# Patient Record
Sex: MALE | Race: WHITE | URBAN - NONMETROPOLITAN AREA
[De-identification: names, ages, dates, MRNs, and addresses within clinical notes are randomized per-mention and may not be internally consistent; named-entity substitution may affect disease eponyms.]

---

## 2017-03-17 ENCOUNTER — DOCTOR'S OFFICE (OUTPATIENT)
Dept: URBAN - NONMETROPOLITAN AREA CLINIC 1 | Facility: CLINIC | Age: 52
Setting detail: OPHTHALMOLOGY
End: 2017-03-17
Payer: COMMERCIAL

## 2017-03-17 DIAGNOSIS — H44.23: ICD-10-CM

## 2017-03-17 DIAGNOSIS — H04.123: ICD-10-CM

## 2017-03-17 DIAGNOSIS — H33.22: ICD-10-CM

## 2017-03-17 DIAGNOSIS — H43.813: ICD-10-CM

## 2017-03-17 PROCEDURE — 92134 CPTRZ OPH DX IMG PST SGM RTA: CPT | Performed by: OPHTHALMOLOGY

## 2017-03-17 PROCEDURE — 76512 OPH US DX B-SCAN: CPT | Performed by: OPHTHALMOLOGY

## 2017-03-17 PROCEDURE — 92226 OPHTHALMOSCOPY EXT SUBSEQUENT: CPT | Performed by: OPHTHALMOLOGY

## 2017-03-17 PROCEDURE — 92014 COMPRE OPH EXAM EST PT 1/>: CPT | Performed by: OPHTHALMOLOGY

## 2017-03-17 ASSESSMENT — REFRACTION_CURRENTRX
OS_OVR_VA: 20/
OD_OVR_VA: 20/
OD_OVR_VA: 20/
OS_OVR_VA: 20/
OS_OVR_VA: 20/
OD_OVR_VA: 20/

## 2017-03-17 ASSESSMENT — REFRACTION_MANIFEST
OS_VA2: 20/
OS_VA1: 20/
OD_VA1: 20/
OS_VA3: 20/
OD_VA2: 20/
OS_VA3: 20/
OU_VA: 20/
OU_VA: 20/
OS_VA3: 20/
OS_VA1: 20/
OD_VA1: 20/
OU_VA: 20/
OD_VA3: 20/
OS_VA1: 20/
OS_VA2: 20/
OS_VA2: 20/
OD_VA2: 20/
OD_VA3: 20/
OD_VA3: 20/
OD_VA1: 20/
OD_VA2: 20/

## 2017-03-17 ASSESSMENT — VISUAL ACUITY
OD_BCVA: 20/CFX2'
OS_BCVA: 20/25-2

## 2017-03-17 ASSESSMENT — SPHEQUIV_DERIVED
OD_SPHEQUIV: 1
OS_SPHEQUIV: 0.25

## 2017-03-17 ASSESSMENT — TEAR BREAK UP TIME (TBUT)
OD_TBUT: T
OS_TBUT: T

## 2017-03-17 ASSESSMENT — SUPERFICIAL PUNCTATE KERATITIS (SPK)
OD_SPK: ABSENT
OS_SPK: 1+

## 2017-03-17 ASSESSMENT — CONFRONTATIONAL VISUAL FIELD TEST (CVF)
OD_FINDINGS: FULL
OS_FINDINGS: FULL

## 2017-03-17 ASSESSMENT — REFRACTION_AUTOREFRACTION
OS_AXIS: 38
OD_CYLINDER: -1.00
OD_SPHERE: +1.50
OS_SPHERE: +0.75
OS_CYLINDER: -1.00
OD_AXIS: 6

## 2017-06-19 ENCOUNTER — DOCTOR'S OFFICE (OUTPATIENT)
Dept: URBAN - NONMETROPOLITAN AREA CLINIC 1 | Facility: CLINIC | Age: 52
Setting detail: OPHTHALMOLOGY
End: 2017-06-19
Payer: COMMERCIAL

## 2017-06-19 DIAGNOSIS — H43.811: ICD-10-CM

## 2017-06-19 DIAGNOSIS — H43.812: ICD-10-CM

## 2017-06-19 DIAGNOSIS — H33.22: ICD-10-CM

## 2017-06-19 DIAGNOSIS — H43.813: ICD-10-CM

## 2017-06-19 DIAGNOSIS — H04.123: ICD-10-CM

## 2017-06-19 DIAGNOSIS — H44.23: ICD-10-CM

## 2017-06-19 PROCEDURE — 92014 COMPRE OPH EXAM EST PT 1/>: CPT | Performed by: OPHTHALMOLOGY

## 2017-06-19 PROCEDURE — 92226 OPHTHALMOSCOPY EXT SUBSEQUENT: CPT | Performed by: OPHTHALMOLOGY

## 2017-06-19 PROCEDURE — 92134 CPTRZ OPH DX IMG PST SGM RTA: CPT | Performed by: OPHTHALMOLOGY

## 2017-06-19 ASSESSMENT — REFRACTION_MANIFEST
OD_VA2: 20/
OS_VA1: 20/
OU_VA: 20/
OD_VA1: 20/
OS_VA2: 20/
OD_VA2: 20/
OS_VA3: 20/
OS_VA2: 20/
OD_VA1: 20/
OD_VA3: 20/
OU_VA: 20/
OU_VA: 20/
OS_VA3: 20/
OD_VA3: 20/
OS_VA3: 20/
OS_VA2: 20/
OD_VA2: 20/
OD_VA1: 20/
OS_VA1: 20/
OS_VA1: 20/
OD_VA3: 20/

## 2017-06-19 ASSESSMENT — REFRACTION_AUTOREFRACTION
OD_CYLINDER: -1.00
OS_SPHERE: +0.75
OD_SPHERE: +1.50
OD_AXIS: 6
OS_CYLINDER: -1.00
OS_AXIS: 38

## 2017-06-19 ASSESSMENT — VISUAL ACUITY
OS_BCVA: 20/30-2
OD_BCVA: CF@FACE

## 2017-06-19 ASSESSMENT — SPHEQUIV_DERIVED
OD_SPHEQUIV: 1
OS_SPHEQUIV: 0.25

## 2017-06-19 ASSESSMENT — REFRACTION_CURRENTRX
OS_OVR_VA: 20/
OS_OVR_VA: 20/
OD_OVR_VA: 20/
OS_OVR_VA: 20/
OD_OVR_VA: 20/
OD_OVR_VA: 20/

## 2017-06-19 ASSESSMENT — CONFRONTATIONAL VISUAL FIELD TEST (CVF)
OD_FINDINGS: FULL
OS_FINDINGS: FULL

## 2017-06-19 ASSESSMENT — TEAR BREAK UP TIME (TBUT)
OD_TBUT: T
OS_TBUT: T

## 2017-06-19 ASSESSMENT — SUPERFICIAL PUNCTATE KERATITIS (SPK)
OD_SPK: ABSENT
OS_SPK: 1+

## 2017-10-05 ENCOUNTER — DOCTOR'S OFFICE (OUTPATIENT)
Dept: URBAN - NONMETROPOLITAN AREA CLINIC 1 | Facility: CLINIC | Age: 52
Setting detail: OPHTHALMOLOGY
End: 2017-10-05
Payer: COMMERCIAL

## 2017-10-05 DIAGNOSIS — H43.813: ICD-10-CM

## 2017-10-05 DIAGNOSIS — H33.22: ICD-10-CM

## 2017-10-05 DIAGNOSIS — H44.23: ICD-10-CM

## 2017-10-05 DIAGNOSIS — H04.123: ICD-10-CM

## 2017-10-05 DIAGNOSIS — H43.811: ICD-10-CM

## 2017-10-05 DIAGNOSIS — H43.812: ICD-10-CM

## 2017-10-05 PROCEDURE — 92226 OPHTHALMOSCOPY EXT SUBSEQUENT: CPT | Performed by: OPHTHALMOLOGY

## 2017-10-05 PROCEDURE — 92134 CPTRZ OPH DX IMG PST SGM RTA: CPT | Performed by: OPHTHALMOLOGY

## 2017-10-05 PROCEDURE — 92014 COMPRE OPH EXAM EST PT 1/>: CPT | Performed by: OPHTHALMOLOGY

## 2017-10-05 ASSESSMENT — SPHEQUIV_DERIVED
OD_SPHEQUIV: 1
OS_SPHEQUIV: 0.25

## 2017-10-05 ASSESSMENT — REFRACTION_MANIFEST
OD_VA1: 20/
OS_VA2: 20/
OS_VA3: 20/
OS_VA2: 20/
OD_VA2: 20/
OS_VA1: 20/
OU_VA: 20/
OS_VA3: 20/
OD_VA3: 20/
OS_VA1: 20/
OD_VA1: 20/
OD_VA2: 20/
OS_VA1: 20/
OU_VA: 20/
OD_VA3: 20/
OS_VA2: 20/
OU_VA: 20/
OS_VA3: 20/
OD_VA3: 20/
OD_VA1: 20/
OD_VA2: 20/

## 2017-10-05 ASSESSMENT — REFRACTION_AUTOREFRACTION
OD_SPHERE: +1.50
OD_CYLINDER: -1.00
OS_AXIS: 38
OS_SPHERE: +0.75
OD_AXIS: 6
OS_CYLINDER: -1.00

## 2017-10-05 ASSESSMENT — REFRACTION_CURRENTRX
OD_OVR_VA: 20/
OS_OVR_VA: 20/
OD_OVR_VA: 20/
OS_OVR_VA: 20/
OS_OVR_VA: 20/
OD_OVR_VA: 20/

## 2017-10-05 ASSESSMENT — CONFRONTATIONAL VISUAL FIELD TEST (CVF)
OD_FINDINGS: FULL
OS_FINDINGS: FULL

## 2017-10-05 ASSESSMENT — TEAR BREAK UP TIME (TBUT)
OS_TBUT: T
OD_TBUT: T

## 2017-10-05 ASSESSMENT — SUPERFICIAL PUNCTATE KERATITIS (SPK)
OD_SPK: ABSENT
OS_SPK: 1+

## 2017-10-05 ASSESSMENT — VISUAL ACUITY
OD_BCVA: CF@FACE
OS_BCVA: 20/25-1

## 2017-11-21 ENCOUNTER — DOCTOR'S OFFICE (OUTPATIENT)
Dept: URBAN - NONMETROPOLITAN AREA CLINIC 2 | Facility: CLINIC | Age: 52
Setting detail: OPHTHALMOLOGY
End: 2017-11-21
Payer: COMMERCIAL

## 2017-11-21 DIAGNOSIS — H44.23: ICD-10-CM

## 2017-11-21 DIAGNOSIS — H04.123: ICD-10-CM

## 2017-11-21 DIAGNOSIS — H33.22: ICD-10-CM

## 2017-11-21 DIAGNOSIS — H10.503: ICD-10-CM

## 2017-11-21 DIAGNOSIS — H43.813: ICD-10-CM

## 2017-11-21 PROCEDURE — 92014 COMPRE OPH EXAM EST PT 1/>: CPT | Performed by: OPHTHALMOLOGY

## 2017-11-21 ASSESSMENT — REFRACTION_MANIFEST
OS_VA1: 20/
OD_VA1: 20/
OU_VA: 20/
OS_VA3: 20/
OS_VA3: 20/
OD_VA2: 20/
OS_VA2: 20/
OS_VA1: 20/
OD_VA3: 20/
OS_VA2: 20/
OS_VA2: 20/
OD_VA1: 20/
OU_VA: 20/
OD_VA3: 20/
OU_VA: 20/
OS_VA1: 20/
OD_VA2: 20/
OD_VA1: 20/
OS_VA3: 20/
OD_VA3: 20/
OD_VA2: 20/

## 2017-11-21 ASSESSMENT — REFRACTION_CURRENTRX
OD_OVR_VA: 20/
OS_OVR_VA: 20/
OD_OVR_VA: 20/
OD_OVR_VA: 20/
OS_OVR_VA: 20/
OS_OVR_VA: 20/

## 2017-11-21 ASSESSMENT — VISUAL ACUITY
OD_BCVA: CF@FACE
OS_BCVA: 20/30-1

## 2017-11-21 ASSESSMENT — SPHEQUIV_DERIVED
OD_SPHEQUIV: 1
OS_SPHEQUIV: 0.25

## 2017-11-21 ASSESSMENT — TEAR BREAK UP TIME (TBUT)
OS_TBUT: T
OD_TBUT: T

## 2017-11-21 ASSESSMENT — REFRACTION_AUTOREFRACTION
OS_AXIS: 38
OD_CYLINDER: -1.00
OD_AXIS: 6
OS_CYLINDER: -1.00
OD_SPHERE: +1.50
OS_SPHERE: +0.75

## 2017-11-21 ASSESSMENT — CONFRONTATIONAL VISUAL FIELD TEST (CVF)
OS_FINDINGS: FULL
OD_FINDINGS: FULL

## 2017-11-21 ASSESSMENT — SUPERFICIAL PUNCTATE KERATITIS (SPK)
OD_SPK: ABSENT
OS_SPK: 1+

## 2017-11-27 ENCOUNTER — RX ONLY (RX ONLY)
Age: 52
End: 2017-11-27

## 2017-11-27 ENCOUNTER — DOCTOR'S OFFICE (OUTPATIENT)
Dept: URBAN - NONMETROPOLITAN AREA CLINIC 1 | Facility: CLINIC | Age: 52
Setting detail: OPHTHALMOLOGY
End: 2017-11-27
Payer: COMMERCIAL

## 2017-11-27 DIAGNOSIS — H04.123: ICD-10-CM

## 2017-11-27 DIAGNOSIS — H10.501: ICD-10-CM

## 2017-11-27 DIAGNOSIS — H43.813: ICD-10-CM

## 2017-11-27 DIAGNOSIS — H10.502: ICD-10-CM

## 2017-11-27 DIAGNOSIS — H33.22: ICD-10-CM

## 2017-11-27 DIAGNOSIS — H44.23: ICD-10-CM

## 2017-11-27 PROCEDURE — 99213 OFFICE O/P EST LOW 20 MIN: CPT | Performed by: OPHTHALMOLOGY

## 2017-11-27 ASSESSMENT — REFRACTION_AUTOREFRACTION
OS_AXIS: 38
OS_SPHERE: +0.75
OD_CYLINDER: -1.00
OS_CYLINDER: -1.00
OD_AXIS: 6
OD_SPHERE: +1.50

## 2017-11-27 ASSESSMENT — REFRACTION_MANIFEST
OD_VA3: 20/
OS_VA2: 20/
OS_VA1: 20/
OU_VA: 20/
OD_VA2: 20/
OU_VA: 20/
OS_VA3: 20/
OS_VA2: 20/
OD_VA1: 20/
OS_VA1: 20/
OD_VA1: 20/
OD_VA3: 20/
OU_VA: 20/
OS_VA2: 20/
OD_VA2: 20/
OD_VA3: 20/
OS_VA1: 20/
OS_VA3: 20/
OD_VA2: 20/
OD_VA1: 20/
OS_VA3: 20/

## 2017-11-27 ASSESSMENT — SPHEQUIV_DERIVED
OD_SPHEQUIV: 1
OS_SPHEQUIV: 0.25

## 2017-11-27 ASSESSMENT — VISUAL ACUITY
OD_BCVA: HM
OS_BCVA: 20/25-1

## 2017-11-27 ASSESSMENT — REFRACTION_CURRENTRX
OS_OVR_VA: 20/
OD_OVR_VA: 20/
OS_OVR_VA: 20/
OD_OVR_VA: 20/
OS_OVR_VA: 20/
OD_OVR_VA: 20/

## 2017-11-27 ASSESSMENT — SUPERFICIAL PUNCTATE KERATITIS (SPK)
OD_SPK: ABSENT
OS_SPK: 1+

## 2017-11-27 ASSESSMENT — TEAR BREAK UP TIME (TBUT)
OD_TBUT: T
OS_TBUT: T

## 2017-11-27 ASSESSMENT — CONFRONTATIONAL VISUAL FIELD TEST (CVF)
OD_FINDINGS: FULL
OS_FINDINGS: FULL

## 2017-12-11 ENCOUNTER — DOCTOR'S OFFICE (OUTPATIENT)
Dept: URBAN - NONMETROPOLITAN AREA CLINIC 1 | Facility: CLINIC | Age: 52
Setting detail: OPHTHALMOLOGY
End: 2017-12-11
Payer: COMMERCIAL

## 2017-12-11 DIAGNOSIS — H44.23: ICD-10-CM

## 2017-12-11 DIAGNOSIS — H43.813: ICD-10-CM

## 2017-12-11 DIAGNOSIS — H33.22: ICD-10-CM

## 2017-12-11 DIAGNOSIS — H04.123: ICD-10-CM

## 2017-12-11 PROCEDURE — 92012 INTRM OPH EXAM EST PATIENT: CPT | Performed by: OPHTHALMOLOGY

## 2017-12-11 ASSESSMENT — REFRACTION_CURRENTRX
OD_OVR_VA: 20/
OS_OVR_VA: 20/
OD_OVR_VA: 20/
OD_OVR_VA: 20/

## 2017-12-11 ASSESSMENT — REFRACTION_MANIFEST
OU_VA: 20/
OD_VA3: 20/
OS_VA2: 20/
OD_VA2: 20/
OS_VA3: 20/
OS_VA1: 20/
OS_VA2: 20/
OS_VA1: 20/
OD_VA2: 20/
OD_VA3: 20/
OD_VA1: 20/
OS_VA1: 20/
OD_VA2: 20/
OU_VA: 20/
OD_VA1: 20/
OS_VA3: 20/
OU_VA: 20/
OS_VA3: 20/
OD_VA1: 20/
OD_VA3: 20/
OS_VA2: 20/

## 2017-12-11 ASSESSMENT — REFRACTION_AUTOREFRACTION
OS_AXIS: 38
OS_SPHERE: +0.75
OD_AXIS: 6
OD_CYLINDER: -1.00
OS_CYLINDER: -1.00
OD_SPHERE: +1.50

## 2017-12-11 ASSESSMENT — SPHEQUIV_DERIVED
OD_SPHEQUIV: 1
OS_SPHEQUIV: 0.25

## 2017-12-11 ASSESSMENT — VISUAL ACUITY
OS_BCVA: 20/30
OD_BCVA: HM

## 2017-12-11 ASSESSMENT — SUPERFICIAL PUNCTATE KERATITIS (SPK)
OD_SPK: ABSENT
OS_SPK: 1+

## 2017-12-11 ASSESSMENT — TEAR BREAK UP TIME (TBUT)
OD_TBUT: T
OS_TBUT: T

## 2017-12-11 ASSESSMENT — CONFRONTATIONAL VISUAL FIELD TEST (CVF)
OS_FINDINGS: FULL
OD_FINDINGS: FULL

## 2017-12-21 ENCOUNTER — DOCTOR'S OFFICE (OUTPATIENT)
Dept: URBAN - NONMETROPOLITAN AREA CLINIC 1 | Facility: CLINIC | Age: 52
Setting detail: OPHTHALMOLOGY
End: 2017-12-21
Payer: COMMERCIAL

## 2017-12-21 DIAGNOSIS — H43.813: ICD-10-CM

## 2017-12-21 DIAGNOSIS — H10.503: ICD-10-CM

## 2017-12-21 PROCEDURE — 92012 INTRM OPH EXAM EST PATIENT: CPT | Performed by: OPHTHALMOLOGY

## 2017-12-21 ASSESSMENT — VISUAL ACUITY
OS_BCVA: 20/50+2
OD_BCVA: CF

## 2017-12-21 ASSESSMENT — SUPERFICIAL PUNCTATE KERATITIS (SPK)
OS_SPK: 1+
OD_SPK: ABSENT

## 2017-12-21 ASSESSMENT — REFRACTION_MANIFEST
OS_VA3: 20/
OS_VA3: 20/
OS_VA1: 20/
OS_VA2: 20/
OS_VA2: 20/
OD_VA2: 20/
OD_VA1: 20/
OD_VA1: 20/
OD_VA2: 20/
OD_VA2: 20/
OU_VA: 20/
OD_VA3: 20/
OU_VA: 20/
OD_VA1: 20/
OS_VA1: 20/
OD_VA3: 20/
OS_VA1: 20/
OS_VA2: 20/
OS_VA3: 20/
OU_VA: 20/
OD_VA3: 20/

## 2017-12-21 ASSESSMENT — TEAR BREAK UP TIME (TBUT)
OD_TBUT: T
OS_TBUT: T

## 2017-12-21 ASSESSMENT — REFRACTION_CURRENTRX
OD_OVR_VA: 20/
OS_OVR_VA: 20/
OD_OVR_VA: 20/
OS_OVR_VA: 20/
OD_OVR_VA: 20/
OS_OVR_VA: 20/

## 2017-12-21 ASSESSMENT — CONFRONTATIONAL VISUAL FIELD TEST (CVF)
OD_FINDINGS: FULL
OS_FINDINGS: FULL

## 2017-12-21 ASSESSMENT — REFRACTION_AUTOREFRACTION
OD_SPHERE: +1.50
OD_AXIS: 6
OS_AXIS: 38
OD_CYLINDER: -1.00
OS_CYLINDER: -1.00
OS_SPHERE: +0.75

## 2017-12-21 ASSESSMENT — SPHEQUIV_DERIVED
OS_SPHEQUIV: 0.25
OD_SPHEQUIV: 1

## 2018-01-11 ENCOUNTER — DOCTOR'S OFFICE (OUTPATIENT)
Dept: URBAN - NONMETROPOLITAN AREA CLINIC 1 | Facility: CLINIC | Age: 53
Setting detail: OPHTHALMOLOGY
End: 2018-01-11
Payer: COMMERCIAL

## 2018-01-11 DIAGNOSIS — H43.813: ICD-10-CM

## 2018-01-11 DIAGNOSIS — H43.812: ICD-10-CM

## 2018-01-11 DIAGNOSIS — S05.12XA: ICD-10-CM

## 2018-01-11 DIAGNOSIS — H33.22: ICD-10-CM

## 2018-01-11 DIAGNOSIS — H43.811: ICD-10-CM

## 2018-01-11 DIAGNOSIS — H10.503: ICD-10-CM

## 2018-01-11 PROCEDURE — 92014 COMPRE OPH EXAM EST PT 1/>: CPT | Performed by: OPHTHALMOLOGY

## 2018-01-11 PROCEDURE — 92134 CPTRZ OPH DX IMG PST SGM RTA: CPT | Performed by: OPHTHALMOLOGY

## 2018-01-11 PROCEDURE — 92226 OPHTHALMOSCOPY EXT SUBSEQUENT: CPT | Performed by: OPHTHALMOLOGY

## 2018-01-11 ASSESSMENT — REFRACTION_MANIFEST
OD_VA1: 20/
OD_VA3: 20/
OU_VA: 20/
OS_VA3: 20/
OD_VA1: 20/
OU_VA: 20/
OD_VA2: 20/
OS_VA1: 20/
OS_VA2: 20/
OD_VA2: 20/
OS_VA2: 20/
OS_VA1: 20/
OS_VA3: 20/
OS_VA2: 20/
OS_VA1: 20/
OU_VA: 20/
OD_VA3: 20/
OS_VA3: 20/
OD_VA3: 20/
OD_VA1: 20/
OD_VA2: 20/

## 2018-01-11 ASSESSMENT — CONFRONTATIONAL VISUAL FIELD TEST (CVF)
OD_FINDINGS: FULL
OS_FINDINGS: FULL

## 2018-01-11 ASSESSMENT — TEAR BREAK UP TIME (TBUT)
OS_TBUT: T
OD_TBUT: T

## 2018-01-11 ASSESSMENT — REFRACTION_AUTOREFRACTION
OS_AXIS: 38
OD_CYLINDER: -1.00
OS_SPHERE: +0.75
OD_SPHERE: +1.50
OD_AXIS: 6
OS_CYLINDER: -1.00

## 2018-01-11 ASSESSMENT — VISUAL ACUITY
OS_BCVA: 20/30-2
OD_BCVA: 20/CF XS 3'

## 2018-01-11 ASSESSMENT — REFRACTION_CURRENTRX
OS_OVR_VA: 20/
OS_OVR_VA: 20/
OD_OVR_VA: 20/
OS_OVR_VA: 20/
OD_OVR_VA: 20/
OD_OVR_VA: 20/

## 2018-01-11 ASSESSMENT — SUPERFICIAL PUNCTATE KERATITIS (SPK)
OD_SPK: ABSENT
OS_SPK: 1+

## 2018-01-11 ASSESSMENT — SPHEQUIV_DERIVED
OD_SPHEQUIV: 1
OS_SPHEQUIV: 0.25

## 2018-01-18 ENCOUNTER — RX ONLY (RX ONLY)
Age: 53
End: 2018-01-18

## 2018-01-25 ENCOUNTER — DOCTOR'S OFFICE (OUTPATIENT)
Dept: URBAN - NONMETROPOLITAN AREA CLINIC 1 | Facility: CLINIC | Age: 53
Setting detail: OPHTHALMOLOGY
End: 2018-01-25
Payer: COMMERCIAL

## 2018-01-25 DIAGNOSIS — H10.503: ICD-10-CM

## 2018-01-25 DIAGNOSIS — H43.813: ICD-10-CM

## 2018-01-25 DIAGNOSIS — S05.12XA: ICD-10-CM

## 2018-01-25 DIAGNOSIS — H33.22: ICD-10-CM

## 2018-01-25 PROCEDURE — 92014 COMPRE OPH EXAM EST PT 1/>: CPT | Performed by: OPHTHALMOLOGY

## 2018-01-25 ASSESSMENT — REFRACTION_AUTOREFRACTION
OS_SPHERE: +0.75
OD_SPHERE: +1.50
OD_CYLINDER: -1.00
OS_AXIS: 38
OS_CYLINDER: -1.00
OD_AXIS: 6

## 2018-01-25 ASSESSMENT — TEAR BREAK UP TIME (TBUT)
OS_TBUT: T
OD_TBUT: T

## 2018-01-25 ASSESSMENT — REFRACTION_MANIFEST
OS_VA1: 20/
OD_VA2: 20/
OD_VA1: 20/
OD_VA3: 20/
OU_VA: 20/
OS_VA3: 20/
OS_VA3: 20/
OD_VA2: 20/
OS_VA2: 20/
OD_VA3: 20/
OS_VA1: 20/
OS_VA3: 20/
OD_VA2: 20/
OS_VA2: 20/
OD_VA3: 20/
OD_VA1: 20/
OU_VA: 20/
OS_VA2: 20/
OU_VA: 20/
OD_VA1: 20/
OS_VA1: 20/

## 2018-01-25 ASSESSMENT — REFRACTION_CURRENTRX
OS_OVR_VA: 20/
OD_OVR_VA: 20/
OD_OVR_VA: 20/
OS_OVR_VA: 20/
OD_OVR_VA: 20/
OS_OVR_VA: 20/

## 2018-01-25 ASSESSMENT — CONFRONTATIONAL VISUAL FIELD TEST (CVF)
OD_FINDINGS: FULL
OS_FINDINGS: FULL

## 2018-01-25 ASSESSMENT — SPHEQUIV_DERIVED
OD_SPHEQUIV: 1
OS_SPHEQUIV: 0.25

## 2018-01-25 ASSESSMENT — SUPERFICIAL PUNCTATE KERATITIS (SPK)
OS_SPK: 1+
OD_SPK: ABSENT

## 2018-01-25 ASSESSMENT — VISUAL ACUITY
OD_BCVA: 20/CF XS 3'
OS_BCVA: 20/25-2

## 2018-02-19 ENCOUNTER — DOCTOR'S OFFICE (OUTPATIENT)
Dept: URBAN - NONMETROPOLITAN AREA CLINIC 1 | Facility: CLINIC | Age: 53
Setting detail: OPHTHALMOLOGY
End: 2018-02-19
Payer: COMMERCIAL

## 2018-02-19 DIAGNOSIS — H43.812: ICD-10-CM

## 2018-02-19 DIAGNOSIS — H33.22: ICD-10-CM

## 2018-02-19 DIAGNOSIS — S05.12XA: ICD-10-CM

## 2018-02-19 DIAGNOSIS — H43.813: ICD-10-CM

## 2018-02-19 DIAGNOSIS — H10.502: ICD-10-CM

## 2018-02-19 DIAGNOSIS — H10.501: ICD-10-CM

## 2018-02-19 DIAGNOSIS — H43.811: ICD-10-CM

## 2018-02-19 PROCEDURE — 92226 OPHTHALMOSCOPY EXT SUBSEQUENT: CPT | Performed by: OPHTHALMOLOGY

## 2018-02-19 PROCEDURE — 92014 COMPRE OPH EXAM EST PT 1/>: CPT | Performed by: OPHTHALMOLOGY

## 2018-02-19 ASSESSMENT — REFRACTION_AUTOREFRACTION
OS_AXIS: 38
OD_CYLINDER: -1.00
OS_SPHERE: +0.75
OD_SPHERE: +1.50
OS_CYLINDER: -1.00
OD_AXIS: 6

## 2018-02-19 ASSESSMENT — REFRACTION_MANIFEST
OS_VA3: 20/
OU_VA: 20/
OD_VA1: 20/
OS_VA3: 20/
OD_VA2: 20/
OD_VA2: 20/
OS_VA1: 20/
OS_VA1: 20/
OD_VA2: 20/
OD_VA1: 20/
OS_VA3: 20/
OU_VA: 20/
OS_VA2: 20/
OD_VA3: 20/
OU_VA: 20/
OS_VA2: 20/
OD_VA3: 20/
OS_VA2: 20/
OS_VA1: 20/
OD_VA3: 20/
OD_VA1: 20/

## 2018-02-19 ASSESSMENT — REFRACTION_CURRENTRX
OD_OVR_VA: 20/
OS_OVR_VA: 20/
OS_OVR_VA: 20/
OD_OVR_VA: 20/
OS_OVR_VA: 20/
OD_OVR_VA: 20/

## 2018-02-19 ASSESSMENT — TEAR BREAK UP TIME (TBUT)
OS_TBUT: T
OD_TBUT: T

## 2018-02-19 ASSESSMENT — CONFRONTATIONAL VISUAL FIELD TEST (CVF)
OD_FINDINGS: FULL
OS_FINDINGS: FULL

## 2018-02-19 ASSESSMENT — VISUAL ACUITY: OS_BCVA: 20/30-2

## 2018-02-19 ASSESSMENT — SUPERFICIAL PUNCTATE KERATITIS (SPK)
OS_SPK: 1+
OD_SPK: ABSENT

## 2018-02-19 ASSESSMENT — SPHEQUIV_DERIVED
OD_SPHEQUIV: 1
OS_SPHEQUIV: 0.25

## 2018-05-17 ENCOUNTER — DOCTOR'S OFFICE (OUTPATIENT)
Dept: URBAN - NONMETROPOLITAN AREA CLINIC 1 | Facility: CLINIC | Age: 53
Setting detail: OPHTHALMOLOGY
End: 2018-05-17
Payer: COMMERCIAL

## 2018-05-17 DIAGNOSIS — H10.502: ICD-10-CM

## 2018-05-17 DIAGNOSIS — H10.501: ICD-10-CM

## 2018-05-17 DIAGNOSIS — H43.811: ICD-10-CM

## 2018-05-17 DIAGNOSIS — H43.813: ICD-10-CM

## 2018-05-17 DIAGNOSIS — H43.812: ICD-10-CM

## 2018-05-17 DIAGNOSIS — H04.123: ICD-10-CM

## 2018-05-17 DIAGNOSIS — S05.12XA: ICD-10-CM

## 2018-05-17 DIAGNOSIS — H33.22: ICD-10-CM

## 2018-05-17 PROCEDURE — 92226 OPHTHALMOSCOPY EXT SUBSEQUENT: CPT | Performed by: OPHTHALMOLOGY

## 2018-05-17 PROCEDURE — 92014 COMPRE OPH EXAM EST PT 1/>: CPT | Performed by: OPHTHALMOLOGY

## 2018-05-17 PROCEDURE — 92134 CPTRZ OPH DX IMG PST SGM RTA: CPT | Performed by: OPHTHALMOLOGY

## 2018-05-17 ASSESSMENT — SUPERFICIAL PUNCTATE KERATITIS (SPK)
OD_SPK: ABSENT
OS_SPK: 1+

## 2018-05-17 ASSESSMENT — CONFRONTATIONAL VISUAL FIELD TEST (CVF)
OD_FINDINGS: FULL
OS_FINDINGS: FULL

## 2018-05-17 ASSESSMENT — REFRACTION_CURRENTRX
OS_OVR_VA: 20/
OD_OVR_VA: 20/
OS_OVR_VA: 20/
OD_OVR_VA: 20/
OD_OVR_VA: 20/
OS_OVR_VA: 20/

## 2018-05-17 ASSESSMENT — REFRACTION_MANIFEST
OD_VA1: 20/
OS_VA3: 20/
OS_VA3: 20/
OD_VA2: 20/
OS_VA2: 20/
OD_VA2: 20/
OD_VA1: 20/
OU_VA: 20/
OS_VA2: 20/
OD_VA1: 20/
OD_VA3: 20/
OS_VA1: 20/
OU_VA: 20/
OU_VA: 20/
OS_VA1: 20/
OD_VA3: 20/
OS_VA2: 20/
OD_VA2: 20/
OS_VA1: 20/
OD_VA3: 20/
OS_VA3: 20/

## 2018-05-17 ASSESSMENT — SPHEQUIV_DERIVED
OS_SPHEQUIV: 0.25
OD_SPHEQUIV: 1

## 2018-05-17 ASSESSMENT — REFRACTION_AUTOREFRACTION
OD_AXIS: 6
OS_AXIS: 38
OD_SPHERE: +1.50
OS_CYLINDER: -1.00
OD_CYLINDER: -1.00
OS_SPHERE: +0.75

## 2018-05-17 ASSESSMENT — VISUAL ACUITY
OS_BCVA: 20/30-2
OD_BCVA: 20/CFX2'

## 2018-05-17 ASSESSMENT — TEAR BREAK UP TIME (TBUT)
OS_TBUT: T
OD_TBUT: T

## 2018-08-23 ENCOUNTER — DOCTOR'S OFFICE (OUTPATIENT)
Dept: URBAN - NONMETROPOLITAN AREA CLINIC 1 | Facility: CLINIC | Age: 53
Setting detail: OPHTHALMOLOGY
End: 2018-08-23
Payer: COMMERCIAL

## 2018-08-23 DIAGNOSIS — H43.811: ICD-10-CM

## 2018-08-23 DIAGNOSIS — H43.812: ICD-10-CM

## 2018-08-23 DIAGNOSIS — H04.122: ICD-10-CM

## 2018-08-23 DIAGNOSIS — S05.12XA: ICD-10-CM

## 2018-08-23 DIAGNOSIS — H10.503: ICD-10-CM

## 2018-08-23 DIAGNOSIS — H43.813: ICD-10-CM

## 2018-08-23 DIAGNOSIS — H04.121: ICD-10-CM

## 2018-08-23 DIAGNOSIS — H33.22: ICD-10-CM

## 2018-08-23 PROCEDURE — 83861 MICROFLUID ANALY TEARS: CPT | Performed by: OPHTHALMOLOGY

## 2018-08-23 PROCEDURE — 92134 CPTRZ OPH DX IMG PST SGM RTA: CPT | Performed by: OPHTHALMOLOGY

## 2018-08-23 PROCEDURE — 92226 OPHTHALMOSCOPY EXT SUBSEQUENT: CPT | Performed by: OPHTHALMOLOGY

## 2018-08-23 PROCEDURE — 92014 COMPRE OPH EXAM EST PT 1/>: CPT | Performed by: OPHTHALMOLOGY

## 2018-08-23 ASSESSMENT — REFRACTION_MANIFEST
OS_VA2: 20/
OU_VA: 20/
OD_VA3: 20/
OD_VA3: 20/
OS_VA1: 20/
OS_VA2: 20/
OS_VA3: 20/
OD_VA1: 20/
OS_VA2: 20/
OS_VA1: 20/
OD_VA2: 20/
OD_VA2: 20/
OU_VA: 20/
OU_VA: 20/
OS_VA3: 20/
OD_VA2: 20/
OS_VA1: 20/
OD_VA1: 20/
OS_VA3: 20/
OD_VA1: 20/
OD_VA3: 20/

## 2018-08-23 ASSESSMENT — SPHEQUIV_DERIVED
OS_SPHEQUIV: 0.25
OD_SPHEQUIV: 1

## 2018-08-23 ASSESSMENT — REFRACTION_CURRENTRX
OS_OVR_VA: 20/
OS_OVR_VA: 20/
OD_OVR_VA: 20/
OD_OVR_VA: 20/
OS_OVR_VA: 20/
OD_OVR_VA: 20/

## 2018-08-23 ASSESSMENT — REFRACTION_AUTOREFRACTION
OD_AXIS: 6
OS_AXIS: 38
OS_CYLINDER: -1.00
OD_CYLINDER: -1.00
OS_SPHERE: +0.75
OD_SPHERE: +1.50

## 2018-08-23 ASSESSMENT — SUPERFICIAL PUNCTATE KERATITIS (SPK)
OD_SPK: ABSENT
OS_SPK: 1+

## 2018-08-23 ASSESSMENT — TEAR BREAK UP TIME (TBUT)
OD_TBUT: T
OS_TBUT: T

## 2018-08-23 ASSESSMENT — CONFRONTATIONAL VISUAL FIELD TEST (CVF)
OD_FINDINGS: FULL
OS_FINDINGS: FULL

## 2018-08-23 ASSESSMENT — VISUAL ACUITY
OD_BCVA: 20/CFX3'
OS_BCVA: 20/40-1

## 2018-09-24 ENCOUNTER — DOCTOR'S OFFICE (OUTPATIENT)
Dept: URBAN - NONMETROPOLITAN AREA CLINIC 1 | Facility: CLINIC | Age: 53
Setting detail: OPHTHALMOLOGY
End: 2018-09-24
Payer: COMMERCIAL

## 2018-09-24 ENCOUNTER — OPTICAL OFFICE (OUTPATIENT)
Dept: URBAN - NONMETROPOLITAN AREA CLINIC 4 | Facility: CLINIC | Age: 53
Setting detail: OPHTHALMOLOGY
End: 2018-09-24

## 2018-09-24 DIAGNOSIS — Z96.1: ICD-10-CM

## 2018-09-24 DIAGNOSIS — H52.221: ICD-10-CM

## 2018-09-24 PROCEDURE — 92015 DETERMINE REFRACTIVE STATE: CPT | Performed by: OPTOMETRIST

## 2018-09-24 PROCEDURE — V2103 SPHEROCYLINDR 4.00D/12-2.00D: HCPCS | Performed by: OPTOMETRIST

## 2018-09-24 PROCEDURE — V2744 TINT PHOTOCHROMATIC LENS/ES: HCPCS | Performed by: OPTOMETRIST

## 2018-09-24 PROCEDURE — V2784 LENS POLYCARB OR EQUAL: HCPCS | Performed by: OPTOMETRIST

## 2018-09-24 PROCEDURE — V2020 VISION SVCS FRAMES PURCHASES: HCPCS | Performed by: OPTOMETRIST

## 2018-09-24 ASSESSMENT — REFRACTION_AUTOREFRACTION
OD_AXIS: 6
OD_SPHERE: +1.50
OS_AXIS: 38
OS_SPHERE: +0.75
OD_CYLINDER: -1.00
OS_CYLINDER: -1.00

## 2018-09-24 ASSESSMENT — REFRACTION_CURRENTRX
OD_OVR_VA: 20/
OD_SPHERE: +0.75
OS_OVR_VA: 20/
OD_OVR_VA: 20/
OS_OVR_VA: 20/
OS_OVR_VA: 20/
OD_CYLINDER: -0.75
OD_OVR_VA: 20/
OD_AXIS: 009
OS_SPHERE: BALANCE
OD_VPRISM_DIRECTION: PROGS
OD_ADD: +2.25

## 2018-09-24 ASSESSMENT — REFRACTION_MANIFEST
OD_AXIS: 005
OD_VA1: 20/
OD_VA1: 20/30-2
OS_VA2: 20/
OU_VA: 20/
OD_VA3: 20/
OD_ADD: +2.50
OD_SPHERE: +1.25
OD_CYLINDER: -1.00
OS_VA1: 20/CF
OS_VA3: 20/
OD_VA2: 20/
OS_VA1: 20/
OD_VA2: 20/30-2
OS_SPHERE: BALANCE
OS_VA3: 20/
OD_VA3: 20/
OU_VA: 20/
OS_VA2: 20/

## 2018-09-24 ASSESSMENT — VISUAL ACUITY
OS_BCVA: 20/40-1
OD_BCVA: 20/CFX3'

## 2018-09-24 ASSESSMENT — SPHEQUIV_DERIVED
OS_SPHEQUIV: 0.25
OD_SPHEQUIV: 0.75
OD_SPHEQUIV: 1

## 2018-11-26 ENCOUNTER — DOCTOR'S OFFICE (OUTPATIENT)
Dept: URBAN - NONMETROPOLITAN AREA CLINIC 1 | Facility: CLINIC | Age: 53
Setting detail: OPHTHALMOLOGY
End: 2018-11-26
Payer: COMMERCIAL

## 2018-11-26 DIAGNOSIS — S05.12XA: ICD-10-CM

## 2018-11-26 DIAGNOSIS — H43.813: ICD-10-CM

## 2018-11-26 DIAGNOSIS — H10.503: ICD-10-CM

## 2018-11-26 DIAGNOSIS — H04.121: ICD-10-CM

## 2018-11-26 DIAGNOSIS — H43.812: ICD-10-CM

## 2018-11-26 DIAGNOSIS — H04.122: ICD-10-CM

## 2018-11-26 DIAGNOSIS — H43.811: ICD-10-CM

## 2018-11-26 DIAGNOSIS — H04.123: ICD-10-CM

## 2018-11-26 PROCEDURE — 92226 OPHTHALMOSCOPY EXT SUBSEQUENT: CPT | Performed by: OPHTHALMOLOGY

## 2018-11-26 PROCEDURE — 83861 MICROFLUID ANALY TEARS: CPT | Performed by: OPHTHALMOLOGY

## 2018-11-26 PROCEDURE — 92134 CPTRZ OPH DX IMG PST SGM RTA: CPT | Performed by: OPHTHALMOLOGY

## 2018-11-26 PROCEDURE — 92014 COMPRE OPH EXAM EST PT 1/>: CPT | Performed by: OPHTHALMOLOGY

## 2018-11-26 ASSESSMENT — REFRACTION_MANIFEST
OD_ADD: +2.50
OS_VA1: 20/
OD_VA1: 20/
OU_VA: 20/
OD_SPHERE: +1.25
OD_VA2: 20/30-2
OD_VA2: 20/
OD_AXIS: 005
OS_VA3: 20/
OD_VA1: 20/30-2
OU_VA: 20/
OD_VA3: 20/
OS_SPHERE: BALANCE
OS_VA1: 20/CF
OD_VA3: 20/
OS_VA2: 20/
OS_VA2: 20/
OS_VA3: 20/
OD_CYLINDER: -1.00

## 2018-11-26 ASSESSMENT — CONFRONTATIONAL VISUAL FIELD TEST (CVF)
OS_FINDINGS: FULL
OD_FINDINGS: FULL

## 2018-11-26 ASSESSMENT — REFRACTION_CURRENTRX
OS_SPHERE: BALANCE
OS_OVR_VA: 20/
OD_OVR_VA: 20/
OD_AXIS: 009
OD_ADD: +2.25
OS_OVR_VA: 20/
OD_SPHERE: +0.75
OD_VPRISM_DIRECTION: PROGS
OD_OVR_VA: 20/
OS_OVR_VA: 20/
OD_OVR_VA: 20/
OD_CYLINDER: -0.75

## 2018-11-26 ASSESSMENT — REFRACTION_AUTOREFRACTION
OD_AXIS: 6
OS_AXIS: 38
OD_CYLINDER: -1.00
OS_SPHERE: +0.75
OS_CYLINDER: -1.00
OD_SPHERE: +1.50

## 2018-11-26 ASSESSMENT — TEAR BREAK UP TIME (TBUT)
OS_TBUT: T
OD_TBUT: T

## 2018-11-26 ASSESSMENT — SPHEQUIV_DERIVED
OD_SPHEQUIV: 0.75
OD_SPHEQUIV: 1
OS_SPHEQUIV: 0.25

## 2018-11-26 ASSESSMENT — SUPERFICIAL PUNCTATE KERATITIS (SPK)
OD_SPK: ABSENT
OS_SPK: 1+

## 2018-11-26 ASSESSMENT — VISUAL ACUITY: OS_BCVA: 20/30-2

## 2019-03-11 ENCOUNTER — DOCTOR'S OFFICE (OUTPATIENT)
Dept: URBAN - NONMETROPOLITAN AREA CLINIC 1 | Facility: CLINIC | Age: 54
Setting detail: OPHTHALMOLOGY
End: 2019-03-11
Payer: COMMERCIAL

## 2019-03-11 DIAGNOSIS — H10.503: ICD-10-CM

## 2019-03-11 DIAGNOSIS — H33.22: ICD-10-CM

## 2019-03-11 DIAGNOSIS — H04.123: ICD-10-CM

## 2019-03-11 DIAGNOSIS — H04.122: ICD-10-CM

## 2019-03-11 DIAGNOSIS — H43.811: ICD-10-CM

## 2019-03-11 DIAGNOSIS — H43.813: ICD-10-CM

## 2019-03-11 DIAGNOSIS — H43.812: ICD-10-CM

## 2019-03-11 DIAGNOSIS — H04.121: ICD-10-CM

## 2019-03-11 PROCEDURE — 83861 MICROFLUID ANALY TEARS: CPT | Performed by: OPHTHALMOLOGY

## 2019-03-11 PROCEDURE — 92134 CPTRZ OPH DX IMG PST SGM RTA: CPT | Performed by: OPHTHALMOLOGY

## 2019-03-11 PROCEDURE — 92014 COMPRE OPH EXAM EST PT 1/>: CPT | Performed by: OPHTHALMOLOGY

## 2019-03-11 PROCEDURE — 92226 OPHTHALMOSCOPY EXT SUBSEQUENT: CPT | Performed by: OPHTHALMOLOGY

## 2019-03-11 ASSESSMENT — CONFRONTATIONAL VISUAL FIELD TEST (CVF)
OS_FINDINGS: FULL
OD_FINDINGS: FULL

## 2019-03-11 ASSESSMENT — REFRACTION_CURRENTRX
OS_OVR_VA: 20/
OD_VPRISM_DIRECTION: PROGS
OD_OVR_VA: 20/
OS_OVR_VA: 20/
OS_OVR_VA: 20/
OD_SPHERE: +0.75
OD_OVR_VA: 20/
OS_SPHERE: BALANCE
OD_ADD: +2.25
OD_CYLINDER: -0.75
OD_AXIS: 009
OD_OVR_VA: 20/

## 2019-03-11 ASSESSMENT — REFRACTION_MANIFEST
OD_AXIS: 005
OS_VA3: 20/
OU_VA: 20/
OS_VA1: 20/
OD_VA2: 20/30-2
OD_VA3: 20/
OS_VA1: 20/CF
OD_CYLINDER: -1.00
OD_VA1: 20/
OD_SPHERE: +1.25
OD_VA1: 20/30-2
OD_VA2: 20/
OS_SPHERE: BALANCE
OS_VA2: 20/
OU_VA: 20/
OD_ADD: +2.50
OD_VA3: 20/
OS_VA2: 20/
OS_VA3: 20/

## 2019-03-11 ASSESSMENT — REFRACTION_AUTOREFRACTION
OS_SPHERE: +0.75
OD_AXIS: 6
OD_SPHERE: +1.50
OD_CYLINDER: -1.00
OS_CYLINDER: -1.00
OS_AXIS: 38

## 2019-03-11 ASSESSMENT — SPHEQUIV_DERIVED
OS_SPHEQUIV: 0.25
OD_SPHEQUIV: 1
OD_SPHEQUIV: 0.75

## 2019-03-11 ASSESSMENT — VISUAL ACUITY: OS_BCVA: 20/30-2

## 2019-03-11 ASSESSMENT — SUPERFICIAL PUNCTATE KERATITIS (SPK)
OS_SPK: 1+
OD_SPK: ABSENT

## 2019-03-11 ASSESSMENT — TEAR BREAK UP TIME (TBUT)
OD_TBUT: T
OS_TBUT: T

## 2019-03-15 PROBLEM — K63.5 POLYP OF COLON: Status: ACTIVE | Noted: 2019-03-15

## 2019-04-01 ENCOUNTER — ANESTHESIA (OUTPATIENT)
Dept: GASTROENTEROLOGY | Facility: HOSPITAL | Age: 54
End: 2019-04-01
Payer: MEDICARE

## 2019-04-01 ENCOUNTER — ANESTHESIA EVENT (OUTPATIENT)
Dept: GASTROENTEROLOGY | Facility: HOSPITAL | Age: 54
End: 2019-04-01
Payer: MEDICARE

## 2019-04-01 ENCOUNTER — HOSPITAL ENCOUNTER (OUTPATIENT)
Facility: HOSPITAL | Age: 54
Setting detail: OUTPATIENT SURGERY
Discharge: HOME | End: 2019-04-01
Attending: COLON & RECTAL SURGERY | Admitting: COLON & RECTAL SURGERY
Payer: MEDICARE

## 2019-04-01 VITALS
DIASTOLIC BLOOD PRESSURE: 93 MMHG | OXYGEN SATURATION: 96 % | RESPIRATION RATE: 18 BRPM | BODY MASS INDEX: 32.58 KG/M2 | WEIGHT: 215 LBS | HEIGHT: 68 IN | TEMPERATURE: 97.9 F | HEART RATE: 73 BPM | SYSTOLIC BLOOD PRESSURE: 157 MMHG

## 2019-04-01 DIAGNOSIS — K63.5 POLYP OF COLON, UNSPECIFIED PART OF COLON, UNSPECIFIED TYPE: ICD-10-CM

## 2019-04-01 PROCEDURE — 88305 TISSUE EXAM BY PATHOLOGIST: CPT | Performed by: PATHOLOGY

## 2019-04-01 PROCEDURE — 45385 COLONOSCOPY W/LESION REMOVAL: CPT | Performed by: COLON & RECTAL SURGERY

## 2019-04-01 RX ORDER — SODIUM CHLORIDE 9 MG/ML
50 INJECTION, SOLUTION INTRAVENOUS CONTINUOUS
Status: DISCONTINUED | OUTPATIENT
Start: 2019-04-01 | End: 2019-04-01 | Stop reason: HOSPADM

## 2019-04-01 RX ORDER — LIDOCAINE HYDROCHLORIDE 10 MG/ML
INJECTION, SOLUTION INFILTRATION; PERINEURAL AS NEEDED
Status: DISCONTINUED | OUTPATIENT
Start: 2019-04-01 | End: 2019-04-01 | Stop reason: SURG

## 2019-04-01 RX ORDER — PROPOFOL 10 MG/ML
INJECTION, EMULSION INTRAVENOUS CONTINUOUS PRN
Status: DISCONTINUED | OUTPATIENT
Start: 2019-04-01 | End: 2019-04-01 | Stop reason: SURG

## 2019-04-01 RX ORDER — PROPOFOL 10 MG/ML
INJECTION, EMULSION INTRAVENOUS AS NEEDED
Status: DISCONTINUED | OUTPATIENT
Start: 2019-04-01 | End: 2019-04-01 | Stop reason: SURG

## 2019-04-01 RX ADMIN — PROPOFOL 120 MG: 10 INJECTION, EMULSION INTRAVENOUS at 08:38

## 2019-04-01 RX ADMIN — PROPOFOL 100 MCG/KG/MIN: 10 INJECTION, EMULSION INTRAVENOUS at 08:38

## 2019-04-01 RX ADMIN — SODIUM CHLORIDE: 0.9 INJECTION, SOLUTION INTRAVENOUS at 08:28

## 2019-04-01 RX ADMIN — LIDOCAINE HYDROCHLORIDE 50 MG: 10 INJECTION, SOLUTION INFILTRATION; PERINEURAL at 08:38

## 2019-06-17 ENCOUNTER — DOCTOR'S OFFICE (OUTPATIENT)
Dept: URBAN - NONMETROPOLITAN AREA CLINIC 1 | Facility: CLINIC | Age: 54
Setting detail: OPHTHALMOLOGY
End: 2019-06-17
Payer: COMMERCIAL

## 2019-06-17 ENCOUNTER — RX ONLY (RX ONLY)
Age: 54
End: 2019-06-17

## 2019-06-17 DIAGNOSIS — H04.122: ICD-10-CM

## 2019-06-17 DIAGNOSIS — H04.123: ICD-10-CM

## 2019-06-17 DIAGNOSIS — H04.121: ICD-10-CM

## 2019-06-17 DIAGNOSIS — H43.813: ICD-10-CM

## 2019-06-17 DIAGNOSIS — H10.503: ICD-10-CM

## 2019-06-17 PROCEDURE — 83861 MICROFLUID ANALY TEARS: CPT | Performed by: OPHTHALMOLOGY

## 2019-06-17 PROCEDURE — 92014 COMPRE OPH EXAM EST PT 1/>: CPT | Performed by: OPHTHALMOLOGY

## 2019-06-17 PROCEDURE — 92134 CPTRZ OPH DX IMG PST SGM RTA: CPT | Performed by: OPHTHALMOLOGY

## 2019-06-17 ASSESSMENT — REFRACTION_MANIFEST
OS_VA2: 20/
OD_SPHERE: +1.25
OD_VA2: 20/
OU_VA: 20/
OD_VA3: 20/
OD_VA2: 20/30-2
OS_VA2: 20/
OS_SPHERE: BALANCE
OU_VA: 20/
OS_VA1: 20/
OD_ADD: +2.50
OS_VA3: 20/
OD_VA1: 20/
OD_VA3: 20/
OD_CYLINDER: -1.00
OS_VA1: 20/CF
OD_VA1: 20/30-2
OD_AXIS: 005
OS_VA3: 20/

## 2019-06-17 ASSESSMENT — REFRACTION_CURRENTRX
OS_OVR_VA: 20/
OD_VPRISM_DIRECTION: PROGS
OD_AXIS: 009
OD_OVR_VA: 20/
OS_OVR_VA: 20/
OS_SPHERE: BALANCE
OD_OVR_VA: 20/
OD_CYLINDER: -0.75
OD_ADD: +2.25
OD_SPHERE: +0.75
OS_OVR_VA: 20/
OD_OVR_VA: 20/

## 2019-06-17 ASSESSMENT — REFRACTION_AUTOREFRACTION
OD_AXIS: 6
OS_AXIS: 38
OS_CYLINDER: -1.00
OD_SPHERE: +1.50
OD_CYLINDER: -1.00
OS_SPHERE: +0.75

## 2019-06-17 ASSESSMENT — CONFRONTATIONAL VISUAL FIELD TEST (CVF)
OD_FINDINGS: FULL
OS_FINDINGS: FULL

## 2019-06-17 ASSESSMENT — SPHEQUIV_DERIVED
OD_SPHEQUIV: 1
OD_SPHEQUIV: 0.75
OS_SPHEQUIV: 0.25

## 2019-06-17 ASSESSMENT — SUPERFICIAL PUNCTATE KERATITIS (SPK)
OD_SPK: ABSENT
OS_SPK: 1+

## 2019-06-17 ASSESSMENT — VISUAL ACUITY: OS_BCVA: 20/40-2

## 2019-06-17 ASSESSMENT — TEAR BREAK UP TIME (TBUT)
OD_TBUT: T
OS_TBUT: T

## 2019-07-11 ENCOUNTER — DOCTOR'S OFFICE (OUTPATIENT)
Dept: URBAN - NONMETROPOLITAN AREA CLINIC 1 | Facility: CLINIC | Age: 54
Setting detail: OPHTHALMOLOGY
End: 2019-07-11
Payer: COMMERCIAL

## 2019-07-11 DIAGNOSIS — H04.122: ICD-10-CM

## 2019-07-11 DIAGNOSIS — H04.123: ICD-10-CM

## 2019-07-11 DIAGNOSIS — H33.22: ICD-10-CM

## 2019-07-11 DIAGNOSIS — H43.813: ICD-10-CM

## 2019-07-11 DIAGNOSIS — H04.121: ICD-10-CM

## 2019-07-11 DIAGNOSIS — H10.503: ICD-10-CM

## 2019-07-11 PROCEDURE — 83861 MICROFLUID ANALY TEARS: CPT | Performed by: OPHTHALMOLOGY

## 2019-07-11 PROCEDURE — 92250 FUNDUS PHOTOGRAPHY W/I&R: CPT | Performed by: OPHTHALMOLOGY

## 2019-07-11 PROCEDURE — 92014 COMPRE OPH EXAM EST PT 1/>: CPT | Performed by: OPHTHALMOLOGY

## 2019-07-11 PROCEDURE — 92235 FLUORESCEIN ANGRPH MLTIFRAME: CPT | Performed by: OPHTHALMOLOGY

## 2019-07-11 ASSESSMENT — SPHEQUIV_DERIVED
OS_SPHEQUIV: 0.25
OD_SPHEQUIV: 0.75
OD_SPHEQUIV: 1

## 2019-07-11 ASSESSMENT — REFRACTION_MANIFEST
OS_VA2: 20/
OD_VA3: 20/
OD_VA1: 20/30-2
OD_VA2: 20/
OD_ADD: +2.50
OD_VA3: 20/
OU_VA: 20/
OD_AXIS: 005
OD_VA1: 20/
OS_VA1: 20/
OS_VA3: 20/
OS_VA3: 20/
OS_VA1: 20/CF
OS_SPHERE: BALANCE
OD_SPHERE: +1.25
OU_VA: 20/
OS_VA2: 20/
OD_VA2: 20/30-2
OD_CYLINDER: -1.00

## 2019-07-11 ASSESSMENT — VISUAL ACUITY: OS_BCVA: 20/40-2

## 2019-07-11 ASSESSMENT — REFRACTION_CURRENTRX
OD_AXIS: 009
OD_OVR_VA: 20/
OD_CYLINDER: -0.75
OD_OVR_VA: 20/
OS_OVR_VA: 20/
OD_VPRISM_DIRECTION: PROGS
OS_OVR_VA: 20/
OD_OVR_VA: 20/
OD_ADD: +2.25
OD_SPHERE: +0.75
OS_OVR_VA: 20/
OS_SPHERE: BALANCE

## 2019-07-11 ASSESSMENT — SUPERFICIAL PUNCTATE KERATITIS (SPK)
OD_SPK: ABSENT
OS_SPK: 1+

## 2019-07-11 ASSESSMENT — REFRACTION_AUTOREFRACTION
OS_SPHERE: +0.75
OD_AXIS: 6
OS_CYLINDER: -1.00
OD_SPHERE: +1.50
OS_AXIS: 38
OD_CYLINDER: -1.00

## 2019-07-11 ASSESSMENT — TEAR BREAK UP TIME (TBUT)
OS_TBUT: T
OD_TBUT: T

## 2019-07-11 ASSESSMENT — CONFRONTATIONAL VISUAL FIELD TEST (CVF)
OD_FINDINGS: FULL
OS_FINDINGS: FULL

## 2019-10-10 ENCOUNTER — DOCTOR'S OFFICE (OUTPATIENT)
Dept: URBAN - NONMETROPOLITAN AREA CLINIC 1 | Facility: CLINIC | Age: 54
Setting detail: OPHTHALMOLOGY
End: 2019-10-10
Payer: COMMERCIAL

## 2019-10-10 DIAGNOSIS — H04.121: ICD-10-CM

## 2019-10-10 DIAGNOSIS — H43.812: ICD-10-CM

## 2019-10-10 DIAGNOSIS — H43.811: ICD-10-CM

## 2019-10-10 DIAGNOSIS — H43.813: ICD-10-CM

## 2019-10-10 DIAGNOSIS — H04.123: ICD-10-CM

## 2019-10-10 DIAGNOSIS — H10.503: ICD-10-CM

## 2019-10-10 DIAGNOSIS — H04.122: ICD-10-CM

## 2019-10-10 PROCEDURE — 83861 MICROFLUID ANALY TEARS: CPT | Performed by: OPHTHALMOLOGY

## 2019-10-10 PROCEDURE — 92014 COMPRE OPH EXAM EST PT 1/>: CPT | Performed by: OPHTHALMOLOGY

## 2019-10-10 PROCEDURE — 92134 CPTRZ OPH DX IMG PST SGM RTA: CPT | Performed by: OPHTHALMOLOGY

## 2019-10-10 PROCEDURE — 92226 OPHTHALMOSCOPY EXT SUBSEQUENT: CPT | Performed by: OPHTHALMOLOGY

## 2019-10-10 ASSESSMENT — REFRACTION_CURRENTRX
OD_VPRISM_DIRECTION: PROGS
OD_OVR_VA: 20/
OS_OVR_VA: 20/
OD_ADD: +2.25
OD_SPHERE: +0.75
OD_OVR_VA: 20/
OD_CYLINDER: -0.75
OS_OVR_VA: 20/
OS_OVR_VA: 20/
OS_SPHERE: BALANCE
OD_AXIS: 009
OD_OVR_VA: 20/

## 2019-10-10 ASSESSMENT — REFRACTION_AUTOREFRACTION
OS_AXIS: 38
OD_AXIS: 6
OD_SPHERE: +1.50
OS_SPHERE: +0.75
OS_CYLINDER: -1.00
OD_CYLINDER: -1.00

## 2019-10-10 ASSESSMENT — REFRACTION_MANIFEST
OS_SPHERE: BALANCE
OS_VA2: 20/
OD_SPHERE: +1.25
OD_AXIS: 005
OU_VA: 20/
OS_VA1: 20/CF
OS_VA3: 20/
OS_VA3: 20/
OD_VA2: 20/
OD_VA1: 20/30-2
OD_VA3: 20/
OD_CYLINDER: -1.00
OU_VA: 20/
OD_VA1: 20/
OD_VA3: 20/
OD_ADD: +2.50
OS_VA2: 20/
OD_VA2: 20/30-2
OS_VA1: 20/

## 2019-10-10 ASSESSMENT — CONFRONTATIONAL VISUAL FIELD TEST (CVF)
OS_FINDINGS: FULL
OD_FINDINGS: FULL

## 2019-10-10 ASSESSMENT — TEAR BREAK UP TIME (TBUT)
OS_TBUT: T
OD_TBUT: T

## 2019-10-10 ASSESSMENT — SUPERFICIAL PUNCTATE KERATITIS (SPK)
OD_SPK: ABSENT
OS_SPK: 1+

## 2019-10-10 ASSESSMENT — VISUAL ACUITY
OD_BCVA: HM
OS_BCVA: 20/30-2

## 2019-10-10 ASSESSMENT — SPHEQUIV_DERIVED
OS_SPHEQUIV: 0.25
OD_SPHEQUIV: 0.75
OD_SPHEQUIV: 1

## 2019-10-18 ENCOUNTER — DOCTOR'S OFFICE (OUTPATIENT)
Dept: URBAN - NONMETROPOLITAN AREA CLINIC 1 | Facility: CLINIC | Age: 54
Setting detail: OPHTHALMOLOGY
End: 2019-10-18
Payer: COMMERCIAL

## 2019-10-18 DIAGNOSIS — H00.14: ICD-10-CM

## 2019-10-18 PROCEDURE — 92012 INTRM OPH EXAM EST PATIENT: CPT | Performed by: OPTOMETRIST

## 2019-10-18 ASSESSMENT — SPHEQUIV_DERIVED
OD_SPHEQUIV: 1
OD_SPHEQUIV: 0.75
OS_SPHEQUIV: 0.25

## 2019-10-18 ASSESSMENT — REFRACTION_AUTOREFRACTION
OS_AXIS: 38
OS_SPHERE: +0.75
OS_CYLINDER: -1.00
OD_SPHERE: +1.50
OD_CYLINDER: -1.00
OD_AXIS: 6

## 2019-10-18 ASSESSMENT — REFRACTION_MANIFEST
OS_VA1: 20/CF
OS_VA2: 20/
OD_VA2: 20/30-2
OS_VA2: 20/
OD_VA1: 20/
OD_VA3: 20/
OS_VA3: 20/
OS_SPHERE: BALANCE
OD_VA2: 20/
OD_ADD: +2.50
OD_AXIS: 005
OU_VA: 20/
OD_SPHERE: +1.25
OD_CYLINDER: -1.00
OS_VA3: 20/
OD_VA3: 20/
OS_VA1: 20/
OU_VA: 20/
OD_VA1: 20/30-2

## 2019-10-18 ASSESSMENT — REFRACTION_CURRENTRX
OD_OVR_VA: 20/
OD_SPHERE: +0.75
OD_OVR_VA: 20/
OD_VPRISM_DIRECTION: PROGS
OD_AXIS: 009
OS_OVR_VA: 20/
OS_OVR_VA: 20/
OS_SPHERE: BALANCE
OS_OVR_VA: 20/
OD_ADD: +2.25
OD_OVR_VA: 20/
OD_CYLINDER: -0.75

## 2019-10-18 ASSESSMENT — TEAR BREAK UP TIME (TBUT)
OD_TBUT: T
OS_TBUT: T

## 2019-10-18 ASSESSMENT — SUPERFICIAL PUNCTATE KERATITIS (SPK)
OS_SPK: 1+
OD_SPK: 1+

## 2019-10-18 ASSESSMENT — CONFRONTATIONAL VISUAL FIELD TEST (CVF)
OS_FINDINGS: FULL
OD_FINDINGS: FULL

## 2019-10-18 ASSESSMENT — VISUAL ACUITY
OD_BCVA: HM
OS_BCVA: 20/30-2

## 2019-11-01 ENCOUNTER — DOCTOR'S OFFICE (OUTPATIENT)
Dept: URBAN - NONMETROPOLITAN AREA CLINIC 1 | Facility: CLINIC | Age: 54
Setting detail: OPHTHALMOLOGY
End: 2019-11-01
Payer: COMMERCIAL

## 2019-11-01 DIAGNOSIS — H00.14: ICD-10-CM

## 2019-11-01 PROCEDURE — 92012 INTRM OPH EXAM EST PATIENT: CPT | Performed by: OPTOMETRIST

## 2019-11-01 ASSESSMENT — VISUAL ACUITY
OD_BCVA: HM
OS_BCVA: 20/30-2

## 2019-11-01 ASSESSMENT — REFRACTION_CURRENTRX
OD_OVR_VA: 20/
OS_SPHERE: BALANCE
OS_OVR_VA: 20/
OD_VPRISM_DIRECTION: PROGS
OD_AXIS: 009
OD_OVR_VA: 20/
OD_SPHERE: +0.75
OS_OVR_VA: 20/
OD_CYLINDER: -0.75
OD_ADD: +2.25
OD_OVR_VA: 20/
OS_OVR_VA: 20/

## 2019-11-01 ASSESSMENT — REFRACTION_MANIFEST
OD_VA3: 20/
OD_VA1: 20/30-2
OS_VA2: 20/
OD_VA2: 20/
OS_SPHERE: BALANCE
OS_VA3: 20/
OS_VA1: 20/CF
OS_VA3: 20/
OU_VA: 20/
OD_AXIS: 005
OU_VA: 20/
OS_VA1: 20/
OD_SPHERE: +1.25
OD_VA1: 20/
OD_ADD: +2.50
OS_VA2: 20/
OD_VA2: 20/30-2
OD_VA3: 20/
OD_CYLINDER: -1.00

## 2019-11-01 ASSESSMENT — SUPERFICIAL PUNCTATE KERATITIS (SPK)
OD_SPK: 1+
OS_SPK: 1+

## 2019-11-01 ASSESSMENT — REFRACTION_AUTOREFRACTION
OS_CYLINDER: -1.00
OD_SPHERE: +1.50
OS_SPHERE: +0.75
OD_CYLINDER: -1.00
OS_AXIS: 38
OD_AXIS: 6

## 2019-11-01 ASSESSMENT — SPHEQUIV_DERIVED
OD_SPHEQUIV: 1
OD_SPHEQUIV: 0.75
OS_SPHEQUIV: 0.25

## 2019-11-01 ASSESSMENT — TEAR BREAK UP TIME (TBUT)
OS_TBUT: T
OD_TBUT: T

## 2019-12-22 ENCOUNTER — HOSPITAL ENCOUNTER (EMERGENCY)
Facility: HOSPITAL | Age: 54
Discharge: HOME | End: 2019-12-22
Attending: EMERGENCY MEDICINE | Admitting: EMERGENCY MEDICINE
Payer: MEDICARE

## 2019-12-22 VITALS
RESPIRATION RATE: 17 BRPM | TEMPERATURE: 98.9 F | WEIGHT: 222.66 LBS | OXYGEN SATURATION: 94 % | DIASTOLIC BLOOD PRESSURE: 100 MMHG | SYSTOLIC BLOOD PRESSURE: 164 MMHG | HEART RATE: 94 BPM | BODY MASS INDEX: 33.86 KG/M2

## 2019-12-22 DIAGNOSIS — N50.9 SCROTAL SKIN LESION: Primary | ICD-10-CM

## 2019-12-22 DIAGNOSIS — D69.6 THROMBOCYTOPENIA (HCC): ICD-10-CM

## 2019-12-22 LAB
APTT PPP: 32 SECONDS (ref 23–37)
BASOPHILS # BLD AUTO: 0.02 THOUSANDS/ΜL (ref 0–0.1)
BASOPHILS NFR BLD AUTO: 0 % (ref 0–1)
EOSINOPHIL # BLD AUTO: 0.01 THOUSAND/ΜL (ref 0–0.61)
EOSINOPHIL NFR BLD AUTO: 0 % (ref 0–6)
ERYTHROCYTE [DISTWIDTH] IN BLOOD BY AUTOMATED COUNT: 13.7 % (ref 11.6–15.1)
HCT VFR BLD AUTO: 44.8 % (ref 36.5–49.3)
HGB BLD-MCNC: 14.4 G/DL (ref 12–17)
IMM GRANULOCYTES # BLD AUTO: 0.02 THOUSAND/UL (ref 0–0.2)
IMM GRANULOCYTES NFR BLD AUTO: 0 % (ref 0–2)
INR PPP: 1.13 (ref 0.84–1.19)
LYMPHOCYTES # BLD AUTO: 1.11 THOUSANDS/ΜL (ref 0.6–4.47)
LYMPHOCYTES NFR BLD AUTO: 17 % (ref 14–44)
MCH RBC QN AUTO: 31.7 PG (ref 26.8–34.3)
MCHC RBC AUTO-ENTMCNC: 32.1 G/DL (ref 31.4–37.4)
MCV RBC AUTO: 99 FL (ref 82–98)
MONOCYTES # BLD AUTO: 0.66 THOUSAND/ΜL (ref 0.17–1.22)
MONOCYTES NFR BLD AUTO: 10 % (ref 4–12)
NEUTROPHILS # BLD AUTO: 4.77 THOUSANDS/ΜL (ref 1.85–7.62)
NEUTS SEG NFR BLD AUTO: 73 % (ref 43–75)
NRBC BLD AUTO-RTO: 0 /100 WBCS
PLATELET # BLD AUTO: 143 THOUSANDS/UL (ref 149–390)
PMV BLD AUTO: 11.3 FL (ref 8.9–12.7)
PROTHROMBIN TIME: 14.5 SECONDS (ref 11.6–14.5)
RBC # BLD AUTO: 4.54 MILLION/UL (ref 3.88–5.62)
WBC # BLD AUTO: 6.59 THOUSAND/UL (ref 4.31–10.16)

## 2019-12-22 PROCEDURE — 85610 PROTHROMBIN TIME: CPT | Performed by: EMERGENCY MEDICINE

## 2019-12-22 PROCEDURE — 12001 RPR S/N/AX/GEN/TRNK 2.5CM/<: CPT | Performed by: EMERGENCY MEDICINE

## 2019-12-22 PROCEDURE — 36415 COLL VENOUS BLD VENIPUNCTURE: CPT | Performed by: EMERGENCY MEDICINE

## 2019-12-22 PROCEDURE — 85025 COMPLETE CBC W/AUTO DIFF WBC: CPT | Performed by: EMERGENCY MEDICINE

## 2019-12-22 PROCEDURE — 85730 THROMBOPLASTIN TIME PARTIAL: CPT | Performed by: EMERGENCY MEDICINE

## 2019-12-22 PROCEDURE — 88304 TISSUE EXAM BY PATHOLOGIST: CPT | Performed by: PATHOLOGY

## 2019-12-22 PROCEDURE — 99284 EMERGENCY DEPT VISIT MOD MDM: CPT | Performed by: EMERGENCY MEDICINE

## 2019-12-22 PROCEDURE — 99283 EMERGENCY DEPT VISIT LOW MDM: CPT

## 2019-12-22 PROCEDURE — 11200 RMVL SKIN TAGS UP TO&INC 15: CPT | Performed by: EMERGENCY MEDICINE

## 2019-12-22 RX ORDER — LIDOCAINE HYDROCHLORIDE AND EPINEPHRINE 10; 10 MG/ML; UG/ML
10 INJECTION, SOLUTION INFILTRATION; PERINEURAL ONCE
Status: COMPLETED | OUTPATIENT
Start: 2019-12-22 | End: 2019-12-22

## 2019-12-22 RX ADMIN — LIDOCAINE HYDROCHLORIDE AND EPINEPHRINE 10 ML: 10; 10 INJECTION, SOLUTION INFILTRATION; PERINEURAL at 19:37

## 2019-12-23 NOTE — ED PROVIDER NOTES
History  Chief Complaint   Patient presents with    Groin Swelling     "he's had bleeding from his groin for 2 days" Pt seen in ED yesterday for same complaint  Mother presents with adult breifs with saturated areas  Patient is a 53-year-old male who presents the emergency department complaining of a bleeding lesion on his left scrotum  He was seen in the emergency department at [facility] possible 2 days ago for the same diagnosed with a bleeding skin lesion and advised to follow up with Urology or Dermatology however the lesion continues to bleed on to his brief or pads that her tucked in the area near the lesion  History provided by:  Patient and parent      Prior to Admission Medications   Prescriptions Last Dose Informant Patient Reported?  Taking?   ascorbic acid (VITAMIN C) 500 mg tablet   Yes No   Sig: Take 500 mg by mouth daily   aspirin 81 MG tablet 12/22/2019 at Unknown time  Yes Yes   Sig: Take 81 mg by mouth daily   b complex vitamins tablet   Yes No   Sig: Take 1 tablet by mouth daily   calcium-vitamin D 250-100 MG-UNIT per tablet   Yes No   Sig: Take 1 tablet by mouth 2 (two) times a day   clonazePAM (KlonoPIN) 0 5 mg tablet   Yes No   Sig: Take 0 5 mg by mouth 2 (two) times a day   furosemide (LASIX) 40 mg tablet   Yes No   Sig: Take 40 mg by mouth daily   lamoTRIgine (LaMICtal) 200 MG tablet   Yes No   Sig: Take 200 mg by mouth 2 (two) times a day   levOCARNitine (CARNITOR) 330 MG tablet   Yes No   Sig: Take 330 mg by mouth 2 (two) times a day   primidone (MYSOLINE) 50 mg tablet   Yes No   Sig: Take 400 mg by mouth daily after breakfast   vitamin E, tocopherol, 400 units capsule   Yes No   Sig: Take 400 Units by mouth daily   zinc gluconate 50 mg tablet   Yes No   Sig: Take 50 mg by mouth daily   zonisamide (ZONEGRAN) 100 mg capsule   Yes No   Sig: Take 100 mg by mouth daily      Facility-Administered Medications: None       Past Medical History:   Diagnosis Date    Pericardial effusion     Pneumonia     Seizure [facility])        Past Surgical History:   Procedure Laterality Date    FRACTURE SURGERY      clavicle, left humerus, radial, and ulna  Right radius    HIP ARTHROSCOPY Right     [place] COLONOSCOPY FLX DX W/COLLJ SPEC WHEN PFRMD N/A 4/1/2019    Procedure: COLONOSCOPY;  Surgeon: Natanael Banks MD;  [facility]; Service: Colorectal       Family History   Adopted: Yes     I have reviewed and agree with the history as documented  Social History     Tobacco Use    Smoking status: Never Smoker    Smokeless tobacco: Never Used   Substance Use Topics    Alcohol use: Never     Frequency: Never    Drug use: Never        Review of Systems   Constitutional: Negative for activity change, appetite change, chills, fatigue and fever  HENT: Negative for congestion, ear pain, rhinorrhea and sore throat  Eyes: Negative for discharge, redness and visual disturbance  Respiratory: Negative for cough, chest tightness, shortness of breath and wheezing  Cardiovascular: Negative for chest pain and palpitations  Gastrointestinal: Negative for abdominal pain, constipation, diarrhea, nausea and vomiting  Endocrine: Negative for polydipsia and polyuria  Genitourinary: Negative for difficulty urinating, dysuria, frequency, hematuria and urgency  Musculoskeletal: Negative for arthralgias and myalgias  Skin: Negative for color change, pallor and rash  Bleeding skin lesion on left scrotum   Neurological: Negative for dizziness, weakness, light-headedness, numbness and headaches  Hematological: Negative for adenopathy  Does not bruise/bleed easily  All other systems reviewed and are negative  Physical Exam  Physical Exam   Constitutional: He is oriented to person, place, and time  He appears well-developed and well-nourished  HENT:   Head: Normocephalic and atraumatic     Right Ear: External ear normal    Left Ear: External ear normal    Nose: Nose normal  Mouth/Throat: Oropharynx is clear and moist    Eyes: Pupils are equal, round, and reactive to light  Conjunctivae and EOM are normal    Neck: Normal range of motion  Neck supple  Cardiovascular: Normal rate, regular rhythm, normal heart sounds and intact distal pulses  Pulmonary/Chest: Effort normal and breath sounds normal  No respiratory distress  He has no wheezes  He has no rales  He exhibits no tenderness  Abdominal: Soft  Bowel sounds are normal  He exhibits no distension  There is no tenderness  There is no guarding  Genitourinary: Penis normal          Musculoskeletal: Normal range of motion  Neurological: He is alert and oriented to person, place, and time  No cranial nerve deficit or sensory deficit  Skin: Skin is warm and dry  Psychiatric: He has a normal mood and affect  Nursing note and vitals reviewed        Vital Signs  ED Triage Vitals   Temperature Pulse Respirations Blood Pressure SpO2   12/22/19 1854 12/22/19 1850 12/22/19 1850 12/22/19 1850 12/22/19 1850   98 9 °F (37 2 °C) 94 17 164/100 94 %      Temp Source Heart Rate Source Patient Position - Orthostatic VS BP Location FiO2 (%)   12/22/19 1854 12/22/19 1850 -- 12/22/19 1850 --   Oral Monitor  Right arm       Pain Score       12/22/19 1850       No Pain           Vitals:    12/22/19 1850   BP: 164/100   Pulse: 94         Visual Acuity      ED Medications  Medications   lidocaine-epinephrine (XYLOCAINE/EPINEPHRINE) 1 %-1:100,000 injection 10 mL (10 mL Infiltration Given by Other 12/22/19 1937)       Diagnostic Studies  Results Reviewed     Procedure Component Value Units Date/Time    Protime-INR [[id number]]  (Normal) Collected:  12/22/19 1930    Lab Status:  Final result Specimen:  Blood from Arm, Left Updated:  12/22/19 1954     Protime 14 5 seconds      INR 1 13    APTT [[id number]]  (Normal) Collected:  12/22/19 1930    Lab Status:  Final result Specimen:  Blood from Arm, Left Updated:  12/22/19 1954     PTT 32 seconds     CBC and differential [[id number]]  (Abnormal) Collected:  12/22/19 1930    Lab Status:  Final result Specimen:  Blood from Arm, Left Updated:  12/22/19 1950     WBC 6 59 Thousand/uL      RBC 4 54 Million/uL      Hemoglobin 14 4 g/dL      Hematocrit 44 8 %      MCV 99 fL      MCH 31 7 pg      MCHC 32 1 g/dL      RDW 13 7 %      MPV 11 3 fL      Platelets 930 Thousands/uL      nRBC 0 /100 WBCs      Neutrophils Relative 73 %      Immat GRANS % 0 %      Lymphocytes Relative 17 %      Monocytes Relative 10 %      Eosinophils Relative 0 %      Basophils Relative 0 %      Neutrophils Absolute 4 77 Thousands/µL      Immature Grans Absolute 0 02 Thousand/uL      Lymphocytes Absolute 1 11 Thousands/µL      Monocytes Absolute 0 66 Thousand/µL      Eosinophils Absolute 0 01 Thousand/µL      Basophils Absolute 0 02 Thousands/µL                  No orders to display              Procedures  Laceration repair  Date/Time: 12/22/2019 7:51 PM  Performed by: Josr Crowley DO  Authorized by: Josr Crowley DO   Consent: Verbal consent obtained  Risks and benefits: risks, benefits and alternatives were discussed  Consent given by: patient and guardian  Patient understanding: patient states understanding of the procedure being performed  Patient identity confirmed: verbally with patient  Body area: anogenital (Left scrotal skin)  Laceration length: 0 5 cm  Anesthesia: local infiltration    Anesthesia:  Local Anesthetic: lidocaine 1% with epinephrine  Anesthetic total: 3 mL    Sedation:  Patient sedated: no        Procedure Details:  Wound skin closure material used: Five 0 Vicryl  Number of sutures: 1  Suture technique: Figure of 8    Approximation: close  Approximation difficulty: simple  Dressing: 4x4 sterile gauze  Patient tolerance: Patient tolerated the procedure well with no immediate complications  Comments: Skin tag lesion was anesthetized with lidocaine with epinephrine and a figure-of-eight suture was tied around the base of the lesion and the lesion was removed with a scalpel the lesion was sent for pathology analysis with results to be sent to PCP for follow-up  Bleeding was controlled and resolved dressing placed  ED Course                               MDM  Number of Diagnoses or Management Options  Scrotal skin lesion: new and requires workup  Thrombocytopenia [facility]): new and requires workup  Diagnosis management comments: Patient remains clinically hemodynamically stable in the emergency department afebrile nontoxic well-appearing bleeding skin lesion was sutured with figure-of-eight suture at the base and removed with scalpel in the ED and sent for pathology patient advised follow-up with PCP and either Dermatology or Urology for further evaluation and treatment advised local wound care for now return precautions and anticipatory guidance discussed           Amount and/or Complexity of Data Reviewed  Clinical lab tests: ordered and reviewed  Tests in the medicine section of CPT®: ordered    Risk of Complications, Morbidity, and/or Mortality  Presenting problems: low  Diagnostic procedures: low  Management options: low    Patient Progress  Patient progress: stable        Disposition  Final diagnoses:   Scrotal skin lesion - Bleeding sutured and removed in the emergency department   Thrombocytopenia ([facility] [address] ) - Mild     Time reflects when diagnosis was documented in both MDM as applicable and the Disposition within this note     Time User Action Codes Description Comment    12/22/2019  7:54 PM Sindy Delcid Add [N50 9] Scrotal skin lesion     12/22/2019  7:54 PM Tien Babin Modify [N50 9] Scrotal skin lesion Bleeding sutured and removed in the emergency department    12/22/2019  7:55 PM Tien Babin Add [D69 6] Thrombocytopenia ([address] )     12/22/2019  7:55 PM Sindy Delcid Modify [D69 6] Thrombocytopenia [facility]) Mild      ED Disposition     ED Disposition Condition Date/Time Comment    Discharge Stable Sun Dec 22, 2019  7:53 ZHANE Mariano discharge to home/self care  Follow-up Information     Follow up With Specialties Details Why Contact Info    Meme Brooks DO General Practice Schedule an appointment as soon as possible for a visit in 3 days  [address] [facility] [address] [facility]      Traci Parks MD Urology Schedule an appointment as soon as possible for a visit in 3 days For wound re-check, For suture removal recheck of and follow-up for left scrotal skin lesion that was removed in the ED  [address] [facility] Dermatology Schedule an appointment as soon as possible for a visit in 3 days For wound re-check, For suture removal,  follow-up for left scrotal skin lesion that was removed in the ED  [address] [address]  [phone]            Patient's Medications   Discharge Prescriptions    No medications on file     No discharge procedures on file      ED Provider  Electronically Signed by           Lamine Harris DO  12/22/19 2013

## 2019-12-24 ENCOUNTER — TELEPHONE (OUTPATIENT)
Dept: EMERGENCY DEPT | Facility: HOSPITAL | Age: 54
End: 2019-12-24

## 2020-01-07 ENCOUNTER — APPOINTMENT (EMERGENCY)
Dept: CT IMAGING | Facility: HOSPITAL | Age: 55
DRG: 871 | End: 2020-01-07
Payer: MEDICARE

## 2020-01-07 ENCOUNTER — HOSPITAL ENCOUNTER (EMERGENCY)
Facility: HOSPITAL | Age: 55
Discharge: HOME | DRG: 871 | End: 2020-01-07
Attending: EMERGENCY MEDICINE | Admitting: EMERGENCY MEDICINE
Payer: MEDICARE

## 2020-01-07 VITALS
SYSTOLIC BLOOD PRESSURE: 142 MMHG | OXYGEN SATURATION: 95 % | RESPIRATION RATE: 22 BRPM | BODY MASS INDEX: 34.83 KG/M2 | TEMPERATURE: 101.6 F | HEART RATE: 104 BPM | DIASTOLIC BLOOD PRESSURE: 75 MMHG | WEIGHT: 229.06 LBS

## 2020-01-07 DIAGNOSIS — S30.0XXA CONTUSION OF LOWER BACK, INITIAL ENCOUNTER: Primary | ICD-10-CM

## 2020-01-07 DIAGNOSIS — J18.9 PNEUMONIA OF LEFT UPPER LOBE DUE TO INFECTIOUS ORGANISM: ICD-10-CM

## 2020-01-07 LAB
FLUAV RNA NPH QL NAA+PROBE: NORMAL
FLUBV RNA NPH QL NAA+PROBE: NORMAL
RSV RNA NPH QL NAA+PROBE: NORMAL

## 2020-01-07 PROCEDURE — 87631 RESP VIRUS 3-5 TARGETS: CPT | Performed by: EMERGENCY MEDICINE

## 2020-01-07 PROCEDURE — 99284 EMERGENCY DEPT VISIT MOD MDM: CPT | Performed by: EMERGENCY MEDICINE

## 2020-01-07 PROCEDURE — 99284 EMERGENCY DEPT VISIT MOD MDM: CPT

## 2020-01-07 PROCEDURE — 74176 CT ABD & PELVIS W/O CONTRAST: CPT

## 2020-01-07 PROCEDURE — 71250 CT THORAX DX C-: CPT

## 2020-01-07 RX ORDER — LAMOTRIGINE 25 MG/1
250 TABLET ORAL DAILY
Status: ON HOLD | COMMUNITY
End: 2020-01-21 | Stop reason: SDUPTHER

## 2020-01-07 RX ORDER — PRIMIDONE 50 MG/1
100 TABLET ORAL
COMMUNITY

## 2020-01-07 RX ORDER — PRIMIDONE 50 MG/1
150 TABLET ORAL
Status: ON HOLD | COMMUNITY
End: 2020-01-21 | Stop reason: SDUPTHER

## 2020-01-07 RX ORDER — AZITHROMYCIN 250 MG/1
TABLET, FILM COATED ORAL
Qty: 6 TABLET | Refills: 0 | Status: SHIPPED | OUTPATIENT
Start: 2020-01-07 | End: 2020-01-11

## 2020-01-07 RX ORDER — LAMOTRIGINE 25 MG/1
300 TABLET ORAL
Status: ON HOLD | COMMUNITY
End: 2021-08-07 | Stop reason: SDUPTHER

## 2020-01-07 RX ORDER — ACETAMINOPHEN 325 MG/1
650 TABLET ORAL ONCE
Status: COMPLETED | OUTPATIENT
Start: 2020-01-07 | End: 2020-01-07

## 2020-01-07 RX ADMIN — ACETAMINOPHEN 650 MG: 325 TABLET ORAL at 10:14

## 2020-01-07 NOTE — ED PROVIDER NOTES
History  Chief Complaint   Patient presents with   Sariah Angela on sunday mom states fell into tub while amb in to br, large ecchymotic area noted to right flank area, area is hard to touch, mom states pt had rapid breathing and keeps asking for water, pt has no complaints     Patient with history of developmental delay, unreliable for history  History is obtained from the patient's mother  According to her, 2 days ago, patient tripped and fell injuring right ribs/flank  Originally the patient seemed asymptomatic but over the past 2 days has developed an area of ecchymosis which has become hard and apparently uncomfortable  The patient himself denies any pain  According to the mother, the patient recently had upper respiratory type symptoms  No further history is available  History provided by:  Parent  History limited by: Developmental delay   used: No    Fall   Mechanism of injury: fall    Injury location:  Torso  Torso injury location:  R flank  Incident location:  Home  Fall:     Fall occurred: from standing, into bathtub  Impact surface: bathtub  Point of impact: right flank  Entrapped after fall: no    Suspicion of alcohol use: no    Suspicion of drug use: no    Prior to arrival data:     Patient ambulatory at scene: yes      Blood loss:  None    Responsiveness at scene:  Alert    Orientation at scene:  Person, place, situation and time    Loss of consciousness: no      Amnesic to event: no      Airway interventions:  None  Associated symptoms: no abdominal pain, no chest pain, no difficulty breathing, no headaches, no hearing loss, no loss of consciousness, no nausea, no neck pain, no seizures and no vomiting        Prior to Admission Medications   Prescriptions Last Dose Informant Patient Reported?  Taking?   ascorbic acid (VITAMIN C) 500 mg tablet   Yes Yes   Sig: Take 500 mg by mouth daily   aspirin 81 MG tablet   Yes Yes   Sig: Take 81 mg by mouth daily   b complex vitamins tablet   Yes Yes   Sig: Take 1 tablet by mouth daily   calcium-vitamin D 250-100 MG-UNIT per tablet   Yes Yes   Sig: Take 1 tablet by mouth daily    clonazePAM (KlonoPIN) 0 5 mg tablet   Yes Yes   Sig: Take 0 5 mg by mouth daily at bedtime    furosemide (LASIX) 40 mg tablet   Yes Yes   Sig: Take 40 mg by mouth daily   lamoTRIgine (LaMICtal) 200 MG tablet   Yes Yes   Sig: Take 200 mg by mouth daily in the early morning    lamoTRIgine (LaMICtal) 25 mg tablet   Yes Yes   Sig: Take 250 mg by mouth daily   lamoTRIgine (LaMICtal) 25 mg tablet   Yes Yes   Sig: Take 300 mg by mouth daily at bedtime   levOCARNitine (CARNITOR) 330 MG tablet   Yes Yes   Sig: Take 330 mg by mouth 4 (four) times daily (after meals and at bedtime)    primidone (MYSOLINE) 50 mg tablet   Yes Yes   Sig: Take 150 mg by mouth daily after breakfast    primidone (MYSOLINE) 50 mg tablet   Yes Yes   Sig: Take 100 mg by mouth daily with lunch   primidone (MYSOLINE) 50 mg tablet   Yes Yes   Sig: Take 150 mg by mouth daily before dinner   vitamin E, tocopherol, 400 units capsule   Yes Yes   Sig: Take 400 Units by mouth daily   zinc gluconate 50 mg tablet   Yes Yes   Sig: Take 50 mg by mouth daily   zonisamide (ZONEGRAN) 100 mg capsule   Yes Yes   Sig: Take 100 mg by mouth daily      Facility-Administered Medications: None       Past Medical History:   Diagnosis Date    Hypertension     Pericardial effusion     Pneumonia     Seizure [facility])        Past Surgical History:   Procedure Laterality Date    FRACTURE SURGERY      clavicle, left humerus, radial, and ulna  Right radius    HIP ARTHROSCOPY Right     [place] COLONOSCOPY FLX DX W/COLLJ SPEC WHEN PFRMD N/A 4/1/2019    Procedure: COLONOSCOPY;  Surgeon: Anna Laurent MD;  [facility]; Service: Colorectal       Family History   Adopted: Yes     I have reviewed and agree with the history as documented      Social History     Tobacco Use    Smoking status: Never Smoker    Smokeless tobacco: Never Used   Substance Use Topics    Alcohol use: Never     Frequency: Never    Drug use: Never        Review of Systems   Constitutional: Negative for chills and fever  HENT: Negative for ear pain, hearing loss, sore throat, trouble swallowing and voice change  Eyes: Negative for pain and discharge  Respiratory: Negative for cough, shortness of breath and wheezing  Cardiovascular: Negative for chest pain and palpitations  Gastrointestinal: Negative for abdominal pain, blood in stool, constipation, diarrhea, nausea and vomiting  Genitourinary: Negative for dysuria, flank pain, frequency and hematuria  Musculoskeletal: Negative for joint swelling, neck pain and neck stiffness  Skin: Negative for rash and wound  Neurological: Negative for dizziness, seizures, loss of consciousness, syncope, facial asymmetry and headaches  Psychiatric/Behavioral: Negative for hallucinations, self-injury and suicidal ideas  All other systems reviewed and are negative  Physical Exam  Physical Exam   Constitutional: He is oriented to person, place, and time  He appears well-developed and well-nourished  No distress  HENT:   Head: Normocephalic and atraumatic  Right Ear: External ear normal    Left Ear: External ear normal    Eyes: Pupils are equal, round, and reactive to light  Conjunctivae and EOM are normal    Neck: Normal range of motion  Neck supple  Cardiovascular: Normal rate, regular rhythm and normal heart sounds  No murmur heard  Pulmonary/Chest: Effort normal and breath sounds normal  He has no wheezes  He has no rales  Abdominal: Soft  Bowel sounds are normal  He exhibits no distension  There is no tenderness  There is no rebound and no guarding  Musculoskeletal: Normal range of motion  He exhibits no deformity  Area of ecchymosis noted in the right flank  This area is only minimally tender  There is no drainage or bleeding  There is no crepitus  Neurological: He is alert and oriented to person, place, and time  No cranial nerve deficit  Skin: Skin is warm and dry  No rash noted  Psychiatric: He has a normal mood and affect  His behavior is normal    Nursing note and vitals reviewed  Vital Signs  ED Triage Vitals [01/07/20 0929]   Temperature Pulse Respirations Blood Pressure SpO2   (!) 101 6 °F (38 7 °C) 104 22 142/75 95 %      Temp Source Heart Rate Source Patient Position - Orthostatic VS BP Location FiO2 (%)   Oral Monitor Sitting Right arm --      Pain Score       No Pain           Vitals:    01/07/20 0929   BP: 142/75   Pulse: 104   Patient Position - Orthostatic VS: Sitting         Visual Acuity      ED Medications  Medications   acetaminophen (TYLENOL) tablet 650 mg (650 mg Oral Given 1/7/20 1014)       Diagnostic Studies  Results Reviewed     Procedure Component Value Units Date/Time    Influenza A/B and RSV PCR [[id number]]  (Normal) Collected:  01/07/20 0944    Lab Status:  Final result Specimen:  Nose Updated:  01/07/20 1026     INFLUENZA A PCR None Detected     INFLUENZA B PCR None Detected     RSV PCR None Detected                 CT chest abdomen pelvis wo contrast   Final Result by Thomas Mckeon MD (01/07 1031)      No traumatic thoracic or intra-abdominal abnormality identified  Stranding within the subcutaneous tissues of the right flank consistent with a contusion  Left upper lobe alveolar opacities likely representing pneumonia  Pericardial calcification likely related to prior pericarditis  Workstation performed: [id number]                    Procedures  Procedures         ED Course  ED Course as of Jan 07 1105   Tue Jan 07, 2020   1105 Patient had fever 101 6 but normal saturations  Examination is benign  Blood pressure is without hypotension  CT reveals left upper lobe pneumonia but no traumatic injury to the right flank or intra-abdominal area    Patient is stable for discharge with prescription for antibiotic                                  MDM  Number of Diagnoses or Management Options  Contusion of lower back, initial encounter:   Pneumonia of left upper lobe due to infectious organism [facility]):      Amount and/or Complexity of Data Reviewed  Clinical lab tests: ordered and reviewed  Tests in the radiology section of CPT®: ordered and reviewed  Review and summarize past medical records: yes  Independent visualization of images, tracings, or specimens: yes          Disposition  Final diagnoses:   Contusion of lower back, initial encounter   Pneumonia of left upper lobe due to infectious organism [facility])     Time reflects when diagnosis was documented in both MDM as applicable and the Disposition within this note     Time User Action Codes Description Comment    1/7/2020 10:58 AM Raleigh Price [S30  0XXA] Contusion of lower back, initial encounter     1/7/2020 10:58 AM Raleigh Price [J18 1] Pneumonia of left upper lobe due to infectious organism [facility])       ED Disposition     ED Disposition Condition Date/Time Comment    Discharge Stable Tue Jan 7, 2020 10:58 AM [place] discharge to home/self care  Follow-up Information     Follow up With Specialties Details Why Contact Gene Hyde,  General Practice In 2 days  [address] [place] [address]  [phone]            Patient's Medications   Discharge Prescriptions    AZITHROMYCIN (ZITHROMAX) 250 MG TABLET    Take 2 tablets today then 1 tablet daily x 4 days       Start Date: 1/7/2020  End Date: 1/11/2020       Order Dose: --       Quantity: 6 tablet    Refills: 0     No discharge procedures on file      ED Provider  Electronically Signed by           Suzi Tellez MD  01/07/20 9509

## 2020-01-10 ENCOUNTER — APPOINTMENT (INPATIENT)
Dept: NON INVASIVE DIAGNOSTICS | Facility: HOSPITAL | Age: 55
DRG: 871 | End: 2020-01-10
Payer: MEDICARE

## 2020-01-10 ENCOUNTER — HOSPITAL ENCOUNTER (INPATIENT)
Facility: HOSPITAL | Age: 55
LOS: 7 days | DRG: 871 | End: 2020-01-17
Attending: EMERGENCY MEDICINE | Admitting: ANESTHESIOLOGY
Payer: MEDICARE

## 2020-01-10 ENCOUNTER — APPOINTMENT (EMERGENCY)
Dept: RADIOLOGY | Facility: HOSPITAL | Age: 55
DRG: 871 | End: 2020-01-10
Payer: MEDICARE

## 2020-01-10 DIAGNOSIS — R60.0 BILATERAL LEG EDEMA: ICD-10-CM

## 2020-01-10 DIAGNOSIS — J96.01 ACUTE RESPIRATORY FAILURE WITH HYPOXIA (HCC): ICD-10-CM

## 2020-01-10 DIAGNOSIS — J96.00 ACUTE RESPIRATORY FAILURE (HCC): ICD-10-CM

## 2020-01-10 DIAGNOSIS — J18.9 PNEUMONIA: Primary | ICD-10-CM

## 2020-01-10 PROBLEM — G40.309 NONINTRACTABLE GENERALIZED IDIOPATHIC EPILEPSY WITHOUT STATUS EPILEPTICUS (HCC): Status: ACTIVE | Noted: 2020-01-10

## 2020-01-10 PROBLEM — D69.6 THROMBOCYTOPENIA (HCC): Status: ACTIVE | Noted: 2020-01-10

## 2020-01-10 PROBLEM — R79.89 ELEVATED SERUM CREATININE: Status: ACTIVE | Noted: 2020-01-10

## 2020-01-10 PROBLEM — N17.9 AKI (ACUTE KIDNEY INJURY) (HCC): Status: ACTIVE | Noted: 2020-01-10

## 2020-01-10 PROBLEM — R94.31 T WAVE INVERSION IN EKG: Status: ACTIVE | Noted: 2020-01-10

## 2020-01-10 PROBLEM — S30.1XXD: Status: ACTIVE | Noted: 2020-01-10

## 2020-01-10 PROBLEM — J21.0 RSV (ACUTE BRONCHIOLITIS DUE TO RESPIRATORY SYNCYTIAL VIRUS): Status: ACTIVE | Noted: 2020-01-10

## 2020-01-10 PROBLEM — I10 ESSENTIAL HYPERTENSION: Status: ACTIVE | Noted: 2020-01-10

## 2020-01-10 LAB
ALBUMIN SERPL BCP-MCNC: 3.3 G/DL (ref 3.5–5)
ALP SERPL-CCNC: 178 U/L (ref 46–116)
ALT SERPL W P-5'-P-CCNC: 31 U/L (ref 12–78)
ANION GAP SERPL CALCULATED.3IONS-SCNC: 8 MMOL/L (ref 4–13)
APTT PPP: 34 SECONDS (ref 23–37)
ARTERIAL PATENCY WRIST A: YES
AST SERPL W P-5'-P-CCNC: 33 U/L (ref 5–45)
ATRIAL RATE: 107 BPM
ATRIAL RATE: 117 BPM
BASE EXCESS BLDA CALC-SCNC: -1.4 MMOL/L
BASOPHILS # BLD AUTO: 0.02 THOUSANDS/ΜL (ref 0–0.1)
BASOPHILS NFR BLD AUTO: 0 % (ref 0–1)
BILIRUB SERPL-MCNC: 1.07 MG/DL (ref 0.2–1)
BUN SERPL-MCNC: 21 MG/DL (ref 5–25)
CALCIUM SERPL-MCNC: 8 MG/DL (ref 8.3–10.1)
CHLORIDE SERPL-SCNC: 96 MMOL/L (ref 100–108)
CO2 SERPL-SCNC: 28 MMOL/L (ref 21–32)
CREAT SERPL-MCNC: 1.45 MG/DL (ref 0.6–1.3)
EOSINOPHIL # BLD AUTO: 0.01 THOUSAND/ΜL (ref 0–0.61)
EOSINOPHIL NFR BLD AUTO: 0 % (ref 0–6)
ERYTHROCYTE [DISTWIDTH] IN BLOOD BY AUTOMATED COUNT: 13.9 % (ref 11.6–15.1)
FLUAV RNA NPH QL NAA+PROBE: ABNORMAL
FLUBV RNA NPH QL NAA+PROBE: ABNORMAL
GFR SERPL CREATININE-BSD FRML MDRD: 55 ML/MIN/1.73SQ M
GLUCOSE SERPL-MCNC: 97 MG/DL (ref 65–140)
HCO3 BLDA-SCNC: 23.2 MMOL/L (ref 22–28)
HCT VFR BLD AUTO: 45.4 % (ref 36.5–49.3)
HGB BLD-MCNC: 15 G/DL (ref 12–17)
IMM GRANULOCYTES # BLD AUTO: 0.02 THOUSAND/UL (ref 0–0.2)
IMM GRANULOCYTES NFR BLD AUTO: 0 % (ref 0–2)
INR PPP: 1.24 (ref 0.84–1.19)
L PNEUMO1 AG UR QL IA.RAPID: NEGATIVE
LACTATE SERPL-SCNC: 2.2 MMOL/L (ref 0.5–2)
LACTATE SERPL-SCNC: 2.8 MMOL/L (ref 0.5–2)
LYMPHOCYTES # BLD AUTO: 0.48 THOUSANDS/ΜL (ref 0.6–4.47)
LYMPHOCYTES NFR BLD AUTO: 6 % (ref 14–44)
MCH RBC QN AUTO: 31.9 PG (ref 26.8–34.3)
MCHC RBC AUTO-ENTMCNC: 33 G/DL (ref 31.4–37.4)
MCV RBC AUTO: 97 FL (ref 82–98)
MONOCYTES # BLD AUTO: 0.88 THOUSAND/ΜL (ref 0.17–1.22)
MONOCYTES NFR BLD AUTO: 11 % (ref 4–12)
NASAL CANNULA: 2
NEUTROPHILS # BLD AUTO: 6.59 THOUSANDS/ΜL (ref 1.85–7.62)
NEUTS SEG NFR BLD AUTO: 83 % (ref 43–75)
NRBC BLD AUTO-RTO: 0 /100 WBCS
NT-PROBNP SERPL-MCNC: 197 PG/ML
O2 CT BLDA-SCNC: 13.8 ML/DL (ref 16–23)
OXYHGB MFR BLDA: 68.4 % (ref 94–97)
P AXIS: 62 DEGREES
P AXIS: 69 DEGREES
PCO2 BLDA: 38.8 MM HG (ref 36–44)
PH BLDA: 7.39 [PH] (ref 7.35–7.45)
PLATELET # BLD AUTO: 122 THOUSANDS/UL (ref 149–390)
PMV BLD AUTO: 11.9 FL (ref 8.9–12.7)
PO2 BLDA: 35.9 MM HG (ref 75–129)
POTASSIUM SERPL-SCNC: 3.6 MMOL/L (ref 3.5–5.3)
PR INTERVAL: 150 MS
PR INTERVAL: 152 MS
PROCALCITONIN SERPL-MCNC: 2.84 NG/ML
PROT SERPL-MCNC: 9.6 G/DL (ref 6.4–8.2)
PROTHROMBIN TIME: 15.7 SECONDS (ref 11.6–14.5)
QRS AXIS: 83 DEGREES
QRS AXIS: 83 DEGREES
QRSD INTERVAL: 96 MS
QRSD INTERVAL: 98 MS
QT INTERVAL: 328 MS
QT INTERVAL: 336 MS
QTC INTERVAL: 448 MS
QTC INTERVAL: 457 MS
RBC # BLD AUTO: 4.7 MILLION/UL (ref 3.88–5.62)
RSV RNA NPH QL NAA+PROBE: DETECTED
S PNEUM AG UR QL: NEGATIVE
SODIUM SERPL-SCNC: 132 MMOL/L (ref 136–145)
SPECIMEN SOURCE: ABNORMAL
T WAVE AXIS: -43 DEGREES
T WAVE AXIS: -63 DEGREES
TROPONIN I SERPL-MCNC: 0.02 NG/ML
TROPONIN I SERPL-MCNC: <0.02 NG/ML
TROPONIN I SERPL-MCNC: <0.02 NG/ML
VENTRICULAR RATE: 107 BPM
VENTRICULAR RATE: 117 BPM
WBC # BLD AUTO: 8 THOUSAND/UL (ref 4.31–10.16)

## 2020-01-10 PROCEDURE — 93306 TTE W/DOPPLER COMPLETE: CPT | Performed by: INTERNAL MEDICINE

## 2020-01-10 PROCEDURE — 84145 PROCALCITONIN (PCT): CPT | Performed by: EMERGENCY MEDICINE

## 2020-01-10 PROCEDURE — 87081 CULTURE SCREEN ONLY: CPT | Performed by: PHYSICIAN ASSISTANT

## 2020-01-10 PROCEDURE — 83605 ASSAY OF LACTIC ACID: CPT | Performed by: NURSE PRACTITIONER

## 2020-01-10 PROCEDURE — 94760 N-INVAS EAR/PLS OXIMETRY 1: CPT

## 2020-01-10 PROCEDURE — C8929 TTE W OR WO FOL WCON,DOPPLER: HCPCS

## 2020-01-10 PROCEDURE — 94640 AIRWAY INHALATION TREATMENT: CPT

## 2020-01-10 PROCEDURE — 87449 NOS EACH ORGANISM AG IA: CPT | Performed by: NURSE PRACTITIONER

## 2020-01-10 PROCEDURE — 99223 1ST HOSP IP/OBS HIGH 75: CPT | Performed by: INTERNAL MEDICINE

## 2020-01-10 PROCEDURE — 85730 THROMBOPLASTIN TIME PARTIAL: CPT | Performed by: EMERGENCY MEDICINE

## 2020-01-10 PROCEDURE — 94664 DEMO&/EVAL PT USE INHALER: CPT

## 2020-01-10 PROCEDURE — 99285 EMERGENCY DEPT VISIT HI MDM: CPT

## 2020-01-10 PROCEDURE — 93005 ELECTROCARDIOGRAM TRACING: CPT

## 2020-01-10 PROCEDURE — 99291 CRITICAL CARE FIRST HOUR: CPT | Performed by: ANESTHESIOLOGY

## 2020-01-10 PROCEDURE — 87147 CULTURE TYPE IMMUNOLOGIC: CPT | Performed by: PHYSICIAN ASSISTANT

## 2020-01-10 PROCEDURE — 99285 EMERGENCY DEPT VISIT HI MDM: CPT | Performed by: EMERGENCY MEDICINE

## 2020-01-10 PROCEDURE — 36600 WITHDRAWAL OF ARTERIAL BLOOD: CPT

## 2020-01-10 PROCEDURE — 80175 DRUG SCREEN QUAN LAMOTRIGINE: CPT | Performed by: NURSE PRACTITIONER

## 2020-01-10 PROCEDURE — 83880 ASSAY OF NATRIURETIC PEPTIDE: CPT | Performed by: PHYSICIAN ASSISTANT

## 2020-01-10 PROCEDURE — 87070 CULTURE OTHR SPECIMN AEROBIC: CPT | Performed by: NURSE PRACTITIONER

## 2020-01-10 PROCEDURE — 93010 ELECTROCARDIOGRAM REPORT: CPT | Performed by: INTERNAL MEDICINE

## 2020-01-10 PROCEDURE — 36415 COLL VENOUS BLD VENIPUNCTURE: CPT | Performed by: EMERGENCY MEDICINE

## 2020-01-10 PROCEDURE — 84484 ASSAY OF TROPONIN QUANT: CPT | Performed by: NURSE PRACTITIONER

## 2020-01-10 PROCEDURE — 82805 BLOOD GASES W/O2 SATURATION: CPT | Performed by: INTERNAL MEDICINE

## 2020-01-10 PROCEDURE — 87631 RESP VIRUS 3-5 TARGETS: CPT | Performed by: PHYSICIAN ASSISTANT

## 2020-01-10 PROCEDURE — 94660 CPAP INITIATION&MGMT: CPT

## 2020-01-10 PROCEDURE — 87040 BLOOD CULTURE FOR BACTERIA: CPT | Performed by: EMERGENCY MEDICINE

## 2020-01-10 PROCEDURE — 80203 DRUG SCREEN QUANT ZONISAMIDE: CPT | Performed by: NURSE PRACTITIONER

## 2020-01-10 PROCEDURE — 85025 COMPLETE CBC W/AUTO DIFF WBC: CPT | Performed by: EMERGENCY MEDICINE

## 2020-01-10 PROCEDURE — 80053 COMPREHEN METABOLIC PANEL: CPT | Performed by: EMERGENCY MEDICINE

## 2020-01-10 PROCEDURE — 94762 N-INVAS EAR/PLS OXIMTRY CONT: CPT

## 2020-01-10 PROCEDURE — 87205 SMEAR GRAM STAIN: CPT | Performed by: NURSE PRACTITIONER

## 2020-01-10 PROCEDURE — 85610 PROTHROMBIN TIME: CPT | Performed by: EMERGENCY MEDICINE

## 2020-01-10 PROCEDURE — 93970 EXTREMITY STUDY: CPT

## 2020-01-10 PROCEDURE — 96365 THER/PROPH/DIAG IV INF INIT: CPT

## 2020-01-10 PROCEDURE — 71045 X-RAY EXAM CHEST 1 VIEW: CPT

## 2020-01-10 PROCEDURE — 83605 ASSAY OF LACTIC ACID: CPT | Performed by: EMERGENCY MEDICINE

## 2020-01-10 PROCEDURE — 96367 TX/PROPH/DG ADDL SEQ IV INF: CPT

## 2020-01-10 PROCEDURE — 80188 ASSAY OF PRIMIDONE: CPT | Performed by: NURSE PRACTITIONER

## 2020-01-10 PROCEDURE — 84484 ASSAY OF TROPONIN QUANT: CPT | Performed by: EMERGENCY MEDICINE

## 2020-01-10 RX ORDER — ALBUTEROL SULFATE 2.5 MG/3ML
2.5 SOLUTION RESPIRATORY (INHALATION) EVERY 6 HOURS PRN
Status: DISCONTINUED | OUTPATIENT
Start: 2020-01-10 | End: 2020-01-10

## 2020-01-10 RX ORDER — BUMETANIDE 0.25 MG/ML
2 INJECTION, SOLUTION INTRAMUSCULAR; INTRAVENOUS ONCE
Status: DISCONTINUED | OUTPATIENT
Start: 2020-01-10 | End: 2020-01-10

## 2020-01-10 RX ORDER — FUROSEMIDE 40 MG/1
40 TABLET ORAL DAILY
Status: DISCONTINUED | OUTPATIENT
Start: 2020-01-10 | End: 2020-01-10

## 2020-01-10 RX ORDER — ASPIRIN 81 MG/1
81 TABLET, CHEWABLE ORAL DAILY
Status: DISCONTINUED | OUTPATIENT
Start: 2020-01-10 | End: 2020-01-17 | Stop reason: HOSPADM

## 2020-01-10 RX ORDER — LEVALBUTEROL 1.25 MG/.5ML
1.25 SOLUTION, CONCENTRATE RESPIRATORY (INHALATION)
Status: DISCONTINUED | OUTPATIENT
Start: 2020-01-10 | End: 2020-01-17 | Stop reason: HOSPADM

## 2020-01-10 RX ORDER — ALBUTEROL SULFATE 2.5 MG/3ML
2.5 SOLUTION RESPIRATORY (INHALATION) EVERY 6 HOURS PRN
Status: DISCONTINUED | OUTPATIENT
Start: 2020-01-10 | End: 2020-01-17 | Stop reason: HOSPADM

## 2020-01-10 RX ORDER — ZONISAMIDE 100 MG/1
100 CAPSULE ORAL
Status: DISCONTINUED | OUTPATIENT
Start: 2020-01-10 | End: 2020-01-17 | Stop reason: HOSPADM

## 2020-01-10 RX ORDER — PRIMIDONE 50 MG/1
150 TABLET ORAL
Status: DISCONTINUED | OUTPATIENT
Start: 2020-01-10 | End: 2020-01-17 | Stop reason: HOSPADM

## 2020-01-10 RX ORDER — B-COMPLEX WITH VITAMIN C
1 TABLET ORAL
Status: DISCONTINUED | OUTPATIENT
Start: 2020-01-10 | End: 2020-01-17 | Stop reason: HOSPADM

## 2020-01-10 RX ORDER — LAMOTRIGINE 100 MG/1
200 TABLET ORAL
Status: DISCONTINUED | OUTPATIENT
Start: 2020-01-10 | End: 2020-01-16

## 2020-01-10 RX ORDER — FUROSEMIDE 10 MG/ML
20 INJECTION INTRAMUSCULAR; INTRAVENOUS ONCE
Status: COMPLETED | OUTPATIENT
Start: 2020-01-10 | End: 2020-01-10

## 2020-01-10 RX ORDER — ASCORBIC ACID 500 MG
500 TABLET ORAL DAILY
Status: DISCONTINUED | OUTPATIENT
Start: 2020-01-10 | End: 2020-01-17 | Stop reason: HOSPADM

## 2020-01-10 RX ORDER — PRIMIDONE 50 MG/1
150 TABLET ORAL
Status: DISCONTINUED | OUTPATIENT
Start: 2020-01-10 | End: 2020-01-16

## 2020-01-10 RX ORDER — PRIMIDONE 50 MG/1
100 TABLET ORAL
Status: DISCONTINUED | OUTPATIENT
Start: 2020-01-10 | End: 2020-01-17 | Stop reason: HOSPADM

## 2020-01-10 RX ORDER — HEPARIN SODIUM 5000 [USP'U]/ML
5000 INJECTION, SOLUTION INTRAVENOUS; SUBCUTANEOUS EVERY 8 HOURS SCHEDULED
Status: DISCONTINUED | OUTPATIENT
Start: 2020-01-10 | End: 2020-01-17 | Stop reason: HOSPADM

## 2020-01-10 RX ORDER — GUAIFENESIN 600 MG
600 TABLET, EXTENDED RELEASE 12 HR ORAL 2 TIMES DAILY
Status: DISCONTINUED | OUTPATIENT
Start: 2020-01-10 | End: 2020-01-17 | Stop reason: HOSPADM

## 2020-01-10 RX ORDER — LAMOTRIGINE 100 MG/1
250 TABLET ORAL DAILY
Status: DISCONTINUED | OUTPATIENT
Start: 2020-01-10 | End: 2020-01-10

## 2020-01-10 RX ORDER — CEFEPIME HYDROCHLORIDE 2 G/50ML
2000 INJECTION, SOLUTION INTRAVENOUS ONCE
Status: COMPLETED | OUTPATIENT
Start: 2020-01-10 | End: 2020-01-10

## 2020-01-10 RX ORDER — CLONAZEPAM 0.5 MG/1
0.5 TABLET ORAL
Status: DISCONTINUED | OUTPATIENT
Start: 2020-01-10 | End: 2020-01-17 | Stop reason: HOSPADM

## 2020-01-10 RX ORDER — ACETAMINOPHEN 325 MG/1
650 TABLET ORAL EVERY 6 HOURS PRN
Status: DISCONTINUED | OUTPATIENT
Start: 2020-01-10 | End: 2020-01-17 | Stop reason: HOSPADM

## 2020-01-10 RX ORDER — LEVALBUTEROL 1.25 MG/.5ML
1.25 SOLUTION, CONCENTRATE RESPIRATORY (INHALATION) EVERY 6 HOURS PRN
Status: DISCONTINUED | OUTPATIENT
Start: 2020-01-10 | End: 2020-01-10

## 2020-01-10 RX ORDER — VITAMIN E 268 MG
400 CAPSULE ORAL DAILY
Status: DISCONTINUED | OUTPATIENT
Start: 2020-01-10 | End: 2020-01-17 | Stop reason: HOSPADM

## 2020-01-10 RX ORDER — ZINC GLUCONATE 50 MG
50 TABLET ORAL DAILY
Status: DISCONTINUED | OUTPATIENT
Start: 2020-01-10 | End: 2020-01-17 | Stop reason: HOSPADM

## 2020-01-10 RX ORDER — ONDANSETRON 2 MG/ML
4 INJECTION INTRAMUSCULAR; INTRAVENOUS EVERY 6 HOURS PRN
Status: DISCONTINUED | OUTPATIENT
Start: 2020-01-10 | End: 2020-01-17 | Stop reason: HOSPADM

## 2020-01-10 RX ORDER — CEFEPIME HYDROCHLORIDE 2 G/50ML
2000 INJECTION, SOLUTION INTRAVENOUS EVERY 12 HOURS
Status: DISCONTINUED | OUTPATIENT
Start: 2020-01-10 | End: 2020-01-13

## 2020-01-10 RX ORDER — LEVOCARNITINE 330 MG/1
330 TABLET ORAL
Status: DISCONTINUED | OUTPATIENT
Start: 2020-01-10 | End: 2020-01-17 | Stop reason: HOSPADM

## 2020-01-10 RX ORDER — ALBUMIN, HUMAN INJ 5% 5 %
12.5 SOLUTION INTRAVENOUS ONCE
Status: COMPLETED | OUTPATIENT
Start: 2020-01-10 | End: 2020-01-10

## 2020-01-10 RX ORDER — LAMOTRIGINE 100 MG/1
300 TABLET ORAL
Status: DISCONTINUED | OUTPATIENT
Start: 2020-01-10 | End: 2020-01-17 | Stop reason: HOSPADM

## 2020-01-10 RX ORDER — HYDRALAZINE HYDROCHLORIDE 20 MG/ML
5 INJECTION INTRAMUSCULAR; INTRAVENOUS EVERY 6 HOURS PRN
Status: DISCONTINUED | OUTPATIENT
Start: 2020-01-10 | End: 2020-01-14

## 2020-01-10 RX ORDER — LAMOTRIGINE 100 MG/1
250 TABLET ORAL
Status: DISCONTINUED | OUTPATIENT
Start: 2020-01-11 | End: 2020-01-16

## 2020-01-10 RX ORDER — LABETALOL 20 MG/4 ML (5 MG/ML) INTRAVENOUS SYRINGE
10 EVERY 4 HOURS PRN
Status: DISCONTINUED | OUTPATIENT
Start: 2020-01-10 | End: 2020-01-14

## 2020-01-10 RX ADMIN — LAMOTRIGINE 200 MG: 100 TABLET ORAL at 08:50

## 2020-01-10 RX ADMIN — SODIUM CHLORIDE 1000 ML: 0.9 INJECTION, SOLUTION INTRAVENOUS at 05:36

## 2020-01-10 RX ADMIN — PRIMIDONE 150 MG: 50 TABLET ORAL at 08:50

## 2020-01-10 RX ADMIN — HEPARIN SODIUM 5000 UNITS: 5000 INJECTION INTRAVENOUS; SUBCUTANEOUS at 15:11

## 2020-01-10 RX ADMIN — CLONAZEPAM 0.5 MG: 0.5 TABLET ORAL at 22:18

## 2020-01-10 RX ADMIN — GUAIFENESIN 600 MG: 600 TABLET ORAL at 17:45

## 2020-01-10 RX ADMIN — SODIUM CHLORIDE 1000 ML: 0.9 INJECTION, SOLUTION INTRAVENOUS at 08:57

## 2020-01-10 RX ADMIN — METRONIDAZOLE 500 MG: 500 INJECTION, SOLUTION INTRAVENOUS at 23:17

## 2020-01-10 RX ADMIN — VANCOMYCIN HYDROCHLORIDE 1250 MG: 5 INJECTION, POWDER, LYOPHILIZED, FOR SOLUTION INTRAVENOUS at 20:44

## 2020-01-10 RX ADMIN — ZONISAMIDE 100 MG: 100 CAPSULE ORAL at 23:25

## 2020-01-10 RX ADMIN — LAMOTRIGINE 250 MG: 25 TABLET ORAL at 15:41

## 2020-01-10 RX ADMIN — LEVALBUTEROL HYDROCHLORIDE 1.25 MG: 1.25 SOLUTION, CONCENTRATE RESPIRATORY (INHALATION) at 13:00

## 2020-01-10 RX ADMIN — LAMOTRIGINE 300 MG: 100 TABLET ORAL at 22:18

## 2020-01-10 RX ADMIN — PERFLUTREN 1 ML/MIN: 6.52 INJECTION, SUSPENSION INTRAVENOUS at 16:16

## 2020-01-10 RX ADMIN — GUAIFENESIN 600 MG: 600 TABLET ORAL at 08:51

## 2020-01-10 RX ADMIN — CEFEPIME HYDROCHLORIDE 2000 MG: 2 INJECTION, SOLUTION INTRAVENOUS at 05:41

## 2020-01-10 RX ADMIN — PRIMIDONE 100 MG: 50 TABLET ORAL at 15:42

## 2020-01-10 RX ADMIN — IPRATROPIUM BROMIDE 0.5 MG: 0.5 SOLUTION RESPIRATORY (INHALATION) at 20:30

## 2020-01-10 RX ADMIN — METRONIDAZOLE 500 MG: 500 INJECTION, SOLUTION INTRAVENOUS at 13:38

## 2020-01-10 RX ADMIN — CEFEPIME HYDROCHLORIDE 2000 MG: 2 INJECTION, SOLUTION INTRAVENOUS at 18:39

## 2020-01-10 RX ADMIN — IPRATROPIUM BROMIDE 0.5 MG: 0.5 SOLUTION RESPIRATORY (INHALATION) at 09:56

## 2020-01-10 RX ADMIN — FUROSEMIDE 40 MG: 40 TABLET ORAL at 08:51

## 2020-01-10 RX ADMIN — Medication 50 MG: at 08:49

## 2020-01-10 RX ADMIN — OXYCODONE HYDROCHLORIDE AND ACETAMINOPHEN 500 MG: 500 TABLET ORAL at 09:02

## 2020-01-10 RX ADMIN — IPRATROPIUM BROMIDE 0.5 MG: 0.5 SOLUTION RESPIRATORY (INHALATION) at 13:00

## 2020-01-10 RX ADMIN — Medication 400 UNITS: at 08:50

## 2020-01-10 RX ADMIN — FUROSEMIDE 20 MG: 10 INJECTION, SOLUTION INTRAMUSCULAR; INTRAVENOUS at 15:06

## 2020-01-10 RX ADMIN — VANCOMYCIN HYDROCHLORIDE 1500 MG: 1 INJECTION, POWDER, LYOPHILIZED, FOR SOLUTION INTRAVENOUS at 06:31

## 2020-01-10 RX ADMIN — ALBUMIN (HUMAN) 12.5 G: 12.5 INJECTION, SOLUTION INTRAVENOUS at 22:18

## 2020-01-10 RX ADMIN — SODIUM CHLORIDE 1000 ML: 0.9 INJECTION, SOLUTION INTRAVENOUS at 06:31

## 2020-01-10 RX ADMIN — LEVALBUTEROL HYDROCHLORIDE 1.25 MG: 1.25 SOLUTION, CONCENTRATE RESPIRATORY (INHALATION) at 09:56

## 2020-01-10 RX ADMIN — HEPARIN SODIUM 5000 UNITS: 5000 INJECTION INTRAVENOUS; SUBCUTANEOUS at 22:18

## 2020-01-10 RX ADMIN — Medication 1 TABLET: at 08:52

## 2020-01-10 RX ADMIN — PRIMIDONE 150 MG: 50 TABLET ORAL at 23:25

## 2020-01-10 RX ADMIN — ASPIRIN 81 MG 81 MG: 81 TABLET ORAL at 09:02

## 2020-01-10 RX ADMIN — HEPARIN SODIUM 5000 UNITS: 5000 INJECTION INTRAVENOUS; SUBCUTANEOUS at 08:58

## 2020-01-10 RX ADMIN — LEVALBUTEROL HYDROCHLORIDE 1.25 MG: 1.25 SOLUTION, CONCENTRATE RESPIRATORY (INHALATION) at 20:30

## 2020-01-10 RX ADMIN — ACETAMINOPHEN 650 MG: 325 TABLET ORAL at 20:16

## 2020-01-10 NOTE — ASSESSMENT & PLAN NOTE
· Pneumonia found on CT scan 1/7, after a fall  · CT AP 1/7: Left upper lobe alveolar opacities likely representing pneumonia,Was treated outpatient with oral Zithromax   · Failure to improve, cefepime and vanc given in the ER and also on Flagyl  · Blood cultures remained negative, urine strep and Legionella negative  · Antibiotics discontinued after 5 daysprocalcitonin remained negative  · Symptom control  · Respiratory and oxygen protocol

## 2020-01-10 NOTE — ASSESSMENT & PLAN NOTE
· /86  · Does not appear to be on antihypertensive, however is on Lasix for chronic lower extremity edema  · Monitor BP

## 2020-01-10 NOTE — ASSESSMENT & PLAN NOTE
· S/p fall 1/7  · CT abdomen pelvis:  No traumatic thoracic or intra-abdominal abnormality identified    Stranding within the subcutaneous tissues of the right flank consistent with a contusion  ·

## 2020-01-10 NOTE — PLAN OF CARE
Problem: Potential for Falls  Goal: Patient will remain free of falls  Description  INTERVENTIONS:  - Assess patient frequently for physical needs  -  Identify cognitive and physical deficits and behaviors that affect risk of falls  -  [place] fall precautions as indicated by assessment   - Educate patient/family on patient safety including physical limitations  - Instruct patient to call for assistance with activity based on assessment  - Modify environment to reduce risk of injury  - Consider OT/PT consult to assist with strengthening/mobility  Outcome: Progressing     Problem: Prexisting or High Potential for Compromised Skin Integrity  Goal: Skin integrity is maintained or improved  Description  INTERVENTIONS:  - Identify patients at risk for skin breakdown  - Assess and monitor skin integrity  - Assess and monitor nutrition and hydration status  - Monitor labs   - Assess for incontinence   - Turn and reposition patient  - Assist with mobility/ambulation  - Relieve pressure over bony prominences  - Avoid friction and shearing  - Provide appropriate hygiene as needed including keeping skin clean and dry  - Evaluate need for skin moisturizer/barrier cream  - Collaborate with interdisciplinary team   - Patient/family teaching  - Consider wound care consult   Outcome: Progressing     Problem: Nutrition/Hydration-ADULT  Goal: Nutrient/Hydration intake appropriate for improving, restoring or maintaining nutritional needs  Description  Monitor and assess patient's nutrition/hydration status for malnutrition  Collaborate with interdisciplinary team and initiate plan and interventions as ordered  Monitor patient's weight and dietary intake as ordered or per policy  Utilize nutrition screening tool and intervene as necessary  Determine patient's food preferences and provide high-protein, high-caloric foods as appropriate       INTERVENTIONS:  - Monitor oral intake, urinary output, labs, and treatment plans  - Assess nutrition and hydration status and recommend course of action  - Evaluate amount of meals eaten  - Assist patient with eating if necessary   - Allow adequate time for meals  - Recommend/ encourage appropriate diets, oral nutritional supplements, and vitamin/mineral supplements  - Order, calculate, and assess calorie counts as needed  - Recommend, monitor, and adjust tube feedings and TPN/PPN based on assessed needs  - Assess need for intravenous fluids  - Provide specific nutrition/hydration education as appropriate  - Include patient/family/caregiver in decisions related to nutrition  Outcome: Progressing     Problem: SAFETY ADULT  Goal: Patient will remain free of falls  Description  INTERVENTIONS:  - Assess patient frequently for physical needs  -  Identify cognitive and physical deficits and behaviors that affect risk of falls    -  [place] fall precautions as indicated by assessment   - Educate patient/family on patient safety including physical limitations  - Instruct patient to call for assistance with activity based on assessment  - Modify environment to reduce risk of injury  - Consider OT/PT consult to assist with strengthening/mobility  Outcome: Progressing

## 2020-01-10 NOTE — H&P
[facility] Internal Medicine    H&P- Jovanny Latif 1965, 48 y o  male MRN: [id number]    Unit/Bed#: ED 10 Encounter: [id number]    Primary Care Provider: Rafael Hernandez,    Date and time admitted to hospital: 1/10/2020  5:20 AM        * Pneumonia due to infectious organism  Assessment & Plan  · Pneumonia found on CT scan 1/7, after a fall  · CT AP 1/7: Left upper lobe alveolar opacities likely representing pneumonia  · PCXR 1/10:  Left upper lobe opacity  Radiology read is pending  · Was treated outpatient with oral Zithromax   · Failure to improve, cefepime and vanc given in the ER  · Temperature 99 4°, no leukocytosis  · Check procalcitonin  · Check Legionella and strep pneumoniae  · Blood cultures pending  · Continue antibiotics and deescalate as indicated  · Symptom control  · Respiratory and oxygen protocol    Nonintractable generalized idiopathic epilepsy without status epilepticus ([facility] [address] )  Assessment & Plan  · History of seizures since a child, has 1-2 year, with recent increase in past months to one/month  · Is on Lamictal Mysoline clonazepam Zonisemide  · Per Neurology, medications contribute to unsteady gait  · Check Lamictal, Mysoline, zonisamide levels  · Seizure precautions  · Fall precautions    Essential hypertension  Assessment & Plan  · /86  · Does not appear to be on antihypertensive, however is on Lasix for chronic lower extremity edema  · Monitor BP    T wave inversion in EKG  Assessment & Plan  · EKG:  with T-wave inversions V3 V4 V5 V6  No old for comparison  · No chest pain  · Troponin <0 03  · Trend troponin x 3  · Telemetry monitoring    Contusion, flank, subsequent encounter  Assessment & Plan  · S/p fall 1/7  · CT abdomen pelvis:  No traumatic thoracic or intra-abdominal abnormality identified    Stranding within the subcutaneous tissues of the right flank consistent with a contusion  · Monitor    Thrombocytopenia (HCC)  Assessment & Plan  · Platelets 122  · Monitor for signs of bleeding  · Daily CBC and trend platelets    Elevated serum creatinine  Assessment & Plan  · Creatinine 1 45  Baseline is 1 0 to 1 2   · Monitor Cr        VTE Prophylaxis: Heparin  / reason for no mechanical VTE prophylaxis VTE score 3   Code Status:  Full  POLST: POLST form is not discussed and not completed at this time  Discussion with family:  Patient's mother    Anticipated Length of Stay:  Patient will be admitted on an Inpatient basis with an anticipated length of stay of  greater than 2 midnights  Justification for Hospital Stay:  As described in plan above    Total Time for Visit, including Counseling / Coordination of Care: 45 minutes  Greater than 50% of this total time spent on direct patient counseling and coordination of care  Chief Complaint:   Cough, fever, shortness of breath    History of Present Illness:    Korey Thapa is a 48 y o  male with a history of epilepsy generally controlled on anticonvulsants, developmental delay, essential hypertension, recent fall, and pneumonia who presents with a 3 day history of worsening shortness of breath, fever, and cough  The pneumonia was found last week on a CT scan  He was seen because he had fallen last week while in the shower  He was started on Zithromax p o  He has gotten worse  He has a productive cough, wheezing, and shortness of breath  No chest pain  No nausea or vomiting  He has chronic lower extremity swelling  His gait is sometimes unsteady  Per Neurology note, this is somewhat attributed to his medication  When he fell last week, his mother said he fell through the shower door and hit the side of the tub  He did not lose consciousness, but she is unsure if he may have had a seizure  She says he really only has them 1 to 2 times a year except for the past several months where he has been having one a month  He has a large contusion of the right flank area and mid thoracic spine      Review of Systems:    Review of Systems   Constitutional: Positive for fever  Negative for chills  Eyes: Negative for visual disturbance  Respiratory: Positive for cough, shortness of breath and wheezing  Cardiovascular: Positive for leg swelling  Negative for chest pain and palpitations  Gastrointestinal: Negative for abdominal pain, diarrhea, nausea and vomiting  Genitourinary: Negative for dysuria and flank pain  Musculoskeletal: Negative for arthralgias and myalgias  Skin: Negative for pallor and rash  Neurological: Positive for seizures  Negative for dizziness, syncope, weakness, numbness and headaches  All other systems reviewed and are negative  Past Medical and Surgical History:     Past Medical History:   Diagnosis Date    Hypertension     Pericardial effusion     Pneumonia     Seizure [facility])        Past Surgical History:   Procedure Laterality Date    FRACTURE SURGERY      clavicle, left humerus, radial, and ulna  Right radius    HIP ARTHROSCOPY Right     AK COLONOSCOPY FLX DX W/COLLJ SPEC WHEN PFRMD N/A 4/1/2019    Procedure: COLONOSCOPY;  Surgeon: Marques Gonzalez MD;  Location: [facility]; Service: Colorectal       Meds/Allergies:    Prior to Admission medications    Medication Sig Start Date End Date Taking?  Authorizing Provider   ascorbic acid (VITAMIN C) 500 mg tablet Take 500 mg by mouth daily   Yes Historical Provider, MD   aspirin 81 MG tablet Take 81 mg by mouth daily   Yes Historical Provider, MD   azithromycin (ZITHROMAX) 250 mg tablet Take 2 tablets today then 1 tablet daily x 4 days 1/7/20 1/11/20 Yes Loc Melissa MD   b complex vitamins tablet Take 1 tablet by mouth daily   Yes Historical Provider, MD   calcium-vitamin D 250-100 MG-UNIT per tablet Take 1 tablet by mouth daily    Yes Historical Provider, MD   clonazePAM (KlonoPIN) 0 5 mg tablet Take 0 5 mg by mouth daily at bedtime    Yes Historical Provider, MD   furosemide (LASIX) 40 mg tablet Take 40 mg by mouth daily   Yes Historical Provider, MD   lamoTRIgine (LaMICtal) 200 MG tablet Take 200 mg by mouth daily in the early morning    Yes Historical Provider, MD   lamoTRIgine (LaMICtal) 25 mg tablet Take 250 mg by mouth daily   Yes Historical Provider, MD   lamoTRIgine (LaMICtal) 25 mg tablet Take 300 mg by mouth daily at bedtime   Yes Historical Provider, MD   levOCARNitine (CARNITOR) 330 MG tablet Take 330 mg by mouth 4 (four) times daily (after meals and at bedtime)    Yes Historical Provider, MD   primidone (MYSOLINE) 50 mg tablet Take 150 mg by mouth daily after breakfast    Yes Historical Provider, MD   primidone (MYSOLINE) 50 mg tablet Take 100 mg by mouth daily with lunch   Yes Historical Provider, MD   primidone (MYSOLINE) 50 mg tablet Take 150 mg by mouth daily before dinner   Yes Historical Provider, MD   vitamin E, tocopherol, 400 units capsule Take 400 Units by mouth daily   Yes Historical Provider, MD   zinc gluconate 50 mg tablet Take 50 mg by mouth daily   Yes Historical Provider, MD   zonisamide (ZONEGRAN) 100 mg capsule Take 100 mg by mouth daily   Yes Historical Provider, MD     I have reviewed home medications with patient family member  Patient's mother was concerned that he gets his anticonvulsants at the right times, breakfast, 1500, and 2200  Allergies:    Allergies   Allergen Reactions    Dilantin [Phenytoin]      Pericardial effusion    Phenobarbital Hyperactivity       Social History:     Marital Status: Unknown   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  Patient Pre-hospital Living Situation:  Home with mother  Patient Pre-hospital Level of Mobility:  Ambulatory  Patient Pre-hospital Diet Restrictions:  None  Substance Use History:   Social History     Substance and Sexual Activity   Alcohol Use Never    Frequency: Never     Social History     Tobacco Use   Smoking Status Never Smoker   Smokeless Tobacco Never Used     Social History     Substance and Sexual Activity   Drug Use Never Family History:    Patient is adopted, no family history available  Physical Exam:     Vitals:   Blood Pressure: (!) 185/103 (01/10/20 0745)  Pulse: (!) 116 (01/10/20 0745)  Temperature: 99 4 °F (37 4 °C) (01/10/20 0516)  Temp Source: Temporal (01/10/20 0516)  Respirations: (!) 32 (01/10/20 0745)  Weight - Scale: 101 kg (222 lb 10 6 oz) (01/10/20 0516)  SpO2: 94 % (01/10/20 0745)    Physical Exam   Constitutional: He is oriented to person, place, and time  He appears well-developed and well-nourished  HENT:   Head: Normocephalic and atraumatic  Eyes: Pupils are equal, round, and reactive to light  Conjunctivae and EOM are normal    Neck: Normal range of motion  Neck supple  Cardiovascular: Normal rate, regular rhythm, normal heart sounds and intact distal pulses  Pulmonary/Chest: Effort normal  He has wheezes  He exhibits no tenderness  Abdominal: Soft  Bowel sounds are normal    Musculoskeletal: Normal range of motion  He exhibits edema  3+ nonpitting bilateral lower extremity edema   Neurological: He is alert and oriented to person, place, and time  Skin: Skin is warm and dry  Capillary refill takes less than 2 seconds  Discoloration of PVD bilateral lower extremities   Nursing note and vitals reviewed  Additional Data:     Lab Results: I have personally reviewed pertinent reports        Results from last 7 days   Lab Units 01/10/20  0526   WBC Thousand/uL 8 00   HEMOGLOBIN g/dL 15 0   HEMATOCRIT % 45 4   PLATELETS Thousands/uL 122*   NEUTROS PCT % 83*   LYMPHS PCT % 6*   MONOS PCT % 11   EOS PCT % 0     Results from last 7 days   Lab Units 01/10/20  0526   SODIUM mmol/L 132*   POTASSIUM mmol/L 3 6   CHLORIDE mmol/L 96*   CO2 mmol/L 28   BUN mg/dL 21   CREATININE mg/dL 1 45*   ANION GAP mmol/L 8   CALCIUM mg/dL 8 0*   ALBUMIN g/dL 3 3*   TOTAL BILIRUBIN mg/dL 1 07*   ALK PHOS U/L 178*   ALT U/L 31   AST U/L 33   GLUCOSE RANDOM mg/dL 97     Results from last 7 days   Lab Units 01/10/20  0526   INR  1 24*             Results from last 7 days   Lab Units 01/10/20  0526   LACTIC ACID mmol/L 2 2*       Imaging: I have personally reviewed pertinent reports  XR chest 1 view portable    (Results Pending)       EKG, Pathology, and Other Studies Reviewed on Admission:   · EKG: ST rate of 107  No ST elevations  Inverted T-waves in V3 V4 V5 V6  Allscripts / Epic Records Reviewed: Yes     ** Please Note: This note has been constructed using a voice recognition system   **

## 2020-01-10 NOTE — ASSESSMENT & PLAN NOTE
· Chronic thrombocytopenia by reviewing the records from outside  · Monitor for signs of bleeding  · Daily CBC and trend platelets

## 2020-01-10 NOTE — ASSESSMENT & PLAN NOTE
· History of seizures since a child, has 1-2 year, with recent increase in past months to one/month  · Is on Lamictal Mysoline clonazepam Zonisemide  · Per Neurology, medications contribute to unsteady gait  · Seizure precautions  · Fall precautions  · Patient had 1 episode of tonic-clonic seizure lasting for about 5 seconds spontaneously resolved while eating the breakfast today  Discussed with Neurology on-call  No need to adjust the medication

## 2020-01-10 NOTE — RESPIRATORY THERAPY NOTE
RT Ventilator Management Note  Mateusz Mckinney 48 y o  male MRN: [id number]  Unit/Bed#: ZHAO Encounter: [id number]      Daily Screen     No data found in the last 10 encounters  Physical Exam:   Assessment Type: During-treatment  General Appearance: Alert, Awake  Respiratory Pattern: Symmetrical, Labored  Chest Assessment: Chest expansion symmetrical  Bilateral Breath Sounds: Diminished, Expiratory wheezes  Cough: Congested, Strong, Non-productive  O2 Device: HFNC      Resp Comments: Pt from ED to  via HFNC  Pt placed on resp

## 2020-01-10 NOTE — ASSESSMENT & PLAN NOTE
· Pneumonia found on CT scan 1/7, after a fall  · Was treated outpatient with oral Zithromax   · Failure to improve, cefepime and vanc given in the ER  · Temperature 99 4°, no leukocytosis  · Check procalcitonin  · Blood cultures pending  · Continue antibiotics and deescalate as indicated  · Symptom control  · Respiratory and oxygen protocol

## 2020-01-10 NOTE — RESPIRATORY THERAPY NOTE
RT Protocol Note  Jeanne Bills 48 y o  male MRN: [id number]  Unit/Bed#: ED 10 Encounter: [id number]    Assessment    Principal Problem:    Pneumonia due to infectious organism  Active Problems:    Elevated serum creatinine    Thrombocytopenia (HCC)    Nonintractable generalized idiopathic epilepsy without status epilepticus ([facility] [address] )    Contusion, flank, subsequent encounter    T wave inversion in EKG    Essential hypertension      Home Pulmonary Medications:         Past Medical History:   Diagnosis Date    Hypertension     Pericardial effusion     Pneumonia     Seizure ([facility] [address] )      Social History     Socioeconomic History    Marital status: Unknown     Spouse name: None    Number of children: None    Years of education: None    Highest education level: None   Occupational History    None   Social Needs    Financial resource strain: None    Food insecurity:     Worry: None     Inability: None    Transportation needs:     Medical: None     Non-medical: None   Tobacco Use    Smoking status: Never Smoker    Smokeless tobacco: Never Used   Substance and Sexual Activity    Alcohol use: Never     Frequency: Never    Drug use: Never    Sexual activity: None   Lifestyle    Physical activity:     Days per week: None     Minutes per session: None    Stress: None   Relationships    Social connections:     Talks on phone: None     Gets together: None     Attends [employer] service: None     Active member of club or organization: None     Attends meetings of clubs or organizations: None     Relationship status: None    Intimate partner violence:     Fear of current or ex partner: None     Emotionally abused: None     Physically abused: None     Forced sexual activity: None   Other Topics Concern    None   Social History Narrative    None       Subjective         Objective    Physical Exam:   Assessment Type: During-treatment  General Appearance: Alert, Awake  Respiratory Pattern: Symmetrical, Labored  Chest Assessment: Chest expansion symmetrical  Bilateral Breath Sounds: Diminished, Expiratory wheezes  Cough: Congested, Strong, Non-productive  O2 Device: 2LPM NC    Vitals:  Blood pressure (!) 183/90, pulse (!) 120, temperature 99 1 °F (37 3 °C), temperature source Oral, resp  rate (!) 29, weight 101 kg (222 lb 10 6 oz), SpO2 91 %  Results from last 7 days   Lab Units 01/10/20  1045   PH ART  7 395   PCO2 ART mm Hg 38 8   PO2 ART mm Hg 35 9*   HCO3 ART mmol/L 23 2   BASE EXC ART mmol/L -1 4   O2 CONTENT ART mL/dL 13 8*   O2 HGB, ARTERIAL % 68 4*   ABG SOURCE  Radial, Right   SHAN TEST  Yes       Imaging and other studies: I have personally reviewed pertinent reports  O2 Device: 2LPM NC     Plan    Respiratory Plan: Moderate/Severe Distress pathway        Resp Comments: Pt now inpatient and respiratory protocol ordered  This is pt 6th time having PNA per mother  Pt takes udn tx when sick in the past  Pt is now audibly wheezing and exp wheezes through out lung fields  Q6 udn tx started now

## 2020-01-10 NOTE — ED NOTES
Assumed care of patient at 0700  Rounded on patient  Patient resting comfortably in bed, with call bell in reach and family at the bedside  No needs at this time        Rosalba Willoughby RN  01/10/20 0875

## 2020-01-10 NOTE — ASSESSMENT & PLAN NOTE
· History of seizures since a child, has 1-2 year, with recent increase in past months to one/month  · Is on Lamictal Mysoline clonazepam Zonisemide  · Per Neurology, medications contribute to unsteady gait  · Check Lamictal, Mysoline, zonisamide levels  · Seizure precautions  · Fall precautions

## 2020-01-10 NOTE — ASSESSMENT & PLAN NOTE
· Pneumonia found on CT scan 1/7, after a fall  · CT AP 1/7: Left upper lobe alveolar opacities likely representing pneumonia  · PCXR 1/10:  Left upper lobe opacity    Radiology read is pending  · Was treated outpatient with oral Zithromax   · Failure to improve, cefepime and vanc given in the ER  · Temperature 99 4°, no leukocytosis  · Check procalcitonin  · Check Legionella and strep pneumoniae  · Blood cultures pending  · Continue antibiotics and deescalate as indicated  · Symptom control  · Respiratory and oxygen protocol

## 2020-01-10 NOTE — ASSESSMENT & PLAN NOTE
· EKG:  with T-wave inversions V3 V4 V5 V6    No old for comparison  · No chest pain  · Troponin <0 03  · Trend troponin x 3  · Outpatient follow-up

## 2020-01-10 NOTE — ASSESSMENT & PLAN NOTE
· EKG:  with T-wave inversions V3 V4 V5 V6    No old for comparison  · No chest pain  · Troponin <0 03  · Trend troponin x 3  · Telemetry monitoring

## 2020-01-10 NOTE — PROGRESS NOTES
Given poor tolerance to regular foods, will order ensure clear TID  Will monitor I/O, labs, weights

## 2020-01-10 NOTE — ED NOTES
Pharmacy could not dispense Vitamin B supplements or levocarnitine as ordered  [place] Texted physician on duty  Awaiting reply        Gavin Cesar RN  01/10/20 8166

## 2020-01-10 NOTE — ASSESSMENT & PLAN NOTE
· S/p fall 1/7  · CT abdomen pelvis:  No traumatic thoracic or intra-abdominal abnormality identified    Stranding within the subcutaneous tissues of the right flank consistent with a contusion  · Monitor

## 2020-01-10 NOTE — ASSESSMENT & PLAN NOTE
· History of seizures since a child, has 1-2 year, with recent increase in past months to one/month  · Is on Lamictal Mysoline clonazepam Zonisemide  · Per Neurology, medications contribute to unsteady gait  · Check Lamictal, Mysoline, zonisamide levels  · Resume home meds and follow up levels  · Seizure precautions  · Fall precautions

## 2020-01-10 NOTE — PROGRESS NOTES
Vancomycin Assessment    [place] is a 48 y o  male who was currently prescribed vancomycin 1500 mg iv q 12 hrs for Pneumonia     Relevant clinical data and objective history reviewed:  Creatinine   Date Value Ref Range Status   01/10/2020 1 45 (H) 0 60 - 1 30 mg/dL Final     Comment:     Standardized to IDMS reference method     BP (!) 185/103 (BP Location: Right arm)   Pulse (!) 116   Temp 99 4 °F (37 4 °C) (Temporal)   Resp (!) 32   Wt 101 kg (222 lb 10 6 oz)   SpO2 94%   BMI 33 86 kg/m²   I/O last 3 completed shifts: In: 1050 [IV Piggyback:1050]  Out: -   Lab Results   Component Value Date/Time    BUN 21 01/10/2020 05:26 AM    WBC 8 00 01/10/2020 05:26 AM    HGB 15 0 01/10/2020 05:26 AM    HCT 45 4 01/10/2020 05:26 AM    MCV 97 01/10/2020 05:26 AM     (L) 01/10/2020 05:26 AM     Temp Readings from Last 3 Encounters:   01/10/20 99 4 °F (37 4 °C) (Temporal)   01/07/20 (!) 101 6 °F (38 7 °C) (Oral)   12/22/19 98 9 °F (37 2 °C) (Oral)     Vancomycin Days of Therapy: 1    Assessment/Plan  The patient is currently on vancomycin utilizing scheduled dosing based on adjusted body weight (due to obesity)  Baseline risks associated with therapy include: pre-existing renal impairment and concomitant nephrotoxic medications and dehydration  The patient was currently prescribed 1500 mg iv q 12 hrs and after clinical evaluation will be changed to 1250 mg iv q 12 hrs  Pharmacy will also follow closely for s/sx of nephrotoxicity, infusion reactions and appropriateness of therapy  BMP and CBC will be ordered per protocol  Plan for trough as patient approaches steady state, prior to the 4th  dose at approximately 1900 on 1/11/19  Due to infection severity, will target a trough of 15-20 (appropriate for most indications)   Pharmacy will continue to follow the patients culture results and clinical progress daily      Klaudia Munroe, Pharmacist

## 2020-01-10 NOTE — RESPIRATORY THERAPY NOTE
RT Ventilator Management Note  Geetha Portillo 48 y o  male MRN: [id number]  Unit/Bed#: ED 10 Encounter: [id number]      Daily Screen     No data found in the last 10 encounters  Physical Exam:   Assessment Type: During-treatment  General Appearance: Alert, Awake  Respiratory Pattern: Symmetrical, Labored  Chest Assessment: Chest expansion symmetrical  Bilateral Breath Sounds: Diminished, Expiratory wheezes  Cough: Congested, Strong, Non-productive  O2 Device: HFNC      Resp Comments: (P) Pt continues with increased WOB and SpO2 of 92% on 6LPM NC  Pt placed on HFNC at this time, WOB is decreasing with increase in SpO2 to 96%  Pt tolerating HFNC well

## 2020-01-10 NOTE — ED PROVIDER NOTES
History  Chief Complaint   Patient presents with    Shortness of Breath     Patient has some intermittent episodes of sob, increased cough and fever  Patient was diagnosed with pneumonia 2 days ago and was started on levofloxacin  47 yo M dx'd w/PNA on CT 2 days ago, febrile at that time w/negative rsv / flu and sent home in well appearing condition w/aza  Respiratory sx and fever con't and worsened since that time  History provided by:  Patient and parent   used: No    Shortness of Breath   Severity:  Moderate  Onset quality:  Gradual  Duration:  3 days  Timing:  Constant  Progression:  Worsening  Chronicity:  New  Context: URI    Context: not activity, not animal exposure, not emotional upset and not fumes    Relieved by:  Nothing  Worsened by:  Deep breathing, exertion, activity and coughing  Ineffective treatments: AZA x 2 days  Associated symptoms: cough and fever    Associated symptoms: no abdominal pain, no chest pain, no diaphoresis, no ear pain, no headaches, no hemoptysis, no neck pain, no rash, no sore throat, no sputum production, no syncope, no vomiting and no wheezing        Cannot display prior to admission medications because the patient has not been admitted in this contact  Past Medical History:   Diagnosis Date    Hypertension     Pericardial effusion     Pneumonia     Seizure [facility])        Past Surgical History:   Procedure Laterality Date    FRACTURE SURGERY      clavicle, left humerus, radial, and ulna  Right radius    HIP ARTHROSCOPY Right     ME COLONOSCOPY FLX DX W/COLLJ SPEC WHEN PFRMD N/A 4/1/2019    Procedure: COLONOSCOPY;  Surgeon: Bravo Renee MD;  Location: [facility]; Service: Colorectal       Family History   Adopted: Yes     I have reviewed and agree with the history as documented      Social History     Tobacco Use    Smoking status: Never Smoker    Smokeless tobacco: Never Used   Substance Use Topics    Alcohol use: Never Frequency: Never    Drug use: Never        Review of Systems   Constitutional: Positive for fatigue and fever  Negative for diaphoresis  HENT: Negative for ear pain, postnasal drip, rhinorrhea, sinus pressure, sinus pain and sore throat  Eyes: Negative for pain and redness  Respiratory: Positive for cough and shortness of breath  Negative for hemoptysis, sputum production and wheezing  Cardiovascular: Negative for chest pain and syncope  Gastrointestinal: Negative for abdominal distention, abdominal pain, diarrhea, nausea and vomiting  Endocrine: Negative for cold intolerance and heat intolerance  Genitourinary: Negative for dysuria and flank pain  Musculoskeletal: Negative for neck pain  Skin: Negative for rash  Neurological: Negative for headaches  Psychiatric/Behavioral: Negative for agitation and behavioral problems  Physical Exam  Physical Exam   Constitutional: He is oriented to person, place, and time  Non-toxic appearance  He appears ill  HENT:   Head: Normocephalic and atraumatic  Mouth/Throat: Oropharynx is clear and moist    Eyes: Pupils are equal, round, and reactive to light  Strabismus   Neck: Normal range of motion  Neck supple  Cardiovascular:   Tachycardic    Pulmonary/Chest: Tachypnea noted  No apnea  He is in respiratory distress  He has decreased breath sounds in the right middle field, the right lower field, the left middle field and the left lower field  He has wheezes in the left middle field  Abdominal: Soft  He exhibits no distension and no mass  There is no tenderness  There is no rebound  Musculoskeletal:        Right lower leg: Normal         Left lower leg: Normal    Neurological: He is alert and oriented to person, place, and time  Skin: Skin is warm  Capillary refill takes less than 2 seconds  Psychiatric: He has a normal mood and affect   His behavior is normal        Vital Signs  ED Triage Vitals [01/10/20 0516]   Temperature Pulse Respirations Blood Pressure SpO2   99 4 °F (37 4 °C) (!) 113 (!) 26 170/86 92 %      Temp Source Heart Rate Source Patient Position - Orthostatic VS BP Location FiO2 (%)   Temporal Monitor Lying Right arm --      Pain Score       --           Vitals:    01/10/20 0516   BP: 170/86   Pulse: (!) 113   Patient Position - Orthostatic VS: Lying         Visual Acuity      ED Medications  Medications   sodium chloride 0 9 % bolus 1,000 mL (0 mL Intravenous Stopped 1/10/20 0606)     Followed by   sodium chloride 0 9 % bolus 1,000 mL (1,000 mL Intravenous New Bag 1/10/20 0631)     Followed by   sodium chloride 0 9 % bolus 1,000 mL (has no administration in time range)   vancomycin (VANCOCIN) 1,500 mg in sodium chloride 0 9 % 250 mL IVPB (1,500 mg Intravenous New Bag 1/10/20 0631)   cefepime (MAXIPIME) IVPB (premix) 2,000 mg (0 mg Intravenous Stopped 1/10/20 0611)       Diagnostic Studies  Results Reviewed     Procedure Component Value Units Date/Time    Lactic acid x2 [[id number]]  (Abnormal) Collected:  01/10/20 0526    Lab Status:  Final result Specimen:  Blood from Arm, Right Updated:  01/10/20 0606     LACTIC ACID 2 2 mmol/L     Narrative:       Result may be elevated if tourniquet was used during collection      Troponin I [[id number]]  (Normal) Collected:  01/10/20 0529    Lab Status:  Final result Specimen:  Blood from Arm, Right Updated:  01/10/20 0558     Troponin I <0 02 ng/mL     Comprehensive metabolic panel [[id number]]  (Abnormal) Collected:  01/10/20 0526    Lab Status:  Final result Specimen:  Blood from Arm, Right Updated:  01/10/20 0553     Sodium 132 mmol/L      Potassium 3 6 mmol/L      Chloride 96 mmol/L      CO2 28 mmol/L      ANION GAP 8 mmol/L      BUN 21 mg/dL      Creatinine 1 45 mg/dL      Glucose 97 mg/dL      Calcium 8 0 mg/dL      AST 33 U/L      ALT 31 U/L      Alkaline Phosphatase 178 U/L      Total Protein 9 6 g/dL      Albumin 3 3 g/dL      Total Bilirubin 1 07 mg/dL      eGFR 55 ml/min/1 73sq m Narrative:       National Kid[employer] guidelines for Chronic Kidney Disease (CKD):     Stage 1 with normal or high GFR (GFR > 90 mL/min/1 73 square meters)    Stage 2 Mild CKD (GFR = 60-89 mL/min/1 73 square meters)    Stage 3A Moderate CKD (GFR = 45-59 mL/min/1 73 square meters)    Stage 3B Moderate CKD (GFR = 30-44 mL/min/1 73 square meters)    Stage 4 Severe CKD (GFR = 15-29 mL/min/1 73 square meters)    Stage 5 End Stage CKD (GFR <15 mL/min/1 73 square meters)  Note: GFR calculation is accurate only with a steady state creatinine    Protime-INR [[id number]]  (Abnormal) Collected:  01/10/20 0526    Lab Status:  Final result Specimen:  Blood from Arm, Right Updated:  01/10/20 0551     Protime 15 7 seconds      INR 1 24    APTT [[id number]]  (Normal) Collected:  01/10/20 0526    Lab Status:  Final result Specimen:  Blood from Arm, Right Updated:  01/10/20 0551     PTT 34 seconds     CBC and differential [[id number]]  (Abnormal) Collected:  01/10/20 0526    Lab Status:  Final result Specimen:  Blood from Arm, Right Updated:  01/10/20 0546     WBC 8 00 Thousand/uL      RBC 4 70 Million/uL      Hemoglobin 15 0 g/dL      Hematocrit 45 4 %      MCV 97 fL      MCH 31 9 pg      MCHC 33 0 g/dL      RDW 13 9 %      MPV 11 9 fL      Platelets 804 Thousands/uL      nRBC 0 /100 WBCs      Neutrophils Relative 83 %      Immat GRANS % 0 %      Lymphocytes Relative 6 %      Monocytes Relative 11 %      Eosinophils Relative 0 %      Basophils Relative 0 %      Neutrophils Absolute 6 59 Thousands/µL      Immature Grans Absolute 0 02 Thousand/uL      Lymphocytes Absolute 0 48 Thousands/µL      Monocytes Absolute 0 88 Thousand/µL      Eosinophils Absolute 0 01 Thousand/µL      Basophils Absolute 0 02 Thousands/µL     Blood culture #2 [[id number]] Collected:  01/10/20 0536    Lab Status:   In process Specimen:  Blood from Arm, Right Updated:  01/10/20 0540    Procalcitonin [[id number]] Collected:  01/10/20 0526    Lab Status: In process Specimen:  Blood from Arm, Right Updated:  01/10/20 0533    Blood culture #1 [[id number]] Collected:  01/10/20 0526    Lab Status: In process Specimen:  Blood from Arm, Right Updated:  01/10/20 0533    Lactic acid x2 [[id number]]     Lab Status:  No result Specimen:  Blood                  XR chest 1 view portable    (Results Pending)              Procedures  ECG 12 Lead Documentation Only  Date/Time: 1/10/2020 7:07 AM  Performed by: Dona Khan MD  Authorized by: Dona Khan MD     ECG reviewed by me, the ED Provider: yes    Patient location:  ED  Previous ECG:     Previous ECG:  Unavailable    Comparison to cardiac monitor: Yes    Interpretation:     Interpretation: abnormal    Rate:     ECG rate:  107    ECG rate assessment: normal    Rhythm:     Rhythm: sinus tachycardia    QRS:     QRS axis:  Normal    QRS intervals:  Normal  Conduction:     Conduction: normal    ST segments:     ST segments:  Abnormal    Depression:  I, II, III, V3, V4, V5 and V6  T waves:     T waves: inverted      Inverted:  V3, V4, V5, V6, II, III and aVF  Comments:      Incomplete RBBB             ED Course                               MDM  Number of Diagnoses or Management Options  Pneumonia:   Diagnosis management comments: 47 yo M presenting from home w/worsening SOB w/known PNA  Failing out pt  Tachycardic and tachypneic on arrival  Sepsis w/u initiated  The patient was given IVF, vanc and cefepime  Lactate 2 2 on arrival  Troponin is normal  EKG cannot see prior; does have diffuse ST depression possibly rate related, no ST elevation noted and pt denying any CP  Trend troponin  Discussed with WHITNEY for admission to tele bed           Disposition  Final diagnoses:   Pneumonia     Time reflects when diagnosis was documented in both MDM as applicable and the Disposition within this note     Time User Action Codes Description Comment    1/10/2020  6:57 AM Gabby Alexis Add [[id number]] Pneumonia       ED Disposition     ED Disposition Condition Date/Time Comment    No Disposition Selected  Fri Anand 10, 2020  6:56 AM Case was discussed with WHITNEY Fay and the patient's admission status was agreed to be Admission Status: inpatient status to the service of WHITNEY Fay         Follow-up Information    None         Patient's Medications   Discharge Prescriptions    No medications on file     No discharge procedures on file      ED Provider  Electronically Signed by           Lucy Gu MD  01/10/20 0289       Lucy Gu MD  01/10/20 9695

## 2020-01-10 NOTE — CONSULTS
Consult- Catalina Copeland 1965, 48 y o  male MRN: [id number]    Unit/Bed#: ED 10 Encounter: [id number]    Primary Care Provider: Paulino Lugo DO   Date and time admitted to hospital: 1/10/2020  5:20 AM      Consults    * RSV (acute bronchiolitis due to respiratory syncytial virus)  Assessment & Plan  Supportive Care  Droplet precautions  High flow nasal cannula O2 for comfort      Nonintractable generalized idiopathic epilepsy without status epilepticus ([facility] [address] )  Assessment & Plan  · History of seizures since a child, has 1-2 year, with recent increase in past months to one/month  · Is on Lamictal Mysoline clonazepam Zonisemide  · Per Neurology, medications contribute to unsteady gait  · Check Lamictal, Mysoline, zonisamide levels  · Resume home meds and follow up levels  · Seizure precautions  · Fall precautions      Pneumonia due to infectious organism  Assessment & Plan  · Pneumonia found on CT scan 1/7, after a fall  · CT AP 1/7: Left upper lobe alveolar opacities likely representing pneumonia  · PCXR 1/10:  Left upper lobe opacity  Radiology read is pending  · Was treated outpatient with oral Zithromax   · Failure to improve, cefepime and vanc given in the ER  · Temperature 99 4°, no leukocytosis  · Check procalcitonin  · Check Legionella and strep pneumoniae  · Blood cultures pending  · Continue antibiotics and deescalate as indicated  · Symptom control  · Respiratory and oxygen protocol      Essential hypertension  Assessment & Plan  · /86  · Does not appear to be on antihypertensive, however is on Lasix for chronic lower extremity edema  · Monitor BP  · PRN labetalol and hydralazine for now  · Consider adding po once patient is more stable      T wave inversion in EKG  Assessment & Plan  · EKG:  with T-wave inversions V3 V4 V5 V6    No old for comparison  · No chest pain  · Troponin <0 03  · Trend troponin x 3  · Telemetry monitoring      Contusion, flank, subsequent encounter  Assessment & Plan  · S/p fall 1/7  · CT abdomen pelvis:  No traumatic thoracic or intra-abdominal abnormality identified  Stranding within the subcutaneous tissues of the right flank consistent with a contusion  · Monitor      Thrombocytopenia (HCC)  Assessment & Plan  · Platelets 497  · Monitor for signs of bleeding  · Daily CBC and trend platelets      Elevated serum creatinine  Assessment & Plan  · Creatinine 1 45  Baseline is 1 0 to 1 2   · Monitor Cr        -------------------------------------------------------------------------------------------------------------  Chief Complaint: I feel short of breath    History of Present Illness   HX and PE limited by: developmental delay  Ketan Alfred is a 48 y o  male with a pmhx of epilepsy since childhood, fairly well controlled on anticonvulsants, developmental delay, essential HTN, recent fall in shower and an outpatient diagnosis of pneumonia who presents to [address] [place] [address] ED on 1/10/20 with worsening shortness of breath, fever and cough X 3 days  The patient was found to have a pneumonia incidentally on a CT that was ordered after a fall last week  At that time, he was started on zithromax po and per his mother has been compliant with taking the medication  Despite that, his sxs have worsened  Mr Isabella De La Paz was admitted to the hospitalist service but critical care was called to assess him today 1/10/20 when his respiratory status declined  He appeared to be in respiratory distress, utilizing accessory muscles, with HTN and tachycardia and an oxygen sat of 90% on 6 L NC  The patient was accepted to the ICU as a step down level 2        History obtained from the patient/ family  -------------------------------------------------------------------------------------------------------------  Dispo: Continue Stepdown Level 2 level of care     Code Status: Level 1 - Full Code  --------------------------------------------------------------------------------------------------------------  Review of Systems   Constitutional: Positive for activity change, fatigue and fever  Negative for appetite change  HENT: Negative for congestion, sinus pressure, sinus pain, sneezing and sore throat  Respiratory: Positive for cough, chest tightness, shortness of breath and wheezing  Negative for apnea and stridor  Cardiovascular: Positive for leg swelling (chronic per patient)  Negative for chest pain and palpitations  Gastrointestinal: Negative for abdominal distention, abdominal pain, constipation, diarrhea, nausea and vomiting  Genitourinary: Negative for dysuria, flank pain, frequency and urgency  Musculoskeletal: Negative for myalgias, neck pain and neck stiffness  Skin: Negative for color change and pallor  Neurological: Negative for dizziness, facial asymmetry, speech difficulty, weakness, light-headedness and headaches  Psychiatric/Behavioral: Negative for agitation and confusion  The patient is not nervous/anxious  Physical Exam   Constitutional: He is oriented to person, place, and time  He appears well-developed and well-nourished  He appears distressed  HENT:   Head: Normocephalic  Nose: Nose normal    Mouth/Throat: Oropharynx is clear and moist    Eyes: Pupils are equal, round, and reactive to light  Conjunctivae and EOM are normal    Right eye with exotropia   Neck: Normal range of motion  Neck supple  No tracheal deviation present  No thyromegaly present  Cardiovascular: Regular rhythm  Tachycardia present  Exam reveals distant heart sounds  No murmur heard  Pulses:       Carotid pulses are 2+ on the right side, and 2+ on the left side  Radial pulses are 2+ on the right side, and 2+ on the left side  Dorsalis pedis pulses are 2+ on the right side, and 2+ on the left side  Pulmonary/Chest: Accessory muscle usage present   Tachypnea noted  He is in respiratory distress  He has wheezes in the right upper field, the right middle field, the right lower field, the left upper field, the left middle field and the left lower field  He has no rales  He exhibits no tenderness  Abdominal: Soft  He exhibits no distension  Bowel sounds are decreased  There is no tenderness  There is no guarding  Musculoskeletal: Normal range of motion  He exhibits no edema  Pitting edema and chronic skin changes to bilateral LE- pt  States these are both chronic conditions   Lymphadenopathy:     He has no cervical adenopathy  Neurological: He is alert and oriented to person, place, and time  He has normal strength  He displays no atrophy and no tremor  No cranial nerve deficit or sensory deficit  GCS eye subscore is 4  GCS verbal subscore is 5  GCS motor subscore is 6  Skin: Skin is warm and dry  Capillary refill takes 2 to 3 seconds  He is not diaphoretic  Psychiatric: His speech is normal and behavior is normal    Nursing note and vitals reviewed  --------------------------------------------------------------------------------------------------------------  Historical Information   Past Medical History:   Diagnosis Date    Hypertension     Pericardial effusion     Pneumonia     [facility]izure [facility])      Past Surgical History:   Procedure Laterality Date    FRACTURE SURGERY      clavicle, left humerus, radial, and ulna  Right radius    HIP ARTHROSCOPY Right     DE COLONOSCOPY FLX DX W/COLLJ SPEC WHEN PFRMD N/A 4/1/2019    Procedure: COLONOSCOPY;  Surgeon: Gita Roasdo MD;  Location: [facility]; Service: Colorectal     Social History   Social History     Substance and Sexual Activity   Alcohol Use Never    Frequency: Never     Social History     Substance and Sexual Activity   Drug Use Never     Social History     Tobacco Use   Smoking Status Never Smoker   Smokeless Tobacco Never Used       Family History:   Family History   Adopted:  Yes Patient is adopted - family hx unknown    Vitals:   Vitals:    01/10/20 1400 01/10/20 1505 01/10/20 1539 01/10/20 1610   BP: (!) 175/78 159/93 (!) 193/93    BP Location: Right arm Left arm Left arm    Pulse: (!) 118 (!) 118 (!) 122    Resp: (!) 33  (!) 32    Temp:       TempSrc:       SpO2: 92%  92% 96%   Weight:       Height:         Temp  Min: 99 1 °F (37 3 °C)  Max: 99 4 °F (37 4 °C)  IBW: 68 4 kg  Height: 5' 8" (172 7 cm)  Body mass index is 33 86 kg/m²        Medications:  Current Facility-Administered Medications   Medication Dose Route Frequency    acetaminophen (TYLENOL) tablet 650 mg  650 mg Oral Q6H PRN    ascorbic acid (VITAMIN C) tablet 500 mg  500 mg Oral Daily    aspirin chewable tablet 81 mg  81 mg Oral Daily    b complex vitamins tablet 1 tablet  1 tablet Oral Daily    calcium carbonate-vitamin D (OSCAL-D) 500 mg-200 units per tablet 1 tablet  1 tablet Oral Daily With Breakfast    cefepime (MAXIPIME) IVPB (premix) 2,000 mg  2,000 mg Intravenous Q12H    clonazePAM (KlonoPIN) tablet 0 5 mg  0 5 mg Oral HS    guaiFENesin (MUCINEX) 12 hr tablet 600 mg  600 mg Oral BID    heparin (porcine) subcutaneous injection 5,000 Units  5,000 Units Subcutaneous Q8H [facility]    hydrALAZINE (APRESOLINE) injection 5 mg  5 mg Intravenous Q6H PRN    ipratropium (ATROVENT) 0 02 % inhalation solution 0 5 mg  0 5 mg Nebulization Q6H    Labetalol HCl (NORMODYNE) injection 10 mg  10 mg Intravenous Q4H PRN    lamoTRIgine (LaMICtal) tablet 200 mg  200 mg Oral Early Morning    lamoTRIgine (LaMICtal) tablet 250 mg  250 mg Oral Daily    lamoTRIgine (LaMICtal) tablet 300 mg  300 mg Oral HS    levalbuterol (XOPENEX) inhalation solution 1 25 mg  1 25 mg Nebulization Q6H    levOCARNitine (CARNITOR) tablet 330 mg  330 mg Oral 4x Daily (PC & HS)    metroNIDAZOLE (FLAGYL) IVPB (premix) 500 mg  500 mg Intravenous Q8H    ondansetron (ZOFRAN) injection 4 mg  4 mg Intravenous Q6H PRN    primidone (MYSOLINE) tablet 100 mg 100 mg Oral Daily With Lunch    primidone (MYSOLINE) tablet 150 mg  150 mg Oral After Breakfast    primidone (MYSOLINE) tablet 150 mg  150 mg Oral HS    vancomycin (VANCOCIN) 1,250 mg in sodium chloride 0 9 % 250 mL IVPB  15 mg/kg (Adjusted) Intravenous Q12H    vitamin E (tocopherol) capsule 400 Units  400 Units Oral Daily    zinc gluconate tablet 50 mg  50 mg Oral Daily    zonisamide (ZONEGRAN) capsule 100 mg  100 mg Oral HS     Home medications:  Prior to Admission Medications   Prescriptions Last Dose Informant Patient Reported?  Taking?   ascorbic acid (VITAMIN C) 500 mg tablet 1/9/2020 at Unknown time  Yes Yes   Sig: Take 500 mg by mouth daily   aspirin 81 MG tablet 1/9/2020 at Unknown time  Yes Yes   Sig: Take 81 mg by mouth daily   azithromycin (ZITHROMAX) 250 mg tablet 1/9/2020 at Unknown time  No Yes   Sig: Take 2 tablets today then 1 tablet daily x 4 days   b complex vitamins tablet 1/9/2020 at Unknown time  Yes Yes   Sig: Take 1 tablet by mouth daily   calcium-vitamin D 250-100 MG-UNIT per tablet 1/9/2020 at Unknown time  Yes Yes   Sig: Take 1 tablet by mouth daily    clonazePAM (KlonoPIN) 0 5 mg tablet 1/9/2020 at Unknown time  Yes Yes   Sig: Take 0 5 mg by mouth daily at bedtime    furosemide (LASIX) 40 mg tablet 1/9/2020 at Unknown time  Yes Yes   Sig: Take 40 mg by mouth daily   lamoTRIgine (LaMICtal) 200 MG tablet 1/9/2020 at Unknown time  Yes Yes   Sig: Take 200 mg by mouth daily in the early morning    lamoTRIgine (LaMICtal) 25 mg tablet 1/9/2020 at Unknown time  Yes Yes   Sig: Take 250 mg by mouth daily   lamoTRIgine (LaMICtal) 25 mg tablet 1/9/2020 at Unknown time  Yes Yes   Sig: Take 300 mg by mouth daily at bedtime   levOCARNitine (CARNITOR) 330 MG tablet 1/9/2020 at Unknown time  Yes Yes   Sig: Take 330 mg by mouth 4 (four) times daily (after meals and at bedtime)    primidone (MYSOLINE) 50 mg tablet 1/9/2020 at Unknown time  Yes Yes   Sig: Take 150 mg by mouth daily after breakfast primidone (MYSOLINE) 50 mg tablet 1/9/2020 at Unknown time  Yes Yes   Sig: Take 100 mg by mouth daily with lunch   primidone (MYSOLINE) 50 mg tablet 1/9/2020 at Unknown time  Yes Yes   Sig: Take 150 mg by mouth daily before dinner   vitamin E, tocopherol, 400 units capsule 1/9/2020 at Unknown time  Yes Yes   Sig: Take 400 Units by mouth daily   zinc gluconate 50 mg tablet 1/9/2020 at Unknown time  Yes Yes   Sig: Take 50 mg by mouth daily   zonisamide (ZONEGRAN) 100 mg capsule 1/9/2020 at Unknown time  Yes Yes   Sig: Take 100 mg by mouth daily      Facility-Administered Medications: None     Allergies:   Allergies   Allergen Reactions    Dilantin [Phenytoin]      Pericardial effusion    Phenobarbital Hyperactivity         Laboratory and Diagnostics:  Results from last 7 days   Lab Units 01/10/20  0526   WBC Thousand/uL 8 00   HEMOGLOBIN g/dL 15 0   HEMATOCRIT % 45 4   PLATELETS Thousands/uL 122*   NEUTROS PCT % 83*   MONOS PCT % 11     Results from last 7 days   Lab Units 01/10/20  0526   SODIUM mmol/L 132*   POTASSIUM mmol/L 3 6   CHLORIDE mmol/L 96*   CO2 mmol/L 28   ANION GAP mmol/L 8   BUN mg/dL 21   CREATININE mg/dL 1 45*   CALCIUM mg/dL 8 0*   GLUCOSE RANDOM mg/dL 97   ALT U/L 31   AST U/L 33   ALK PHOS U/L 178*   ALBUMIN g/dL 3 3*   TOTAL BILIRUBIN mg/dL 1 07*          Results from last 7 days   Lab Units 01/10/20  0526   INR  1 24*   PTT seconds 34      Results from last 7 days   Lab Units 01/10/20  1340 01/10/20  1039 01/10/20  0529   TROPONIN I ng/mL 0 02 <0 02 <0 02     Results from last 7 days   Lab Units 01/10/20  1125 01/10/20  0526   LACTIC ACID mmol/L 2 8* 2 2*     ABG:  Results from last 7 days   Lab Units 01/10/20  1045   PH ART  7 395   PCO2 ART mm Hg 38 8   PO2 ART mm Hg 35 9*   HCO3 ART mmol/L 23 2   BASE EXC ART mmol/L -1 4   ABG SOURCE  Radial, Right     VBG:  Results from last 7 days   Lab Units 01/10/20  1045   ABG SOURCE  Radial, Right     Results from last 7 days   Lab Units 01/10/20  0526   PROCALCITONIN ng/ml 2 84*         Micro:  Results from last 7 days   Lab Units 01/10/20  0536 01/10/20  0526   BLOOD CULTURE  Received in Microbiology Lab  Culture in Progress  Received in Microbiology Lab  Culture in Progress  EKG: Sinus tachycardia  Imaging:   XR chest 1 view portable   Final Result      Left upper lobe opacity, not substantially changed when allowing for difference in modality, consistent with the reported history of pneumonia  Workstation performed: [id number]         VAS lower limb venous duplex study, complete bilateral    (Results Pending)       Full code  ------------------------------------------------------------------------------------------------------------  Counseling / Coordination of Care  Total time spent today 30 minutes  Greater than 50% of total time was spent with the patient and / or family counseling and / or coordination of care  A description of the counseling / coordination of care: Spoke with patient, reviewed chart and spoke with ED and hospitalist attending  Discussed plan of care for patient      Sary Telles PA-C        Portions of the record may have been created with voice recognition software  Occasional wrong word or "sound a like" substitutions may have occurred due to the inherent limitations of voice recognition software    Read the chart carefully and recognize, using context, where substitutions have occurred

## 2020-01-10 NOTE — ASSESSMENT & PLAN NOTE
· /86  · Does not appear to be on antihypertensive, however is on Lasix for chronic lower extremity edema  · Monitor BP  · PRN labetalol and hydralazine for now  · Consider adding po once patient is more stable

## 2020-01-11 ENCOUNTER — APPOINTMENT (INPATIENT)
Dept: RADIOLOGY | Facility: HOSPITAL | Age: 55
DRG: 871 | End: 2020-01-11
Payer: MEDICARE

## 2020-01-11 LAB
ANION GAP SERPL CALCULATED.3IONS-SCNC: 6 MMOL/L (ref 4–13)
BASOPHILS # BLD AUTO: 0.02 THOUSANDS/ΜL (ref 0–0.1)
BASOPHILS NFR BLD AUTO: 0 % (ref 0–1)
BUN SERPL-MCNC: 19 MG/DL (ref 5–25)
CALCIUM SERPL-MCNC: 7.5 MG/DL (ref 8.3–10.1)
CHLORIDE SERPL-SCNC: 100 MMOL/L (ref 100–108)
CO2 SERPL-SCNC: 30 MMOL/L (ref 21–32)
CREAT SERPL-MCNC: 1.35 MG/DL (ref 0.6–1.3)
EOSINOPHIL # BLD AUTO: 0.02 THOUSAND/ΜL (ref 0–0.61)
EOSINOPHIL NFR BLD AUTO: 0 % (ref 0–6)
ERYTHROCYTE [DISTWIDTH] IN BLOOD BY AUTOMATED COUNT: 14.2 % (ref 11.6–15.1)
GFR SERPL CREATININE-BSD FRML MDRD: 60 ML/MIN/1.73SQ M
GLUCOSE SERPL-MCNC: 101 MG/DL (ref 65–140)
HCT VFR BLD AUTO: 41 % (ref 36.5–49.3)
HGB BLD-MCNC: 13.4 G/DL (ref 12–17)
IMM GRANULOCYTES # BLD AUTO: 0.02 THOUSAND/UL (ref 0–0.2)
IMM GRANULOCYTES NFR BLD AUTO: 0 % (ref 0–2)
LACTATE SERPL-SCNC: 0.9 MMOL/L (ref 0.5–2)
LYMPHOCYTES # BLD AUTO: 0.69 THOUSANDS/ΜL (ref 0.6–4.47)
LYMPHOCYTES NFR BLD AUTO: 12 % (ref 14–44)
MAGNESIUM SERPL-MCNC: 1.6 MG/DL (ref 1.6–2.6)
MCH RBC QN AUTO: 32 PG (ref 26.8–34.3)
MCHC RBC AUTO-ENTMCNC: 32.7 G/DL (ref 31.4–37.4)
MCV RBC AUTO: 98 FL (ref 82–98)
MONOCYTES # BLD AUTO: 0.8 THOUSAND/ΜL (ref 0.17–1.22)
MONOCYTES NFR BLD AUTO: 14 % (ref 4–12)
NEUTROPHILS # BLD AUTO: 4.04 THOUSANDS/ΜL (ref 1.85–7.62)
NEUTS SEG NFR BLD AUTO: 74 % (ref 43–75)
NRBC BLD AUTO-RTO: 0 /100 WBCS
PHOSPHATE SERPL-MCNC: 3.3 MG/DL (ref 2.7–4.5)
PLATELET # BLD AUTO: 110 THOUSANDS/UL (ref 149–390)
PMV BLD AUTO: 11.7 FL (ref 8.9–12.7)
POTASSIUM SERPL-SCNC: 3.7 MMOL/L (ref 3.5–5.3)
PROCALCITONIN SERPL-MCNC: 4 NG/ML
RBC # BLD AUTO: 4.19 MILLION/UL (ref 3.88–5.62)
SODIUM SERPL-SCNC: 136 MMOL/L (ref 136–145)
VANCOMYCIN TROUGH SERPL-MCNC: 10.6 UG/ML (ref 10–20)
WBC # BLD AUTO: 5.59 THOUSAND/UL (ref 4.31–10.16)

## 2020-01-11 PROCEDURE — 94640 AIRWAY INHALATION TREATMENT: CPT

## 2020-01-11 PROCEDURE — 85025 COMPLETE CBC W/AUTO DIFF WBC: CPT | Performed by: NURSE PRACTITIONER

## 2020-01-11 PROCEDURE — 94760 N-INVAS EAR/PLS OXIMETRY 1: CPT

## 2020-01-11 PROCEDURE — 92610 EVALUATE SWALLOWING FUNCTION: CPT

## 2020-01-11 PROCEDURE — 80202 ASSAY OF VANCOMYCIN: CPT | Performed by: NURSE PRACTITIONER

## 2020-01-11 PROCEDURE — 99233 SBSQ HOSP IP/OBS HIGH 50: CPT | Performed by: ANESTHESIOLOGY

## 2020-01-11 PROCEDURE — 80048 BASIC METABOLIC PNL TOTAL CA: CPT | Performed by: NURSE PRACTITIONER

## 2020-01-11 PROCEDURE — 84100 ASSAY OF PHOSPHORUS: CPT | Performed by: PHYSICIAN ASSISTANT

## 2020-01-11 PROCEDURE — 71045 X-RAY EXAM CHEST 1 VIEW: CPT

## 2020-01-11 PROCEDURE — 94660 CPAP INITIATION&MGMT: CPT

## 2020-01-11 PROCEDURE — 83735 ASSAY OF MAGNESIUM: CPT | Performed by: PHYSICIAN ASSISTANT

## 2020-01-11 PROCEDURE — 94762 N-INVAS EAR/PLS OXIMTRY CONT: CPT

## 2020-01-11 PROCEDURE — 84145 PROCALCITONIN (PCT): CPT | Performed by: NURSE PRACTITIONER

## 2020-01-11 RX ORDER — POLYVINYL ALCOHOL 14 MG/ML
1 SOLUTION/ DROPS OPHTHALMIC 4 TIMES DAILY
Status: DISCONTINUED | OUTPATIENT
Start: 2020-01-11 | End: 2020-01-11

## 2020-01-11 RX ORDER — CALCIUM GLUCONATE 20 MG/ML
1 INJECTION, SOLUTION INTRAVENOUS ONCE
Status: COMPLETED | OUTPATIENT
Start: 2020-01-11 | End: 2020-01-11

## 2020-01-11 RX ORDER — MAGNESIUM SULFATE HEPTAHYDRATE 40 MG/ML
2 INJECTION, SOLUTION INTRAVENOUS ONCE
Status: COMPLETED | OUTPATIENT
Start: 2020-01-11 | End: 2020-01-11

## 2020-01-11 RX ORDER — FUROSEMIDE 10 MG/ML
20 INJECTION INTRAMUSCULAR; INTRAVENOUS ONCE
Status: COMPLETED | OUTPATIENT
Start: 2020-01-11 | End: 2020-01-11

## 2020-01-11 RX ORDER — ASPIRIN 81 MG
1 TABLET, DELAYED RELEASE (ENTERIC COATED) ORAL DAILY
Status: DISCONTINUED | OUTPATIENT
Start: 2020-01-11 | End: 2020-01-11 | Stop reason: SDUPTHER

## 2020-01-11 RX ADMIN — LEVOCARNITINE 330 MG: 330 TABLET ORAL at 17:34

## 2020-01-11 RX ADMIN — VANCOMYCIN HYDROCHLORIDE 1250 MG: 5 INJECTION, POWDER, LYOPHILIZED, FOR SOLUTION INTRAVENOUS at 09:41

## 2020-01-11 RX ADMIN — PRIMIDONE 100 MG: 50 TABLET ORAL at 12:50

## 2020-01-11 RX ADMIN — DEXTRAN 70, AND HYPROMELLOSE 2910 1 DROP: 1; 3 SOLUTION/ DROPS OPHTHALMIC at 19:11

## 2020-01-11 RX ADMIN — METRONIDAZOLE 500 MG: 500 INJECTION, SOLUTION INTRAVENOUS at 12:50

## 2020-01-11 RX ADMIN — GUAIFENESIN 600 MG: 600 TABLET ORAL at 19:11

## 2020-01-11 RX ADMIN — CLONAZEPAM 0.5 MG: 0.5 TABLET ORAL at 21:51

## 2020-01-11 RX ADMIN — DEXTRAN 70, AND HYPROMELLOSE 2910 1 DROP: 1; 3 SOLUTION/ DROPS OPHTHALMIC at 21:51

## 2020-01-11 RX ADMIN — METRONIDAZOLE 500 MG: 500 INJECTION, SOLUTION INTRAVENOUS at 06:15

## 2020-01-11 RX ADMIN — LEVALBUTEROL HYDROCHLORIDE 1.25 MG: 1.25 SOLUTION, CONCENTRATE RESPIRATORY (INHALATION) at 20:56

## 2020-01-11 RX ADMIN — OXYCODONE HYDROCHLORIDE AND ACETAMINOPHEN 500 MG: 500 TABLET ORAL at 08:44

## 2020-01-11 RX ADMIN — PRIMIDONE 150 MG: 50 TABLET ORAL at 08:45

## 2020-01-11 RX ADMIN — FUROSEMIDE 20 MG: 10 INJECTION, SOLUTION INTRAMUSCULAR; INTRAVENOUS at 16:37

## 2020-01-11 RX ADMIN — LAMOTRIGINE 300 MG: 100 TABLET ORAL at 21:50

## 2020-01-11 RX ADMIN — LAMOTRIGINE 250 MG: 100 TABLET ORAL at 12:49

## 2020-01-11 RX ADMIN — Medication 50 MG: at 08:44

## 2020-01-11 RX ADMIN — LEVOCARNITINE 330 MG: 330 TABLET ORAL at 13:34

## 2020-01-11 RX ADMIN — METRONIDAZOLE 500 MG: 500 INJECTION, SOLUTION INTRAVENOUS at 20:48

## 2020-01-11 RX ADMIN — HEPARIN SODIUM 5000 UNITS: 5000 INJECTION INTRAVENOUS; SUBCUTANEOUS at 21:50

## 2020-01-11 RX ADMIN — Medication 1 TABLET: at 08:44

## 2020-01-11 RX ADMIN — VANCOMYCIN HYDROCHLORIDE 2000 MG: 5 INJECTION, POWDER, LYOPHILIZED, FOR SOLUTION INTRAVENOUS at 21:51

## 2020-01-11 RX ADMIN — GUAIFENESIN 600 MG: 600 TABLET ORAL at 08:44

## 2020-01-11 RX ADMIN — LEVALBUTEROL HYDROCHLORIDE 1.25 MG: 1.25 SOLUTION, CONCENTRATE RESPIRATORY (INHALATION) at 07:00

## 2020-01-11 RX ADMIN — PRIMIDONE 50 MG: 50 TABLET ORAL at 21:50

## 2020-01-11 RX ADMIN — Medication 400 UNITS: at 08:45

## 2020-01-11 RX ADMIN — LEVALBUTEROL HYDROCHLORIDE 1.25 MG: 1.25 SOLUTION, CONCENTRATE RESPIRATORY (INHALATION) at 01:57

## 2020-01-11 RX ADMIN — IPRATROPIUM BROMIDE 0.5 MG: 0.5 SOLUTION RESPIRATORY (INHALATION) at 20:56

## 2020-01-11 RX ADMIN — IPRATROPIUM BROMIDE 0.5 MG: 0.5 SOLUTION RESPIRATORY (INHALATION) at 13:09

## 2020-01-11 RX ADMIN — DEXTRAN 70, AND HYPROMELLOSE 2910 1 DROP: 1; 3 SOLUTION/ DROPS OPHTHALMIC at 12:21

## 2020-01-11 RX ADMIN — IPRATROPIUM BROMIDE 0.5 MG: 0.5 SOLUTION RESPIRATORY (INHALATION) at 01:57

## 2020-01-11 RX ADMIN — CALCIUM GLUCONATE 1 G: 20 INJECTION, SOLUTION INTRAVENOUS at 10:42

## 2020-01-11 RX ADMIN — CEFEPIME HYDROCHLORIDE 2000 MG: 2 INJECTION, SOLUTION INTRAVENOUS at 05:33

## 2020-01-11 RX ADMIN — IPRATROPIUM BROMIDE 0.5 MG: 0.5 SOLUTION RESPIRATORY (INHALATION) at 07:00

## 2020-01-11 RX ADMIN — LAMOTRIGINE 200 MG: 100 TABLET ORAL at 05:33

## 2020-01-11 RX ADMIN — LEVOCARNITINE 330 MG: 330 TABLET ORAL at 21:51

## 2020-01-11 RX ADMIN — LEVALBUTEROL HYDROCHLORIDE 1.25 MG: 1.25 SOLUTION, CONCENTRATE RESPIRATORY (INHALATION) at 13:09

## 2020-01-11 RX ADMIN — ASPIRIN 81 MG 81 MG: 81 TABLET ORAL at 08:44

## 2020-01-11 RX ADMIN — HEPARIN SODIUM 5000 UNITS: 5000 INJECTION INTRAVENOUS; SUBCUTANEOUS at 05:33

## 2020-01-11 RX ADMIN — CEFEPIME HYDROCHLORIDE 2000 MG: 2 INJECTION, SOLUTION INTRAVENOUS at 19:11

## 2020-01-11 RX ADMIN — ZONISAMIDE 100 MG: 100 CAPSULE ORAL at 21:50

## 2020-01-11 RX ADMIN — ZINC 1 TABLET: TAB ORAL at 12:20

## 2020-01-11 RX ADMIN — HEPARIN SODIUM 5000 UNITS: 5000 INJECTION INTRAVENOUS; SUBCUTANEOUS at 13:58

## 2020-01-11 RX ADMIN — MAGNESIUM SULFATE HEPTAHYDRATE 2 G: 40 INJECTION, SOLUTION INTRAVENOUS at 06:48

## 2020-01-11 NOTE — PROGRESS NOTES
Progress Note - Ketan Alfred 1965, 48 y o  male MRN: [id number]    Unit/Bed#: -01 Encounter: [id number]    Primary Care Provider: Isaiah Ratliff DO   Date and time admitted to hospital: 1/10/2020  5:20 AM        * RSV (acute bronchiolitis due to respiratory syncytial virus)  Assessment & Plan  Supportive Care  Droplet precautions  High flow nasal cannula O2 for comfort      Acute respiratory failure with hypoxia ([facility] [address] )  Assessment & Plan  · Likely related to PNA and RSV  · Elevated D-dimer  BLE duplex pending to r/o DVT  · Continue ABX  · Follow up on cultures  · HFNC, wean to nasal cannula as able  · Xopenex/ Atrovent q6 hours, PRN albuterol  · Respiratory protocol  · Mucinex PO PRN      Pneumonia due to infectious organism  Assessment & Plan  · Pneumonia found on CT scan 1/7, after a fall  · CT AP 1/7: Left upper lobe alveolar opacities likely representing pneumonia  · PCXR 1/10:  Left upper lobe opacity  Radiology read is pending  · Was treated outpatient with oral Zithromax   · Failure to improve, cefepime and vanc given in the ER  · Temperature 99 4°, no leukocytosis  · Trend WBC  · Temp 100 6 overnight  · Check procalcitonin- 2 84, Will trend  · Check Legionella and strep pneumoniae- negative  · Blood cultures pending  · Continue antibiotics and deescalate as indicated  · Symptom control  · Respiratory and oxygen protocol      Nonintractable generalized idiopathic epilepsy without status epilepticus ([facility] [address] )  Assessment & Plan  · History of seizures since a child, has 1-2 year, with recent increase in past months to one/month  · Is on Lamictal Mysoline clonazepam Zonisemide  · Per Neurology, medications contribute to unsteady gait  · Check Lamictal, Mysoline, zonisamide levels  · Resume home meds and follow up levels  · Seizure precautions  · Fall precautions      T wave inversion in EKG  Assessment & Plan  · EKG:  with T-wave inversions V3 V4 V5 V6    No old for comparison  · No chest pain  · Troponin <0 03  · Trend troponin x 3- all negative  · Telemetry monitoring      Essential hypertension  Assessment & Plan  · /86  · Does not appear to be on antihypertensive, however is on Lasix for chronic lower extremity edema  · Given Lasix 20 mg IV x1  · Monitor BP  · PRN labetalol and hydralazine for now  · Consider adding po once patient is more stable      Elevated serum creatinine  Assessment & Plan  · Creatinine 1 45  Baseline is 1 0 to 1 2   · Monitor Cr  · Avoid hypotension, nephrotoxic agents      Thrombocytopenia (HCC)  Assessment & Plan  · Platelets 109  · Monitor for signs of bleeding  · Daily CBC and trend platelets      Contusion, flank, subsequent encounter  Assessment & Plan  · S/p fall 1/7  · CT abdomen pelvis:  No traumatic thoracic or intra-abdominal abnormality identified  Stranding within the subcutaneous tissues of the right flank consistent with a contusion  · Monitor        ----------------------------------------------------------------------------------------  HPI/24hr events:  Patient was admitted to the [facility] yesterday after worsening respiratory distress while in emergency department  At that time he was satting 90% on 6 L nasal cannula with increased tachycardia and hypertension  He was given breathing treatments started on high-flow nasal cannula  There is concern for aspiration pneumonia and he was continued on cefepime, vancomycin, Flagyl  He also received Lasix 20 mg IV x1  He continued to be tachycardic last evening and was given 250 mL of albumin 5% with improvement in his heart  He remained on the high-flow but was wean down to lower settings  He appears improved  Lactic cleared  He had no other acute events      Disposition: Continue Stepdown Level 2 level of care   Code Status: Level 1 - Full Code  ---------------------------------------------------------------------------------------  SUBJECTIVE    Review of Systems   Constitutional: Positive for fever  Negative for chills  HENT: Negative for sore throat and trouble swallowing  Eyes: Positive for discharge  Negative for photophobia and visual disturbance  States eyes watering  This is baseline  Respiratory: Positive for cough, chest tightness and shortness of breath  States these are improved since admission  Cardiovascular: Positive for leg swelling  Negative for chest pain and palpitations  Chronic LE edema   Gastrointestinal: Negative for abdominal pain, diarrhea, nausea and vomiting  Genitourinary: Negative for difficulty urinating  Musculoskeletal: Positive for gait problem  Unsteady at times   Neurological: Negative for dizziness, speech difficulty, weakness, light-headedness and headaches  Psychiatric/Behavioral: Negative for sleep disturbance  ---------------------------------------------------------------------------------------  OBJECTIVE    Physical Exam   Constitutional: He is oriented to person, place, and time  No distress  HENT:   Head: Normocephalic and atraumatic  Mouth/Throat: Oropharynx is clear and moist  No oropharyngeal exudate  Eyes: Pupils are equal, round, and reactive to light  Conjunctivae are normal  Right eye exhibits no discharge  Left eye exhibits no discharge  No scleral icterus  Right eye extropia   Neck: Normal range of motion  Neck supple  No tracheal deviation present  Cardiovascular: Normal rate, regular rhythm, normal heart sounds and intact distal pulses  Exam reveals no friction rub  No murmur heard  Pulmonary/Chest: No respiratory distress  Slightly tachypneic  Scattered wheezing throughout  Intermittent congested cough, productive at times  Abdominal: Soft  Bowel sounds are normal  There is no tenderness  There is no guarding  Large, round   Genitourinary:   Genitourinary Comments: Voiding spontaneously   Musculoskeletal: Normal range of motion  He exhibits edema     BLE edema Neurological: He is alert and oriented to person, place, and time  Developmental delay  Answers questions appropriately, slow responses at times  Follows commands  Speech clear and coherent  DUARTE equally   Skin: Skin is warm and dry  Capillary refill takes less than 2 seconds  He is not diaphoretic  Psychiatric: He has a normal mood and affect  His behavior is normal        Vitals   Vitals:    01/10/20 2030 01/10/20 2300 20 0007 20 0157   BP:  130/79     BP Location:  Left arm     Pulse:  98     Resp:  20     Temp:  98 8 °F (37 1 °C)     TempSrc:  Oral     SpO2: 96% 96% 96% 95%   Weight:       Height:         Temp (24hrs), Av 4 °F (37 4 °C), Min:98 8 °F (37 1 °C), Max:100 6 °F (38 1 °C)  Current: Temperature: 98 8 °F (37 1 °C)          SpO2: SpO2: 95 %, SpO2 Activity: SpO2 Activity: At Rest, SpO2 Device: O2 Device: High flow nasal cannula  O2 Flow Rate (L/min): 60 L/min    Invasive/non-invasive ventilation settings   Respiratory    Lab Data (Last 4 hours)    None         O2/Vent Data (Last 4 hours)    None                Height and Weights   Height: 5' 8" (172 7 cm)  IBW: 68 4 kg  Body mass index is 33 76 kg/m²  Weight (last 2 days)     Date/Time   Weight    01/10/20 1807   101 (222)    01/10/20 0516   101 (222 66)              Intake and Output  I/O        07 - 01/10 0700 01/10 07 -  0700    I V  (mL/kg)  30 (0 3)    IV Piggyback 1050 2900    Total Intake(mL/kg) 1050 (10 4) 2930 (29)    Urine (mL/kg/hr)  925 (0 4)    Total Output  925    Net +1050 +                Nutrition       Diet Orders   (From admission, onward)             Start     Ordered    01/10/20 1818  Diet NPO  Diet effective now     Question Answer Comment   Diet Type NPO    RD to adjust diet per protocol?  Yes        01/10/20 1817    01/10/20 1224  Dietary nutrition supplements  Once     Question Answer Comment   Select Supplement: Ensure Clear Apple    Frequency Breakfast, Dinner        01/10/20 1223 01/10/20 1224  Dietary nutrition supplements  Once     Question Answer Comment   Select Supplement: Ensure Clear Benoit    Frequency Lunch        01/10/20 1223                Laboratory and Diagnostics:  Results from last 7 days   Lab Units 01/10/20  0526   WBC Thousand/uL 8 00   HEMOGLOBIN g/dL 15 0   HEMATOCRIT % 45 4   PLATELETS Thousands/uL 122*   NEUTROS PCT % 83*   MONOS PCT % 11     Results from last 7 days   Lab Units 01/10/20  0526   SODIUM mmol/L 132*   POTASSIUM mmol/L 3 6   CHLORIDE mmol/L 96*   CO2 mmol/L 28   ANION GAP mmol/L 8   BUN mg/dL 21   CREATININE mg/dL 1 45*   CALCIUM mg/dL 8 0*   GLUCOSE RANDOM mg/dL 97   ALT U/L 31   AST U/L 33   ALK PHOS U/L 178*   ALBUMIN g/dL 3 3*   TOTAL BILIRUBIN mg/dL 1 07*          Results from last 7 days   Lab Units 01/10/20  0526   INR  1 24*   PTT seconds 34      Results from last 7 days   Lab Units 01/10/20  1340 01/10/20  1039 01/10/20  0529   TROPONIN I ng/mL 0 02 <0 02 <0 02     Results from last 7 days   Lab Units 01/10/20  2335 01/10/20  1125 01/10/20  0526   LACTIC ACID mmol/L 0 9 2 8* 2 2*     ABG:  Results from last 7 days   Lab Units 01/10/20  1045   PH ART  7 395   PCO2 ART mm Hg 38 8   PO2 ART mm Hg 35 9*   HCO3 ART mmol/L 23 2   BASE EXC ART mmol/L -1 4   ABG SOURCE  Radial, Right     VBG:  Results from last 7 days   Lab Units 01/10/20  1045   ABG SOURCE  Radial, Right     Results from last 7 days   Lab Units 01/10/20  0526   PROCALCITONIN ng/ml 2 84*       Micro  Results from last 7 days   Lab Units 01/10/20  1538 01/10/20  1126 01/10/20  0536 01/10/20  0526   BLOOD CULTURE   --   --  Received in Microbiology Lab  Culture in Progress  Received in Microbiology Lab  Culture in Progress     GRAM STAIN RESULT  Rare Epithelial cells per low power field*  Rare Polys*  1+ Gram positive cocci in pairs*  --   --   --    LEGIONELLA URINARY ANTIGEN   --  Negative  --   --    STREP PNEUMONIAE ANTIGEN, URINE   --  Negative  --   --        EKG: NSR, Rate improved  Imaging: No new imaging to review       Active Medications  Scheduled Meds:  Current Facility-Administered Medications:  acetaminophen 650 mg Oral Q6H PRN Birdie Flood, CRNP    albuterol 2 5 mg Nebulization Q6H PRN Mnaa Edge, RIGOBERTONP    ascorbic acid 500 mg Oral Daily Sherita Flood, CRNP    aspirin 81 mg Oral Daily Sherita Flood, CRNP    b complex vitamins 1 tablet Oral Daily Sherita Flood, CRNP    calcium carbonate-vitamin D 1 tablet Oral Daily With Breakfast Sherita Flood, CRNP    cefepime 2,000 mg Intravenous Q12H Sherita Flood, CRNP Last Rate: Stopped (01/10/20 1901)   clonazePAM 0 5 mg Oral HS Sherita Flood, CRNP    guaiFENesin 600 mg Oral BID Sherita Flood, CRNP    heparin (porcine) 5,000 Units Subcutaneous [facility] Sherita Flood, CRNP    hydrALAZINE 5 mg Intravenous Q6H PRN Triston Her PA-C    ipratropium 0 5 mg Nebulization Q6H Ceasar Nance MD    Labetalol HCl 10 mg Intravenous Q4H PRN Triston Her PA-C    lamoTRIgine 200 mg Oral Early Morning Sherita Flood, CRNP    lamoTRIgine 250 mg Oral Daily With Lunch Mana Edge, CRNP    lamoTRIgine 300 mg Oral HS Sherita Flood, NADEGE    levalbuterol 1 25 mg Nebulization Q6H Ceasar Nance MD    levOCARNitine 330 mg Oral 4x Daily (PC & HS) Sherita Flood, CRREINA    metroNIDAZOLE 500 mg Intravenous Q8H Enma Her PA-C Last Rate: Stopped (01/10/20 3397)   ondansetron 4 mg Intravenous Q6H PRN Sherita Flood, CRNP    primidone 100 mg Oral Daily With Lunch Sherita Flood, CRNP    primidone 150 mg Oral After Breakfast Sherita Flood, CRNP    primidone 150 mg Oral HS Sherita Flood, CRNP    vancomycin 15 mg/kg (Adjusted) Intravenous Q12H Birdie Flood, CRNP Last Rate: Stopped (01/10/20 5550)   vitamin E (tocopherol) 400 Units Oral Daily Sherita Flood, CRNP    zinc gluconate 50 mg Oral Daily Sherita Flood, CRNP    zonisamide 100 mg Oral HS Sherita Flood, CRNP      Continuous Infusions:     PRN Meds: acetaminophen 650 mg Q6H PRN   albuterol 2 5 mg Q6H PRN   hydrALAZINE 5 mg Q6H PRN   Labetalol HCl 10 mg Q4H PRN   ondansetron 4 mg Q6H PRN         Trinity NADEGE Anne      Portions of the record may have been created with voice recognition software  Occasional wrong word or "sound a like" substitutions may have occurred due to the inherent limitations of voice recognition software    Read the chart carefully and recognize, using context, where substitutions have occurred

## 2020-01-11 NOTE — ASSESSMENT & PLAN NOTE
· Pneumonia found on CT scan 1/7, after a fall  · CT AP 1/7: Left upper lobe alveolar opacities likely representing pneumonia  · PCXR 1/10:  Left upper lobe opacity      · Was treated outpatient with oral Zithromax   · Failure to improve, now on cefepime and vanc Day #2  · Check procalcitonin- 2 84 -> 4 0  · Legionella and strep pneumoniae- negative  · Blood cultures NTD  · Respiratory and oxygen protocol  · Speech eval today and no signs of aspiration consider D/C flagyl tomorrow

## 2020-01-11 NOTE — SPEECH THERAPY NOTE
Speech-Language Pathology Bedside Swallow Evaluation      Patient Name: Eddie Godoy    [id number] Date: 1/11/2020     Problem List  Principal Problem:    RSV (acute bronchiolitis due to respiratory syncytial virus)  Active Problems:    Pneumonia due to infectious organism    Elevated serum creatinine    Thrombocytopenia (HCC)    Nonintractable generalized idiopathic epilepsy without status epilepticus ([facility] [address] )    Contusion, flank, subsequent encounter    T wave inversion in EKG    Essential hypertension    Acute respiratory failure with hypoxia [facility])      Past Medical History  Past Medical History:   Diagnosis Date    Hypertension     Pericardial effusion     Pneumonia     Seizure [facility])        Past Surgical History  Past Surgical History:   Procedure Laterality Date    FRACTURE SURGERY      clavicle, left humerus, radial, and ulna  Right radius    HIP ARTHROSCOPY Right     MT COLONOSCOPY FLX DX W/COLLJ SPEC WHEN PFRMD N/A 4/1/2019    Procedure: COLONOSCOPY;  Surgeon: Day Barnes MD;  Location: [facility]; Service: Colorectal       Summary   Pt presented with functional appearing oral and suspected mild pharyngeal dysphagia  Symptoms or concerns included suspected delayed pharyngeal swallow and reduced airway protection  Recommended level 3 soft chopped diet to ease in breakdown and reduce required effort and thin liquids, aspiration precautions  Pt and mother pleased with diet advancement  Dysphagia tx to complete regular trials and f/u on tolerance of recommended upgraded diet post evaluation from prior NPO       Risk/s for Aspiration: SOB, dx of CP     Recommended Diet: soft/level 3 diet and thin liquids   Recommended Form of Meds: as tolerated   Aspiration precautions and swallowing strategies: upright posture, only feed when fully alert, slow rate of feeding, small bites/sips and alternating bites and sips:     Current Medical Status  Eddie Godoy is a 48 y o  male with a history of epilepsy generally controlled on anticonvulsants, developmental delay, essential hypertension, recent fall, and pneumonia who presents with a 3 day history of worsening shortness of breath, fever, and cough  The pneumonia was found last week on a CT scan  He was seen because he had fallen last week while in the shower  He was started on Zithromax p o  He has gotten worse  He has a productive cough, wheezing, and shortness of breath  No chest pain  No nausea or vomiting  He has chronic lower extremity swelling  His gait is sometimes unsteady  Per Neurology note, this is somewhat attributed to his medication  When he fell last week, his mother said he fell through the shower door and hit the side of the tub  He did not lose consciousness, but she is unsure if he may have had a seizure  She says he really only has them 1 to 2 times a year except for the past several months where he has been having one a month  He has a large contusion of the right flank area and mid thoracic spine  Current Precautions:  Fall  Aspiration  Contact Droplet  Seizure      Past medical history: Please see H&P for details    Special Studies:  CXR 1/10/2020: Left upper lobe opacity, not substantially changed when allowing for difference in modality, consistent with the reported history of pneumonia  Social/Education/Vocational Hx:  Pt lives mother  Mother provides needed care however pt does work and participate in activities in community (i e , [employer])    Swallow Information   Current Risks for Dysphagia & Aspiration: decreased alertness and current active disease  Current Symptoms/Concerns: productive cough  Current Diet: NPO except medications   Baseline Diet: regular diet and thin liquids      Baseline Assessment   Behavior/Cognition: awake following arousal   Speech/Language Status: able to participate in basic conversation  Intellectual impairment    Patient Positioning: upright in bed  Pain Status/Interventions/Response to Interventions:  No report of pain  Swallow Mechanism Exam  Facial: symmetrical  Labial: WFL  Lingual: WFL and slight lingual thrush  Velum: symmetrical  Mandible: adequate ROM  Dentition: adequate and natural  Vocal quality:clear but weak  Volitional Cough: strong/productive  Expectorated quarter size mucous during evaluation of brown quality  Respiratory Status: high flow @ 6L      Consistencies Assessed and Performance   Consistencies Administered: thin liquids, puree, mechanical soft solids, soft solids and hard solids  Materials administered included small sips via cup and straw, monitored bite sizes    Oral Stage: WFL  Mastication was adequate with the materials administered today  Bolus formation and transfer were functional with no significant oral residue noted  No overt s/s reduced oral control  Pharyngeal Stage: suspected and mild    Swallow Mechanics:  Swallowing initiation appeared slightly delayed  Laryngeal rise was palpated and judged to be diminished but overall functional   1 episode of cough response and slight desaturation of 02 with regular pretzel  Remaining 4 regular trials yielded no other episodes of difficulty  Denied globus sensation with all and vocal quality clear with checks  Esophageal Concerns: none reported    Strategies and Efficacy: small bites/sips, slow rate    Summary and Recommendations (see above)    Results Reviewed with: patient, RN, MD and family     Caregiver Education: initiated  I educated patient and mother with basics re: swallowing A/P and means of minimizing aspiration risk (eg benefit of upright position c use of pillow/s to ensure head in neutral or slightly flexed position, use of slow rate/small bites and sips, ensuring laryngeal rise/swallow initiation prior to administering the next tsp etc)  Also discussed minimizing of caffeine drinks due to a diatoric and poor relation to seizure medication  Mother agreeable to promote water intake as able  Treatment Recommended: dysphagia tx     Frequency of treatment: 1-3x week    Patient Stated Goal: "I want to get back home and go to sheets for a [employer] "    Dysphagia LTG per SLP  -Patient will demonstrate optimum safety and efficacy of oral intake and swallowing function without overt s/sx of penetration or aspiration for the highest appropriate diet level       Short Term Goals:    -Pt will tolerate Dysphagia 3/advanced (dental soft) diet and thin liquids with no significant s/s oral or pharyngeal dysphagia across 1-3 diagnostic session/s     -Patient will tolerate trials of upgraded food and/or liquid texture with no significant s/s of oral or pharyngeal dysphagia including aspiration across 1-3 diagnostic sessions       Speech Therapy Prognosis   Prognosis: good    Prognosis Considerations: prior medical history, cognitive status and respiratory status

## 2020-01-11 NOTE — ASSESSMENT & PLAN NOTE
· Likely related to PNA and RSV  · Elevated D-dimer  BLE duplex pending to r/o DVT  · Continue ABX  · Follow up on cultures     · HFNC 50% 60L, wean to nasal cannula as able  · Xopenex/ Atrovent q6 hours, PRN albuterol  · Respiratory protocol  · Mucinex PO PRN

## 2020-01-11 NOTE — PLAN OF CARE
Problem: Potential for Falls  Goal: Patient will remain free of falls  Description  INTERVENTIONS:  - Assess patient frequently for physical needs  -  Identify cognitive and physical deficits and behaviors that affect risk of falls  -  [place] fall precautions as indicated by assessment   - Educate patient/family on patient safety including physical limitations  - Instruct patient to call for assistance with activity based on assessment  - Modify environment to reduce risk of injury  - Consider OT/PT consult to assist with strengthening/mobility  Outcome: Progressing     Problem: Prexisting or High Potential for Compromised Skin Integrity  Goal: Skin integrity is maintained or improved  Description  INTERVENTIONS:  - Identify patients at risk for skin breakdown  - Assess and monitor skin integrity  - Assess and monitor nutrition and hydration status  - Monitor labs   - Assess for incontinence   - Turn and reposition patient  - Assist with mobility/ambulation  - Relieve pressure over bony prominences  - Avoid friction and shearing  - Provide appropriate hygiene as needed including keeping skin clean and dry  - Evaluate need for skin moisturizer/barrier cream  - Collaborate with interdisciplinary team   - Patient/family teaching  - Consider wound care consult   Outcome: Progressing     Problem: Nutrition/Hydration-ADULT  Goal: Nutrient/Hydration intake appropriate for improving, restoring or maintaining nutritional needs  Description  Monitor and assess patient's nutrition/hydration status for malnutrition  Collaborate with interdisciplinary team and initiate plan and interventions as ordered  Monitor patient's weight and dietary intake as ordered or per policy  Utilize nutrition screening tool and intervene as necessary  Determine patient's food preferences and provide high-protein, high-caloric foods as appropriate       INTERVENTIONS:  - Monitor oral intake, urinary output, labs, and treatment plans  - Assess nutrition and hydration status and recommend course of action  - Evaluate amount of meals eaten  - Assist patient with eating if necessary   - Allow adequate time for meals  - Recommend/ encourage appropriate diets, oral nutritional supplements, and vitamin/mineral supplements  - Order, calculate, and assess calorie counts as needed  - Recommend, monitor, and adjust tube feedings and TPN/PPN based on assessed needs  - Assess need for intravenous fluids  - Provide specific nutrition/hydration education as appropriate  - Include patient/family/caregiver in decisions related to nutrition  Outcome: Progressing     Problem: PAIN - ADULT  Goal: Verbalizes/displays adequate comfort level or baseline comfort level  Description  Interventions:  - Encourage patient to monitor pain and request assistance  - Assess pain using appropriate pain scale  - Administer analgesics based on type and severity of pain and evaluate response  - Implement non-pharmacological measures as appropriate and evaluate response  - Consider cultural and social influences on pain and pain management  - Notify physician/advanced practitioner if interventions unsuccessful or patient reports new pain  Outcome: Progressing     Problem: INFECTION - ADULT  Goal: Absence or prevention of progression during hospitalization  Description  INTERVENTIONS:  - Assess and monitor for signs and symptoms of infection  - Monitor lab/diagnostic results  - Monitor all insertion sites, i e  indwelling lines, tubes, and drains  - Monitor endotracheal if appropriate and nasal secretions for changes in amount and color  - [place] appropriate cooling/warming therapies per order  - Administer medications as ordered  - Instruct and encourage patient and family to use good hand hygiene technique  - Identify and instruct in appropriate isolation precautions for identified infection/condition  Outcome: Progressing  Goal: Absence of fever/infection during neutropenic period  Description  INTERVENTIONS:  - Monitor WBC    Outcome: Progressing     Problem: SAFETY ADULT  Goal: Patient will remain free of falls  Description  INTERVENTIONS:  - Assess patient frequently for physical needs  -  Identify cognitive and physical deficits and behaviors that affect risk of falls    -  [place] fall precautions as indicated by assessment   - Educate patient/family on patient safety including physical limitations  - Instruct patient to call for assistance with activity based on assessment  - Modify environment to reduce risk of injury  - Consider OT/PT consult to assist with strengthening/mobility  Outcome: Progressing  Goal: Maintain or return to baseline ADL function  Description  INTERVENTIONS:  -  Assess patient's ability to carry out ADLs; assess patient's baseline for ADL function and identify physical deficits which impact ability to perform ADLs (bathing, care of mouth/teeth, toileting, grooming, dressing, etc )  - Assess/evaluate cause of self-care deficits   - Assess range of motion  - Assess patient's mobility; develop plan if impaired  - Assess patient's need for assistive devices and provide as appropriate  - Encourage maximum independence but intervene and supervise when necessary  - Involve family in performance of ADLs  - Assess for home care needs following discharge   - Consider OT consult to assist with ADL evaluation and planning for discharge  - Provide patient education as appropriate  Outcome: Progressing  Goal: Maintain or return mobility status to optimal level  Description  INTERVENTIONS:  - Assess patient's baseline mobility status (ambulation, transfers, stairs, etc )    - Identify cognitive and physical deficits and behaviors that affect mobility  - Identify mobility aids required to assist with transfers and/or ambulation (gait belt, sit-to-stand, lift, walker, cane, etc )  - [place] fall precautions as indicated by assessment  - Record patient progress and toleration of activity level on Mobility SBAR; progress patient to next Phase/Stage  - Instruct patient to call for assistance with activity based on assessment  - Consider rehabilitation consult to assist with strengthening/weightbearing, etc   Outcome: Progressing     Problem: DISCHARGE PLANNING  Goal: Discharge to home or other facility with appropriate resources  Description  INTERVENTIONS:  - Identify barriers to discharge w/patient and caregiver  - Arrange for needed discharge resources and transportation as appropriate  - Identify discharge learning needs (meds, wound care, etc )  - Arrange for interpretive services to assist at discharge as needed  - Refer to Case Management Department for coordinating discharge planning if the patient needs post-hospital services based on physician/advanced practitioner order or complex needs related to functional status, cognitive ability, or social support system  Outcome: Progressing     Problem: Knowledge Deficit  Goal: Patient/family/caregiver demonstrates understanding of disease process, treatment plan, medications, and discharge instructions  Description  Complete learning assessment and assess knowledge base    Interventions:  - Provide teaching at level of understanding  - Provide teaching via preferred learning methods  Outcome: Progressing

## 2020-01-11 NOTE — ASSESSMENT & PLAN NOTE
· EKG:  with T-wave inversions V3 V4 V5 V6    No old for comparison  · No chest pain  · Troponin <0 03  · Trend troponin x 3- all negative  · Telemetry monitoring

## 2020-01-11 NOTE — RESPIRATORY THERAPY NOTE
RT Ventilator Management Note  Angelika Tom 48 y o  male MRN: [id number]  Unit/Bed#: -01 Encounter: [id number]      Daily Screen     No data found in the last 10 encounters  Physical Exam:   Assessment Type: During-treatment  General Appearance: Sleeping  Respiratory Pattern: Normal  Chest Assessment: Chest expansion symmetrical  Bilateral Breath Sounds: Diminished, Coarse(slt coarse)  Cough: None  O2 Device: HFNC      Resp Comments: Pt tolerating HFNC 50/50 very well  Will continue to monitor

## 2020-01-11 NOTE — ASSESSMENT & PLAN NOTE
· Pneumonia found on CT scan 1/7, after a fall  · CT AP 1/7: Left upper lobe alveolar opacities likely representing pneumonia  · PCXR 1/10:  Left upper lobe opacity  Radiology read is pending  · Was treated outpatient with oral Zithromax   · Failure to improve, cefepime and vanc given in the ER  · Temperature 99 4°, no leukocytosis  · Trend WBC     · Temp 100 6 overnight  · Check procalcitonin- 2 84, Will trend  · Check Legionella and strep pneumoniae- negative  · Blood cultures pending  · Continue antibiotics and deescalate as indicated  · Symptom control  · Respiratory and oxygen protocol

## 2020-01-11 NOTE — ASSESSMENT & PLAN NOTE
· /86  · Does not appear to be on antihypertensive, however is on Lasix for chronic lower extremity edema  · Given Lasix 20 mg IV x1  · Monitor BP  · PRN labetalol and hydralazine for now  · Consider adding po once patient is more stable

## 2020-01-11 NOTE — ASSESSMENT & PLAN NOTE
· Does not appear to be on antihypertensive, however is on Lasix 40mg QD for chronic lower extremity edema  · Given Lasix 20 mg IV x1 due to being +1 6 liters this afternoon    · Monitor BP  · PRN labetalol and hydralazine for now  · Consider adding po once patient is more stable

## 2020-01-11 NOTE — ASSESSMENT & PLAN NOTE
· Baseline is 1 0 to 1 2   · Monitor Cr  · Creatinine today pending  · Avoid hypotension, nephrotoxic agents

## 2020-01-11 NOTE — ASSESSMENT & PLAN NOTE
· Likely related to PNA and RSV  · Elevated D-dimer  BLE duplex pending to r/o DVT  · Continue ABX  · Follow up on cultures     · HFNC, wean to nasal cannula as able  · Xopenex/ Atrovent q6 hours, PRN albuterol  · Respiratory protocol  · Mucinex PO PRN

## 2020-01-12 ENCOUNTER — APPOINTMENT (INPATIENT)
Dept: RADIOLOGY | Facility: HOSPITAL | Age: 55
DRG: 871 | End: 2020-01-12
Payer: MEDICARE

## 2020-01-12 LAB
ALBUMIN SERPL BCP-MCNC: 2.8 G/DL (ref 3.5–5)
ALP SERPL-CCNC: 150 U/L (ref 46–116)
ALT SERPL W P-5'-P-CCNC: 29 U/L (ref 12–78)
ANION GAP SERPL CALCULATED.3IONS-SCNC: 6 MMOL/L (ref 4–13)
AST SERPL W P-5'-P-CCNC: 38 U/L (ref 5–45)
BACTERIA SPT RESP CULT: ABNORMAL
BACTERIA SPT RESP CULT: ABNORMAL
BILIRUB SERPL-MCNC: 0.63 MG/DL (ref 0.2–1)
BUN SERPL-MCNC: 21 MG/DL (ref 5–25)
CALCIUM SERPL-MCNC: 7.7 MG/DL (ref 8.3–10.1)
CHLORIDE SERPL-SCNC: 102 MMOL/L (ref 100–108)
CO2 SERPL-SCNC: 29 MMOL/L (ref 21–32)
CREAT SERPL-MCNC: 1.2 MG/DL (ref 0.6–1.3)
ERYTHROCYTE [DISTWIDTH] IN BLOOD BY AUTOMATED COUNT: 14.1 % (ref 11.6–15.1)
GFR SERPL CREATININE-BSD FRML MDRD: 69 ML/MIN/1.73SQ M
GLUCOSE SERPL-MCNC: 102 MG/DL (ref 65–140)
GRAM STN SPEC: ABNORMAL
HCT VFR BLD AUTO: 39.6 % (ref 36.5–49.3)
HGB BLD-MCNC: 12.9 G/DL (ref 12–17)
MAGNESIUM SERPL-MCNC: 2.1 MG/DL (ref 1.6–2.6)
MCH RBC QN AUTO: 31.9 PG (ref 26.8–34.3)
MCHC RBC AUTO-ENTMCNC: 32.6 G/DL (ref 31.4–37.4)
MCV RBC AUTO: 98 FL (ref 82–98)
MRSA NOSE QL CULT: ABNORMAL
MRSA NOSE QL CULT: ABNORMAL
PLATELET # BLD AUTO: 100 THOUSANDS/UL (ref 149–390)
PMV BLD AUTO: 11.7 FL (ref 8.9–12.7)
POTASSIUM SERPL-SCNC: 3.9 MMOL/L (ref 3.5–5.3)
PROCALCITONIN SERPL-MCNC: 2.74 NG/ML
PROT SERPL-MCNC: 8.1 G/DL (ref 6.4–8.2)
RBC # BLD AUTO: 4.05 MILLION/UL (ref 3.88–5.62)
SODIUM SERPL-SCNC: 137 MMOL/L (ref 136–145)
WBC # BLD AUTO: 4.96 THOUSAND/UL (ref 4.31–10.16)

## 2020-01-12 PROCEDURE — 92526 ORAL FUNCTION THERAPY: CPT

## 2020-01-12 PROCEDURE — 80053 COMPREHEN METABOLIC PANEL: CPT | Performed by: PHYSICIAN ASSISTANT

## 2020-01-12 PROCEDURE — 94762 N-INVAS EAR/PLS OXIMTRY CONT: CPT

## 2020-01-12 PROCEDURE — 94760 N-INVAS EAR/PLS OXIMETRY 1: CPT

## 2020-01-12 PROCEDURE — 94640 AIRWAY INHALATION TREATMENT: CPT

## 2020-01-12 PROCEDURE — 99233 SBSQ HOSP IP/OBS HIGH 50: CPT | Performed by: ANESTHESIOLOGY

## 2020-01-12 PROCEDURE — 94660 CPAP INITIATION&MGMT: CPT

## 2020-01-12 PROCEDURE — 85027 COMPLETE CBC AUTOMATED: CPT | Performed by: PHYSICIAN ASSISTANT

## 2020-01-12 PROCEDURE — 71045 X-RAY EXAM CHEST 1 VIEW: CPT

## 2020-01-12 PROCEDURE — 83735 ASSAY OF MAGNESIUM: CPT | Performed by: PHYSICIAN ASSISTANT

## 2020-01-12 PROCEDURE — 84145 PROCALCITONIN (PCT): CPT | Performed by: PHYSICIAN ASSISTANT

## 2020-01-12 RX ADMIN — IPRATROPIUM BROMIDE 0.5 MG: 0.5 SOLUTION RESPIRATORY (INHALATION) at 13:04

## 2020-01-12 RX ADMIN — PRIMIDONE 150 MG: 50 TABLET ORAL at 21:16

## 2020-01-12 RX ADMIN — PRIMIDONE 100 MG: 50 TABLET ORAL at 13:12

## 2020-01-12 RX ADMIN — VANCOMYCIN HYDROCHLORIDE 2000 MG: 5 INJECTION, POWDER, LYOPHILIZED, FOR SOLUTION INTRAVENOUS at 10:07

## 2020-01-12 RX ADMIN — LEVOCARNITINE 330 MG: 330 TABLET ORAL at 13:12

## 2020-01-12 RX ADMIN — Medication 400 UNITS: at 09:58

## 2020-01-12 RX ADMIN — LEVOCARNITINE 330 MG: 330 TABLET ORAL at 17:34

## 2020-01-12 RX ADMIN — IPRATROPIUM BROMIDE 0.5 MG: 0.5 SOLUTION RESPIRATORY (INHALATION) at 08:54

## 2020-01-12 RX ADMIN — GUAIFENESIN 600 MG: 600 TABLET ORAL at 10:02

## 2020-01-12 RX ADMIN — CLONAZEPAM 0.5 MG: 0.5 TABLET ORAL at 21:14

## 2020-01-12 RX ADMIN — LAMOTRIGINE 200 MG: 100 TABLET ORAL at 05:43

## 2020-01-12 RX ADMIN — IPRATROPIUM BROMIDE 0.5 MG: 0.5 SOLUTION RESPIRATORY (INHALATION) at 01:33

## 2020-01-12 RX ADMIN — LEVALBUTEROL HYDROCHLORIDE 1.25 MG: 1.25 SOLUTION, CONCENTRATE RESPIRATORY (INHALATION) at 13:04

## 2020-01-12 RX ADMIN — LAMOTRIGINE 250 MG: 100 TABLET ORAL at 13:13

## 2020-01-12 RX ADMIN — DEXTRAN 70, AND HYPROMELLOSE 2910 1 DROP: 1; 3 SOLUTION/ DROPS OPHTHALMIC at 17:40

## 2020-01-12 RX ADMIN — HEPARIN SODIUM 5000 UNITS: 5000 INJECTION INTRAVENOUS; SUBCUTANEOUS at 05:43

## 2020-01-12 RX ADMIN — METRONIDAZOLE 500 MG: 500 INJECTION, SOLUTION INTRAVENOUS at 05:03

## 2020-01-12 RX ADMIN — Medication 1 TABLET: at 07:30

## 2020-01-12 RX ADMIN — HEPARIN SODIUM 5000 UNITS: 5000 INJECTION INTRAVENOUS; SUBCUTANEOUS at 13:45

## 2020-01-12 RX ADMIN — ZONISAMIDE 100 MG: 100 CAPSULE ORAL at 21:16

## 2020-01-12 RX ADMIN — DEXTRAN 70, AND HYPROMELLOSE 2910 1 DROP: 1; 3 SOLUTION/ DROPS OPHTHALMIC at 21:18

## 2020-01-12 RX ADMIN — MUPIROCIN: 20 OINTMENT TOPICAL at 21:17

## 2020-01-12 RX ADMIN — LEVALBUTEROL HYDROCHLORIDE 1.25 MG: 1.25 SOLUTION, CONCENTRATE RESPIRATORY (INHALATION) at 08:54

## 2020-01-12 RX ADMIN — CEFEPIME HYDROCHLORIDE 2000 MG: 2 INJECTION, SOLUTION INTRAVENOUS at 05:42

## 2020-01-12 RX ADMIN — DEXTRAN 70, AND HYPROMELLOSE 2910 1 DROP: 1; 3 SOLUTION/ DROPS OPHTHALMIC at 09:59

## 2020-01-12 RX ADMIN — PRIMIDONE 150 MG: 50 TABLET ORAL at 09:58

## 2020-01-12 RX ADMIN — CEFEPIME HYDROCHLORIDE 2000 MG: 2 INJECTION, SOLUTION INTRAVENOUS at 17:35

## 2020-01-12 RX ADMIN — Medication 50 MG: at 09:58

## 2020-01-12 RX ADMIN — LEVALBUTEROL HYDROCHLORIDE 1.25 MG: 1.25 SOLUTION, CONCENTRATE RESPIRATORY (INHALATION) at 01:33

## 2020-01-12 RX ADMIN — ZINC 1 TABLET: TAB ORAL at 09:58

## 2020-01-12 RX ADMIN — LEVOCARNITINE 330 MG: 330 TABLET ORAL at 21:17

## 2020-01-12 RX ADMIN — OXYCODONE HYDROCHLORIDE AND ACETAMINOPHEN 500 MG: 500 TABLET ORAL at 10:02

## 2020-01-12 RX ADMIN — LAMOTRIGINE 300 MG: 100 TABLET ORAL at 21:14

## 2020-01-12 RX ADMIN — HEPARIN SODIUM 5000 UNITS: 5000 INJECTION INTRAVENOUS; SUBCUTANEOUS at 21:14

## 2020-01-12 RX ADMIN — IPRATROPIUM BROMIDE 0.5 MG: 0.5 SOLUTION RESPIRATORY (INHALATION) at 20:38

## 2020-01-12 RX ADMIN — ASPIRIN 81 MG 81 MG: 81 TABLET ORAL at 10:02

## 2020-01-12 RX ADMIN — VANCOMYCIN HYDROCHLORIDE 2000 MG: 5 INJECTION, POWDER, LYOPHILIZED, FOR SOLUTION INTRAVENOUS at 21:20

## 2020-01-12 RX ADMIN — GUAIFENESIN 600 MG: 600 TABLET ORAL at 17:34

## 2020-01-12 RX ADMIN — DEXTRAN 70, AND HYPROMELLOSE 2910 1 DROP: 1; 3 SOLUTION/ DROPS OPHTHALMIC at 13:12

## 2020-01-12 RX ADMIN — LEVALBUTEROL HYDROCHLORIDE 1.25 MG: 1.25 SOLUTION, CONCENTRATE RESPIRATORY (INHALATION) at 20:38

## 2020-01-12 RX ADMIN — LEVOCARNITINE 330 MG: 330 TABLET ORAL at 09:57

## 2020-01-12 NOTE — ASSESSMENT & PLAN NOTE
· Baseline is 1 0 to 1 2   · Monitor Cr  · Creatinine down to 1 2 today  · Avoid hypotension, nephrotoxic agents

## 2020-01-12 NOTE — ASSESSMENT & PLAN NOTE
· Pneumonia found on CT scan 1/7, after a fall  · CT AP 1/7: Left upper lobe alveolar opacities likely representing pneumonia  · PCXR 1/10:  Left upper lobe opacity      · Was treated outpatient with oral Zithromax   · Failure to improve, now on cefepime and vanc Day #3  · Check procalcitonin- 2 84 -> 4 0 -> 2 74  · Legionella and strep pneumoniae- negative  · Blood cultures NTD  · Respiratory and oxygen protocol  · Speech eval 1/11 and no signs of aspiration-->  D/C'd flagyl on 1/12  · MRSA swab +; bactroban started 1/12  · Continue HFNC, wean as able

## 2020-01-12 NOTE — PLAN OF CARE
Problem: Potential for Falls  Goal: Patient will remain free of falls  Description  INTERVENTIONS:  - Assess patient frequently for physical needs  -  Identify cognitive and physical deficits and behaviors that affect risk of falls  -  [place] fall precautions as indicated by assessment   - Educate patient/family on patient safety including physical limitations  - Instruct patient to call for assistance with activity based on assessment  - Modify environment to reduce risk of injury  - Consider OT/PT consult to assist with strengthening/mobility  Outcome: Progressing     Problem: Prexisting or High Potential for Compromised Skin Integrity  Goal: Skin integrity is maintained or improved  Description  INTERVENTIONS:  - Identify patients at risk for skin breakdown  - Assess and monitor skin integrity  - Assess and monitor nutrition and hydration status  - Monitor labs   - Assess for incontinence   - Turn and reposition patient  - Assist with mobility/ambulation  - Relieve pressure over bony prominences  - Avoid friction and shearing  - Provide appropriate hygiene as needed including keeping skin clean and dry  - Evaluate need for skin moisturizer/barrier cream  - Collaborate with interdisciplinary team   - Patient/family teaching  - Consider wound care consult   Outcome: Progressing     Problem: Nutrition/Hydration-ADULT  Goal: Nutrient/Hydration intake appropriate for improving, restoring or maintaining nutritional needs  Description  Monitor and assess patient's nutrition/hydration status for malnutrition  Collaborate with interdisciplinary team and initiate plan and interventions as ordered  Monitor patient's weight and dietary intake as ordered or per policy  Utilize nutrition screening tool and intervene as necessary  Determine patient's food preferences and provide high-protein, high-caloric foods as appropriate       INTERVENTIONS:  - Monitor oral intake, urinary output, labs, and treatment plans  - Assess nutrition and hydration status and recommend course of action  - Evaluate amount of meals eaten  - Assist patient with eating if necessary   - Allow adequate time for meals  - Recommend/ encourage appropriate diets, oral nutritional supplements, and vitamin/mineral supplements  - Order, calculate, and assess calorie counts as needed  - Recommend, monitor, and adjust tube feedings and TPN/PPN based on assessed needs  - Assess need for intravenous fluids  - Provide specific nutrition/hydration education as appropriate  - Include patient/family/caregiver in decisions related to nutrition  Outcome: Progressing     Problem: PAIN - ADULT  Goal: Verbalizes/displays adequate comfort level or baseline comfort level  Description  Interventions:  - Encourage patient to monitor pain and request assistance  - Assess pain using appropriate pain scale  - Administer analgesics based on type and severity of pain and evaluate response  - Implement non-pharmacological measures as appropriate and evaluate response  - Consider cultural and social influences on pain and pain management  - Notify physician/advanced practitioner if interventions unsuccessful or patient reports new pain  Outcome: Progressing     Problem: INFECTION - ADULT  Goal: Absence or prevention of progression during hospitalization  Description  INTERVENTIONS:  - Assess and monitor for signs and symptoms of infection  - Monitor lab/diagnostic results  - Monitor all insertion sites, i e  indwelling lines, tubes, and drains  - Monitor endotracheal if appropriate and nasal secretions for changes in amount and color  - [place] appropriate cooling/warming therapies per order  - Administer medications as ordered  - Instruct and encourage patient and family to use good hand hygiene technique  - Identify and instruct in appropriate isolation precautions for identified infection/condition  Outcome: Progressing  Goal: Absence of fever/infection during neutropenic period  Description  INTERVENTIONS:  - Monitor WBC    Outcome: Progressing     Problem: SAFETY ADULT  Goal: Patient will remain free of falls  Description  INTERVENTIONS:  - Assess patient frequently for physical needs  -  Identify cognitive and physical deficits and behaviors that affect risk of falls    -  [place] fall precautions as indicated by assessment   - Educate patient/family on patient safety including physical limitations  - Instruct patient to call for assistance with activity based on assessment  - Modify environment to reduce risk of injury  - Consider OT/PT consult to assist with strengthening/mobility  Outcome: Progressing  Goal: Maintain or return to baseline ADL function  Description  INTERVENTIONS:  -  Assess patient's ability to carry out ADLs; assess patient's baseline for ADL function and identify physical deficits which impact ability to perform ADLs (bathing, care of mouth/teeth, toileting, grooming, dressing, etc )  - Assess/evaluate cause of self-care deficits   - Assess range of motion  - Assess patient's mobility; develop plan if impaired  - Assess patient's need for assistive devices and provide as appropriate  - Encourage maximum independence but intervene and supervise when necessary  - Involve family in performance of ADLs  - Assess for home care needs following discharge   - Consider OT consult to assist with ADL evaluation and planning for discharge  - Provide patient education as appropriate  Outcome: Progressing  Goal: Maintain or return mobility status to optimal level  Description  INTERVENTIONS:  - Assess patient's baseline mobility status (ambulation, transfers, stairs, etc )    - Identify cognitive and physical deficits and behaviors that affect mobility  - Identify mobility aids required to assist with transfers and/or ambulation (gait belt, sit-to-stand, lift, walker, cane, etc )  - [place] fall precautions as indicated by assessment  - Record patient progress and toleration of activity level on Mobility SBAR; progress patient to next Phase/Stage  - Instruct patient to call for assistance with activity based on assessment  - Consider rehabilitation consult to assist with strengthening/weightbearing, etc   Outcome: Progressing     Problem: DISCHARGE PLANNING  Goal: Discharge to home or other facility with appropriate resources  Description  INTERVENTIONS:  - Identify barriers to discharge w/patient and caregiver  - Arrange for needed discharge resources and transportation as appropriate  - Identify discharge learning needs (meds, wound care, etc )  - Arrange for interpretive services to assist at discharge as needed  - Refer to Case Management Department for coordinating discharge planning if the patient needs post-hospital services based on physician/advanced practitioner order or complex needs related to functional status, cognitive ability, or social support system  Outcome: Progressing     Problem: Knowledge Deficit  Goal: Patient/family/caregiver demonstrates understanding of disease process, treatment plan, medications, and discharge instructions  Description  Complete learning assessment and assess knowledge base    Interventions:  - Provide teaching at level of understanding  - Provide teaching via preferred learning methods  Outcome: Progressing

## 2020-01-12 NOTE — ASSESSMENT & PLAN NOTE
· History of seizures since a child, has 1-2 year, with recent increase in past months to one/month  · Is on Lamictal Mysoline clonazepam Zonisemide  · Per Neurology, medications contribute to unsteady gait  · Check Lamictal, Mysoline, zonisamide levels  · Will take 2-5 days for results  · Resume home meds and follow up levels  · Seizure precautions  · Fall precautions

## 2020-01-12 NOTE — PROGRESS NOTES
Vancomycin Assessment    Mateusz Mckinney is a 48 y o  male who is currently receiving vancomycin 1250 mg iv q 12 hrs for Pneumonia     Relevant clinical data and objective history reviewed:  Creatinine   Date Value Ref Range Status   01/11/2020 1 35 (H) 0 60 - 1 30 mg/dL Final     Comment:     Standardized to IDMS reference method   01/10/2020 1 45 (H) 0 60 - 1 30 mg/dL Final     Comment:     Standardized to IDMS reference method     /88 (BP Location: Left arm)   Pulse 100   Temp 98 3 °F (36 8 °C) (Oral)   Resp 15   Ht 5' 8" (1 727 m)   Wt 101 kg (222 lb 0 1 oz)   SpO2 93%   BMI 33 76 kg/m²   I/O last 3 completed shifts: In: 7719 [I V :40; IV Piggyback:3450]  Out: 3050 [Urine:3050]  Lab Results   Component Value Date/Time    BUN 19 01/11/2020 05:05 AM    WBC 5 59 01/11/2020 05:05 AM    HGB 13 4 01/11/2020 05:05 AM    HCT 41 0 01/11/2020 05:05 AM    MCV 98 01/11/2020 05:05 AM     (L) 01/11/2020 05:05 AM     Temp Readings from Last 3 Encounters:   01/11/20 98 3 °F (36 8 °C) (Oral)   01/07/20 (!) 101 6 °F (38 7 °C) (Oral)   12/22/19 98 9 °F (37 2 °C) (Oral)     Vancomycin Days of Therapy: 2    Assessment/Plan  The patient is currently on vancomycin utilizing scheduled dosing  Baseline risks associated with therapy include: pre-existing renal impairment, concomitant nephrotoxic medications and advanced age  The patient is receiving 1250 mg iv q 12 hrs with the most recent vancomycin level being at steady-state and sub-therapeutic based on a goal of 15-20 (appropriate for most indications) ; therefore, after clinical evaluation will be changed to 2000 mg q 12 hrs   Pharmacy will continue to follow closely for s/sx of nephrotoxicity, infusion reactions and appropriateness of therapy  BMP and CBC will be ordered per protocol  Plan for trough as patient approaches steady state, prior to the 4th  dose at approximately 0800 on 1/13/20   Pharmacy will continue to follow the patients culture results and clinical progress daily      Nitesh Canseco, Pharmacist

## 2020-01-12 NOTE — ASSESSMENT & PLAN NOTE
· Platelets pending for this am   · appears to have had low platelets since December (143,000)  · Monitor for signs of bleeding  · Daily CBC and trend platelets

## 2020-01-12 NOTE — PROGRESS NOTES
Vancomycin IV Pharmacy-to-Dose Consultation    Cecil Mckeon is a 48 y o  male who is currently receiving Vancomycin IV with management by the Pharmacy Consult service  Assessment/Plan:  The patient was reviewed  Renal function is stable and no signs or symptoms of nephrotoxicity and/or infusion reactions were documented in the chart  Based on todays assessment, continue current vancomycin (day # 3) dosing of 2000 mg IV every 12 hours, with a plan for trough to be drawn at 0800 on 1/13/20  We will continue to follow the patients culture results and clinical progress daily      Amanda Yang, Pharmacist

## 2020-01-12 NOTE — ASSESSMENT & PLAN NOTE
· Likely related to PNA and RSV  · Elevated D-dimer  BLE duplex negative for DVT  · Continue ABX  · MRSA + in nares     · HFNC 50% 60L, wean to nasal cannula as able  · Xopenex/ Atrovent q6 hours, PRN albuterol  · Respiratory protocol  · Mucinex PO PRN  · OOB to chair today given negative duplex

## 2020-01-12 NOTE — ASSESSMENT & PLAN NOTE
· Does not appear to be on antihypertensive, however is on Lasix 40mg QD for chronic lower extremity edema  · Received one dose of Lasix 20 mg IV x1 yesterday    · Monitor BP  · PRN labetalol and hydralazine for now  · Consider adding po once patient is more stable

## 2020-01-12 NOTE — PROGRESS NOTES
Progress Note - Saravanan Leiva 1965, 48 y o  male MRN: [id number]    Unit/Bed#: -01 Encounter: [id number]    Primary Care Provider: Zaynab Martins DO   Date and time admitted to hospital: 1/10/2020  5:20 AM        * RSV (acute bronchiolitis due to respiratory syncytial virus)  Assessment & Plan  Supportive Care  Droplet precautions  High flow nasal cannula O2 for comfort- wean as tolerated      Acute respiratory failure with hypoxia ([facility] )  Assessment & Plan  · Likely related to PNA and RSV  · Elevated D-dimer  BLE duplex pending to r/o DVT  · Continue ABX  · Follow up on cultures  · HFNC 50% 60L, wean to nasal cannula as able  · Xopenex/ Atrovent q6 hours, PRN albuterol  · Respiratory protocol  · Mucinex PO PRN      Pneumonia due to infectious organism  Assessment & Plan  · Pneumonia found on CT scan 1/7, after a fall  · CT AP 1/7: Left upper lobe alveolar opacities likely representing pneumonia  · PCXR 1/10:  Left upper lobe opacity  · Was treated outpatient with oral Zithromax   · Failure to improve, now on cefepime and vanc Day #2  · Check procalcitonin- 2 84 -> 4 0  · Legionella and strep pneumoniae- negative  · Blood cultures NTD  · Respiratory and oxygen protocol  · Speech eval today and no signs of aspiration consider D/C flagyl tomorrow      Nonintractable generalized idiopathic epilepsy without status epilepticus ([facility] )  Assessment & Plan  · History of seizures since a child, has 1-2 year, with recent increase in past months to one/month  · Is on Lamictal Mysoline clonazepam Zonisemide  · Per Neurology, medications contribute to unsteady gait  · Check Lamictal, Mysoline, zonisamide levels  · Resume home meds and follow up levels  · Seizure precautions  · Fall precautions      T wave inversion in EKG  Assessment & Plan  · EKG:  with T-wave inversions V3 V4 V5 V6    No old for comparison  · No chest pain  · Troponin <0 03  · Trend troponin x 3- all negative  · Telemetry monitoring      Essential hypertension  Assessment & Plan  · Does not appear to be on antihypertensive, however is on Lasix 40mg QD for chronic lower extremity edema  · Given Lasix 20 mg IV x1 due to being +1 6 liters this afternoon  · Monitor BP  · PRN labetalol and hydralazine for now  · Consider adding po once patient is more stable      Elevated serum creatinine  Assessment & Plan  · Baseline is 1 0 to 1 2   · Monitor Cr  · Creatinine today pending  · Avoid hypotension, nephrotoxic agents      Thrombocytopenia (HCC)  Assessment & Plan  · Platelets 133  · Monitor for signs of bleeding  · Daily CBC and trend platelets      Contusion, flank, subsequent encounter  Assessment & Plan  · S/p fall 1/7  · CT abdomen pelvis:  No traumatic thoracic or intra-abdominal abnormality identified  Stranding within the subcutaneous tissues of the right flank consistent with a contusion  · Monitor        ----------------------------------------------------------------------------------------  HPI/24hr events:  No acute events overnight  Patient remained on high-flow nasal cannula throughout the day and overnight without any issues  Was evaluated by speech yesterday and cleared for a regular diet  There were no concerns for aspiration  He was given Lasix 20 mg x 1 dose yesterday for positive via  Disposition: Continue Stepdown Level 2 level of care   Code Status: Level 1 - Full Code  ---------------------------------------------------------------------------------------  SUBJECTIVE    Review of Systems   Constitutional: Negative for chills and fever  HENT: Negative for sore throat and trouble swallowing  Respiratory: Positive for cough  Negative for chest tightness and shortness of breath  Cardiovascular: Negative for chest pain and palpitations  Gastrointestinal: Negative for abdominal pain, diarrhea, nausea and vomiting  Genitourinary: Negative for difficulty urinating     Musculoskeletal: Negative for back pain and myalgias  Neurological: Negative for dizziness, light-headedness and headaches  Psychiatric/Behavioral: Negative for hallucinations and sleep disturbance  ---------------------------------------------------------------------------------------  OBJECTIVE    Physical Exam   Constitutional: He is oriented to person, place, and time  He appears well-developed and well-nourished  No distress  HENT:   Head: Normocephalic and atraumatic  Mouth/Throat: Oropharynx is clear and moist  No oropharyngeal exudate  Right eye extropia   Eyes: Pupils are equal, round, and reactive to light  Conjunctivae are normal  Right eye exhibits no discharge  Left eye exhibits no discharge  No scleral icterus  Neck: Neck supple  No tracheal deviation present  Cardiovascular: Normal rate, regular rhythm, normal heart sounds and intact distal pulses  Exam reveals no friction rub  No murmur heard  Pulmonary/Chest: Effort normal  No respiratory distress  He has wheezes  He has no rales  Faint scattered wheezing throughout   Abdominal: Soft  Bowel sounds are normal  He exhibits no distension  There is no tenderness  Abdomen large, round   Genitourinary:   Genitourinary Comments: Voiding spontaneously without difficulty   Musculoskeletal: He exhibits edema  Bilateral lower extremity edema  Moves all extremities spontaneously and with equal strength throughout   Neurological: He is alert and oriented to person, place, and time  Developmental delay  Answers questions appropriately  Follows command  Speech clear and coherent  Moves all extremities equally   Skin: Skin is warm and dry  Capillary refill takes less than 2 seconds  He is not diaphoretic  No pallor  Psychiatric: He has a normal mood and affect  His behavior is normal    Vitals reviewed      Vitals   Vitals:    01/11/20 1900 01/11/20 2100 01/11/20 2325 01/12/20 0133   BP: 139/88  148/92    BP Location:   Left arm    Pulse: 100  99    Resp: 15  (! 24 Temp: 98 3  98 6 °F (37 °C)    TempSrc:   Oral    SpO2: 95% 95% 91% 92%   Weight:       Height:         Temp (24hrs), Av 2 °F (36 8 °C), Min:97 1 °F (36 2 °C), Max:98 8 °F (37 1 °C)  Current: Temperature: 98 6 °F (37 °C)          SpO2: SpO2: 92 %, SpO2 Activity: SpO2 Activity: At Rest, SpO2 Device: O2 Device: High flow nasal cannula  O2 Flow Rate (L/min): 50 L/min    Invasive/non-invasive ventilation settings   Respiratory    Lab Data (Last 4 hours)    None         O2/Vent Data (Last 4 hours)       0133          Non-Invasive Ventilation Mode HFNC                   Height and Weights   Height: 5' 8" (172 7 cm)  IBW: 68 4 kg  Body mass index is 33 76 kg/m²  Weight (last 2 days)     Date/Time   Weight    01/10/20 1807   101 (222)    01/10/20 0516   101 (222 66)              Intake and Output  I/O       01/10 07 -  0700  07 -  0700    P  O   480    I V  (mL/kg) 30 (0 3) 10 (0 1)    IV Piggyback 3050 550    Total Intake(mL/kg) 3080 (30 5) 1040 (10 3)    Urine (mL/kg/hr) 1400 (0 6) 1650 (0 7)    Total Output 1400 1650    Net +1680 -610                Nutrition       Diet Orders   (From admission, onward)             Start     Ordered    20 1543  Diet Dysphagia/Modified Consistency; Dysphagia 3-Dental Soft; Thin Liquid  Diet effective now     Question Answer Comment   Diet Type Dysphagia/Modified Consistency    Dysphagia/Modified Consistency Dysphagia 3-Dental Soft    Liquid Modifier Thin Liquid    Special Instructions Chopped meats    RD to adjust diet per protocol?  Yes        20 1543    01/10/20 1224  Dietary nutrition supplements  Once     Question Answer Comment   Select Supplement: Ensure Clear Apple    Frequency Breakfast, Dinner        01/10/20 1223    01/10/20 1224  Dietary nutrition supplements  Once     Question Answer Comment   Select Supplement: Ensure Clear Jearl Raker    Frequency Lunch        01/10/20 1223                Laboratory and Diagnostics:  Results from last 7 days   Lab Units 01/11/20  0505 01/10/20  0526   WBC Thousand/uL 5 59 8 00   HEMOGLOBIN g/dL 13 4 15 0   HEMATOCRIT % 41 0 45 4   PLATELETS Thousands/uL 110* 122*   NEUTROS PCT % 74 83*   MONOS PCT % 14* 11     Results from last 7 days   Lab Units 01/11/20  0505 01/10/20  0526   SODIUM mmol/L 136 132*   POTASSIUM mmol/L 3 7 3 6   CHLORIDE mmol/L 100 96*   CO2 mmol/L 30 28   ANION GAP mmol/L 6 8   BUN mg/dL 19 21   CREATININE mg/dL 1 35* 1 45*   CALCIUM mg/dL 7 5* 8 0*   GLUCOSE RANDOM mg/dL 101 97   ALT U/L  --  31   AST U/L  --  33   ALK PHOS U/L  --  178*   ALBUMIN g/dL  --  3 3*   TOTAL BILIRUBIN mg/dL  --  1 07*     Results from last 7 days   Lab Units 01/11/20  0505   MAGNESIUM mg/dL 1 6   PHOSPHORUS mg/dL 3 3      Results from last 7 days   Lab Units 01/10/20  0526   INR  1 24*   PTT seconds 34      Results from last 7 days   Lab Units 01/10/20  1340 01/10/20  1039 01/10/20  0529   TROPONIN I ng/mL 0 02 <0 02 <0 02     Results from last 7 days   Lab Units 01/10/20  2335 01/10/20  1125 01/10/20  0526   LACTIC ACID mmol/L 0 9 2 8* 2 2*     ABG:  Results from last 7 days   Lab Units 01/10/20  1045   PH ART  7 395   PCO2 ART mm Hg 38 8   PO2 ART mm Hg 35 9*   HCO3 ART mmol/L 23 2   BASE EXC ART mmol/L -1 4   ABG SOURCE  Radial, Right     VBG:  Results from last 7 days   Lab Units 01/10/20  1045   ABG SOURCE  Radial, Right     Results from last 7 days   Lab Units 01/11/20  0505 01/10/20  0526   PROCALCITONIN ng/ml 4 00* 2 84*       Micro  Results from last 7 days   Lab Units 01/10/20  1538 01/10/20  1340 01/10/20  1126 01/10/20  0536 01/10/20  0526   BLOOD CULTURE   --   --   --  No Growth at 24 hrs  No Growth at 24 hrs  SPUTUM CULTURE  Culture too young- will reincubate  --   --   --   --    GRAM STAIN RESULT  Rare Epithelial cells per low power field*  Rare Polys*  1+ Gram positive cocci in pairs*  --   --   --   --    MRSA CULTURE ONLY   --  Culture results to follow    --   --   --    LEGIONELLA URINARY ANTIGEN   --   --  Negative  --   --    STREP PNEUMONIAE ANTIGEN, URINE   --   --  Negative  --   --        EKG: NSR  Imaging: No new imaging to review       Active Medications  Scheduled Meds:    Current Facility-Administered Medications:  acetaminophen 650 mg Oral Q6H PRN Frannie Neema, CRNP    albuterol 2 5 mg Nebulization Q6H PRN Seretha Hulett, CRNP    ascorbic acid 500 mg Oral Daily Frannie Neema, CRNP    aspirin 81 mg Oral Daily Frannie Neema, CRNP    calcium carbonate-vitamin D 1 tablet Oral Daily With Breakfast Frannie Neema, CRNP    cefepime 2,000 mg Intravenous Q12H Frannie Neema, CRNP Last Rate: Stopped (01/11/20 2048)   clonazePAM 0 5 mg Oral HS Frannie Neema, CRNP    dextran 70-hypromellose 1 drop Both Eyes 4x Daily Liya Haskins PA-C    guaiFENesin 600 mg Oral BID Frannie Neema, CRNP    heparin (porcine) 5,000 Units Subcutaneous [facility] Frannie Neema, CRNP    hydrALAZINE 5 mg Intravenous Q6H PRN Andres Her PA-C    ipratropium 0 5 mg Nebulization Q6H Roberto Benjamin MD    Labetalol HCl 10 mg Intravenous Q4H PRN Andres Her PA-C    lamoTRIgine 200 mg Oral Early Morning Frannie Neema, CRNP    lamoTRIgine 250 mg Oral Daily With Lunch Seretha Hulett, CRNP    lamoTRIgine 300 mg Oral HS Frannie Neema, CRREINA    levalbuterol 1 25 mg Nebulization Q6H Roberto Benjamin MD    levOCARNitine 330 mg Oral 4x Daily (PC & HS) Frannie Neema, CRNP    metroNIDAZOLE 500 mg Intravenous Q8H Enma Her PA-C Last Rate: Stopped (01/11/20 2151)   multivitamin stress formula 1 tablet Oral Daily Mumtaz Wells MD    ondansetron 4 mg Intravenous Q6H PRN Frannie Neema, CRNP    primidone 100 mg Oral Daily With Lunch Frannie Neema, CRNP    primidone 150 mg Oral After Breakfast Frannie Neema, CRNP    primidone 150 mg Oral HS Frannie Neema, CRNP    vancomycin 20 mg/kg Intravenous Q12H Roberto Benjamin MD Last Rate: 2,000 mg (01/11/20 8884)   vitamin E (tocopherol) 400 Units Oral Daily Aidan Dessert, CRNP    zinc gluconate 50 mg Oral Daily Aidan Dessert, NADEGE    zonisamide 100 mg Oral HS Aidan MikaelsertNADEGE      Continuous Infusions:     PRN Meds:     acetaminophen 650 mg Q6H PRN   albuterol 2 5 mg Q6H PRN   hydrALAZINE 5 mg Q6H PRN   Labetalol HCl 10 mg Q4H PRN   ondansetron 4 mg Q6H PRN       --------------------------------------------------------------------------------------  Counseling / Coordination of Care  Total time spent today 21 minutes  Greater than 50% of total time was spent with the patient and / or family counseling and / or coordination of care  A description of the counseling / coordination of care: NADEGE Varner      Portions of the record may have been created with voice recognition software  Occasional wrong word or "sound a like" substitutions may have occurred due to the inherent limitations of voice recognition software    Read the chart carefully and recognize, using context, where substitutions have occurred

## 2020-01-12 NOTE — PROGRESS NOTES
Progress Note - Jessica Skinner 1965, 48 y o  male MRN: [id number]    Unit/Bed#: -01 Encounter: [id number]    Primary Care Provider: Jasbir Pelayo DO   Date and time admitted to hospital: 1/10/2020  5:20 AM        * RSV (acute bronchiolitis due to respiratory syncytial virus)  Assessment & Plan  Supportive Care  Droplet precautions  High flow nasal cannula O2 for comfort- wean as tolerated      Acute respiratory failure with hypoxia ([facility] [address] )  Assessment & Plan  · Likely related to PNA and RSV  · Elevated D-dimer  BLE duplex negative for DVT  · Continue ABX  · MRSA + in nares  · HFNC 50% 60L, wean to nasal cannula as able  · Xopenex/ Atrovent q6 hours, PRN albuterol  · Respiratory protocol  · Mucinex PO PRN  · OOB to chair today given negative duplex      Pneumonia due to infectious organism  Assessment & Plan  · Pneumonia found on CT scan 1/7, after a fall  · CT AP 1/7: Left upper lobe alveolar opacities likely representing pneumonia  · PCXR 1/10:  Left upper lobe opacity      · Was treated outpatient with oral Zithromax   · Failure to improve, now on cefepime and vanc Day #3  · Check procalcitonin- 2 84 -> 4 0 -> 2 74  · Legionella and strep pneumoniae- negative  · Blood cultures NTD  · Respiratory and oxygen protocol  · Speech eval 1/11 and no signs of aspiration-->  D/C'd flagyl on 1/12  · MRSA swab +; bactroban started 1/12  · Continue HFNC, wean as able      Nonintractable generalized idiopathic epilepsy without status epilepticus ([facility] [address] )  Assessment & Plan  · History of seizures since a child, has 1-2 year, with recent increase in past months to one/month  · Is on Lamictal Mysoline clonazepam Zonisemide  · Per Neurology, medications contribute to unsteady gait  · Check Lamictal, Mysoline, zonisamide levels  · Will take 2-5 days for results  · Resume home meds and follow up levels  · Seizure precautions  · Fall precautions      T wave inversion in EKG  Assessment & Plan  · EKG:  with T-wave inversions V3 V4 V5 V6  No old for comparison  · No chest pain  · Troponin <0 03  · Trend troponin x 3- all negative  · Telemetry monitoring      Essential hypertension  Assessment & Plan  · Does not appear to be on antihypertensive, however is on Lasix 40mg QD for chronic lower extremity edema  · Received one dose of Lasix 20 mg IV x1 yesterday  · Monitor BP  · PRN labetalol and hydralazine for now  · Consider adding po once patient is more stable      Elevated serum creatinine  Assessment & Plan  · Baseline is 1 0 to 1 2   · Monitor Cr  · Creatinine down to 1 2 today  · Avoid hypotension, nephrotoxic agents      Thrombocytopenia (HCC)  Assessment & Plan  · Platelets pending for this 137  · appears to have had low platelets since December (143,000)  · Monitor for signs of bleeding  · Daily CBC and trend platelets      Contusion, flank, subsequent encounter  Assessment & Plan  · S/p fall 1/7  · CT abdomen pelvis:  No traumatic thoracic or intra-abdominal abnormality identified  Stranding within the subcutaneous tissues of the right flank consistent with a contusion  · Monitor        ----------------------------------------------------------------------------------------  HPI/24hr events: No acute events overnight  Duplex negative for DVT and he was mobilized out of bed  He desatted to upper 80's with activity, but quickly recovered without adjusting O2  Flagyl was d/c'd after determined not aspirating  He was started on bactroban for + MRSA screen  Disposition: Continue Stepdown Level 2 level of care   Code Status: Level 1 - Full Code  ---------------------------------------------------------------------------------------  SUBJECTIVE    Review of Systems   Constitutional: Negative for chills and fever  HENT: Negative for sore throat and trouble swallowing  Eyes: Negative for photophobia  Respiratory: Positive for cough  Negative for chest tightness and shortness of breath           SOB feels improved compared to previous day   Cardiovascular: Negative for chest pain and palpitations  Gastrointestinal: Negative for abdominal pain, diarrhea, nausea and vomiting  Genitourinary: Negative for difficulty urinating  Musculoskeletal: Negative for arthralgias and myalgias  Neurological: Negative for dizziness, light-headedness and headaches  Psychiatric/Behavioral: Negative for sleep disturbance  ---------------------------------------------------------------------------------------  OBJECTIVE    Physical Exam   Constitutional: He is oriented to person, place, and time  He appears well-developed and well-nourished  No distress  HENT:   Head: Normocephalic and atraumatic  Right Ear: External ear normal    Left Ear: External ear normal    Mouth/Throat: Oropharynx is clear and moist  No oropharyngeal exudate  Eyes: Pupils are equal, round, and reactive to light  Conjunctivae are normal  Right eye exhibits no discharge  Left eye exhibits no discharge  No scleral icterus  Neck: Normal range of motion  No tracheal deviation present  Cardiovascular: Normal rate, regular rhythm, normal heart sounds and intact distal pulses  Exam reveals no friction rub  No murmur heard  Pulmonary/Chest: Effort normal  No respiratory distress  He has wheezes  He has no rales  Inspiratory and expiratory wheezes, faint and scattered   Abdominal: Soft  Bowel sounds are normal  He exhibits no distension  There is no tenderness  Genitourinary:   Genitourinary Comments: Voiding spontaneously, clear yellow   Musculoskeletal: He exhibits edema  Stable chronic BLE edema  DUARTE spontaneously and w equal strength throughout   Neurological: He is alert and oriented to person, place, and time  Right eye extropia  Grossly non-focal   Skin: Skin is warm and dry  Capillary refill takes less than 2 seconds  He is not diaphoretic  No pallor  Right flank contusion   Psychiatric: He has a normal mood and affect   His behavior is normal    Developmental delay   Vitals reviewed  Vitals   Vitals:    20 0000 20 0049 20 0312 20 0400   BP: 133/55   114/81   BP Location:       Pulse: 88   84   Resp: 22   21   Temp: 98 5 °F (36 9 °C)   98 5 °F (36 9 °C)   TempSrc: Oral   Oral   SpO2: 94% 91% 91% 93%   Weight:       Height:         Temp (24hrs), Av 4 °F (36 9 °C), Min:97 9 °F (36 6 °C), Max:98 7 °F (37 1 °C)  Current: Temperature: 98 5 °F (36 9 °C)          SpO2: SpO2: 91 %, SpO2 Device: O2 Device: High flow nasal cannula  O2 Flow Rate (L/min): 50 L/min, 60%    Invasive/non-invasive ventilation settings   Respiratory    Lab Data (Last 4 hours)    None         O2/Vent Data (Last 4 hours)      400          Non-Invasive Ventilation Mode HFNC                   Height and Weights   Height: 5' 8" (172 7 cm)  IBW: 68 4 kg  Body mass index is 33 76 kg/m²  Weight (last 2 days)     None          Intake and Output  I/O        07 -  0700  07 -  0700    P  O  480 1200    I V  (mL/kg) 10 (0 1) 50 (0 5)    IV Piggyback 550 1050    Total Intake(mL/kg) 1040 (10 3) 2300 (22 8)    Urine (mL/kg/hr) 1950 (0 8) 2200 (0 9)    Stool  0    Total Output 1950 2200    Net -910 +100          Unmeasured Urine Occurrence 1 x     Unmeasured Stool Occurrence  2 x          Nutrition       Diet Orders   (From admission, onward)             Start     Ordered    20 1543  Diet Dysphagia/Modified Consistency; Dysphagia 3-Dental Soft; Thin Liquid  Diet effective now     Question Answer Comment   Diet Type Dysphagia/Modified Consistency    Dysphagia/Modified Consistency Dysphagia 3-Dental Soft    Liquid Modifier Thin Liquid    Special Instructions Chopped meats    RD to adjust diet per protocol?  Yes        20 1543    01/10/20 1224  Dietary nutrition supplements  Once     Question Answer Comment   Select Supplement: Ensure Clear Apple    Frequency Breakfast, Dinner        01/10/20 1223    01/10/20 1224  Dietary nutrition supplements  Once     Question Answer Comment   Select Supplement: Ensure Clear Benoit    Frequency Lunch        01/10/20 1223                Laboratory and Diagnostics:  Results from last 7 days   Lab Units 01/13/20  0435 01/12/20  0440 01/11/20  0505 01/10/20  0526   WBC Thousand/uL 4 69 4 96 5 59 8 00   HEMOGLOBIN g/dL 12 4 12 9 13 4 15 0   HEMATOCRIT % 38 1 39 6 41 0 45 4   PLATELETS Thousands/uL 137* 100* 110* 122*   NEUTROS PCT %  --   --  74 83*   MONOS PCT %  --   --  14* 11     Results from last 7 days   Lab Units 01/13/20  0435 01/12/20  0440 01/11/20  0505 01/10/20  0526   SODIUM mmol/L 139 137 136 132*   POTASSIUM mmol/L 4 0 3 9 3 7 3 6   CHLORIDE mmol/L 104 102 100 96*   CO2 mmol/L 29 29 30 28   ANION GAP mmol/L 6 6 6 8   BUN mg/dL 21 21 19 21   CREATININE mg/dL 1 02 1 20 1 35* 1 45*   CALCIUM mg/dL 7 5* 7 7* 7 5* 8 0*   GLUCOSE RANDOM mg/dL 110 102 101 97   ALT U/L  --  29  --  31   AST U/L  --  38  --  33   ALK PHOS U/L  --  150*  --  178*   ALBUMIN g/dL  --  2 8*  --  3 3*   TOTAL BILIRUBIN mg/dL  --  0 63  --  1 07*     Results from last 7 days   Lab Units 01/13/20  0435 01/12/20  0440 01/11/20  0505   MAGNESIUM mg/dL 2 1 2 1 1 6   PHOSPHORUS mg/dL  --   --  3 3      Results from last 7 days   Lab Units 01/10/20  0526   INR  1 24*   PTT seconds 34      Results from last 7 days   Lab Units 01/10/20  1340 01/10/20  1039 01/10/20  0529   TROPONIN I ng/mL 0 02 <0 02 <0 02     Results from last 7 days   Lab Units 01/10/20  2335 01/10/20  1125 01/10/20  0526   LACTIC ACID mmol/L 0 9 2 8* 2 2*     ABG:  Results from last 7 days   Lab Units 01/10/20  1045   PH ART  7 395   PCO2 ART mm Hg 38 8   PO2 ART mm Hg 35 9*   HCO3 ART mmol/L 23 2   BASE EXC ART mmol/L -1 4   ABG SOURCE  Radial, Right     VBG:  Results from last 7 days   Lab Units 01/10/20  1045   ABG SOURCE  Radial, Right     Results from last 7 days   Lab Units 01/12/20  1601 01/11/20  0505 01/10/20  0526   PROCALCITONIN ng/ml 2 74* 4 00* 2 84*       Micro  Results from last 7 days   Lab Units 01/10/20  1538 01/10/20  1340 01/10/20  1126 01/10/20  0536 01/10/20  0526   BLOOD CULTURE   --   --   --  No Growth at 48 hrs  No Growth at 48 hrs  SPUTUM CULTURE  2+ Growth of   Commensal respiratory leonides only; No significant growth of Staph aureus/MRSA or Pseudomonas aeruginosa  --   --   --   --    GRAM STAIN RESULT  Epithelial cells per low power field*  Rare Polys*  1+ Gram positive cocci in pairs*  --   --   --   --    MRSA CULTURE ONLY   --  Methicillin Resistant Staphylococcus aureus isolated*  Please note: This patient requires contact precautions    --   --   --    LEGIONELLA URINARY ANTIGEN   --   --  Negative  --   --    STREP PNEUMONIAE ANTIGEN, URINE   --   --  Negative  --   --        EKG: NSR, no ectopy  Imaging: No new imaging to review    Active Medications  Scheduled Meds:  Current Facility-Administered Medications:  acetaminophen 650 mg Oral Q6H PRN Verito Pill, CRNP    albuterol 2 5 mg Nebulization Q6H PRN Beryl Palm, CRNP    ascorbic acid 500 mg Oral Daily Verito Pill, CRNP    aspirin 81 mg Oral Daily Verito Pill, CRNP    calcium carbonate-vitamin D 1 tablet Oral Daily With Breakfast Verito Pill, CRNP    cefepime 2,000 mg Intravenous Q12H Verito Pill, CRNP Last Rate: 2,000 mg (01/12/20 1735)   clonazePAM 0 5 mg Oral HS Verito Pill, CRNP    dextran 70-hypromellose 1 drop Both Eyes 4x Daily Liya Haskins PA-C    guaiFENesin 600 mg Oral BID Verito Pill, CRNP    heparin (porcine) 5,000 Units Subcutaneous [facility] Verito Pill, CRNP    hydrALAZINE 5 mg Intravenous Q6H PRN Moises Her PA-C    ipratropium 0 5 mg Nebulization Q6H April Clemens MD    Labetalol HCl 10 mg Intravenous Q4H PRN [place] Dart Pistoria, WHITNEY    lamoTRIgine 200 mg Oral Early Morning Verito Pill, CRNP    lamoTRIgine 250 mg Oral Daily With Lunch Beryl Palm, CRNP    lamoTRIgine 300 mg Oral HS Verito Pill, CRNP levalbuterol 1 25 mg Nebulization Q6H Ragini Colby MD    levOCARNitine 330 mg Oral 4x Daily (PC & HS) NADEGE Nayak    multivitamin stress formula 1 tablet Oral Daily Glenys Mascorro MD    mupirocin  Nasal Q12H [facility] Liya Garza WHITNEY Zayas    ondansetron 4 mg Intravenous Q6H PRN NADEGE Nayak    primidone 100 mg Oral Daily With Lunch NADEGE Nayak    primidone 150 mg Oral After Breakfast NADEGE Nayak    primidone 150 mg Oral HS NADEGE Nayak    vancomycin 20 mg/kg Intravenous Q12H Ragini Colby MD Last Rate: 2,000 mg (01/12/20 2120)   vitamin E (tocopherol) 400 Units Oral Daily NADEGE Nayak    zinc gluconate 50 mg Oral Daily NADEGE Nayak    zonisamide 100 mg Oral HS NADEGE Nayak      Continuous Infusions:     PRN Meds:     acetaminophen 650 mg Q6H PRN   albuterol 2 5 mg Q6H PRN   hydrALAZINE 5 mg Q6H PRN   Labetalol HCl 10 mg Q4H PRN   ondansetron 4 mg Q6H PRN     ---------------------------------------------------------------------------------------  Counseling / Coordination of Care  Total time spent today 22 minutes  Greater than 50% of total time was spent with the patient and / or family counseling and / or coordination of care  A description of the counseling / coordination of care: NADEGE Lopez      Portions of the record may have been created with voice recognition software  Occasional wrong word or "sound a like" substitutions may have occurred due to the inherent limitations of voice recognition software    Read the chart carefully and recognize, using context, where substitutions have occurred

## 2020-01-13 LAB
ANION GAP SERPL CALCULATED.3IONS-SCNC: 6 MMOL/L (ref 4–13)
BUN SERPL-MCNC: 21 MG/DL (ref 5–25)
CALCIUM SERPL-MCNC: 7.5 MG/DL (ref 8.3–10.1)
CHLORIDE SERPL-SCNC: 104 MMOL/L (ref 100–108)
CO2 SERPL-SCNC: 29 MMOL/L (ref 21–32)
CREAT SERPL-MCNC: 1.02 MG/DL (ref 0.6–1.3)
ERYTHROCYTE [DISTWIDTH] IN BLOOD BY AUTOMATED COUNT: 14.4 % (ref 11.6–15.1)
GFR SERPL CREATININE-BSD FRML MDRD: 84 ML/MIN/1.73SQ M
GLUCOSE SERPL-MCNC: 110 MG/DL (ref 65–140)
HCT VFR BLD AUTO: 38.1 % (ref 36.5–49.3)
HGB BLD-MCNC: 12.4 G/DL (ref 12–17)
MAGNESIUM SERPL-MCNC: 2.1 MG/DL (ref 1.6–2.6)
MCH RBC QN AUTO: 32 PG (ref 26.8–34.3)
MCHC RBC AUTO-ENTMCNC: 32.5 G/DL (ref 31.4–37.4)
MCV RBC AUTO: 98 FL (ref 82–98)
PHENOBARB SERPL-MCNC: 12 UG/ML (ref 15–40)
PLATELET # BLD AUTO: 137 THOUSANDS/UL (ref 149–390)
PMV BLD AUTO: 11.7 FL (ref 8.9–12.7)
POTASSIUM SERPL-SCNC: 4 MMOL/L (ref 3.5–5.3)
PRIMIDONE SERPL-MCNC: 7.7 UG/ML (ref 5–12)
PROCALCITONIN SERPL-MCNC: 1.9 NG/ML
RBC # BLD AUTO: 3.87 MILLION/UL (ref 3.88–5.62)
SODIUM SERPL-SCNC: 139 MMOL/L (ref 136–145)
VANCOMYCIN TROUGH SERPL-MCNC: 20.4 UG/ML (ref 10–20)
WBC # BLD AUTO: 4.69 THOUSAND/UL (ref 4.31–10.16)
ZONISAMIDE SERPL-MCNC: 4.1 UG/ML (ref 10–40)

## 2020-01-13 PROCEDURE — 93970 EXTREMITY STUDY: CPT | Performed by: SURGERY

## 2020-01-13 PROCEDURE — 94760 N-INVAS EAR/PLS OXIMETRY 1: CPT

## 2020-01-13 PROCEDURE — 99233 SBSQ HOSP IP/OBS HIGH 50: CPT | Performed by: ANESTHESIOLOGY

## 2020-01-13 PROCEDURE — 80048 BASIC METABOLIC PNL TOTAL CA: CPT | Performed by: PHYSICIAN ASSISTANT

## 2020-01-13 PROCEDURE — 94640 AIRWAY INHALATION TREATMENT: CPT

## 2020-01-13 PROCEDURE — 84145 PROCALCITONIN (PCT): CPT | Performed by: NURSE PRACTITIONER

## 2020-01-13 PROCEDURE — 83735 ASSAY OF MAGNESIUM: CPT | Performed by: PHYSICIAN ASSISTANT

## 2020-01-13 PROCEDURE — 85027 COMPLETE CBC AUTOMATED: CPT | Performed by: PHYSICIAN ASSISTANT

## 2020-01-13 PROCEDURE — 80202 ASSAY OF VANCOMYCIN: CPT | Performed by: INTERNAL MEDICINE

## 2020-01-13 RX ADMIN — CEFEPIME HYDROCHLORIDE 2000 MG: 2 INJECTION, SOLUTION INTRAVENOUS at 06:14

## 2020-01-13 RX ADMIN — LEVOCARNITINE 330 MG: 330 TABLET ORAL at 22:00

## 2020-01-13 RX ADMIN — HEPARIN SODIUM 5000 UNITS: 5000 INJECTION INTRAVENOUS; SUBCUTANEOUS at 13:13

## 2020-01-13 RX ADMIN — VANCOMYCIN HYDROCHLORIDE 2000 MG: 5 INJECTION, POWDER, LYOPHILIZED, FOR SOLUTION INTRAVENOUS at 09:02

## 2020-01-13 RX ADMIN — Medication 1 TABLET: at 09:12

## 2020-01-13 RX ADMIN — LEVALBUTEROL HYDROCHLORIDE 1.25 MG: 1.25 SOLUTION, CONCENTRATE RESPIRATORY (INHALATION) at 13:42

## 2020-01-13 RX ADMIN — DEXTRAN 70, AND HYPROMELLOSE 2910 1 DROP: 1; 3 SOLUTION/ DROPS OPHTHALMIC at 22:00

## 2020-01-13 RX ADMIN — LEVALBUTEROL HYDROCHLORIDE 1.25 MG: 1.25 SOLUTION, CONCENTRATE RESPIRATORY (INHALATION) at 20:41

## 2020-01-13 RX ADMIN — PRIMIDONE 150 MG: 50 TABLET ORAL at 09:00

## 2020-01-13 RX ADMIN — ASPIRIN 81 MG 81 MG: 81 TABLET ORAL at 08:55

## 2020-01-13 RX ADMIN — IPRATROPIUM BROMIDE 0.5 MG: 0.5 SOLUTION RESPIRATORY (INHALATION) at 20:41

## 2020-01-13 RX ADMIN — DEXTRAN 70, AND HYPROMELLOSE 2910 1 DROP: 1; 3 SOLUTION/ DROPS OPHTHALMIC at 09:01

## 2020-01-13 RX ADMIN — PRIMIDONE 100 MG: 50 TABLET ORAL at 13:10

## 2020-01-13 RX ADMIN — GUAIFENESIN 600 MG: 600 TABLET ORAL at 17:31

## 2020-01-13 RX ADMIN — LEVOCARNITINE 330 MG: 330 TABLET ORAL at 13:11

## 2020-01-13 RX ADMIN — ZONISAMIDE 100 MG: 100 CAPSULE ORAL at 22:00

## 2020-01-13 RX ADMIN — DEXTRAN 70, AND HYPROMELLOSE 2910 1 DROP: 1; 3 SOLUTION/ DROPS OPHTHALMIC at 13:10

## 2020-01-13 RX ADMIN — IPRATROPIUM BROMIDE 0.5 MG: 0.5 SOLUTION RESPIRATORY (INHALATION) at 08:11

## 2020-01-13 RX ADMIN — MUPIROCIN: 20 OINTMENT TOPICAL at 20:16

## 2020-01-13 RX ADMIN — IPRATROPIUM BROMIDE 0.5 MG: 0.5 SOLUTION RESPIRATORY (INHALATION) at 13:41

## 2020-01-13 RX ADMIN — LEVOCARNITINE 330 MG: 330 TABLET ORAL at 09:19

## 2020-01-13 RX ADMIN — LAMOTRIGINE 200 MG: 100 TABLET ORAL at 06:14

## 2020-01-13 RX ADMIN — LAMOTRIGINE 250 MG: 100 TABLET ORAL at 14:37

## 2020-01-13 RX ADMIN — GUAIFENESIN 600 MG: 600 TABLET ORAL at 08:55

## 2020-01-13 RX ADMIN — CLONAZEPAM 0.5 MG: 0.5 TABLET ORAL at 22:00

## 2020-01-13 RX ADMIN — Medication 400 UNITS: at 08:59

## 2020-01-13 RX ADMIN — ZINC 1 TABLET: TAB ORAL at 09:00

## 2020-01-13 RX ADMIN — IPRATROPIUM BROMIDE 0.5 MG: 0.5 SOLUTION RESPIRATORY (INHALATION) at 03:11

## 2020-01-13 RX ADMIN — LEVOCARNITINE 330 MG: 330 TABLET ORAL at 16:46

## 2020-01-13 RX ADMIN — PRIMIDONE 150 MG: 50 TABLET ORAL at 22:00

## 2020-01-13 RX ADMIN — LEVALBUTEROL HYDROCHLORIDE 1.25 MG: 1.25 SOLUTION, CONCENTRATE RESPIRATORY (INHALATION) at 03:11

## 2020-01-13 RX ADMIN — HEPARIN SODIUM 5000 UNITS: 5000 INJECTION INTRAVENOUS; SUBCUTANEOUS at 06:14

## 2020-01-13 RX ADMIN — OXYCODONE HYDROCHLORIDE AND ACETAMINOPHEN 500 MG: 500 TABLET ORAL at 08:55

## 2020-01-13 RX ADMIN — Medication 50 MG: at 08:59

## 2020-01-13 RX ADMIN — DEXTRAN 70, AND HYPROMELLOSE 2910 1 DROP: 1; 3 SOLUTION/ DROPS OPHTHALMIC at 17:31

## 2020-01-13 RX ADMIN — LEVALBUTEROL HYDROCHLORIDE 1.25 MG: 1.25 SOLUTION, CONCENTRATE RESPIRATORY (INHALATION) at 08:11

## 2020-01-13 RX ADMIN — MUPIROCIN: 20 OINTMENT TOPICAL at 08:58

## 2020-01-13 NOTE — ASSESSMENT & PLAN NOTE
· Likely related to PNA and RSV  · Elevated D-dimer  BLE duplex negative for DVT  · ABX: vancomycin IV day 4  · Flagyl discontinued 1/12  · Cefepime discontinued 1/13  · Bactroban BID for 5 days for MRSA + in nares     · HFNC, wean to nasal cannula as able  · Xopenex/ Atrovent q6 hours, PRN albuterol  · Respiratory protocol  · Mucinex PO PRN  · OOB to chair

## 2020-01-13 NOTE — PROGRESS NOTES
Chart reviewed aware of medical management  Admit with RSV bronchiolitis  On high flow- Vanco IV, Apresoline IV, nebs,      CM called patient's mother to obtain baseline information as patient was asleep and on high flow 02  Baseline information was obtained  CM discussed the role of CM in helping the patient develop a discharge plan and assist the patient in carry out their plan  01/13/20 [facility]   Patient Information   Mental Status Sedated   Primary Caregiver Family   Support System Immediate family   Activities of Daily Living Prior to Admission   Functional Status Independent   Assistive Device No device needed   Living Arrangement House;Lives with someone   Income Information   Income Source SSI/SSD   Quill of Transport to Appts: Family     No advance directive  No POA  Per patients Mother she is the decision maker and is able to assist upon dc  Primary  is patient's Mother  PCP: Dr Peggy Miner                 Pt has a prescription plan and uses [employer]  Medications are affordable  Patient has CP and does attend work at " [employer]" Monday to Friday  Patient is transported by a [employer]  Patient resides with his Mother and brother  House set up: 2-3 steps to enter the house with rails, patient's bedroom is on the 2nd floor with 12 steps up and rails  Functional level PTA: Able to manage self care, is involve in [employer] group, very artistic, involved in Art group  DME use:  Has a nebulizer  Prior use of Home Care or STR: No  Transportation:  No identified issues upon dc, as Mother can transport  Community Resources: [employer]    The Goal for Jessa Pathak is to go home with his family  CM discussed home care services, patient will not be eligible as his Mother knows he will want to go back to work and will not be home bound      Mrs José Luis Gutiérrez express concerns with [employer] nebulizer-- as she had an issue with the tubing when they went to use it-- she is unsure how old it is  She is going to try to find out where it came from/ the age to see if it needs to be service  She thinks it is less than 5 years  CM reviewed d/c planning process including the following: identifying help at home, patient preference for d/c planning needs, availability of treatment team to discuss questions or concerns patient and/or family may have regarding understanding medications and recognizing signs and symptoms once discharged  CM also encouraged patient to follow up with all recommended appointments after discharge  Patient advised of importance for patient and family to participate in managing patients medical well being      CM will continue to follow    Home with Mother

## 2020-01-13 NOTE — PROGRESS NOTES
Vancomycin Assessment    Catalina Copeland is a 48 y o  male who is currently receiving vancomycin 2000 mg IV every 12 hours for Pneumonia     Relevant clinical data and objective history reviewed:  Creatinine   Date Value Ref Range Status   01/13/2020 1 02 0 60 - 1 30 mg/dL Final     Comment:     Standardized to IDMS reference method   01/12/2020 1 20 0 60 - 1 30 mg/dL Final     Comment:     Standardized to IDMS reference method   01/11/2020 1 35 (H) 0 60 - 1 30 mg/dL Final     Comment:     Standardized to IDMS reference method     /97 (BP Location: Left arm)   Pulse 90   Temp 98 3 °F (36 8 °C) (Oral)   Resp 20   Ht 5' 8" (1 727 m)   Wt 101 kg (222 lb 0 1 oz)   SpO2 96%   BMI 33 76 kg/m²   I/O last 3 completed shifts: In: 6045 [P O :1200; I V :50; IV Piggyback:1200]  Out: 2800 [Urine:2800]  Lab Results   Component Value Date/Time    BUN 21 01/13/2020 04:35 AM    WBC 4 69 01/13/2020 04:35 AM    HGB 12 4 01/13/2020 04:35 AM    HCT 38 1 01/13/2020 04:35 AM    MCV 98 01/13/2020 04:35 AM     (L) 01/13/2020 04:35 AM     Temp Readings from Last 3 Encounters:   01/13/20 98 3 °F (36 8 °C) (Oral)   01/07/20 (!) 101 6 °F (38 7 °C) (Oral)   12/22/19 98 9 °F (37 2 °C) (Oral)     Vancomycin Days of Therapy: 4    Trough: 20 4    Assessment/Plan  The patient is currently on vancomycin utilizing scheduled dosing  The patient is receiving 2000 mg IV every 12 hours with the most recent vancomycin level being at steady-state and supratherapeutic based on a goal of 15-20 (appropriate for most indications) ; therefore, after clinical evaluation will be changed to 1750 mg IV every 12 hours   Pharmacy will continue to follow closely for s/sx of nephrotoxicity, infusion reactions and appropriateness of therapy  BMP and CBC will be ordered per protocol  Plan for trough as patient approaches steady state, prior to the 4th  dose at approximately 1030 at 1/15/20   Pharmacy will continue to follow the patients culture results and clinical progress daily      Rosa Lacey, Pharmacist

## 2020-01-13 NOTE — CASE MANAGEMENT
ETHAN identified Nebulizer is 8 yrs old  CM was called to bedside to talk to Mother and she found out the same information  Aware patient will need a new nebulizer prescription for dc  Informed patient's Mother the relationship with [employer] for the needed nebulizer, it is her choice for me to use the DME company-    Requested prescription for Nebulizer for dc

## 2020-01-13 NOTE — SPEECH THERAPY NOTE
Speech Language/Pathology Bedside Dysphagia Treatment    Recommended diet: level 3 soft with thin liquids  Recommended strategies: small bites sips, slow rate, 90 degree positioning     Conversed with pt's mother regarding intake and tolerance  Reported appetite + with absent episodes of significant difficulty or s/s of aspiration  Reviewed compensatory strategies that included: slow rate, small bites/sips, and upright positioning  Agreement, usage, and understanding verbalized  Pt was upright in chair for long duration of day and consumed dinner in chair (encouraged if able to tolerate)  Absent po trials due to fatigue  Nsg agreeable to tolerance of current diet  Continue current and dysphagia tx additional 1 session to trial regular for advancement if appropriate       [address] Clark MS CCC-SLP

## 2020-01-13 NOTE — ASSESSMENT & PLAN NOTE
· Pneumonia found on CT scan 1/7, after a fall  · CT AP 1/7: Left upper lobe alveolar opacities likely representing pneumonia  · PCXR 1/10:  Left upper lobe opacity  · Was treated outpatient with oral Zithromax   · Failure to improve, cefepime and vanc Day #3  · Check procalcitonin- 2 84 -> 4 0 -> 2 74 -> 1 9  · Legionella and strep pneumoniae- negative  · Blood cultures NTD  · Respiratory and oxygen protocol  · Speech eval 1/11 and no signs of aspiration-->  D/C'd flagyl on 1/12  · MRSA swab +; bactroban started 1/12  · Continue HFNC, wean as able  · On 1/13-Discontinue cefepime IV   Continue Vancomycin IV (Day4)  · Follow up on pending culture susceptibility reports

## 2020-01-13 NOTE — ASSESSMENT & PLAN NOTE
· Platelets appear to have had low platelets since December (143,000)  · Monitor for signs of bleeding  · Daily CBC and trend platelets

## 2020-01-13 NOTE — ASSESSMENT & PLAN NOTE
· Supportive Care  · Droplet precautions  · High flow nasal cannula O2 for comfort- wean as tolerated  · Was weaned yesterday to nasal cannula, overnight placed back on high-flow due to desats to 85%

## 2020-01-13 NOTE — PLAN OF CARE
Problem: Potential for Falls  Goal: Patient will remain free of falls  Description  INTERVENTIONS:  - Assess patient frequently for physical needs  -  Identify cognitive and physical deficits and behaviors that affect risk of falls  -  [place] fall precautions as indicated by assessment   - Educate patient/family on patient safety including physical limitations  - Instruct patient to call for assistance with activity based on assessment  - Modify environment to reduce risk of injury  - Consider OT/PT consult to assist with strengthening/mobility  Outcome: Progressing     Problem: Prexisting or High Potential for Compromised Skin Integrity  Goal: Skin integrity is maintained or improved  Description  INTERVENTIONS:  - Identify patients at risk for skin breakdown  - Assess and monitor skin integrity  - Assess and monitor nutrition and hydration status  - Monitor labs   - Assess for incontinence   - Turn and reposition patient  - Assist with mobility/ambulation  - Relieve pressure over bony prominences  - Avoid friction and shearing  - Provide appropriate hygiene as needed including keeping skin clean and dry  - Evaluate need for skin moisturizer/barrier cream  - Collaborate with interdisciplinary team   - Patient/family teaching  - Consider wound care consult   Outcome: Progressing     Problem: Nutrition/Hydration-ADULT  Goal: Nutrient/Hydration intake appropriate for improving, restoring or maintaining nutritional needs  Description  Monitor and assess patient's nutrition/hydration status for malnutrition  Collaborate with interdisciplinary team and initiate plan and interventions as ordered  Monitor patient's weight and dietary intake as ordered or per policy  Utilize nutrition screening tool and intervene as necessary  Determine patient's food preferences and provide high-protein, high-caloric foods as appropriate       INTERVENTIONS:  - Monitor oral intake, urinary output, labs, and treatment plans  - Assess nutrition and hydration status and recommend course of action  - Evaluate amount of meals eaten  - Assist patient with eating if necessary   - Allow adequate time for meals  - Recommend/ encourage appropriate diets, oral nutritional supplements, and vitamin/mineral supplements  - Order, calculate, and assess calorie counts as needed  - Recommend, monitor, and adjust tube feedings and TPN/PPN based on assessed needs  - Assess need for intravenous fluids  - Provide specific nutrition/hydration education as appropriate  - Include patient/family/caregiver in decisions related to nutrition  Outcome: Progressing     Problem: PAIN - ADULT  Goal: Verbalizes/displays adequate comfort level or baseline comfort level  Description  Interventions:  - Encourage patient to monitor pain and request assistance  - Assess pain using appropriate pain scale  - Administer analgesics based on type and severity of pain and evaluate response  - Implement non-pharmacological measures as appropriate and evaluate response  - Consider cultural and social influences on pain and pain management  - Notify physician/advanced practitioner if interventions unsuccessful or patient reports new pain  Outcome: Progressing     Problem: INFECTION - ADULT  Goal: Absence or prevention of progression during hospitalization  Description  INTERVENTIONS:  - Assess and monitor for signs and symptoms of infection  - Monitor lab/diagnostic results  - Monitor all insertion sites, i e  indwelling lines, tubes, and drains  - Monitor endotracheal if appropriate and nasal secretions for changes in amount and color  - [place] appropriate cooling/warming therapies per order  - Administer medications as ordered  - Instruct and encourage patient and family to use good hand hygiene technique  - Identify and instruct in appropriate isolation precautions for identified infection/condition  Outcome: Progressing  Goal: Absence of fever/infection during neutropenic period  Description  INTERVENTIONS:  - Monitor WBC    Outcome: Progressing     Problem: SAFETY ADULT  Goal: Patient will remain free of falls  Description  INTERVENTIONS:  - Assess patient frequently for physical needs  -  Identify cognitive and physical deficits and behaviors that affect risk of falls    -  [place] fall precautions as indicated by assessment   - Educate patient/family on patient safety including physical limitations  - Instruct patient to call for assistance with activity based on assessment  - Modify environment to reduce risk of injury  - Consider OT/PT consult to assist with strengthening/mobility  Outcome: Progressing  Goal: Maintain or return to baseline ADL function  Description  INTERVENTIONS:  -  Assess patient's ability to carry out ADLs; assess patient's baseline for ADL function and identify physical deficits which impact ability to perform ADLs (bathing, care of mouth/teeth, toileting, grooming, dressing, etc )  - Assess/evaluate cause of self-care deficits   - Assess range of motion  - Assess patient's mobility; develop plan if impaired  - Assess patient's need for assistive devices and provide as appropriate  - Encourage maximum independence but intervene and supervise when necessary  - Involve family in performance of ADLs  - Assess for home care needs following discharge   - Consider OT consult to assist with ADL evaluation and planning for discharge  - Provide patient education as appropriate  Outcome: Progressing  Goal: Maintain or return mobility status to optimal level  Description  INTERVENTIONS:  - Assess patient's baseline mobility status (ambulation, transfers, stairs, etc )    - Identify cognitive and physical deficits and behaviors that affect mobility  - Identify mobility aids required to assist with transfers and/or ambulation (gait belt, sit-to-stand, lift, walker, cane, etc )  - [place] fall precautions as indicated by assessment  - Record patient progress and toleration of activity level on Mobility SBAR; progress patient to next Phase/Stage  - Instruct patient to call for assistance with activity based on assessment  - Consider rehabilitation consult to assist with strengthening/weightbearing, etc   Outcome: Progressing     Problem: DISCHARGE PLANNING  Goal: Discharge to home or other facility with appropriate resources  Description  INTERVENTIONS:  - Identify barriers to discharge w/patient and caregiver  - Arrange for needed discharge resources and transportation as appropriate  - Identify discharge learning needs (meds, wound care, etc )  - Arrange for interpretive services to assist at discharge as needed  - Refer to Case Management Department for coordinating discharge planning if the patient needs post-hospital services based on physician/advanced practitioner order or complex needs related to functional status, cognitive ability, or social support system  Outcome: Progressing     Problem: Knowledge Deficit  Goal: Patient/family/caregiver demonstrates understanding of disease process, treatment plan, medications, and discharge instructions  Description  Complete learning assessment and assess knowledge base    Interventions:  - Provide teaching at level of understanding  - Provide teaching via preferred learning methods  Outcome: Progressing

## 2020-01-14 LAB
ANION GAP SERPL CALCULATED.3IONS-SCNC: 2 MMOL/L (ref 4–13)
BUN SERPL-MCNC: 19 MG/DL (ref 5–25)
CALCIUM SERPL-MCNC: 7.8 MG/DL (ref 8.3–10.1)
CHLORIDE SERPL-SCNC: 105 MMOL/L (ref 100–108)
CO2 SERPL-SCNC: 32 MMOL/L (ref 21–32)
CREAT SERPL-MCNC: 0.97 MG/DL (ref 0.6–1.3)
ERYTHROCYTE [DISTWIDTH] IN BLOOD BY AUTOMATED COUNT: 14.3 % (ref 11.6–15.1)
GFR SERPL CREATININE-BSD FRML MDRD: 89 ML/MIN/1.73SQ M
GLUCOSE SERPL-MCNC: 117 MG/DL (ref 65–140)
HCT VFR BLD AUTO: 38.8 % (ref 36.5–49.3)
HGB BLD-MCNC: 12.2 G/DL (ref 12–17)
LAMOTRIGINE SERPL-MCNC: 11.5 UG/ML (ref 2–20)
MCH RBC QN AUTO: 31.4 PG (ref 26.8–34.3)
MCHC RBC AUTO-ENTMCNC: 31.4 G/DL (ref 31.4–37.4)
MCV RBC AUTO: 100 FL (ref 82–98)
PLATELET # BLD AUTO: 148 THOUSANDS/UL (ref 149–390)
PMV BLD AUTO: 11.5 FL (ref 8.9–12.7)
POTASSIUM SERPL-SCNC: 4.4 MMOL/L (ref 3.5–5.3)
PROCALCITONIN SERPL-MCNC: 1.15 NG/ML
RBC # BLD AUTO: 3.89 MILLION/UL (ref 3.88–5.62)
SODIUM SERPL-SCNC: 139 MMOL/L (ref 136–145)
WBC # BLD AUTO: 5.38 THOUSAND/UL (ref 4.31–10.16)

## 2020-01-14 PROCEDURE — 85027 COMPLETE CBC AUTOMATED: CPT | Performed by: NURSE PRACTITIONER

## 2020-01-14 PROCEDURE — 94660 CPAP INITIATION&MGMT: CPT

## 2020-01-14 PROCEDURE — 94760 N-INVAS EAR/PLS OXIMETRY 1: CPT

## 2020-01-14 PROCEDURE — 84145 PROCALCITONIN (PCT): CPT | Performed by: NURSE PRACTITIONER

## 2020-01-14 PROCEDURE — 94640 AIRWAY INHALATION TREATMENT: CPT

## 2020-01-14 PROCEDURE — 99233 SBSQ HOSP IP/OBS HIGH 50: CPT | Performed by: ANESTHESIOLOGY

## 2020-01-14 PROCEDURE — 80048 BASIC METABOLIC PNL TOTAL CA: CPT | Performed by: NURSE PRACTITIONER

## 2020-01-14 RX ADMIN — DEXTRAN 70, AND HYPROMELLOSE 2910 1 DROP: 1; 3 SOLUTION/ DROPS OPHTHALMIC at 21:10

## 2020-01-14 RX ADMIN — ASPIRIN 81 MG 81 MG: 81 TABLET ORAL at 08:56

## 2020-01-14 RX ADMIN — GUAIFENESIN 600 MG: 600 TABLET ORAL at 08:56

## 2020-01-14 RX ADMIN — VANCOMYCIN HYDROCHLORIDE 1750 MG: 5 INJECTION, POWDER, LYOPHILIZED, FOR SOLUTION INTRAVENOUS at 03:05

## 2020-01-14 RX ADMIN — DEXTRAN 70, AND HYPROMELLOSE 2910 1 DROP: 1; 3 SOLUTION/ DROPS OPHTHALMIC at 18:07

## 2020-01-14 RX ADMIN — HEPARIN SODIUM 5000 UNITS: 5000 INJECTION INTRAVENOUS; SUBCUTANEOUS at 13:37

## 2020-01-14 RX ADMIN — ZONISAMIDE 100 MG: 100 CAPSULE ORAL at 21:10

## 2020-01-14 RX ADMIN — LAMOTRIGINE 200 MG: 100 TABLET ORAL at 06:25

## 2020-01-14 RX ADMIN — PRIMIDONE 100 MG: 50 TABLET ORAL at 12:16

## 2020-01-14 RX ADMIN — LEVOCARNITINE 330 MG: 330 TABLET ORAL at 08:57

## 2020-01-14 RX ADMIN — IPRATROPIUM BROMIDE 0.5 MG: 0.5 SOLUTION RESPIRATORY (INHALATION) at 09:02

## 2020-01-14 RX ADMIN — HEPARIN SODIUM 5000 UNITS: 5000 INJECTION INTRAVENOUS; SUBCUTANEOUS at 21:10

## 2020-01-14 RX ADMIN — CLONAZEPAM 0.5 MG: 0.5 TABLET ORAL at 21:10

## 2020-01-14 RX ADMIN — LAMOTRIGINE 300 MG: 100 TABLET ORAL at 21:10

## 2020-01-14 RX ADMIN — LEVOCARNITINE 330 MG: 330 TABLET ORAL at 18:07

## 2020-01-14 RX ADMIN — GUAIFENESIN 600 MG: 600 TABLET ORAL at 18:07

## 2020-01-14 RX ADMIN — Medication 50 MG: at 08:59

## 2020-01-14 RX ADMIN — PRIMIDONE 150 MG: 50 TABLET ORAL at 08:58

## 2020-01-14 RX ADMIN — MUPIROCIN: 20 OINTMENT TOPICAL at 08:58

## 2020-01-14 RX ADMIN — IPRATROPIUM BROMIDE 0.5 MG: 0.5 SOLUTION RESPIRATORY (INHALATION) at 13:59

## 2020-01-14 RX ADMIN — IPRATROPIUM BROMIDE 0.5 MG: 0.5 SOLUTION RESPIRATORY (INHALATION) at 01:59

## 2020-01-14 RX ADMIN — LEVOCARNITINE 330 MG: 330 TABLET ORAL at 12:15

## 2020-01-14 RX ADMIN — LAMOTRIGINE 300 MG: 100 TABLET ORAL at 03:11

## 2020-01-14 RX ADMIN — LEVALBUTEROL HYDROCHLORIDE 1.25 MG: 1.25 SOLUTION, CONCENTRATE RESPIRATORY (INHALATION) at 09:01

## 2020-01-14 RX ADMIN — LEVOCARNITINE 330 MG: 330 TABLET ORAL at 21:12

## 2020-01-14 RX ADMIN — IPRATROPIUM BROMIDE 0.5 MG: 0.5 SOLUTION RESPIRATORY (INHALATION) at 19:37

## 2020-01-14 RX ADMIN — LEVALBUTEROL HYDROCHLORIDE 1.25 MG: 1.25 SOLUTION, CONCENTRATE RESPIRATORY (INHALATION) at 13:59

## 2020-01-14 RX ADMIN — LEVALBUTEROL HYDROCHLORIDE 1.25 MG: 1.25 SOLUTION, CONCENTRATE RESPIRATORY (INHALATION) at 19:37

## 2020-01-14 RX ADMIN — HEPARIN SODIUM 5000 UNITS: 5000 INJECTION INTRAVENOUS; SUBCUTANEOUS at 06:28

## 2020-01-14 RX ADMIN — ZINC 1 TABLET: TAB ORAL at 08:57

## 2020-01-14 RX ADMIN — LAMOTRIGINE 250 MG: 100 TABLET ORAL at 12:15

## 2020-01-14 RX ADMIN — HEPARIN SODIUM 5000 UNITS: 5000 INJECTION INTRAVENOUS; SUBCUTANEOUS at 03:07

## 2020-01-14 RX ADMIN — Medication 1 TABLET: at 08:56

## 2020-01-14 RX ADMIN — DEXTRAN 70, AND HYPROMELLOSE 2910 1 DROP: 1; 3 SOLUTION/ DROPS OPHTHALMIC at 08:57

## 2020-01-14 RX ADMIN — Medication 400 UNITS: at 08:59

## 2020-01-14 RX ADMIN — PRIMIDONE 150 MG: 50 TABLET ORAL at 21:10

## 2020-01-14 RX ADMIN — OXYCODONE HYDROCHLORIDE AND ACETAMINOPHEN 500 MG: 500 TABLET ORAL at 08:56

## 2020-01-14 RX ADMIN — DEXTRAN 70, AND HYPROMELLOSE 2910 1 DROP: 1; 3 SOLUTION/ DROPS OPHTHALMIC at 12:15

## 2020-01-14 RX ADMIN — LEVALBUTEROL HYDROCHLORIDE 1.25 MG: 1.25 SOLUTION, CONCENTRATE RESPIRATORY (INHALATION) at 01:59

## 2020-01-14 NOTE — CONSULTS
The patient's Vancomycin therapy has been discontinued/completed  Thank you for this consult; Pharmacy will sign-off now

## 2020-01-14 NOTE — PROGRESS NOTES
Vancomycin IV Pharmacy-to-Dose Consultation    Jovanny Latif is a 48 y o  male who is currently receiving Vancomycin IV with management by the Pharmacy Consult service  Assessment/Plan:  The patient was reviewed  Renal function is stable and no signs or symptoms of nephrotoxicity and/or infusion reactions were documented in the chart  Based on todays assessment, continue current vancomycin (day # 5) dosing of 1750 mg iv q 12 hrs, with a plan for trough to be drawn at 1430 on 01/15/20  We will continue to follow the patients culture results and clinical progress daily      Ashlee Branch, Pharmacist

## 2020-01-14 NOTE — RESPIRATORY THERAPY NOTE
Paged to patient's room by nurse per CRNP  Patient's SpO2 decreasing into low 80's  Head of bed raised  FiO2 and liter flow increased    CRNP aware

## 2020-01-14 NOTE — ASSESSMENT & PLAN NOTE
· Likely related to PNA and RSV  · Elevated D-dimer  BLE duplex negative for DVT  · ABX  · Completed 5 days of vancomycin IV on 1/13  · Flagyl discontinued 1/12  · Cefepime discontinued 1/13  · HFNC, wean to nasal cannula as able  · Weaned from HFNC on 1/13, had episodes of apnea and desaturations during the night and was placed back on HFNC on 1/14  Again weaned on 1/14 to nasal cannula  Will order CPAP HS for suspected sleep apnea  Patient will likely need outpatient follow up for sleep study  · Xopenex/ Atrovent q6 hours, PRN albuterol  · Respiratory protocol  · Mucinex PO PRN  · OOB to chair  · Trial of CPAP overnight 1/14/2020  Tolerated well and maintained O2 saturations >90%

## 2020-01-14 NOTE — PLAN OF CARE
Problem: Potential for Falls  Goal: Patient will remain free of falls  Description  INTERVENTIONS:  - Assess patient frequently for physical needs  -  Identify cognitive and physical deficits and behaviors that affect risk of falls  -  [place] fall precautions as indicated by assessment   - Educate patient/family on patient safety including physical limitations  - Instruct patient to call for assistance with activity based on assessment  - Modify environment to reduce risk of injury  - Consider OT/PT consult to assist with strengthening/mobility  Outcome: Progressing     Problem: Prexisting or High Potential for Compromised Skin Integrity  Goal: Skin integrity is maintained or improved  Description  INTERVENTIONS:  - Identify patients at risk for skin breakdown  - Assess and monitor skin integrity  - Assess and monitor nutrition and hydration status  - Monitor labs   - Assess for incontinence   - Turn and reposition patient  - Assist with mobility/ambulation  - Relieve pressure over bony prominences  - Avoid friction and shearing  - Provide appropriate hygiene as needed including keeping skin clean and dry  - Evaluate need for skin moisturizer/barrier cream  - Collaborate with interdisciplinary team   - Patient/family teaching  - Consider wound care consult   Outcome: Progressing     Problem: Nutrition/Hydration-ADULT  Goal: Nutrient/Hydration intake appropriate for improving, restoring or maintaining nutritional needs  Description  Monitor and assess patient's nutrition/hydration status for malnutrition  Collaborate with interdisciplinary team and initiate plan and interventions as ordered  Monitor patient's weight and dietary intake as ordered or per policy  Utilize nutrition screening tool and intervene as necessary  Determine patient's food preferences and provide high-protein, high-caloric foods as appropriate       INTERVENTIONS:  - Monitor oral intake, urinary output, labs, and treatment plans  - Assess nutrition and hydration status and recommend course of action  - Evaluate amount of meals eaten  - Assist patient with eating if necessary   - Allow adequate time for meals  - Recommend/ encourage appropriate diets, oral nutritional supplements, and vitamin/mineral supplements  - Order, calculate, and assess calorie counts as needed  - Recommend, monitor, and adjust tube feedings and TPN/PPN based on assessed needs  - Assess need for intravenous fluids  - Provide specific nutrition/hydration education as appropriate  - Include patient/family/caregiver in decisions related to nutrition  Outcome: Progressing     Problem: PAIN - ADULT  Goal: Verbalizes/displays adequate comfort level or baseline comfort level  Description  Interventions:  - Encourage patient to monitor pain and request assistance  - Assess pain using appropriate pain scale  - Administer analgesics based on type and severity of pain and evaluate response  - Implement non-pharmacological measures as appropriate and evaluate response  - Consider cultural and social influences on pain and pain management  - Notify physician/advanced practitioner if interventions unsuccessful or patient reports new pain  Outcome: Progressing     Problem: INFECTION - ADULT  Goal: Absence or prevention of progression during hospitalization  Description  INTERVENTIONS:  - Assess and monitor for signs and symptoms of infection  - Monitor lab/diagnostic results  - Monitor all insertion sites, i e  indwelling lines, tubes, and drains  - Monitor endotracheal if appropriate and nasal secretions for changes in amount and color  - [place] appropriate cooling/warming therapies per order  - Administer medications as ordered  - Instruct and encourage patient and family to use good hand hygiene technique  - Identify and instruct in appropriate isolation precautions for identified infection/condition  Outcome: Progressing  Goal: Absence of fever/infection during neutropenic period  Description  INTERVENTIONS:  - Monitor WBC    Outcome: Progressing     Problem: SAFETY ADULT  Goal: Patient will remain free of falls  Description  INTERVENTIONS:  - Assess patient frequently for physical needs  -  Identify cognitive and physical deficits and behaviors that affect risk of falls  -  [place] fall precautions as indicated by assessment   - Educate patient/family on patient safety including physical limitations  - Instruct patient to call for assistance with activity based on assessment  - Modify environment to reduce risk of injury  - Consider OT/PT consult to assist with strengthening/mobility  Outcome: Progressing  Goal: Maintain or return to baseline ADL function  Description  INTERVENTIONS:  - Assess patient frequently for physical needs  -  Identify cognitive and physical deficits and behaviors that affect risk of falls    -  [place] fall precautions as indicated by assessment   - Educate patient/family on patient safety including physical limitations  - Instruct patient to call for assistance with activity based on assessment  - Modify environment to reduce risk of injury  - Consider OT/PT consult to assist with strengthening/mobility  Outcome: Progressing  Goal: Maintain or return mobility status to optimal level  Description  INTERVENTIONS:  -  Assess patient's ability to carry out ADLs; assess patient's baseline for ADL function and identify physical deficits which impact ability to perform ADLs (bathing, care of mouth/teeth, toileting, grooming, dressing, etc )  - Assess/evaluate cause of self-care deficits   - Assess range of motion  - Assess patient's mobility; develop plan if impaired  - Assess patient's need for assistive devices and provide as appropriate  - Encourage maximum independence but intervene and supervise when necessary  - Involve family in performance of ADLs  - Assess for home care needs following discharge   - Consider OT consult to assist with ADL evaluation and planning for discharge  - Provide patient education as appropriate  Outcome: Progressing     Problem: DISCHARGE PLANNING  Goal: Discharge to home or other facility with appropriate resources  Description  INTERVENTIONS:  - Identify barriers to discharge w/patient and caregiver  - Arrange for needed discharge resources and transportation as appropriate  - Identify discharge learning needs (meds, wound care, etc )  - Arrange for interpretive services to assist at discharge as needed  - Refer to Case Management Department for coordinating discharge planning if the patient needs post-hospital services based on physician/advanced practitioner order or complex needs related to functional status, cognitive ability, or social support system  Outcome: Progressing     Problem: Knowledge Deficit  Goal: Patient/family/caregiver demonstrates understanding of disease process, treatment plan, medications, and discharge instructions  Description  Complete learning assessment and assess knowledge base    Interventions:  - Provide teaching at level of understanding  - Provide teaching via preferred learning methods  Outcome: Progressing

## 2020-01-14 NOTE — RESPIRATORY THERAPY NOTE
Patient placed on HFNC during hours of sleep per verbal order from [address]  Patient presents with deep pitched wheeze and loud snore    Goal is to keep SpO2 > 90%

## 2020-01-14 NOTE — ASSESSMENT & PLAN NOTE
· Pneumonia found on CT scan 1/7, after a fall  · CT AP 1/7: Left upper lobe alveolar opacities likely representing pneumonia  · PCXR 1/10:  Left upper lobe opacity  · Was treated outpatient with oral Zithromax   · Failure to improve, cefepime and vanc Day #3  · Check procalcitonin- 2 84 -> 4 0 -> 2 74 -> 1 9 -> 1 15  · Legionella and strep pneumoniae- negative  · Blood cultures NTD  · Respiratory and oxygen protocol  · Speech eval 1/11 and no signs of aspiration-->  D/C'd flagyl on 1/12  · MRSA swab +  · Tilmon [place] started 1/12, discontinued per policy 4/09  · Contact isolation  · Continue HFNC, wean as able  · Completed 5 days of vancomycin IV on 1/13  Cefepime discontinue 1/13

## 2020-01-14 NOTE — PROGRESS NOTES
Progress Note - Sanya Quesada 1965, 48 y o  male MRN: [id number]    Unit/Bed#: -01 Encounter: [id number]    Primary Care Provider: Debora Frankel, DO   Date and time admitted to hospital: 1/10/2020  5:20 AM        * RSV (acute bronchiolitis due to respiratory syncytial virus)  Assessment & Plan  · Supportive Care  · Droplet precautions  · High flow nasal cannula O2 for comfort- wean as tolerated  · Was weaned yesterday to nasal cannula, overnight placed back on high-flow due to desats to 85%      Acute respiratory failure with hypoxia (HCC)  Assessment & Plan  · Likely related to PNA and RSV  · Elevated D-dimer  BLE duplex negative for DVT  · ABX: vancomycin IV day 4  · Flagyl discontinued 1/12  · Cefepime discontinued 1/13  · Bactroban BID for 5 days for MRSA + in nares  · HFNC, wean to nasal cannula as able  · Xopenex/ Atrovent q6 hours, PRN albuterol  · Respiratory protocol  · Mucinex PO PRN  · OOB to chair      Pneumonia due to infectious organism  Assessment & Plan  · Pneumonia found on CT scan 1/7, after a fall  · CT AP 1/7: Left upper lobe alveolar opacities likely representing pneumonia  · PCXR 1/10:  Left upper lobe opacity  · Was treated outpatient with oral Zithromax   · Failure to improve, cefepime and vanc Day #3  · Check procalcitonin- 2 84 -> 4 0 -> 2 74 -> 1 9  · Legionella and strep pneumoniae- negative  · Blood cultures NTD  · Respiratory and oxygen protocol  · Speech eval 1/11 and no signs of aspiration-->  D/C'd flagyl on 1/12  · MRSA swab +; bactroban started 1/12  · Continue HFNC, wean as able  · On 1/13-Discontinue cefepime IV   Continue Vancomycin IV (Day4)  · Follow up on pending culture susceptibility reports    Nonintractable generalized idiopathic epilepsy without status epilepticus ([facility] [address] )  Assessment & Plan  · History of seizures since a child, has 1-2 year, with recent increase in past months to one/month  · Is on Lamictal Mysoline clonazepam Zonisemide  · Per Neurology, medications contribute to unsteady gait  · Check Lamictal, Mysoline, zonisamide levels  · Will take 2-5 days for results  · Resume home meds and follow up levels  · Seizure precautions  · Fall precautions      Elevated serum creatinine  Assessment & Plan  · Baseline is 1 0 to 1 2   · Monitor Cr  · Creatinine down to 1 2 today  · Avoid hypotension, nephrotoxic agents      Thrombocytopenia (HCC)  Assessment & Plan  · Platelets appear to have had low platelets since December (143,000)  · Monitor for signs of bleeding  · Daily CBC and trend platelets      Contusion, flank, subsequent encounter  Assessment & Plan  · S/p fall 1/7  · CT abdomen pelvis:  No traumatic thoracic or intra-abdominal abnormality identified  Stranding within the subcutaneous tissues of the right flank consistent with a contusion  · Monitor      ----------------------------------------------------------------------------------------  HPI/24hr events: Weaned off HFNC to 6L NC yesterday and tolerated most of day  Cefepime was d/c'd and he was continued on Vanco Tolerated increasing activity  Overnight, he had multiple episodes of desats, consistently around 85%  He was repositioned multiple times but needed to go back on HFNC, with improvement in his sat's  No other acute events overnight  Disposition: Continue Stepdown Level 2 level of care   Code Status: Level 1 - Full Code  ---------------------------------------------------------------------------------------  SUBJECTIVE    Review of Systems   Reason unable to perform ROS: Pt denies pain, sleeping comfortably  Denies SOB  Falls back to sleep easily      ---------------------------------------------------------------------------------------  OBJECTIVE    Physical Exam   Constitutional: He is oriented to person, place, and time  He appears well-nourished  No distress  Sleeping comfortable  No distress     HENT:   Head: Normocephalic and atraumatic     Mouth/Throat: Oropharynx is clear and moist  No oropharyngeal exudate  right eye extropia   Eyes: Pupils are equal, round, and reactive to light  Conjunctivae are normal  Right eye exhibits no discharge  Left eye exhibits no discharge  No scleral icterus  Neck: Neck supple  No tracheal deviation present  Cardiovascular: Normal rate, regular rhythm, normal heart sounds and intact distal pulses  Exam reveals no friction rub  No murmur heard  Pulmonary/Chest: Effort normal  No respiratory distress  He has no rales  Faint scattered wheezing, increased air movement  Abdominal: Soft  Bowel sounds are normal  He exhibits no distension  There is no tenderness  There is no guarding  Genitourinary:   Genitourinary Comments: Voiding spontaneously   Musculoskeletal: He exhibits edema  DUARTE spontaneously  Equal strength throughout   Neurological: He is alert and oriented to person, place, and time  Non-focal exam   Skin: Skin is warm and dry  Capillary refill takes less than 2 seconds  He is not diaphoretic  No pallor  Right flank ecchymosis- stable   Psychiatric: His behavior is normal    Nursing note and vitals reviewed  Vitals   Vitals:    20 0000 20 0159 20 0302 20 0400   BP:   140/83    BP Location:   Left arm    Pulse:   85    Resp:   18    Temp:   98 8 °F (37 1 °C)    TempSrc:   Oral    SpO2: 97% 97% 95% 95%   Weight:       Height:         Temp (24hrs), Av 4 °F (36 9 °C), Min:98 1 °F (36 7 °C), Max:98 8 °F (37 1 °C)  Current: Temperature: 98 8 °F (37 1 °C)          SpO2 Activity: SpO2 Activity: At Rest, SpO2 Device: O2 Device: High flow nasal cannula  O2 Flow Rate (L/min): 50 L/min    Invasive/non-invasive ventilation settings   Respiratory    Lab Data (Last 4 hours)    None         O2/Vent Data (Last 4 hours)      400          Non-Invasive Ventilation Mode HFNC                   Height and Weights   Height: 5' 8" (172 7 cm)  IBW: 68 4 kg  Body mass index is 33 76 kg/m²    Weight (last 2 days)     None          Intake and Output  I/O       01/12 0701 - 01/13 0700 01/13 0701 - 01/14 0700    P  O  1200 1080    I V  (mL/kg) 50 (0 5)     IV Piggyback 1050 500    Total Intake(mL/kg) 2300 (22 8) 1580 (15 6)    Urine (mL/kg/hr) 2500 (1) 600 (0 2)    Stool 0 0    Total Output 2500 600    Net -200 +980          Unmeasured Urine Occurrence  1 x    Unmeasured Stool Occurrence 2 x 1 x          Nutrition       Diet Orders   (From admission, onward)             Start     Ordered    01/11/20 1543  Diet Dysphagia/Modified Consistency; Dysphagia 3-Dental Soft; Thin Liquid  Diet effective now     Question Answer Comment   Diet Type Dysphagia/Modified Consistency    Dysphagia/Modified Consistency Dysphagia 3-Dental Soft    Liquid Modifier Thin Liquid    Special Instructions Chopped meats    RD to adjust diet per protocol?  Yes        01/11/20 1543    01/10/20 1224  Dietary nutrition supplements  Once     Question Answer Comment   Select Supplement: Ensure Clear Apple    Frequency Breakfast, Dinner        01/10/20 1223    01/10/20 1224  Dietary nutrition supplements  Once     Question Answer Comment   Select Supplement: Ensure Clear Benoit    Frequency Lunch        01/10/20 1223                Laboratory and Diagnostics:  Results from last 7 days   Lab Units 01/13/20  0435 01/12/20  0440 01/11/20  0505 01/10/20  0526   WBC Thousand/uL 4 69 4 96 5 59 8 00   HEMOGLOBIN g/dL 12 4 12 9 13 4 15 0   HEMATOCRIT % 38 1 39 6 41 0 45 4   PLATELETS Thousands/uL 137* 100* 110* 122*   NEUTROS PCT %  --   --  74 83*   MONOS PCT %  --   --  14* 11     Results from last 7 days   Lab Units 01/13/20  0435 01/12/20  0440 01/11/20  0505 01/10/20  0526   SODIUM mmol/L 139 137 136 132*   POTASSIUM mmol/L 4 0 3 9 3 7 3 6   CHLORIDE mmol/L 104 102 100 96*   CO2 mmol/L 29 29 30 28   ANION GAP mmol/L 6 6 6 8   BUN mg/dL 21 21 19 21   CREATININE mg/dL 1 02 1 20 1 35* 1 45*   CALCIUM mg/dL 7 5* 7 7* 7 5* 8 0*   GLUCOSE RANDOM mg/dL 110 102 101 97 ALT U/L  --  29  --  31   AST U/L  --  38  --  33   ALK PHOS U/L  --  150*  --  178*   ALBUMIN g/dL  --  2 8*  --  3 3*   TOTAL BILIRUBIN mg/dL  --  0 63  --  1 07*     Results from last 7 days   Lab Units 01/13/20  0435 01/12/20  0440 01/11/20  0505   MAGNESIUM mg/dL 2 1 2 1 1 6   PHOSPHORUS mg/dL  --   --  3 3      Results from last 7 days   Lab Units 01/10/20  0526   INR  1 24*   PTT seconds 34      Results from last 7 days   Lab Units 01/10/20  1340 01/10/20  1039 01/10/20  0529   TROPONIN I ng/mL 0 02 <0 02 <0 02     Results from last 7 days   Lab Units 01/10/20  2335 01/10/20  1125 01/10/20  0526   LACTIC ACID mmol/L 0 9 2 8* 2 2*     ABG:  Results from last 7 days   Lab Units 01/10/20  1045   PH ART  7 395   PCO2 ART mm Hg 38 8   PO2 ART mm Hg 35 9*   HCO3 ART mmol/L 23 2   BASE EXC ART mmol/L -1 4   ABG SOURCE  Radial, Right     VBG:  Results from last 7 days   Lab Units 01/10/20  1045   ABG SOURCE  Radial, Right     Results from last 7 days   Lab Units 01/13/20  0843 01/12/20  1601 01/11/20  0505 01/10/20  0526   PROCALCITONIN ng/ml 1 90* 2 74* 4 00* 2 84*       Micro  Results from last 7 days   Lab Units 01/10/20  1538 01/10/20  1340 01/10/20  1126 01/10/20  0536 01/10/20  0526   BLOOD CULTURE   --   --   --  No Growth at 72 hrs  No Growth at 72 hrs  SPUTUM CULTURE  2+ Growth of   Commensal respiratory leonides only; No significant growth of Staph aureus/MRSA or Pseudomonas aeruginosa  --   --   --   --    GRAM STAIN RESULT  Epithelial cells per low power field*  Rare Polys*  1+ Gram positive cocci in pairs*  --   --   --   --    MRSA CULTURE ONLY   --  Methicillin Resistant Staphylococcus aureus isolated*  Please note: This patient requires contact precautions    --   --   --    LEGIONELLA URINARY ANTIGEN   --   --  Negative  --   --    STREP PNEUMONIAE ANTIGEN, URINE   --   --  Negative  --   --        EKG: nsr  Imaging: no new imaging to review    Active Medications  Scheduled Meds:    Current Facility-Administered Medications:  acetaminophen 650 mg Oral Q6H PRN Marandrea Avila, CRNP    albuterol 2 5 mg Nebulization Q6H PRN Shoaib Horton, CRNP    ascorbic acid 500 mg Oral Daily Marlavonla Margarita, CRNP    aspirin 81 mg Oral Daily Marlavonla Margarita, CRNP    calcium carbonate-vitamin D 1 tablet Oral Daily With Breakfast Marandrea Avila, NADEGE    clonazePAM 0 5 mg Oral HS Marlavonla Margarita, NADEGE    dextran 70-hypromellose 1 drop Both Eyes 4x Daily Liya Haskins PA-C    guaiFENesin 600 mg Oral BID MarNADEGE Rehman    heparin (porcine) 5,000 Units Subcutaneous [facility] Veronique Avila, NADEGE    hydrALAZINE 5 mg Intravenous Q6H PRN Basil Lasso PisWHITNEY he    ipratropium 0 5 mg Nebulization Q6H Peter Rasmussen MD    Labetalol HCl 10 mg Intravenous Q4H PRN Basil Lasso PisWHITNEY he    lamoTRIgine 200 mg Oral Early Morning Marandrea Avila, NADEGE    lamoTRIgine 250 mg Oral Daily With Lunch Shoaib Horton, NADEGE    lamoTRIgine 300 mg Oral HS Marandrea Avila, NADEGE    levalbuterol 1 25 mg Nebulization Q6H Peter Rasmussen MD    levOCARNitine 330 mg Oral 4x Daily (PC & HS) NADEGE Armenta    multivitamin stress formula 1 tablet Oral Daily Mindy Trevizo MD    mupirocin  Nasal Q12H [facility] Liya Montenegro PA-C    ondansetron 4 mg Intravenous Q6H PRN Marandrea Avila, NADEGE    primidone 100 mg Oral Daily With Lunch Marlavonla Margarita, CRREINA    primidone 150 mg Oral After Breakfast Marandrea Avila, CRREINA    primidone 150 mg Oral HS NADEGE Armenta    vancomycin 1,750 mg Intravenous Q12H Peter Rasmussen MD Last Rate: 1,750 mg (01/14/20 0305)   vitamin E (tocopherol) 400 Units Oral Daily Marandrea Avila, NADEGE    zinc gluconate 50 mg Oral Daily Marzella Margarita, NADEGE    zonisamide 100 mg Oral HS NADEGE Armenta      Continuous Infusions:     PRN Meds:     acetaminophen 650 mg Q6H PRN   albuterol 2 5 mg Q6H PRN   hydrALAZINE 5 mg Q6H PRN   Labetalol HCl 10 mg Q4H PRN   ondansetron 4 mg Q6H PRN ---------------------------------------------------------------------------------------  Counseling / Coordination of Care  Total time spent today 43 minutes  Greater than 50% of total time was spent with the patient and / or family counseling and / or coordination of care  A description of the counseling / coordination of care: NADEGE Renee      Portions of the record may have been created with voice recognition software  Occasional wrong word or "sound a like" substitutions may have occurred due to the inherent limitations of voice recognition software    Read the chart carefully and recognize, using context, where substitutions have occurred

## 2020-01-14 NOTE — ASSESSMENT & PLAN NOTE
· Supportive Care  · Droplet precautions-discontinue 1/14 as patient is not immunocompromised  · High flow nasal cannula O2 for comfort- wean as tolerated  · Was weaned yesterday to nasal cannula, overnight placed back on high-flow due to desats to 85%  · Trial on CPAP overnight 1/14/2020  Tolerated well and maintained O2 saturations > 90%

## 2020-01-15 PROBLEM — R79.89 ELEVATED SERUM CREATININE: Status: RESOLVED | Noted: 2020-01-10 | Resolved: 2020-01-15

## 2020-01-15 LAB
ANION GAP SERPL CALCULATED.3IONS-SCNC: 5 MMOL/L (ref 4–13)
BACTERIA BLD CULT: NORMAL
BACTERIA BLD CULT: NORMAL
BUN SERPL-MCNC: 19 MG/DL (ref 5–25)
CALCIUM SERPL-MCNC: 8.2 MG/DL (ref 8.3–10.1)
CHLORIDE SERPL-SCNC: 104 MMOL/L (ref 100–108)
CO2 SERPL-SCNC: 32 MMOL/L (ref 21–32)
CREAT SERPL-MCNC: 1 MG/DL (ref 0.6–1.3)
ERYTHROCYTE [DISTWIDTH] IN BLOOD BY AUTOMATED COUNT: 14.2 % (ref 11.6–15.1)
GFR SERPL CREATININE-BSD FRML MDRD: 86 ML/MIN/1.73SQ M
GLUCOSE SERPL-MCNC: 106 MG/DL (ref 65–140)
HCT VFR BLD AUTO: 39.4 % (ref 36.5–49.3)
HGB BLD-MCNC: 12.6 G/DL (ref 12–17)
MCH RBC QN AUTO: 32.1 PG (ref 26.8–34.3)
MCHC RBC AUTO-ENTMCNC: 32 G/DL (ref 31.4–37.4)
MCV RBC AUTO: 100 FL (ref 82–98)
PLATELET # BLD AUTO: 169 THOUSANDS/UL (ref 149–390)
PMV BLD AUTO: 10.8 FL (ref 8.9–12.7)
POTASSIUM SERPL-SCNC: 4.1 MMOL/L (ref 3.5–5.3)
RBC # BLD AUTO: 3.93 MILLION/UL (ref 3.88–5.62)
SODIUM SERPL-SCNC: 141 MMOL/L (ref 136–145)
WBC # BLD AUTO: 6.33 THOUSAND/UL (ref 4.31–10.16)

## 2020-01-15 PROCEDURE — 94640 AIRWAY INHALATION TREATMENT: CPT

## 2020-01-15 PROCEDURE — 97163 PT EVAL HIGH COMPLEX 45 MIN: CPT

## 2020-01-15 PROCEDURE — 85027 COMPLETE CBC AUTOMATED: CPT | Performed by: NURSE PRACTITIONER

## 2020-01-15 PROCEDURE — 97116 GAIT TRAINING THERAPY: CPT

## 2020-01-15 PROCEDURE — 94760 N-INVAS EAR/PLS OXIMETRY 1: CPT

## 2020-01-15 PROCEDURE — 94660 CPAP INITIATION&MGMT: CPT

## 2020-01-15 PROCEDURE — 97167 OT EVAL HIGH COMPLEX 60 MIN: CPT

## 2020-01-15 PROCEDURE — 94762 N-INVAS EAR/PLS OXIMTRY CONT: CPT

## 2020-01-15 PROCEDURE — 80048 BASIC METABOLIC PNL TOTAL CA: CPT | Performed by: NURSE PRACTITIONER

## 2020-01-15 PROCEDURE — NC001 PR NO CHARGE: Performed by: NURSE PRACTITIONER

## 2020-01-15 PROCEDURE — 99233 SBSQ HOSP IP/OBS HIGH 50: CPT | Performed by: ANESTHESIOLOGY

## 2020-01-15 RX ADMIN — GUAIFENESIN 600 MG: 600 TABLET ORAL at 17:27

## 2020-01-15 RX ADMIN — LEVOCARNITINE 330 MG: 330 TABLET ORAL at 12:20

## 2020-01-15 RX ADMIN — PRIMIDONE 150 MG: 50 TABLET ORAL at 08:24

## 2020-01-15 RX ADMIN — ZONISAMIDE 100 MG: 100 CAPSULE ORAL at 21:30

## 2020-01-15 RX ADMIN — Medication 400 UNITS: at 08:25

## 2020-01-15 RX ADMIN — LAMOTRIGINE 300 MG: 100 TABLET ORAL at 21:28

## 2020-01-15 RX ADMIN — HEPARIN SODIUM 5000 UNITS: 5000 INJECTION INTRAVENOUS; SUBCUTANEOUS at 21:29

## 2020-01-15 RX ADMIN — DEXTRAN 70, AND HYPROMELLOSE 2910 1 DROP: 1; 3 SOLUTION/ DROPS OPHTHALMIC at 21:29

## 2020-01-15 RX ADMIN — IPRATROPIUM BROMIDE 0.5 MG: 0.5 SOLUTION RESPIRATORY (INHALATION) at 13:49

## 2020-01-15 RX ADMIN — Medication 1 TABLET: at 08:22

## 2020-01-15 RX ADMIN — CLONAZEPAM 0.5 MG: 0.5 TABLET ORAL at 21:28

## 2020-01-15 RX ADMIN — ZINC 1 TABLET: TAB ORAL at 08:25

## 2020-01-15 RX ADMIN — IPRATROPIUM BROMIDE 0.5 MG: 0.5 SOLUTION RESPIRATORY (INHALATION) at 01:09

## 2020-01-15 RX ADMIN — HEPARIN SODIUM 5000 UNITS: 5000 INJECTION INTRAVENOUS; SUBCUTANEOUS at 05:06

## 2020-01-15 RX ADMIN — LAMOTRIGINE 200 MG: 100 TABLET ORAL at 05:06

## 2020-01-15 RX ADMIN — PRIMIDONE 100 MG: 50 TABLET ORAL at 12:20

## 2020-01-15 RX ADMIN — IPRATROPIUM BROMIDE 0.5 MG: 0.5 SOLUTION RESPIRATORY (INHALATION) at 21:01

## 2020-01-15 RX ADMIN — GUAIFENESIN 600 MG: 600 TABLET ORAL at 08:22

## 2020-01-15 RX ADMIN — LEVALBUTEROL HYDROCHLORIDE 1.25 MG: 1.25 SOLUTION, CONCENTRATE RESPIRATORY (INHALATION) at 08:04

## 2020-01-15 RX ADMIN — DEXTRAN 70, AND HYPROMELLOSE 2910 1 DROP: 1; 3 SOLUTION/ DROPS OPHTHALMIC at 17:27

## 2020-01-15 RX ADMIN — LEVALBUTEROL HYDROCHLORIDE 1.25 MG: 1.25 SOLUTION, CONCENTRATE RESPIRATORY (INHALATION) at 01:09

## 2020-01-15 RX ADMIN — IPRATROPIUM BROMIDE 0.5 MG: 0.5 SOLUTION RESPIRATORY (INHALATION) at 08:04

## 2020-01-15 RX ADMIN — DEXTRAN 70, AND HYPROMELLOSE 2910 1 DROP: 1; 3 SOLUTION/ DROPS OPHTHALMIC at 12:19

## 2020-01-15 RX ADMIN — LEVALBUTEROL HYDROCHLORIDE 1.25 MG: 1.25 SOLUTION, CONCENTRATE RESPIRATORY (INHALATION) at 13:50

## 2020-01-15 RX ADMIN — LEVALBUTEROL HYDROCHLORIDE 1.25 MG: 1.25 SOLUTION, CONCENTRATE RESPIRATORY (INHALATION) at 21:01

## 2020-01-15 RX ADMIN — PRIMIDONE 150 MG: 50 TABLET ORAL at 21:30

## 2020-01-15 RX ADMIN — ASPIRIN 81 MG 81 MG: 81 TABLET ORAL at 08:22

## 2020-01-15 RX ADMIN — Medication 50 MG: at 08:24

## 2020-01-15 RX ADMIN — DEXTRAN 70, AND HYPROMELLOSE 2910 1 DROP: 1; 3 SOLUTION/ DROPS OPHTHALMIC at 08:22

## 2020-01-15 RX ADMIN — LEVOCARNITINE 330 MG: 330 TABLET ORAL at 17:27

## 2020-01-15 RX ADMIN — HEPARIN SODIUM 5000 UNITS: 5000 INJECTION INTRAVENOUS; SUBCUTANEOUS at 14:02

## 2020-01-15 RX ADMIN — LAMOTRIGINE 250 MG: 100 TABLET ORAL at 12:20

## 2020-01-15 RX ADMIN — OXYCODONE HYDROCHLORIDE AND ACETAMINOPHEN 500 MG: 500 TABLET ORAL at 08:22

## 2020-01-15 RX ADMIN — LEVOCARNITINE 330 MG: 330 TABLET ORAL at 08:24

## 2020-01-15 RX ADMIN — LEVOCARNITINE 330 MG: 330 TABLET ORAL at 21:30

## 2020-01-15 NOTE — ASSESSMENT & PLAN NOTE
· Does not appear to be on antihypertensive, however is on Lasix 40mg QD for chronic lower extremity edema  · Received one dose of Lasix 20 mg IV x1   · Monitor BP

## 2020-01-15 NOTE — ASSESSMENT & PLAN NOTE
· S/p fall 1/7 (prior to admission)  · CT abdomen pelvis:  No traumatic thoracic or intra-abdominal abnormality identified    Stranding within the subcutaneous tissues of the right flank consistent with a contusion  · Monitor

## 2020-01-15 NOTE — PROGRESS NOTES
Transfer Note - Saravanan Leiva 1965, 48 y o  male MRN: [id number]    Unit/Bed#: -01 Encounter: [id number]    Primary Care Provider: Zaynab Martins DO   Date and time admitted to hospital: 1/10/2020  5:20 AM        * RSV (acute bronchiolitis due to respiratory syncytial virus)  Assessment & Plan  · Supportive Care  · Droplet precautions-discontinue 1/14 as patient is not immunocompromised  · High flow nasal cannula O2 for comfort- wean as tolerated, now on nasal cannula  · Was weaned 01/13 to nasal cannula, overnight placed back on high-flow due to desats to 85%  · Trial on CPAP overnight 1/14/2020  Tolerated well and maintained O2 saturations > 90%  · Currently on room air  Nasal cannula oxygen prn  Acute respiratory failure with hypoxia (HCC)  Assessment & Plan  · Likely related to PNA and RSV  · Elevated D-dimer  BLE duplex negative for DVT  · ABX  · Completed 5 days of vancomycin IV on 1/13  · Flagyl discontinued 1/12  · Cefepime discontinued 1/13  · HFNC, wean to nasal cannula as able  · Weaned from HFNC on 1/13, had episodes of apnea and desaturations during the night and was placed back on HFNC on 1/14  Again weaned on 1/14 to nasal cannula  Will order CPAP HS for suspected sleep apnea  Patient will likely need outpatient follow up for sleep study  · Xopenex/ Atrovent q6 hours, PRN albuterol  · Respiratory protocol  · Pulmonary Toliet  · Mucinex PO PRN  · OOB to chair  · Trial of CPAP overnight 1/14/2020  Tolerated well and maintained O2 saturations >90%  · Currently on room air  Nasal cannula oxygen prn  Pneumonia due to infectious organism  Assessment & Plan  · Pneumonia found on CT scan 1/7, after a fall  · CT AP 1/7: Left upper lobe alveolar opacities likely representing pneumonia  · PCXR 1/10:  Left upper lobe opacity  · Was treated outpatient with oral Zithromax   · Started on vancomycin, cefepime, flagyl on admit    · Speech eval 1/11 and no signs of aspiration-->  D/C'd flagyl on 1/12  · Completed 5 days of vancomycin IV on 1/13  Cefepime discontinue 1/13  · Check procalcitonin- 2 84 -> 4 0 -> 2 74 -> 1 9 -> 1 15  · Legionella and strep pneumoniae- negative  · Blood cultures-negative  · Sputum-mixed leonides  · Respiratory and oxygen protocol  · MRSA swab +  · bactroban started 1/12, discontinued per policy 0/00  · Contact isolation  · Weaned from HFNC on 1/14, continue nasal cannula prn to maintain saturations > 90%  · Aggressive pulmonary toliet    Nonintractable generalized idiopathic epilepsy without status epilepticus ([facility] [address] )  Assessment & Plan  · History of seizures since a child, has 1-2 year, with recent increase in past months to one/month  · Patient is on Lamictal, Mysoline, clonazepam, Zonisemide  · Per Neurology, medications contribute to unsteady gait  · Check Lamictal, Mysoline, zonisamide levels  · Will take 2-5 days for results  · Resume home meds and follow up levels  · Seizure precautions  · Fall precautions      T wave inversion in EKG  Assessment & Plan  · EKG:  with T-wave inversions V3 V4 V5 V6  No old for comparison  · No chest pain  · Troponin <0 03  · Trend troponin x 3- all negative  · Telemetry monitoring      Essential hypertension  Assessment & Plan  · Does not appear to be on antihypertensive, however is on Lasix 40mg QD for chronic lower extremity edema  · Received one dose of Lasix 20 mg IV x1   · Monitor BP      Thrombocytopenia (HCC)  Assessment & Plan  · Platelets appear to have had low platelets since December (143,000)  · Monitor for signs of bleeding  · Daily CBC and trend platelets      Contusion, flank, subsequent encounter  Assessment & Plan  · S/p fall 1/7 (prior to admission)  · CT abdomen pelvis:  No traumatic thoracic or intra-abdominal abnormality identified    Stranding within the subcutaneous tissues of the right flank consistent with a contusion  · Monitor        Code Status: Level 1 - Full Code  POA:    POLST:      Reason for ICU admission:   Acute hypoxic respiratory failure requiring high flow nasal cannula    Active problems:   Principal Problem:    RSV (acute bronchiolitis due to respiratory syncytial virus)  Active Problems:    Pneumonia due to infectious organism    Acute respiratory failure with hypoxia (HCC)    Nonintractable generalized idiopathic epilepsy without status epilepticus ([address] )    T wave inversion in EKG    Essential hypertension    Thrombocytopenia (HCC)    Contusion, flank, subsequent encounter  Resolved Problems:    Elevated serum creatinine      Consultants:       History of Present Illness:   Shaggy Hernández is a 48 y o  Male with past medical history of epilepsy since childhood, fairly well controlled on anticonvulsants, developmental delay, essential HTN, recent fall in shower at home, outpatient diagnosis of pneumonia who presented to [address] [place] [address] ED on 01/10 with worsening shortness of breath, fever, and cough x 3 days  The patient was found to have a pneumonia incidentally on a CT that was ordered after a fall    At that time, he was started on zithromax po and per his mother has been compliant with taking the medication  Summary of clinical course:   Patient was admitted to the hospitalist service on 01/10 for RSV pneumonia  Critical care was called to assess him on 1/10/20 when his respiratory status declined  He appeared to be in respiratory distress, utilizing accessory muscles, with HTN and tachycardia and an oxygen sat of 90% on 6 L NC  Patient was placed on high flow nasal cannula and upgrade to step down 2 level of care under the critical care team     Patient was started on broad spectrum antibiotics with vancomycin, cefepime and flagyl  Continued on nasal cannula, weaned to nasal cannula on 01/13  Did require high flow oxygen again overnight on 01/13-01/14 for periods on nocturnal hypoxia  Again weaned to nasal cannula on 01/14   Patient trialed CPAP on the evening of 01/14 which he tolerated well and had no desaturations events while on CPAP  Also during this time, antibiotics were deescalated and on 01/14 the patient completed a 5 day course of vancomycin IV  Patient is hemodynamically stable and now on nasal cannula  PT and OT evaluations ongoing  CM following patient  Recent or scheduled procedures:   1/07 CT chest abdomen pelvis:  No traumatic thoracic or intra-abdominal abnormality identified  Stranding within the subcutaneous tissues of the right flank consistent with contusion  Left upper lobe alveolar opacities likely representing pneumonia  Pericardial calcification likely related to prior pericarditis  1/10 CXR: Left upper lobe opacity, not substantially changed when allowing for difference in modality, consistent with the reported history of pneumonia  1/11 CXR: Worsening left upper lobe airspace consolidation, as well as diffuse bilateral airspace opacities  1/12 CXR: Increased consolidation of the left upper lobe suggest pneumonia  More diffuse opacities are slightly improved  1/13 BLE duplex negative    Outstanding/pending diagnostics:   AM labs for 01/15    Cultures:   1/10 RSV POSITIVE, flu negative  1/10 Sputum cx-mixed resp leonides/GPC in pairs  1/10 Urine strep/ legionella negative  1/10 Blood cultures negative  1/12 MRSA nares +       Mobilization Plan:   OOB to chair, PT/OT consults pending    Nutrition Plan:   SLP on 01/12-recommended level 2 soft with thin liquid diet, small bites/sips, slow rate, 90 degree positioning  "Continue current and dysphagia tx additional 1 session to trial regular for advancement if appropriate" per notes  VTE Pharmacologic Prophylaxis: Heparin  VTE Mechanical Prophylaxis: sequential compression device    Discharge Plan:   Patient should be ready for discharge pending evaluation by PT/OT    Initial Physical Therapy Recommendations: pending  Initial Occupational Therapy Recommendations: pending  Initial /Plan: see note on 01/13  Anticipate discharge to home however evaluation by PT/OT pending  Home medications that are not reordered and reason why:   n/a      Spoke with Dr Neel Torres regarding transfer  Please call [phone] with any questions or concerns  Portions of the record may have been created with voice recognition software  Occasional wrong word or "sound a like" substitutions may have occurred due to the inherent limitations of voice recognition software  Read the chart carefully and recognize, using context, where substitutions have occurred

## 2020-01-15 NOTE — PHYSICAL THERAPY NOTE
PHYSICAL THERAPY EVALUATION  NAME:  Steven Ninilchik: 01/15/20    AGE:   48 y o  Mrn:   [id number]  ADMIT DX:  Acute respiratory failure ([facility] [address] ) [J96 00]  Pneumonia [J18 9]  Respiratory distress [R06 03]  Bilateral leg edema [R60 0]    Past Medical History:   Diagnosis Date    Hypertension     Pericardial effusion     Pneumonia     Seizure ([facility] [address] )      Length Of Stay: 5  Performed at least 2 patient identifiers during session: Name and Birthday  PHYSICAL THERAPY EVALUATION :      01/15/20 0737   Note Type   Note type Eval/Treat   Pain Assessment   Pain Assessment No/denies pain   Pain Score No Pain   Home Living   Type of [address] [place] [address] Two level;Bed/bath upstairs;Stairs to enter with rails  (2-3 ALESIA w rails and FF w B HRs 2nd floor)   Additional Comments Pt reports having a 2nd floor bedroom and bathroom with FF of steps with B HRs to access  Prior Function   Level of [place] Independent with ADLs and functional mobility   Lives With   (mother and brother)   Receives Help From Family   ADL Assistance Independent   IADLs Needs assistance   Falls in the last 6 months 1 to 4   Vocational   (retail greenhouse)   Comments Pt reports being (I) without AD PTA  Restrictions/Precautions   Other Precautions Chair Alarm; Bed Alarm;Contact/isolation;Droplet precautions; Fall Risk;Multiple lines   General   Additional Pertinent History Pt recently diagnosed with PNA, (+) for RSV, hx of seizures, developmental delay   Family/Caregiver Present No   Cognition   Orientation Level Oriented to person;Oriented to place  (month)   Following Commands Follows one step commands with increased time or repetition   RLE Assessment   RLE Assessment WFL  (B LE edema' grossly 3+/5)   LLE Assessment   LLE Assessment WFL  (grossly 3+/5)   Light Touch   RLE Light Touch Grossly intact   LLE Light Touch Grossly intact   Bed Mobility   Supine to Sit 4  Minimal assistance   Additional items Assist x 1; Increased time required;Verbal cues  (VCs for technique  trunk management)   Additional Comments HOB flat without bedrail   Transfers   Sit to Stand 4  Minimal assistance   Additional items Assist x 1; Increased time required;Verbal cues   Stand to Sit 4  Minimal assistance   Additional items Assist x 1; Increased time required;Verbal cues   Stand pivot 4  Minimal assistance   Additional items Assist x 1; Increased time required;Verbal cues   Additional Comments Completed transfers without AD with minAx1 with min cues for turning completely and direction   Ambulation/Elevation   Gait pattern Wide ADRIEL; Decreased foot clearance; Short stride  (slow cadnece)   Gait Assistance 4  Minimal assist   Additional items Assist x 1;Verbal cues  (cues for inc step length)   Assistive Device None  (pt reaching for external support)   Distance 4'x1 and 23' x1 without AD with dec step length, increased ADRIEL and min cues of rincreased step length   Balance   Static Sitting Fair   Dynamic Sitting Fair -   Static Standing Fair -   Dynamic Standing Poor +   Ambulatory Poor +   Endurance Deficit   Endurance Deficit Yes   Endurance Deficit Description Spo2 on room air at rest in mid 90s  Nursing trialing on room air  SpO2 desaturated to 86% with mobility and increased to 90-91% with standing/seated rest break with verbal cues for pursed lip breathing  Activity Tolerance   Activity Tolerance Patient limited by fatigue   Medical Staff Made Aware OTFeliberto   Nurse Made Aware RNTong   Assessment   Prognosis Good   Problem List Decreased strength;Decreased endurance; Impaired balance;Decreased mobility; Decreased cognition; Impaired judgement;Decreased safety awareness   Goals   STG Expiration Date 01/25/20   PT Treatment Day 1   Plan   Treatment/Interventions Functional transfer training;LE strengthening/ROM; Elevations; Therapeutic exercise; Endurance training;Patient/family training;Cognitive reorientation;Equipment eval/education; Bed mobility;Gait training; Compensatory technique education;Spoke to nursing;OT   PT Frequency Other (Comment)  (3-5x/wk)   Recommendation   Recommendation Short-term skilled PT; Home PT  (STR vs Home PT pending progress)   Equipment Recommended Walker   Additional Comments [facility] 6 clicks 32/13     (Please find full objective findings from PT assessment regarding body systems outlined above)  Assessment: Pt is a 48 y o  male seen for PT evaluation s/p admission to [address] [place] [address] on 1/10/2020 with RSV (acute bronchiolitis due to respiratory syncytial virus)  Pt recently diagnoosed with PNA  Order placed for PT services  Upon evaluation: Pt is presenting with impaired functional mobility due to decreased strength, decreased endurance, impaired balance, gait deviations, impaired cognition, decreased safety awareness, impaired judgment, fall risk and LE edema requiring minimal assistance for bed mobility, minimal assistance for transfers and minimal assistance for ambulation with RW  Pt's clinical presentation is currently unstable/unpredictable given the functional mobility deficits above, especially weakness, edema of extremities, decreased endurance, gait deviations, decreased activity tolerance, decreased functional mobility tolerance, decreased safety awareness, impaired judgement, decreased cognition and SOB upon exertion, coupled with fall risks as indicated by AM-PAC 6-Clicks: 37/21 as well as hx of falls, impaired balance, impaired judgement, decreased safety awareness and decreased cognition and combined with medical complications of tachycardia, low SpO2 values and need for input for mobility technique/safety  Pt's PMHx and comorbidities that may affect physical performance and progress include: HTN, obesity, limited cognition with developmental delay and epilepsy, thrombocytopenia   Personal factors affecting pt at time of IE include: step(s) to enter environment, multi-level environment, inability to perform ADLs, inability to navigate level surfaces without external assistance, inability to ambulate household distances, inability to navigate community distances and recent fall(s)/fall history  Pt will benefit from continued skilled PT services to address deficits as defined above and to maximize level of functional mobility to facilitate return toward PLOF and improved QOL  From PT/mobility standpoint, recommendation at time of d/c would be STR vs  Home PT pending progress in order to reduce fall risk and maximize pt's functional independence and consistency with mobility in order to facilitate return to PLOF  Recommend trial with walker next 1-2 sessions and ther ex next 1-2 sessions  Goals: Pt will: Perform bed mobility tasks with modified I to reposition in bed and prepare for transfers  Pt will perform transfers with modified I to increase Indep in home environment, return to home with ALESIA and decrease risk for falls and prepare for ambulation  Pt will ambulate with least restrictive AD for >/= 150' with  modified I  to increase Indep in home environment, decrease burden of care, decrease risk for falls, improve activity tolerance and improve gait quality and to access home environment  Pt will complete >/= 12 steps with with bilateral handrails with Supervision to increase Indep in home environment, decrease burden of care, return to home with ALESIA, return to multilevel home, decrease risk for falls and improve activity tolerance  Maribel Menard, PT,DPT         PHYSICAL THERAPY TREATMENT NOTE  Time In:0752  Time Out:0802  Total Time: 8'      S:  "This feels good" (regarding walking)  O:  Sit<>stand from toilet with minAx1 with mod VCs for hand placement  initiated RW for ambulation for balance and safety   Ambulated 48' with RW and steadying assistance with minimal cues for navigation of environment with RW  1 standing rest break after 20' ambulation for 10 seconds to recover desaturation of Spo2 from 86% to 91%  SpO2 at 89% after ambulation  Increased to 91% with sitting rest  Min cues for increased step length and to stay within ADRIEL of RW      A:  Pt performed ambulation with decreased assistance and improved gait pattern with noted increased step length bilaterally  He required one 10 second standing rest break to recover desaturation of SpO2 to 86%, increasing to 91%  He requires minimal cues for hand placement with transfers and proper use of RW  He will continue to benefit from PT services to maximize LOF  STR vs home PT pending progress as patient has stairs to access home environment and was working  He would benefit form STR to facilitate return to home and work  P:  Recommend addition of therapeutic exercises to current functional mobility program  Ambulation with LRAD   Stair trial     Angel Bridges, PT, DPT

## 2020-01-15 NOTE — PROGRESS NOTES
Progress Note - Kendy Patterson 1965, 48 y o  male MRN: [id number]    Unit/Bed#: -01 Encounter: [id number]    Primary Care Provider: Francisco Bowden DO   Date and time admitted to hospital: 1/10/2020  5:20 AM        * RSV (acute bronchiolitis due to respiratory syncytial virus)  Assessment & Plan  · Supportive Care  · Droplet precautions-discontinue 1/14 as patient is not immunocompromised  · High flow nasal cannula O2 for comfort- wean as tolerated  · Was weaned yesterday to nasal cannula, overnight placed back on high-flow due to desats to 85%  · Trial on CPAP overnight 1/14/2020  Tolerated well and maintained O2 saturations > 90%  Acute respiratory failure with hypoxia (HCC)  Assessment & Plan  · Likely related to PNA and RSV  · Elevated D-dimer  BLE duplex negative for DVT  · ABX  · Completed 5 days of vancomycin IV on 1/13  · Flagyl discontinued 1/12  · Cefepime discontinued 1/13  · HFNC, wean to nasal cannula as able  · Weaned from HFNC on 1/13, had episodes of apnea and desaturations during the night and was placed back on HFNC on 1/14  Again weaned on 1/14 to nasal cannula  Will order CPAP HS for suspected sleep apnea  Patient will likely need outpatient follow up for sleep study  · Xopenex/ Atrovent q6 hours, PRN albuterol  · Respiratory protocol  · Mucinex PO PRN  · OOB to chair  · Trial of CPAP overnight 1/14/2020  Tolerated well and maintained O2 saturations >90%  Pneumonia due to infectious organism  Assessment & Plan  · Pneumonia found on CT scan 1/7, after a fall  · CT AP 1/7: Left upper lobe alveolar opacities likely representing pneumonia  · PCXR 1/10:  Left upper lobe opacity      · Was treated outpatient with oral Zithromax   · Failure to improve, cefepime and vanc Day #3  · Check procalcitonin- 2 84 -> 4 0 -> 2 74 -> 1 9 -> 1 15  · Legionella and strep pneumoniae- negative  · Blood cultures NTD  · Respiratory and oxygen protocol  · Speech eval 1/11 and no signs of aspiration-->  D/C'd flagyl on 1/12  · MRSA swab +  · Isidor Lass started 1/12, discontinued per policy 1/88  · Contact isolation  · Continue HFNC, wean as able  · Completed 5 days of vancomycin IV on 1/13  Cefepime discontinue 1/13  Nonintractable generalized idiopathic epilepsy without status epilepticus ([facility] [address] )  Assessment & Plan  · History of seizures since a child, has 1-2 year, with recent increase in past months to one/month  · Is on Lamictal Mysoline clonazepam Zonisemide  · Per Neurology, medications contribute to unsteady gait  · Check Lamictal, Mysoline, zonisamide levels  · Will take 2-5 days for results  · Resume home meds and follow up levels  · Seizure precautions  · Fall precautions      T wave inversion in EKG  Assessment & Plan  · EKG:  with T-wave inversions V3 V4 V5 V6  No old for comparison  · No chest pain  · Troponin <0 03  · Trend troponin x 3- all negative  · Telemetry monitoring      Essential hypertension  Assessment & Plan  · Does not appear to be on antihypertensive, however is on Lasix 40mg QD for chronic lower extremity edema  · Received one dose of Lasix 20 mg IV x1   · Monitor BP  · PRN labetalol and hydralazine for now  · Consider adding po once patient is more stable      Elevated serum creatinineresolved as of 1/15/2020  Assessment & Plan  · Baseline is 1 0 to 1 2   · Monitor Cr  · Creatinine down to 1 2 today  · Avoid hypotension, nephrotoxic agents      Thrombocytopenia (HCC)  Assessment & Plan  · Platelets appear to have had low platelets since December (143,000)  · Monitor for signs of bleeding  · Daily CBC and trend platelets      Contusion, flank, subsequent encounter  Assessment & Plan  · S/p fall 1/7  · CT abdomen pelvis:  No traumatic thoracic or intra-abdominal abnormality identified    Stranding within the subcutaneous tissues of the right flank consistent with a contusion  · Monitor      ----------------------------------------------------------------------------------------  HPI/24hr events: No significant events overnight  Tolerated trial of CPAP maintaining O2 saturations > 90%  Disposition: Continue Stepdown Level 2 level of care   Code Status: Level 1 - Full Code  ---------------------------------------------------------------------------------------  SUBJECTIVE  "I'm OK " Denied SOB, headache, nausea, chest pain and abdominal pain  Review of Systems  Review of systems was reviewed and negative unless stated above in HPI/24-hour events   ---------------------------------------------------------------------------------------  OBJECTIVE    Physical Exam   Constitutional: He is oriented to person, place, and time  He appears well-developed and well-nourished  No distress  HENT:   Head: Normocephalic and atraumatic  Right Ear: External ear normal    Left Ear: External ear normal    Nose: Nose normal    Eyes: Pupils are equal, round, and reactive to light  Conjunctivae and EOM are normal  Right eye exhibits no discharge  Left eye exhibits no discharge  No scleral icterus  Neck: Normal range of motion  Neck supple  No JVD present  No tracheal deviation present  Cardiovascular: Normal rate, regular rhythm and intact distal pulses  Exam reveals no gallop and no friction rub  Pulmonary/Chest: Effort normal and breath sounds normal  No stridor  No respiratory distress  He has no wheezes  He has no rales  He exhibits no tenderness  Abdominal: Soft  Bowel sounds are normal  He exhibits no distension and no mass  There is no tenderness  There is no rebound and no guarding  Right flank contusion present  Musculoskeletal: Normal range of motion  He exhibits edema  He exhibits no tenderness or deformity  Lymphadenopathy:     He has no cervical adenopathy  Neurological: He is alert and oriented to person, place, and time   No cranial nerve deficit or sensory deficit  Skin: Skin is warm and dry  No rash noted  He is not diaphoretic  No erythema  No pallor  Vitals   Vitals:    20 2300 01/15/20 0029 01/15/20 0109 01/15/20 0324   BP: 139/83   117/86   BP Location: Left arm   Right arm   Pulse: 80   73   Resp: 22   18   Temp: 98 4 °F (36 9 °C)   98 4 °F (36 9 °C)   TempSrc: Axillary   Axillary   SpO2: 96% 95% 90% 96%   Weight:       Height:         Temp (24hrs), Av 6 °F (37 °C), Min:98 4 °F (36 9 °C), Max:98 8 °F (37 1 °C)  Current: Temperature: 98 4 °F (36 9 °C)          SpO2: SpO2: 96 %  O2 Flow Rate (L/min): 2 L/min    Invasive/non-invasive ventilation settings   Respiratory    Lab Data (Last 4 hours)    None         O2/Vent Data (Last 4 hours)    None                Height and Weights   Height: 5' 8" (172 7 cm)  IBW: 68 4 kg  Body mass index is 33 76 kg/m²  Weight (last 2 days)     None          Intake and Output  I/O        0701 -  0700  07 - 01/15 0700    P  O  1080 240    IV Piggyback 500     Total Intake(mL/kg) 1580 (15 6) 240 (2 4)    Urine (mL/kg/hr) 600 (0 2) 450 (0 2)    Stool 0 0    Total Output 600 450    Net +980 -210          Unmeasured Urine Occurrence 1 x 1 x    Unmeasured Stool Occurrence 1 x 2 x          Nutrition       Diet Orders   (From admission, onward)             Start     Ordered    20 1543  Diet Dysphagia/Modified Consistency; Dysphagia 3-Dental Soft; Thin Liquid  Diet effective now     Question Answer Comment   Diet Type Dysphagia/Modified Consistency    Dysphagia/Modified Consistency Dysphagia 3-Dental Soft    Liquid Modifier Thin Liquid    Special Instructions Chopped meats    RD to adjust diet per protocol?  Yes        20 1543    01/10/20 1224  Dietary nutrition supplements  Once     Question Answer Comment   Select Supplement: Ensure Clear Apple    Frequency Breakfast, Dinner        01/10/20 1223    01/10/20 1224  Dietary nutrition supplements  Once     Question Answer Comment   Select Supplement: Ensure Clear Benoit    Frequency Lunch        01/10/20 1223                Laboratory and Diagnostics:  Results from last 7 days   Lab Units 01/15/20  0503 01/14/20  0519 01/13/20  0435 01/12/20  0440 01/11/20  0505 01/10/20  0526   WBC Thousand/uL 6 33 5 38 4 69 4 96 5 59 8 00   HEMOGLOBIN g/dL 12 6 12 2 12 4 12 9 13 4 15 0   HEMATOCRIT % 39 4 38 8 38 1 39 6 41 0 45 4   PLATELETS Thousands/uL 169 148* 137* 100* 110* 122*   NEUTROS PCT %  --   --   --   --  74 83*   MONOS PCT %  --   --   --   --  14* 11     Results from last 7 days   Lab Units 01/14/20  0519 01/13/20  0435 01/12/20  0440 01/11/20  0505 01/10/20  0526   SODIUM mmol/L 139 139 137 136 132*   POTASSIUM mmol/L 4 4 4 0 3 9 3 7 3 6   CHLORIDE mmol/L 105 104 102 100 96*   CO2 mmol/L 32 29 29 30 28   ANION GAP mmol/L 2* 6 6 6 8   BUN mg/dL 19 21 21 19 21   CREATININE mg/dL 0 97 1 02 1 20 1 35* 1 45*   CALCIUM mg/dL 7 8* 7 5* 7 7* 7 5* 8 0*   GLUCOSE RANDOM mg/dL 117 110 102 101 97   ALT U/L  --   --  29  --  31   AST U/L  --   --  38  --  33   ALK PHOS U/L  --   --  150*  --  178*   ALBUMIN g/dL  --   --  2 8*  --  3 3*   TOTAL BILIRUBIN mg/dL  --   --  0 63  --  1 07*     Results from last 7 days   Lab Units 01/13/20  0435 01/12/20  0440 01/11/20  0505   MAGNESIUM mg/dL 2 1 2 1 1 6   PHOSPHORUS mg/dL  --   --  3 3      Results from last 7 days   Lab Units 01/10/20  0526   INR  1 24*   PTT seconds 34      Results from last 7 days   Lab Units 01/10/20  1340 01/10/20  1039 01/10/20  0529   TROPONIN I ng/mL 0 02 <0 02 <0 02     Results from last 7 days   Lab Units 01/10/20  2335 01/10/20  1125 01/10/20  0526   LACTIC ACID mmol/L 0 9 2 8* 2 2*     ABG:  Results from last 7 days   Lab Units 01/10/20  1045   PH ART  7 395   PCO2 ART mm Hg 38 8   PO2 ART mm Hg 35 9*   HCO3 ART mmol/L 23 2   BASE EXC ART mmol/L -1 4   ABG SOURCE  Radial, Right     VBG:  Results from last 7 days   Lab Units 01/10/20  1045   ABG SOURCE  Radial, Right     Results from last 7 days   Lab Units 01/14/20  0600 01/13/20  0843 01/12/20  1601 01/11/20  0505 01/10/20  0526   PROCALCITONIN ng/ml 1 15* 1 90* 2 74* 4 00* 2 84*       Micro  Results from last 7 days   Lab Units 01/10/20  1538 01/10/20  1340 01/10/20  1126 01/10/20  0536 01/10/20  0526   BLOOD CULTURE   --   --   --  No Growth After 4 Days  No Growth After 4 Days  SPUTUM CULTURE  2+ Growth of   Commensal respiratory leonides only; No significant growth of Staph aureus/MRSA or Pseudomonas aeruginosa  --   --   --   --    GRAM STAIN RESULT  Epithelial cells per low power field*  Rare Polys*  1+ Gram positive cocci in pairs*  --   --   --   --    MRSA CULTURE ONLY   --  Methicillin Resistant Staphylococcus aureus isolated*  Please note: This patient requires contact precautions  --   --   --    LEGIONELLA URINARY ANTIGEN   --   --  Negative  --   --    STREP PNEUMONIAE ANTIGEN, URINE   --   --  Negative  --   --        EKG: NSR on telemetry  Imaging: I have personally reviewed pertinent reports        Active Medications  Scheduled Meds:    Current Facility-Administered Medications:  acetaminophen 650 mg Oral Q6H PRN NADEGE Manzo   albuterol 2 5 mg Nebulization Q6H PRN NADEGE Gonzalez   ascorbic acid 500 mg Oral Daily NADEGE Manzo   aspirin 81 mg Oral Daily NADEGE Manzo   calcium carbonate-vitamin D 1 tablet Oral Daily With Breakfast NADEGE Manzo   clonazePAM 0 5 mg Oral HS NADEGE Manzo   dextran 70-hypromellose 1 drop Both Eyes 4x Daily Liya Haskins PA-C   guaiFENesin 600 mg Oral BID NADEGE Manzo   heparin (porcine) 5,000 Units Subcutaneous [facility] NADEGE Manzo   ipratropium 0 5 mg Nebulization Q6H Mina Ortega MD   lamoTRIgine 200 mg Oral Early Morning NADEGE Manzo   lamoTRIgine 250 mg Oral Daily With Lunch NADEGE Gonzalez   lamoTRIgine 300 mg Oral HS NADEGE Manzo   levalbuterol 1 25 mg Nebulization Q6H Mina Ortega MD levOCARNitine 330 mg Oral 4x Daily (PC & HS) Tresea Pill, CRNP   multivitamin stress formula 1 tablet Oral Daily Concepcion Arrieta MD   ondansetron 4 mg Intravenous Q6H PRN Tresea Pill, CRNP   primidone 100 mg Oral Daily With Lunch Tresea Pill, CRNP   primidone 150 mg Oral After Breakfast Tresea Pill, CRNP   primidone 150 mg Oral HS Tresea Pill, CRNP   vitamin E (tocopherol) 400 Units Oral Daily Tresea Pill, CRNP   zinc gluconate 50 mg Oral Daily Tresea Pill, CRNP   zonisamide 100 mg Oral HS Tresea Pill, CRNP     Continuous Infusions:     PRN Meds:     acetaminophen 650 mg Q6H PRN   albuterol 2 5 mg Q6H PRN   ondansetron 4 mg Q6H PRN     ---------------------------------------------------------------------------------------  Counseling / Coordination of Care    Total time spent today 30 minutes  Greater than 50% of total time was spent with the patient and / or family counseling and / or coordination of care  [address] [place] [address]      Portions of the record may have been created with voice recognition software  Occasional wrong word or "sound a like" substitutions may have occurred due to the inherent limitations of voice recognition software    Read the chart carefully and recognize, using context, where substitutions have occurred

## 2020-01-15 NOTE — ASSESSMENT & PLAN NOTE
· Likely related to PNA and RSV  · Elevated D-dimer  BLE duplex negative for DVT  · ABX  · Completed 5 days of vancomycin IV on 1/13  · Flagyl discontinued 1/12  · Cefepime discontinued 1/13  · HFNC, wean to nasal cannula as able  · Weaned from HFNC on 1/13, had episodes of apnea and desaturations during the night and was placed back on HFNC on 1/14  Again weaned on 1/14 to nasal cannula  Will order CPAP HS for suspected sleep apnea  Patient will likely need outpatient follow up for sleep study  · Xopenex/ Atrovent q6 hours, PRN albuterol  · Respiratory protocol  · Pulmonary [place]  · Mucinex PO PRN  · OOB to chair  · Trial of CPAP overnight 1/14/2020  Tolerated well and maintained O2 saturations >90%  · Currently on room air  Nasal cannula oxygen prn

## 2020-01-15 NOTE — OCCUPATIONAL THERAPY NOTE
OccupationalTherapy Evaluation     Patient Name: Tati Sen  HZMMI'[id number] Date: 1/15/2020  Problem List  Principal Problem:    RSV (acute bronchiolitis due to respiratory syncytial virus)  Active Problems:    Pneumonia due to infectious organism    Thrombocytopenia ([facility] )    Nonintractable generalized idiopathic epilepsy without status epilepticus ([facility] )    Contusion, flank, subsequent encounter    T wave inversion in EKG    Essential hypertension    Acute respiratory failure with hypoxia [facility])    Past Medical History  Past Medical History:   Diagnosis Date    Hypertension     Pericardial effusion     Pneumonia     Seizure [facility])      Past Surgical History  Past Surgical History:   Procedure Laterality Date    FRACTURE SURGERY      clavicle, left humerus, radial, and ulna  Right radius    HIP ARTHROSCOPY Right     TX COLONOSCOPY FLX DX W/COLLJ SPEC WHEN PFRMD N/A 4/1/2019    Procedure: COLONOSCOPY;  Surgeon: Bravo Renee MD;  Location:  GI LAB; Service: Colorectal           01/15/20 0805   Note Type   Note type Eval only   Restrictions/Precautions   Other Precautions Cognitive; Chair Alarm; Bed Alarm; Fall Risk;Droplet precautions;Contact/isolation;Multiple lines   Pain Assessment   Pain Assessment No/denies pain   Pain Score No Pain   Home Living   Type of [address] [place] [address] Two level;Bed/bath upstairs;Stairs to enter with rails  (2-3 ALESIA with HR  FF to 2nd floor to bed/bath)   Additional Comments Pt reports having a 2nd floor bedroom and bathroom with FF of steps with B HRs to access  Prior Function   Level of [place] Independent with ADLs and functional mobility   Lives With   (mother and brother)   Receives Help From Family   ADL Assistance Independent   IADLs Needs assistance   Falls in the last 6 months 1 to 4   Vocational   (retail greenhouse)   Comments Pt reports being I with ADL tasks and functional mobility      Lifestyle   Autonomy Pt reports completing ADL tasks @ I Reciprocal Relationships Pt lives at home with his brother and mother   Service to Others Pt works at a [employer]   [address] [employer] [address] enjoys being artistic and completing crafts   ADL   Where Assessed Edge of bed   [address]  Supervision/Setup   LB Dressing Assistance 3  Moderate Assistance   LB Dressing Deficit Steadying; Requires assistive device for steadying;Verbal cueing;Supervision/safety; Increased time to complete; Don/doff R sock; Don/doff L sock; Don/doff R shoe;Don/doff L shoe;Pull up over hips   Toileting Assistance  4  Minimal Assistance   Toileting Deficit Perineal hygiene;Clothing management up;Supervison/safety;Steadying;Verbal cueing; Increased time to complete   Bed Mobility   Supine to Sit 4  Minimal assistance   Additional items Assist x 1; Increased time required;Verbal cues   Additional Comments HOB flat without bedrail   Transfers   Sit to Stand 4  Minimal assistance   Additional items Assist x 1; Increased time required   Stand to Sit 4  Minimal assistance   Additional items Assist x 1; Increased time required   Stand pivot 4  Minimal assistance   Additional items Assist x 1; Increased time required   Additional Comments Completed transfers without AD with minAx1 with min cues for turning completely and direction   Balance   Static Sitting Fair +   Dynamic Sitting Fair   Static Standing Fair -   Dynamic Standing Poor +   Ambulatory Poor +   Activity Tolerance   Activity Tolerance Patient limited by fatigue   Medical Staff Made Aware Spoke with PTKamille   Nurse Made Aware Spoke with RN, Osa Cockayne Assessment   RUE Assessment WFL   LUE Assessment   LUE Assessment WFL   Cognition   Overall Cognitive Status Impaired   Arousal/Participation Alert; Responsive; Cooperative   Attention Difficulty attending to directions   Orientation Level Oriented to person;Oriented to place  (month)   Following Commands Follows one step commands with increased time or repetition   Assessment Limitation Decreased ADL status; Decreased UE strength;Decreased Safe judgement during ADL;Decreased cognition;Decreased endurance;Decreased high-level ADLs   Prognosis Good   Assessment Pt is a 49 y/o M admitted to [address] [place] [address] 1/10/2020 d/t experiencing worsening SOB, fever, and cough  Dx: Pneumonia  Pt with significant PMHx impacting performance during functional tasks including: epilepsy, HTN, seizure, pericardial effusion, developmental delay, recurrent falls  Pt reports he lives at home with his mother and brother in a 2 story home with 2-3 ALESIA with HR  Pt's bed/bath on 2nd floor  Pt reports completing ADL tasks @ I and functional mobility in the home  Pt has assistance with IADLs and community mobility  Some information obtained from CM note d/t Pt unable to accurately report details regarding home set-up or PLOF  On evaluation, pt completing supine>sit @ Min A with extended time, HOB flat and no handrails  Pt sitting EOB while completing LB dressing @ Mod A for donning socks/shoes and Cm around hips as well as steadying  Pt completing functional transfers @ Min A with extended time and vc'ing for safety and technique  Pt with short distance (15ft) ambulation to restroom with no AD @ Min A for steadying  Pt completing toileting @ Min A for CM and posterior pericare  Pt then returning to recliner chair  Upon end of session, Pt seated OOB in recliner with call bell in reach, tray table in front of Pt, chair alarm intact and all needs met at this time  Pt scoring 35/100 on Barthel Index  Pt presents with decrease activity tolerance, decrease standing balance, decrease sitting balance, decrease performance during ADL tasks, decrease cognition, decrease safety awareness , increase impulsiveness, decrease UB MS, decrease generalized strength, decrease activity engagement and decrease performance during functional transfers   Pt would benefit from continued acute OT services to address deficits as well as post acute rehab vs  [facility] OT with assistance from family PRN pending progress  Plan   Treatment Interventions ADL retraining;Functional transfer training;UE strengthening/ROM; Endurance training;Cognitive reorientation;Patient/family training;Equipment evaluation/education; Compensatory technique education;Continued evaluation; Energy conservation; Activityengagement   Goal Expiration Date 01/25/20   OT Frequency 3-5x/wk   Recommendation   Recommendation   (post acute rehab vs  OT)   OT Discharge Recommendation   (post acute rehab vs  OT)   Barthel Index   Feeding 10   Bathing 0   Grooming Score 5   Dressing Score 5   Bladder Score 0   Bowels Score 0   Toilet Use Score 5   Transfers (Bed/Chair) Score 10   Mobility (Level Surface) Score 0   Stairs Score 0   Barthel Index Score 35     Pt goals to be met by 1/25/2020    1  Pt will demonstrate ability to complete supine<>sit @ I in order to increase safety and independence during ADL tasks  2  Pt will demonstrate ability to complete UB ADLs including washing/dressing @ I in order to increase performance and participation during meaningful tasks  3  Pt will demonstrate ability to complete LB dressing @ Mod I in order to increase safety and independence during meaningful tasks  4  Pt will demonstrate ability to suzy/doff socks/shoes while sitting EOB @ I in order to increase safety and independence during meaningful tasks  5  Pt will demonstrate ability to complete toileting tasks including CM and pericare @ S with use of least restrictive device in order to increase safety and independence during meaningful tasks  6  Pt will demonstrate ability to complete EOB, chair, toilet/commode transfers @ Mod I with use of least restrictive device in order to increase performance and participation during functional tasks  7  Pt will demonstrate ability to stand for 15 minutes while maintaining G balance with use of least restrictive device for UB support PRN    8  Pt will demonstrate ability to tolerate 30-35 minute OT session with no vc'ing for deep breathing or use of energy conservation techniques in order to increase activity tolerance during functional tasks  9  Pt will demonstrate Good carryover of use of energy conservation/compensatory strategies during ADLs and functional tasks in order to increase safety and reduce risk for falls  10  Pt will demonstrate Good attention and participation in continued evaluation of functional ambulation house hold distances in order to assist with safe d/c planning  11  Pt will attend to continued cognitive assessments 100% of the time in order to provide most appropriate d/c recommendations  12  Pt/family will demonstrate 100% carryover of BUE HEP in order to increase BUE MS and increase performance during functional tasks upon d/c home      Birdie Whitaker OTR/L

## 2020-01-15 NOTE — PLAN OF CARE
Problem: OCCUPATIONAL THERAPY ADULT  Goal: Performs self-care activities at highest level of function for planned discharge setting  See evaluation for individualized goals  Description  Treatment Interventions: ADL retraining, Functional transfer training, UE strengthening/ROM, Endurance training, Cognitive reorientation, Patient/family training, Equipment evaluation/education, Compensatory technique education, Continued evaluation, Energy conservation, Activityengagement          See flowsheet documentation for full assessment, interventions and recommendations  Note:   Limitation: Decreased ADL status, Decreased UE strength, Decreased Safe judgement during ADL, Decreased cognition, Decreased endurance, Decreased high-level ADLs  Prognosis: Good  Assessment: Pt is a 49 y/o M admitted to [facility] 1/10/2020 d/t experiencing worsening SOB, fever, and cough  Dx: Pneumonia  Pt with significant PMHx impacting performance during functional tasks including: epilepsy, HTN, seizure, pericardial effusion, developmental delay, recurrent falls  Pt reports he lives at home with his mother and brother in a 2 story home with 2-3 ALESIA with HR  Pt's bed/bath on 2nd floor  Pt reports completing ADL tasks @ I and functional mobility in the home  Pt has assistance with IADLs and community mobility  Some information obtained from CM note d/t Pt unable to accurately report details regarding home set-up or PLOF  On evaluation, pt completing supine>sit @ Min A with extended time, HOB flat and no handrails  Pt sitting EOB while completing LB dressing @ Mod A for donning socks/shoes and Cm around hips as well as steadying  Pt completing functional transfers @ Min A with extended time and vc'ing for safety and technique  Pt with short distance (15ft) ambulation to restroom with no AD @ Min A for steadying  Pt completing toileting @ Min A for CM and posterior pericare  Pt then returning to recliner chair   Upon end of session, Pt seated OOB in recliner with call bell in reach, tray table in front of Pt, chair alarm intact and all needs met at this time  Pt scoring 35/100 on Barthel Index  Pt presents with decrease activity tolerance, decrease standing balance, decrease sitting balance, decrease performance during ADL tasks, decrease cognition, decrease safety awareness , increase impulsiveness, decrease UB MS, decrease generalized strength, decrease activity engagement and decrease performance during functional transfers  Pt would benefit from continued acute OT services to address deficits as well as post acute rehab vs  [facility] [place] OT with assistance from family PRN pending progress     Recommendation: (post acute rehab vs [place] OT)  OT Discharge Recommendation: (post acute rehab vs [place] OT)

## 2020-01-15 NOTE — PLAN OF CARE
Problem: PHYSICAL THERAPY ADULT  Goal: Performs mobility at highest level of function for planned discharge setting  See evaluation for individualized goals  Description  Treatment/Interventions: Functional transfer training, LE strengthening/ROM, Elevations, Therapeutic exercise, Endurance training, Patient/family training, Cognitive reorientation, Equipment eval/education, Bed mobility, Gait training, Compensatory technique education, Spoke to nursing, OT  Equipment Recommended: Anay [place]ón       See flowsheet documentation for full assessment, interventions and recommendations  Note:   Prognosis: Good  Problem List: Decreased strength, Decreased endurance, Impaired balance, Decreased mobility, Decreased cognition, Impaired judgement, Decreased safety awareness  Assessment: Pt is a 48 y o  male seen for PT evaluation s/p admission to [address] [place] [address] on 1/10/2020 with RSV (acute bronchiolitis due to respiratory syncytial virus)  Pt recently diagnoosed with PNA  Order placed for PT services    Upon evaluation: Pt is presenting with impaired functional mobility due to decreased strength, decreased endurance, impaired balance, gait deviations, impaired cognition, decreased safety awareness, impaired judgment, fall risk and LE edema requiring minimal assistance for bed mobility, minimal assistance for transfers and minimal assistance for ambulation with RW  Pt's clinical presentation is currently unstable/unpredictable given the functional mobility deficits above, especially weakness, edema of extremities, decreased endurance, gait deviations, decreased activity tolerance, decreased functional mobility tolerance, decreased safety awareness, impaired judgement, decreased cognition and SOB upon exertion, coupled with fall risks as indicated by AM-PAC 6-Clicks: 62/23 as well as hx of falls, impaired balance, impaired judgement, decreased safety awareness and decreased cognition and combined with medical complications of tachycardia, low SpO2 values and need for input for mobility technique/safety  Pt's PMHx and comorbidities that may affect physical performance and progress include: HTN, obesity, limited cognition with developmental delay and epilepsy, thrombocytopenia  Personal factors affecting pt at time of IE include: step(s) to enter environment, multi-level environment, inability to perform ADLs, inability to navigate level surfaces without external assistance, inability to ambulate household distances, inability to navigate community distances and recent fall(s)/fall history  Pt will benefit from continued skilled PT services to address deficits as defined above and to maximize level of functional mobility to facilitate return toward PLOF and improved QOL  From PT/mobility standpoint, recommendation at time of d/c would be STR vs  Home PT pending progress in order to reduce fall risk and maximize pt's functional independence and consistency with mobility in order to facilitate return to PLOF  Recommend trial with walker next 1-2 sessions and ther ex next 1-2 sessions  Recommendation: Short-term skilled PT, Home PT(STR vs Home PT pending progress)          See flowsheet documentation for full assessment

## 2020-01-15 NOTE — CASE MANAGEMENT
Chart reviewed aware of medical management:  RSV pneumonia, possible bacterial superinfection pneumonia   Acute respiratory failure with hypoxemia  Improving  Reviewed therapy notes and discuss dc plans with patient's Mother  We discussed STR vs Home with home care  At this time her choice is HHC  Educated on [address], per Mrs Rachell Cabot they have used [address] in the past although she could not recall the agency  A post acute care recommendation was made by your care team for [address]  Discussed Freedom of Choice with caregiver  List of agencies given to caregiver via in person  caregiver aware the list is custom filtered for them by zip code location   Mrs Rachell Cabot is going to review the list and CM will need to follow up 1/16:  Also noting patient may need home 02  Will ask MD to order home 02 evaluation and determine if nocturnal overnight oxymetry is needed

## 2020-01-15 NOTE — ASSESSMENT & PLAN NOTE
· History of seizures since a child, has 1-2 year, with recent increase in past months to one/month  · Patient is on Lamictal, Mysoline, clonazepam, Zonisemide  · Per Neurology, medications contribute to unsteady gait  · Check Lamictal, Mysoline, zonisamide levels  · Will take 2-5 days for results  · Resume home meds and follow up levels  · Seizure precautions  · Fall precautions

## 2020-01-15 NOTE — PLAN OF CARE
Problem: PHYSICAL THERAPY ADULT  Goal: Performs mobility at highest level of function for planned discharge setting  See evaluation for individualized goals  Description  Treatment/Interventions: Functional transfer training, LE strengthening/ROM, Elevations, Therapeutic exercise, Endurance training, Patient/family training, Cognitive reorientation, Equipment eval/education, Bed mobility, Gait training, Compensatory technique education, Spoke to nursing, OT  Equipment Recommended: Miranda Awad       See flowsheet documentation for full assessment, interventions and recommendations  4/07/4553 [id number] by Chris Hagen PT  Outcome: Progressing  Note:   Prognosis: Good  Problem List: Decreased strength, Decreased endurance, Impaired balance, Decreased mobility, Decreased cognition, Impaired judgement, Decreased safety awareness   Pt performed ambulation with decreased assistance and improved gait pattern with noted increased step length bilaterally  He required one 10 second standing rest break to recover desaturation of SpO2 to 86%, increasing to 91%  He requires minimal cues for hand placement with transfers and proper use of RW  He will continue to benefit from PT services to maximize LOF  STR vs home PT pending progress as patient has stairs to access home environment and was working  He would benefit form STR to facilitate return to home and work  Recommendation: Short-term skilled PT, Home PT(STR vs Home PT pending progress)          See flowsheet documentation for full assessment

## 2020-01-15 NOTE — ASSESSMENT & PLAN NOTE
· Supportive Care  · Droplet precautions-discontinue 1/14 as patient is not immunocompromised  · High flow nasal cannula O2 for comfort- wean as tolerated, now on nasal cannula  · Was weaned 01/13 to nasal cannula, overnight placed back on high-flow due to desats to 85%  · Trial on CPAP overnight 1/14/2020  Tolerated well and maintained O2 saturations > 90%  · Currently on room air  Nasal cannula oxygen prn

## 2020-01-15 NOTE — ASSESSMENT & PLAN NOTE
· Pneumonia found on CT scan 1/7, after a fall  · CT AP 1/7: Left upper lobe alveolar opacities likely representing pneumonia  · PCXR 1/10:  Left upper lobe opacity  · Was treated outpatient with oral Zithromax   · Started on vancomycin, cefepime, flagyl on admit  · Speech eval 1/11 and no signs of aspiration-->  D/C'd flagyl on 1/12  · Completed 5 days of vancomycin IV on 1/13  Cefepime discontinue 1/13    · Check procalcitonin- 2 84 -> 4 0 -> 2 74 -> 1 9 -> 1 15  · Legionella and strep pneumoniae- negative  · Blood cultures-negative  · Sputum-mixed leonides  · Respiratory and oxygen protocol  · MRSA swab +  · bactroban started 1/12, discontinued per policy 0/77  · Contact isolation  · Weaned from HFNC on 1/14, continue nasal cannula prn to maintain saturations > 90%  · Aggressive pulmonary toliet

## 2020-01-15 NOTE — ASSESSMENT & PLAN NOTE
· Does not appear to be on antihypertensive, however is on Lasix 40mg QD for chronic lower extremity edema  · Received one dose of Lasix 20 mg IV x1   · Monitor BP  · PRN labetalol and hydralazine for now  · Consider adding po once patient is more stable

## 2020-01-16 LAB
ANION GAP SERPL CALCULATED.3IONS-SCNC: 6 MMOL/L (ref 4–13)
BUN SERPL-MCNC: 19 MG/DL (ref 5–25)
CALCIUM SERPL-MCNC: 8.2 MG/DL (ref 8.3–10.1)
CHLORIDE SERPL-SCNC: 103 MMOL/L (ref 100–108)
CO2 SERPL-SCNC: 31 MMOL/L (ref 21–32)
CREAT SERPL-MCNC: 0.92 MG/DL (ref 0.6–1.3)
ERYTHROCYTE [DISTWIDTH] IN BLOOD BY AUTOMATED COUNT: 14.2 % (ref 11.6–15.1)
GFR SERPL CREATININE-BSD FRML MDRD: 95 ML/MIN/1.73SQ M
GLUCOSE SERPL-MCNC: 101 MG/DL (ref 65–140)
HCT VFR BLD AUTO: 37.2 % (ref 36.5–49.3)
HGB BLD-MCNC: 12.1 G/DL (ref 12–17)
MCH RBC QN AUTO: 31.8 PG (ref 26.8–34.3)
MCHC RBC AUTO-ENTMCNC: 32.5 G/DL (ref 31.4–37.4)
MCV RBC AUTO: 98 FL (ref 82–98)
PLATELET # BLD AUTO: 194 THOUSANDS/UL (ref 149–390)
PMV BLD AUTO: 11.1 FL (ref 8.9–12.7)
POTASSIUM SERPL-SCNC: 4.2 MMOL/L (ref 3.5–5.3)
RBC # BLD AUTO: 3.8 MILLION/UL (ref 3.88–5.62)
SODIUM SERPL-SCNC: 140 MMOL/L (ref 136–145)
WBC # BLD AUTO: 6.02 THOUSAND/UL (ref 4.31–10.16)

## 2020-01-16 PROCEDURE — 94762 N-INVAS EAR/PLS OXIMTRY CONT: CPT

## 2020-01-16 PROCEDURE — 94760 N-INVAS EAR/PLS OXIMETRY 1: CPT

## 2020-01-16 PROCEDURE — 99232 SBSQ HOSP IP/OBS MODERATE 35: CPT | Performed by: FAMILY MEDICINE

## 2020-01-16 PROCEDURE — 94640 AIRWAY INHALATION TREATMENT: CPT

## 2020-01-16 PROCEDURE — 85027 COMPLETE CBC AUTOMATED: CPT | Performed by: NURSE PRACTITIONER

## 2020-01-16 PROCEDURE — 94660 CPAP INITIATION&MGMT: CPT

## 2020-01-16 PROCEDURE — 80048 BASIC METABOLIC PNL TOTAL CA: CPT | Performed by: NURSE PRACTITIONER

## 2020-01-16 RX ORDER — LAMOTRIGINE 100 MG/1
250 TABLET ORAL
Status: DISCONTINUED | OUTPATIENT
Start: 2020-01-17 | End: 2020-01-17 | Stop reason: HOSPADM

## 2020-01-16 RX ORDER — LAMOTRIGINE 100 MG/1
200 TABLET ORAL
Status: DISCONTINUED | OUTPATIENT
Start: 2020-01-17 | End: 2020-01-17 | Stop reason: HOSPADM

## 2020-01-16 RX ORDER — PRIMIDONE 50 MG/1
150 TABLET ORAL
Status: DISCONTINUED | OUTPATIENT
Start: 2020-01-17 | End: 2020-01-17 | Stop reason: HOSPADM

## 2020-01-16 RX ADMIN — DEXTRAN 70, AND HYPROMELLOSE 2910 1 DROP: 1; 3 SOLUTION/ DROPS OPHTHALMIC at 09:38

## 2020-01-16 RX ADMIN — LEVALBUTEROL HYDROCHLORIDE 1.25 MG: 1.25 SOLUTION, CONCENTRATE RESPIRATORY (INHALATION) at 21:39

## 2020-01-16 RX ADMIN — PRIMIDONE 100 MG: 50 TABLET ORAL at 12:37

## 2020-01-16 RX ADMIN — LAMOTRIGINE 200 MG: 100 TABLET ORAL at 06:17

## 2020-01-16 RX ADMIN — IPRATROPIUM BROMIDE 0.5 MG: 0.5 SOLUTION RESPIRATORY (INHALATION) at 14:00

## 2020-01-16 RX ADMIN — PRIMIDONE 150 MG: 50 TABLET ORAL at 09:38

## 2020-01-16 RX ADMIN — Medication 1 TABLET: at 09:50

## 2020-01-16 RX ADMIN — MICONAZOLE NITRATE: 20 CREAM TOPICAL at 17:49

## 2020-01-16 RX ADMIN — MICONAZOLE NITRATE: 20 CREAM TOPICAL at 12:37

## 2020-01-16 RX ADMIN — ASPIRIN 81 MG 81 MG: 81 TABLET ORAL at 09:37

## 2020-01-16 RX ADMIN — HEPARIN SODIUM 5000 UNITS: 5000 INJECTION INTRAVENOUS; SUBCUTANEOUS at 22:58

## 2020-01-16 RX ADMIN — ZINC 1 TABLET: TAB ORAL at 09:38

## 2020-01-16 RX ADMIN — ZONISAMIDE 100 MG: 100 CAPSULE ORAL at 22:57

## 2020-01-16 RX ADMIN — LEVALBUTEROL HYDROCHLORIDE 1.25 MG: 1.25 SOLUTION, CONCENTRATE RESPIRATORY (INHALATION) at 01:27

## 2020-01-16 RX ADMIN — HEPARIN SODIUM 5000 UNITS: 5000 INJECTION INTRAVENOUS; SUBCUTANEOUS at 14:35

## 2020-01-16 RX ADMIN — IPRATROPIUM BROMIDE 0.5 MG: 0.5 SOLUTION RESPIRATORY (INHALATION) at 21:39

## 2020-01-16 RX ADMIN — Medication 50 MG: at 09:38

## 2020-01-16 RX ADMIN — LEVOCARNITINE 330 MG: 330 TABLET ORAL at 12:38

## 2020-01-16 RX ADMIN — LEVALBUTEROL HYDROCHLORIDE 1.25 MG: 1.25 SOLUTION, CONCENTRATE RESPIRATORY (INHALATION) at 08:50

## 2020-01-16 RX ADMIN — LAMOTRIGINE 300 MG: 100 TABLET ORAL at 22:58

## 2020-01-16 RX ADMIN — LEVOCARNITINE 330 MG: 330 TABLET ORAL at 17:49

## 2020-01-16 RX ADMIN — LAMOTRIGINE 250 MG: 100 TABLET ORAL at 12:38

## 2020-01-16 RX ADMIN — LEVOCARNITINE 330 MG: 330 TABLET ORAL at 09:39

## 2020-01-16 RX ADMIN — CLONAZEPAM 0.5 MG: 0.5 TABLET ORAL at 22:58

## 2020-01-16 RX ADMIN — DEXTRAN 70, AND HYPROMELLOSE 2910 1 DROP: 1; 3 SOLUTION/ DROPS OPHTHALMIC at 17:49

## 2020-01-16 RX ADMIN — IPRATROPIUM BROMIDE 0.5 MG: 0.5 SOLUTION RESPIRATORY (INHALATION) at 01:27

## 2020-01-16 RX ADMIN — PRIMIDONE 150 MG: 50 TABLET ORAL at 22:57

## 2020-01-16 RX ADMIN — GUAIFENESIN 600 MG: 600 TABLET ORAL at 09:50

## 2020-01-16 RX ADMIN — HEPARIN SODIUM 5000 UNITS: 5000 INJECTION INTRAVENOUS; SUBCUTANEOUS at 06:17

## 2020-01-16 RX ADMIN — OXYCODONE HYDROCHLORIDE AND ACETAMINOPHEN 500 MG: 500 TABLET ORAL at 09:37

## 2020-01-16 RX ADMIN — GUAIFENESIN 600 MG: 600 TABLET ORAL at 17:49

## 2020-01-16 RX ADMIN — Medication 400 UNITS: at 09:37

## 2020-01-16 RX ADMIN — LEVALBUTEROL HYDROCHLORIDE 1.25 MG: 1.25 SOLUTION, CONCENTRATE RESPIRATORY (INHALATION) at 14:00

## 2020-01-16 RX ADMIN — IPRATROPIUM BROMIDE 0.5 MG: 0.5 SOLUTION RESPIRATORY (INHALATION) at 08:50

## 2020-01-16 RX ADMIN — DEXTRAN 70, AND HYPROMELLOSE 2910 1 DROP: 1; 3 SOLUTION/ DROPS OPHTHALMIC at 12:35

## 2020-01-16 RX ADMIN — LEVOCARNITINE 330 MG: 330 TABLET ORAL at 22:57

## 2020-01-16 RX ADMIN — DEXTRAN 70, AND HYPROMELLOSE 2910 1 DROP: 1; 3 SOLUTION/ DROPS OPHTHALMIC at 22:57

## 2020-01-16 NOTE — CASE MANAGEMENT
In follow up with Mrs Natalie Daniel she is concerned that she will not be able to provide enough assistance for her son  She spoke to her Brother who resides in [place] and she is requesting a referral to [employer]  ( she is planning on staying with her brother in [facility])    CM will refer to [employer] to review

## 2020-01-16 NOTE — ASSESSMENT & PLAN NOTE
· Likely related to pneumonia and RSV  Patient finish the course of antibiotics for the pneumonia  Supportive care for the RSV  · Patient was on high-flow nasal cannula requiring intensive care admission and was able to be weaned off  · Patient requiring CPAP q h s  For suspected sleep apnea    Eventually need sleep study  · Continue with the respiratory protocol  · Airway clearance protocol

## 2020-01-16 NOTE — QUICK NOTE
Mother updated in the room,  Mother reported that patient usually takes is seizure medication every 8 hours, 07:00 15:00 and 22:00-dosing adjusted  Discussed with the nurses about keeping the schedule

## 2020-01-16 NOTE — PROGRESS NOTES
Progress Note - Lui Olivares 1965, 48 y o  male MRN: [id number]    Unit/Bed#: -01 Encounter: [id number]    Primary Care Provider: Maria G Wright DO   Date and time admitted to hospital: 1/10/2020  5:20 AM        Acute respiratory failure with hypoxia [facility])  Assessment & Plan  · Likely related to pneumonia and RSV  Patient finish the course of antibiotics for the pneumonia  Supportive care for the RSV  · Patient was on high-flow nasal cannula requiring intensive care admission and was able to be weaned off  · Patient requiring CPAP q h s  For suspected sleep apnea  Eventually need sleep study  · Continue with the respiratory protocol  · Airway clearance protocol    Essential hypertension  Assessment & Plan  · Blood pressure acceptable    T wave inversion in EKG  Assessment & Plan  · EKG:  with T-wave inversions V3 V4 V5 V6  No old for comparison  · No chest pain  · Troponin <0 03  · Trend troponin x 3  · Outpatient follow-up    Contusion, flank, subsequent encounter  Assessment & Plan  · S/p fall 1/7  · CT abdomen pelvis:  No traumatic thoracic or intra-abdominal abnormality identified  Stranding within the subcutaneous tissues of the right flank consistent with a contusion  ·     Nonintractable generalized idiopathic epilepsy without status epilepticus ([address] )  Assessment & Plan  · History of seizures since a child, has 1-2 year, with recent increase in past months to one/month  · Is on Lamictal Mysoline clonazepam Zonisemide  · Per Neurology, medications contribute to unsteady gait  · Seizure precautions  · Fall precautions  · Patient had 1 episode of tonic-clonic seizure lasting for about 5 seconds spontaneously resolved while eating the breakfast today  Discussed with Neurology on-call  No need to adjust the medication      Thrombocytopenia (HCC)  Assessment & Plan  · Chronic thrombocytopenia by reviewing the records from outside  · Monitor for signs of bleeding  · Daily CBC and trend platelets    Pneumonia due to infectious organism  Assessment & Plan  · Pneumonia found on CT scan 1/7, after a fall  · CT AP 1/7: Left upper lobe alveolar opacities likely representing pneumonia,Was treated outpatient with oral Zithromax   · Failure to improve, cefepime and vanc given in the ER and also on Flagyl  · Blood cultures remained negative, urine strep and Legionella negative  · Antibiotics discontinued after 5 daysprocalcitonin remained negative  · Symptom control  · Respiratory and oxygen protocol    * RSV (acute bronchiolitis due to respiratory syncytial virus)  Assessment & Plan  · Supportive care  · Droplet precautions were discontinued as per protocol  · Patient was able to be weaned off of the high-flow oxygen  · Patient was on in nasal cannula, wean off as tolerated    Elevated serum creatinineresolved as of 1/15/2020  Assessment & Plan  · Creatinine 1 45  Baseline is 1 0 to 1 2   · Monitor Cr      VTE Pharmacologic Prophylaxis:   Pharmacologic: heparin  Mechanical VTE Prophylaxis in Place: Yes    Patient Centered Rounds: I have performed bedside rounds with nursing staff today  Discussions with Specialists or Other Care Team Provider:  Neurology    Education and Discussions with Family / Patient:  Called mother twice today  No answer  No option to leave a voicemail     Time Spent for Care: 30 minutes  More than 50% of total time spent on counseling and coordination of care as described above  Current Length of Stay: 6 day(s)    Current Patient Status: Inpatient   Certification Statement: The patient will continue to require additional inpatient hospital stay due to Pending rehab    Discharge Plan:     Code Status: Level 1 - Full Code      Subjective:   Patient seen and examined  Patient had a brief episode of seizure activity while he was eating breakfast   Lasted for about 5 seconds or so  Discussed with Neurology    Likely secondary to his acute illness lowering the seizure threshold no need to adjust the medications at this point  Attempted to contact his mother multiple times no answer and no option to leave a voicemail either    Objective:     Vitals:   Temp (24hrs), Av 7 °F (36 5 °C), Min:97 3 °F (36 3 °C), Max:98 4 °F (36 9 °C)    Temp:  [97 3 °F (36 3 °C)-98 4 °F (36 9 °C)] 97 3 °F (36 3 °C)  HR:  [] 103  Resp:  [19-22] 19  BP: (115-142)/(78-97) 124/78  SpO2:  [90 %-96 %] 94 %  Body mass index is 33 76 kg/m²  Input and Output Summary (last 24 hours): Intake/Output Summary (Last 24 hours) at 2020 1625  Last data filed at 2020 1501  Gross per 24 hour   Intake 360 ml   Output 1180 ml   Net -820 ml       Physical Exam:     Physical Exam   Constitutional: No distress  HENT:   Left mild subconjunctivae hemorrhage noted   Eyes: Right eye exhibits no discharge  Left eye exhibits no discharge  Neck: No JVD present  Cardiovascular: Normal rate  Pulmonary/Chest: Effort normal    Decreased breath sounds bilateral  Bilateral rhonchi   Abdominal: Soft  Musculoskeletal: Normal range of motion  He exhibits no edema  Neurological: He is alert  No cranial nerve deficit  Skin: Skin is warm  He is not diaphoretic  Additional Data:     Labs:    Results from last 7 days   Lab Units 20  0435  20  0505   WBC Thousand/uL 6 02   < > 5 59   HEMOGLOBIN g/dL 12 1   < > 13 4   HEMATOCRIT % 37 2   < > 41 0   PLATELETS Thousands/uL 194   < > 110*   NEUTROS PCT %  --   --  74   LYMPHS PCT %  --   --  12*   MONOS PCT %  --   --  14*   EOS PCT %  --   --  0    < > = values in this interval not displayed       Results from last 7 days   Lab Units 20  0435  20  0440   POTASSIUM mmol/L 4 2   < > 3 9   CHLORIDE mmol/L 103   < > 102   CO2 mmol/L 31   < > 29   BUN mg/dL 19   < > 21   CREATININE mg/dL 0 92   < > 1 20   CALCIUM mg/dL 8 2*   < > 7 7*   ALK PHOS U/L  --   --  150*   ALT U/L  --   --  29   AST U/L  --   --  38    < > = values in this interval not displayed  Results from last 7 days   Lab Units 01/10/20  0526   INR  1 24*       * I Have Reviewed All Lab Data Listed Above  * Additional Pertinent Lab Tests Reviewed: All Labs Within Last 24 Hours Reviewed    Imaging:    Imaging Reports Reviewed Today Include:   Imaging Personally Reviewed by Myself Includes:      Recent Cultures (last 7 days):     Results from last 7 days   Lab Units 01/10/20  1538 01/10/20  1126 01/10/20  0536 01/10/20  0526   BLOOD CULTURE   --   --  No Growth After 5 Days  No Growth After 5 Days  SPUTUM CULTURE  2+ Growth of   Commensal respiratory leonides only; No significant growth of Staph aureus/MRSA or Pseudomonas aeruginosa    --   --   --    GRAM STAIN RESULT  Epithelial cells per low power field*  Rare Polys*  1+ Gram positive cocci in pairs*  --   --   --    LEGIONELLA URINARY ANTIGEN   --  Negative  --   --        Last 24 Hours Medication List:     Current Facility-Administered Medications:  acetaminophen 650 mg Oral Q6H PRN NADEGE Sarah   albuterol 2 5 mg Nebulization Q6H PRN NADEGE Sarah   ascorbic acid 500 mg Oral Daily NADEGE Sarah   aspirin 81 mg Oral Daily NADEGE Sarah   calcium carbonate-vitamin D 1 tablet Oral Daily With Breakfast NADEGE Sarah   clonazePAM 0 5 mg Oral HS NADEGE Sarah   dextran 70-hypromellose 1 drop Both Eyes 4x Daily NADEGE Sarah   guaiFENesin 600 mg Oral BID NADEGE Sarah   heparin (porcine) 5,000 Units Subcutaneous Q8H [address] NADEGE Sarah   ipratropium 0 5 mg Nebulization Q6H NADEGE Sarah   lamoTRIgine 200 mg Oral Early Morning NADEGE Sarah   lamoTRIgine 250 mg Oral Daily With Lunch NADEGE Sarah   lamoTRIgine 300 mg Oral HS NADEGE Sarah   levalbuterol 1 25 mg Nebulization Q6H NADEGE Sarah   levOCARNitine 330 mg Oral 4x Daily (PC & HS) NADEGE Gonzales   miconazole  Topical BID Aaron Nagy MD   multivitamin stress formula 1 tablet Oral Daily NADEGE Sarah   ondansetron 4 mg Intravenous Q6H PRN NADEGE Sarah   primidone 100 mg Oral Daily With Lunch NADEGE Sarah   primidone 150 mg Oral After Breakfast NADEGE Sarah   primidone 150 mg Oral HS NADEGE Sarah   vitamin E (tocopherol) 400 Units Oral Daily NADEGE Sarah   zinc gluconate 50 mg Oral Daily NADEGE Sarah   zonisamide 100 mg Oral HS NADEGE Khan        Today, Patient Was Seen By: Arie Lerma MD    ** Please Note: Dictation voice to text software may have been used in the creation of this document   **

## 2020-01-16 NOTE — RESPIRATORY THERAPY NOTE
RT Protocol Note  Dimitri Goode 48 y o  male MRN: [id number]  Unit/Bed#: -01 Encounter: [id number]    Assessment    Principal Problem:    RSV (acute bronchiolitis due to respiratory syncytial virus)  Active Problems:    Pneumonia due to infectious organism    Thrombocytopenia (HCC)    Nonintractable generalized idiopathic epilepsy without status epilepticus ([facility] [address] )    Contusion, flank, subsequent encounter    T wave inversion in EKG    Essential hypertension    Acute respiratory failure with hypoxia ([facility] [address] )      Home Pulmonary Medications:         Past Medical History:   Diagnosis Date    Hypertension     Pericardial effusion     Pneumonia     Seizure ([facility] [address] )      Social History     Socioeconomic History    Marital status: Unknown     Spouse name: None    Number of children: None    Years of education: None    Highest education level: None   Occupational History    None   Social Needs    Financial resource strain: None    Food insecurity:     Worry: None     Inability: None    Transportation needs:     Medical: None     Non-medical: None   Tobacco Use    Smoking status: Never Smoker    Smokeless tobacco: Never Used   Substance and Sexual Activity    Alcohol use: Never     Frequency: Never    Drug use: Never    Sexual activity: None   Lifestyle    Physical activity:     Days per week: None     Minutes per session: None    Stress: None   Relationships    Social connections:     Talks on phone: None     Gets together: None     Attends Mandaeism service: None     Active member of club or organization: None     Attends meetings of clubs or organizations: None     Relationship status: None    Intimate partner violence:     Fear of current or ex partner: None     Emotionally abused: None     Physically abused: None     Forced sexual activity: None   Other Topics Concern    None   Social History Narrative    None       Subjective         Objective    Physical Exam:   Assessment Type: During-treatment  General Appearance: Awake, Other (Comment)  Chest Assessment: Chest expansion symmetrical  Bilateral Breath Sounds: Diminished, Clear  O2 Device: CPAP with 2L O2    Vitals:  Blood pressure 128/97, pulse 102, temperature 98 4 °F (36 9 °C), temperature source Oral, resp  rate 21, height 5' 8" (1 727 m), weight 101 kg (222 lb 0 1 oz), SpO2 96 %  Results from last 7 days   Lab Units 01/10/20  1045   PH ART  7 395   PCO2 ART mm Hg 38 8   PO2 ART mm Hg 35 9*   HCO3 ART mmol/L 23 2   BASE EXC ART mmol/L -1 4   O2 CONTENT ART mL/dL 13 8*   O2 HGB, ARTERIAL % 68 4*   ABG SOURCE  Radial, Right   SHAN TEST  Yes       Imaging and other studies: I have personally reviewed pertinent reports  O2 Device: CPAP with 2L O2     Plan    Respiratory Plan: Mild Distress pathway        Resp Comments: Pt with cognative impairment at baseline  No distress

## 2020-01-16 NOTE — ASSESSMENT & PLAN NOTE
· Supportive care  · Droplet precautions were discontinued as per protocol  · Patient was able to be weaned off of the high-flow oxygen     · Patient was on in nasal cannula, wean off as tolerated

## 2020-01-17 ENCOUNTER — APPOINTMENT (INPATIENT)
Dept: NEUROLOGY | Facility: HOSPITAL | Age: 55
DRG: 871 | End: 2020-01-17
Payer: MEDICARE

## 2020-01-17 ENCOUNTER — APPOINTMENT (INPATIENT)
Dept: NEUROLOGY | Facility: CLINIC | Age: 55
DRG: 101 | End: 2020-01-17
Payer: MEDICARE

## 2020-01-17 ENCOUNTER — HOSPITAL ENCOUNTER (INPATIENT)
Facility: HOSPITAL | Age: 55
LOS: 4 days | DRG: 101 | End: 2020-01-21
Attending: INTERNAL MEDICINE | Admitting: GENERAL PRACTICE
Payer: MEDICARE

## 2020-01-17 VITALS
WEIGHT: 222 LBS | HEART RATE: 92 BPM | OXYGEN SATURATION: 94 % | TEMPERATURE: 97.8 F | DIASTOLIC BLOOD PRESSURE: 82 MMHG | HEIGHT: 68 IN | RESPIRATION RATE: 16 BRPM | BODY MASS INDEX: 33.65 KG/M2 | SYSTOLIC BLOOD PRESSURE: 130 MMHG

## 2020-01-17 DIAGNOSIS — J21.0 RSV (ACUTE BRONCHIOLITIS DUE TO RESPIRATORY SYNCYTIAL VIRUS): Primary | ICD-10-CM

## 2020-01-17 DIAGNOSIS — G40.309 NONINTRACTABLE GENERALIZED IDIOPATHIC EPILEPSY WITHOUT STATUS EPILEPTICUS (HCC): ICD-10-CM

## 2020-01-17 DIAGNOSIS — J96.01 ACUTE RESPIRATORY FAILURE WITH HYPOXIA (HCC): ICD-10-CM

## 2020-01-17 PROBLEM — G93.40 ACUTE ENCEPHALOPATHY: Status: ACTIVE | Noted: 2020-01-17

## 2020-01-17 PROBLEM — R13.10 DYSPHAGIA: Status: ACTIVE | Noted: 2020-01-17

## 2020-01-17 PROBLEM — R62.50 DEVELOPMENTAL DELAY: Status: ACTIVE | Noted: 2020-01-17

## 2020-01-17 PROCEDURE — 95715 VEEG EA 12-26HR INTMT MNTR: CPT

## 2020-01-17 PROCEDURE — 95700 EEG CONT REC W/VID EEG TECH: CPT

## 2020-01-17 PROCEDURE — 99223 1ST HOSP IP/OBS HIGH 75: CPT | Performed by: GENERAL PRACTICE

## 2020-01-17 PROCEDURE — 94762 N-INVAS EAR/PLS OXIMTRY CONT: CPT

## 2020-01-17 PROCEDURE — 4A10X4Z MONITORING OF CENTRAL NERVOUS ELECTRICAL ACTIVITY, EXTERNAL APPROACH: ICD-10-PCS | Performed by: PSYCHIATRY & NEUROLOGY

## 2020-01-17 PROCEDURE — 94660 CPAP INITIATION&MGMT: CPT

## 2020-01-17 PROCEDURE — 99239 HOSP IP/OBS DSCHRG MGMT >30: CPT | Performed by: FAMILY MEDICINE

## 2020-01-17 PROCEDURE — 97530 THERAPEUTIC ACTIVITIES: CPT

## 2020-01-17 PROCEDURE — 94640 AIRWAY INHALATION TREATMENT: CPT

## 2020-01-17 PROCEDURE — 94760 N-INVAS EAR/PLS OXIMETRY 1: CPT

## 2020-01-17 RX ORDER — ASPIRIN 81 MG/1
81 TABLET, CHEWABLE ORAL DAILY
Status: CANCELLED | OUTPATIENT
Start: 2020-01-18

## 2020-01-17 RX ORDER — ZINC GLUCONATE 50 MG
50 TABLET ORAL DAILY
Status: CANCELLED | OUTPATIENT
Start: 2020-01-18

## 2020-01-17 RX ORDER — LAMOTRIGINE 100 MG/1
200 TABLET ORAL
Status: CANCELLED | OUTPATIENT
Start: 2020-01-18

## 2020-01-17 RX ORDER — ZONISAMIDE 100 MG/1
100 CAPSULE ORAL
Status: CANCELLED | OUTPATIENT
Start: 2020-01-17

## 2020-01-17 RX ORDER — ASCORBIC ACID 500 MG
500 TABLET ORAL DAILY
Status: CANCELLED | OUTPATIENT
Start: 2020-01-18

## 2020-01-17 RX ORDER — CLONAZEPAM 0.5 MG/1
0.5 TABLET ORAL
Status: DISCONTINUED | OUTPATIENT
Start: 2020-01-17 | End: 2020-01-21 | Stop reason: HOSPADM

## 2020-01-17 RX ORDER — ASCORBIC ACID 500 MG
500 TABLET ORAL DAILY
Status: DISCONTINUED | OUTPATIENT
Start: 2020-01-18 | End: 2020-01-21 | Stop reason: HOSPADM

## 2020-01-17 RX ORDER — ALBUTEROL SULFATE 2.5 MG/3ML
2.5 SOLUTION RESPIRATORY (INHALATION) EVERY 4 HOURS PRN
Status: DISCONTINUED | OUTPATIENT
Start: 2020-01-17 | End: 2020-01-21 | Stop reason: HOSPADM

## 2020-01-17 RX ORDER — ZONISAMIDE 100 MG/1
100 CAPSULE ORAL
Status: DISCONTINUED | OUTPATIENT
Start: 2020-01-17 | End: 2020-01-21 | Stop reason: HOSPADM

## 2020-01-17 RX ORDER — PRIMIDONE 50 MG/1
100 TABLET ORAL
Status: DISCONTINUED | OUTPATIENT
Start: 2020-01-18 | End: 2020-01-18

## 2020-01-17 RX ORDER — VITAMIN E 268 MG
400 CAPSULE ORAL DAILY
Status: CANCELLED | OUTPATIENT
Start: 2020-01-18

## 2020-01-17 RX ORDER — LEVALBUTEROL 1.25 MG/.5ML
1.25 SOLUTION, CONCENTRATE RESPIRATORY (INHALATION)
Status: CANCELLED | OUTPATIENT
Start: 2020-01-17

## 2020-01-17 RX ORDER — PRIMIDONE 50 MG/1
150 TABLET ORAL
Status: DISCONTINUED | OUTPATIENT
Start: 2020-01-18 | End: 2020-01-21 | Stop reason: HOSPADM

## 2020-01-17 RX ORDER — LAMOTRIGINE 100 MG/1
250 TABLET ORAL
Status: CANCELLED | OUTPATIENT
Start: 2020-01-17

## 2020-01-17 RX ORDER — PRIMIDONE 50 MG/1
150 TABLET ORAL
Status: CANCELLED | OUTPATIENT
Start: 2020-01-18

## 2020-01-17 RX ORDER — LEVOCARNITINE 330 MG/1
330 TABLET ORAL
Status: CANCELLED | OUTPATIENT
Start: 2020-01-17

## 2020-01-17 RX ORDER — ACETAMINOPHEN 325 MG/1
650 TABLET ORAL EVERY 6 HOURS PRN
Status: CANCELLED | OUTPATIENT
Start: 2020-01-17

## 2020-01-17 RX ORDER — VITAMIN E 268 MG
400 CAPSULE ORAL DAILY
Status: DISCONTINUED | OUTPATIENT
Start: 2020-01-18 | End: 2020-01-21 | Stop reason: HOSPADM

## 2020-01-17 RX ORDER — PRIMIDONE 50 MG/1
100 TABLET ORAL
Status: CANCELLED | OUTPATIENT
Start: 2020-01-18

## 2020-01-17 RX ORDER — CLONAZEPAM 0.5 MG/1
0.5 TABLET ORAL
Status: CANCELLED | OUTPATIENT
Start: 2020-01-17

## 2020-01-17 RX ORDER — LEVALBUTEROL 1.25 MG/.5ML
1.25 SOLUTION, CONCENTRATE RESPIRATORY (INHALATION)
Status: DISCONTINUED | OUTPATIENT
Start: 2020-01-17 | End: 2020-01-17

## 2020-01-17 RX ORDER — ONDANSETRON 2 MG/ML
4 INJECTION INTRAMUSCULAR; INTRAVENOUS EVERY 6 HOURS PRN
Status: CANCELLED | OUTPATIENT
Start: 2020-01-17

## 2020-01-17 RX ORDER — GUAIFENESIN 600 MG
600 TABLET, EXTENDED RELEASE 12 HR ORAL 2 TIMES DAILY
Status: DISCONTINUED | OUTPATIENT
Start: 2020-01-17 | End: 2020-01-21 | Stop reason: HOSPADM

## 2020-01-17 RX ORDER — B-COMPLEX WITH VITAMIN C
1 TABLET ORAL
Status: DISCONTINUED | OUTPATIENT
Start: 2020-01-18 | End: 2020-01-21 | Stop reason: HOSPADM

## 2020-01-17 RX ORDER — HEPARIN SODIUM 5000 [USP'U]/ML
5000 INJECTION, SOLUTION INTRAVENOUS; SUBCUTANEOUS EVERY 8 HOURS SCHEDULED
Status: CANCELLED | OUTPATIENT
Start: 2020-01-17

## 2020-01-17 RX ORDER — SODIUM CHLORIDE FOR INHALATION 0.9 %
3 VIAL, NEBULIZER (ML) INHALATION
Status: DISCONTINUED | OUTPATIENT
Start: 2020-01-18 | End: 2020-01-18

## 2020-01-17 RX ORDER — ACETAMINOPHEN 325 MG/1
650 TABLET ORAL EVERY 6 HOURS PRN
Status: DISCONTINUED | OUTPATIENT
Start: 2020-01-17 | End: 2020-01-21 | Stop reason: HOSPADM

## 2020-01-17 RX ORDER — LAMOTRIGINE 100 MG/1
300 TABLET ORAL
Status: DISCONTINUED | OUTPATIENT
Start: 2020-01-17 | End: 2020-01-21 | Stop reason: HOSPADM

## 2020-01-17 RX ORDER — ASPIRIN 81 MG/1
81 TABLET, CHEWABLE ORAL DAILY
Status: DISCONTINUED | OUTPATIENT
Start: 2020-01-18 | End: 2020-01-21 | Stop reason: HOSPADM

## 2020-01-17 RX ORDER — ALBUTEROL SULFATE 2.5 MG/3ML
2.5 SOLUTION RESPIRATORY (INHALATION) EVERY 6 HOURS PRN
Status: CANCELLED | OUTPATIENT
Start: 2020-01-17

## 2020-01-17 RX ORDER — PRIMIDONE 50 MG/1
150 TABLET ORAL
Status: DISCONTINUED | OUTPATIENT
Start: 2020-01-17 | End: 2020-01-21 | Stop reason: HOSPADM

## 2020-01-17 RX ORDER — LAMOTRIGINE 100 MG/1
300 TABLET ORAL
Status: CANCELLED | OUTPATIENT
Start: 2020-01-17

## 2020-01-17 RX ORDER — ALBUTEROL SULFATE 2.5 MG/3ML
2.5 SOLUTION RESPIRATORY (INHALATION) EVERY 6 HOURS PRN
Status: DISCONTINUED | OUTPATIENT
Start: 2020-01-17 | End: 2020-01-17

## 2020-01-17 RX ORDER — LEVOCARNITINE 330 MG/1
330 TABLET ORAL
Status: DISCONTINUED | OUTPATIENT
Start: 2020-01-17 | End: 2020-01-21 | Stop reason: HOSPADM

## 2020-01-17 RX ORDER — LEVALBUTEROL 1.25 MG/.5ML
1.25 SOLUTION, CONCENTRATE RESPIRATORY (INHALATION)
Status: DISCONTINUED | OUTPATIENT
Start: 2020-01-18 | End: 2020-01-21 | Stop reason: HOSPADM

## 2020-01-17 RX ORDER — ONDANSETRON 2 MG/ML
4 INJECTION INTRAMUSCULAR; INTRAVENOUS EVERY 6 HOURS PRN
Status: DISCONTINUED | OUTPATIENT
Start: 2020-01-17 | End: 2020-01-21 | Stop reason: HOSPADM

## 2020-01-17 RX ORDER — GUAIFENESIN 600 MG
600 TABLET, EXTENDED RELEASE 12 HR ORAL 2 TIMES DAILY
Status: CANCELLED | OUTPATIENT
Start: 2020-01-17

## 2020-01-17 RX ORDER — LAMOTRIGINE 100 MG/1
200 TABLET ORAL
Status: DISCONTINUED | OUTPATIENT
Start: 2020-01-18 | End: 2020-01-21 | Stop reason: HOSPADM

## 2020-01-17 RX ORDER — MIDAZOLAM HYDROCHLORIDE 2 MG/2ML
4 INJECTION, SOLUTION INTRAMUSCULAR; INTRAVENOUS ONCE
Status: COMPLETED | OUTPATIENT
Start: 2020-01-17 | End: 2020-01-17

## 2020-01-17 RX ORDER — HEPARIN SODIUM 5000 [USP'U]/ML
5000 INJECTION, SOLUTION INTRAVENOUS; SUBCUTANEOUS EVERY 8 HOURS SCHEDULED
Status: DISCONTINUED | OUTPATIENT
Start: 2020-01-17 | End: 2020-01-21 | Stop reason: HOSPADM

## 2020-01-17 RX ORDER — ZINC GLUCONATE 50 MG
50 TABLET ORAL DAILY
Status: DISCONTINUED | OUTPATIENT
Start: 2020-01-18 | End: 2020-01-21 | Stop reason: HOSPADM

## 2020-01-17 RX ORDER — PRIMIDONE 50 MG/1
150 TABLET ORAL
Status: CANCELLED | OUTPATIENT
Start: 2020-01-17

## 2020-01-17 RX ORDER — B-COMPLEX WITH VITAMIN C
1 TABLET ORAL
Status: CANCELLED | OUTPATIENT
Start: 2020-01-18

## 2020-01-17 RX ADMIN — MIDAZOLAM HYDROCHLORIDE 4 MG: 1 INJECTION, SOLUTION INTRAMUSCULAR; INTRAVENOUS at 10:35

## 2020-01-17 RX ADMIN — IPRATROPIUM BROMIDE 0.5 MG: 0.5 SOLUTION RESPIRATORY (INHALATION) at 14:50

## 2020-01-17 RX ADMIN — ASPIRIN 81 MG 81 MG: 81 TABLET ORAL at 08:52

## 2020-01-17 RX ADMIN — Medication 50 MG: at 08:54

## 2020-01-17 RX ADMIN — IPRATROPIUM BROMIDE 0.5 MG: 0.5 SOLUTION RESPIRATORY (INHALATION) at 19:28

## 2020-01-17 RX ADMIN — Medication 400 UNITS: at 08:54

## 2020-01-17 RX ADMIN — LEVALBUTEROL HYDROCHLORIDE 1.25 MG: 1.25 SOLUTION, CONCENTRATE RESPIRATORY (INHALATION) at 19:28

## 2020-01-17 RX ADMIN — LAMOTRIGINE 250 MG: 100 TABLET ORAL at 15:11

## 2020-01-17 RX ADMIN — CLONAZEPAM 0.5 MG: 0.5 TABLET ORAL at 22:08

## 2020-01-17 RX ADMIN — IPRATROPIUM BROMIDE 0.5 MG: 0.5 SOLUTION RESPIRATORY (INHALATION) at 01:27

## 2020-01-17 RX ADMIN — Medication 1 TABLET: at 08:52

## 2020-01-17 RX ADMIN — HEPARIN SODIUM 5000 UNITS: 5000 INJECTION INTRAVENOUS; SUBCUTANEOUS at 22:08

## 2020-01-17 RX ADMIN — ZONISAMIDE 100 MG: 100 CAPSULE ORAL at 22:08

## 2020-01-17 RX ADMIN — PRIMIDONE 150 MG: 50 TABLET ORAL at 07:04

## 2020-01-17 RX ADMIN — LAMOTRIGINE 300 MG: 100 TABLET ORAL at 22:08

## 2020-01-17 RX ADMIN — MICONAZOLE NITRATE 1 APPLICATION: 20 CREAM TOPICAL at 08:53

## 2020-01-17 RX ADMIN — LEVOCARNITINE 330 MG: 330 TABLET ORAL at 08:53

## 2020-01-17 RX ADMIN — LEVALBUTEROL HYDROCHLORIDE 1.25 MG: 1.25 SOLUTION, CONCENTRATE RESPIRATORY (INHALATION) at 14:50

## 2020-01-17 RX ADMIN — PRIMIDONE 100 MG: 50 TABLET ORAL at 12:34

## 2020-01-17 RX ADMIN — LEVALBUTEROL HYDROCHLORIDE 1.25 MG: 1.25 SOLUTION, CONCENTRATE RESPIRATORY (INHALATION) at 08:21

## 2020-01-17 RX ADMIN — DEXTRAN 70 AND HYPROMELLOSE 2910 1 DROP: 1; 3 SOLUTION/ DROPS OPHTHALMIC at 22:09

## 2020-01-17 RX ADMIN — HEPARIN SODIUM 5000 UNITS: 5000 INJECTION INTRAVENOUS; SUBCUTANEOUS at 07:03

## 2020-01-17 RX ADMIN — DEXTRAN 70, AND HYPROMELLOSE 2910 1 DROP: 1; 3 SOLUTION/ DROPS OPHTHALMIC at 12:41

## 2020-01-17 RX ADMIN — ZINC 1 TABLET: TAB ORAL at 08:54

## 2020-01-17 RX ADMIN — LAMOTRIGINE 200 MG: 100 TABLET ORAL at 07:03

## 2020-01-17 RX ADMIN — HEPARIN SODIUM 5000 UNITS: 5000 INJECTION INTRAVENOUS; SUBCUTANEOUS at 15:11

## 2020-01-17 RX ADMIN — LEVALBUTEROL HYDROCHLORIDE 1.25 MG: 1.25 SOLUTION, CONCENTRATE RESPIRATORY (INHALATION) at 01:27

## 2020-01-17 RX ADMIN — GUAIFENESIN 600 MG: 600 TABLET ORAL at 08:52

## 2020-01-17 RX ADMIN — DEXTRAN 70, AND HYPROMELLOSE 2910 1 DROP: 1; 3 SOLUTION/ DROPS OPHTHALMIC at 08:53

## 2020-01-17 RX ADMIN — IPRATROPIUM BROMIDE 0.5 MG: 0.5 SOLUTION RESPIRATORY (INHALATION) at 08:22

## 2020-01-17 RX ADMIN — OXYCODONE HYDROCHLORIDE AND ACETAMINOPHEN 500 MG: 500 TABLET ORAL at 08:52

## 2020-01-17 NOTE — RESPIRATORY THERAPY NOTE
RT Protocol Note  Omid Sue 48 y o  male MRN: [id number]  Unit/Bed#: -01 Encounter: [id number]    Assessment    Principal Problem:    RSV (acute bronchiolitis due to respiratory syncytial virus)  Active Problems:    Pneumonia due to infectious organism    Thrombocytopenia (HCC)    Nonintractable generalized idiopathic epilepsy without status epilepticus ([facility] [address] )    Contusion, flank, subsequent encounter    T wave inversion in EKG    Essential hypertension    Acute respiratory failure with hypoxia ([facility] [address] )      Home Pulmonary Medications:    Home Devices/Therapy: BiPAP/CPAP    Past Medical History:   Diagnosis Date    Hypertension     Pericardial effusion     Pneumonia     Seizure ([facility] [address] )      Social History     Socioeconomic History    Marital status: Unknown     Spouse name: None    Number of children: None    Years of education: None    Highest education level: None   Occupational History    None   Social Needs    Financial resource strain: None    Food insecurity:     Worry: None     Inability: None    Transportation needs:     Medical: None     Non-medical: None   Tobacco Use    Smoking status: Never Smoker    Smokeless tobacco: Never Used   Substance and Sexual Activity    Alcohol use: Never     Frequency: Never    Drug use: Never    Sexual activity: None   Lifestyle    Physical activity:     Days per week: None     Minutes per session: None    Stress: None   Relationships    Social connections:     Talks on phone: None     Gets together: None     Attends Mu-ism service: None     Active member of club or organization: None     Attends meetings of clubs or organizations: None     Relationship status: None    Intimate partner violence:     Fear of current or ex partner: None     Emotionally abused: None     Physically abused: None     Forced sexual activity: None   Other Topics Concern    None   Social History Narrative    None       Subjective         Objective    Physical Exam: Assessment Type: Pre-treatment  General Appearance: Awake  Respiratory Pattern: Normal  Chest Assessment: Chest expansion symmetrical, Trachea midline  Bilateral Breath Sounds: Clear, Rhonchi  Location Specific: No  Cough: None  O2 Device: 3L NC    Vitals:  Blood pressure 149/85, pulse 91, temperature 97 9 °F (36 6 °C), temperature source Oral, resp  rate 16, height 5' 8" (1 727 m), weight 101 kg (222 lb 0 1 oz), SpO2 91 %  Results from last 7 days   Lab Units 01/10/20  1045   PH ART  7 395   PCO2 ART mm Hg 38 8   PO2 ART mm Hg 35 9*   HCO3 ART mmol/L 23 2   BASE EXC ART mmol/L -1 4   O2 CONTENT ART mL/dL 13 8*   O2 HGB, ARTERIAL % 68 4*   ABG SOURCE  Radial, Right   SHAN TEST  Yes       Imaging and other studies: I have personally reviewed pertinent reports  O2 Device: 3L NC     Plan    Respiratory Plan: No distress/Pulmonary history, Mild Distress pathway        Resp Comments: Pt with no distress  Seizures on prior shift  CPAP HS

## 2020-01-17 NOTE — ASSESSMENT & PLAN NOTE
POA  Was in ICU at [facility]  Now on [place] O2  Suspected 2/2 RSV and superimposed PNA completed abx  Started on CPAP qhs at [facility]      Will ask for pulm consult as pt still hypoxic

## 2020-01-17 NOTE — CASE MANAGEMENT
Discussed in rounds today: patient had seizures while therapy was working with patient  MD feels medication adjustments are indicated and she will reach out with Neurology  ETHAN reached out and spoke to [facility], [facility], [phone]  Patient is a great candidate for acute rehab , however we need to assure seizure free for 24 hours  Earliest possible dc would be Monday  CM tiger text therapy to see patient on Monday for [facility] [place] to review  1055:MD evaluated and informed me he needs to be transfer to higher level of care related to his seizures/ neurology and EEG monitoring  Medical necessity form completed provided for nursing and 1 copy and placed in the bin  Per MD she has updated the family

## 2020-01-17 NOTE — H&P
H&P- Ramone Samaoya 1965, 48 y o  male MRN: [id number]    Unit/Bed#: [facility] 360-86 Encounter: [id number]    Primary Care Provider: Raquel Patel DO   Date and time admitted to hospital: 1/17/2020  5:25 PM        * Nonintractable generalized idiopathic epilepsy without status epilepticus ([facility] [address] )  Assessment & Plan  Pt had witnessed seizure last night  Transferred to [facility] for EMU  Epileptology aware  C/w home meds  Neuro consult      Acute encephalopathy  Assessment & Plan  Likely post-ictal  Per brother pt AAOx3 at baseline    Dysphagia  Assessment & Plan  Modified diet  Speech eval    Developmental delay  Assessment & Plan  Supportive care  PT/OT/ST  Pt AAOx1 and did not always follow commands  At baseline AAOx3    Acute respiratory failure with hypoxia [facility])  Assessment & Plan  POA  Was in ICU at [facility]  Now on [place] O2  Suspected 2/2 RSV and superimposed PNA completed abx  Started on CPAP qhs at [facility]  Will ask for pulm consult as pt still hypoxic    RSV (acute bronchiolitis due to respiratory syncytial virus)  Assessment & Plan  Resp protocol    VTE Prophylaxis: Heparin  / sequential compression device   Code Status: Full  POLST: POLST form is not discussed and not completed at this time  Discussion with family: w/ brother    Anticipated Length of Stay:  Patient will be admitted on an Inpatient basis with an anticipated length of stay of  At least 2 midnights  Justification for Hospital Stay: need for EMU    Total Time for Visit, including Counseling / Coordination of Care: 45 minutes  Greater than 50% of this total time spent on direct patient counseling and coordination of care  Chief Complaint:   seizure    History of Present Illness:    Ramone Samayoa is a 48 y o  male who presents with seizures witnessed at [facility]  Pt has a known seizure d/o and DD and had multiple seizures at [address] this morning  Given Versed and no seizures since then  Pt originally at [facility] for RSV w/ superimposed PNA    Was on HF in ICU but now on NC  Pt otherwise cannot give me any history  Review of Systems:    Review of Systems   Unable to perform ROS: Mental status change       Past Medical and Surgical History:     Past Medical History:   Diagnosis Date    Hypertension     Pericardial effusion     Pneumonia     Seizure [facility])        Past Surgical History:   Procedure Laterality Date    FRACTURE SURGERY      clavicle, left humerus, radial, and ulna  Right radius    HIP ARTHROSCOPY Right     IN COLONOSCOPY FLX DX W/COLLJ SPEC WHEN PFRMD N/A 4/1/2019    Procedure: COLONOSCOPY;  Surgeon: Jyotsna Butler MD;  Location: [facility]; Service: Colorectal       Meds/Allergies:    Prior to Admission medications    Medication Sig Start Date End Date Taking?  Authorizing Provider   ascorbic acid (VITAMIN C) 500 mg tablet Take 500 mg by mouth daily    Historical Provider, MD   aspirin 81 MG tablet Take 81 mg by mouth daily    Historical Provider, MD   b complex vitamins tablet Take 1 tablet by mouth daily    Historical Provider, MD   calcium-vitamin D 250-100 MG-UNIT per tablet Take 1 tablet by mouth daily     Historical Provider, MD   clonazePAM (KlonoPIN) 0 5 mg tablet Take 0 5 mg by mouth daily at bedtime     Historical Provider, MD   furosemide (LASIX) 40 mg tablet Take 40 mg by mouth daily    Historical Provider, MD   lamoTRIgine (LaMICtal) 200 MG tablet Take 200 mg by mouth daily in the early morning     Historical Provider, MD   lamoTRIgine (LaMICtal) 25 mg tablet Take 250 mg by mouth daily    Historical Provider, MD   lamoTRIgine (LaMICtal) 25 mg tablet Take 300 mg by mouth daily at bedtime    Historical Provider, MD   levOCARNitine (CARNITOR) 330 MG tablet Take 330 mg by mouth 4 (four) times daily (after meals and at bedtime)     Historical Provider, MD   primidone (MYSOLINE) 50 mg tablet Take 150 mg by mouth daily after breakfast     Historical Provider, MD   primidone (MYSOLINE) 50 mg tablet Take 100 mg by mouth daily with lunch    Historical Provider, MD   primidone (MYSOLINE) 50 mg tablet Take 150 mg by mouth daily before dinner    Historical Provider, MD   vitamin E, tocopherol, 400 units capsule Take 400 Units by mouth daily    Historical Provider, MD   zinc gluconate 50 mg tablet Take 50 mg by mouth daily    Historical Provider, MD   zonisamide (ZONEGRAN) 100 mg capsule Take 100 mg by mouth daily    Historical Provider, MD     I have reviewed home medications using allscripts  Allergies: Allergies   Allergen Reactions    Dilantin [Phenytoin]      Pericardial effusion    Phenobarbital Hyperactivity       Social History:     Marital Status: Unknown     Substance Use History:   Social History     Substance and Sexual Activity   Alcohol Use Never    Frequency: Never     Social History     Tobacco Use   Smoking Status Never Smoker   Smokeless Tobacco Never Used     Social History     Substance and Sexual Activity   Drug Use Never       Family History:    Family History   Adopted: Yes       Physical Exam:     Vitals:   Blood Pressure: 111/71 (01/17/20 1742)  Pulse: 95 (01/17/20 1742)  Temperature: 98 8 °F (37 1 °C) (01/17/20 1742)  Respirations: 16 (01/17/20 1742)  Height: 5' 6" (167 6 cm) (01/17/20 1804)  Weight - Scale: 110 kg (242 lb 8 1 oz) (01/17/20 1804)  SpO2: 94 % (01/17/20 1742)    Physical Exam   Constitutional: No distress  HENT:   Head: Normocephalic and atraumatic  Eyes: Conjunctivae and EOM are normal    Neck: Normal range of motion  Neck supple  Cardiovascular: Normal rate and regular rhythm  Pulmonary/Chest:   rhochi   Abdominal: Soft  Bowel sounds are normal  He exhibits no distension  There is no tenderness  Musculoskeletal: He exhibits no edema  Neurological: He is alert  Ox1  Pt squeezes my hands and closes eyes on command   Skin: Skin is warm and dry  He is not diaphoretic  Additional Data:     Lab Results: I have personally reviewed pertinent reports        Results from last 7 days   Lab Units 01/16/20  0435  01/11/20  0505   WBC Thousand/uL 6 02   < > 5 59   HEMOGLOBIN g/dL 12 1   < > 13 4   HEMATOCRIT % 37 2   < > 41 0   PLATELETS Thousands/uL 194   < > 110*   NEUTROS PCT %  --   --  74   LYMPHS PCT %  --   --  12*   MONOS PCT %  --   --  14*   EOS PCT %  --   --  0    < > = values in this interval not displayed  Results from last 7 days   Lab Units 01/16/20  0435  01/12/20  0440   SODIUM mmol/L 140   < > 137   POTASSIUM mmol/L 4 2   < > 3 9   CHLORIDE mmol/L 103   < > 102   CO2 mmol/L 31   < > 29   BUN mg/dL 19   < > 21   CREATININE mg/dL 0 92   < > 1 20   ANION GAP mmol/L 6   < > 6   CALCIUM mg/dL 8 2*   < > 7 7*   ALBUMIN g/dL  --   --  2 8*   TOTAL BILIRUBIN mg/dL  --   --  0 63   ALK PHOS U/L  --   --  150*   ALT U/L  --   --  29   AST U/L  --   --  38   GLUCOSE RANDOM mg/dL 101   < > 102    < > = values in this interval not displayed  Results from last 7 days   Lab Units 01/14/20  0600 01/13/20  0843 01/12/20  1601 01/11/20  0505 01/10/20  2335   LACTIC ACID mmol/L  --   --   --   --  0 9   PROCALCITONIN ng/ml 1 15* 1 90* 2 74* 4 00*  --        Imaging: I have personally reviewed pertinent reports  No orders to display       EKG, Pathology, and Other Studies Reviewed on Admission:   · EKG: n/a    Allscripts / Epic Records Reviewed: Yes     ** Please Note: This note has been constructed using a voice recognition system   ** EPISTAXIS/CLOTTED NASAL BLOOD

## 2020-01-17 NOTE — QUICK NOTE
Patient had a single seizure yesterday  Since he had active infecton, we did not believe a change in AED was warranted at that time  However, last night he had few more GTC, with incomplete return to baseline  Thus, he will be transferred to [facility] for EMU and to optimize his AED regimen

## 2020-01-17 NOTE — ASSESSMENT & PLAN NOTE
Pt had witnessed seizure last night  Transferred to B for EMU  Epileptology aware      C/w home meds  Neuro consult

## 2020-01-17 NOTE — PLAN OF CARE
Problem: PHYSICAL THERAPY ADULT  Goal: Performs mobility at highest level of function for planned discharge setting  See evaluation for individualized goals  Description  Treatment/Interventions: Functional transfer training, LE strengthening/ROM, Elevations, Therapeutic exercise, Endurance training, Patient/family training, Cognitive reorientation, Equipment eval/education, Bed mobility, Gait training, Compensatory technique education, Spoke to nursing, OT  Equipment Recommended: Niru Edmond       See flowsheet documentation for full assessment, interventions and recommendations  Outcome: Not Progressing  Note:   Prognosis: Fair  Problem List: Decreased strength, Decreased endurance, Impaired balance, Decreased mobility, Decreased cognition, Impaired judgement, Decreased safety awareness, Pain  Assessment: Pt with seizure like activity this session limiting further mobility  He was able to follow 1 step commands inconsistently with increased time to process and carry out  He required increased assistance with rolling with bed mobility and was unable to progress to standing and ambulation trial due to full body seizure like activity lasting no greater than 2-3 seconds warranting return to bed for safety  RN made aware  Sitting balance varied form steadying assistance to moderate assistance of one due to lateral LOB to left with seizure like activity mostly at UB  He will continue to benefit from PT services as he is medically managed to progress to maximize LOF and facilitate return to home  Continue to recommend STR upon D/C to maximize LOF  Recommendation: Short-term skilled PT          See flowsheet documentation for full assessment

## 2020-01-17 NOTE — PLAN OF CARE
Problem: Potential for Falls  Goal: Patient will remain free of falls  Description  INTERVENTIONS:  - Assess patient frequently for physical needs  -  Identify cognitive and physical deficits and behaviors that affect risk of falls  -  [place] fall precautions as indicated by assessment   - Educate patient/family on patient safety including physical limitations  - Instruct patient to call for assistance with activity based on assessment  - Modify environment to reduce risk of injury  - Consider OT/PT consult to assist with strengthening/mobility  Outcome: Progressing     Problem: Prexisting or High Potential for Compromised Skin Integrity  Goal: Skin integrity is maintained or improved  Description  INTERVENTIONS:  - Identify patients at risk for skin breakdown  - Assess and monitor skin integrity  - Assess and monitor nutrition and hydration status  - Monitor labs   - Assess for incontinence   - Turn and reposition patient  - Assist with mobility/ambulation  - Relieve pressure over bony prominences  - Avoid friction and shearing  - Provide appropriate hygiene as needed including keeping skin clean and dry  - Evaluate need for skin moisturizer/barrier cream  - Collaborate with interdisciplinary team   - Patient/family teaching  - Consider wound care consult   Outcome: Progressing     Problem: Nutrition/Hydration-ADULT  Goal: Nutrient/Hydration intake appropriate for improving, restoring or maintaining nutritional needs  Description  Monitor and assess patient's nutrition/hydration status for malnutrition  Collaborate with interdisciplinary team and initiate plan and interventions as ordered  Monitor patient's weight and dietary intake as ordered or per policy  Utilize nutrition screening tool and intervene as necessary  Determine patient's food preferences and provide high-protein, high-caloric foods as appropriate       INTERVENTIONS:  - Monitor oral intake, urinary output, labs, and treatment plans  - Assess nutrition and hydration status and recommend course of action  - Evaluate amount of meals eaten  - Assist patient with eating if necessary   - Allow adequate time for meals  - Recommend/ encourage appropriate diets, oral nutritional supplements, and vitamin/mineral supplements  - Order, calculate, and assess calorie counts as needed  - Recommend, monitor, and adjust tube feedings and TPN/PPN based on assessed needs  - Assess need for intravenous fluids  - Provide specific nutrition/hydration education as appropriate  - Include patient/family/caregiver in decisions related to nutrition  Outcome: Progressing     Problem: PAIN - ADULT  Goal: Verbalizes/displays adequate comfort level or baseline comfort level  Description  Interventions:  - Encourage patient to monitor pain and request assistance  - Assess pain using appropriate pain scale  - Administer analgesics based on type and severity of pain and evaluate response  - Implement non-pharmacological measures as appropriate and evaluate response  - Consider cultural and social influences on pain and pain management  - Notify physician/advanced practitioner if interventions unsuccessful or patient reports new pain  Outcome: Progressing     Problem: INFECTION - ADULT  Goal: Absence or prevention of progression during hospitalization  Description  INTERVENTIONS:  - Assess and monitor for signs and symptoms of infection  - Monitor lab/diagnostic results  - Monitor all insertion sites, i e  indwelling lines, tubes, and drains  - Monitor endotracheal if appropriate and nasal secretions for changes in amount and color  - [place] appropriate cooling/warming therapies per order  - Administer medications as ordered  - Instruct and encourage patient and family to use good hand hygiene technique  - Identify and instruct in appropriate isolation precautions for identified infection/condition  Outcome: Progressing  Goal: Absence of fever/infection during neutropenic period  Description  INTERVENTIONS:  - Monitor WBC    Outcome: Progressing     Problem: SAFETY ADULT  Goal: Patient will remain free of falls  Description  INTERVENTIONS:  - Assess patient frequently for physical needs  -  Identify cognitive and physical deficits and behaviors that affect risk of falls    -  [place] fall precautions as indicated by assessment   - Educate patient/family on patient safety including physical limitations  - Instruct patient to call for assistance with activity based on assessment  - Modify environment to reduce risk of injury  - Consider OT/PT consult to assist with strengthening/mobility  Outcome: Progressing  Goal: Maintain or return to baseline ADL function  Description  INTERVENTIONS:  -  Assess patient's ability to carry out ADLs; assess patient's baseline for ADL function and identify physical deficits which impact ability to perform ADLs (bathing, care of mouth/teeth, toileting, grooming, dressing, etc )  - Assess/evaluate cause of self-care deficits   - Assess range of motion  - Assess patient's mobility; develop plan if impaired  - Assess patient's need for assistive devices and provide as appropriate  - Encourage maximum independence but intervene and supervise when necessary  - Involve family in performance of ADLs  - Assess for home care needs following discharge   - Consider OT consult to assist with ADL evaluation and planning for discharge  - Provide patient education as appropriate  Outcome: Progressing  Goal: Maintain or return mobility status to optimal level  Description  INTERVENTIONS:  - Assess patient's baseline mobility status (ambulation, transfers, stairs, etc )    - Identify cognitive and physical deficits and behaviors that affect mobility  - Identify mobility aids required to assist with transfers and/or ambulation (gait belt, sit-to-stand, lift, walker, cane, etc )  - [place] fall precautions as indicated by assessment  - Record patient progress and toleration of activity level on Mobility SBAR; progress patient to next Phase/Stage  - Instruct patient to call for assistance with activity based on assessment  - Consider rehabilitation consult to assist with strengthening/weightbearing, etc   Outcome: Progressing     Problem: DISCHARGE PLANNING  Goal: Discharge to home or other facility with appropriate resources  Description  INTERVENTIONS:  - Identify barriers to discharge w/patient and caregiver  - Arrange for needed discharge resources and transportation as appropriate  - Identify discharge learning needs (meds, wound care, etc )  - Arrange for interpretive services to assist at discharge as needed  - Refer to Case Management Department for coordinating discharge planning if the patient needs post-hospital services based on physician/advanced practitioner order or complex needs related to functional status, cognitive ability, or social support system  Outcome: Progressing     Problem: Knowledge Deficit  Goal: Patient/family/caregiver demonstrates understanding of disease process, treatment plan, medications, and discharge instructions  Description  Complete learning assessment and assess knowledge base    Interventions:  - Provide teaching at level of understanding  - Provide teaching via preferred learning methods  Outcome: Progressing

## 2020-01-17 NOTE — PLAN OF CARE
Problem: Prexisting or High Potential for Compromised Skin Integrity  Goal: Skin integrity is maintained or improved  Description  INTERVENTIONS:  - Identify patients at risk for skin breakdown  - Assess and monitor skin integrity  - Assess and monitor nutrition and hydration status  - Monitor labs   - Assess for incontinence   - Turn and reposition patient  - Assist with mobility/ambulation  - Relieve pressure over bony prominences  - Avoid friction and shearing  - Provide appropriate hygiene as needed including keeping skin clean and dry  - Evaluate need for skin moisturizer/barrier cream  - Collaborate with interdisciplinary team   - Patient/family teaching  - Consider wound care consult   Outcome: Progressing     Problem: Potential for Falls  Goal: Patient will remain free of falls  Description  INTERVENTIONS:  - Assess patient frequently for physical needs  -  Identify cognitive and physical deficits and behaviors that affect risk of falls    -  [place] fall precautions as indicated by assessment   - Educate patient/family on patient safety including physical limitations  - Instruct patient to call for assistance with activity based on assessment  - Modify environment to reduce risk of injury  - Consider OT/PT consult to assist with strengthening/mobility  Outcome: Progressing     Problem: NEUROSENSORY - ADULT  Goal: Achieves stable or improved neurological status  Description  INTERVENTIONS  - Monitor and report changes in neurological status  - Monitor vital signs such as temperature, blood pressure, glucose, and any other labs ordered   - Initiate measures to prevent increased intracranial pressure  - Monitor for seizure activity and implement precautions if appropriate      Outcome: Progressing  Goal: Remains free of injury related to seizures activity  Description  INTERVENTIONS  - Maintain airway, patient safety  and administer oxygen as ordered  - Monitor patient for seizure activity, document and report duration and description of seizure to physician/advanced practitioner  - If seizure occurs,  ensure patient safety during seizure  - Reorient patient post seizure  - Seizure pads on all 4 side rails  - Instruct patient/family to notify RN of any seizure activity including if an aura is experienced  - Instruct patient/family to call for assistance with activity based on nursing assessment  - Administer anti-seizure medications if ordered    Outcome: Progressing  Goal: Achieves maximal functionality and self care  Description  INTERVENTIONS  - Monitor swallowing and airway patency with patient fatigue and changes in neurological status  - Encourage and assist patient to increase activity and self care     - Encourage visually impaired, hearing impaired and aphasic patients to use assistive/communication devices  Outcome: Progressing     Problem: RESPIRATORY - ADULT  Goal: Achieves optimal ventilation and oxygenation  Description  INTERVENTIONS:  - Assess for changes in respiratory status  - Assess for changes in mentation and behavior  - Position to facilitate oxygenation and minimize respiratory effort  - Oxygen administered by appropriate delivery if ordered  - Initiate smoking cessation education as indicated  - Encourage broncho-pulmonary hygiene including cough, deep breathe, Incentive Spirometry  - Assess the need for suctioning and aspirate as needed  - Assess and instruct to report SOB or any respiratory difficulty  - Respiratory Therapy support as indicated  Outcome: Progressing     Problem: PAIN - ADULT  Goal: Verbalizes/displays adequate comfort level or baseline comfort level  Description  Interventions:  - Encourage patient to monitor pain and request assistance  - Assess pain using appropriate pain scale  - Administer analgesics based on type and severity of pain and evaluate response  - Implement non-pharmacological measures as appropriate and evaluate response  - Consider cultural and social influences on pain and pain management  - Notify physician/advanced practitioner if interventions unsuccessful or patient reports new pain  Outcome: Progressing     Problem: INFECTION - ADULT  Goal: Absence or prevention of progression during hospitalization  Description  INTERVENTIONS:  - Assess and monitor for signs and symptoms of infection  - Monitor lab/diagnostic results  - Monitor all insertion sites, i e  indwelling lines, tubes, and drains  - Monitor endotracheal if appropriate and nasal secretions for changes in amount and color  - [place] appropriate cooling/warming therapies per order  - Administer medications as ordered  - Instruct and encourage patient and family to use good hand hygiene technique  - Identify and instruct in appropriate isolation precautions for identified infection/condition  Outcome: Progressing     Problem: SAFETY ADULT  Goal: Patient will remain free of falls  Description  INTERVENTIONS:  - Assess patient frequently for physical needs  -  Identify cognitive and physical deficits and behaviors that affect risk of falls    -  [place] fall precautions as indicated by assessment   - Educate patient/family on patient safety including physical limitations  - Instruct patient to call for assistance with activity based on assessment  - Modify environment to reduce risk of injury  - Consider OT/PT consult to assist with strengthening/mobility  Outcome: Progressing  Goal: Maintain or return to baseline ADL function  Description  INTERVENTIONS:  -  Assess patient's ability to carry out ADLs; assess patient's baseline for ADL function and identify physical deficits which impact ability to perform ADLs (bathing, care of mouth/teeth, toileting, grooming, dressing, etc )  - Assess/evaluate cause of self-care deficits   - Assess range of motion  - Assess patient's mobility; develop plan if impaired  - Assess patient's need for assistive devices and provide as appropriate  - Encourage maximum independence but intervene and supervise when necessary  - Involve family in performance of ADLs  - Assess for home care needs following discharge   - Consider OT consult to assist with ADL evaluation and planning for discharge  - Provide patient education as appropriate  Outcome: Progressing  Goal: Maintain or return mobility status to optimal level  Description  INTERVENTIONS:  - Assess patient's baseline mobility status (ambulation, transfers, stairs, etc )    - Identify cognitive and physical deficits and behaviors that affect mobility  - Identify mobility aids required to assist with transfers and/or ambulation (gait belt, sit-to-stand, lift, walker, cane, etc )  - [place] fall precautions as indicated by assessment  - Record patient progress and toleration of activity level on Mobility SBAR; progress patient to next Phase/Stage  - Instruct patient to call for assistance with activity based on assessment  - Consider rehabilitation consult to assist with strengthening/weightbearing, etc   Outcome: Progressing

## 2020-01-17 NOTE — PROGRESS NOTES
Filipe Alonso MD, saw and evaluated the patient  I have discussed the patient with the resident/non-physician practitioner and agree with the resident's/non-physician practitioner's findings, Plan of Care, and MDM as documented in the resident's/non-physician practitioner's note, except where noted  All available labs and Radiology studies were reviewed  At this point I agree with the current assessment done in the Emergency Department  I have conducted an independent evaluation of this patient a history and physical is as follows:    Patient complains of feeling intermittently jittery, sometimes short of breath, sometimes anxious  Patient has history of prior alcohol use, prior benzodiazepine dependence, but has been weaned off benzodiazepines  Here because of the anxiety  Patient also has history of thyroid disease  No fevers or chills  No abdominal pain  Review of systems negative in 12 systems reviewed  On exam Vital signs were reviewed  Patient is awake, alert, interactive  The patient's pupils are equally round reactive to light  Oropharynx is clear with moist mucous membranes  Neck is supple and nontender with no adenopathy or JVD  Heart is regular with no murmurs, rubs, or gallops  Lungs are clear and equal with no wheezes, rales, or rhonchi  Abdomen is soft and nontender with no masses, rebound, or guarding  There is no CVA tenderness  The patient was completely exposed  There is no skin breakdown  There are no rashes or skin changes  Extremities are warm and well perfused with good pulses  The patient has normal strength, sensation, and cranial nerves    Impression:  Anxiety, agree with resident note  Critical Care Time  CritCare Time    Procedures Pt being transferred to [facility] for [facility]  Report called and given to NELL Maradiaga  Awaiting 1545 pick-up from [place] [facility]   Will continue to monitor

## 2020-01-17 NOTE — PROGRESS NOTES
Pt seen having active tonic clonic seizure  Lasting approx 10 seconds  IM midazolam 4mg administered  Pt remained unharmed

## 2020-01-17 NOTE — ASSESSMENT & PLAN NOTE
· Chronic thrombocytopenia by reviewing the records from outside  · Monitor for signs of bleeding  · Daily CBC and trend platelets-194 yesterday

## 2020-01-17 NOTE — OCCUPATIONAL THERAPY NOTE
OccupationalTherapy Progress Note     Patient Name: Mateusz CARMICHAEL Date: 1/17/2020  Problem List  Principal Problem:    RSV (acute bronchiolitis due to respiratory syncytial virus)  Active Problems:    Pneumonia due to infectious organism    Thrombocytopenia ([facility] [address] )    Nonintractable generalized idiopathic epilepsy without status epilepticus [facility] )    Contusion, flank, subsequent encounter    T wave inversion in EKG    Essential hypertension    Acute respiratory failure with hypoxia ([facility] )              01/17/20 0817   Restrictions/Precautions   Weight Bearing Precautions Per Order No   Other Precautions Cognitive; Bed Alarm;Contact/isolation;O2;Fall Risk;Telemetry   Pain Assessment   Pain Assessment FLACC   Pain Rating: FLACC (Rest) - Face 1   Pain Rating: FLACC (Rest) - Legs 0   Pain Rating: FLACC (Rest) - Activity 0   Pain Rating: FLACC (Rest) - Cry 1   Pain Rating: FLACC (Rest) - Consolability 0   Score: FLACC (Rest) 2   Pain Rating: FLACC (Activity) - Face 1   Pain Rating: FLACC (Activity) - Legs 0   Pain Rating: FLACC (Activity) - Activity 1   Pain Rating: FLACC (Activity) - Cry 1   Pain Rating: FLACC (Activity) - Consolability 0   Score: FLACC (Activity) 3   ADL   Where Assessed Supine, bed   Grooming Assistance 1  Total Assistance   Grooming Deficit Setup;Verbal cueing;Supervision/safety; Increased time to complete;Wash/dry face   Grooming Comments Pt given wash cloth in attempt to wash face  Pt unable to follow command to grasp washcloth and lift to face  Requiring total A   Toileting Assistance  1  Total Assistance   Toileting Deficit Setup;Steadying;Verbal cueing;Supervison/safety; Increased time to complete;Perineal hygiene   Toileting Comments Pt incontinent, requiring total A for perihygiene while rolling in bed   Bed Mobility   Rolling R 2  Maximal assistance   Additional items Assist x 1; Increased time required;Verbal cues   Rolling L 3  Moderate assistance  (min A on 1st trial, mod A 2nd) Additional items Assist x 1; Increased time required;Verbal cues   Supine to Sit 4  Minimal assistance   Additional items Assist x 1; Increased time required;Verbal cues   Sit to Supine 2  Maximal assistance   Additional items Assist x 1; Increased time required;Verbal cues;LE management   Cognition   Overall Cognitive Status Impaired   Arousal/Participation Persistent stimuli required;Poorly responsive   Attention Difficulty attending to directions   Orientation Level Oriented to person;Disoriented to place; Disoriented to time;Disoriented to situation   Memory Unable to assess   Following Commands Follows one step commands inconsistently   Comments Pt minimally responsive to commands and questions   Activity Tolerance   Activity Tolerance Treatment limited secondary to medical complications (Comment)  (seizure like activity)   Medical Staff Made Aware NELL Parmar, Brenda Campos   Assessment   Assessment Pt seen for OT treatment session in order to focus on functional activity tolerance, bed mobility, ADLs, functional mobility/transfers  Pt minimally responsive to questions and commands throughout session, minimal eye contact  Pt supine in bed upon arrival  Pt given wash cloth in attempt to wash face  Pt unable to follow command to grasp washcloth and lift to face  Requiring total A  Seizure-like activity with supine in bed noted in UB  Pt required min A x1 to sit EOB  To maintain sitting EOB for 2 min, pt required CGA-mod A for stability and safety while in sitting 2* full body seizure like activity  Unsafe to trial sit><stand transfers at this time  Pt required max A x1 to return to supine  Pt noted to be incontinent, requiring total A for hygiene  Rolling R with max A x1, rolling L with min-mod A x1   Pt would benefit from continued OT services in order to address decrease activity tolerance, decrease standing balance, decrease sitting balance, decrease performance during ADL tasks, decrease cognition, decrease safety awareness , increase impulsiveness, decrease UB MS, decrease generalized strength, decrease activity engagement and decrease performance during functional transfers  At the end of the session pt returned to supine with call bell within reach, RN notified of progression of treatment  All needs met  Continue to recommend STR when medically cleared  OT to follow pt on caseload  Plan   Goal Expiration Date 01/25/20   OT Treatment Day 1   OT Frequency 3-5x/wk   Recommendation   OT Discharge Recommendation Short Term Rehab   OT - OK to Discharge   (when medically cleared to rehab)        Pt will benefit from continued OT services in order to maximize independence with ADL performance, functional mobility, and improve overall endurance/strength required to complete functional tasks in preparation for discharge      At the end of the session, all needs met and pt supine in bed, bed alarm activated and Call bell within reach    [facility], OTR/L

## 2020-01-17 NOTE — ASSESSMENT & PLAN NOTE
· Pneumonia found on CT scan 1/7, after a fall  · CT AP 1/7: Left upper lobe alveolar opacities likely representing pneumonia,Was treated outpatient with oral Zithromax   · Failure to improve, cefepime and vanc given in the ER and also on Flagyl  · Blood cultures remained negative,urine strep and Legionella negative  · Antibiotics discontinued after 5 daysmprocalcitonin remained negative  · Supportive care with the respiratory protocol and oxygen

## 2020-01-17 NOTE — ASSESSMENT & PLAN NOTE
· Patient needed high-flow oxygen at the time of presentation  · Currently on nasal cannula    · Continue with supportive care

## 2020-01-17 NOTE — TRANSPORTATION MEDICAL NECESSITY
Section I - General Information    Name of Patient: Saravanan Leiva                 : 1965    Medicare #: [id number]  Transport Date: 20 (PCS is valid for round trips on this date and for all repetitive trips in the 60-day range as noted below )  Origin: [address] [place] [address] [place]: [employer]    Is the pt's stay covered under Medicare Part A (PPS/DRG)   [x]      Closest appropriate facility? If no, why is transport to more distant facility required? Yes  If hospice pt, is this transport related to pt's terminal illness? No       Section II - Medical Necessity Questionnaire  Ambulance transportation is medically necessary only if other means of transport are contraindicated or would be potentially harmful to the patient  To meet this requirement, the patient must either be "bed confined" or suffer from a condition such that transport by means other than ambulance is contraindicated by the patient's condition  The following questions must be answered by the medical professional signing below for this form to be valid:    1)  Describe the MEDICAL CONDITION (physical and/or mental) of this patient AT [address] [place] [address] that requires the patient to be transported in an ambulance and why transport by other means is contraindicated by the patient's condition:  Requires higher level of care, reoccurring seizures, requiring continuous eeg monitoring with Neurology     2) Is the patient "bed confined" as defined below? No  To be "be confined" the patient must satisfy all three of the following conditions: (1) unable to get up from bed without Assistance; AND (2) unable to ambulate; AND (3) unable to sit in a chair or wheelchair  3) Can this patient safely be transported by car or wheelchair van (i e , seated during transport without a medical attendant or monitoring)?    No    4) In addition to completing questions 1-3 above, please check any of the following conditions that apply     *Note: supporting documentation for any boxes checked must be maintained in the patient's medical records  Medical attendant required   Requires oxygen-unable to self administer  Special handling/isolation/infection control precautions required   Hemodynamic monitoring required en route      If hosp-hosp transfer, describe services needed at 2nd facility not available at 1st facility?   - services not present: requiring continuous eeg monitor, seizure management with Neurology      Section III - Signature of Physician or Healthcare Professional  I certify that the above information is true and correct based on my evaluation of this patient, and represent that the patient requires transport by ambulance and that other forms of transport are contraindicated  I understand that this information will be used by the Centers for Medicare and Medicaid Services (CMS) to support the determination of medical necessity for ambulance services, and I represent that I have personal knowledge of the patient's condition at time of transport  []  If this box is checked, I also certify that the patient is physically or mentally incapable of signing the ambulance service's claim and that the institution with which I am affiliated has furnished care, services, or assistance to the patient  My signature below is made on behalf of the patient pursuant to 42 CFR §424 36(b)(4)   In accordance with 42 CFR §424 37, the specific reason(s) that the patient is physically or mentally incapable of signing the claim form is as follows: NA    Signature of Physician* or [address] [place] [address] RN _____________________________________________________________  Signature Date 01/17/20 (For scheduled repetitive transports, this form is not valid for transports performed more than 60 days after this date)    Printed Name & Credentials of Physician or Healthcare Professional (MD, DO, RN, etc )________________________________  *Form must be signed by patient's attending physician for scheduled, repetitive transports   For non-repetitive, unscheduled ambulance transports, if unable to obtain the signature of the attending physician, any of the following may sign (choose appropriate option below)  [] Physician Assistant []  Clinical Nurse Specialist []  Registered Nurse  []  Nurse Practitioner  [x] Discharge Planner

## 2020-01-17 NOTE — ASSESSMENT & PLAN NOTE
· History of seizures since a child, has 1-2 year, with recent increase in past months to one/month  · Is on Lamictal Mysoline clonazepam Zonisemide  · Per Neurology, medications contribute to unsteady gait  · Seizure precautions  · Fall precautions  · Patient had 1 episode of seizure yesterday and in the morning patient had multiple episodes of not acting seizures  When I was in the room patient had a tonic-clonic activity with tongue biting lasted for about 15 seconds or so and then the patient with sonorous respiration with gargling and fixed gaze    Improved with the IM midazolam-discussed with Neurology and also with the epileptologist   Plan is to transfer to [place] for emu monitoring and medication adjustment

## 2020-01-17 NOTE — QUICK NOTE
Was in the room evaluating the patient and then he developed tonic clonic seizure  Tongue bite noted  A tonoclonic activity lasted for about 15 seconds or so  Then the patient was with sonorous respiration /gurgling and abnormal gaze  Patient was given IM midazolam with improvement in his condition    Patient had multiple seizures a earlier today  Discussed with Neurology on-call and also with the epileptologist from [facility] Andres [facility] to transfer to [facility] to be monitor under  EMU for medication adjustment  Discussed with patient's mother and she was agreeable for transfer  Patient is accepted in [facility] by the hospitalist team Dr Rigo Mary

## 2020-01-17 NOTE — PHYSICAL THERAPY NOTE
PHYSICAL THERAPY TREATMENT NOTE  NAME:  Myrna Rodrigez  DATE: 01/17/20    Length Of Stay: 7  Performed at least 2 patient identifiers during session: Name and ID regino    TREATMENT:      01/17/20 0816   Pain Assessment   Pain Assessment FLACC   Pain Rating: FLACC (Rest) - Face 1   Pain Rating: FLACC (Rest) - Legs 0   Pain Rating: FLACC (Rest) - Activity 0   Pain Rating: FLACC (Rest) - Cry 1   Pain Rating: FLACC (Rest) - Consolability 0   Score: FLACC (Rest) 2   Pain Rating: FLACC (Activity) - Face 1   Pain Rating: FLACC (Activity) - Legs 0   Pain Rating: FLACC (Activity) - Activity 1   Pain Rating: FLACC (Activity) - Cry 1   Pain Rating: FLACC (Activity) - Consolability 0   Score: FLACC (Activity) 3   General   Chart Reviewed Yes   Additional Pertinent History yes  Per nursing, patient with seizures 2-3 seconds long this AM  Nursing reports okay to see patient  Family/Caregiver Present No   Cognition   Orientation Level Oriented to person   Following Commands Follows one step commands inconsistently   Comments Pt requires increased time to process and respond to verbal commands  Subjective   Subjective "Lolly Console"   Bed Mobility   Rolling R 2  Maximal assistance   Additional items Assist x 1; Increased time required;Verbal cues;LE management  (VCs for technique and completion)   Rolling L 3  Moderate assistance  (minAx1 1st trial, modAx1 2nd trial)   Additional items Assist x 1; Increased time required;Verbal cues;LE management  (VCs for technique, trunk management)   Supine to Sit 4  Minimal assistance   Additional items Assist x 1; Increased time required;Verbal cues  (VCs for technique, min cues for hip management)   Sit to Supine 2  Maximal assistance   Additional items Assist x 1;LE management  (trunk management  pt with seizure like activity)   Additional Comments Pt required minAx1 to complete supine to sit EOB with increased time and HOB elevated 30 degrees   sat EOB 2' with steadying assistance to modAx1 to maintain due to seizure like activity of upper body to left  Then patient with full body seizure like activity warranting return to supine with maxAx1  Transfers   Sit to Stand   (unable to assess due to safety concerns)   Ambulation/Elevation   Gait Assistance   (unable to assess due to safety concerns)   Balance   Static Sitting Poor  (steadying assistance to modAx1 w LOB to left)   Endurance Deficit   Endurance Deficit Description Supplemental O2 on 2 lpm with SpO2 90% or greater   Activity Tolerance   Activity Tolerance Treatment limited secondary to medical complications (Comment)   Medical Staff Made Aware OT, [id number] [facility]   Nurse Made Aware RNDavid   Assessment   Prognosis Fair   Problem List Decreased strength;Decreased endurance; Impaired balance;Decreased mobility; Decreased cognition; Impaired judgement;Decreased safety awareness;Pain   Goals   PT Treatment Day 2   Plan   Treatment/Interventions LE strengthening/ROM; Functional transfer training;Elevations; Therapeutic exercise; Endurance training;Cognitive reorientation;Patient/family training;Equipment eval/education; Bed mobility;Gait training; Compensatory technique education;Spoke to nursing;OT   Progress No functional improvements  (limited by medical complication w seizure like activity)   PT Frequency Other (Comment)  (3-5x/week)   Recommendation   Recommendation Short-term skilled PT     Pt with seizure like activity this session limiting further mobility  He was able to follow 1 step commands inconsistently with increased time to process and carry out  He required increased assistance with rolling with bed mobility and was unable to progress to standing and ambulation trial due to full body seizure like activity lasting no greater than 2-3 seconds warranting return to bed for safety  RN made aware  Sitting balance varied form steadying assistance to moderate assistance of one due to lateral LOB to left with seizure like activity mostly at UB  He will continue to benefit from PT services as he is medically managed to progress to maximize LOF and facilitate return to home  Continue to recommend STR upon D/C to maximize LOF       Nan Gustafson, PT,DPT

## 2020-01-17 NOTE — DISCHARGE SUMMARY
Discharge- Ernesto Fat 1965, 48 y o  male MRN: [id number]    Unit/Bed#: -01 Encounter: [id number]    Primary Care Provider: Kayla Aase, DO   Date and time admitted to hospital: 1/10/2020  5:20 AM        Acute respiratory failure with hypoxia [facility])  Assessment & Plan  · Likely related to pneumonia and RSV  Patient finish the course of antibiotics for the pneumonia  Supportive care for the RSV  · Patient was on high-flow nasal cannula requiring intensive care admission and was able to be weaned off  · Patient requiring CPAP q h s  For suspected sleep apnea  Eventually need sleep study  · Continue with the respiratory protocol  · Airway clearance protocol  · Patient will be transferred to [facility] hypertension  Assessment & Plan  · Blood pressure acceptable    T wave inversion in EKG  Assessment & Plan  · EKG:  with T-wave inversions V3 V4 V5 V6  No old for comparison  · No chest pain  · Troponin <0 03  · Trend troponin x 3  · Outpatient follow-up    Contusion, flank, subsequent encounter  Assessment & Plan  · S/p fall 1/7  · CT abdomen pelvis:  No traumatic thoracic or intra-abdominal abnormality identified  Stranding within the subcutaneous tissues of the right flank consistent with a contusion  ·     Nonintractable generalized idiopathic epilepsy without status epilepticus ([facility] [address] )  Assessment & Plan  · History of seizures since a child, has 1-2 year, with recent increase in past months to one/month  · Is on Lamictal Mysoline clonazepam Zonisemide  · Per Neurology, medications contribute to unsteady gait  · Seizure precautions  · Fall precautions  · Patient had 1 episode of seizure yesterday and in the morning patient had multiple episodes of not acting seizures  When I was in the room patient had a tonic-clonic activity with tongue biting lasted for about 15 seconds or so and then the patient with sonorous respiration with gargling and fixed gaze    Improved with the IM midazolam-discussed with Neurology and also with the epileptologist   Plan is to transfer to [place] for emu monitoring and medication adjustment    Thrombocytopenia ([facility] [address] )  Assessment & Plan  · Chronic thrombocytopenia by reviewing the records from outside  · Monitor for signs of bleeding  · Daily CBC and trend platelets-194 yesterday    Pneumonia due to infectious organism  Assessment & Plan  · Pneumonia found on CT scan 1/7, after a fall  · CT AP 1/7: Left upper lobe alveolar opacities likely representing pneumonia,Was treated outpatient with oral Zithromax   · Failure to improve, cefepime and vanc given in the ER and also on Flagyl  · Blood cultures remained negative,urine strep and Legionella negative  · Antibiotics discontinued after 5 daysmprocalcitonin remained negative  · Supportive care with the respiratory protocol and oxygen    * RSV (acute bronchiolitis due to respiratory syncytial virus)  Assessment & Plan  · Patient needed high-flow oxygen at the time of presentation  · Currently on nasal cannula  · Continue with supportive care      Discharging Physician / Practitioner: Imelda Sdidiqi MD  PCP: Maria G Wright DO  Admission Date:   Admission Orders (From admission, onward)     Ordered        01/10/20 1251  Inpatient Admission  Once         01/10/20 0657  Inpatient Admission (expected length of stay for this patient Order details is greater than two midnights)  Once                   Discharge Date: 01/17/20    Resolved Problems  Date Reviewed: 1/17/2020          Resolved    Elevated serum creatinine 1/15/2020     Resolved by  Corin Garcia, [facility] Stay:  · Critical care    Procedures Performed:       Significant Findings / Test Results:   · CT chest abdomen and Pelvis-no traumatic thoracic or intra abnormal abnormality identified  Stranding within the subcutaneous tissue of the right flank consistent with contusion    Left upper lobe alveolar opacities likely representing pneumonia  · Chest x-ray-left upper lobe opacity  · Lower extremity Doppler-no acute DVT  · Chest x-ray-worsening left upper lobe airspace consolidation  · Chest x-ray 11/12/2020-increased consolidation of the left upper lobe suggest pneumonia  Incidental Findings:   ·     Test Results Pending at Discharge (will require follow up): Outpatient Tests Requested:  ·     Complications:  none    Reason for Admission:  RSV tracheobronchitis and pneumonia    Hospital Course:     Geetha Portillo is a 48 y o  male patient who originally presented to the hospital on 1/10/2020 due to cough fever and shortness of breath  Patient with history of seizure disorder with developmental delay and had a recent fall and diagnosed with pneumonia  Patient was treated as an outpatient but was having fever and worsening of shortness of breath and was brought to the hospital   Patient was on Zithromax as an outpatient  Patient was admitted to the hospital and started on antibiotics  Patient had worsening of respiratory status with increased work of breathing and hypoxia and needed to be on high-flow oxygen  At that point patient was transferred to the intensive care unit as a step-down patient  Patient was continued on the antibiotics with cefepime vancomycin and Flagyl  For calcium 13 remained negative and the antibiotics was discontinued after a total of 5 days in the hospital   In the meantime patient was continued on the breathing treatment and also was able to be weaned off of the high-flow to nasal cannula  Patient was continued on his oral antiepileptics  Patient had multiple episodes of seizure while in the hospital and discussed with epileptologist who recommended that the patient should be transferred to [place] for  EMU monitoring  For details refer to the chart  Discussed with patient's mother who is agreeable for the transfer    Discussed with the Dr Momo Taylor with epileptology  Accepting physician Dr Chavez Leung    Please see above list of diagnoses and related plan for additional information  Condition at Discharge: good     Discharge Day Visit / Exam:     Subjective:  Patient seen and examined  Vitals: Blood Pressure: (!) 189/87 (01/17/20 0745)  Pulse: 103 (01/17/20 0745)  Temperature: 97 7 °F (36 5 °C) (01/17/20 0745)  Temp Source: Oral (01/17/20 0745)  Respirations: 18 (01/17/20 0745)  Height: 5' 8" (172 7 cm) (01/10/20 1807)  Weight - Scale: 101 kg (222 lb 0 1 oz) (01/10/20 1807)  SpO2: 93 % (01/17/20 5574)  Exam:   Physical Exam   Constitutional: He appears well-developed  No distress  HENT:   Head: Normocephalic  Left subconjunctival hemorrhage  Tongue bite on the right   Eyes: Right eye exhibits no discharge  Neck: No JVD present  Cardiovascular: Normal rate and regular rhythm  Pulmonary/Chest: No respiratory distress  Decreased breath sounds bilateral with bilateral rhonchi   Abdominal: Soft  He exhibits no distension  Musculoskeletal: He exhibits no edema  Neurological: No cranial nerve deficit  Patient opens eyes to voice  Answers simple questions     Skin: Skin is warm  Discussion with Family: patient and mother in detail    Discharge instructions/Information to patient and family:   See after visit summary for information provided to patient and family  Provisions for Follow-Up Care:  See after visit summary for information related to follow-up care and any pertinent home health orders  Disposition:     Home    For Discharges to [facility] SNF:   · Not Applicable to this Patient - Not Applicable to this Patient    Planned Readmission:  none     Discharge Statement:  I spent 45 minutes discharging the patient  This time was spent on the day of discharge  I had direct contact with the patient on the day of discharge   Greater than 50% of the total time was spent examining patient, answering all patient questions, arranging and discussing plan of care with patient as well as directly providing post-discharge instructions  Additional time then spent on discharge activities  Discharge Medications:  See after visit summary for reconciled discharge medications provided to patient and family        ** Please Note: This note has been constructed using a voice recognition system **

## 2020-01-17 NOTE — PROGRESS NOTES
While completing morning assessment pt began to have very short length seizures appox 2-3 seconds long  This happened about three times in a ten minute period  Pt placed on O2  Provider notified and en route to bedside  Will continue to monitor

## 2020-01-17 NOTE — EMTALA/ACUTE CARE TRANSFER
[address] [facility] UNIT  [address] [facility] [address]  Dept: [phone]      ACUTE CARE TRANSFER CONSENT    NAME Kendy EDOUARD 1965                              MRN [id number]    I have been informed of my rights regarding examination, treatment, and transfer   by Dr Rainer Ramirez MD    Benefits:      Risks:        Consent for Transfer:  I acknowledge that my medical condition has been evaluated and explained to me by the treating physician or other qualified medical person and/or my attending physician, who has recommended that I be transferred to the service of    at    The above potential benefits of such transfer, the potential risks associated with such transfer, and the probable risks of not being transferred have been explained to me, and I fully understand them  The doctor has explained that, in my case, the benefits of transfer outweigh the risks  I agree to be transferred  I authorize the performance of emergency medical procedures and treatments upon me in both transit and upon arrival at the receiving facility  Additionally, I authorize the release of any and all medical records to the receiving facility and request they be transported with me, if possible  I understand that the safest mode of transportation during a medical emergency is an ambulance and that the Hospital advocates the use of this mode of transport  Risks of traveling to the receiving facility by car, including absence of medical control, life sustaining equipment, such as oxygen, and medical personnel has been explained to me and I fully understand them  (JUVE CORRECT BOX BELOW)  [  ]  I consent to the stated transfer and to be transported by ambulance/helicopter  [  ]  I consent to the stated transfer, but refuse transportation by ambulance and accept full responsibility for my transportation by car    I understand the risks of non-ambulance transfers and I exonerate the Hospital and its staff from any deterioration in my condition that results from this refusal     X___________________________________________    DATE  20  TIME________  Signature of patient or legally responsible individual signing on patient behalf           RELATIONSHIP TO PATIENT_________________________          Provider Certification    NAME Kendy Patterson                                         1965                              MRN [id number]    A medical screening exam was performed on the above named patient  Based on the examination:    Condition Necessitating Transfer [facility]     Patient Condition:      Reason for Transfer:   emu monitoring    Transfer Requirements: Facility     · Space available and qualified personnel available for treatment as acknowledged by    · Agreed to accept transfer and to provide appropriate medical treatment as acknowledged by          · Appropriate medical records of the examination and treatment of the patient are provided at the time of transfer   [address] [facility], [address] _______  · Transfer will be performed by qualified personnel from    and appropriate transfer equipment as required, including the use of necessary and appropriate life support measures      Provider Certification: I have examined the patient and explained the following risks and benefits of being transferred/refusing transfer to the patient/family:         Based on these reasonable risks and benefits to the patient and/or the unborn child(hillary), and based upon the information available at the time of the patients examination, I certify that the medical benefits reasonably to be expected from the provision of appropriate medical treatments at another medical facility outweigh the increasing risks, if any, to the individuals medical condition, and in the case of labor to the unborn child, from effecting the transfer      X____________________________________________ DATE 01/17/20        TIME_______      ORIGINAL - SEND TO MEDICAL RECORDS   COPY - SEND WITH PATIENT DURING TRANSFER

## 2020-01-17 NOTE — ASSESSMENT & PLAN NOTE
· Likely related to pneumonia and RSV  Patient finish the course of antibiotics for the pneumonia  Supportive care for the RSV  · Patient was on high-flow nasal cannula requiring intensive care admission and was able to be weaned off  · Patient requiring CPAP q h s  For suspected sleep apnea    Eventually need sleep study  · Continue with the respiratory protocol  · Airway clearance protocol  · Patient will be transferred to [facility]

## 2020-01-17 NOTE — PLAN OF CARE
Problem: OCCUPATIONAL THERAPY ADULT  Goal: Performs self-care activities at highest level of function for planned discharge setting  See evaluation for individualized goals  Description  Treatment Interventions: ADL retraining, Functional transfer training, UE strengthening/ROM, Endurance training, Cognitive reorientation, Patient/family training, Equipment evaluation/education, Compensatory technique education, Continued evaluation, Energy conservation, Activityengagement          See flowsheet documentation for full assessment, interventions and recommendations  Outcome: Progressing  Note:   Limitation: Decreased ADL status, Decreased UE strength, Decreased Safe judgement during ADL, Decreased cognition, Decreased endurance, Decreased high-level ADLs  Prognosis: Good  Assessment: Pt seen for OT treatment session in order to focus on functional activity tolerance, bed mobility, ADLs, functional mobility/transfers  Pt minimally responsive to questions and commands throughout session, minimal eye contact  Pt supine in bed upon arrival  Pt given wash cloth in attempt to wash face  Pt unable to follow command to grasp washcloth and lift to face  Requiring total A  Seizure-like activity with supine in bed noted in UB  Pt required min A x1 to sit EOB  To maintain sitting EOB for 2 min, pt required CGA-mod A for stability and safety while in sitting 2* full body seizure like activity  Unsafe to trial sit><stand transfers at this time  Pt required max A x1 to return to supine  Pt noted to be incontinent, requiring total A for hygiene  Rolling R with max A x1, rolling L with min-mod A x1   Pt would benefit from continued OT services in order to address decrease activity tolerance, decrease standing balance, decrease sitting balance, decrease performance during ADL tasks, decrease cognition, decrease safety awareness , increase impulsiveness, decrease UB MS, decrease generalized strength, decrease activity engagement and decrease performance during functional transfers  At the end of the session pt returned to supine with call bell within reach, RN notified of progression of treatment  All needs met  Continue to recommend STR when medically cleared  OT to follow pt on caseload    Recommendation: (post acute rehab vs HH OT)  OT Discharge Recommendation: Short Term Rehab  OT - OK to Discharge: (when medically cleared to rehab)

## 2020-01-18 ENCOUNTER — APPOINTMENT (INPATIENT)
Dept: RADIOLOGY | Facility: HOSPITAL | Age: 55
DRG: 101 | End: 2020-01-18
Payer: MEDICARE

## 2020-01-18 ENCOUNTER — APPOINTMENT (INPATIENT)
Dept: NEUROLOGY | Facility: CLINIC | Age: 55
DRG: 101 | End: 2020-01-18
Payer: MEDICARE

## 2020-01-18 PROBLEM — Z22.322 MRSA NASAL COLONIZATION: Status: ACTIVE | Noted: 2020-01-18

## 2020-01-18 LAB
ANION GAP SERPL CALCULATED.3IONS-SCNC: 3 MMOL/L (ref 4–13)
BUN SERPL-MCNC: 24 MG/DL (ref 5–25)
CALCIUM SERPL-MCNC: 8.7 MG/DL (ref 8.3–10.1)
CHLORIDE SERPL-SCNC: 109 MMOL/L (ref 100–108)
CO2 SERPL-SCNC: 31 MMOL/L (ref 21–32)
CREAT SERPL-MCNC: 0.99 MG/DL (ref 0.6–1.3)
ERYTHROCYTE [DISTWIDTH] IN BLOOD BY AUTOMATED COUNT: 14.4 % (ref 11.6–15.1)
GFR SERPL CREATININE-BSD FRML MDRD: 87 ML/MIN/1.73SQ M
GLUCOSE SERPL-MCNC: 90 MG/DL (ref 65–140)
HCT VFR BLD AUTO: 35.4 % (ref 36.5–49.3)
HGB BLD-MCNC: 11.3 G/DL (ref 12–17)
MAGNESIUM SERPL-MCNC: 2.2 MG/DL (ref 1.6–2.6)
MCH RBC QN AUTO: 32.5 PG (ref 26.8–34.3)
MCHC RBC AUTO-ENTMCNC: 31.9 G/DL (ref 31.4–37.4)
MCV RBC AUTO: 102 FL (ref 82–98)
PHOSPHATE SERPL-MCNC: 3 MG/DL (ref 2.7–4.5)
PLATELET # BLD AUTO: 200 THOUSANDS/UL (ref 149–390)
PMV BLD AUTO: 10.8 FL (ref 8.9–12.7)
POTASSIUM SERPL-SCNC: 4.2 MMOL/L (ref 3.5–5.3)
RBC # BLD AUTO: 3.48 MILLION/UL (ref 3.88–5.62)
SODIUM SERPL-SCNC: 143 MMOL/L (ref 136–145)
WBC # BLD AUTO: 6.9 THOUSAND/UL (ref 4.31–10.16)

## 2020-01-18 PROCEDURE — 84100 ASSAY OF PHOSPHORUS: CPT | Performed by: GENERAL PRACTICE

## 2020-01-18 PROCEDURE — 99232 SBSQ HOSP IP/OBS MODERATE 35: CPT | Performed by: NURSE PRACTITIONER

## 2020-01-18 PROCEDURE — NC001 PR NO CHARGE: Performed by: PSYCHIATRY & NEUROLOGY

## 2020-01-18 PROCEDURE — 92610 EVALUATE SWALLOWING FUNCTION: CPT

## 2020-01-18 PROCEDURE — 95718 EEG PHYS/QHP 2-12 HR W/VEEG: CPT | Performed by: PSYCHIATRY & NEUROLOGY

## 2020-01-18 PROCEDURE — 85027 COMPLETE CBC AUTOMATED: CPT | Performed by: GENERAL PRACTICE

## 2020-01-18 PROCEDURE — 80048 BASIC METABOLIC PNL TOTAL CA: CPT | Performed by: GENERAL PRACTICE

## 2020-01-18 PROCEDURE — 95715 VEEG EA 12-26HR INTMT MNTR: CPT

## 2020-01-18 PROCEDURE — 99223 1ST HOSP IP/OBS HIGH 75: CPT | Performed by: PSYCHIATRY & NEUROLOGY

## 2020-01-18 PROCEDURE — 94640 AIRWAY INHALATION TREATMENT: CPT

## 2020-01-18 PROCEDURE — 97163 PT EVAL HIGH COMPLEX 45 MIN: CPT

## 2020-01-18 PROCEDURE — 94640 AIRWAY INHALATION TREATMENT: CPT | Performed by: SOCIAL WORKER

## 2020-01-18 PROCEDURE — 94760 N-INVAS EAR/PLS OXIMETRY 1: CPT

## 2020-01-18 PROCEDURE — 71045 X-RAY EXAM CHEST 1 VIEW: CPT

## 2020-01-18 PROCEDURE — 99223 1ST HOSP IP/OBS HIGH 75: CPT | Performed by: INTERNAL MEDICINE

## 2020-01-18 PROCEDURE — 83735 ASSAY OF MAGNESIUM: CPT | Performed by: GENERAL PRACTICE

## 2020-01-18 RX ORDER — PREDNISONE 20 MG/1
40 TABLET ORAL DAILY
Status: DISCONTINUED | OUTPATIENT
Start: 2020-01-18 | End: 2020-01-21 | Stop reason: HOSPADM

## 2020-01-18 RX ORDER — BUDESONIDE 0.5 MG/2ML
0.5 INHALANT ORAL
Status: DISCONTINUED | OUTPATIENT
Start: 2020-01-18 | End: 2020-01-21 | Stop reason: HOSPADM

## 2020-01-18 RX ORDER — PRIMIDONE 50 MG/1
100 TABLET ORAL
Status: DISCONTINUED | OUTPATIENT
Start: 2020-01-18 | End: 2020-01-21 | Stop reason: HOSPADM

## 2020-01-18 RX ADMIN — LEVALBUTEROL HYDROCHLORIDE 1.25 MG: 1.25 SOLUTION, CONCENTRATE RESPIRATORY (INHALATION) at 07:29

## 2020-01-18 RX ADMIN — IPRATROPIUM BROMIDE 0.5 MG: 0.5 SOLUTION RESPIRATORY (INHALATION) at 20:05

## 2020-01-18 RX ADMIN — PREDNISONE 40 MG: 20 TABLET ORAL at 08:10

## 2020-01-18 RX ADMIN — PRIMIDONE 100 MG: 50 TABLET ORAL at 15:39

## 2020-01-18 RX ADMIN — IPRATROPIUM BROMIDE 0.5 MG: 0.5 SOLUTION RESPIRATORY (INHALATION) at 14:12

## 2020-01-18 RX ADMIN — Medication 1 TABLET: at 08:10

## 2020-01-18 RX ADMIN — LEVOCARNITINE 330 MG: 330 TABLET ORAL at 08:10

## 2020-01-18 RX ADMIN — Medication 50 MG: at 08:10

## 2020-01-18 RX ADMIN — ZINC 1 TABLET: TAB ORAL at 08:10

## 2020-01-18 RX ADMIN — DEXTRAN 70 AND HYPROMELLOSE 2910 1 DROP: 1; 3 SOLUTION/ DROPS OPHTHALMIC at 08:10

## 2020-01-18 RX ADMIN — PRIMIDONE 150 MG: 50 TABLET ORAL at 00:47

## 2020-01-18 RX ADMIN — DEXTRAN 70 AND HYPROMELLOSE 2910 1 DROP: 1; 3 SOLUTION/ DROPS OPHTHALMIC at 17:23

## 2020-01-18 RX ADMIN — HEPARIN SODIUM 5000 UNITS: 5000 INJECTION INTRAVENOUS; SUBCUTANEOUS at 13:02

## 2020-01-18 RX ADMIN — DEXTRAN 70 AND HYPROMELLOSE 2910 1 DROP: 1; 3 SOLUTION/ DROPS OPHTHALMIC at 21:46

## 2020-01-18 RX ADMIN — Medication 400 UNITS: at 08:10

## 2020-01-18 RX ADMIN — GUAIFENESIN 600 MG: 600 TABLET, EXTENDED RELEASE ORAL at 08:11

## 2020-01-18 RX ADMIN — LAMOTRIGINE 300 MG: 100 TABLET ORAL at 21:44

## 2020-01-18 RX ADMIN — DEXTRAN 70 AND HYPROMELLOSE 2910 1 DROP: 1; 3 SOLUTION/ DROPS OPHTHALMIC at 13:02

## 2020-01-18 RX ADMIN — MICONAZOLE NITRATE 1 APPLICATION: 2 CREAM TOPICAL at 17:23

## 2020-01-18 RX ADMIN — LEVALBUTEROL HYDROCHLORIDE 1.25 MG: 1.25 SOLUTION, CONCENTRATE RESPIRATORY (INHALATION) at 14:12

## 2020-01-18 RX ADMIN — GUAIFENESIN 600 MG: 600 TABLET, EXTENDED RELEASE ORAL at 17:23

## 2020-01-18 RX ADMIN — IPRATROPIUM BROMIDE 0.5 MG: 0.5 SOLUTION RESPIRATORY (INHALATION) at 07:38

## 2020-01-18 RX ADMIN — OXYCODONE HYDROCHLORIDE AND ACETAMINOPHEN 500 MG: 500 TABLET ORAL at 08:10

## 2020-01-18 RX ADMIN — ISODIUM CHLORIDE 3 ML: 0.03 SOLUTION RESPIRATORY (INHALATION) at 07:29

## 2020-01-18 RX ADMIN — LEVOCARNITINE 330 MG: 330 TABLET ORAL at 13:02

## 2020-01-18 RX ADMIN — PRIMIDONE 150 MG: 50 TABLET ORAL at 21:44

## 2020-01-18 RX ADMIN — LEVOCARNITINE 330 MG: 330 TABLET ORAL at 00:19

## 2020-01-18 RX ADMIN — LEVOCARNITINE 330 MG: 330 TABLET ORAL at 17:23

## 2020-01-18 RX ADMIN — MICONAZOLE NITRATE: 2 CREAM TOPICAL at 08:11

## 2020-01-18 RX ADMIN — HEPARIN SODIUM 5000 UNITS: 5000 INJECTION INTRAVENOUS; SUBCUTANEOUS at 06:26

## 2020-01-18 RX ADMIN — LEVOCARNITINE 330 MG: 330 TABLET ORAL at 21:45

## 2020-01-18 RX ADMIN — CLONAZEPAM 0.5 MG: 0.5 TABLET ORAL at 21:44

## 2020-01-18 RX ADMIN — HEPARIN SODIUM 5000 UNITS: 5000 INJECTION INTRAVENOUS; SUBCUTANEOUS at 21:44

## 2020-01-18 RX ADMIN — LEVALBUTEROL HYDROCHLORIDE 1.25 MG: 1.25 SOLUTION, CONCENTRATE RESPIRATORY (INHALATION) at 20:05

## 2020-01-18 RX ADMIN — BUDESONIDE 0.5 MG: 0.5 INHALANT RESPIRATORY (INHALATION) at 20:05

## 2020-01-18 RX ADMIN — PRIMIDONE 150 MG: 50 TABLET ORAL at 08:10

## 2020-01-18 RX ADMIN — ZONISAMIDE 100 MG: 100 CAPSULE ORAL at 21:45

## 2020-01-18 RX ADMIN — ASPIRIN 81 MG 81 MG: 81 TABLET ORAL at 08:10

## 2020-01-18 RX ADMIN — LAMOTRIGINE 200 MG: 100 TABLET ORAL at 06:26

## 2020-01-18 RX ADMIN — LAMOTRIGINE 250 MG: 25 TABLET ORAL at 15:38

## 2020-01-18 NOTE — ASSESSMENT & PLAN NOTE
POA  Was in ICU at [address] [place] [address]  Now on [place] O2  Sp high flow nasal canula requiring icu at [facility]  Suspected 2/2 RSV and superimposed PNA completed abx    Started on CPAP qhs at [address] [place] [address], for suspected sleep apnea eventually will require sleep study   Airway clearance protocol    Appreciate pulmonary

## 2020-01-18 NOTE — CONSULTS
Consult Note - Pulmonary   Ac Mac 48 y o  male MRN: [id number]  Unit/Bed#: [facility] 918-43 Encounter: [id number]      Reason for consultation: Hypoxic respiratory failure    Requesting physician: Dr Lopez Degree    1  Acute hypoxic respiratory failure 2/2 RSV pneumonia  - Pt presented with worsening cough, SOB, fever  - Imaging consistent with JAMAL opacities  - RSV +, MRSA nares +  - completed 5 day course of vancomycin/cefepime for possible superimposed bacterial pneumonia  - subsequent improvement in respiratory status, currently saturating 89% on room air, wore CPAP overnight  - positive RSV and positive MRSA nares, but his sputum culture grew mixed leonides, urine strep/Legionella were negative, and blood cultures were negative  - Pt is without recent fever or leukocytosis, continue to monitor off further Abx  - given wheezing, rhonchi on exam, will check repeat CXR  - start prednisone 40mg daily x 5 days for post viral bronchospasm  - c/w Atrovent/xopenex  - out of bed to chair, incentive spirometry, pulmonary toilet, goal SpO2 > 90%    2  Suspected CHASE  - Pt with episodes of hypoxia overnight while at [facility]  - c/w qhs CPAP  - will need outpatient sleep study     3  Epilepsy with recent seizures  - transferred to [facility] for VEEG given recurrent seizures  - management per neurology and primary team      Case to be discussed with Dr Keo Tucker  History of Present Illness      HPI:  Ac Mac is a 48 y o  male with past medical history of developmental delay, epilepsy since childhood, fairly well controlled on anticonvulsants, essential hypertension, outpatient diagnosis of pneumonia, who initially presented to [facility] on 01/10 with worsening shortness of breath, cough, fever  The patient was found to have left upper lobe pneumonia on CT that was ordered after his fall and patient was started on azithromycin  He was subsequently admitted and found to have RSV pneumonia    His respiratory status declined and Critical Care was consulted for hypoxia on 6 L nasal cannula  Patient was then placed on high-flow nasal cannula and upgraded to level to step-down under the critical care team   He was started on broad-spectrum antibiotics including vancomycin, cefepime, Flagyl, due to concern for superimposed bacterial infection  He completed 5 days of antibiotics, which were then discontinued  Patient's respiratory status slowly improved, but he continued to have nocturnal desaturation and was started on CPAP for suspected sleep apnea  Of note, patient was found to have positive RSV and positive MRSA nares, but his sputum culture grew mixed leonides, urine strep/Legionella were negative, and blood cultures were negative  After patient was transferred to the floor, he was evaluated by hospitalist on 01/17/2020 was noted to have tonic-clonic seizure with tongue biting, which lasted for about 15 seconds  Patient was given IM midazolam with improvement in condition  He also was noted to have multiple seizures earlier in the day  Neurology on-call was contacted and it was decided to transfer patient to [facility] for video EEG monitoring  Patient was successfully transferred yesterday evening  Patient was still noted to be somewhat hypoxic requiring nasal cannula upon transfer, so pulmonology evaluation was requested  Patient currently seen and examined at bedside  He is alert and oriented x2 to self and year, but believes that he is in [facility]  He does complain of dry cough and mild shortness of breath, but denies fever, chills, nausea, vomiting, chest pain  He understands that he has a history of epilepsy and is here for monitoring  Per RN, no seizure activity overnight  Patient currently saturating 89% on room air  He were CPAP overnight  Patient does not know of any history of underlying lung disease, including asthma or COPD      Review of Systems   Constitutional: Positive for activity change and fatigue  Negative for chills, fever and unexpected weight change  HENT: Positive for congestion  Negative for rhinorrhea, sneezing and sore throat  Respiratory: Positive for cough and wheezing  Negative for shortness of breath  Cardiovascular: Negative for chest pain, palpitations and leg swelling  Gastrointestinal: Negative for abdominal pain, constipation, diarrhea, nausea and vomiting  Endocrine: Negative for cold intolerance and heat intolerance  Genitourinary: Negative for dysuria  Musculoskeletal: Negative for arthralgias  Allergic/Immunologic: Negative for immunocompromised state  Neurological: Positive for seizures  Negative for dizziness and numbness  Psychiatric/Behavioral: Positive for confusion  Historical Information   Past Medical History:   Diagnosis Date    Hypertension     Pericardial effusion     Pneumonia     Seizure [facility])      Past Surgical History:   Procedure Laterality Date    FRACTURE SURGERY      clavicle, left humerus, radial, and ulna  Right radius    HIP ARTHROSCOPY Right     KS COLONOSCOPY FLX DX W/COLLJ SPEC WHEN PFRMD N/A 4/1/2019    Procedure: COLONOSCOPY;  Surgeon: Soheila Knowles MD;  Location: [facility];   Service: Colorectal     Family History   Adopted: Yes     Social History:  Never smoker, no alcohol or drug use    Meds/Allergies   Current Facility-Administered Medications   Medication Dose Route Frequency    acetaminophen (TYLENOL) tablet 650 mg  650 mg Oral Q6H PRN    albuterol inhalation solution 2 5 mg  2 5 mg Nebulization Q4H PRN    ascorbic acid (VITAMIN C) tablet 500 mg  500 mg Oral Daily    aspirin chewable tablet 81 mg  81 mg Oral Daily    calcium carbonate-vitamin D (OSCAL-D) 500 mg-200 units per tablet 1 tablet  1 tablet Oral Daily With Breakfast    clonazePAM (KlonoPIN) tablet 0 5 mg  0 5 mg Oral HS    dextran 70-hypromellose (GENTEAL TEARS) 0 1-0 3 % ophthalmic solution 1 drop  1 drop Both Eyes 4x Daily    guaiFENesin (MUCINEX) 12 hr tablet 600 mg  600 mg Oral BID    heparin (porcine) subcutaneous injection 5,000 Units  5,000 Units Subcutaneous Q8H [facility]    ipratropium (ATROVENT) 0 02 % inhalation solution 0 5 mg  0 5 mg Nebulization TID    lamoTRIgine (LaMICtal) tablet 200 mg  200 mg Oral Early Morning    lamoTRIgine (LaMICtal) tablet 250 mg  250 mg Oral Daily With Lunch    lamoTRIgine (LaMICtal) tablet 300 mg  300 mg Oral HS    levalbuterol (XOPENEX) inhalation solution 1 25 mg  1 25 mg Nebulization TID    levOCARNitine (CARNITOR) tablet 330 mg  330 mg Oral 4x Daily (PC & HS)    miconazole 2 % cream   Topical BID    multivitamin stress formula tablet 1 tablet  1 tablet Oral Daily    ondansetron (ZOFRAN) injection 4 mg  4 mg Intravenous Q6H PRN    predniSONE tablet 40 mg  40 mg Oral Daily    primidone (MYSOLINE) tablet 100 mg  100 mg Oral Daily With Lunch    primidone (MYSOLINE) tablet 150 mg  150 mg Oral HS    primidone (MYSOLINE) tablet 150 mg  150 mg Oral After Breakfast    sodium chloride 0 9 % inhalation solution 3 mL  3 mL Nebulization TID    vitamin E (tocopherol) capsule 400 Units  400 Units Oral Daily    zinc gluconate tablet 50 mg  50 mg Oral Daily    zonisamide (ZONEGRAN) capsule 100 mg  100 mg Oral HS     Medications Prior to Admission   Medication    ascorbic acid (VITAMIN C) 500 mg tablet    aspirin 81 MG tablet    b complex vitamins tablet    calcium-vitamin D 250-100 MG-UNIT per tablet    clonazePAM (KlonoPIN) 0 5 mg tablet    furosemide (LASIX) 40 mg tablet    lamoTRIgine (LaMICtal) 200 MG tablet    lamoTRIgine (LaMICtal) 25 mg tablet    lamoTRIgine (LaMICtal) 25 mg tablet    levOCARNitine (CARNITOR) 330 MG tablet    primidone (MYSOLINE) 50 mg tablet    primidone (MYSOLINE) 50 mg tablet    primidone (MYSOLINE) 50 mg tablet    vitamin E, tocopherol, 400 units capsule    zinc gluconate 50 mg tablet    zonisamide (ZONEGRAN) 100 mg capsule     Allergies   Allergen Reactions    Dilantin [Phenytoin]      Pericardial effusion    Phenobarbital Hyperactivity       Vitals:    01/17/20 2157 01/18/20 0734 01/18/20 0735 01/18/20 0742   BP: 109/71   130/78   Pulse: 93   91   Resp:       Temp: 99 1 °F (37 3 °C)   99 6 °F (37 6 °C)   TempSrc:    Axillary   SpO2:  95% 95% 98%   Weight:       Height:           Physical Exam   Constitutional: He appears well-developed and well-nourished  No distress  Dysconjugate gaze   HENT:   Head: Normocephalic and atraumatic  Mouth/Throat: Oropharynx is clear and moist  No oropharyngeal exudate  On VEEG    Eyes: EOM are normal  No scleral icterus  Cardiovascular: Normal rate, regular rhythm and normal heart sounds  No murmur heard  Pulmonary/Chest: Effort normal  No respiratory distress  He has wheezes  Scattered rhonchi with diffuse expiratory wheeze, most prominent at bases    Abdominal: Soft  Bowel sounds are normal  He exhibits no distension  There is no tenderness  Musculoskeletal: He exhibits no edema  Neurological: He is alert  Oriented x 2 to self and year  Knows he is in hospital but thought [place]   Follows commands    Skin: He is not diaphoretic  Labs: I have personally reviewed pertinent lab results  Results from last 7 days   Lab Units 01/18/20  0544 01/16/20  0435 01/15/20  0503   WBC Thousand/uL 6 90 6 02 6 33   HEMOGLOBIN g/dL 11 3* 12 1 12 6   HEMATOCRIT % 35 4* 37 2 39 4   PLATELETS Thousands/uL 200 194 169      Results from last 7 days   Lab Units 01/18/20  0544 01/16/20  0435 01/15/20  0503  01/12/20  0440   POTASSIUM mmol/L 4 2 4 2 4 1   < > 3 9   CHLORIDE mmol/L 109* 103 104   < > 102   CO2 mmol/L 31 31 32   < > 29   BUN mg/dL 24 19 19   < > 21   CREATININE mg/dL 0 99 0 92 1 00   < > 1 20   CALCIUM mg/dL 8 7 8 2* 8 2*   < > 7 7*   ALK PHOS U/L  --   --   --   --  150*   ALT U/L  --   --   --   --  29   AST U/L  --   --   --   --  38    < > = values in this interval not displayed       Results from last 7 days   Lab Units 01/18/20  0544 01/13/20  0435 01/12/20  0440   MAGNESIUM mg/dL 2 2 2 1 2 1     Results from last 7 days   Lab Units 01/18/20  0544   PHOSPHORUS mg/dL 3 0              0   Lab Value Date/Time    TROPONINI 0 02 01/10/2020 1340    TROPONINI <0 02 01/10/2020 1039    TROPONINI <0 02 01/10/2020 0529       Imaging and other studies: I have personally reviewed pertinent reports  and I have personally reviewed pertinent films in PACS  CXR 1/12/20  Increased consolidation in left upper lobe    CTA chest/abdomen/pelvis 01/07/2020  Left upper lobe alveolar opacities  Pericardial calcifications likely related to prior pericarditis    Lower extremity duplex 01/10/2020  No evidence of acute or chronic DVT in bilateral lower extremities    Pulmonary function testing:  None    EKG, Pathology, and Other Studies: I have personally reviewed pertinent reports      Echo 01/10/2020  EF 83%, grade 1 diastolic dysfunction    Micro:  positive RSV and positive MRSA nares, but his sputum culture grew mixed leonides, urine strep/Legionella were negative, and blood cultures were negative    Code Status: Level 1 - Full Code      Loy Nicole MD  Pulmonary & Critical Care Fellow, [address] [place] [address]

## 2020-01-18 NOTE — PLAN OF CARE
Problem: PHYSICAL THERAPY ADULT  Goal: Performs mobility at highest level of function for planned discharge setting  See evaluation for individualized goals  Description  Treatment/Interventions: Functional transfer training, LE strengthening/ROM, Elevations, Therapeutic exercise, Endurance training, Patient/family training, Bed mobility, Gait training  Equipment Recommended: (TBD)       See flowsheet documentation for full assessment, interventions and recommendations  1/18/2020 1454 by Ben Tolentino PT  Note:   Prognosis: Fair  Problem List: Decreased strength, Decreased endurance, Impaired balance, Decreased coordination, Decreased mobility, Decreased cognition, Impaired judgement, Decreased safety awareness  Assessment: Pt is 48 y o  male seen for high complexity PT evaluation s/p admission to [facility] on 1/17/20 as a transfer from [facility]; due to multiple seizures at [facility] this morning, transferred to [facility] for EMU  Pt initially admitted to [facility] on 1/10 with 3 day history of worsening SOB, fever, and cough  Comorbidities affecting pt's physical performance at time of assessment include: developmental delay, recurrent generalized seizure activity, h/o epilepsy, acute hypoxic respiratory failure 2* RSV, pneumonia, and CHASE  Patient's mom present and provided social history  PTA, Pt independent with ADLs and functional mobility without use of DME  Pt works [employer] time and lives with his family in a [facility] with 2-3 ALESIA and FF to bed/bathroom on 2nd floor  Upon evaluation, Pt required mod A x 1 for bed mobility, mod A x 2 for transfers and ambulation  Pt sat EOB at close supervision level for static sitting balance with poor/slouched posture 2* thoracic kyphosis  However, required min A x 1 when asked to maintain static sitting balance with upright posture  Pt required mod A x 2 for sit to stand transfer; VC required for upright posture   Pt ambulated 3 lateral steps with mod A x 2; demonstrated shuffled gait with significant balance deficits  Concluded session with patient supine in bed with bed alarm  Further ambulation deferred 2* SOB, fatigue, and significant ambulatory balance deficits  SpO2 >90% on RA post activity, required VC for deep breathing  Pt currently demonstrate deficits in posture, MCKEON, SOB, activity tolerance, trunk control, cognition, generalized weakness/deconditioing, trunk control, sitting/standing/ambulatory balance, gait, coordination, and pantera  Plan to progress ambulation distances with RW trial next session  Recommend STR as patient is currently below his functional baseline  Barriers to Discharge: Inaccessible home environment     Recommendation: Post acute IP rehab     PT - OK to Discharge: Yes(to rehab once medically stable)    See flowsheet documentation for full assessment

## 2020-01-18 NOTE — PROGRESS NOTES
Progress Note - Jeanne Sanju 1965, 48 y o  male MRN: [id number]    [facility] Encounter: [id number]    Primary Care Provider: Zelia Brunner, DO   Date and time admitted to hospital: 1/17/2020  5:25 PM        * Nonintractable generalized idiopathic epilepsy without status epilepticus ([facility] [address] )  Assessment & Plan  Pt has seizure history as a child and over the last past month has had increased occurrences  Pt had witnessed seizure  At [address] [place] [address] (he had 1 episode of tonic clonic seizure last about 5 seconds spontaneously resolved while eating breakfast  Neuro did not feel meds needed to be adjusted pt was transferred here)   Transferred to B for EMU  - emu for first 12 hrs unremarkable with exception of generalized spikes (expected for known generalized epilepsy)   Pt Meds per neuro:  "Lamictal 200 mg in AM, 250 mg at 3:00 PM and 300 mg at night  -primidone 150 mg in AM, 100 mg at 3:00 PM, 150 mg at night  -zonisamide 100 mg at night  -clonazepam 0 5 mg at night  -most recent AED levels include:              -primidone level of 7 7 on 01/10              -low zonisamide level of 4 1 on 01/10              -lamotrigine level of 11 5 on 01/10 "  -   (neuro feel these meds contribute to unsteady gait)   - If needed per neuro 1/18 then Zonegran may be increased as it is low, initial dose  - feel would be useful to obtain MRI w/wo once off EEG  Epileptology appreciated   C/w home meds        Acute encephalopathy  Assessment & Plan  Likely post-ictal  Per brother pt AAOx3 at baseline    Developmental delay  Assessment & Plan  Supportive care  PT/OT/ST  Pt AAOx1  Does not always follow command (pt is eating on my exam and very focused on his meal)   At baseline AAOx3    Acute respiratory failure with hypoxia [facility])  Assessment & Plan  POA  Was in ICU at [address] [place] [address]  Now on [place] O2  Sp high flow nasal canula requiring icu at [facility], [place]  Suspected 2/2 RSV and superimposed PNA completed abx    Started on CPAP qhs at [address] [place] [address], for suspected sleep apnea eventually will require sleep study   Airway clearance protocol    Appreciate pulmonary     RSV (acute bronchiolitis due to respiratory syncytial virus)  Assessment & Plan  Resp protocol  Supportive care appreciate pulmonary     Contusion, flank, subsequent encounter  Assessment & Plan  POA with some ecchymosis  Pt was sp a fall on 1/7   Prior imaging ct performed: No traumatic thoracic or intraabdominal abn  Stranding within the subcutaneous tissues of the right flank consistent with a contusion   Small ecchymotic area on upper back not tender to touch    T wave inversion in EKG  Assessment & Plan  Noted on EKG  at [facility]   When read by cards noted sinus tach with incomplete right bundle , st and t wave abnormality   troponins at gsl < 0 03  Will need continued outpatient follow up     MRSA nasal colonization  Assessment & Plan  On contact precautions   - noted at [facility], [place] via swap     Dysphagia  Assessment & Plan  Modified diet  Speech eval      VTE Pharmacologic Prophylaxis:   Pharmacologic: Heparin  Mechanical VTE Prophylaxis in Place: Yes    Patient Centered Rounds: I have performed bedside rounds with nursing staff today  Discussions with Specialists or Other Care Team Provider: nursing     Education and Discussions with Family / Patient: patient     Time Spent for Care: 30 minutes  More than 50% of total time spent on counseling and coordination of care as described above  Current Length of Stay: 1 day(s)    Current Patient Status: Inpatient   Certification Statement: The patient will continue to require additional inpatient hospital stay due to emu studies     Discharge Plan: when medically stable     Code Status: Level 1 - Full Code      Subjective:   Patient is doing well he is eating by himself  He does have a moist occasionally productive cough  He demonstrates coughing when I ask if he is coughing  Reports that his nose has been running  No other symptoms denies pain    Denies any visual disturbances  Patient has ecchymotic area on posterior upper back when palpating he denies any pain  Denies any numbness tingling  Objective:     Vitals:   Temp (24hrs), Av 2 °F (37 3 °C), Min:98 9 °F (37 2 °C), Max:99 6 °F (37 6 °C)    Temp:  [98 9 °F (37 2 °C)-99 6 °F (37 6 °C)] 99 °F (37 2 °C)  HR:  [91-99] 93  Resp:  [22] 22  BP: (109-138)/(71-78) 133/73  SpO2:  [90 %-98 %] 90 %  Body mass index is 39 14 kg/m²  Input and Output Summary (last 24 hours): Intake/Output Summary (Last 24 hours) at 2020  Last data filed at 2020 1700  Gross per 24 hour   Intake 1420 ml   Output 875 ml   Net 545 ml       Physical Exam:     Physical Exam   Constitutional: He is oriented to person, place, and time  No distress  HENT:   Head: Normocephalic and atraumatic  Mouth/Throat: No oropharyngeal exudate  Eyes: Right eye exhibits no discharge  Left eye exhibits no discharge  Left eye with conjunctival hemorrhage   Neck: No tracheal deviation present  No thyromegaly present  Cardiovascular: Normal rate  Exam reveals no gallop and no friction rub  No murmur heard  Pulmonary/Chest: No stridor  No respiratory distress  He has no wheezes  He has rales (Rhonchi)  He exhibits no tenderness  Moist occasionally productive cough   Abdominal: He exhibits no distension and no mass  There is no tenderness  There is no rebound and no guarding  No hernia  Musculoskeletal: He exhibits no edema, tenderness or deformity  Lymphadenopathy:     He has no cervical adenopathy  Neurological: He is oriented to person, place, and time  Skin: No rash noted  He is not diaphoretic  No erythema  No pallor  Psychiatric: He has a normal mood and affect           Additional Data:     Labs:    Results from last 7 days   Lab Units 20  0544   WBC Thousand/uL 6 90   HEMOGLOBIN g/dL 11 3*   HEMATOCRIT % 35 4*   PLATELETS Thousands/uL 200     Results from last 7 days   Lab Units 20  0544 01/12/20  0440   SODIUM mmol/L 143   < > 137   POTASSIUM mmol/L 4 2   < > 3 9   CHLORIDE mmol/L 109*   < > 102   CO2 mmol/L 31   < > 29   BUN mg/dL 24   < > 21   CREATININE mg/dL 0 99   < > 1 20   ANION GAP mmol/L 3*   < > 6   CALCIUM mg/dL 8 7   < > 7 7*   ALBUMIN g/dL  --   --  2 8*   TOTAL BILIRUBIN mg/dL  --   --  0 63   ALK PHOS U/L  --   --  150*   ALT U/L  --   --  29   AST U/L  --   --  38   GLUCOSE RANDOM mg/dL 90   < > 102    < > = values in this interval not displayed  Results from last 7 days   Lab Units 01/14/20  0600 01/13/20  0843 01/12/20  1601   PROCALCITONIN ng/ml 1 15* 1 90* 2 74*           * I Have Reviewed All Lab Data Listed Above  * Additional Pertinent Lab Tests Reviewed:  All Labs Within Last 24 Hours Reviewed    Imaging:    Imaging Reports Reviewed Today Include: reviewed     Recent Cultures (last 7 days):           Last 24 Hours Medication List:     Current Facility-Administered Medications:  acetaminophen 650 mg Oral Q6H PRN Marissa Elaine, DO   albuterol 2 5 mg Nebulization Q4H PRN Martha Baptiste MD   ascorbic acid 500 mg Oral Daily Marissa Elaine, DO   aspirin 81 mg Oral Daily Marissa Elaine, DO   budesonide 0 5 mg Nebulization Q12H Angus Hou MD   calcium carbonate-vitamin D 1 tablet Oral Daily With Breakfast Marissa Elaine, DO   clonazePAM 0 5 mg Oral HS Marissa Elaine, DO   dextran 70-hypromellose 1 drop Both Eyes 4x Daily Marissa Elaine, DO   guaiFENesin 600 mg Oral BID Marissa Elaine, DO   heparin (porcine) 5,000 Units Subcutaneous [facility] Marissa Elaine, DO   ipratropium 0 5 mg Nebulization TID Angus Hou MD   lamoTRIgine 200 mg Oral Early Morning Marissa Elaine, DO   lamoTRIgine 250 mg Oral Daily With Lunch Karishma Abilios WHITNEY Tello   lamoTRIgine 300 mg Oral HS Marissa Elaine, DO   levalbuterol 1 25 mg Nebulization TID Martha Baptiste MD   levOCARNitine 330 mg Oral 4x Daily (PC & HS) Marissa Elaine, DO   miconazole  Topical BID Marissa Elaine, DO   multivitamin stress formula 1 tablet Oral Daily Maggie Gonsalez DO   ondansetron 4 mg Intravenous Q6H PRN Maggie Gonsalez DO   predniSONE 40 mg Oral Daily Melly Caldwell MD   primidone 100 mg Oral Daily With Lunch Delaydillan Tello PA-C   primidone 150 mg Oral HS Maggie Gonsalez DO   primidone 150 mg Oral After Breakfast Maggie Gonsalez DO   vitamin E (tocopherol) 400 Units Oral Daily Maggie Gonsalez DO   zinc gluconate 50 mg Oral Daily Maggie Gonsalez DO   zonisamide 100 mg Oral HS Maggie Gonsalez DO        Today, Patient Was Seen By: NADEGE Gleason    ** Please Note: Dictation voice to text software may have been used in the creation of this document   **

## 2020-01-18 NOTE — ASSESSMENT & PLAN NOTE
Pt has seizure history as a child and over the last past month has had increased occurrences  Pt had witnessed seizure  At [facility] (he had 1 episode of tonic clonic seizure last about 5 seconds spontaneously resolved while eating breakfast  Neuro did not feel meds needed to be adjusted pt was transferred here)   [facility]  - emu for first 12 hrs unremarkable with exception of generalized spikes (expected for known generalized epilepsy)   Pt Meds per neuro:  "Lamictal 200 mg in AM, 250 mg at 3:00 PM and 300 mg at night  -primidone 150 mg in AM, 100 mg at 3:00 PM, 150 mg at night  -zonisamide 100 mg at night  -clonazepam 0 5 mg at night  -most recent AED levels include:              -primidone level of 7 7 on 01/10              -low zonisamide level of 4 1 on 01/10              -lamotrigine level of 11 5 on 01/10 "  -   (neuro feel these meds contribute to unsteady gait)   - If needed per neuro 1/18 then Zonegran may be increased as it is low, initial dose    - feel would be useful to obtain MRI w/wo once off EEG  Epileptology appreciated   C/w home meds

## 2020-01-18 NOTE — SPEECH THERAPY NOTE
Speech-Language Pathology Bedside Swallow Evaluation        Patient Name: Mamta Pereira    WLMBC'H Date: 1/18/2020     Problem List  Principal Problem:    Nonintractable generalized idiopathic epilepsy without status epilepticus ([facility] )  Active Problems:    RSV (acute bronchiolitis due to respiratory syncytial virus)    Acute respiratory failure with hypoxia ([facility] [address] )    Developmental delay    Dysphagia    Acute encephalopathy         Past Medical History  Past Medical History:   Diagnosis Date    Hypertension     Pericardial effusion     Pneumonia     Seizure [facility])        Past Surgical History  Past Surgical History:   Procedure Laterality Date    FRACTURE SURGERY      clavicle, left humerus, radial, and ulna  Right radius    HIP ARTHROSCOPY Right     [place] COLONOSCOPY FLX DX W/COLLJ SPEC WHEN PFRMD N/A 4/1/2019    Procedure: COLONOSCOPY;  Surgeon: Marques Gonzalez MD;  Location: [facility]; Service: Colorectal       Summary    Pt presents with functional oral and pharyngeal stages of swallowing, pt w/o overt s/s aspiration  Per mother, pt's chewing is better/stronger than previously, however pt stated he wanted to remain on dysphagia 3 diet instead of advancing to regular diet  Recommendations:   Diet: soft/level 3 diet and thin liquids   Meds: whole with liquid   Frequent Oral care: 2x/day  Other Recommendations/ considerations: no further follow up needed  Current Medical Status  Pt is a 48 y o  male who presented to [facility] with epilepsy  Pt  presents with seizures witnessed at [facility] [place], operated by [facility]  Pt has a known seizure d/o and DD and had multiple seizures at [address] this morning  Given Versed and no seizures since then  Pt originally at [address] [facility], [place], operated by [facility] for RSV w/ superimposed PNA  Was on HF in ICU but now on NC  Pt otherwise cannot give me any history       Past medical history:   Please see H&P for details    Special Studies:  CXR: 1/12/20 increased consolidation of JAMAL suggest pneumonia  Social/Education/Vocational Hx:  Pt lives with family    Swallow Information   Current Risks for Dysphagia & Aspiration: known history of dysphagia  Current Symptoms/Concerns: none currently  Current Diet: soft/level 3 diet and thin liquids   Baseline Diet: soft/level 3 diet and thin liquids- at [address] [facility]  Takes pills- w/ water    Baseline Assessment   Behavior/Cognition: alert  Speech/Language Status: able to participate in conversation and able to follow commands  Patient Positioning: upright in bed     Swallow Mechanism Exam   Facial: symmetrical  Labial: WFL  Lingual: WFL  Velum: unable to visualize  Mandible: adequate ROM  Dentition: adequate  Vocal quality:clear/adequate   Volitional Cough: strong/productive   Respiratory: RA    Consistencies Assessed and Performance   Consistencies Administered: thin liquids, nectar thick, puree and soft solids    Oral Stage: pt was able to feed self w/ min assistance  Adequate mastication/manipulation  Mother reported pt is chewing much better- "his jaw looks stronger"  Good oral control of liquids by cup and straw  Pharyngeal Stage: swallow initiation was timely w/ decreased laryngeal excursion  No overt s/s aspiration and pt denied and food dysphagia symptoms         Esophageal Concerns: none reported      Results Reviewed with: patient, RN and family   Dysphagia Goals: none at this time      Soraya Abrams CCC-SLP  Speech Pathogist  Available via  tiger text

## 2020-01-18 NOTE — PHYSICAL THERAPY NOTE
Physical Therapy Evaluation     Patient Name: Angelika ESPINOZA'S Date: 1/18/2020     Problem List  Principal Problem:    Nonintractable generalized idiopathic epilepsy without status epilepticus ([facility] [address] )  Active Problems:    RSV (acute bronchiolitis due to respiratory syncytial virus)    Acute respiratory failure with hypoxia ([facility] [address] )    Developmental delay    Dysphagia    Acute encephalopathy       Past Medical History  Past Medical History:   Diagnosis Date    Hypertension     Pericardial effusion     Pneumonia     Seizure [facility])         Past Surgical History  Past Surgical History:   Procedure Laterality Date    FRACTURE SURGERY      clavicle, left humerus, radial, and ulna  Right radius    HIP ARTHROSCOPY Right     [place] COLONOSCOPY FLX DX W/COLLJ SPEC WHEN PFRMD N/A 4/1/2019    Procedure: COLONOSCOPY;  Surgeon: Ishaan Irizarry MD;  Location:  GI LAB; Service: Colorectal           01/18/20 1110   Note Type   Note type Eval only   Pain Assessment   Pain Assessment No/denies pain   Pain Score No Pain   Home Living   Type of Home House   Home Layout Two level;Bed/bath upstairs;Stairs to enter with rails  (2-3 ALESIA, FF to bed/bath on 2nd floor)   Home Equipment   (No use of DME PTA)   Additional Comments  Pt resides with his mom and siblings in a [facility] with 2-3 ALESIA  Prior Function   Level of [place] Independent with ADLs and functional mobility   Lives With   (mom and brother)   Receives Help From Family   ADL Assistance Independent   IADLs Needs assistance   Falls in the last 6 months 1 to 4   Vocational Part time employment  (Retail)   Comments  (-)   Restrictions/Precautions   Wells Zander Bearing Precautions Per Order No   Other Precautions Droplet precautions;Contact/isolation;Cognitive; Chair Alarm; Bed Alarm;Telemetry;Multiple lines; Fall Risk;Seizure  (EMU)   General   Family/Caregiver Present Yes   Cognition   Overall Cognitive Status Impaired   Arousal/Participation Cooperative   Attention Attends with cues to redirect   Orientation Level Oriented to person;Oriented to place;Oriented to time;Oriented to situation   Memory Decreased recall of precautions   Following Commands Follows one step commands with increased time or repetition   Comments Pt is pleasant and cooperative  Increased time to process/respond to all commands and questions  RLE Assessment   RLE Assessment   (Grossly 4/5)   LLE Assessment   LLE Assessment   (Grossly 4/5)   Bed Mobility   Supine to Sit 3  Moderate assistance   Additional items Assist x 1; Increased time required;Verbal cues;LE management   Sit to Supine 3  Moderate assistance   Additional items Assist x 1; Increased time required;LE management;Verbal cues   Additional Comments Pt required mod A x 1 for UB support for supine to sit transfer  HOB elevated used for ease  Required mod A x 1 for BLE management to move sitting to supine  Pt sat EOB with poor/slouching posture at close superivsion level; Required min A x 1 to maintain static sitting balance with upright posture  Increased thoracic kyphosis noted in sitting  Transfers   Sit to Stand 3  Moderate assistance   Additional items Increased time required;Assist x 2;Verbal cues   Stand to Sit 3  Moderate assistance   Additional items Assist x 2; Increased time required;Verbal cues   Additional Comments Completed transfer with HHA x 2; Required moderate force production to achieve full standing position; Initially, difficulty with achieving full upright posture 2* thoracic kyphosis and balance defiicits  Ambulation/Elevation   Gait pattern Improper Weight shift;Decreased foot clearance;Shuffling; Foward flexed; Short stride; Step to;Excessively slow   Gait Assistance 3  Moderate assist   Additional items Assist x 2;Verbal cues   Assistive Device   (HHA x 2)   Distance 3 feet (3 lateral steps)   Balance   Static Sitting Fair -   Dynamic Sitting Poor + Static Standing Poor   Dynamic Standing Poor   Ambulatory Poor   Endurance Deficit   Endurance Deficit Yes   Endurance Deficit Description fatigue, SOB; SpO2: >90s on RA; VC for deep breathing   Activity Tolerance   Activity Tolerance Patient limited by fatigue;Patient tolerated treatment well   Nurse Made Aware Yes, RN Codie   Assessment   Prognosis Fair   Problem List Decreased strength;Decreased endurance; Impaired balance;Decreased coordination;Decreased mobility; Decreased cognition; Impaired judgement;Decreased safety awareness   Assessment Pt is 48 y o  male seen for high complexity PT evaluation s/p admission to [facility] on 1/17/20 as a transfer from [address] [place] [address]; due to multiple seizures at [address] [place] [address] this morning, transferred to [facility] for EMU  Pt initially admitted to [address] [place] [address] on 1/10 with 3 day history of worsening SOB, fever, and cough  Comorbidities affecting pt's physical performance at time of assessment include: developmental delay, recurrent generalized seizure activity, h/o epilepsy, acute hypoxic respiratory failure 2* RSV, pneumonia, and CHASE  Patient's mom present and provided social history  PTA, Pt independent with ADLs and functional mobility without use of DME  Pt works park time and lives with his family in a [facility] with 2-3 ALESIA and FF to bed/bathroom on 2nd floor  Upon evaluation, Pt required mod A x 1 for bed mobility, mod A x 2 for transfers and ambulation  Pt sat EOB at close supervision level for static sitting balance with poor/slouched posture 2* thoracic kyphosis  However, required min A x 1 when asked to maintain static sitting balance with upright posture  Pt required mod A x 2 for sit to stand transfer; VC required for upright posture  Pt ambulated 3 lateral steps with mod A x 2; demonstrated shuffled gait with significant balance deficits  Concluded session with patient supine in bed with bed alarm  Further ambulation deferred 2* SOB, fatigue, and significant ambulatory balance deficits   SpO2 >90% on RA post activity, required VC for deep breathing  Pt currently demonstrate deficits in posture, MCKEON, SOB, activity tolerance, trunk control, cognition, generalized weakness/deconditioing, trunk control, sitting/standing/ambulatory balance, gait, coordination, and pantera  Plan to progress ambulation distances with RW trial next session  Recommend STR as patient is currently below his functional baseline  Goals   Patient Goals To walk   STG Expiration Date 02/01/20   Short Term Goal #1 In 1-2 weeks, the patient will complete the following 1) Patient will perform supine <> sit independently 2) Patient will perform sit<>stand  transfer with LRAD at mod I level  3) Patient will ambulate >75 feet using LRAD at mod I level  4) Patient will ascend/descend 12 steps with 1 handrail and supervision  5) Patient will improve dynamic standing balance from poor  to fair + in order to perform everyday activities  6) Patient will continue with ongoing rehab services for family education, DME, and D/C planning  7) Increase standing tolerance to 5 minutes  8) Sit EOB with upright posture for 5 minutes at supervision level  Plan   Treatment/Interventions Functional transfer training;LE strengthening/ROM; Elevations; Therapeutic exercise; Endurance training;Patient/family training;Bed mobility;Gait training   PT Frequency   (3-5x/wk)   Recommendation   Recommendation Post acute IP rehab   Equipment Recommended   (TBD)   PT - OK to Discharge Yes  (to rehab once medically stable)   Modified [place] Scale   Modified Daryl Scale 4   Barthel Index   Feeding 10   Bathing 0   Grooming Score 5   Dressing Score 5   Bladder Score 0   Bowels Score 0   Toilet Use Score 5   Transfers (Bed/Chair) Score 5   Mobility (Level Surface) Score 0   Stairs Score 0   Barthel Index Score 30       Renny Obrien PT, DPT

## 2020-01-18 NOTE — CONSULTS
Consultation - Neurology   Ava Pizarro 48 y o  male MRN: [id number]  Unit/Bed#: [facility] 719-01 Encounter: [id number]      Assessment/Plan   Assessment/Plan:  65-year-old male with history of static encephalopathy/degree of cognitive impairment and epilepsy, presenting initially to [address] [place] [address] with shortness of breath and hypoxic respiratory failure (after recently being diagnosed with pneumonia several days prior)  Throughout admission over the past week he had breakthrough recurrent generalized tonic-clonic seizures, thus he was transferred for video EEG monitoring at [place] for more aggressive seizure/AED management  Concern for breakthrough seizure in the setting of infectious derangement with pneumonia, upon review of his AED levels, his zonisamide level was also low on admission  1  Recurrent generalized seizure activity, history of epilepsy:  -video EEG monitoring initiated, will discuss findings with epileptologist and attending neurologist  -his AED regimen as of this morning include:  -Lamictal 200 mg in AM, 250 mg at 3:00 PM and 300 mg at night  -primidone 150 mg in AM, 100 mg at 3:00 PM, 150 mg at night  -zonisamide 100 mg at night  -clonazepam 0 5 mg at night  -most recent AED levels include:   -primidone level of 7 7 on 01/10   -low zonisamide level of 4 1 on 01/10   -lamotrigine level of 11 5 on 01/10  -no neuro imaging obtained this admission; will discuss with attending if neuro imaging is warranted after video EEG monitoring completed  -supportive care/seizure precautions  -patient and family are interested in initiating care with  epilepsy team upon discharge, will arrange once medically/neurologically stable for discharge  2  Acute hypoxic respiratory failure secondary to RSV/pneumonia:  -pulmonary consult, checking repeat chest x-ray  -started on prednisone for oral or 5 days  -completed antibiotic course per H&P    3   CHASE:  -nightly CPAP  -outpatient sleep study recommended    Will further discuss plan of care with attending neurologist      History of Present Illness     Reason for Consult / Principal Problem: Recurrent Seizure Activity    HPI: Saravanan Leiva is a 48 y o  male history as mentioned above in assessment who neurology is asked to evaluate in regards to recurrent seizure activity/transfer to [place] for video EEG monitoring following initial admission at [address] [place] [address] for pneumonia/hypoxic respiratory failure  To review, the patient was initially admitted to [address] [place] [address] on 01/10 with respiratory symptoms including shortness of breath, fever/cough, he was found to have pneumonia the week prior on imaging (was found down by family during that presentation)  He does have a history of epilepsy, is on multiple AEDs at baseline which contribute to unsteady gait  He was evaluated by critical care for hypoxic respiratory failure likely secondary to pneumonia  His monitored in the critical care setting for several days and then transferred to the med surgical unit on 01/15  It was noted on the morning of  he had 1 brief episode of tonic-clonic seizure activity lasting several seconds while eating breakfast   However was noted yesterday that patient had several additional tonic-clonic seizures, 1 of which seen by primary team (documented in quick note):  Generalized tonic-clonic seizure for 15 seconds with sonorous respiration, abnormal gaze, he was given IM midazolam with  in activity  Neurology was contacted in regards to these recurrent events and advised/recommended video EEG monitoring at [place]  Per review of Care everywhere, he follows with [facility] Neu[facility], last formally seen in 2019  His AED regimen at that time was Lamictal 200/250/300, primidone 150/100/150, zonisamide 100 mg at night and clonazepam 0 5 mg at night  He also has a history of hyperammonemia and thus is on levocarnitine      Per discussion with his family including his mother this morning at bedside  The patient has had epilepsy since he was a child/infant, his seizures were previously generalized/tonic-clonic in semiology (he will also be postictal and lethargic for approximately 24 hours after), he would have 1-2 year on average  However mother states over the past year his semiology has changed as he will have sudden drop attacks with subsequent brief laughing episodes (also will become awake again fairly quickly without a prolonged postictal state)  These new events are slightly more frequent in frequency  He has followed with [facility] neurology as mentioned above, he states his Lamictal and primidone have been longstanding AEDs, zonisamide was a fairly new AED  Clonazepam is also quite new and dose was higher in the past, however patient will be quite lethargic and thus dose was decreased  Family states today he looks much more awake and interactive, quite near his baseline (yesterday was more lethargic)  Patient himself denies any subjective neurologic complaints  Family does state that the patient has had frequent falls and gait instability over the past year, per review of the neurology noting Care everywhere in August 2019; it was decided that the risk of falling/head injury with seizures/drop attacks outweighed the risk of fall secondary to gait instability from his meds  Thus AED regimen was kept the same  Inpatient consult to Neurology  Consult performed by: Frances Barlow PA-C  Consult ordered by: Regine Palomares DO          Review of Systems   Constitutional: Negative  HENT: Negative  Respiratory: Positive for cough and wheezing  Cardiovascular: Negative  Gastrointestinal: Negative  Musculoskeletal: Negative  Skin: Negative  Neurological: Positive for seizures   Negative for dizziness, tremors, syncope, facial asymmetry, speech difficulty, weakness, light-headedness, numbness and headaches  All other ROS reviewed and negative  Historical Information   Past Medical History:   Diagnosis Date    Hypertension     Pericardial effusion     Pneumonia     Seizure [facility])      Past Surgical History:   Procedure Laterality Date    FRACTURE SURGERY      clavicle, left humerus, radial, and ulna  Right radius    HIP ARTHROSCOPY Right     LA COLONOSCOPY FLX DX W/COLLJ SPEC WHEN PFRMD N/A 4/1/2019    Procedure: COLONOSCOPY;  Surgeon: Tim Gutierrez MD;  Location: [facility]; Service: Colorectal     Social History   Social History     Substance and Sexual Activity   Alcohol Use Never    Frequency: Never     Social History     Substance and Sexual Activity   Drug Use Never     Social History     Tobacco Use   Smoking Status Never Smoker   Smokeless Tobacco Never Used     Family History:   Family History   Adopted: Yes       Review of previous medical records was completed      Meds/Allergies   current meds:   Current Facility-Administered Medications   Medication Dose Route Frequency    acetaminophen (TYLENOL) tablet 650 mg  650 mg Oral Q6H PRN    albuterol inhalation solution 2 5 mg  2 5 mg Nebulization Q4H PRN    ascorbic acid (VITAMIN C) tablet 500 mg  500 mg Oral Daily    aspirin chewable tablet 81 mg  81 mg Oral Daily    calcium carbonate-vitamin D (OSCAL-D) 500 mg-200 units per tablet 1 tablet  1 tablet Oral Daily With Breakfast    clonazePAM (KlonoPIN) tablet 0 5 mg  0 5 mg Oral HS    dextran 70-hypromellose (GENTEAL TEARS) 0 1-0 3 % ophthalmic solution 1 drop  1 drop Both Eyes 4x Daily    guaiFENesin (MUCINEX) 12 hr tablet 600 mg  600 mg Oral BID    heparin (porcine) subcutaneous injection 5,000 Units  5,000 Units Subcutaneous Q8H [address]    lamoTRIgine (LaMICtal) tablet 200 mg  200 mg Oral Early Morning    lamoTRIgine (LaMICtal) tablet 250 mg  250 mg Oral Daily With Lunch    lamoTRIgine (LaMICtal) tablet 300 mg  300 mg Oral HS    levalbuterol (XOPENEX) inhalation solution 1 25 mg  1 25 mg Nebulization TID    levOCARNitine (CARNITOR) tablet 330 mg  330 mg Oral 4x Daily (PC & HS)    miconazole 2 % cream   Topical BID    multivitamin stress formula tablet 1 tablet  1 tablet Oral Daily    ondansetron (ZOFRAN) injection 4 mg  4 mg Intravenous Q6H PRN    primidone (MYSOLINE) tablet 100 mg  100 mg Oral Daily With Lunch    primidone (MYSOLINE) tablet 150 mg  150 mg Oral HS    primidone (MYSOLINE) tablet 150 mg  150 mg Oral After Breakfast    sodium chloride 0 9 % inhalation solution 3 mL  3 mL Nebulization TID    vitamin E (tocopherol) capsule 400 Units  400 Units Oral Daily    zinc gluconate tablet 50 mg  50 mg Oral Daily    zonisamide (ZONEGRAN) capsule 100 mg  100 mg Oral HS    and PTA meds:   Prior to Admission Medications   Prescriptions Last Dose Informant Patient Reported?  Taking?   ascorbic acid (VITAMIN C) 500 mg tablet   Yes No   Sig: Take 500 mg by mouth daily   aspirin 81 MG tablet   Yes No   Sig: Take 81 mg by mouth daily   b complex vitamins tablet   Yes No   Sig: Take 1 tablet by mouth daily   calcium-vitamin D 250-100 MG-UNIT per tablet   Yes No   Sig: Take 1 tablet by mouth daily    clonazePAM (KlonoPIN) 0 5 mg tablet   Yes No   Sig: Take 0 5 mg by mouth daily at bedtime    furosemide (LASIX) 40 mg tablet   Yes No   Sig: Take 40 mg by mouth daily   lamoTRIgine (LaMICtal) 200 MG tablet   Yes No   Sig: Take 200 mg by mouth daily in the early morning    lamoTRIgine (LaMICtal) 25 mg tablet   Yes No   Sig: Take 250 mg by mouth daily   lamoTRIgine (LaMICtal) 25 mg tablet   Yes No   Sig: Take 300 mg by mouth daily at bedtime   levOCARNitine (CARNITOR) 330 MG tablet   Yes No   Sig: Take 330 mg by mouth 4 (four) times daily (after meals and at bedtime)    primidone (MYSOLINE) 50 mg tablet   Yes No   Sig: Take 150 mg by mouth daily after breakfast    primidone (MYSOLINE) 50 mg tablet   Yes No   Sig: Take 100 mg by mouth daily with lunch   primidone (MYSOLINE) 50 mg tablet Yes No   Sig: Take 150 mg by mouth daily before dinner   vitamin E, tocopherol, 400 units capsule   Yes No   Sig: Take 400 Units by mouth daily   zinc gluconate 50 mg tablet   Yes No   Sig: Take 50 mg by mouth daily   zonisamide (ZONEGRAN) 100 mg capsule   Yes No   Sig: Take 100 mg by mouth daily      Facility-Administered Medications: None       Allergies   Allergen Reactions    Dilantin [Phenytoin]      Pericardial effusion    Phenobarbital Hyperactivity       Objective   Vitals:Blood pressure 109/71, pulse 93, temperature 99 1 °F (37 3 °C), resp  rate 16, height 5' 6" (1 676 m), weight 110 kg (242 lb 8 1 oz), SpO2 96 %  ,Body mass index is 39 14 kg/m²  Intake/Output Summary (Last 24 hours) at 1/18/2020 0713  Last data filed at 1/18/2020 0551  Gross per 24 hour   Intake 600 ml   Output 300 ml   Net 300 ml       Invasive Devices: Invasive Devices     Peripheral Intravenous Line            Peripheral IV 01/17/20 Left Hand less than 1 day                Physical Exam   Constitutional:   Mildly obese   HENT:   Head: Normocephalic and atraumatic  Eyes:   Chronic left retinal detachment, conjunctiva bleeding in left eye   Neck: Normal range of motion  Neck supple  Cardiovascular: Normal rate and regular rhythm  Pulmonary/Chest: Effort normal and breath sounds normal    Abdominal: Soft  Bowel sounds are normal    Musculoskeletal: Normal range of motion  Neurological: He is alert  He has a normal Finger-Nose-Finger Test    Skin: Skin is warm and dry  Neurologic Exam     Mental Status     Patient is awake and alert, oriented to self as well as family members at bedside  The month/year as well as the location  He can name colors and objects, no aphasia or dysarthria with brief conversation  He follows central and appendicular commands  Cranial Nerves     CN II   Visual fields full to confrontation       He has history of detached left retina with conjunctiva bleeding, he has chronic exotropia and EOM abnormality as result as well  He has no gross facial asymmetry with smile, symmetric light touch sensation, tongue protrusion is midline with no clear tongue bite  Motor Exam   Muscle bulk: normal  Overall muscle tone: normal  Right arm pronator drift: absent  Left arm pronator drift: absent  5/5 in upper extremities throughout  He has some proximal weakness bilaterally in the lower extremities, due to some cognitive impairment distal lower extremity testing was limited, he has giveaway weakness with bilateral dorsi and plantar flexion although with maximal effort this appears near full  Sensory Exam   Light touch normal      Gait, Coordination, and Reflexes     Coordination   Finger to nose coordination: normal    Tremor   Resting tremor: absent  Intention tremor: absent  He does at times display some extremity tremulousness in the uppers with intention/action  He has no resting tremor with conversation; nor does he have clinical seizure activity seen throughout exam        Lab Results:   CBC:   Results from last 7 days   Lab Units 01/18/20  0544 01/16/20  0435 01/15/20  0503   WBC Thousand/uL 6 90 6 02 6 33   RBC Million/uL 3 48* 3 80* 3 93   HEMOGLOBIN g/dL 11 3* 12 1 12 6   HEMATOCRIT % 35 4* 37 2 39 4   MCV fL 102* 98 100*   PLATELETS Thousands/uL 200 194 169   , BMP/CMP:   Results from last 7 days   Lab Units 01/18/20  0544 01/16/20  0435 01/15/20  0503  01/12/20  0440   SODIUM mmol/L 143 140 141   < > 137   POTASSIUM mmol/L 4 2 4 2 4 1   < > 3 9   CHLORIDE mmol/L 109* 103 104   < > 102   CO2 mmol/L 31 31 32   < > 29   BUN mg/dL 24 19 19   < > 21   CREATININE mg/dL 0 99 0 92 1 00   < > 1 20   CALCIUM mg/dL 8 7 8 2* 8 2*   < > 7 7*   AST U/L  --   --   --   --  38   ALT U/L  --   --   --   --  29   ALK PHOS U/L  --   --   --   --  150*   EGFR ml/min/1 73sq m 87 95 86   < > 69    < > = values in this interval not displayed     , Vitamin B12:   , HgBA1C:   , TSH:   , Coagulation:   , Lipid Profile:   , Ammonia:   , Urinalysis:       Invalid input(s): URIBILINOGEN, Drug Screen:   , Medication Drug Levels:       Invalid input(s): CARBAMAZEPINE,  PHENOBARB, LACOSAMIDE, OXCARBAZEPINE  Imaging Studies: I have personally reviewed pertinent films in PACS   No neuro imaging obtained this admission  EKG, Pathology, and Other Studies: I have personally reviewed pertinent reports  Video EEG monitoring 01/17/2020-01/18/2020: final read pending    VTE Prophylaxis: Sequential compression device (Venodyne)     Code Status: Level 1 - Full Code    Total time spent today 60 minutes  Discussed plan of care with patient/family and primary team:  Continuing video EEG monitoring, continuing AED regimen as ordered for now, we will make changes as needed, pulmonary consult for hypoxia, repeat chest x-ray, seizure precautions and possible neuro imaging once video EEG monitoring completed

## 2020-01-18 NOTE — PLAN OF CARE
Problem: Prexisting or High Potential for Compromised Skin Integrity  Goal: Skin integrity is maintained or improved  Description  INTERVENTIONS:  - Identify patients at risk for skin breakdown  - Assess and monitor skin integrity  - Assess and monitor nutrition and hydration status  - Monitor labs   - Assess for incontinence   - Turn and reposition patient  - Assist with mobility/ambulation  - Relieve pressure over bony prominences  - Avoid friction and shearing  - Provide appropriate hygiene as needed including keeping skin clean and dry  - Evaluate need for skin moisturizer/barrier cream  - Collaborate with interdisciplinary team   - Patient/family teaching  - Consider wound care consult   1/18/2020 1135 by Caity Varela RN  Outcome: Progressing  1/18/2020 1135 by Caity Varela RN  Outcome: Progressing     Problem: Potential for Falls  Goal: Patient will remain free of falls  Description  INTERVENTIONS:  - Assess patient frequently for physical needs  -  Identify cognitive and physical deficits and behaviors that affect risk of falls    -  [place] fall precautions as indicated by assessment   - Educate patient/family on patient safety including physical limitations  - Instruct patient to call for assistance with activity based on assessment  - Modify environment to reduce risk of injury  - Consider OT/PT consult to assist with strengthening/mobility  1/18/2020 1135 by Caity Varela RN  Outcome: Progressing  1/18/2020 1135 by Caity Varela RN  Outcome: Progressing     Problem: NEUROSENSORY - ADULT  Goal: Achieves stable or improved neurological status  Description  INTERVENTIONS  - Monitor and report changes in neurological status  - Monitor vital signs such as temperature, blood pressure, glucose, and any other labs ordered   - Initiate measures to prevent increased intracranial pressure  - Monitor for seizure activity and implement precautions if appropriate      1/18/2020 1135 by Siri Simmons RN  Outcome: Progressing  1/18/2020 1135 by Siri Simmons RN  Outcome: Progressing  Goal: Remains free of injury related to seizures activity  Description  INTERVENTIONS  - Maintain airway, patient safety  and administer oxygen as ordered  - Monitor patient for seizure activity, document and report duration and description of seizure to physician/advanced practitioner  - If seizure occurs,  ensure patient safety during seizure  - Reorient patient post seizure  - Seizure pads on all 4 side rails  - Instruct patient/family to notify RN of any seizure activity including if an aura is experienced  - Instruct patient/family to call for assistance with activity based on nursing assessment  - Administer anti-seizure medications if ordered    1/18/2020 1135 by Siri Simmons RN  Outcome: Progressing  1/18/2020 1135 by Siri Simmons RN  Outcome: Progressing  Goal: Achieves maximal functionality and self care  Description  INTERVENTIONS  - Monitor swallowing and airway patency with patient fatigue and changes in neurological status  - Encourage and assist patient to increase activity and self care     - Encourage visually impaired, hearing impaired and aphasic patients to use assistive/communication devices  1/18/2020 1135 by Siri Simmons RN  Outcome: Progressing  1/18/2020 1135 by Siri Simmons RN  Outcome: Progressing     Problem: RESPIRATORY - ADULT  Goal: Achieves optimal ventilation and oxygenation  Description  INTERVENTIONS:  - Assess for changes in respiratory status  - Assess for changes in mentation and behavior  - Position to facilitate oxygenation and minimize respiratory effort  - Oxygen administered by appropriate delivery if ordered  - Initiate smoking cessation education as indicated  - Encourage broncho-pulmonary hygiene including cough, deep breathe, Incentive Spirometry  - Assess the need for suctioning and aspirate as needed  - Assess and instruct to report SOB or any respiratory difficulty  - Respiratory Therapy support as indicated  1/18/2020 1135 by Zoraida Santos RN  Outcome: Progressing  1/18/2020 1135 by Zoraida Santos RN  Outcome: Progressing     Problem: PAIN - ADULT  Goal: Verbalizes/displays adequate comfort level or baseline comfort level  Description  Interventions:  - Encourage patient to monitor pain and request assistance  - Assess pain using appropriate pain scale  - Administer analgesics based on type and severity of pain and evaluate response  - Implement non-pharmacological measures as appropriate and evaluate response  - Notify physician/advanced practitioner if interventions unsuccessful or patient reports new pain   1/18/2020 1135 by Zoraida Santos RN  Outcome: Progressing  1/18/2020 1135 by Zoraida Santos RN  Outcome: Progressing     Problem: INFECTION - ADULT  Goal: Absence or prevention of progression during hospitalization  Description  INTERVENTIONS:  - Assess and monitor for signs and symptoms of infection  - Monitor lab/diagnostic results  - Monitor all insertion sites, i e  indwelling lines, tubes, and drains  - [place] appropriate cooling/warming therapies per order  - Administer medications as ordered  - Instruct and encourage patient and family to use good hand hygiene technique  - Identify and instruct in appropriate isolation precautions for identified infection/condition   1/18/2020 1135 by Zoraida Santos RN  Outcome: Progressing  1/18/2020 1135 by Zoraida Santos RN  Outcome: Progressing     Problem: SAFETY ADULT  Goal: Patient will remain free of falls  Description  INTERVENTIONS:  - Assess patient frequently for physical needs  -  Identify cognitive and physical deficits and behaviors that affect risk of falls    -  [place] fall precautions as indicated by assessment   - Educate patient/family on patient safety including physical limitations  - Instruct patient to call for assistance with activity based on assessment  - Modify environment to reduce risk of injury  - Consider OT/PT consult to assist with strengthening/mobility  1/18/2020 1135 by Yunier Kline RN  Outcome: Progressing  1/18/2020 1135 by Yunier Kline RN  Outcome: Progressing  Goal: Maintain or return to baseline ADL function  Description  INTERVENTIONS:  -  Assess patient's ability to carry out ADLs; assess patient's baseline for ADL function and identify physical deficits which impact ability to perform ADLs (bathing, care of mouth/teeth, toileting, grooming, dressing, etc )  - Assess/evaluate cause of self-care deficits   - Assess range of motion  - Assess patient's mobility; develop plan if impaired  - Assess patient's need for assistive devices and provide as appropriate  - Encourage maximum independence but intervene and supervise when necessary  - Involve family in performance of ADLs  - Assess for home care needs following discharge   - Consider OT consult to assist with ADL evaluation and planning for discharge  - Provide patient education as appropriate  1/18/2020 1135 by Yunier Kline RN  Outcome: Progressing  1/18/2020 1135 by Yunier Kline RN  Outcome: Progressing  Goal: Maintain or return mobility status to optimal level  Description  INTERVENTIONS:  - Assess patient's baseline mobility status (ambulation, transfers, stairs, etc )    - Identify cognitive and physical deficits and behaviors that affect mobility  - Identify mobility aids required to assist with transfers and/or ambulation (gait belt, sit-to-stand, lift, walker, cane, etc )  - [place] fall precautions as indicated by assessment  - Record patient progress and toleration of activity level on Mobility SBAR; progress patient to next Phase/Stage  - Instruct patient to call for assistance with activity based on assessment  - Consider rehabilitation consult to assist with strengthening/weightbearing, etc   1/18/2020 1135 by Yunier Kline RN  Outcome: Progressing  1/18/2020 1135 by Felipe Obrien RN  Outcome: Progressing     Problem: Nutrition/Hydration-ADULT  Goal: Nutrient/Hydration intake appropriate for improving, restoring or maintaining nutritional needs  Description  Monitor and assess patient's nutrition/hydration status for malnutrition  Collaborate with interdisciplinary team and initiate plan and interventions as ordered  Monitor patient's weight and dietary intake as ordered or per policy  Utilize nutrition screening tool and intervene as necessary  Determine patient's food preferences and provide high-protein, high-caloric foods as appropriate       INTERVENTIONS:  - Monitor oral intake, urinary output, labs, and treatment plans  - Assess nutrition and hydration status and recommend course of action  - Evaluate amount of meals eaten  - Assist patient with eating if necessary   - Allow adequate time for meals  - Recommend/ encourage appropriate diets, oral nutritional supplements, and vitamin/mineral supplements  - Order, calculate, and assess calorie counts as needed  - Assess need for intravenous fluids  - Provide specific nutrition/hydration education as appropriate  - Include patient/family/caregiver in decisions related to nutrition   1/18/2020 1135 by Felipe Obrien RN  Outcome: Progressing  1/18/2020 1135 by Felipe Obrien RN  Outcome: Progressing

## 2020-01-18 NOTE — PROGRESS NOTES
Per pt mother- Levocarnitine was taken to [facility], unknown number of pills  Pt was transferred to [facility] without these medications, only 4 pills sent  Pharmacy made aware and will reach out to / pharmacy to see if med can be sent over

## 2020-01-19 ENCOUNTER — APPOINTMENT (INPATIENT)
Dept: NEUROLOGY | Facility: CLINIC | Age: 55
DRG: 101 | End: 2020-01-19
Payer: MEDICARE

## 2020-01-19 PROCEDURE — 99232 SBSQ HOSP IP/OBS MODERATE 35: CPT | Performed by: PSYCHIATRY & NEUROLOGY

## 2020-01-19 PROCEDURE — 94640 AIRWAY INHALATION TREATMENT: CPT

## 2020-01-19 PROCEDURE — 94660 CPAP INITIATION&MGMT: CPT

## 2020-01-19 PROCEDURE — 94760 N-INVAS EAR/PLS OXIMETRY 1: CPT

## 2020-01-19 PROCEDURE — 99231 SBSQ HOSP IP/OBS SF/LOW 25: CPT | Performed by: INTERNAL MEDICINE

## 2020-01-19 PROCEDURE — 99232 SBSQ HOSP IP/OBS MODERATE 35: CPT | Performed by: NURSE PRACTITIONER

## 2020-01-19 PROCEDURE — 95715 VEEG EA 12-26HR INTMT MNTR: CPT

## 2020-01-19 RX ADMIN — DEXTRAN 70 AND HYPROMELLOSE 2910 1 DROP: 1; 3 SOLUTION/ DROPS OPHTHALMIC at 22:13

## 2020-01-19 RX ADMIN — BUDESONIDE 0.5 MG: 0.5 INHALANT RESPIRATORY (INHALATION) at 17:55

## 2020-01-19 RX ADMIN — IPRATROPIUM BROMIDE 0.5 MG: 0.5 SOLUTION RESPIRATORY (INHALATION) at 13:27

## 2020-01-19 RX ADMIN — LEVOCARNITINE 330 MG: 330 TABLET ORAL at 09:44

## 2020-01-19 RX ADMIN — Medication 1 TABLET: at 09:44

## 2020-01-19 RX ADMIN — ASPIRIN 81 MG 81 MG: 81 TABLET ORAL at 09:44

## 2020-01-19 RX ADMIN — PRIMIDONE 150 MG: 50 TABLET ORAL at 22:13

## 2020-01-19 RX ADMIN — GUAIFENESIN 600 MG: 600 TABLET, EXTENDED RELEASE ORAL at 09:45

## 2020-01-19 RX ADMIN — ZONISAMIDE 100 MG: 100 CAPSULE ORAL at 22:13

## 2020-01-19 RX ADMIN — HEPARIN SODIUM 5000 UNITS: 5000 INJECTION INTRAVENOUS; SUBCUTANEOUS at 22:10

## 2020-01-19 RX ADMIN — ZINC 1 TABLET: TAB ORAL at 09:44

## 2020-01-19 RX ADMIN — LAMOTRIGINE 300 MG: 100 TABLET ORAL at 22:10

## 2020-01-19 RX ADMIN — Medication 50 MG: at 09:44

## 2020-01-19 RX ADMIN — IPRATROPIUM BROMIDE 0.5 MG: 0.5 SOLUTION RESPIRATORY (INHALATION) at 08:14

## 2020-01-19 RX ADMIN — DEXTRAN 70 AND HYPROMELLOSE 2910 1 DROP: 1; 3 SOLUTION/ DROPS OPHTHALMIC at 17:55

## 2020-01-19 RX ADMIN — PRIMIDONE 150 MG: 50 TABLET ORAL at 09:44

## 2020-01-19 RX ADMIN — LEVOCARNITINE 330 MG: 330 TABLET ORAL at 17:55

## 2020-01-19 RX ADMIN — GUAIFENESIN 600 MG: 600 TABLET, EXTENDED RELEASE ORAL at 17:55

## 2020-01-19 RX ADMIN — LEVALBUTEROL HYDROCHLORIDE 1.25 MG: 1.25 SOLUTION, CONCENTRATE RESPIRATORY (INHALATION) at 08:14

## 2020-01-19 RX ADMIN — LAMOTRIGINE 250 MG: 25 TABLET ORAL at 15:29

## 2020-01-19 RX ADMIN — MICONAZOLE NITRATE 1 APPLICATION: 2 CREAM TOPICAL at 17:56

## 2020-01-19 RX ADMIN — PRIMIDONE 100 MG: 50 TABLET ORAL at 15:29

## 2020-01-19 RX ADMIN — LEVOCARNITINE 330 MG: 330 TABLET ORAL at 12:45

## 2020-01-19 RX ADMIN — PREDNISONE 40 MG: 20 TABLET ORAL at 09:44

## 2020-01-19 RX ADMIN — IPRATROPIUM BROMIDE 0.5 MG: 0.5 SOLUTION RESPIRATORY (INHALATION) at 17:55

## 2020-01-19 RX ADMIN — LAMOTRIGINE 200 MG: 100 TABLET ORAL at 06:23

## 2020-01-19 RX ADMIN — HEPARIN SODIUM 5000 UNITS: 5000 INJECTION INTRAVENOUS; SUBCUTANEOUS at 06:22

## 2020-01-19 RX ADMIN — BUDESONIDE 0.5 MG: 0.5 INHALANT RESPIRATORY (INHALATION) at 08:14

## 2020-01-19 RX ADMIN — MICONAZOLE NITRATE 1 APPLICATION: 2 CREAM TOPICAL at 09:52

## 2020-01-19 RX ADMIN — CLONAZEPAM 0.5 MG: 0.5 TABLET ORAL at 22:10

## 2020-01-19 RX ADMIN — LEVALBUTEROL HYDROCHLORIDE 1.25 MG: 1.25 SOLUTION, CONCENTRATE RESPIRATORY (INHALATION) at 17:55

## 2020-01-19 RX ADMIN — Medication 400 UNITS: at 09:44

## 2020-01-19 RX ADMIN — DEXTRAN 70 AND HYPROMELLOSE 2910 1 DROP: 1; 3 SOLUTION/ DROPS OPHTHALMIC at 09:44

## 2020-01-19 RX ADMIN — LEVOCARNITINE 330 MG: 330 TABLET ORAL at 22:13

## 2020-01-19 RX ADMIN — OXYCODONE HYDROCHLORIDE AND ACETAMINOPHEN 500 MG: 500 TABLET ORAL at 09:44

## 2020-01-19 RX ADMIN — DEXTRAN 70 AND HYPROMELLOSE 2910 1 DROP: 1; 3 SOLUTION/ DROPS OPHTHALMIC at 12:45

## 2020-01-19 RX ADMIN — HEPARIN SODIUM 5000 UNITS: 5000 INJECTION INTRAVENOUS; SUBCUTANEOUS at 12:45

## 2020-01-19 RX ADMIN — LEVALBUTEROL HYDROCHLORIDE 1.25 MG: 1.25 SOLUTION, CONCENTRATE RESPIRATORY (INHALATION) at 13:27

## 2020-01-19 NOTE — ASSESSMENT & PLAN NOTE
POA  Was in ICU at [address] [place] [address]  Now on [place] O2  Sp high flow nasal canula requiring icu at [facility]  Suspected 2/2 RSV and superimposed PNA completed abx  Started on CPAP qhs at [address] [place] [address], for suspected sleep apnea eventually will require sleep study   Airway clearance protocol    Appreciate pulmonary   Patient's repeat chest x-ray improving  Patient overall improving after start of prednisone per Pulmonary continue for 5 days total of that    Continue with Atrovent and Xopenex  Okay to discharge patient on albuterol

## 2020-01-19 NOTE — ASSESSMENT & PLAN NOTE
Noted on EKG  at [address] [facility]   When read by cards noted sinus tach with incomplete right bundle , st and t wave abnormality   troponins at gsl < 0 03  Will need continued outpatient follow up

## 2020-01-19 NOTE — SOCIAL WORK
Met with pt and explained CM role  Pt lives with his mom in a 2 story house with 3 ALESIA and BR and BD on 2nd floor  Pt was independent PTA and does not drive  Pt's mom provides him with transportation  Pt's PCP is Dr Daron Espinal  No DME avail  No hx of HHC, but has a hx of rehab at [facility]  No hx of mental health issues or drug or alcohol use  Pt has a prescription plan and uses [facility] in [place] for his prescriptions  Primary contact is pt's mom Monique Mark, contact # [phone]  Pt's mom will provide him with transportation home upon discharge  Pt's mom is his legal guardian  CM reviewed d/c planning process including the following: identifying help at home, patient preference for d/c planning needs, Discharge Lounge, [facility] Meds to Bed program, availability of treatment team to discuss questions or concerns patient and/or family may have regarding understanding medications and recognizing signs and symptoms once discharged  CM also encouraged patient to follow up with all recommended appointments after discharge  Patient advised of importance for patient and family to participate in managing patients medical well being  Patient/caregiver received discharge checklist  Content reviewed  Patient/caregiver encouraged to participate in discharge plan of care prior to discharge home

## 2020-01-19 NOTE — SOCIAL WORK
Met with pt, pt's mom Jose and pt's uncle Justin Madrid to discuss pt's discharge plan  Informed them of therapy's recommendation for inpt rehab  Jose stated that she has previously looked into [facility] while pt was at [facility] and she would prefer pt to go to [facility]  A post acute care recommendation was made by your care team for Acute Rehab  Discussed [facility] with patient, pt's mom and uncle  List of facilities given to patient, pt's mom, and uncle via in person  patient, pt's mom and uncle aware the list is custom filtered for them by preference  and that [facility] post acute providers are designated  ECIN referral sent to [facility]

## 2020-01-19 NOTE — PLAN OF CARE
Problem: Prexisting or High Potential for Compromised Skin Integrity  Goal: Skin integrity is maintained or improved  Description  INTERVENTIONS:  - Identify patients at risk for skin breakdown  - Assess and monitor skin integrity  - Assess and monitor nutrition and hydration status  - Monitor labs   - Assess for incontinence   - Turn and reposition patient  - Assist with mobility/ambulation  - Relieve pressure over bony prominences  - Avoid friction and shearing  - Provide appropriate hygiene as needed including keeping skin clean and dry  - Evaluate need for skin moisturizer/barrier cream  - Collaborate with interdisciplinary team   - Patient/family teaching  - Consider wound care consult   Outcome: Progressing     Problem: Potential for Falls  Goal: Patient will remain free of falls  Description  INTERVENTIONS:  - Assess patient frequently for physical needs  -  Identify cognitive and physical deficits and behaviors that affect risk of falls    -  [place] fall precautions as indicated by assessment   - Educate patient/family on patient safety including physical limitations  - Instruct patient to call for assistance with activity based on assessment  - Modify environment to reduce risk of injury  - Consider OT/PT consult to assist with strengthening/mobility  Outcome: Progressing     Problem: NEUROSENSORY - ADULT  Goal: Achieves stable or improved neurological status  Description  INTERVENTIONS  - Monitor and report changes in neurological status  - Monitor vital signs such as temperature, blood pressure, glucose, and any other labs ordered   - Initiate measures to prevent increased intracranial pressure  - Monitor for seizure activity and implement precautions if appropriate      Outcome: Progressing  Goal: Remains free of injury related to seizures activity  Description  INTERVENTIONS  - Maintain airway, patient safety  and administer oxygen as ordered  - Monitor patient for seizure activity, document and report duration and description of seizure to physician/advanced practitioner  - If seizure occurs,  ensure patient safety during seizure  - Reorient patient post seizure  - Seizure pads on all 4 side rails  - Instruct patient/family to notify RN of any seizure activity including if an aura is experienced  - Instruct patient/family to call for assistance with activity based on nursing assessment  - Administer anti-seizure medications if ordered    Outcome: Progressing  Goal: Achieves maximal functionality and self care  Description  INTERVENTIONS  - Monitor swallowing and airway patency with patient fatigue and changes in neurological status  - Encourage and assist patient to increase activity and self care     - Encourage visually impaired, hearing impaired and aphasic patients to use assistive/communication devices  Outcome: Progressing     Problem: RESPIRATORY - ADULT  Goal: Achieves optimal ventilation and oxygenation  Description  INTERVENTIONS:  - Assess for changes in respiratory status  - Assess for changes in mentation and behavior  - Position to facilitate oxygenation and minimize respiratory effort  - Oxygen administered by appropriate delivery if ordered  - Initiate smoking cessation education as indicated  - Encourage broncho-pulmonary hygiene including cough, deep breathe, Incentive Spirometry  - Assess the need for suctioning and aspirate as needed  - Assess and instruct to report SOB or any respiratory difficulty  - Respiratory Therapy support as indicated  Outcome: Progressing     Problem: PAIN - ADULT  Goal: Verbalizes/displays adequate comfort level or baseline comfort level  Description  Interventions:  - Encourage patient to monitor pain and request assistance  - Assess pain using appropriate pain scale  - Administer analgesics based on type and severity of pain and evaluate response  - Implement non-pharmacological measures as appropriate and evaluate response  - Notify physician/advanced practitioner if interventions unsuccessful or patient reports new pain   Outcome: Progressing     Problem: INFECTION - ADULT  Goal: Absence or prevention of progression during hospitalization  Description  INTERVENTIONS:  - Assess and monitor for signs and symptoms of infection  - Monitor lab/diagnostic results  - Monitor all insertion sites, i e  indwelling lines, tubes, and drains  - [place] appropriate cooling/warming therapies per order  - Administer medications as ordered  - Instruct and encourage patient and family to use good hand hygiene technique  - Identify and instruct in appropriate isolation precautions for identified infection/condition   Outcome: Progressing     Problem: SAFETY ADULT  Goal: Patient will remain free of falls  Description  INTERVENTIONS:  - Assess patient frequently for physical needs  -  Identify cognitive and physical deficits and behaviors that affect risk of falls    -  [place] fall precautions as indicated by assessment   - Educate patient/family on patient safety including physical limitations  - Instruct patient to call for assistance with activity based on assessment  - Modify environment to reduce risk of injury  - Consider OT/PT consult to assist with strengthening/mobility  Outcome: Progressing  Goal: Maintain or return to baseline ADL function  Description  INTERVENTIONS:  -  Assess patient's ability to carry out ADLs; assess patient's baseline for ADL function and identify physical deficits which impact ability to perform ADLs (bathing, care of mouth/teeth, toileting, grooming, dressing, etc )  - Assess/evaluate cause of self-care deficits   - Assess range of motion  - Assess patient's mobility; develop plan if impaired  - Assess patient's need for assistive devices and provide as appropriate  - Encourage maximum independence but intervene and supervise when necessary  - Involve family in performance of ADLs  - Assess for home care needs following discharge   - Consider OT consult to assist with ADL evaluation and planning for discharge  - Provide patient education as appropriate  Outcome: Progressing  Goal: Maintain or return mobility status to optimal level  Description  INTERVENTIONS:  - Assess patient's baseline mobility status (ambulation, transfers, stairs, etc )    - Identify cognitive and physical deficits and behaviors that affect mobility  - Identify mobility aids required to assist with transfers and/or ambulation (gait belt, sit-to-stand, lift, walker, cane, etc )  - [place] fall precautions as indicated by assessment  - Record patient progress and toleration of activity level on Mobility SBAR; progress patient to next Phase/Stage  - Instruct patient to call for assistance with activity based on assessment  - Consider rehabilitation consult to assist with strengthening/weightbearing, etc   Outcome: Progressing     Problem: Nutrition/Hydration-ADULT  Goal: Nutrient/Hydration intake appropriate for improving, restoring or maintaining nutritional needs  Description  Monitor and assess patient's nutrition/hydration status for malnutrition  Collaborate with interdisciplinary team and initiate plan and interventions as ordered  Monitor patient's weight and dietary intake as ordered or per policy  Utilize nutrition screening tool and intervene as necessary  Determine patient's food preferences and provide high-protein, high-caloric foods as appropriate       INTERVENTIONS:  - Monitor oral intake, urinary output, labs, and treatment plans  - Assess nutrition and hydration status and recommend course of action  - Evaluate amount of meals eaten  - Assist patient with eating if necessary   - Allow adequate time for meals  - Recommend/ encourage appropriate diets, oral nutritional supplements, and vitamin/mineral supplements  - Order, calculate, and assess calorie counts as needed  - Assess need for intravenous fluids  - Provide specific nutrition/hydration education as appropriate  - Include patient/family/caregiver in decisions related to nutrition   Outcome: Progressing

## 2020-01-19 NOTE — PROGRESS NOTES
Progress Note - Neurology   Kendy Patterson 48 y o  male MRN: [id number]  Unit/Bed#: [facility] 719-01 Encounter: [id number]    Assessment/Plan:  51-year-old male with history of static encephalopathy/degree of cognitive impairment and epilepsy, presenting initially to [address] [place] [address] with shortness of breath and hypoxic respiratory failure (after recently being diagnosed with pneumonia several days prior)  Throughout admission over the past week he had breakthrough recurrent generalized tonic-clonic seizures, thus he was transferred for video EEG monitoring at [facility] for more aggressive seizure/AED management  Concern for breakthrough seizure in the setting of infectious derangement with pneumonia, upon review of his AED levels, his zonisamide level was also low on admission  1  Seizure activity:  -video EEG monitoring ongoing:  Per discussion with epileptologist, and has occasional bursts of generalized spike wave, no clinical change with these  He does have some frequent staring spells/behavioral rests which were captured and NOT seizure activity  Overall EEG background is better than yesterday with decreased an interictal epileptiform discharges  Will continue monitoring through today and if remains improved/unremarkable tomorrow morning, likely discontinue then   -will pursue MRI brain with and without contrast once off video EEG  -AED regimen continues as:   -Lamictal 200 mg in AM, 250 mg at 3:00 PM and 300 mg at night   -primidone 150 mg in AM, 100 mg at 3:00 PM, 150 mg at night   -zonisamide 100 mg at night   -clonazepam 0 5 mg at night  -AED levels reviewed (see consult note yesterday)  -supportive care/seizure precautions  -family interested in establishing care with [facility] epilepsy team upon discharge  Will arrange for appointment/follow up       2  Acute hypoxic respiratory failure secondary to RSV/pneumonia:  -pulmonary following with management  -started on prednisone for oral or 5 days  -Atrovent/Xopenex  -respiratory therapy following     3  CHASE:  -nightly CPAP  -outpatient sleep study recommended    Discussed plan of care with attending neurologist      Subjective:   Patient resting in bed, family at bedside  Patient continues to do well, family agrees he continues to appear much improved compared to earlier in hospitalization at [address] [place] [address]  No significant changes per nursing staff, no gross clinical seizure activity  Vitals: Blood pressure 127/75, pulse 91, temperature 98 1 °F (36 7 °C), resp  rate 22, height 5' 6" (1 676 m), weight 110 kg (242 lb 8 1 oz), SpO2 93 %  ,Body mass index is 39 14 kg/m²  Physical Exam:   Physical Exam   Constitutional: He appears well-developed and well-nourished  HENT:   Head: Normocephalic and atraumatic  Eyes: Pupils are equal, round, and reactive to light  EOM are normal    L conjunctival bleeding noted   Neck: Normal range of motion  Neck supple  Cardiovascular: Normal rate and regular rhythm  Pulmonary/Chest: Effort normal and breath sounds normal    Abdominal: Soft  Bowel sounds are normal    Musculoskeletal: Normal range of motion  Neurological: He is alert  Skin: Skin is warm and dry  Neurologic Exam     Mental Status   Patient awake, pleasant, oriented  Following central and appendicular commands  No dysarthria or aphasia with conversation  Cranial Nerves     CN II   Visual fields full to confrontation  CN III, IV, VI   Pupils are equal, round, and reactive to light  Extraocular motions are normal    L eye exotropia with primary/midline gaze with EOM abnormality  Otherwise no gross CN abnormality  Motor Exam Moving extremities with full appearing strength, no focal deficit or laterality seen on exam today  Sensory Exam   No hemisensory deficit with tactile stimulation        Gait, Coordination, and Reflexes     Tremor   Resting tremor: absent  Intention tremor: absentNo clinical seizure activity seen throughout exam         Lab, Imaging and other studies:   Video EEG monitoring 01/18/2020-01/19/2020: final read pending, see details above in assessment/plan  CBC:   Results from last 7 days   Lab Units 01/18/20  0544 01/16/20  0435 01/15/20  0503   WBC Thousand/uL 6 90 6 02 6 33   RBC Million/uL 3 48* 3 80* 3 93   HEMOGLOBIN g/dL 11 3* 12 1 12 6   HEMATOCRIT % 35 4* 37 2 39 4   MCV fL 102* 98 100*   PLATELETS Thousands/uL 200 194 169   , BMP/CMP:   Results from last 7 days   Lab Units 01/18/20  0544 01/16/20  0435 01/15/20  0503   SODIUM mmol/L 143 140 141   POTASSIUM mmol/L 4 2 4 2 4 1   CHLORIDE mmol/L 109* 103 104   CO2 mmol/L 31 31 32   BUN mg/dL 24 19 19   CREATININE mg/dL 0 99 0 92 1 00   CALCIUM mg/dL 8 7 8 2* 8 2*   EGFR ml/min/1 73sq m 87 95 86   , Vitamin B12:   , HgBA1C:   , TSH:   , Coagulation:   , Lipid Profile:   , Ammonia:   , Urinalysis:       Invalid input(s): URIBILINOGEN, Drug Screen:   , Medication Drug Levels:       Invalid input(s): CARBAMAZEPINE,  PHENOBARB, LACOSAMIDE, OXCARBAZEPINE  VTE Prophylaxis: Sequential compression device (Venodyne)     Total time spent today 25 minutes  Discussed plan of care with patient and primary team: continuing vEEG and current AED regimen, discussed with family that staring episodes/behavioral arrest are not seizure  MRI brain to be obtained when off vEEG (hopefully tomorrow), will arrange for [facility] epilepsy follow up upon discharge to transfer neurology/epilepsy care

## 2020-01-19 NOTE — ASSESSMENT & PLAN NOTE
Pt has seizure history as a child and over the last past month has had increased occurrences  Pt had witnessed seizure  At [facility] (he had 1 episode of tonic clonic seizure last about 5 seconds spontaneously resolved while eating breakfast  Neuro did not feel meds needed to be adjusted pt was transferred here)   Transferred to [facility]"" for EMU  - emu for first 12 hrs unremarkable with exception of generalized spikes (expected for known generalized epilepsy)   Pt Meds per neuro:  "Lamictal 200 mg in AM, 250 mg at 3:00 PM and 300 mg at night  -primidone 150 mg in AM, 100 mg at 3:00 PM, 150 mg at night  -zonisamide 100 mg at night  -clonazepam 0 5 mg at night  -most recent AED levels include:              -primidone level of 7 7 on 01/10              -low zonisamide level of 4 1 on 01/10              -lamotrigine level of 11 5 on 01/10 "  -   (neuro feel these meds contribute to unsteady gait)   - If needed per neuro 1/18 then Zonegran may be increased as it is low, initial dose    - feel would be useful to obtain MRI w/wo once off EEG  Epileptology appreciated   C/w home meds

## 2020-01-19 NOTE — PLAN OF CARE
Problem: Nutrition/Hydration-ADULT  Goal: Nutrient/Hydration intake appropriate for improving, restoring or maintaining nutritional needs  Description  Monitor and assess patient's nutrition/hydration status for malnutrition  Collaborate with interdisciplinary team and initiate plan and interventions as ordered  Monitor patient's weight and dietary intake as ordered or per policy  Utilize nutrition screening tool and intervene as necessary  Determine patient's food preferences and provide high-protein, high-caloric foods as appropriate       INTERVENTIONS:  - Monitor oral intake, urinary output, labs, and treatment plans  - Assess nutrition and hydration status and recommend course of action  - Evaluate amount of meals eaten  - Assist patient with eating if necessary   - Allow adequate time for meals  - Recommend/ encourage appropriate diets, oral nutritional supplements, and vitamin/mineral supplements  - Order, calculate, and assess calorie counts as needed  - Assess need for intravenous fluids  - Provide specific nutrition/hydration education as appropriate  - Include patient/family/caregiver in decisions related to nutrition   Outcome: Progressing

## 2020-01-19 NOTE — ASSESSMENT & PLAN NOTE
Supportive care  PT/OT/ST  Pt AAOx1  Does not always follow command (pt is eating on my exam and very focused on his meal)   At baseline AAOx3

## 2020-01-19 NOTE — ASSESSMENT & PLAN NOTE
Noted on EKG  at [address] [facility]   When read by cards noted sinus tach with incomplete right bundle , st and t wave abnormality   troponins at gsl < 0 03  Will need continued outpatient follow up

## 2020-01-19 NOTE — ASSESSMENT & PLAN NOTE
POA with some ecchymosis  Pt was sp a fall on 1/7   Prior imaging ct performed: No traumatic thoracic or intraabdominal abn   Stranding within the subcutaneous tissues of the right flank consistent with a contusion   Small ecchymotic area on upper back not tender to touch

## 2020-01-19 NOTE — PROGRESS NOTES
Progress Note - Pulmonary   Regenia Kensiha 48 y o  male MRN: [id number]  Unit/Bed#: [facility] 719-01 Encounter: [id number]      1  Acute hypoxic respiratory failure 2/2 RSV pneumonia  - Pt presented with worsening cough, SOB, fever  - Imaging consistent with JAMAL opacities  - RSV +, MRSA nares +  - completed 5 day course of vancomycin/cefepime for possible superimposed bacterial pneumonia  - subsequent improvement in respiratory status, currently saturating low 90s on room air, CPAP at night  - positive RSV and positive MRSA nares, but his sputum culture grew mixed leonides, urine strep/Legionella were negative, and blood cultures were negative  - Pt is without recent fever or leukocytosis, continue to monitor off further Abx  - repeat chest x-ray shows continued improvement  - patient with significant improvement in wheezing after initiation of prednisone, would continue for 5 days total then stop  - c/w Atrovent/xopenex  - patient can be discharged with p r n  Albuterol  - out of bed to chair, incentive spirometry, pulmonary toilet, goal SpO2 > 90%     2  Suspected CHASE  - [place] of [place] [facility] [place]/w [place]hs CP[place], will need outpatient sleep study      3  Epilepsy with recent seizures  - [facility] [place] [facility] given recurrent seizures  - management per neurology and primary team      * patient is stable from pulmonary standpoint, will sign off  Please call with questions  Subjective:   Patient seen examined at bedside  States that he has no problem with his breathing  Does have mild nonproductive cough  Remains on video EEG  No fever, chills, nausea, vomiting, chest pain  Review of Systems   Constitutional: Positive for activity change and fatigue  Negative for chills, fever and unexpected weight change  HENT: Positive for congestion  Negative for rhinorrhea, sneezing and sore throat  Respiratory: Positive for cough and wheezing  Negative for shortness of breath  Cardiovascular: Negative for chest pain, palpitations and leg swelling  Gastrointestinal: Negative for abdominal pain, constipation, diarrhea, nausea and vomiting  Endocrine: Negative for cold intolerance and heat intolerance  Genitourinary: Negative for dysuria  Musculoskeletal: Negative for arthralgias  Allergic/Immunologic: Negative for immunocompromised state  Neurological: Positive for seizures  Negative for dizziness and numbness  Psychiatric/Behavioral: Positive for confusion  Objective:     Vitals:    01/18/20 2005 01/18/20 2154 01/19/20 0730 01/19/20 0814   BP:  128/73 127/75    Pulse:  92 91    Resp:       Temp:  98 5 °F (36 9 °C) 98 1 °F (36 7 °C)    TempSrc:       SpO2: 90% 93% 90% 93%   Weight:       Height:               Intake/Output Summary (Last 24 hours) at 1/19/2020 1102  Last data filed at 1/18/2020 1700  Gross per 24 hour   Intake 240 ml   Output 425 ml   Net -185 ml         Physical Exam   Constitutional: He is oriented to person, place, and time  He appears well-developed and well-nourished  No distress  On video EEG   HENT:   Head: Normocephalic and atraumatic  Mouth/Throat: Oropharynx is clear and moist  No oropharyngeal exudate  Dysconjugate gaze, with conjunctival erythema of left eye   Eyes: EOM are normal  No scleral icterus  Cardiovascular: Normal rate, regular rhythm and normal heart sounds  No murmur heard  Pulmonary/Chest: Effort normal and breath sounds normal  No respiratory distress  He has no wheezes  Abdominal: Soft  Bowel sounds are normal  He exhibits no distension  There is no tenderness  Musculoskeletal: He exhibits no edema  Neurological: He is alert and oriented to person, place, and time  Follows commands   Skin: He is not diaphoretic  Labs: I have personally reviewed pertinent lab results    Results from last 7 days   Lab Units 01/18/20  0544 01/16/20  0435 01/15/20  0503   WBC Thousand/uL 6 90 6 02 6 33   HEMOGLOBIN g/dL 11 3* 12 1 12 6   HEMATOCRIT % 35 4* 37 2 39 4   PLATELETS Thousands/uL 200 194 169      Results from last 7 days   Lab Units 01/18/20  0544 01/16/20  0435 01/15/20  0503   POTASSIUM mmol/L 4 2 4 2 4 1   CHLORIDE mmol/L 109* 103 104   CO2 mmol/L 31 31 32   BUN mg/dL 24 19 19   CREATININE mg/dL 0 99 0 92 1 00   CALCIUM mg/dL 8 7 8 2* 8 2*     Results from last 7 days   Lab Units 01/18/20  0544 01/13/20  0435   MAGNESIUM mg/dL 2 2 2 1     Results from last 7 days   Lab Units 01/18/20  0544   PHOSPHORUS mg/dL 3 0              0   Lab Value Date/Time    TROPONINI 0 02 01/10/2020 1340    TROPONINI <0 02 01/10/2020 1039    TROPONINI <0 02 01/10/2020 0529     Imaging and other studies: I have personally reviewed pertinent reports  and I have personally reviewed pertinent films in PACS  CXR 1/12/20  Increased consolidation in left upper lobe     CTA chest/abdomen/pelvis 01/07/2020  Left upper lobe alveolar opacities  Pericardial calcifications likely related to prior pericarditis     Lower extremity duplex 01/10/2020  No evidence of acute or chronic DVT in bilateral lower extremities     Pulmonary function testing:  None     EKG, Pathology, and Other Studies: I have personally reviewed pertinent reports      Echo 01/10/2020  EF 36%, grade 1 diastolic dysfunction     Micro:  positive RSV and positive MRSA nares, but his sputum culture grew mixed leonides, urine strep/Legionella were negative, and blood cultures were negative        Liang Valerio MD  Pulmonary & Critical Care Fellow, [facility]

## 2020-01-19 NOTE — PROGRESS NOTES
Progress Note - Geetha Portillo 1965, 48 y o  male MRN: [id number]    [facility] Encounter: [id number]    Primary Care Provider: Sunitha Landa DO   Date and time admitted to hospital: 1/17/2020  5:25 PM        * Nonintractable generalized idiopathic epilepsy without status epilepticus ([facility] [address] )  Assessment & Plan  Pt has seizure history as a child and over the last past month has had increased occurrences  Pt had witnessed seizure  At [address] [place] [address] (he had 1 episode of tonic clonic seizure last about 5 seconds spontaneously resolved while eating breakfast  Neuro did not feel meds needed to be adjusted pt was transferred here)   Transferred to B for EMU  - emu for first 12 hrs unremarkable with exception of generalized spikes (expected for known generalized epilepsy)   Pt Meds per neuro:  "Lamictal 200 mg in AM, 250 mg at 3:00 PM and 300 mg at night  -primidone 150 mg in AM, 100 mg at 3:00 PM, 150 mg at night  -zonisamide 100 mg at night  -clonazepam 0 5 mg at night  -most recent AED levels include:              -primidone level of 7 7 on 01/10              -low zonisamide level of 4 1 on 01/10              -lamotrigine level of 11 5 on 01/10 "  -   (neuro feel these meds contribute to unsteady gait)   - If needed per neuro 1/18 then Zonegran may be increased as it is low, initial dose  - feel would be useful to obtain MRI w/wo once off EEG  Epileptology appreciated   C/w home meds        Acute encephalopathy  Assessment & Plan  Likely post-ictal  Per brother pt AAOx3 at baseline    Developmental delay  Assessment & Plan  Supportive care  PT/OT/ST  Pt AAOx1  Does not always follow command (pt is eating on my exam and very focused on his meal)   At baseline AAOx3    Acute respiratory failure with hypoxia [facility])  Assessment & Plan  POA  Was in ICU at [address] [place] [address]  Now on Nc O2  Sp high flow nasal canula requiring icu at [facility]  Suspected 2/2 RSV and superimposed PNA completed abx    Started on CPAP qhs at [address] [place] [address], for suspected sleep apnea eventually will require sleep study   Airway clearance protocol    Appreciate pulmonary   Patient's repeat chest x-ray improving  Patient overall improving after start of prednisone per Pulmonary continue for 5 days total of that  Continue with Atrovent and Xopenex  Okay to discharge patient on albuterol      RSV (acute bronchiolitis due to respiratory syncytial virus)  Assessment & Plan  Resp protocol  Supportive care appreciate pulmonary     Contusion, flank, subsequent encounter  Assessment & Plan  POA with some ecchymosis  Pt was sp a fall on 1/7   Prior imaging ct performed: No traumatic thoracic or intraabdominal abn  Stranding within the subcutaneous tissues of the right flank consistent with a contusion   Small ecchymotic area on upper back not tender to touch    T wave inversion in EKG  Assessment & Plan  Noted on EKG  at [address]   When read by cards noted sinus tach with incomplete right bundle , st and t wave abnormality   troponins at gsl < 0 03  Will need continued outpatient follow up     MRSA nasal colonization  Assessment & Plan  On contact precautions   - noted at gsl via swap     Dysphagia  Assessment & Plan  Modified diet  Speech eval      VTE Pharmacologic Prophylaxis:   Pharmacologic: Heparin  Mechanical VTE Prophylaxis in Place: Yes    Patient Centered Rounds: I have performed bedside rounds with nursing staff today  Discussions with Specialists or Other Care Team Provider: nursing     Education and Discussions with Family / Patient: patient     Time Spent for Care: 45 minutes  More than 50% of total time spent on counseling and coordination of care as described above      Current Length of Stay: 2 day(s)    Current Patient Status: Inpatient   Certification Statement: The patient will continue to require additional inpatient hospital stay due to pending rehab referral to [facility] placed    Discharge Plan: to rehab at discharge     Code Status: Level 1 - Full Code      Subjective:   Pt is doing well alert talking more today   He has no pain slightly tender right flank side  Pt is eating and drinking does had cough that he is able to expectorate today  Not choking on food when he eats  Objective:     Vitals:   Temp (24hrs), Av 9 °F (37 2 °C), Min:98 1 °F (36 7 °C), Max:99 7 °F (37 6 °C)    Temp:  [98 1 °F (36 7 °C)-99 7 °F (37 6 °C)] 99 7 °F (37 6 °C)  HR:  [91-95] 95  Resp:  [20] 20  BP: (126-127)/(75-76) 126/76  SpO2:  [90 %-98 %] 93 %  Body mass index is 39 14 kg/m²  Input and Output Summary (last 24 hours): Intake/Output Summary (Last 24 hours) at 2020 2236  Last data filed at 2020 1730  Gross per 24 hour   Intake 540 ml   Output 300 ml   Net 240 ml       Physical Exam:     Physical Exam   Constitutional: He is oriented to person, place, and time  No distress  HENT:   Head: Normocephalic and atraumatic  Eyes: Right eye exhibits no discharge  No scleral icterus  Neck: No JVD present  No tracheal deviation present  No thyromegaly present  Cardiovascular: Normal rate  Exam reveals no gallop and no friction rub  No murmur heard  Pulmonary/Chest: Effort normal  No stridor  No respiratory distress  He has no wheezes  He has no rales  He exhibits no tenderness  Abdominal: Soft  He exhibits no distension and no mass  There is no tenderness  There is no rebound and no guarding  No hernia  Musculoskeletal: He exhibits tenderness (eecymosis healing on right flank side large softer not as firm / posterior midline back eccymosis )  He exhibits no edema or deformity  Lymphadenopathy:     He has no cervical adenopathy  Neurological: He is oriented to person, place, and time  Skin: No rash noted  He is not diaphoretic  No erythema  No pallor           Additional Data:     Labs:    Results from last 7 days   Lab Units 20  0544   WBC Thousand/uL 6 90   HEMOGLOBIN g/dL 11 3*   HEMATOCRIT % 35 4*   PLATELETS Thousands/uL 200 Results from last 7 days   Lab Units 01/18/20  0544   SODIUM mmol/L 143   POTASSIUM mmol/L 4 2   CHLORIDE mmol/L 109*   CO2 mmol/L 31   BUN mg/dL 24   CREATININE mg/dL 0 99   ANION GAP mmol/L 3*   CALCIUM mg/dL 8 7   GLUCOSE RANDOM mg/dL 90                 Results from last 7 days   Lab Units 01/14/20  0600 01/13/20  0843   PROCALCITONIN ng/ml 1 15* 1 90*           * I Have Reviewed All Lab Data Listed Above  * Additional Pertinent Lab Tests Reviewed:  All Labs Within Last 24 Hours Reviewed    Imaging:    Imaging Reports Reviewed Today Include: reviewed   Recent Cultures (last 7 days):           Last 24 Hours Medication List:     Current Facility-Administered Medications:  acetaminophen 650 mg Oral Q6H PRN Herberth Ashraf DO   albuterol 2 5 mg Nebulization Q4H PRN Ronaldo Dunn MD   ascorbic acid 500 mg Oral Daily Herberth Ashraf DO   aspirin 81 mg Oral Daily Herberth Ashraf DO   budesonide 0 5 mg Nebulization Q12H Shiela Floyd MD   calcium carbonate-vitamin D 1 tablet Oral Daily With Breakfast Herberth Ashraf DO   clonazePAM 0 5 mg Oral HS Herberth Ashraf DO   dextran 70-hypromellose 1 drop Both Eyes 4x Daily Herberth Ashraf DO   guaiFENesin 600 mg Oral BID Herberth Ashraf DO   heparin (porcine) 5,000 Units Subcutaneous [facility] Herberth Ashraf DO   ipratropium 0 5 mg Nebulization TID Shiela Floyd MD   lamoTRIgine 200 mg Oral Early Morning Herberth Ashraf DO   lamoTRIgine 250 mg Oral Daily With Lunch Lavone Quarry Mergel, PA-C   lamoTRIgine 300 mg Oral HS Herberth Ashraf DO   levalbuterol 1 25 mg Nebulization TID Ronaldo Dunn MD   levOCARNitine 330 mg Oral 4x Daily (PC & HS) Herberth Ashraf DO   miconazole  Topical BID Herberth Ashraf DO   multivitamin stress formula 1 tablet Oral Daily Herberth Ashraf DO   ondansetron 4 mg Intravenous Q6H PRN Herberth Ashraf DO   predniSONE 40 mg Oral Daily Shiela Floyd MD   primidone 100 mg Oral Daily With Lunch Lavone Quarry Mergel, PA-C   primidone 150 mg Oral HS Herberth Ashraf DO   primidone 150 mg Oral After Breakfast Crow Search, DO   vitamin E (tocopherol) 400 Units Oral Daily Crow Search, DO   zinc gluconate 50 mg Oral Daily Crow Search, DO   zonisamide 100 mg Oral HS Crow Search, DO        Today, Patient Was Seen By: NADEGE Santillan    ** Please Note: Dictation voice to text software may have been used in the creation of this document   **

## 2020-01-20 ENCOUNTER — APPOINTMENT (INPATIENT)
Dept: RADIOLOGY | Facility: HOSPITAL | Age: 55
DRG: 101 | End: 2020-01-20
Payer: MEDICARE

## 2020-01-20 PROCEDURE — 99233 SBSQ HOSP IP/OBS HIGH 50: CPT | Performed by: PSYCHIATRY & NEUROLOGY

## 2020-01-20 PROCEDURE — 70553 MRI BRAIN STEM W/O & W/DYE: CPT

## 2020-01-20 PROCEDURE — A9585 GADOBUTROL INJECTION: HCPCS | Performed by: INTERNAL MEDICINE

## 2020-01-20 PROCEDURE — 94640 AIRWAY INHALATION TREATMENT: CPT

## 2020-01-20 PROCEDURE — 94760 N-INVAS EAR/PLS OXIMETRY 1: CPT

## 2020-01-20 PROCEDURE — 99232 SBSQ HOSP IP/OBS MODERATE 35: CPT | Performed by: NURSE PRACTITIONER

## 2020-01-20 RX ADMIN — GADOBUTROL 11 ML: 604.72 INJECTION INTRAVENOUS at 19:41

## 2020-01-20 RX ADMIN — LEVALBUTEROL HYDROCHLORIDE 1.25 MG: 1.25 SOLUTION, CONCENTRATE RESPIRATORY (INHALATION) at 13:18

## 2020-01-20 RX ADMIN — GUAIFENESIN 600 MG: 600 TABLET, EXTENDED RELEASE ORAL at 17:45

## 2020-01-20 RX ADMIN — PRIMIDONE 150 MG: 50 TABLET ORAL at 22:04

## 2020-01-20 RX ADMIN — Medication 50 MG: at 09:26

## 2020-01-20 RX ADMIN — ZINC 1 TABLET: TAB ORAL at 09:26

## 2020-01-20 RX ADMIN — LEVOCARNITINE 330 MG: 330 TABLET ORAL at 22:03

## 2020-01-20 RX ADMIN — LAMOTRIGINE 200 MG: 100 TABLET ORAL at 05:56

## 2020-01-20 RX ADMIN — BUDESONIDE 0.5 MG: 0.5 INHALANT RESPIRATORY (INHALATION) at 06:56

## 2020-01-20 RX ADMIN — LEVOCARNITINE 330 MG: 330 TABLET ORAL at 17:45

## 2020-01-20 RX ADMIN — PRIMIDONE 100 MG: 50 TABLET ORAL at 15:44

## 2020-01-20 RX ADMIN — LAMOTRIGINE 300 MG: 100 TABLET ORAL at 22:09

## 2020-01-20 RX ADMIN — DEXTRAN 70 AND HYPROMELLOSE 2910 1 DROP: 1; 3 SOLUTION/ DROPS OPHTHALMIC at 17:45

## 2020-01-20 RX ADMIN — PRIMIDONE 150 MG: 50 TABLET ORAL at 07:50

## 2020-01-20 RX ADMIN — ASPIRIN 81 MG 81 MG: 81 TABLET ORAL at 09:27

## 2020-01-20 RX ADMIN — GUAIFENESIN 600 MG: 600 TABLET, EXTENDED RELEASE ORAL at 09:26

## 2020-01-20 RX ADMIN — LEVALBUTEROL HYDROCHLORIDE 1.25 MG: 1.25 SOLUTION, CONCENTRATE RESPIRATORY (INHALATION) at 06:55

## 2020-01-20 RX ADMIN — PREDNISONE 40 MG: 20 TABLET ORAL at 09:28

## 2020-01-20 RX ADMIN — Medication 400 UNITS: at 09:28

## 2020-01-20 RX ADMIN — MICONAZOLE NITRATE: 2 CREAM TOPICAL at 17:45

## 2020-01-20 RX ADMIN — LEVOCARNITINE 330 MG: 330 TABLET ORAL at 12:10

## 2020-01-20 RX ADMIN — HEPARIN SODIUM 5000 UNITS: 5000 INJECTION INTRAVENOUS; SUBCUTANEOUS at 15:46

## 2020-01-20 RX ADMIN — DEXTRAN 70 AND HYPROMELLOSE 2910 1 DROP: 1; 3 SOLUTION/ DROPS OPHTHALMIC at 22:05

## 2020-01-20 RX ADMIN — LEVOCARNITINE 330 MG: 330 TABLET ORAL at 07:50

## 2020-01-20 RX ADMIN — DEXTRAN 70 AND HYPROMELLOSE 2910 1 DROP: 1; 3 SOLUTION/ DROPS OPHTHALMIC at 09:26

## 2020-01-20 RX ADMIN — IPRATROPIUM BROMIDE 0.5 MG: 0.5 SOLUTION RESPIRATORY (INHALATION) at 06:56

## 2020-01-20 RX ADMIN — MICONAZOLE NITRATE: 2 CREAM TOPICAL at 09:26

## 2020-01-20 RX ADMIN — DEXTRAN 70 AND HYPROMELLOSE 2910 1 DROP: 1; 3 SOLUTION/ DROPS OPHTHALMIC at 12:10

## 2020-01-20 RX ADMIN — OXYCODONE HYDROCHLORIDE AND ACETAMINOPHEN 500 MG: 500 TABLET ORAL at 09:26

## 2020-01-20 RX ADMIN — CLONAZEPAM 0.5 MG: 0.5 TABLET ORAL at 22:02

## 2020-01-20 RX ADMIN — ZONISAMIDE 100 MG: 100 CAPSULE ORAL at 22:04

## 2020-01-20 RX ADMIN — LAMOTRIGINE 250 MG: 25 TABLET ORAL at 15:44

## 2020-01-20 RX ADMIN — IPRATROPIUM BROMIDE 0.5 MG: 0.5 SOLUTION RESPIRATORY (INHALATION) at 13:18

## 2020-01-20 RX ADMIN — HEPARIN SODIUM 5000 UNITS: 5000 INJECTION INTRAVENOUS; SUBCUTANEOUS at 05:56

## 2020-01-20 RX ADMIN — HEPARIN SODIUM 5000 UNITS: 5000 INJECTION INTRAVENOUS; SUBCUTANEOUS at 22:02

## 2020-01-20 RX ADMIN — Medication 1 TABLET: at 07:50

## 2020-01-20 NOTE — APP STUDENT NOTE
[facility] Progress Note  Patient: Saravanan Leiva 48 y o  male   MRN: [id number]  PCP: Zaynab Martins DO  Unit/Bed#: [facility] 719-01 Encounter: [id number]  Date Of Visit: 01/20/20    Assessment  Principal Problem:    Nonintractable generalized idiopathic epilepsy without status epilepticus ([facility] [address] )  Active Problems:    Contusion, flank, subsequent encounter    T wave inversion in EKG    RSV (acute bronchiolitis due to respiratory syncytial virus)    Acute respiratory failure with hypoxia ([facility] [address] )    Developmental delay    Dysphagia    Acute encephalopathy    MRSA nasal colonization  Resolved Problems:    * No resolved hospital problems  *        Plan  1  Nonintractable generalized idiopathic epilepsy without status epilepticus  Pt has hx of seizures as a child and had increase occurrence over the past month  Witnessed seizure at [facility] and transfer to [facility] [place] [facility] for EMU  Appricate neurology- "Lamictal 200 mg in am, 250 mg at 3pm and 300mg at night  - primidone 150 mg in am, 100 mg at 3pm, 150 mg at night  - zonisamide 100 mg at night  -clonazepam 0 5 mg at night  -most recent AED levels include:              -primidone level of 7 7 on 01/10              -low zonisamide level of 4 1 on 01/10              -lamotrigine level of 11 5 on 01/10 "  -   (neuro feel these meds contribute to unsteady gait) "  Awaiting MRI w/wo once EEG completed- vEEG in progress  2  Acute respiratory failure with hypoxia- d/t RSV with superimposed PNA  Was on high flow oxygen but transitioned to NC  Completed abx, x-ray from 1/18 showed improvement  Contiune oxygen supplement- NC for sats >92@  Currently on 2L NC with no distress noted  Continue budesonide, atrovent, and zopenex  CPAP hs, will need outpt sleep study  3  T-wave inversion  Troponin x3-< 0 02  No chest pain    4  Developmental delay  PT/OT/ST  AOx2 to self and situation- following commands and answering questions    Baseline AAOx3     5  Contusion, Flank, subsequent encounter  - s/p fall 1/7  - CT chest abdomen pelvic- No traumatic thoracic or intra-abdominal abnormality identified  Stranding within the subcutaneous tissues of the right flank consistent with a contusion    6  MRSA nasal colonization   - noted on nasal swab at [address] [place] [address]  - remains on contact precaution     7  Dysphagia   Continue modified diet level 3 per SPT        VTE Pharmacologic Prophylaxis: Heparin    Time Spent for Care: 20 mins  More than 50% of total time spent on counseling and coordination of care as described above  Current Length of Stay: 3 day(s)    Current Patient Status: Inpatient     Discharge Plan / Estimated Discharge Date: TBD    Code Status: Level 1 - Full Code  ______________________________________________________________________________    Subjective:   Patient seen and examined  Pt laying comfortably in bed, denies any pain or discomfort at this time- "I had a good breakfast"  EEG tech at bedside  Objective:   Vitals: Blood pressure 142/79, pulse 87, temperature 98 2 °F (36 8 °C), resp  rate 20, height 5' 6" (1 676 m), weight 110 kg (242 lb 8 1 oz), SpO2 94 %  Physical Exam:   General appearance: alert and oriented, in no acute distress, alert and slowed mentation  Head: Normocephalic, without obvious abnormality, atraumatic  Lungs: clear to auscultation bilaterally  Heart: S1, S2 normal   Eye: L eye slightly swollen, red but no discharge noted  Abdomen: normal findings: bowel sounds normal, no masses palpable, no organomegaly and soft, non-tender  Back: negative, R flank eecymosis-tenderness  Extremities: edema +2 lower extremities edema  Neurologic: Alert and oriented X 3, normal strength and tone  Normal symmetric reflexes   Normal coordination and gait    Additional Data:   Labs:  Results from last 7 days   Lab Units 01/18/20  0544 01/16/20  0435 01/15/20  0503 01/14/20  0519   WBC Thousand/uL 6 90 6 02 6 33 5 38   HEMOGLOBIN g/dL 11 3* 12 1 12 6 12 2   HEMATOCRIT % 35 4* 37 2 39 4 38 8   MCV fL 102* 98 100* 100*   PLATELETS Thousands/uL 200 194 169 148*     Results from last 7 days   Lab Units 01/18/20  0544 01/16/20  0435 01/15/20  0503 01/14/20  0519   SODIUM mmol/L 143 140 141 139   POTASSIUM mmol/L 4 2 4 2 4 1 4 4   CHLORIDE mmol/L 109* 103 104 105   CO2 mmol/L 31 31 32 32   BUN mg/dL 24 19 19 19   CREATININE mg/dL 0 99 0 92 1 00 0 97   CALCIUM mg/dL 8 7 8 2* 8 2* 7 8*   EGFR ml/min/1 73sq m 87 95 86 89   GLUCOSE RANDOM mg/dL 90 101 106 117     Results from last 7 days   Lab Units 01/18/20  0544   MAGNESIUM mg/dL 2 2   PHOSPHORUS mg/dL 3 0                              * I Have Reviewed All Lab Data Listed Above      Cultures:           Imaging:  Imaging Reports Reviewed Today Include:       Scheduled Meds:  Current Facility-Administered Medications:  acetaminophen 650 mg Oral Q6H PRN Keli Hidalgo DO   albuterol 2 5 mg Nebulization Q4H PRN Maurilio Cogan, MD   ascorbic acid 500 mg Oral Daily Keli Hidalgo DO   aspirin 81 mg Oral Daily Keli Hidalgo DO   budesonide 0 5 mg Nebulization Q12H Cristal Vallejo MD   calcium carbonate-vitamin D 1 tablet Oral Daily With Breakfast Keli Hidalgo DO   clonazePAM 0 5 mg Oral HS Keli Hidalgo DO   dextran 70-hypromellose 1 drop Both Eyes 4x Daily Keli Hidalgo DO   guaiFENesin 600 mg Oral BID Keli Hidalgo DO   heparin (porcine) 5,000 Units Subcutaneous [facility] Keli Hidalgo DO   ipratropium 0 5 mg Nebulization TID Cristal Vallejo MD   lamoTRIgine 200 mg Oral Early Morning Keli Hidalgo DO   lamoTRIgine 250 mg Oral Daily With Lunch Home Tello PA-C   lamoTRIgine 300 mg Oral HS Keli Hidalgo DO   levalbuterol 1 25 mg Nebulization TID Maurilio Cogan, MD   levOCARNitine 330 mg Oral 4x Daily (PC & HS) Keli Hidalgo DO   miconazole  Topical BID Keli Hidalgo DO   multivitamin stress formula 1 tablet Oral Daily Keli Hidalgo DO   ondansetron 4 mg Intravenous Q6H PRN Keli Hidalgo DO   predniSONE 40 mg Oral Daily Cristal Vallejo MD primidone 100 mg Oral Daily With Lunch Byron Tello PA-C   primidone 150 mg Oral HS Argie Luther, DO   primidone 150 mg Oral After Breakfast Argie Luther, DO   vitamin E (tocopherol) 400 Units Oral Daily Argie Luther, DO   zinc gluconate 50 mg Oral Daily Argie Luther, DO   zonisamide 100 mg Oral HS Argie Luther, DO     Continuous Infusions:   PRN Meds:    acetaminophen    albuterol    ondansetron

## 2020-01-20 NOTE — PLAN OF CARE
Problem: Prexisting or High Potential for Compromised Skin Integrity  Goal: Skin integrity is maintained or improved  Description  INTERVENTIONS:  - Identify patients at risk for skin breakdown  - Assess and monitor skin integrity  - Assess and monitor nutrition and hydration status  - Monitor labs   - Assess for incontinence   - Turn and reposition patient  - Assist with mobility/ambulation  - Relieve pressure over bony prominences  - Avoid friction and shearing  - Provide appropriate hygiene as needed including keeping skin clean and dry  - Evaluate need for skin moisturizer/barrier cream  - Collaborate with interdisciplinary team   - Patient/family teaching  - Consider wound care consult   Outcome: Progressing     Problem: Potential for Falls  Goal: Patient will remain free of falls  Description  INTERVENTIONS:  - Assess patient frequently for physical needs  -  Identify cognitive and physical deficits and behaviors that affect risk of falls    -  [place] fall precautions as indicated by assessment   - Educate patient/family on patient safety including physical limitations  - Instruct patient to call for assistance with activity based on assessment  - Modify environment to reduce risk of injury  - Consider OT/PT consult to assist with strengthening/mobility  Outcome: Progressing     Problem: NEUROSENSORY - ADULT  Goal: Achieves stable or improved neurological status  Description  INTERVENTIONS  - Monitor and report changes in neurological status  - Monitor vital signs such as temperature, blood pressure, glucose, and any other labs ordered   - Initiate measures to prevent increased intracranial pressure  - Monitor for seizure activity and implement precautions if appropriate      Outcome: Progressing  Goal: Remains free of injury related to seizures activity  Description  INTERVENTIONS  - Maintain airway, patient safety  and administer oxygen as ordered  - Monitor patient for seizure activity, document and report duration and description of seizure to physician/advanced practitioner  - If seizure occurs,  ensure patient safety during seizure  - Reorient patient post seizure  - Seizure pads on all 4 side rails  - Instruct patient/family to notify RN of any seizure activity including if an aura is experienced  - Instruct patient/family to call for assistance with activity based on nursing assessment  - Administer anti-seizure medications if ordered    Outcome: Progressing  Goal: Achieves maximal functionality and self care  Description  INTERVENTIONS  - Monitor swallowing and airway patency with patient fatigue and changes in neurological status  - Encourage and assist patient to increase activity and self care     - Encourage visually impaired, hearing impaired and aphasic patients to use assistive/communication devices  Outcome: Progressing     Problem: RESPIRATORY - ADULT  Goal: Achieves optimal ventilation and oxygenation  Description  INTERVENTIONS:  - Assess for changes in respiratory status  - Assess for changes in mentation and behavior  - Position to facilitate oxygenation and minimize respiratory effort  - Oxygen administered by appropriate delivery if ordered  - Initiate smoking cessation education as indicated  - Encourage broncho-pulmonary hygiene including cough, deep breathe, Incentive Spirometry  - Assess the need for suctioning and aspirate as needed  - Assess and instruct to report SOB or any respiratory difficulty  - Respiratory Therapy support as indicated  Outcome: Progressing     Problem: INFECTION - ADULT  Goal: Absence or prevention of progression during hospitalization  Description  INTERVENTIONS:  - Assess and monitor for signs and symptoms of infection  - Monitor lab/diagnostic results  - Monitor all insertion sites, i e  indwelling lines, tubes, and drains  - [place] appropriate cooling/warming therapies per order  - Administer medications as ordered  - Instruct and encourage patient and family to use good hand hygiene technique  - Identify and instruct in appropriate isolation precautions for identified infection/condition   Outcome: Progressing     Problem: PAIN - ADULT  Goal: Verbalizes/displays adequate comfort level or baseline comfort level  Description  Interventions:  - Encourage patient to monitor pain and request assistance  - Assess pain using appropriate pain scale  - Administer analgesics based on type and severity of pain and evaluate response  - Implement non-pharmacological measures as appropriate and evaluate response  - Notify physician/advanced practitioner if interventions unsuccessful or patient reports new pain   Outcome: Progressing     Problem: SAFETY ADULT  Goal: Patient will remain free of falls  Description  INTERVENTIONS:  - Assess patient frequently for physical needs  -  Identify cognitive and physical deficits and behaviors that affect risk of falls    -  [place] fall precautions as indicated by assessment   - Educate patient/family on patient safety including physical limitations  - Instruct patient to call for assistance with activity based on assessment  - Modify environment to reduce risk of injury  - Consider OT/PT consult to assist with strengthening/mobility  Outcome: Progressing  Goal: Maintain or return to baseline ADL function  Description  INTERVENTIONS:  -  Assess patient's ability to carry out ADLs; assess patient's baseline for ADL function and identify physical deficits which impact ability to perform ADLs (bathing, care of mouth/teeth, toileting, grooming, dressing, etc )  - Assess/evaluate cause of self-care deficits   - Assess range of motion  - Assess patient's mobility; develop plan if impaired  - Assess patient's need for assistive devices and provide as appropriate  - Encourage maximum independence but intervene and supervise when necessary  - Involve family in performance of ADLs  - Assess for home care needs following discharge   - Consider OT consult to assist with ADL evaluation and planning for discharge  - Provide patient education as appropriate  Outcome: Progressing  Goal: Maintain or return mobility status to optimal level  Description  INTERVENTIONS:  - Assess patient's baseline mobility status (ambulation, transfers, stairs, etc )    - Identify cognitive and physical deficits and behaviors that affect mobility  - Identify mobility aids required to assist with transfers and/or ambulation (gait belt, sit-to-stand, lift, walker, cane, etc )  - [place] fall precautions as indicated by assessment  - Record patient progress and toleration of activity level on Mobility SBAR; progress patient to next Phase/Stage  - Instruct patient to call for assistance with activity based on assessment  - Consider rehabilitation consult to assist with strengthening/weightbearing, etc   Outcome: Progressing     Problem: Nutrition/Hydration-ADULT  Goal: Nutrient/Hydration intake appropriate for improving, restoring or maintaining nutritional needs  Description  Monitor and assess patient's nutrition/hydration status for malnutrition  Collaborate with interdisciplinary team and initiate plan and interventions as ordered  Monitor patient's weight and dietary intake as ordered or per policy  Utilize nutrition screening tool and intervene as necessary  Determine patient's food preferences and provide high-protein, high-caloric foods as appropriate       INTERVENTIONS:  - Monitor oral intake, urinary output, labs, and treatment plans  - Assess nutrition and hydration status and recommend course of action  - Evaluate amount of meals eaten  - Assist patient with eating if necessary   - Allow adequate time for meals  - Recommend/ encourage appropriate diets, oral nutritional supplements, and vitamin/mineral supplements  - Order, calculate, and assess calorie counts as needed  - Assess need for intravenous fluids  - Provide specific nutrition/hydration education as appropriate  - Include patient/family/caregiver in decisions related to nutrition   Outcome: Progressing

## 2020-01-20 NOTE — PROGRESS NOTES
Progress Note - Neurology   Ramone Samayoa 48 y o  male MRN: [id number]  Unit/Bed#: [facility] 455-04 Encounter: [id number]    Assessment/ Plan:  1)  Breakthrough recurrent generalized tonic-clonic seizures in setting of patient with known intractable epilepsy, static encephalopathy and cognitive impairment with recent pneumonia and subtherapeutic levels of zonisamide   -MRI brain with without contrast is pending status post video EEG   -video EEG thus far without captured nonepileptic events  Patient does demonstrate continuous background irregularity and intermixed theta slowing with frequent brief bursts of generalized spike wave discharges  These findings are consistent with moderate diffuse cerebral dysfunction generalized epilepsy      -video EEG discontinued   -will continue AED regimen as follows:    -Lamictal 200 mg q a m , 250 mg at 3:00 p m , and 300 mg q h s     -primidone 150 mg q a m , 100 mg at 3:00 p m , 150 mg q h s     -zonisamide 100 mg q h s     -clonazepam 0 5 mg q h s    -upon discharge, patient is to follow up with [address] [place] [address] Neurology epilepsy Clinic, appointment has been requested    2)  Acute hypoxic failure secondary to RSV/pneumonia   -appreciate pulmonology   -patient is currently on prednisone for 5 days   -supportive care and medication management per primary team    Subjective:   Patient is a 57-year-old male with a history of static encephalopathy and and cognitive impairment with intractable epilepsy who initially presented to [address] [place] [address] with shortness of breath hypoxic respiratory failure secondary to pneumonia  The patient then demonstrated breakthrough recurrent generalized tonic-clonic seizures and was transferred to [facility] for video EEG  Of note, the patient's initial mild level was low on admission    Video EEG was abnormal as expected with patient's neurologic history, however there were no further electrographic or clinical generalized tonic-clonic seizures since initiation  On exam today, the patient is resting comfortably in bed in no acute distress  He is polite, alert and interactive  A 12 point review systems was completed and is negative  Patient demonstrates a grossly nonfocal neurological exam with exception of baseline left eye blindness and left eye exotropia with dysconjugate gaze  Remainder of neurological exam as detailed below      ROS:  See Subjective     Medications:  Scheduled Meds:  Current Facility-Administered Medications:  acetaminophen 650 mg Oral Q6H PRN Marissa Elaine, DO   albuterol 2 5 mg Nebulization Q4H PRN Martha Baptiste MD   ascorbic acid 500 mg Oral Daily Scheviktory Argentina, DO   aspirin 81 mg Oral Daily Scheli Elaine, DO   budesonide 0 5 mg Nebulization Q12H Angus Hou MD   calcium carbonate-vitamin D 1 tablet Oral Daily With Breakfast Scheli Elaine, DO   clonazePAM 0 5 mg Oral HS Scheli Elaine, DO   dextran 70-hypromellose 1 drop Both Eyes 4x Daily Marissa Elaine, DO   guaiFENesin 600 mg Oral BID Marissa Elaine, DO   heparin (porcine) 5,000 Units Subcutaneous [facility] Scheli Elaine, DO   ipratropium 0 5 mg Nebulization TID Angus Hou MD   lamoTRIgine 200 mg Oral Early Morning Marissa Elaine, DO   lamoTRIgine 250 mg Oral Daily With Lunch Karihsma Foots [facility], PA-C   lamoTRIgine 300 mg Oral HS Scheli Elaine, DO   levalbuterol 1 25 mg Nebulization TID Martha Baptiste MD   levOCARNitine 330 mg Oral 4x Daily (PC & HS) Scheli Elaine, DO   miconazole  Topical BID Scheli Elaine, DO   multivitamin stress formula 1 tablet Oral Daily Scheli Elaine, DO   ondansetron 4 mg Intravenous Q6H PRN Marissa Elaine, DO   predniSONE 40 mg Oral Daily Angus Hou MD   primidone 100 mg Oral Daily With Lunch Karishma Foots [place], PA-C   primidone 150 mg Oral HS Scheli Elaine, DO   primidone 150 mg Oral After Breakfast Scheli Elaine, DO   vitamin E (tocopherol) 400 Units Oral Daily Scheli Elaine, DO   zinc gluconate 50 mg Oral Daily Scheli Elaine, DO zonisamide 100 mg Oral HS Roxana Kenney DO     Continuous Infusions:   PRN Meds:   acetaminophen    albuterol    ondansetron      Vitals: Blood pressure 142/79, pulse 87, temperature 98 2 °F (36 8 °C), resp  rate 20, height 5' 6" (1 676 m), weight 110 kg (242 lb 8 1 oz), SpO2 94 %  ,Body mass index is 39 14 kg/m²  Physical Exam:   Physical Exam   Constitutional: He is oriented to person, place, and time  He appears well-developed and well-nourished  No distress  HENT:   Head: Normocephalic and atraumatic  Right Ear: External ear normal    Left Ear: External ear normal    Mouth/Throat: No oropharyngeal exudate  Eyes:   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   Neck: Normal range of motion  Neck supple  Pulmonary/Chest: Effort normal  No respiratory distress  Musculoskeletal: Normal range of motion  He exhibits no edema, tenderness or deformity  Neurological: He is oriented to person, place, and time  He has normal strength  Skin: Skin is warm and dry  No rash noted  He is not diaphoretic  No erythema  No pallor  Psychiatric: He has a normal mood and affect  His speech is normal and behavior is normal    Nursing note and vitals reviewed  Neurologic Exam     Mental Status   Oriented to person, place, and time  Follows 2 step commands  Attention: normal  Concentration: normal    Speech: speech is normal   Level of consciousness: alert  Knowledge: good  Normal comprehension  Cranial Nerves   Cranial nerves II through XII intact  With exception of left exotropia and baseline blindness     Motor Exam   Muscle bulk: normal  Overall muscle tone: normal    Strength   Strength 5/5 throughout  Sensory Exam   Light touch normal      Gait, Coordination, and Reflexes     Gait  Gait: (Deferred for safety)    Tremor   Resting tremor: absent      Lab, Imaging and other studies: I have personally reviewed pertinent reports       I have personally reviewed pertinent imaging in PACs  No results found for this or any previous visit (from the past 24 hour(s)) ]    VTE Prophylaxis: Sequential compression device (Venodyne)  and Heparin SQ    Counseling / Coordination of Care  Total time spent today 25 minutes  Greater than 50% of total time was spent with the patient and / or family counseling and / or coordination of care  A description of the counseling / coordination of care: Paulo Puckett The pt was seen and examined by myself and the attending physician  The chart was reviewed thoroughly, including laboratory values and imaging studies  The pt was counseled in the room

## 2020-01-21 VITALS
SYSTOLIC BLOOD PRESSURE: 138 MMHG | WEIGHT: 242.51 LBS | HEIGHT: 66 IN | OXYGEN SATURATION: 94 % | BODY MASS INDEX: 38.97 KG/M2 | RESPIRATION RATE: 16 BRPM | DIASTOLIC BLOOD PRESSURE: 85 MMHG | HEART RATE: 92 BPM | TEMPERATURE: 98.1 F

## 2020-01-21 PROCEDURE — 97167 OT EVAL HIGH COMPLEX 60 MIN: CPT

## 2020-01-21 PROCEDURE — 94640 AIRWAY INHALATION TREATMENT: CPT

## 2020-01-21 PROCEDURE — 94760 N-INVAS EAR/PLS OXIMETRY 1: CPT

## 2020-01-21 PROCEDURE — 97110 THERAPEUTIC EXERCISES: CPT

## 2020-01-21 PROCEDURE — 97116 GAIT TRAINING THERAPY: CPT

## 2020-01-21 PROCEDURE — 99232 SBSQ HOSP IP/OBS MODERATE 35: CPT | Performed by: PSYCHIATRY & NEUROLOGY

## 2020-01-21 PROCEDURE — 99239 HOSP IP/OBS DSCHRG MGMT >30: CPT | Performed by: HOSPITALIST

## 2020-01-21 PROCEDURE — 97530 THERAPEUTIC ACTIVITIES: CPT

## 2020-01-21 RX ORDER — ALBUTEROL SULFATE 90 UG/1
2 AEROSOL, METERED RESPIRATORY (INHALATION) EVERY 6 HOURS PRN
Qty: 8.5 G | Refills: 0 | Status: SHIPPED | OUTPATIENT
Start: 2020-01-21 | End: 2021-08-11 | Stop reason: HOSPADM

## 2020-01-21 RX ORDER — LAMOTRIGINE 25 MG/1
250 TABLET ORAL
Refills: 0
Start: 2020-01-21 | End: 2020-02-20

## 2020-01-21 RX ORDER — GUAIFENESIN 600 MG
600 TABLET, EXTENDED RELEASE 12 HR ORAL 2 TIMES DAILY
Refills: 0
Start: 2020-01-21 | End: 2020-01-28

## 2020-01-21 RX ORDER — PRIMIDONE 50 MG/1
150 TABLET ORAL
Refills: 0
Start: 2020-01-21

## 2020-01-21 RX ORDER — ZONISAMIDE 100 MG/1
100 CAPSULE ORAL
Refills: 0
Start: 2020-01-21

## 2020-01-21 RX ORDER — PREDNISONE 20 MG/1
40 TABLET ORAL DAILY
Qty: 2 TABLET | Refills: 0
Start: 2020-01-22 | End: 2020-01-23

## 2020-01-21 RX ADMIN — GUAIFENESIN 600 MG: 600 TABLET, EXTENDED RELEASE ORAL at 08:57

## 2020-01-21 RX ADMIN — ZINC 1 TABLET: TAB ORAL at 08:58

## 2020-01-21 RX ADMIN — LAMOTRIGINE 200 MG: 100 TABLET ORAL at 06:18

## 2020-01-21 RX ADMIN — LEVALBUTEROL HYDROCHLORIDE 1.25 MG: 1.25 SOLUTION, CONCENTRATE RESPIRATORY (INHALATION) at 14:05

## 2020-01-21 RX ADMIN — LEVALBUTEROL HYDROCHLORIDE 1.25 MG: 1.25 SOLUTION, CONCENTRATE RESPIRATORY (INHALATION) at 07:53

## 2020-01-21 RX ADMIN — HEPARIN SODIUM 5000 UNITS: 5000 INJECTION INTRAVENOUS; SUBCUTANEOUS at 06:18

## 2020-01-21 RX ADMIN — IPRATROPIUM BROMIDE 0.5 MG: 0.5 SOLUTION RESPIRATORY (INHALATION) at 07:53

## 2020-01-21 RX ADMIN — LEVOCARNITINE 330 MG: 330 TABLET ORAL at 12:21

## 2020-01-21 RX ADMIN — DEXTRAN 70 AND HYPROMELLOSE 2910 1 DROP: 1; 3 SOLUTION/ DROPS OPHTHALMIC at 08:58

## 2020-01-21 RX ADMIN — BUDESONIDE 0.5 MG: 0.5 INHALANT RESPIRATORY (INHALATION) at 07:53

## 2020-01-21 RX ADMIN — LAMOTRIGINE 250 MG: 25 TABLET ORAL at 14:30

## 2020-01-21 RX ADMIN — PREDNISONE 40 MG: 20 TABLET ORAL at 08:57

## 2020-01-21 RX ADMIN — MICONAZOLE NITRATE 1 APPLICATION: 2 CREAM TOPICAL at 08:58

## 2020-01-21 RX ADMIN — DEXTRAN 70 AND HYPROMELLOSE 2910 1 DROP: 1; 3 SOLUTION/ DROPS OPHTHALMIC at 12:21

## 2020-01-21 RX ADMIN — HEPARIN SODIUM 5000 UNITS: 5000 INJECTION INTRAVENOUS; SUBCUTANEOUS at 14:29

## 2020-01-21 RX ADMIN — Medication 1 TABLET: at 08:57

## 2020-01-21 RX ADMIN — ASPIRIN 81 MG 81 MG: 81 TABLET ORAL at 08:57

## 2020-01-21 RX ADMIN — PRIMIDONE 150 MG: 50 TABLET ORAL at 08:59

## 2020-01-21 RX ADMIN — Medication 400 UNITS: at 08:59

## 2020-01-21 RX ADMIN — LEVOCARNITINE 330 MG: 330 TABLET ORAL at 08:57

## 2020-01-21 RX ADMIN — OXYCODONE HYDROCHLORIDE AND ACETAMINOPHEN 500 MG: 500 TABLET ORAL at 08:57

## 2020-01-21 RX ADMIN — IPRATROPIUM BROMIDE 0.5 MG: 0.5 SOLUTION RESPIRATORY (INHALATION) at 14:05

## 2020-01-21 RX ADMIN — Medication 50 MG: at 08:59

## 2020-01-21 NOTE — ASSESSMENT & PLAN NOTE
Pt has seizure history as a child and over the last past month has had increased occurrences  Pt had witnessed seizure  At [facility] (he had 1 episode of tonic clonic seizure last about 5 seconds spontaneously resolved while eating breakfast  Neuro did not feel meds needed to be adjusted pt was transferred here)   [facility] [place] [facility]  - emu for first 12 hrs unremarkable with exception of generalized spikes (expected for known generalized epilepsy)   Pt Meds per neuro:  "Lamictal 200 mg in AM, 250 mg at 3:00 PM and 300 mg at night  -primidone 150 mg in AM, 100 mg at 3:00 PM, 150 mg at night  -zonisamide 100 mg at night  -clonazepam 0 5 mg at night  -most recent AED levels include:              -primidone level of 7 7 on 01/10              -low zonisamide level of 4 1 on 01/10              -lamotrigine level of 11 5 on 01/10 "  -   (neuro feel these meds contribute to unsteady gait)   - If needed per neuro 1/18 then Zonegran may be increased as it is low, initial dose    - feel would be useful to obtain MRI planned for today possibly  Epileptology appreciated   C/w home meds

## 2020-01-21 NOTE — ASSESSMENT & PLAN NOTE
Noted on EKG  at [facility]   When read by cards noted sinus tach with incomplete right bundle , st and t wave abnormality   troponins at gsl < 0 03  Will need continued outpatient follow up

## 2020-01-21 NOTE — ASSESSMENT & PLAN NOTE
POA with some ecchymosis  Pt was sp a fall on 1/7   Prior imaging ct performed: No traumatic thoracic or intraabdominal abn   Stranding within the subcutaneous tissues of the right flank consistent with a contusion   Small ecchymotic area on upper back not tender to touch  Large area on right flank side healing /yellowing

## 2020-01-21 NOTE — ASSESSMENT & PLAN NOTE
Pt has seizure history as a child and over the last past month has had increased occurrences  Pt had witnessed seizure  At [facility] (he had 1 episode of tonic clonic seizure last about 5 seconds spontaneously resolved while eating breakfast  Neuro did not feel meds needed to be adjusted pt was transferred here)   Transferred to [facility] for EMU  - emu for first 12 hrs unremarkable with exception of generalized spikes (expected for known generalized epilepsy)   Pt Meds per neuro:  "Lamictal 200 mg in AM, 250 mg at 3:00 PM and 300 mg at night  -primidone 150 mg in AM, 100 mg at 3:00 PM, 150 mg at night  -zonisamide 100 mg at night  -clonazepam 0 5 mg at night  -most recent AED levels include:              -primidone level of 7 7 on 01/10              -low zonisamide level of 4 1 on 01/10              -lamotrigine level of 11 5 on 01/10 "  -   MRI brain seizure protocol is negative  - hence his seizures a suspected to be precipitated in setting of infection and no medication changes has been recommended and he is cleared for discharge by Neurology with outpatient follow-up

## 2020-01-21 NOTE — PROGRESS NOTES
Progress Note - Jeanne Sanju 1965, 48 y o  male MRN: [id number]    [facility] Encounter: [id number]    Primary Care Provider: Zelia Brunner, DO   Date and time admitted to hospital: 1/17/2020  5:25 PM        * Nonintractable generalized idiopathic epilepsy without status epilepticus ([facility] [address] )  Assessment & Plan  Pt has seizure history as a child and over the last past month has had increased occurrences  Pt had witnessed seizure  At [address] [place] [address] (he had 1 episode of tonic clonic seizure last about 5 seconds spontaneously resolved while eating breakfast  Neuro did not feel meds needed to be adjusted pt was transferred here)   Transferred to [facility] for EMU  - emu for first 12 hrs unremarkable with exception of generalized spikes (expected for known generalized epilepsy)   Pt Meds per neuro:  "Lamictal 200 mg in AM, 250 mg at 3:00 PM and 300 mg at night  -primidone 150 mg in AM, 100 mg at 3:00 PM, 150 mg at night  -zonisamide 100 mg at night  -clonazepam 0 5 mg at night  -most recent AED levels include:              -primidone level of 7 7 on 01/10              -low zonisamide level of 4 1 on 01/10              -lamotrigine level of 11 5 on 01/10 "  -   (neuro feel these meds contribute to unsteady gait)   - If needed per neuro 1/18 then Zonegran may be increased as it is low, initial dose    - feel would be useful to obtain MRI planned for today possibly  Epileptology appreciated   C/w home meds        Acute encephalopathy  Assessment & Plan  Likely post-ictal  Per brother pt AAOx3 at baseline  improving     Developmental delay  Assessment & Plan  Supportive care  PT/OT/ST  Pt AAOx1   At baseline AAOx3  Pt is more talkative today more interactive   Plan for mri today     RSV (acute bronchiolitis due to respiratory syncytial virus)  Assessment & Plan  Resp protocol  Supportive care appreciate pulmonary     Contusion, flank, subsequent encounter  Assessment & Plan  POA with some ecchymosis  Pt was sp a fall on    Prior imaging ct performed: No traumatic thoracic or intraabdominal abn  Stranding within the subcutaneous tissues of the right flank consistent with a contusion   Small ecchymotic area on upper back not tender to touch  Large area on right flank side healing /yellowing     T wave inversion in EKG  Assessment & Plan  Noted on EKG  at [address]   When read by cards noted sinus tach with incomplete right bundle , st and t wave abnormality   troponins at [facility] < 0 03  Will need continued outpatient follow up     MRSA nasal colonization  Assessment & Plan  On contact precautions   - noted at [facility] via swab    Dysphagia  Assessment & Plan  Modified diet  Speech eval appreciated       VTE Pharmacologic Prophylaxis:   Pharmacologic: Heparin  Mechanical VTE Prophylaxis in Place: Yes    Patient Centered Rounds: I have performed bedside rounds with nursing staff today  Discussions with Specialists or Other Care Team Provider: nursing     Education and Discussions with Family / Patient: patient     Time Spent for Care: 45 minutes  More than 50% of total time spent on counseling and coordination of care as described above  Current Length of Stay: 3 day(s)    Current Patient Status: Inpatient   Certification Statement: The patient will continue to require additional inpatient hospital stay due to pending mri     Discharge Plan: to rehab when med stable pending mri    Code Status: Level 1 - Full Code      Subjective:   Pt is doing better now a little more alert interactive  No pain no n/v/d    Objective:     Vitals:   Temp (24hrs), Av 7 °F (37 1 °C), Min:98 2 °F (36 8 °C), Max:99 2 °F (37 3 °C)    Temp:  [98 2 °F (36 8 °C)-99 2 °F (37 3 °C)] 99 2 °F (37 3 °C)  HR:  [87-96] 96  Resp:  [20-22] 22  BP: (124-142)/(79-81) 124/81  SpO2:  [94 %-96 %] 94 %  Body mass index is 39 14 kg/m²  Input and Output Summary (last 24 hours):        Intake/Output Summary (Last 24 hours) at 2020 2055  Last data filed at 2020 2201  Gross per 24 hour   Intake 360 ml   Output 300 ml   Net 60 ml       Physical Exam:     Physical Exam   Constitutional: No distress  HENT:   Head: Normocephalic and atraumatic  Eyes: Right eye exhibits no discharge  Left eye exhibits no discharge  Left eye blind with conjunctival  hemorrhage    Neck: No tracheal deviation present  No thyromegaly present  Cardiovascular: Normal rate  Pulmonary/Chest: No respiratory distress  He has no wheezes  He has no rales  He exhibits no tenderness  Poor effort    Abdominal: He exhibits no distension and no mass  There is no tenderness  There is no rebound and no guarding  No hernia  Musculoskeletal: He exhibits no edema, tenderness or deformity  Lymphadenopathy:     He has no cervical adenopathy  Neurological: He is alert  Skin: No rash noted  He is not diaphoretic  No erythema  No pallor  Psychiatric: He has a normal mood and affect  Additional Data:     Labs:    Results from last 7 days   Lab Units 01/18/20  0544   WBC Thousand/uL 6 90   HEMOGLOBIN g/dL 11 3*   HEMATOCRIT % 35 4*   PLATELETS Thousands/uL 200     Results from last 7 days   Lab Units 01/18/20  0544   SODIUM mmol/L 143   POTASSIUM mmol/L 4 2   CHLORIDE mmol/L 109*   CO2 mmol/L 31   BUN mg/dL 24   CREATININE mg/dL 0 99   ANION GAP mmol/L 3*   CALCIUM mg/dL 8 7   GLUCOSE RANDOM mg/dL 90                 Results from last 7 days   Lab Units 01/14/20  0600   PROCALCITONIN ng/ml 1 15*           * I Have Reviewed All Lab Data Listed Above  * Additional Pertinent Lab Tests Reviewed:  All Labs Within Last 24 Hours Reviewed    Imaging:    Imaging Reports Reviewed Today Include: reviewed    Recent Cultures (last 7 days):           Last 24 Hours Medication List:     Current Facility-Administered Medications:  acetaminophen 650 mg Oral Q6H PRN Zee Partida, DO   albuterol 2 5 mg Nebulization Q4H PRN Amina Santos MD   ascorbic acid 500 mg Oral Daily [place] Helga, DO   aspirin 81 mg Oral Daily Cinthia Piggs, DO   budesonide 0 5 mg Nebulization Q12H Eamon Estrella MD   calcium carbonate-vitamin D 1 tablet Oral Daily With Breakfast Cinthia Pigmary, DO   clonazePAM 0 5 mg Oral HS Cinthia Pigmary, DO   dextran 70-hypromellose 1 drop Both Eyes 4x Daily Cinthia Piggs, DO   guaiFENesin 600 mg Oral BID Cinthia Piggs, DO   heparin (porcine) 5,000 Units Subcutaneous [facility] Cinthia Pigmary, DO   ipratropium 0 5 mg Nebulization TID Eamon Estrella MD   lamoTRIgine 200 mg Oral Early Morning Cinthia Pigmary, DO   lamoTRIgine 250 mg Oral Daily With Lunch Anant Tello PA-C   lamoTRIgine 300 mg Oral HS Cinthia Pigmary, DO   levalbuterol 1 25 mg Nebulization TID Angie Ngo MD   levOCARNitine 330 mg Oral 4x Daily (PC & HS) Cinthia Pigmary, DO   miconazole  Topical BID Cinthia Pigmary, DO   multivitamin stress formula 1 tablet Oral Daily Cinthia Pigmary, DO   ondansetron 4 mg Intravenous Q6H PRN Cinthia Pigmary, DO   predniSONE 40 mg Oral Daily Eamon Estrella MD   primidone 100 mg Oral Daily With Lunch Anant Tello PA-C   primidone 150 mg Oral HS Cinthia Pigmary, DO   primidone 150 mg Oral After Breakfast Cinthia Piggs, DO   vitamin E (tocopherol) 400 Units Oral Daily Cinthia Piggs, DO   zinc gluconate 50 mg Oral Daily Cinthia Piggs, DO   zonisamide 100 mg Oral HS Cinthiatorie Bhat, DO        Today, Patient Was Seen By: NADEGE Finch    ** Please Note: Dictation voice to text software may have been used in the creation of this document   **

## 2020-01-21 NOTE — RESTORATIVE TECHNICIAN NOTE
Restorative Specialist Mobility Note       Activity: Ambulate in cardona, Ambulate in room, Bathroom privileges, Chair, Dangle, Stand at bedside(Educated/encouraged pt to ambulate with assistance 3-4 x's/day  Chair alarm on  Pt callbell, phone/tray within reach )     Assistive Device: Front wheel walker, Other (Comment)(Assist x1 with chair follow  NC O2 on 1 5L   Assisted PTA Kyle Meth )          Nayan STILES, Restorative Technician,

## 2020-01-21 NOTE — PLAN OF CARE
Problem: OCCUPATIONAL THERAPY ADULT  Goal: Performs self-care activities at highest level of function for planned discharge setting  See evaluation for individualized goals  Description  Treatment Interventions: ADL retraining, Functional transfer training, Endurance training, Patient/family training, Equipment evaluation/education, Compensatory technique education, Continued evaluation, Activityengagement          See flowsheet documentation for full assessment, interventions and recommendations  Note:   Limitation: Decreased ADL status, Decreased endurance, Decreased high-level ADLs, Decreased self-care trans  Prognosis: Fair  Assessment: Pt is a 48year old male seen for initial OT evaluation s/p admission to [facility] 1/17/20 with SOB, fever, cough, recent fall with (?) seizure  Additional active problems include: nonintractable generalized idiopathic epilepsy without status epilepticus, acute encephalopathy, developmental delay, RSV, R flank contusion, abnosmal EKG, MRSA, and dysphagia  Pt with active OT orders and cleared by NELL Robertson for OT evaluation and OOB mobility  Pt is a questionable historian secondary to cognitive deficits  Information obtained from chart and confirmed with patient  Pt resides with his mom and brother in a [facility]  Pt reports that someone is always home with him to provide assist   Pt reports being I with ADLs, requires assistance with IADLs, and I with functional mobility without use of DME PTA  Pt doesn't drive and takes public bus for transportation  Pt is currently demonstrating the following occupational deficits: eating with set-up, grooming with set-up, UB bathing with Yordan, LB bathing with modA, UB dressing with Yordan, LB dressing with maxA, toielting with Yordan, bed mobility with modA, functional transfers with maxA    Factors currently limiting pt's independence in these areas include: cognitive deficits, b/l LE weakness, functional mobility, endurance, balance, and inaccessible home environment (FF to bed/bath)  From an OT standpoint, recommend STR upon d/c  Pt is to continue to benefit from skilled occupational therapy services while in the hospital to maximize functioning and independence with daily activities  See below for OT goals to be addressed 3-5x/wk       OT Discharge Recommendation: Short Term Rehab  OT - OK to Discharge: Yes(to STR when medically stable)

## 2020-01-21 NOTE — DISCHARGE SUMMARY
Discharge- [place]on Palatine 1965, 48 y o  male MRN: [id number]    Unit/Bed#: [facility] 719-01 Encounter: [id number]    Primary Care Provider: José Miguel Hyde DO   Date and time admitted to hospital: 1/17/2020  5:25 PM        MRSA nasal colonization  Assessment & Plan  On contact precautions   - noted at [facility] via swab    Acute encephalopathy  Assessment & Plan  Likely post-ictal  Per brother pt AAOx3 at baseline  improving     Dysphagia  Assessment & Plan  Modified diet  Speech eval appreciated     Developmental delay  Assessment & Plan  Supportive care  PT/OT/ST  Pt AAOx1   At baseline AAOx3    Acute respiratory failure with hypoxia [facility])  Assessment & Plan  POA  Was in ICU at [address] [place] [address]  Sp high flow nasal canula requiring icu at [facility]  Suspected 2/2 RSV and superimposed PNA completed abx  Started on CPAP qhs at [address] [place] [address], for suspected sleep apnea eventually will require sleep study   Airway clearance protocol    Appreciate pulmonary   Patient's repeat chest x-ray improving  Patient overall improving after start of prednisone per Pulmonary continue for 5 days total of that  Prn albuterol on DC      RSV (acute bronchiolitis due to respiratory syncytial virus)  Assessment & Plan  Resp protocol  Supportive care appreciate pulmonary  Status post 5 days of 40 mg oral prednisone last day on 01/22/2020 tomorrow    T wave inversion in EKG  Assessment & Plan  Noted on EKG  at [address] [place] [address]   When read by cards noted sinus tach with incomplete right bundle , st and t wave abnormality   troponins at [facility] < 0 03  Will need continued outpatient follow up     Contusion, flank, subsequent encounter  Assessment & Plan  POA with some ecchymosis  Pt was sp a fall on 1/7   Prior imaging ct performed: No traumatic thoracic or intraabdominal abn   Stranding within the subcutaneous tissues of the right flank consistent with a contusion   Small ecchymotic area on upper back not tender to touch  Large area on right flank side healing /yellowing     * Nonintractable generalized idiopathic epilepsy without status epilepticus ([facility] [address] )  Assessment & Plan  Pt has seizure history as a child and over the last past month has had increased occurrences  Pt had witnessed seizure  At [facility] (he had 1 episode of tonic clonic seizure last about 5 seconds spontaneously resolved while eating breakfast  Neuro did not feel meds needed to be adjusted pt was transferred here)   Transferred to [facility] for EMU  - emu for first 12 hrs unremarkable with exception of generalized spikes (expected for known generalized epilepsy)   Pt Meds per neuro:  "Lamictal 200 mg in AM, 250 mg at 3:00 PM and 300 mg at night  -primidone 150 mg in AM, 100 mg at 3:00 PM, 150 mg at night  -zonisamide 100 mg at night  -clonazepam 0 5 mg at night  -most recent AED levels include:              -primidone level of 7 7 on 01/10              -low zonisamide level of 4 1 on 01/10              -lamotrigine level of 11 5 on 01/10 "  -   MRI brain seizure protocol is negative  - hence his seizures a suspected to be precipitated in setting of infection and no medication changes has been recommended and he is cleared for discharge by Neurology with outpatient follow-up              Transition of Care Discharge Summary - [facility]    Patient Information: Mateusz Mckinney 48 y o  male MRN: [id number]  Unit/Bed#: [facility] 555-88 Encounter: [id number]    Discharging Physician / Practitioner: Oli Barry MD  PCP: Natalee Isabel DO  Admission Date: 1/17/2020  Discharge Date: 01/21/20    Disposition:      Acute Rehab Facility at [facility]    For Discharges to [facility]:   · Not Applicable to this Patient - Not Applicable to this Patient    Reason for Admission:  Transfer from [address] [place] [address] for seizures    Discharge Diagnoses:     Principal Problem:    Nonintractable generalized idiopathic epilepsy without status epilepticus ([facility] [address] )  Active Problems:    Contusion, flank, subsequent encounter    T wave inversion in EKG    RSV (acute bronchiolitis due to respiratory syncytial virus)    Acute respiratory failure with hypoxia (HCC)    Developmental delay    Dysphagia    Acute encephalopathy    MRSA nasal colonization  Resolved Problems:    * No resolved hospital problems  *      Consultations During Hospital Stay:  · Neurology  · Pulmonary    Procedures Performed:     · 24 hour video EEG    Medication Adjustments and Discharge Medications:  · Summary of Medication Adjustments made as a result of this hospitalization:  None  · Medication Dosing Tapers - Please refer to Discharge Medication List for details on any medication dosing tapers (if applicable to patient)  · Medications being temporarily held (include recommended restart time):   · Discharge Medication List: See after visit summary for reconciled discharge medications  Wound Care Recommendations:  When applicable, please see wound care section of After Visit Summary  Diet Recommendations at Discharge:  Diet -        Diet Orders   (From admission, onward)             Start     Ordered    01/17/20 1821  Dietary nutrition supplements  Once     Question Answer Comment   Select Supplement: Ensure Clear Apple    Frequency Breakfast, Dinner        01/17/20 1820    01/17/20 1821  Dietary nutrition supplements  Once     Question Answer Comment   Select Supplement: Ensure Clear [place]    Frequency Lunch        01/17/20 1820    01/17/20 1821  Diet Dysphagia/Modified Consistency; Dysphagia 3-Dental Soft; Thin Liquid  Diet effective now     Question Answer Comment   Diet Type Dysphagia/Modified Consistency    Dysphagia/Modified Consistency Dysphagia 3-Dental Soft    Liquid Modifier Thin Liquid    Special Instructions Chopped meats    RD to adjust diet per protocol? Yes        01/17/20 1820              Fluid Restriction - No Fluid Restriction at Discharge      Instructions for any Catheters / Lines Present at Discharge (including removal date, if applicable):  None    Significant Findings / Test Results:     MRI brain seizure wo and w contrast   Final Result by Heavenly Finnegan MD (01/21 0022)      No acute intracranial abnormality  No restricted diffusion to suggest acute ischemia  No abnormal enhancement within the brain parenchyma  Symmetric bilateral hippocampi with no signal abnormality  Mild diffuse volume loss  Workstation performed: [id number]         XR chest portable   Final Result by Bethene Lesches, MD (01/18 1947)      Improving left upper lobe pneumonia  Workstation performed: [id number]             Incidental Findings:   ·      Test Results Pending at Discharge (will require follow up):   ·      Outpatient Tests Requested:      Complications:      Hospital Course:     Geetha Portillo is a 48 y o  male patient who originally presented to the hospital on 1/17/2020 as a transfer from [facility] where he had a witnessed seizure episode, he was transferred here for continuous EEG monitoring  Patient has had history of seizure in the past, originally presented to [facility] for RSV with superimposed pneumonia was in ICU on high-flow nasal cannula  After having a witnessed seizure he was transferred here for continuous EEG    Condition at Discharge: stable     Discharge Day Visit / Exam:     Subjective:  Feels fine has no complaints    Vitals: Blood Pressure: 138/85 (01/21/20 1518)  Pulse: 92 (01/21/20 1518)  Temperature: 98 1 °F (36 7 °C) (01/21/20 1518)  Temp Source: Axillary (01/18/20 0742)  Respirations: 16 (01/21/20 1518)  Height: 5' 6" (167 6 cm) (01/17/20 1804)  Weight - Scale: 110 kg (242 lb 8 1 oz) (01/17/20 1804)  SpO2: 94 % (01/21/20 1518)  Exam:   Physical Exam   Constitutional: He is oriented to person, place, and time  He appears well-developed and well-nourished  HENT:   Head: Normocephalic and atraumatic     Mouth/Throat: Oropharynx is clear and moist    Eyes: Conjunctivae are normal    Cardiovascular: Normal rate and regular rhythm  Exam reveals no friction rub  No murmur heard  Pulmonary/Chest: Effort normal and breath sounds normal  No stridor  No respiratory distress  He has no wheezes  Abdominal: Soft  He exhibits no distension  There is no tenderness  Musculoskeletal: He exhibits no tenderness  Neurological: He is alert and oriented to person, place, and time  Vitals reviewed  Discharge instructions/Information to patient and family:   See after visit summary section titled Discharge Instructions for information provided to patient and family  Planned Readmission: no      Discharge Statement:  I spent 40 minutes discharging the patient  This time was spent on the day of discharge  I had direct contact with the patient on the day of discharge  Greater than 50% of the total time was spent examining patient, answering all patient questions, arranging and discussing plan of care with patient as well as directly providing post-discharge instructions  Additional time then spent on discharge activities      ** Please Note: This note has been constructed using a voice recognition system **

## 2020-01-21 NOTE — PLAN OF CARE
Problem: Prexisting or High Potential for Compromised Skin Integrity  Goal: Skin integrity is maintained or improved  Description  INTERVENTIONS:  - Identify patients at risk for skin breakdown  - Assess and monitor skin integrity  - Assess and monitor nutrition and hydration status  - Monitor labs   - Assess for incontinence   - Turn and reposition patient  - Assist with mobility/ambulation  - Relieve pressure over bony prominences  - Avoid friction and shearing  - Provide appropriate hygiene as needed including keeping skin clean and dry  - Evaluate need for skin moisturizer/barrier cream  - Collaborate with interdisciplinary team   - Patient/family teaching  - Consider wound care consult   Outcome: Adequate for Discharge     Problem: Potential for Falls  Goal: Patient will remain free of falls  Description  INTERVENTIONS:  - Assess patient frequently for physical needs  -  Identify cognitive and physical deficits and behaviors that affect risk of falls    -  [place] fall precautions as indicated by assessment   - Educate patient/family on patient safety including physical limitations  - Instruct patient to call for assistance with activity based on assessment  - Modify environment to reduce risk of injury  - Consider OT/PT consult to assist with strengthening/mobility  Outcome: Adequate for Discharge     Problem: NEUROSENSORY - ADULT  Goal: Achieves stable or improved neurological status  Description  INTERVENTIONS  - Monitor and report changes in neurological status  - Monitor vital signs such as temperature, blood pressure, glucose, and any other labs ordered   - Initiate measures to prevent increased intracranial pressure  - Monitor for seizure activity and implement precautions if appropriate      Outcome: Adequate for Discharge  Goal: Remains free of injury related to seizures activity  Description  INTERVENTIONS  - Maintain airway, patient safety  and administer oxygen as ordered  - Monitor patient for seizure activity, document and report duration and description of seizure to physician/advanced practitioner  - If seizure occurs,  ensure patient safety during seizure  - Reorient patient post seizure  - Seizure pads on all 4 side rails  - Instruct patient/family to notify RN of any seizure activity including if an aura is experienced  - Instruct patient/family to call for assistance with activity based on nursing assessment  - Administer anti-seizure medications if ordered    Outcome: Adequate for Discharge  Goal: Achieves maximal functionality and self care  Description  INTERVENTIONS  - Monitor swallowing and airway patency with patient fatigue and changes in neurological status  - Encourage and assist patient to increase activity and self care     - Encourage visually impaired, hearing impaired and aphasic patients to use assistive/communication devices  Outcome: Adequate for Discharge     Problem: RESPIRATORY - ADULT  Goal: Achieves optimal ventilation and oxygenation  Description  INTERVENTIONS:  - Assess for changes in respiratory status  - Assess for changes in mentation and behavior  - Position to facilitate oxygenation and minimize respiratory effort  - Oxygen administered by appropriate delivery if ordered  - Initiate smoking cessation education as indicated  - Encourage broncho-pulmonary hygiene including cough, deep breathe, Incentive Spirometry  - Assess the need for suctioning and aspirate as needed  - Assess and instruct to report SOB or any respiratory difficulty  - Respiratory Therapy support as indicated  Outcome: Adequate for Discharge     Problem: PAIN - ADULT  Goal: Verbalizes/displays adequate comfort level or baseline comfort level  Description  Interventions:  - Encourage patient to monitor pain and request assistance  - Assess pain using appropriate pain scale  - Administer analgesics based on type and severity of pain and evaluate response  - Implement non-pharmacological measures as appropriate and evaluate response  - Notify physician/advanced practitioner if interventions unsuccessful or patient reports new pain   Outcome: Adequate for Discharge     Problem: INFECTION - ADULT  Goal: Absence or prevention of progression during hospitalization  Description  INTERVENTIONS:  - Assess and monitor for signs and symptoms of infection  - Monitor lab/diagnostic results  - Monitor all insertion sites, i e  indwelling lines, tubes, and drains  - [place] appropriate cooling/warming therapies per order  - Administer medications as ordered  - Instruct and encourage patient and family to use good hand hygiene technique  - Identify and instruct in appropriate isolation precautions for identified infection/condition   Outcome: Adequate for Discharge     Problem: SAFETY ADULT  Goal: Patient will remain free of falls  Description  INTERVENTIONS:  - Assess patient frequently for physical needs  -  Identify cognitive and physical deficits and behaviors that affect risk of falls    -  [place] fall precautions as indicated by assessment   - Educate patient/family on patient safety including physical limitations  - Instruct patient to call for assistance with activity based on assessment  - Modify environment to reduce risk of injury  - Consider OT/PT consult to assist with strengthening/mobility  Outcome: Adequate for Discharge  Goal: Maintain or return to baseline ADL function  Description  INTERVENTIONS:  -  Assess patient's ability to carry out ADLs; assess patient's baseline for ADL function and identify physical deficits which impact ability to perform ADLs (bathing, care of mouth/teeth, toileting, grooming, dressing, etc )  - Assess/evaluate cause of self-care deficits   - Assess range of motion  - Assess patient's mobility; develop plan if impaired  - Assess patient's need for assistive devices and provide as appropriate  - Encourage maximum independence but intervene and supervise when necessary  - Involve family in performance of ADLs  - Assess for home care needs following discharge   - Consider OT consult to assist with ADL evaluation and planning for discharge  - Provide patient education as appropriate  Outcome: Adequate for Discharge  Goal: Maintain or return mobility status to optimal level  Description  INTERVENTIONS:  - Assess patient's baseline mobility status (ambulation, transfers, stairs, etc )    - Identify cognitive and physical deficits and behaviors that affect mobility  - Identify mobility aids required to assist with transfers and/or ambulation (gait belt, sit-to-stand, lift, walker, cane, etc )  - [place] fall precautions as indicated by assessment  - Record patient progress and toleration of activity level on Mobility SBAR; progress patient to next Phase/Stage  - Instruct patient to call for assistance with activity based on assessment  - Consider rehabilitation consult to assist with strengthening/weightbearing, etc   Outcome: Adequate for Discharge     Problem: Nutrition/Hydration-ADULT  Goal: Nutrient/Hydration intake appropriate for improving, restoring or maintaining nutritional needs  Description  Monitor and assess patient's nutrition/hydration status for malnutrition  Collaborate with interdisciplinary team and initiate plan and interventions as ordered  Monitor patient's weight and dietary intake as ordered or per policy  Utilize nutrition screening tool and intervene as necessary  Determine patient's food preferences and provide high-protein, high-caloric foods as appropriate       INTERVENTIONS:  - Monitor oral intake, urinary output, labs, and treatment plans  - Assess nutrition and hydration status and recommend course of action  - Evaluate amount of meals eaten  - Assist patient with eating if necessary   - Allow adequate time for meals  - Recommend/ encourage appropriate diets, oral nutritional supplements, and vitamin/mineral supplements  - Order, calculate, and assess calorie counts as needed  - Assess need for intravenous fluids  - Provide specific nutrition/hydration education as appropriate  - Include patient/family/caregiver in decisions related to nutrition   Outcome: Adequate for Discharge

## 2020-01-21 NOTE — OCCUPATIONAL THERAPY NOTE
[address] Evaluation     Patient Name: Eh MORA Date: 1/21/2020  Problem List  Principal Problem:    Nonintractable generalized idiopathic epilepsy without status epilepticus ([facility] [address] )  Active Problems:    Contusion, flank, subsequent encounter    T wave inversion in EKG    RSV (acute bronchiolitis due to respiratory syncytial virus)    Acute respiratory failure with hypoxia ([facility] [address] )    Developmental delay    Dysphagia    Acute encephalopathy    MRSA nasal colonization    Past Medical History  Past Medical History:   Diagnosis Date    Hypertension     Pericardial effusion     Pneumonia     Seizure [facility])      Past Surgical History  Past Surgical History:   Procedure Laterality Date    FRACTURE SURGERY      clavicle, left humerus, radial, and ulna  Right radius    HIP ARTHROSCOPY Right     HI COLONOSCOPY FLX DX W/COLLJ SPEC WHEN PFRMD N/A 4/1/2019    Procedure: COLONOSCOPY;  Surgeon: Jose Manuel Lu MD;  Location:  GI LAB; Service: Colorectal        01/21/20 0950   Note Type   Note type Eval only   Restrictions/Precautions   Weight Bearing Precautions Per Order No   Other Precautions Cognitive; Bed Alarm; Chair Alarm;Contact/isolation;Multiple lines;Telemetry;O2;Fall Risk   Pain Assessment   Pain Assessment No/denies pain   Pain Score No Pain   Home Living   Type of Home House  [facility])   Home Layout Two level;Bed/bath upstairs   Bathroom Equipment   (reports none PTA)   Home Equipment   (reports no use of DME PTA)   Additional Comments Pt resides with his mom and brother in [facility] with FF steps to bed/bath  Pt reports that someone is always home with him     Prior Function   Level of [place] Independent with ADLs and functional mobility   Lives With [facility] Help From Family   ADL Assistance Independent   IADLs Needs assistance  (doesn't participate in housework, mom does med mgmt)   Falls in the last 6 months   (unclear but reports multiple falls)   Vocational Part time employment   Lifestyle   Autonomy Pt reports being I with ADLs, requires assistance with IADLs, and I with functional mobility without use of DME PTA  Pt's mom assists with med mgmt and all housework  Pt doesn't drive and typically takes public bus to work  Reciprocal Relationships supportive family   Service to Others works from 8-2 5 days/wk at a [employer]   [address] "I just mope around the house and watch TV"   Psychosocial   Psychosocial (WDL) X   Patient Behaviors/Mood Flat affect   ADL   Eating Assistance 5  Supervision/Setup   Grooming Assistance 5  Supervision/Setup   UB Bathing Assistance 4  Minimal Assistance   LB [address] 3  Moderate Assistance   [address] 4  [address] 2  Maximal [address]  4  Minimal Assistance   Bed Mobility   Supine to Sit 3  Moderate assistance   Additional items Assist x 1; Increased time required;LE management;Verbal cues   Sit to Supine 4  Minimal assistance   Additional items Assist x 1; Increased time required;Verbal cues;LE management   Additional Comments Pt sat EOB for ~5 minutes with supervision  No significant lateral lean, but kyphotic posture noted   Transfers   Sit to Stand 2  Maximal assistance   Additional items Assist x 1; Increased time required;Verbal cues   Stand to Sit 2  Maximal assistance   Additional items Assist x 1; Increased time required;Verbal cues   Additional Comments Completed STS x1, however only attains ~75% of full stand with b/l knee buckling and retropulsive    Balance   Static Sitting Fair -   Dynamic Sitting Poor +   Static Standing Poor -   Activity Tolerance   Activity Tolerance Patient limited by fatigue   Medical Staff Made Aware EARNEST LONG   Nurse Made Aware Pt cleared by NELL Arvizu for OT evaluation and OOB mobility   RUE Assessment   RUE Assessment WFL   LUE Assessment   LUE Assessment WFL   Vision-Basic Assessment   Current Vision Wears glasses all the time   Cognition   Overall Cognitive Status Impaired   Arousal/Participation Alert; Cooperative;Responsive   Attention Attends with cues to redirect   Orientation Level Oriented X4   Memory Decreased recall of precautions   Following Commands Follows one step commands with increased time or repetition   Comments Pt is cooperative and with flat affect  Pt with higher level cognitive deficits, however likely baseline due to developmental delay   Assessment   Limitation Decreased ADL status; Decreased endurance;Decreased high-level ADLs; Decreased self-care trans   Prognosis Fair   Assessment Pt is a 48year old male seen for initial OT evaluation s/p admission to [facility] 1/17/20 with SOB, fever, cough, recent fall with (?) seizure  Additional active problems include: nonintractable generalized idiopathic epilepsy without status epilepticus, acute encephalopathy, developmental delay, RSV, R flank contusion, abnosmal EKG, MRSA, and dysphagia  Pt with active OT orders and cleared by NELL Gallegos for OT evaluation and OOB mobility  Pt is a questionable historian secondary to cognitive deficits  Information obtained from chart and confirmed with patient  Pt resides with his mom and brother in a [facility]  Pt reports that someone is always home with him to provide assist   Pt reports being I with ADLs, requires assistance with IADLs, and I with functional mobility without use of DME PTA  Pt doesn't drive and takes public bus for transportation  Pt is currently demonstrating the following occupational deficits: eating with set-up, grooming with set-up, UB bathing with Yordan, LB bathing with modA, UB dressing with Yordan, LB dressing with maxA, toielting with Yordan, bed mobility with modA, functional transfers with maxA  Factors currently limiting pt's independence in these areas include: cognitive deficits, b/l LE weakness, functional mobility, endurance, balance, and inaccessible home environment (FF to bed/bath)    From an OT standpoint, recommend STR upon d/c  Pt is to continue to benefit from skilled occupational therapy services while in the hospital to maximize functioning and independence with daily activities  See below for OT goals to be addressed 3-5x/wk  Goals   Patient Goals "I don't have any goals"   Plan   Treatment Interventions ADL retraining;Functional transfer training; Endurance training;Patient/family training;Equipment evaluation/education; Compensatory technique education;Continued evaluation; Activityengagement   Goal Expiration Date 02/01/20   OT Treatment Day 1   OT Frequency 3-5x/wk   Recommendation   OT Discharge Recommendation Short Term Rehab   OT - OK to Discharge Yes  (to STR when medically stable)   Barthel Index   Feeding 10   Bathing 0   Grooming Score 5   Dressing Score 5   Bladder Score 10   Bowels Score 10   Toilet Use Score 5   Transfers (Bed/Chair) Score 5   Mobility (Level Surface) Score 0   Stairs Score 0   Barthel Index Score 50     Goals:  Pt will complete all self care tasks with supervision with use of DME/AD as appropriate  Pt will complete functional transfers on/off all surfaces with Ramu with use of DME/AD as appropriate  Pt will participate in simulated IADL management task to increase independence to Mod I w/ G safety and endurance    Pt will improve activity tolerance to G for min 30 min txment sessions for increase engagement in functional tasks    Pt will maintain static standing with supervision for at least 5 minutes in preparation for completing ADLs/IADLs while standing      ZULEIMA Wright, OTR/L

## 2020-01-21 NOTE — ASSESSMENT & PLAN NOTE
Resp protocol  Supportive care appreciate pulmonary  Status post 5 days of 40 mg oral prednisone last day on 01/22/2020 tomorrow

## 2020-01-21 NOTE — ASSESSMENT & PLAN NOTE
POA  Was in ICU at [facility]  Sp high flow nasal canula requiring icu at [facility]  Suspected 2/2 RSV and superimposed PNA completed abx  Started on CPAP qhs at [facility], for suspected sleep apnea eventually will require sleep study   Airway clearance protocol    Appreciate pulmonary   Patient's repeat chest x-ray improving  Patient overall improving after start of prednisone per Pulmonary continue for 5 days total of that    Prn albuterol on DC

## 2020-01-21 NOTE — SOCIAL WORK
CM was informed that pt is medically stable for dc today  CM spoke to [facility] liaison who states pt is accepted and a bed is available today  CM spoke to pt's mother Pradeep Riley regarding [address] cost; she is agreeable  CM arranged with [place] for a 4:30pm dc to [facility]  CM notified pt, pt's mother, pt's bedside RN Vaishnavi Nunez and Erum Beatty [facility] liaison of dc time  Facility transfer form completed  Chart copy requested

## 2020-01-21 NOTE — PHYSICAL THERAPY NOTE
Physical Therapy Progress Note     01/21/20 1045   Pain Assessment   Pain Assessment No/denies pain   Pain Score No Pain   Restrictions/Precautions   Weight Bearing Precautions Per Order No   Other Precautions Cognitive; Impulsive; Chair Alarm;O2;Fall Risk   General   Family/Caregiver Present No   Cognition   Overall Cognitive Status Impaired   Arousal/Participation Alert; Cooperative   Transfers   Sit to Stand 3  Moderate assistance   Additional items Assist x 1;Verbal cues   Stand to Sit 3  Moderate assistance   Additional items Assist x 1;Verbal cues; Impulsive   Ambulation/Elevation   Gait pattern Narrow ADRIEL  (slow, short step length)   Gait Assistance 4  Minimal assist   Additional items Assist x 2;Verbal cues  (chair follow)   Assistive Device Rolling walker   Distance 20 feet x 2 and 40 feet   Balance   Static Sitting Fair -   Static Standing Poor +   Dynamic Standing Poor   Ambulatory Poor   Endurance Deficit   Endurance Deficit Yes   Endurance Deficit Description Sp O2 1 5L 89% with activity   Activity Tolerance   Activity Tolerance Patient limited by fatigue   Nurse [address]  to see per [facility]   Exercises   Hip Flexion Sitting;20 reps;AROM; Bilateral   Knee AROM Long Arc Quad Sitting;20 reps;AROM; Bilateral   Ankle Pumps Sitting;20 reps;AROM; Bilateral   Assessment   Prognosis Fair   Problem List Decreased strength;Decreased endurance; Impaired balance;Decreased mobility; Decreased coordination;Decreased cognition; Impaired judgement;Decreased safety awareness   Assessment Pt is making steady progress with the use of a rolling walker  Pt is impulsive today requiring education for safety  Restorative tech present to assist with mobility and chair follow  Min assist of two during ambulation trials for safe walker management  Requires assistance avoiding obstacles in hallway  Completed seated BLE exercises to conclude session  Chair alarm active post session    Pt would benefit from continued physical therapy to maximize functional independence  Goals   Patient Goals To walk more   STG Expiration Date 02/01/20   Short Term Goal #1 In 1-2 weeks, the patient will complete the following 1) Patient will perform supine <> sit independently 2) Patient will perform sit<>stand  transfer with LRAD at mod I level  3) Patient will ambulate >75 feet using LRAD at mod I level  4) Patient will ascend/descend 12 steps with 1 handrail and supervision  5) Patient will improve dynamic standing balance from poor  to fair + in order to perform everyday activities  6) Patient will continue with ongoing rehab services for family education, DME, and D/C planning  7) Increase standing tolerance to 5 minutes  8) Sit EOB with upright posture for 5 minutes at supervision level  PT Treatment Day 1   Plan   Treatment/Interventions Functional transfer training;LE strengthening/ROM; Therapeutic exercise; Endurance training;Cognitive reorientation;Patient/family training;Bed mobility;Gait training;Spoke to nursing   Progress Progressing toward goals   PT Frequency   (3-5x/week)   Recommendation   Recommendation Post acute IP rehab   Equipment Recommended Keturah Her; Wheelchair  (RW)     Melissa Alejandro PTA

## 2020-01-21 NOTE — PLAN OF CARE
Problem: Prexisting or High Potential for Compromised Skin Integrity  Goal: Skin integrity is maintained or improved  Description  INTERVENTIONS:  - Identify patients at risk for skin breakdown  - Assess and monitor skin integrity  - Assess and monitor nutrition and hydration status  - Monitor labs   - Assess for incontinence   - Turn and reposition patient  - Assist with mobility/ambulation  - Relieve pressure over bony prominences  - Avoid friction and shearing  - Provide appropriate hygiene as needed including keeping skin clean and dry  - Evaluate need for skin moisturizer/barrier cream  - Collaborate with interdisciplinary team   - Patient/family teaching  - Consider wound care consult   Outcome: Progressing     Problem: Potential for Falls  Goal: Patient will remain free of falls  Description  INTERVENTIONS:  - Assess patient frequently for physical needs  -  Identify cognitive and physical deficits and behaviors that affect risk of falls    -  [place] fall precautions as indicated by assessment   - Educate patient/family on patient safety including physical limitations  - Instruct patient to call for assistance with activity based on assessment  - Modify environment to reduce risk of injury  - Consider OT/PT consult to assist with strengthening/mobility  Outcome: Progressing     Problem: NEUROSENSORY - ADULT  Goal: Achieves stable or improved neurological status  Description  INTERVENTIONS  - Monitor and report changes in neurological status  - Monitor vital signs such as temperature, blood pressure, glucose, and any other labs ordered   - Initiate measures to prevent increased intracranial pressure  - Monitor for seizure activity and implement precautions if appropriate      Outcome: Progressing  Goal: Remains free of injury related to seizures activity  Description  INTERVENTIONS  - Maintain airway, patient safety  and administer oxygen as ordered  - Monitor patient for seizure activity, document and report duration and description of seizure to physician/advanced practitioner  - If seizure occurs,  ensure patient safety during seizure  - Reorient patient post seizure  - Seizure pads on all 4 side rails  - Instruct patient/family to notify RN of any seizure activity including if an aura is experienced  - Instruct patient/family to call for assistance with activity based on nursing assessment  - Administer anti-seizure medications if ordered    Outcome: Progressing  Goal: Achieves maximal functionality and self care  Description  INTERVENTIONS  - Monitor swallowing and airway patency with patient fatigue and changes in neurological status  - Encourage and assist patient to increase activity and self care     - Encourage visually impaired, hearing impaired and aphasic patients to use assistive/communication devices  Outcome: Progressing     Problem: RESPIRATORY - ADULT  Goal: Achieves optimal ventilation and oxygenation  Description  INTERVENTIONS:  - Assess for changes in respiratory status  - Assess for changes in mentation and behavior  - Position to facilitate oxygenation and minimize respiratory effort  - Oxygen administered by appropriate delivery if ordered  - Initiate smoking cessation education as indicated  - Encourage broncho-pulmonary hygiene including cough, deep breathe, Incentive Spirometry  - Assess the need for suctioning and aspirate as needed  - Assess and instruct to report SOB or any respiratory difficulty  - Respiratory Therapy support as indicated  Outcome: Progressing     Problem: PAIN - ADULT  Goal: Verbalizes/displays adequate comfort level or baseline comfort level  Description  Interventions:  - Encourage patient to monitor pain and request assistance  - Assess pain using appropriate pain scale  - Administer analgesics based on type and severity of pain and evaluate response  - Implement non-pharmacological measures as appropriate and evaluate response  - Notify physician/advanced practitioner if interventions unsuccessful or patient reports new pain   Outcome: Progressing     Problem: INFECTION - ADULT  Goal: Absence or prevention of progression during hospitalization  Description  INTERVENTIONS:  - Assess and monitor for signs and symptoms of infection  - Monitor lab/diagnostic results  - Monitor all insertion sites, i e  indwelling lines, tubes, and drains  - [place] appropriate cooling/warming therapies per order  - Administer medications as ordered  - Instruct and encourage patient and family to use good hand hygiene technique  - Identify and instruct in appropriate isolation precautions for identified infection/condition   Outcome: Progressing     Problem: SAFETY ADULT  Goal: Patient will remain free of falls  Description  INTERVENTIONS:  - Assess patient frequently for physical needs  -  Identify cognitive and physical deficits and behaviors that affect risk of falls    -  [place] fall precautions as indicated by assessment   - Educate patient/family on patient safety including physical limitations  - Instruct patient to call for assistance with activity based on assessment  - Modify environment to reduce risk of injury  - Consider OT/PT consult to assist with strengthening/mobility  Outcome: Progressing  Goal: Maintain or return to baseline ADL function  Description  INTERVENTIONS:  -  Assess patient's ability to carry out ADLs; assess patient's baseline for ADL function and identify physical deficits which impact ability to perform ADLs (bathing, care of mouth/teeth, toileting, grooming, dressing, etc )  - Assess/evaluate cause of self-care deficits   - Assess range of motion  - Assess patient's mobility; develop plan if impaired  - Assess patient's need for assistive devices and provide as appropriate  - Encourage maximum independence but intervene and supervise when necessary  - Involve family in performance of ADLs  - Assess for home care needs following discharge   - Consider OT consult to assist with ADL evaluation and planning for discharge  - Provide patient education as appropriate  Outcome: Progressing  Goal: Maintain or return mobility status to optimal level  Description  INTERVENTIONS:  - Assess patient's baseline mobility status (ambulation, transfers, stairs, etc )    - Identify cognitive and physical deficits and behaviors that affect mobility  - Identify mobility aids required to assist with transfers and/or ambulation (gait belt, sit-to-stand, lift, walker, cane, etc )  - [place] fall precautions as indicated by assessment  - Record patient progress and toleration of activity level on Mobility SBAR; progress patient to next Phase/Stage  - Instruct patient to call for assistance with activity based on assessment  - Consider rehabilitation consult to assist with strengthening/weightbearing, etc   Outcome: Progressing     Problem: Nutrition/Hydration-ADULT  Goal: Nutrient/Hydration intake appropriate for improving, restoring or maintaining nutritional needs  Description  Monitor and assess patient's nutrition/hydration status for malnutrition  Collaborate with interdisciplinary team and initiate plan and interventions as ordered  Monitor patient's weight and dietary intake as ordered or per policy  Utilize nutrition screening tool and intervene as necessary  Determine patient's food preferences and provide high-protein, high-caloric foods as appropriate       INTERVENTIONS:  - Monitor oral intake, urinary output, labs, and treatment plans  - Assess nutrition and hydration status and recommend course of action  - Evaluate amount of meals eaten  - Assist patient with eating if necessary   - Allow adequate time for meals  - Recommend/ encourage appropriate diets, oral nutritional supplements, and vitamin/mineral supplements  - Order, calculate, and assess calorie counts as needed  - Assess need for intravenous fluids  - Provide specific nutrition/hydration education as appropriate  - Include patient/family/caregiver in decisions related to nutrition   Outcome: Progressing

## 2020-01-21 NOTE — ASSESSMENT & PLAN NOTE
Supportive care  PT/OT/ST  Pt AAOx1   At baseline AAOx3  Pt is more talkative today more interactive   Plan for mri today

## 2020-01-21 NOTE — PLAN OF CARE
Problem: PHYSICAL THERAPY ADULT  Goal: Performs mobility at highest level of function for planned discharge setting  See evaluation for individualized goals  Description  Treatment/Interventions: Functional transfer training, LE strengthening/ROM, Elevations, Therapeutic exercise, Endurance training, Patient/family training, Bed mobility, Gait training  Equipment Recommended: (TBD)       See flowsheet documentation for full assessment, interventions and recommendations  Outcome: Progressing  Note:   Prognosis: Fair  Problem List: Decreased strength, Decreased endurance, Impaired balance, Decreased mobility, Decreased coordination, Decreased cognition, Impaired judgement, Decreased safety awareness  Assessment: Pt is making steady progress with the use of a rolling walker  Pt is impulsive today requiring education for safety  Restorative tech present to assist with mobility and chair follow  Min assist of two during ambulation trials for safe walker management  Requires assistance avoiding obstacles in hallway  Completed seated BLE exercises to conclude session  Chair alarm active post session  Pt would benefit from continued physical therapy to maximize functional independence  Barriers to Discharge: Inaccessible home environment     Recommendation: Post acute IP rehab     PT - OK to Discharge: Yes(to rehab once medically stable)    See flowsheet documentation for full assessment

## 2020-01-21 NOTE — ASSESSMENT & PLAN NOTE
Noted on EKG  at [facility]   When read by cards noted sinus tach with incomplete right bundle , st and t wave abnormality   troponins at gsl < 0 03  Will need continued outpatient follow up

## 2020-01-22 PROCEDURE — 95722 EEG PHY/QHP>36<60 HR W/VEEG: CPT | Performed by: PSYCHIATRY & NEUROLOGY

## 2020-01-30 ENCOUNTER — TELEPHONE (OUTPATIENT)
Dept: PULMONOLOGY | Facility: CLINIC | Age: 55
End: 2020-01-30

## 2020-01-30 NOTE — TELEPHONE ENCOUNTER
Nurse called to schedule new patient/hospital follow up appointment  Pt was seen in the hospital by Critical care, but not by our group

## 2020-12-13 ENCOUNTER — APPOINTMENT (EMERGENCY)
Dept: CT IMAGING | Facility: HOSPITAL | Age: 55
End: 2020-12-13
Payer: MEDICARE

## 2020-12-13 ENCOUNTER — HOSPITAL ENCOUNTER (EMERGENCY)
Facility: HOSPITAL | Age: 55
Discharge: HOME | End: 2020-12-13
Attending: EMERGENCY MEDICINE | Admitting: EMERGENCY MEDICINE
Payer: MEDICARE

## 2020-12-13 VITALS
RESPIRATION RATE: 18 BRPM | OXYGEN SATURATION: 95 % | DIASTOLIC BLOOD PRESSURE: 97 MMHG | HEART RATE: 72 BPM | TEMPERATURE: 98.3 F | SYSTOLIC BLOOD PRESSURE: 167 MMHG
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
## 2020-12-13 DIAGNOSIS — S01.311A LACERATION OF RIGHT EAR LOBE, INITIAL ENCOUNTER: ICD-10-CM

## 2020-12-13 DIAGNOSIS — S09.90XA INJURY OF HEAD, INITIAL ENCOUNTER: ICD-10-CM

## 2020-12-13 PROCEDURE — 12014 RPR F/E/E/N/L/M 5.1-7.5 CM: CPT | Performed by: EMERGENCY MEDICINE

## 2020-12-13 PROCEDURE — 99284 EMERGENCY DEPT VISIT MOD MDM: CPT | Performed by: EMERGENCY MEDICINE

## 2020-12-13 PROCEDURE — 99284 EMERGENCY DEPT VISIT MOD MDM: CPT

## 2020-12-13 PROCEDURE — G1004 CDSM NDSC: HCPCS

## 2020-12-13 PROCEDURE — 70450 CT HEAD/BRAIN W/O DYE: CPT

## 2020-12-13 RX ORDER — GINSENG 100 MG
1 CAPSULE ORAL ONCE
Status: COMPLETED | OUTPATIENT
Start: 2020-12-13 | End: 2020-12-13

## 2020-12-13 RX ORDER — LIDOCAINE HYDROCHLORIDE 10 MG/ML
10 INJECTION, SOLUTION EPIDURAL; INFILTRATION; INTRACAUDAL; PERINEURAL ONCE
Status: COMPLETED | OUTPATIENT
Start: 2020-12-13 | End: 2020-12-13

## 2020-12-13 RX ADMIN — BACITRACIN 1 SMALL APPLICATION: 500 OINTMENT TOPICAL at 19:43

## 2020-12-13 RX ADMIN — LIDOCAINE HYDROCHLORIDE 10 ML: 10 INJECTION, SOLUTION EPIDURAL; INFILTRATION; INTRACAUDAL; PERINEURAL at 19:43

## 2021-07-20 ENCOUNTER — DOCTOR'S OFFICE (OUTPATIENT)
Dept: URBAN - NONMETROPOLITAN AREA CLINIC 1 | Facility: CLINIC | Age: 56
Setting detail: OPHTHALMOLOGY
End: 2021-07-20
Payer: COMMERCIAL

## 2021-07-20 ENCOUNTER — OPTICAL OFFICE (OUTPATIENT)
Dept: URBAN - NONMETROPOLITAN AREA CLINIC 4 | Facility: CLINIC | Age: 56
Setting detail: OPHTHALMOLOGY
End: 2021-07-20

## 2021-07-20 VITALS — HEIGHT: 60 IN

## 2021-07-20 DIAGNOSIS — H52.4: ICD-10-CM

## 2021-07-20 PROBLEM — H04.122: Status: ACTIVE | Noted: 2019-06-17

## 2021-07-20 PROBLEM — H10.501 BLEPHAROCONJUNCTIVITS NOS; RIGHT EYE, LEFT EYE: Status: ACTIVE | Noted: 2017-11-21

## 2021-07-20 PROBLEM — H10.502 BLEPHAROCONJUNCTIVITS NOS; RIGHT EYE, LEFT EYE: Status: ACTIVE | Noted: 2017-11-21

## 2021-07-20 PROBLEM — H04.123: Status: ACTIVE | Noted: 2019-06-17

## 2021-07-20 PROBLEM — H44.23: Status: ACTIVE | Noted: 2017-03-17

## 2021-07-20 PROBLEM — S05.12XA CONTUSION EYEBALL AND ORBITAL TISSUES; LEFT EYE INITIAL ENCOUNTER: Status: ACTIVE | Noted: 2018-01-11

## 2021-07-20 PROBLEM — H04.121: Status: ACTIVE | Noted: 2019-06-17

## 2021-07-20 PROBLEM — H43.811: Status: ACTIVE | Noted: 2017-10-05

## 2021-07-20 PROBLEM — H00.14 CHALAZION; LEFT UPPER LID: Status: RESOLVED | Noted: 2019-10-18 | Resolved: 2021-07-20

## 2021-07-20 PROBLEM — H43.812: Status: ACTIVE | Noted: 2017-10-05

## 2021-07-20 PROBLEM — Z96.1: Status: ACTIVE | Noted: 2017-03-17

## 2021-07-20 PROBLEM — H43.813: Status: ACTIVE | Noted: 2017-10-05

## 2021-07-20 PROBLEM — H33.22: Status: ACTIVE | Noted: 2019-10-10

## 2021-07-20 PROCEDURE — V2020 VISION SVCS FRAMES PURCHASES: HCPCS | Performed by: OPTOMETRIST

## 2021-07-20 PROCEDURE — 92015 DETERMINE REFRACTIVE STATE: CPT | Performed by: OPTOMETRIST

## 2021-07-20 PROCEDURE — V2744 TINT PHOTOCHROMATIC LENS/ES: HCPCS | Performed by: OPTOMETRIST

## 2021-07-20 PROCEDURE — V2784 LENS POLYCARB OR EQUAL: HCPCS | Performed by: OPTOMETRIST

## 2021-07-20 PROCEDURE — V2103 SPHEROCYLINDR 4.00D/12-2.00D: HCPCS | Performed by: OPTOMETRIST

## 2021-07-20 ASSESSMENT — REFRACTION_CURRENTRX
OS_SPHERE: BALANCE
OD_OVR_VA: 20/
OD_SPHERE: +1.25
OD_ADD: +2.25
OS_OVR_VA: 20/
OD_AXIS: 004
OD_VPRISM_DIRECTION: PROGS
OD_CYLINDER: -1.00

## 2021-07-20 ASSESSMENT — VISUAL ACUITY
OS_BCVA: 20/40-1
OD_BCVA: HM

## 2021-07-20 ASSESSMENT — SPHEQUIV_DERIVED: OD_SPHEQUIV: 0.5

## 2021-07-20 ASSESSMENT — REFRACTION_MANIFEST
OS_VA1: 20/CF
OS_SPHERE: BALANCE
OD_VA2: 20/30-2
OD_CYLINDER: -1.00
OD_ADD: +2.50
OD_VA1: 20/30-2
OD_AXIS: 005
OD_SPHERE: +1.00

## 2021-07-20 ASSESSMENT — CONFRONTATIONAL VISUAL FIELD TEST (CVF)
OS_FINDINGS: FULL
OD_FINDINGS: FULL

## 2021-07-20 ASSESSMENT — REFRACTION_AUTOREFRACTION: OD_SPHERE: ERROR

## 2021-08-07 ENCOUNTER — APPOINTMENT (EMERGENCY)
Dept: RADIOLOGY | Facility: HOSPITAL | Age: 56
DRG: 291 | End: 2021-08-07
Payer: MEDICARE

## 2021-08-07 ENCOUNTER — HOSPITAL ENCOUNTER (INPATIENT)
Facility: HOSPITAL | Age: 56
LOS: 4 days | Discharge: HOME | DRG: 291 | End: 2021-08-11
Attending: EMERGENCY MEDICINE | Admitting: INTERNAL MEDICINE
Payer: MEDICARE

## 2021-08-07 ENCOUNTER — APPOINTMENT (EMERGENCY)
Dept: CT IMAGING | Facility: HOSPITAL | Age: 56
DRG: 291 | End: 2021-08-07
Payer: MEDICARE

## 2021-08-07 DIAGNOSIS — R41.82 ALTERED MENTAL STATUS, UNSPECIFIED ALTERED MENTAL STATUS TYPE: ICD-10-CM

## 2021-08-07 DIAGNOSIS — R41.82 ALTERED MENTAL STATUS: Primary | ICD-10-CM

## 2021-08-07 DIAGNOSIS — M79.605 LOWER EXTREMITY PAIN, LEFT: ICD-10-CM

## 2021-08-07 DIAGNOSIS — K59.00 CONSTIPATION, UNSPECIFIED CONSTIPATION TYPE: ICD-10-CM

## 2021-08-07 DIAGNOSIS — J90 PLEURAL EFFUSION, RIGHT: ICD-10-CM

## 2021-08-07 DIAGNOSIS — G40.909 SEIZURE DISORDER (HCC): ICD-10-CM

## 2021-08-07 DIAGNOSIS — R09.02 HYPOXIA: ICD-10-CM

## 2021-08-07 DIAGNOSIS — J96.01 ACUTE RESPIRATORY FAILURE WITH HYPOXIA (HCC): ICD-10-CM

## 2021-08-07 DIAGNOSIS — R60.0 LOWER EXTREMITY EDEMA: ICD-10-CM

## 2021-08-07 DIAGNOSIS — R62.50 DEVELOPMENTAL DELAY: ICD-10-CM

## 2021-08-07 DIAGNOSIS — J90 PLEURAL EFFUSION: ICD-10-CM

## 2021-08-07 DIAGNOSIS — N17.9 AKI (ACUTE KIDNEY INJURY) (HCC): ICD-10-CM

## 2021-08-07 PROBLEM — Z98.890 S/P PERICARDIAL WINDOW CREATION: Status: ACTIVE | Noted: 2021-08-07

## 2021-08-07 PROBLEM — Z86.16 HISTORY OF COVID-19: Status: ACTIVE | Noted: 2021-08-07

## 2021-08-07 LAB
ALBUMIN SERPL BCP-MCNC: 3.4 G/DL (ref 3.5–5)
ALP SERPL-CCNC: 208 U/L (ref 46–116)
ALT SERPL W P-5'-P-CCNC: 22 U/L (ref 12–78)
ANION GAP SERPL CALCULATED.3IONS-SCNC: 8 MMOL/L (ref 4–13)
AST SERPL W P-5'-P-CCNC: 17 U/L (ref 5–45)
BASOPHILS # BLD AUTO: 0.02 THOUSANDS/ΜL (ref 0–0.1)
BASOPHILS NFR BLD AUTO: 0 % (ref 0–1)
BILIRUB SERPL-MCNC: 0.71 MG/DL (ref 0.2–1)
BUN SERPL-MCNC: 24 MG/DL (ref 5–25)
CALCIUM ALBUM COR SERPL-MCNC: 9.4 MG/DL (ref 8.3–10.1)
CALCIUM SERPL-MCNC: 8.9 MG/DL (ref 8.3–10.1)
CHLORIDE SERPL-SCNC: 107 MMOL/L (ref 100–108)
CO2 SERPL-SCNC: 30 MMOL/L (ref 21–32)
CREAT SERPL-MCNC: 1.54 MG/DL (ref 0.6–1.3)
EOSINOPHIL # BLD AUTO: 0.01 THOUSAND/ΜL (ref 0–0.61)
EOSINOPHIL NFR BLD AUTO: 0 % (ref 0–6)
ERYTHROCYTE [DISTWIDTH] IN BLOOD BY AUTOMATED COUNT: 14.9 % (ref 11.6–15.1)
GFR SERPL CREATININE-BSD FRML MDRD: 50 ML/MIN/1.73SQ M
GLUCOSE SERPL-MCNC: 116 MG/DL (ref 65–140)
HCT VFR BLD AUTO: 41.3 % (ref 36.5–49.3)
HGB BLD-MCNC: 13.3 G/DL (ref 12–17)
IMM GRANULOCYTES # BLD AUTO: 0.01 THOUSAND/UL (ref 0–0.2)
IMM GRANULOCYTES NFR BLD AUTO: 0 % (ref 0–2)
LYMPHOCYTES # BLD AUTO: 0.98 THOUSANDS/ΜL (ref 0.6–4.47)
LYMPHOCYTES NFR BLD AUTO: 18 % (ref 14–44)
MCH RBC QN AUTO: 32 PG (ref 26.8–34.3)
MCHC RBC AUTO-ENTMCNC: 32.2 G/DL (ref 31.4–37.4)
MCV RBC AUTO: 100 FL (ref 82–98)
MONOCYTES # BLD AUTO: 0.57 THOUSAND/ΜL (ref 0.17–1.22)
MONOCYTES NFR BLD AUTO: 10 % (ref 4–12)
NEUTROPHILS # BLD AUTO: 3.95 THOUSANDS/ΜL (ref 1.85–7.62)
NEUTS SEG NFR BLD AUTO: 72 % (ref 43–75)
NRBC BLD AUTO-RTO: 0 /100 WBCS
NT-PROBNP SERPL-MCNC: 158 PG/ML
PLATELET # BLD AUTO: 132 THOUSANDS/UL (ref 149–390)
PMV BLD AUTO: 11.9 FL (ref 8.9–12.7)
POTASSIUM SERPL-SCNC: 3.9 MMOL/L (ref 3.5–5.3)
PROT SERPL-MCNC: 8.6 G/DL (ref 6.4–8.2)
RBC # BLD AUTO: 4.15 MILLION/UL (ref 3.88–5.62)
SODIUM SERPL-SCNC: 145 MMOL/L (ref 136–145)
TROPONIN I SERPL-MCNC: <0.02 NG/ML
WBC # BLD AUTO: 5.54 THOUSAND/UL (ref 4.31–10.16)

## 2021-08-07 PROCEDURE — 99285 EMERGENCY DEPT VISIT HI MDM: CPT | Performed by: EMERGENCY MEDICINE

## 2021-08-07 PROCEDURE — 73630 X-RAY EXAM OF FOOT: CPT

## 2021-08-07 PROCEDURE — 84484 ASSAY OF TROPONIN QUANT: CPT | Performed by: EMERGENCY MEDICINE

## 2021-08-07 PROCEDURE — 36415 COLL VENOUS BLD VENIPUNCTURE: CPT | Performed by: EMERGENCY MEDICINE

## 2021-08-07 PROCEDURE — 93005 ELECTROCARDIOGRAM TRACING: CPT

## 2021-08-07 PROCEDURE — 70450 CT HEAD/BRAIN W/O DYE: CPT

## 2021-08-07 PROCEDURE — 83880 ASSAY OF NATRIURETIC PEPTIDE: CPT | Performed by: EMERGENCY MEDICINE

## 2021-08-07 PROCEDURE — 85025 COMPLETE CBC W/AUTO DIFF WBC: CPT | Performed by: EMERGENCY MEDICINE

## 2021-08-07 PROCEDURE — 71275 CT ANGIOGRAPHY CHEST: CPT

## 2021-08-07 PROCEDURE — 80053 COMPREHEN METABOLIC PANEL: CPT | Performed by: EMERGENCY MEDICINE

## 2021-08-07 PROCEDURE — 99223 1ST HOSP IP/OBS HIGH 75: CPT | Performed by: NURSE PRACTITIONER

## 2021-08-07 PROCEDURE — 99285 EMERGENCY DEPT VISIT HI MDM: CPT

## 2021-08-07 RX ORDER — PRIMIDONE 50 MG/1
150 TABLET ORAL
Status: DISCONTINUED | OUTPATIENT
Start: 2021-08-08 | End: 2021-08-11 | Stop reason: HOSPADM

## 2021-08-07 RX ORDER — PRIMIDONE 50 MG/1
100 TABLET ORAL
Status: DISCONTINUED | OUTPATIENT
Start: 2021-08-08 | End: 2021-08-11 | Stop reason: HOSPADM

## 2021-08-07 RX ORDER — ZINC SULFATE 50(220)MG
220 CAPSULE ORAL DAILY
Status: DISCONTINUED | OUTPATIENT
Start: 2021-08-08 | End: 2021-08-11 | Stop reason: HOSPADM

## 2021-08-07 RX ORDER — LEVOCARNITINE 1 G/10ML
330 SOLUTION ORAL
Status: DISCONTINUED | OUTPATIENT
Start: 2021-08-08 | End: 2021-08-08 | Stop reason: CLARIF

## 2021-08-07 RX ORDER — LAMOTRIGINE 100 MG/1
250 TABLET ORAL
COMMUNITY

## 2021-08-07 RX ORDER — CLONAZEPAM 0.5 MG/1
0.5 TABLET ORAL
Status: DISCONTINUED | OUTPATIENT
Start: 2021-08-07 | End: 2021-08-11 | Stop reason: HOSPADM

## 2021-08-07 RX ORDER — ONDANSETRON 2 MG/ML
4 INJECTION INTRAMUSCULAR; INTRAVENOUS EVERY 6 HOURS PRN
Status: DISCONTINUED | OUTPATIENT
Start: 2021-08-07 | End: 2021-08-11 | Stop reason: HOSPADM

## 2021-08-07 RX ORDER — ZONISAMIDE 100 MG/1
100 CAPSULE ORAL
Status: DISCONTINUED | OUTPATIENT
Start: 2021-08-08 | End: 2021-08-11 | Stop reason: HOSPADM

## 2021-08-07 RX ORDER — ASCORBIC ACID 500 MG
500 TABLET ORAL DAILY
Status: DISCONTINUED | OUTPATIENT
Start: 2021-08-08 | End: 2021-08-11 | Stop reason: HOSPADM

## 2021-08-07 RX ORDER — FUROSEMIDE 40 MG/1
40 TABLET ORAL DAILY
Status: ON HOLD | COMMUNITY
Start: 2013-09-27 | End: 2021-08-11 | Stop reason: SDUPTHER

## 2021-08-07 RX ORDER — ACETAMINOPHEN 325 MG/1
650 TABLET ORAL EVERY 6 HOURS PRN
Status: DISCONTINUED | OUTPATIENT
Start: 2021-08-07 | End: 2021-08-11 | Stop reason: HOSPADM

## 2021-08-07 RX ORDER — VITAMIN E 268 MG
400 CAPSULE ORAL DAILY
Status: DISCONTINUED | OUTPATIENT
Start: 2021-08-08 | End: 2021-08-11 | Stop reason: HOSPADM

## 2021-08-07 RX ORDER — CALCIUM CARBONATE 500(1250)
1 TABLET ORAL
Status: DISCONTINUED | OUTPATIENT
Start: 2021-08-08 | End: 2021-08-11 | Stop reason: HOSPADM

## 2021-08-07 RX ORDER — PRIMIDONE 50 MG/1
150 TABLET ORAL
Status: DISCONTINUED | OUTPATIENT
Start: 2021-08-07 | End: 2021-08-11 | Stop reason: HOSPADM

## 2021-08-07 RX ORDER — LAMOTRIGINE 100 MG/1
200 TABLET ORAL
Status: DISCONTINUED | OUTPATIENT
Start: 2021-08-08 | End: 2021-08-11 | Stop reason: HOSPADM

## 2021-08-07 RX ORDER — FUROSEMIDE 40 MG/1
40 TABLET ORAL DAILY
Status: DISCONTINUED | OUTPATIENT
Start: 2021-08-08 | End: 2021-08-08

## 2021-08-07 RX ORDER — LAMOTRIGINE 150 MG/1
300 TABLET ORAL
COMMUNITY

## 2021-08-07 RX ORDER — ASPIRIN 81 MG/1
81 TABLET ORAL DAILY
Status: DISCONTINUED | OUTPATIENT
Start: 2021-08-08 | End: 2021-08-11 | Stop reason: HOSPADM

## 2021-08-07 RX ORDER — LAMOTRIGINE 100 MG/1
300 TABLET ORAL
Status: DISCONTINUED | OUTPATIENT
Start: 2021-08-07 | End: 2021-08-11 | Stop reason: HOSPADM

## 2021-08-07 RX ORDER — HEPARIN SODIUM 5000 [USP'U]/ML
5000 INJECTION, SOLUTION INTRAVENOUS; SUBCUTANEOUS EVERY 8 HOURS SCHEDULED
Status: DISCONTINUED | OUTPATIENT
Start: 2021-08-07 | End: 2021-08-08

## 2021-08-07 RX ADMIN — HEPARIN SODIUM 5000 UNITS: 5000 INJECTION INTRAVENOUS; SUBCUTANEOUS at 23:34

## 2021-08-07 RX ADMIN — CLONAZEPAM 0.5 MG: 0.5 TABLET ORAL at 23:32

## 2021-08-07 RX ADMIN — PRIMIDONE 150 MG: 50 TABLET ORAL at 23:32

## 2021-08-07 RX ADMIN — IOHEXOL 85 ML: 350 INJECTION, SOLUTION INTRAVENOUS at 20:10

## 2021-08-07 RX ADMIN — LAMOTRIGINE 300 MG: 100 TABLET ORAL at 23:32

## 2021-08-07 NOTE — ED NOTES
Pt placed on 2LPM via [facility] oxygen and sats improved from 89% on room air to 95% on 2LPM     Brigitte Mccray RN  08/07/21 [id number]

## 2021-08-07 NOTE — ED PROVIDER NOTES
History  Chief Complaint   Patient presents with    Seizure - Prior Hx Of     pt had a seizure yesterday and has been more sleepy then usual   pts mother reports pt is having more frequent breakthrough seizures then usual       81-year-old male brought to the emergency room by his mother reporting the patient had a seizure yesterday  Mother reports that the patient has frequent seizures and more since suffering from Reg earlier this year  Breakthrough seizures she reports are not uncommon for him however she was concerned because he seemed more sleepy than normal   She is also concerned because she was downstairs when he suffered a seizure yesterday and she believes he fell forward possibly striking his head  Patient was only complaining to her about left ankle pain  Patient suffers from chronic bilateral lower extremity foot and ankle swelling for which she uses compression boots at home  Currently the patient is not complaining of anything specific except when I palpate around his left foot  However, it is noted that when the patient is at rest his pulse oximetry dropped to 86% without supplemental oxygen  Mother reports that she has been monitoring his pulse oximetries at home intermittently status post COVID but she has not noticed any saturations this low  Patient is developmentally intellectually delayed      History provided by:  Patient and parent  History limited by: Patient's chronic mental status  Seizure - Prior Hx Of  Seizure activity on arrival: no    Seizure type:  Unable to specify  Initial focality:  Unable to specify  Episode characteristics comment:  Was unwitnessed  Postictal symptoms: somnolence    Return to baseline: no (More sleepy as per mother)    Severity:  Moderate  Duration: Unknown    Timing:  Unable to specify  Number of seizures this episode:  Unknown  Progression:  Unable to specify  Context: developmental delay    Recent head injury:  During the event  PTA treatment: None  History of seizures: yes        Prior to Admission Medications   Prescriptions Last Dose Informant Patient Reported? Taking?    albuterol (PROAIR HFA) 90 mcg/act inhaler   No Yes   Sig: Inhale 2 puffs every 6 (six) hours as needed for wheezing   ascorbic acid (VITAMIN C) 500 mg tablet   Yes Yes   Sig: Take 500 mg by mouth daily   aspirin 81 MG tablet   Yes Yes   Sig: Take 81 mg by mouth daily   calcium-vitamin D 250-100 MG-UNIT per tablet   Yes Yes   Sig: Take 1 tablet by mouth daily    clonazePAM (KlonoPIN) 0 5 mg tablet   Yes Yes   Sig: Take 0 5 mg by mouth daily at bedtime    furosemide (LASIX) 40 mg tablet   Yes Yes   lamoTRIgine (LaMICtal) 200 MG tablet   Yes Yes   Sig: Take 200 mg by mouth daily in the early morning    lamoTRIgine (LaMICtal) 25 mg tablet   Yes Yes   Sig: Take 300 mg by mouth daily at bedtime   levOCARNitine (CARNITOR) 330 MG tablet   Yes Yes   Sig: Take 330 mg by mouth 4 (four) times daily (after meals and at bedtime)    metoprolol tartrate (LOPRESSOR) 25 mg tablet   Yes Yes   Sig: Take 25 mg by mouth 2 (two) times a day   primidone (MYSOLINE) 50 mg tablet   Yes Yes   Sig: Take 150 mg by mouth daily after breakfast    primidone (MYSOLINE) 50 mg tablet   Yes Yes   Sig: Take 100 mg by mouth daily with lunch   primidone (MYSOLINE) 50 mg tablet   No Yes   Sig: Take 3 tablets (150 mg total) by mouth daily at bedtime   vitamin E, tocopherol, 400 units capsule   Yes Yes   Sig: Take 400 Units by mouth daily   zinc gluconate 50 mg tablet   Yes Yes   Sig: Take 50 mg by mouth daily   zonisamide (ZONEGRAN) 100 mg capsule   No Yes   Sig: Take 1 capsule (100 mg total) by mouth daily at bedtime      Facility-Administered Medications: None       Past Medical History:   Diagnosis Date    Hypertension     Mentally challenged     Pericardial effusion     Pneumonia     Seizure [facility])        Past Surgical History:   Procedure Laterality Date    FRACTURE SURGERY      clavicle, left humerus, radial, and ulna   Right radius    HIP ARTHROSCOPY Right     PA COLONOSCOPY FLX DX W/COLLJ SPEC WHEN PFRMD N/A 4/1/2019    Procedure: COLONOSCOPY;  Surgeon: Vamshi Mora MD;  Location: [facility]; Service: Colorectal       Family History   Adopted: Yes     I have reviewed and agree with the history as documented  E-Cigarette/Vaping    E-Cigarette Use Never User      E-Cigarette/Vaping Substances    Nicotine No     THC No     CBD No     Flavoring No     Other No     Unknown No      Social History     Tobacco Use    Smoking status: Never Smoker    Smokeless tobacco: Never Used   Vaping Use    Vaping Use: Never used   Substance Use Topics    Alcohol use: Never    Drug use: Never       Review of Systems   Unable to perform ROS: Other (Chronic developmental delay)   Constitutional: Negative  Negative for chills, fatigue and fever  HENT: Negative  Respiratory: Negative for shortness of breath and wheezing  Cardiovascular: Positive for leg swelling (Chronic)  Negative for chest pain  Gastrointestinal: Negative  Negative for diarrhea, nausea and vomiting  Genitourinary: Negative  Musculoskeletal: Positive for arthralgias  Negative for back pain and gait problem  Skin: Negative for pallor, rash and wound  Neurological: Positive for seizures  Psychiatric/Behavioral: The patient is not nervous/anxious  All other systems reviewed and are negative  Physical Exam  Physical Exam  Vitals and nursing note reviewed  Constitutional:       General: He is not in acute distress  Appearance: Normal appearance  He is well-developed  He is not ill-appearing or toxic-appearing  HENT:      Head: Normocephalic and atraumatic  Hair is normal       Jaw: No pain on movement  Right Ear: External ear normal       Left Ear: External ear normal       Nose: Nose normal       Mouth/Throat:      Mouth: Mucous membranes are moist       Pharynx: Oropharynx is clear     Eyes:      General: Lids are normal  No scleral icterus  Extraocular Movements: Extraocular movements intact  Conjunctiva/sclera: Conjunctivae normal       Pupils: Pupils are equal, round, and reactive to light  Cardiovascular:      Rate and Rhythm: Normal rate and regular rhythm  Heart sounds: Normal heart sounds  No murmur heard  Pulmonary:      Effort: Pulmonary effort is normal  No respiratory distress  Breath sounds: Normal breath sounds  No decreased breath sounds, wheezing, rhonchi or rales  Abdominal:      General: Abdomen is flat  There is no distension  Palpations: Abdomen is soft  Abdomen is not rigid  Tenderness: There is no abdominal tenderness  There is no right CVA tenderness, left CVA tenderness or guarding  Musculoskeletal:         General: No deformity or signs of injury  Normal range of motion  Cervical back: Normal range of motion and neck supple  Right lower leg: Edema present  Left lower leg: Edema present  Right ankle: Swelling present  Left ankle: Swelling present  Left foot: Tenderness present  Comments: Chronic edema   Skin:     General: Skin is warm and dry  Coloration: Skin is not pale  Findings: No rash  Neurological:      Mental Status: Mental status is at baseline     Psychiatric:         Attention and Perception: Attention normal          Speech: Speech normal          Vital Signs  ED Triage Vitals   Temperature Pulse Respirations Blood Pressure SpO2   08/07/21 1819 08/07/21 1819 08/07/21 1819 08/07/21 1819 08/07/21 1819   98 1 °F (36 7 °C) 69 18 133/67 (!) 89 %      Temp Source Heart Rate Source Patient Position - Orthostatic VS BP Location FiO2 (%)   08/07/21 1819 08/07/21 2015 -- -- --   Temporal Monitor         Pain Score       08/07/21 1819       5           Vitals:    08/07/21 1819 08/07/21 1830 08/07/21 2015   BP: 133/67 111/71 145/65   Pulse: 69 65 58         Visual Acuity  Visual Acuity      Most Recent Value   L Pupil Size (mm)  3   R Pupil Size (mm)  3          ED Medications  Medications   iohexol (OMNIPAQUE) 350 MG/ML injection (SINGLE-DOSE) 85 mL (85 mL Intravenous Given 8/7/21 2010)       Diagnostic Studies  Results Reviewed     Procedure Component Value Units Date/Time    Comprehensive metabolic panel [[id number]]  (Abnormal) Collected: 08/07/21 1849    Lab Status: Final result Specimen: Blood from Arm, Left Updated: 08/07/21 1914     Sodium 145 mmol/L      Potassium 3 9 mmol/L      Chloride 107 mmol/L      CO2 30 mmol/L      ANION GAP 8 mmol/L      BUN 24 mg/dL      Creatinine 1 54 mg/dL      Glucose 116 mg/dL      Calcium 8 9 mg/dL      Corrected Calcium 9 4 mg/dL      AST 17 U/L      ALT 22 U/L      Alkaline Phosphatase 208 U/L      Total Protein 8 6 g/dL      Albumin 3 4 g/dL      Total Bilirubin 0 71 mg/dL      eGFR 50 ml/min/1 73sq m     Narrative:      [place] guidelines for Chronic Kidney Disease (CKD):     Stage 1 with normal or high GFR (GFR > 90 mL/min/1 73 square meters)    Stage 2 Mild CKD (GFR = 60-89 mL/min/1 73 square meters)    Stage 3A Moderate CKD (GFR = 45-59 mL/min/1 73 square meters)    Stage 3B Moderate CKD (GFR = 30-44 mL/min/1 73 square meters)    Stage 4 Severe CKD (GFR = 15-29 mL/min/1 73 square meters)    Stage 5 End Stage CKD (GFR <15 mL/min/1 73 square meters)  Note: GFR calculation is accurate only with a steady state creatinine    NT-BNP PRO [[id number]]  (Abnormal) Collected: 08/07/21 1849    Lab Status: Final result Specimen: Blood from Arm, Left Updated: 08/07/21 1914     NT-proBNP 158 pg/mL     Troponin I [[id number]]  (Normal) Collected: 08/07/21 1849    Lab Status: Final result Specimen: Blood from Arm, Left Updated: 08/07/21 1913     Troponin I <0 02 ng/mL     CBC and differential [[id number]]  (Abnormal) Collected: 08/07/21 1849    Lab Status: Final result Specimen: Blood from Arm, Left Updated: 08/07/21 1853     WBC 5 54 Thousand/uL      RBC 4 15 Million/uL      Hemoglobin 13 3 g/dL      Hematocrit 41 3 %       fL      MCH 32 0 pg      MCHC 32 2 g/dL      RDW 14 9 %      MPV 11 9 fL      Platelets 300 Thousands/uL      nRBC 0 /100 WBCs      Neutrophils Relative 72 %      Immat GRANS % 0 %      Lymphocytes Relative 18 %      Monocytes Relative 10 %      Eosinophils Relative 0 %      Basophils Relative 0 %      Neutrophils Absolute 3 95 Thousands/µL      Immature Grans Absolute 0 01 Thousand/uL      Lymphocytes Absolute 0 98 Thousands/µL      Monocytes Absolute 0 57 Thousand/µL      Eosinophils Absolute 0 01 Thousand/µL      Basophils Absolute 0 02 Thousands/µL                  CTA ED chest PE Study   Final Result by Cale Light MD (08/07 2030)      Moderate right effusion resulting in right basilar compressive atelectatic changes  Superimposed infection must be excluded clinically  This is superimposed upon a baseline of COPD  Pericardial calcifications unchanged from prior exam       Workstation performed: [id number]         CT head without contrast   Final Result by Carley Olivares MD (08/07 2018)      No acute intracranial abnormality or significant change from priors  Workstation performed: [id number]         XR foot 3+ views LEFT   Final Result by Cale Light MD (08/07 2041)      Extensive soft tissue swelling is seen throughout the foot  3 mm calcified density overlying the dorsal metatarsals raising the possibility of foreign body  No subcutaneous emphysema                 Workstation performed: [id number]                    Procedures  ECG 12 Lead Documentation Only    Date/Time: 8/7/2021 7:04 PM  Performed by: Marisol Chin DO  Authorized by: Marisol Chin DO     Indications / Diagnosis:  Hypoxia  ECG reviewed by me, the ED Provider: yes    Patient location:  ED  Previous ECG:     Previous ECG:  Compared to current    Comparison ECG info:  No significant change when compared to an EKG performed on January 10th 2020    Similarity:  No change  Interpretation:     Interpretation: non-specific    Rate:     ECG rate assessment: normal    Rhythm:     Rhythm: sinus rhythm    Ectopy:     Ectopy: none    QRS:     QRS axis:  Normal  Conduction:     Conduction: normal    ST segments:     ST segments:  Non-specific  T waves:     T waves: non-specific               ED Course  ED Course as of Aug 07 2050   Sat Aug 07, 2021   2021 No significant change from prior imaging      2040 Moderate right-sided pleural effusion which is likely accounting for the patient's hypoxia that he presented with  Still awaiting x-ray reading from Radiology regarding the patient's foot  2041 Patient will be admitted for further medical evaluation                                SBIRT 22yo+      Most Recent Value   SBIRT (23 yo +)   In order to provide better care to our patients, we are screening all of our patients for alcohol and drug use  Would it be okay to ask you these screening questions? No Filed at: 08/07/2021 1823                    MDM  Number of Diagnoses or Management Options  Altered mental status: new and requires workup  Hypoxia: new and requires workup  Pleural effusion, right: new and requires workup  Seizure disorder [facility]): established and worsening  Diagnosis management comments: Patient presented to the emergency department and a MSE was performed  The patient was evaluated and diagnosed with acute hypoxia new finding of right-sided pleural effusion  This does not appear to be consistent with an infectious process given the patient's absence of cough, no fever, and absence of leukocytosis  This is a new issue that will require additional planned work-up and treatment in a hospitalized setting  As may have been required as part of this evaluation, clinical laboratory test, radiology imaging and medical testing (I e  EKG) were ordered as necessitated by the patient's presentation   I independently reviewed these studies, imaging and testing  This patient's case is considered to be a considerable risk secondary to the above listed disease process and poses a threat to the patient's well-being and baseline function  Further in-patient diagnostic testing and management, which may include the administration of parenteral medications, is required  Amount and/or Complexity of Data Reviewed  Clinical lab tests: ordered and reviewed  Tests in the radiology section of CPT®: ordered and reviewed  Obtain history from someone other than the patient: yes  Review and summarize past medical records: yes    Risk of Complications, Morbidity, and/or Mortality  Presenting problems: high  Diagnostic procedures: moderate  Management options: moderate    Patient Progress  Patient progress: improved      Disposition  Final diagnoses: Altered mental status   Hypoxia - Pulse ox 86% on room air at rest   Pleural effusion, right   Seizure disorder [facility])     Time reflects when diagnosis was documented in both MDM as applicable and the Disposition within this note     Time User Action Codes Description Comment    8/7/2021  8:40 PM Katya Gone Add [R41 82] Altered mental status     8/7/2021  8:40 PM Katya Gone Add [R09 02] Hypoxia     8/7/2021  8:40 PM Katya Gone Modify [R09 02] Hypoxia Pulse ox 86% on room air at rest    8/7/2021  8:41 PM Katya Gone Add [J90] Pleural effusion, right     8/7/2021  8:41 PM Katya Gone Add [G40 909] Seizure disorder [facility])       ED Disposition     ED Disposition Condition Date/Time Comment    Admit Stable Sat Aug 7, 2021  8:41 PM         Follow-up Information    None         Patient's Medications   Discharge Prescriptions    No medications on file     No discharge procedures on file      PDMP Review     None          ED Provider  Electronically Signed by           Judi Hancock DO  08/07/21 2050

## 2021-08-08 PROBLEM — R93.89 ABNORMAL FINDING ON CT SCAN: Status: ACTIVE | Noted: 2021-08-08

## 2021-08-08 LAB
ANION GAP SERPL CALCULATED.3IONS-SCNC: 5 MMOL/L (ref 4–13)
BASOPHILS # BLD AUTO: 0.02 THOUSANDS/ΜL (ref 0–0.1)
BASOPHILS NFR BLD AUTO: 0 % (ref 0–1)
BUN SERPL-MCNC: 23 MG/DL (ref 5–25)
CALCIUM SERPL-MCNC: 8.6 MG/DL (ref 8.3–10.1)
CHLORIDE SERPL-SCNC: 106 MMOL/L (ref 100–108)
CO2 SERPL-SCNC: 34 MMOL/L (ref 21–32)
CREAT SERPL-MCNC: 1.56 MG/DL (ref 0.6–1.3)
EOSINOPHIL # BLD AUTO: 0.01 THOUSAND/ΜL (ref 0–0.61)
EOSINOPHIL NFR BLD AUTO: 0 % (ref 0–6)
ERYTHROCYTE [DISTWIDTH] IN BLOOD BY AUTOMATED COUNT: 14.9 % (ref 11.6–15.1)
GFR SERPL CREATININE-BSD FRML MDRD: 50 ML/MIN/1.73SQ M
GLUCOSE SERPL-MCNC: 84 MG/DL (ref 65–140)
HCT VFR BLD AUTO: 41.3 % (ref 36.5–49.3)
HGB BLD-MCNC: 13.2 G/DL (ref 12–17)
IMM GRANULOCYTES # BLD AUTO: 0.01 THOUSAND/UL (ref 0–0.2)
IMM GRANULOCYTES NFR BLD AUTO: 0 % (ref 0–2)
LYMPHOCYTES # BLD AUTO: 1.03 THOUSANDS/ΜL (ref 0.6–4.47)
LYMPHOCYTES NFR BLD AUTO: 20 % (ref 14–44)
MCH RBC QN AUTO: 32.4 PG (ref 26.8–34.3)
MCHC RBC AUTO-ENTMCNC: 32 G/DL (ref 31.4–37.4)
MCV RBC AUTO: 101 FL (ref 82–98)
MONOCYTES # BLD AUTO: 0.51 THOUSAND/ΜL (ref 0.17–1.22)
MONOCYTES NFR BLD AUTO: 10 % (ref 4–12)
NEUTROPHILS # BLD AUTO: 3.63 THOUSANDS/ΜL (ref 1.85–7.62)
NEUTS SEG NFR BLD AUTO: 70 % (ref 43–75)
NRBC BLD AUTO-RTO: 0 /100 WBCS
PLATELET # BLD AUTO: 116 THOUSANDS/UL (ref 149–390)
PMV BLD AUTO: 11.7 FL (ref 8.9–12.7)
POTASSIUM SERPL-SCNC: 4 MMOL/L (ref 3.5–5.3)
PROCALCITONIN SERPL-MCNC: <0.05 NG/ML
RBC # BLD AUTO: 4.08 MILLION/UL (ref 3.88–5.62)
SODIUM SERPL-SCNC: 145 MMOL/L (ref 136–145)
WBC # BLD AUTO: 5.21 THOUSAND/UL (ref 4.31–10.16)

## 2021-08-08 PROCEDURE — 80048 BASIC METABOLIC PNL TOTAL CA: CPT | Performed by: NURSE PRACTITIONER

## 2021-08-08 PROCEDURE — 84145 PROCALCITONIN (PCT): CPT | Performed by: NURSE PRACTITIONER

## 2021-08-08 PROCEDURE — 85025 COMPLETE CBC W/AUTO DIFF WBC: CPT | Performed by: NURSE PRACTITIONER

## 2021-08-08 PROCEDURE — 99232 SBSQ HOSP IP/OBS MODERATE 35: CPT | Performed by: INTERNAL MEDICINE

## 2021-08-08 RX ORDER — HEPARIN SODIUM 5000 [USP'U]/ML
5000 INJECTION, SOLUTION INTRAVENOUS; SUBCUTANEOUS EVERY 8 HOURS SCHEDULED
Status: DISCONTINUED | OUTPATIENT
Start: 2021-08-08 | End: 2021-08-11 | Stop reason: HOSPADM

## 2021-08-08 RX ORDER — LEVOCARNITINE 330 MG/1
330 TABLET ORAL
Status: DISCONTINUED | OUTPATIENT
Start: 2021-08-08 | End: 2021-08-11 | Stop reason: HOSPADM

## 2021-08-08 RX ADMIN — ASPIRIN 81 MG: 81 TABLET, COATED ORAL at 08:16

## 2021-08-08 RX ADMIN — OXYCODONE HYDROCHLORIDE AND ACETAMINOPHEN 500 MG: 500 TABLET ORAL at 08:16

## 2021-08-08 RX ADMIN — GLYCERIN, HYPROMELLOSE, POLYETHYLENE GLYCOL 1 DROP: .2; .2; 1 LIQUID OPHTHALMIC at 11:48

## 2021-08-08 RX ADMIN — PRIMIDONE 150 MG: 50 TABLET ORAL at 07:27

## 2021-08-08 RX ADMIN — METOPROLOL TARTRATE 25 MG: 25 TABLET, FILM COATED ORAL at 17:18

## 2021-08-08 RX ADMIN — GLYCERIN, HYPROMELLOSE, POLYETHYLENE GLYCOL 1 DROP: .2; .2; 1 LIQUID OPHTHALMIC at 18:31

## 2021-08-08 RX ADMIN — GLYCERIN, HYPROMELLOSE, POLYETHYLENE GLYCOL 1 DROP: .2; .2; 1 LIQUID OPHTHALMIC at 22:41

## 2021-08-08 RX ADMIN — HEPARIN SODIUM 5000 UNITS: 5000 INJECTION INTRAVENOUS; SUBCUTANEOUS at 06:19

## 2021-08-08 RX ADMIN — LEVOCARNITINE 330 MG: 1 SOLUTION ORAL at 01:17

## 2021-08-08 RX ADMIN — PRIMIDONE 100 MG: 50 TABLET ORAL at 15:21

## 2021-08-08 RX ADMIN — HEPARIN SODIUM 5000 UNITS: 5000 INJECTION INTRAVENOUS; SUBCUTANEOUS at 22:41

## 2021-08-08 RX ADMIN — ZINC SULFATE 220 MG (50 MG) CAPSULE 220 MG: CAPSULE at 08:16

## 2021-08-08 RX ADMIN — Medication 400 UNITS: at 08:16

## 2021-08-08 RX ADMIN — LAMOTRIGINE 300 MG: 100 TABLET ORAL at 22:40

## 2021-08-08 RX ADMIN — HEPARIN SODIUM 5000 UNITS: 5000 INJECTION INTRAVENOUS; SUBCUTANEOUS at 17:19

## 2021-08-08 RX ADMIN — CLONAZEPAM 0.5 MG: 0.5 TABLET ORAL at 22:40

## 2021-08-08 RX ADMIN — LAMOTRIGINE 250 MG: 100 TABLET ORAL at 14:06

## 2021-08-08 RX ADMIN — ZONISAMIDE 100 MG: 100 CAPSULE ORAL at 22:42

## 2021-08-08 RX ADMIN — ZONISAMIDE 100 MG: 100 CAPSULE ORAL at 01:17

## 2021-08-08 RX ADMIN — FUROSEMIDE 40 MG: 40 TABLET ORAL at 08:16

## 2021-08-08 RX ADMIN — PRIMIDONE 150 MG: 50 TABLET ORAL at 22:41

## 2021-08-08 RX ADMIN — LEVOCARNITINE 330 MG: 330 TABLET ORAL at 17:18

## 2021-08-08 RX ADMIN — LAMOTRIGINE 200 MG: 100 TABLET ORAL at 07:27

## 2021-08-08 RX ADMIN — LEVOCARNITINE 330 MG: 330 TABLET ORAL at 22:42

## 2021-08-08 RX ADMIN — METOPROLOL TARTRATE 25 MG: 25 TABLET, FILM COATED ORAL at 08:16

## 2021-08-08 RX ADMIN — CALCIUM 1 TABLET: 500 TABLET ORAL at 07:27

## 2021-08-08 NOTE — ASSESSMENT & PLAN NOTE
· CT chest:Moderate right effusion resulting in right basilar compressive atelectatic changes  Superimposed infection must be excluded clinically  This is superimposed upon a baseline of COPD     · Pulmonology consult

## 2021-08-08 NOTE — ASSESSMENT & PLAN NOTE
· Seizure disorder follows with neurology as outpatient on four agents  · Continue lamotrigine clonazepam zonisamide and primidone as taken

## 2021-08-08 NOTE — ASSESSMENT & PLAN NOTE
· Bilateral lower extremity chronic edema  · Compression stockings  · SCDs  · Lasix on hold due to EDWIGE

## 2021-08-08 NOTE — PROGRESS NOTES
More than 50% of total time spent on counseling and coordination of care as described above  Current Length of Stay: 1 day(s)  Current Patient Status: Inpatient   Certification Statement: The patient will continue to require additional inpatient hospital stay due to pleural effusion  Discharge Plan / Estimated Discharge Date: Anticipate discharge in 48-72 hrs to TBD    Code Status: Level 1 - Full Code      Subjective:   Patient seen and examined  Feeling about the same  No other complaints    Objective:   Vitals: Blood pressure 128/77, pulse 60, temperature 98 3 °F (36 8 °C), temperature source Oral, resp  rate 18, height 5' 6" (1 676 m), weight 98 4 kg (216 lb 14 9 oz), SpO2 94 %  Physical Exam  Vitals reviewed  Constitutional:       General: He is not in acute distress  Cardiovascular:      Rate and Rhythm: Regular rhythm  Heart sounds: Normal heart sounds  Pulmonary:      Effort: Pulmonary effort is normal       Breath sounds: Decreased breath sounds present  No wheezing  Abdominal:      General: Bowel sounds are normal       Palpations: Abdomen is soft  Tenderness: There is no abdominal tenderness  There is no rebound  Musculoskeletal:         General: No swelling or tenderness  Skin:     General: Skin is warm and dry  Neurological:      Mental Status: He is alert  Cranial Nerves: No cranial nerve deficit         Additional Data:   Labs:  Results from last 7 days   Lab Units 08/08/21  0556 08/07/21  1849   WBC Thousand/uL 5 21 5 54   HEMOGLOBIN g/dL 13 2 13 3   HEMATOCRIT % 41 3 41 3   MCV fL 101* 100*   PLATELETS Thousands/uL 116* 132*     Results from last 7 days   Lab Units 08/08/21  0556 08/07/21  1849   SODIUM mmol/L 145 145   POTASSIUM mmol/L 4 0 3 9   CHLORIDE mmol/L 106 107   CO2 mmol/L 34* 30   ANION GAP mmol/L 5 8   BUN mg/dL 23 24   CREATININE mg/dL 1 56* 1 54*   CALCIUM mg/dL 8 6 8 9   ALBUMIN g/dL  --  3 4*   TOTAL BILIRUBIN mg/dL  --  0 71   ALK PHOS U/L  --  208* ALT U/L  --  22   AST U/L  --  17   EGFR ml/min/1 73sq m 50 50   GLUCOSE RANDOM mg/dL 84 116         Results from last 7 days   Lab Units 08/07/21  1849   TROPONIN I ng/mL <0 02     Results from last 7 days   Lab Units 08/07/21  1849   NT-PRO BNP pg/mL 158*                      * I Have Reviewed All Lab Data Listed Above  Cultures:                   Lines/Drains:  Invasive Devices     Peripheral Intravenous Line            Peripheral IV 08/07/21 Left Antecubital <1 day              Telemetry:      Imaging:  Imaging Reports Reviewed Today Include:   XR foot 3+ views LEFT    Result Date: 8/7/2021  Impression: Extensive soft tissue swelling is seen throughout the foot  3 mm calcified density overlying the dorsal metatarsals raising the possibility of foreign body  No subcutaneous emphysema  Workstation performed: [id number]     CT head without contrast    Result Date: 8/7/2021  Impression: No acute intracranial abnormality or significant change from priors  Workstation performed: [id number]     CTA ED chest PE Study    Result Date: 8/7/2021  Impression: Moderate right effusion resulting in right basilar compressive atelectatic changes  Superimposed infection must be excluded clinically  This is superimposed upon a baseline of COPD   Pericardial calcifications unchanged from prior exam  Workstation performed: [id number]     Scheduled Meds:  Current Facility-Administered Medications   Medication Dose Route Frequency Provider Last Rate    acetaminophen  650 mg Oral Q6H PRN NADEGE Nava      ascorbic acid  500 mg Oral Daily NADEGE Nava      aspirin  81 mg Oral Daily NADEGE Nava      calcium carbonate  1 tablet Oral Daily With Breakfast NADEGE Nava      clonazePAM  0 5 mg Oral HS NADEGE Nava      glycerin-hypromellose-  1 drop Both Eyes 4x Daily Wong abdi DO      lamoTRIgine  200 mg Oral Early Morning NADEGE Nava      lamoTRIgine  250 mg Oral Daily NADEGE Stanley      lamoTRIgine  300 mg Oral HS NADEGE Stanley      levOCARNitine  330 mg Oral 4x Daily (with meals and at bedtime) Piedad Medina DO      metoprolol tartrate  25 mg Oral BID NADEGE Stanley      ondansetron  4 mg Intravenous Q6H PRN NADEGE Stanley      primidone  100 mg Oral Daily With Lunch NADEGE Stanley      primidone  150 mg Oral After Breakfast NADEGE Stanley      primidone  150 mg Oral HS NADEGE Stanley      vitamin E (tocopherol)  400 Units Oral Daily NADEGE Stanley      zinc sulfate  220 mg Oral Daily NADEGE Stanley      zonisamide  100 mg Oral HS NADEGE Stanley         [address] [facility]  Hospitalist    ** Please Note: This note has been constructed using a voice recognition system   **

## 2021-08-08 NOTE — ASSESSMENT & PLAN NOTE
· COVID in January 2021-per mother patient has not returned to baseline since, including increased difficulty with ambulation and increased breakthrough seizures

## 2021-08-08 NOTE — ASSESSMENT & PLAN NOTE
· Pericardial window 20 years ago for effusion-per mother recent investigation revealed that pericardium was "brittle" at that site and to continue upon chart review [facility] echocardiogram performed December 2020 shows evidence of "intraventricular septal bounce consistent with constrictive pericarditis and excessive respiratory change in interventricular septal motion"  · Lasix currently on hold due to EDWIGE

## 2021-08-08 NOTE — ASSESSMENT & PLAN NOTE
· CT chest:Moderate right effusion resulting in right basilar compressive atelectatic changes  Superimposed infection must be excluded clinically   This is superimposed upon a baseline of COPD  · Per record review [facility] December 2020:  He had a thoracentesis followed by a chest tube for incompletely drained exudative pleural fluid  · Pulmonology consult

## 2021-08-08 NOTE — ASSESSMENT & PLAN NOTE
· Pericardial window 20 years ago for effusion-per mother recent investigation revealed that pericardium was "brittle" at that site and to continue Lasix-unable to find supporting documentation including Cardiology notes in available records  · Lasix currently on hold due to EDWIGE

## 2021-08-08 NOTE — ASSESSMENT & PLAN NOTE
· Acute respiratory failure/hypoxia currently requires 2 L nasal cannula to maintain saturations  · Does have moderate right pleural effusion    History of this requiring chest tube during hospitalization at [facility]  · Consultation to pulmonology ordered  · Continue bronchodilators

## 2021-08-08 NOTE — ASSESSMENT & PLAN NOTE
· EDWIGE secondary to diuretic use    Continue holding furosemide    Results from last 7 days   Lab Units 08/08/21  0556 08/07/21  1849   BUN mg/dL 23 24   CREATININE mg/dL 1 56* 1 54*   EGFR ml/min/1 73sq m 50 50

## 2021-08-08 NOTE — PLAN OF CARE
Problem: Potential for Falls  Goal: Patient will remain free of falls  Description: INTERVENTIONS:  - Educate patient/family on patient safety including physical limitations  - Instruct patient to call for assistance with activity   - Consult OT/PT to assist with strengthening/mobility   - Keep Call bell within reach  - Keep bed low and locked with side rails adjusted as appropriate  - Keep care items and personal belongings within reach  - Initiate and maintain comfort rounds  - Make Fall Risk Sign visible to staff  - Offer Toileting every 2 Hours, in advance of need  - Initiate/Maintain bed alarm  - Apply yellow socks and bracelet for high fall risk patients  - Consider moving patient to room near nurses station  8/8/2021 0248 by Ambika Rodney RN  Outcome: Progressing  8/8/2021 0247 by Ambika Rodney RN  Outcome: Progressing     Problem: MOBILITY - ADULT  Goal: Maintain or return to baseline ADL function  Description: INTERVENTIONS:  -  Assess patient's ability to carry out ADLs; assess patient's baseline for ADL function and identify physical deficits which impact ability to perform ADLs (bathing, care of mouth/teeth, toileting, grooming, dressing, etc )  - Assess/evaluate cause of self-care deficits   - Assess range of motion  - Assess patient's mobility; develop plan if impaired  - Assess patient's need for assistive devices and provide as appropriate  - Encourage maximum independence but intervene and supervise when necessary  - Involve family in performance of ADLs  - Assess for home care needs following discharge   - Consider OT consult to assist with ADL evaluation and planning for discharge  - Provide patient education as appropriate  8/8/2021 0248 by Ambika Rodney RN  Outcome: Progressing  8/8/2021 0247 by Amibka Rodney RN  Outcome: Progressing  Goal: Maintains/Returns to pre admission functional level  Description: INTERVENTIONS:  - Perform BMAT or MOVE assessment daily    - Set and communicate daily mobility goal to care team and patient/family/caregiver  - Collaborate with rehabilitation services on mobility goals if consulted  - Perform Range of Motion 3 times a day  - Reposition patient every 2 hours    - Out of bed for meals 3 times a day  - Out of bed for toileting  - Record patient progress and toleration of activity level   8/8/2021 0248 by Alexandra Monterroso RN  Outcome: Progressing  8/8/2021 0247 by Alexandra Monterroso RN  Outcome: Progressing     Problem: NEUROSENSORY - ADULT  Goal: Achieves stable or improved neurological status  Description: INTERVENTIONS  - Monitor and report changes in neurological status  - Monitor vital signs such as temperature, blood pressure, glucose, and any other labs ordered   - Initiate measures to prevent increased intracranial pressure  - Monitor for seizure activity and implement precautions if appropriate      8/8/2021 0248 by Alexandra Monterroso RN  Outcome: Progressing  8/8/2021 0247 by Alexandra Monterroso RN  Outcome: Progressing  Goal: Remains free of injury related to seizures activity  Description: INTERVENTIONS  - Maintain airway, patient safety  and administer oxygen as ordered  - Monitor patient for seizure activity, document and report duration and description of seizure to physician/advanced practitioner  - If seizure occurs,  ensure patient safety during seizure  - Reorient patient post seizure  - Seizure pads on all 4 side rails  - Instruct patient/family to notify RN of any seizure activity including if an aura is experienced  - Instruct patient/family to call for assistance with activity based on nursing assessment  - Administer anti-seizure medications if ordered    8/8/2021 0248 by Alexandra Monterroso RN  Outcome: Progressing  8/8/2021 0247 by Alexandra Monterroso RN  Outcome: Progressing  Goal: Achieves maximal functionality and self care  Description: INTERVENTIONS  - Monitor swallowing and airway patency with patient fatigue and changes in neurological status  - Encourage and assist patient to increase activity and self care     - Encourage visually impaired, hearing impaired and aphasic patients to use assistive/communication devices  8/8/2021 0248 by Kaleb Davalos RN  Outcome: Progressing  8/8/2021 0247 by Kaleb Davalos RN  Outcome: Progressing     Problem: RESPIRATORY - ADULT  Goal: Achieves optimal ventilation and oxygenation  Description: INTERVENTIONS:  - Assess for changes in respiratory status  - Assess for changes in mentation and behavior  - Position to facilitate oxygenation and minimize respiratory effort  - Oxygen administered by appropriate delivery if ordered  - Initiate smoking cessation education as indicated  - Encourage broncho-pulmonary hygiene including cough, deep breathe, Incentive Spirometry  - Assess the need for suctioning and aspirate as needed  - Assess and instruct to report SOB or any respiratory difficulty  - Respiratory Therapy support as indicated  8/8/2021 0248 by Kaleb Davalos RN  Outcome: Progressing  8/8/2021 0247 by Kaleb Davalos RN  Outcome: Progressing     Problem: METABOLIC, FLUID AND ELECTROLYTES - ADULT  Goal: Electrolytes maintained within normal limits  Description: INTERVENTIONS:  - Monitor labs and assess patient for signs and symptoms of electrolyte imbalances  - Administer electrolyte replacement as ordered  - Monitor response to electrolyte replacements, including repeat lab results as appropriate  - Instruct patient on fluid and nutrition as appropriate  8/8/2021 0248 by Kaleb Davalos RN  Outcome: Progressing  8/8/2021 0247 by Kaleb Davalos RN  Outcome: Progressing  Goal: Fluid balance maintained  Description: INTERVENTIONS:  - Monitor labs   - Monitor I/O and WT  - Instruct patient on fluid and nutrition as appropriate  - Assess for signs & symptoms of volume excess or deficit  8/8/2021 0248 by Kaleb Davalos RN  Outcome: Progressing  8/8/2021 0247 by Kaleb Davalos RN  Outcome: Progressing     Problem: SKIN/TISSUE INTEGRITY - ADULT  Goal: Skin Integrity remains intact(Skin Breakdown Prevention)  Description: Assess:  -Perform Gilberto assessment every shift  -Clean and moisturize skin every shift and PRN  -Inspect skin when repositioning, toileting, and assisting with ADLS  -Assess extremities for adequate circulation and sensation     Bed Management:  -Have minimal linens on bed & keep smooth, unwrinkled  -Change linens as needed when moist or perspiring  -Avoid sitting or lying in one position for more than 2 hours while in bed  -Keep HOB at 30 degrees     Toileting:  -Offer bedside commode  -Assess for incontinence every 2 hours      Activity:  -Encourage activity and walks on unit  -Encourage or provide ROM exercises   -Turn and reposition patient every 2 Hours  -Use appropriate equipment to lift or move patient in bed  -Instruct/ Assist with weight shifting every hour when out of bed in chair      Skin Care:  -Avoid use of baby powder, tape, friction and shearing, hot water or constrictive clothing  -Relieve pressure over bony prominences using turn and prop/foam wedges/pillows  -Do not massage red bony areas    Next Steps:   -Consider consults to  interdisciplinary teams such as WOCN, case management, PT/OT  8/8/2021 0248 by Rossana Cortes RN  Outcome: Progressing  8/8/2021 0247 by Rossana Cortes RN  Outcome: Progressing     Problem: MUSCULOSKELETAL - ADULT  Goal: Maintain or return mobility to safest level of function  Description: INTERVENTIONS:  - Assess patient's ability to carry out ADLs; assess patient's baseline for ADL function and identify physical deficits which impact ability to perform ADLs (bathing, care of mouth/teeth, toileting, grooming, dressing, etc )  - Assess/evaluate cause of self-care deficits   - Assess range of motion  - Assess patient's mobility  - Assess patient's need for assistive devices and provide as appropriate  - Encourage maximum independence but intervene and supervise when necessary  - Involve family in performance of ADLs  - Assess for home care needs following discharge   - Consider OT consult to assist with ADL evaluation and planning for discharge  - Provide patient education as appropriate  8/8/2021 0248 by Kaleb Davalos RN  Outcome: Progressing  8/8/2021 0247 by Kaleb Davalos RN  Outcome: Progressing     Problem: PAIN - ADULT  Goal: Verbalizes/displays adequate comfort level or baseline comfort level  Description: Interventions:  - Encourage patient to monitor pain and request assistance  - Assess pain using appropriate pain scale  - Administer analgesics based on type and severity of pain and evaluate response  - Implement non-pharmacological measures as appropriate and evaluate response  - Consider cultural and social influences on pain and pain management  - Notify physician/advanced practitioner if interventions unsuccessful or patient reports new pain  8/8/2021 0248 by Kaleb Davalos RN  Outcome: Progressing  8/8/2021 0247 by Kaleb Davalos RN  Outcome: Progressing     Problem: INFECTION - ADULT  Goal: Absence or prevention of progression during hospitalization  Description: INTERVENTIONS:  - Assess and monitor for signs and symptoms of infection  - Monitor lab/diagnostic results  - Monitor all insertion sites, i e  indwelling lines, tubes, and drains  - Monitor endotracheal if appropriate and nasal secretions for changes in amount and color  - [place] appropriate cooling/warming therapies per order  - Administer medications as ordered  - Instruct and encourage patient and family to use good hand hygiene technique  - Identify and instruct in appropriate isolation precautions for identified infection/condition  8/8/2021 0248 by Kaleb Davalos RN  Outcome: Progressing  8/8/2021 0247 by Kaleb Davalos RN  Outcome: Progressing  Goal: Absence of fever/infection during neutropenic period  Description: INTERVENTIONS:  - Monitor WBC    8/8/2021 0248 by Kaleb Davalos RN  Outcome: Progressing  8/8/2021 0247 by Tim Diaz RN  Outcome: Progressing     Problem: SAFETY ADULT  Goal: Patient will remain free of falls  Description: INTERVENTIONS:  - Educate patient/family on patient safety including physical limitations  - Instruct patient to call for assistance with activity   - Consult OT/PT to assist with strengthening/mobility   - Keep Call bell within reach  - Keep bed low and locked with side rails adjusted as appropriate  - Keep care items and personal belongings within reach  - Initiate and maintain comfort rounds  - Make Fall Risk Sign visible to staff  - Offer Toileting every 2 Hours, in advance of need  - Initiate/Maintain bed alarm  - Apply yellow socks and bracelet for high fall risk patients  - Consider moving patient to room near nurses station  8/8/2021 0248 by Tim Diaz RN  Outcome: Progressing  8/8/2021 0247 by Tim Diaz RN  Outcome: Progressing  Goal: Maintain or return to baseline ADL function  Description: INTERVENTIONS:  -  Assess patient's ability to carry out ADLs; assess patient's baseline for ADL function and identify physical deficits which impact ability to perform ADLs (bathing, care of mouth/teeth, toileting, grooming, dressing, etc )  - Assess/evaluate cause of self-care deficits   - Assess range of motion  - Assess patient's mobility; develop plan if impaired  - Assess patient's need for assistive devices and provide as appropriate  - Encourage maximum independence but intervene and supervise when necessary  - Involve family in performance of ADLs  - Assess for home care needs following discharge   - Consider OT consult to assist with ADL evaluation and planning for discharge  - Provide patient education as appropriate  8/8/2021 0248 by Tim Diaz RN  Outcome: Progressing  8/8/2021 0247 by Tim Diaz RN  Outcome: Progressing  Goal: Maintains/Returns to pre admission functional level  Description: INTERVENTIONS:  - Perform BMAT or MOVE assessment daily    - Set and communicate daily mobility goal to care team and patient/family/caregiver  - Collaborate with rehabilitation services on mobility goals if consulted  - Perform Range of Motion 3 times a day  - Reposition patient every 2 hours  - Out of bed for meals 3 times a day  - Out of bed for toileting  - Record patient progress and toleration of activity level   8/8/2021 0248 by Tomasz Rowan RN  Outcome: Progressing  8/8/2021 0247 by Tomasz Rowan RN  Outcome: Progressing     Problem: DISCHARGE PLANNING  Goal: Discharge to home or other facility with appropriate resources  Description: INTERVENTIONS:  - Identify barriers to discharge w/patient and caregiver  - Arrange for needed discharge resources and transportation as appropriate  - Identify discharge learning needs (meds, wound care, etc )  - Arrange for interpretive services to assist at discharge as needed  - Refer to Case Management Department for coordinating discharge planning if the patient needs post-hospital services based on physician/advanced practitioner order or complex needs related to functional status, cognitive ability, or social support system  8/8/2021 0248 by Tomasz Rowan RN  Outcome: Progressing  8/8/2021 0247 by Tomasz Rowan RN  Outcome: Progressing     Problem: Knowledge Deficit  Goal: Patient/family/caregiver demonstrates understanding of disease process, treatment plan, medications, and discharge instructions  Description: Complete learning assessment and assess knowledge base    Interventions:  - Provide teaching at level of understanding  - Provide teaching via preferred learning methods  8/8/2021 0248 by Tomasz Rowan RN  Outcome: Progressing  8/8/2021 0247 by Tomasz Rowan RN  Outcome: Progressing

## 2021-08-08 NOTE — ASSESSMENT & PLAN NOTE
· Oxygen saturation recorded is 86% on room air  · Currently utilizing 2 L nasal cannula with oxygen saturation of 99  · CT chest: Moderate right effusion resulting in right basilar compressive atelectatic changes  Superimposed infection must be excluded clinically  This is superimposed upon a baseline of COPD     · History of COVID January 2021  · Afebrile, WBCs 5 54  · Respiratory protocol-although patient's mother does not wish for albuterol to be used-she says it interfered with his seizure meds-I discussed with her we could try Xopenex should he require nebulization  · Oxygen protocol

## 2021-08-08 NOTE — ASSESSMENT & PLAN NOTE
· Acute respiratory failure/hypoxia currently requires 2 L nasal cannula to maintain saturations  · Does have moderate right pleural effusion    History of this requiring chest tube during hospitalization at [facility]  · Consultation pulmonology ordered  · Continue bronchodilators

## 2021-08-08 NOTE — H&P
[address]  H&P- Carlo Nixon 1965, 49 y o  male MRN: [id number]  Unit/Bed#: -Tad Encounter: [id number]  Primary Care Provider: Trinna Scheuermann, DO   Date and time admitted to hospital: 8/7/2021  6:14 PM    * Acute respiratory failure with hypoxia (HCC)  Assessment & Plan  · Oxygen saturation recorded is 86% on room air  · Currently utilizing 2 L nasal cannula with oxygen saturation of 99  · CT chest: Moderate right effusion resulting in right basilar compressive atelectatic changes  Superimposed infection must be excluded clinically  This is superimposed upon a baseline of COPD  · History of COVID January 2021  · Afebrile, WBCs 5 54  · Respiratory protocol-although patient's mother does not wish for albuterol to be used-she says it interfered with his seizure meds-I discussed with her we could try Xopenex should he require nebulization  · Oxygen protocol    Pleural effusion  Assessment & Plan  · CT chest:Moderate right effusion resulting in right basilar compressive atelectatic changes  Superimposed infection must be excluded clinically  This is superimposed upon a baseline of COPD  · Per record review [facility] December 2020:  He had a thoracentesis followed by a chest tube for incompletely drained exudative pleural fluid  · Pulmonology consult    EDWIGE (acute kidney injury) ([facility] )  Assessment & Plan  · Creatinine 1 5 with baseline around 0 9    Suspect multifactorial due to diuretic use and decreased oral intake  · Will hold Lasix  · Daily metabolic panel and trend creatinine    Nonintractable generalized idiopathic epilepsy without status epilepticus ([facility] )  Assessment & Plan  · Seizure disorder with recent increase in breakthrough seizures  · Mother of patient at bedside very specific with medication instructions-ordered as such  · CT head: No acute intracranial abnormality or significant change from priors   · Seizure precautions  · He was transferred to [place] [address] [facility] in January 2020 with an essentially unremarkable workup    S/P pericardial window creation  Assessment & Plan  · Pericardial window 20 years ago for effusion-per mother recent investigation revealed that pericardium was "brittle" at that site and to continue upon chart review [facility] echocardiogram performed December 2020 shows evidence of "intraventricular septal bounce consistent with constrictive pericarditis and excessive respiratory change in interventricular septal motion"  · Lasix currently on hold due to EDWIGE    Lower extremity edema  Assessment & Plan  · Bilateral lower extremity chronic edema  · Compression stockings  · SCDs  · Lasix on hold due to EDWIGE    Lower extremity pain, left  Assessment & Plan  · Reports left foot pain  · Left foot x-ray: Extensive soft tissue swelling is seen throughout the foot  3 mm calcified density overlying the dorsal metatarsals raising the possibility of foreign body  No subcutaneous emphysema  · There is no apparent compromise in skin over the foot, however there is swelling and a chronic rash over the left shin  · Possible outpatient podiatry or Ortho consult    Abnormal finding on CT scan  Assessment & Plan  · CT abdomen pelvis:  Heterogeneity of the tail the pancreas without associated fat stranding or fluid   Recommend elective MRI for further evaluation       History of COVID-19  Assessment & Plan  · COVID in January 2021-per mother patient has not returned to baseline since, including increased difficulty with ambulation and increased breakthrough seizures    Dysphagia  Assessment & Plan  · Mechanical soft diet  · Aspiration precautions    Developmental delay  Assessment & Plan  · At baseline-oriented to self, month, year and conversant  · Supportive care    Thrombocytopenia (HCC)  Assessment & Plan  · Platelets 821  · Daily CBC and trend platelets  · Bleeding precautions    VTE Prophylaxis: Pharmacologic VTE Prophylaxis contraindicated due to Possible procedure  / sequential compression device   Code Status:  Full  POLST: POLST form is not discussed and not completed at this time  Discussion with family:  Mother    Anticipated Length of Stay:  Patient will be admitted on an Inpatient basis with an anticipated length of stay of  greater than 2 midnights  Justification for Hospital Stay:  Per plan above    Total Time for Visit, including Counseling / Coordination of Care: 45 minutes  Greater than 50% of this total time spent on direct patient counseling and coordination of care  Chief Complaint:   Increased seizures    History of Present Illness:    Gil Baer is a 49 y o  male with history of developmental delay , seizure disorder, pericardial effusion status post pericardial window 20 years ago, COVID infection, lower extremity edema, pneumonia, and essential hypertension  who presents with increased seizures  His mother, who is his caretaker and at bedside, says that he has been having more seizures ever since he has had COVID pneumonia in December 2020 to January 2021  She also said he is having increasing difficulty ambulating  The patient is oriented to person place and time, however has difficulty in communicating his medical history  He denies fever, visual disturbance, congestion, shortness of breath, chest pain, vomiting, abdominal pain, diarrhea, back pain,  dysuria, dizziness, or focal weakness  Review of Systems:    Review of Systems   Constitutional: Negative for chills and fever  Eyes: Negative for visual disturbance  Respiratory: Negative for cough and shortness of breath  Cardiovascular: Negative for chest pain, palpitations and leg swelling  Gastrointestinal: Negative for abdominal distention, abdominal pain, blood in stool, constipation, diarrhea, nausea and vomiting  Genitourinary: Negative for dysuria and flank pain  Musculoskeletal: Positive for gait problem  Negative for arthralgias and myalgias     Skin: Negative for pallor and rash  Neurological: Positive for seizures  Negative for dizziness, syncope, weakness, numbness and headaches  All other systems reviewed and are negative  Past Medical and Surgical History:     Past Medical History:   Diagnosis Date    Hypertension     Mentally challenged     Pericardial effusion     Pneumonia     Seizure [facility])        Past Surgical History:   Procedure Laterality Date    FRACTURE SURGERY      clavicle, left humerus, radial, and ulna  Right radius    HIP ARTHROSCOPY Right     SD COLONOSCOPY FLX DX W/COLLJ SPEC WHEN PFRMD N/A 4/1/2019    Procedure: COLONOSCOPY;  Surgeon: James Valdes MD;  Location: [facility]; Service: Colorectal       Meds/Allergies:    Prior to Admission medications    Medication Sig Start Date End Date Taking?  Authorizing Provider   albuterol (PROAIR HFA) 90 mcg/act inhaler Inhale 2 puffs every 6 (six) hours as needed for wheezing 1/21/20  Yes Olya Saavedra MD   ascorbic acid (VITAMIN C) 500 mg tablet Take 500 mg by mouth daily   Yes Historical Provider, MD   aspirin 81 MG tablet Take 81 mg by mouth daily   Yes Historical Provider, MD   calcium-vitamin D 250-100 MG-UNIT per tablet Take 1 tablet by mouth daily    Yes Historical Provider, MD   clonazePAM (KlonoPIN) 0 5 mg tablet Take 0 5 mg by mouth daily at bedtime    Yes Historical Provider, MD   furosemide (LASIX) 40 mg tablet  9/27/13  Yes Historical Provider, MD   lamoTRIgine (LaMICtal) 200 MG tablet Take 200 mg by mouth daily in the early morning    Yes Historical Provider, MD   lamoTRIgine (LaMICtal) 25 mg tablet Take 300 mg by mouth daily at bedtime   Yes Historical Provider, MD   levOCARNitine (CARNITOR) 330 MG tablet Take 330 mg by mouth 4 (four) times daily (after meals and at bedtime)    Yes Historical Provider, MD   metoprolol tartrate (LOPRESSOR) 25 mg tablet Take 25 mg by mouth 2 (two) times a day 3/18/21  Yes Historical Provider, MD   primidone (MYSOLINE) 50 mg tablet Take 150 mg by mouth daily after breakfast    Yes Historical Provider, MD   primidone (MYSOLINE) 50 mg tablet Take 100 mg by mouth daily with lunch   Yes Historical Provider, MD   primidone (MYSOLINE) 50 mg tablet Take 3 tablets (150 mg total) by mouth daily at bedtime 1/21/20  Yes Sushma Prado MD   vitamin E, tocopherol, 400 units capsule Take 400 Units by mouth daily   Yes Historical Provider, MD   zinc gluconate 50 mg tablet Take 50 mg by mouth daily   Yes Historical Provider, MD   zonisamide (ZONEGRAN) 100 mg capsule Take 1 capsule (100 mg total) by mouth daily at bedtime 1/21/20  Yes Sushma Prado MD     I have reviewed home medications with patient family member  Allergies: Allergies   Allergen Reactions    Dilantin [Phenytoin]      Pericardial effusion    Phenobarbital Hyperactivity       Social History:     Marital Status: Single   Occupation:  Not employed  Patient Pre-hospital Living Situation:  Home  Patient Pre-hospital Level of Mobility:  Currently using walker  Patient Pre-hospital Diet Restrictions:  Mechanical soft  Substance Use History:   Social History     Substance and Sexual Activity   Alcohol Use Never     Social History     Tobacco Use   Smoking Status Never Smoker   Smokeless Tobacco Never Used     Social History     Substance and Sexual Activity   Drug Use Never       Family History:    non-contributory    Physical Exam:     Vitals:   Blood Pressure: 122/74 (08/08/21 0733)  Pulse: 65 (08/08/21 0733)  Temperature: 97 9 °F (36 6 °C) (08/08/21 0733)  Temp Source: Oral (08/07/21 2345)  Respirations: 16 (08/07/21 2345)  Height: 5' 6" (167 6 cm) (08/07/21 2125)  Weight - Scale: 98 4 kg (216 lb 14 9 oz) (08/08/21 0600)  SpO2: 94 % (08/08/21 0738)    Physical Exam  Vitals and nursing note reviewed  Constitutional:       General: He is not in acute distress  HENT:      Head: Normocephalic and atraumatic        Mouth/Throat:      Mouth: Mucous membranes are moist  Pharynx: Oropharynx is clear  Eyes:      Extraocular Movements: Extraocular movements intact  Pupils: Pupils are equal, round, and reactive to light  Comments: Dysconjugate gaze (this was also noted in prior H&P)   Cardiovascular:      Rate and Rhythm: Normal rate and regular rhythm  Pulses: Normal pulses  Heart sounds: Normal heart sounds  Pulmonary:      Effort: Pulmonary effort is normal       Breath sounds: Examination of the right-middle field reveals decreased breath sounds  Examination of the right-lower field reveals decreased breath sounds  Decreased breath sounds present  No wheezing or rales  Abdominal:      General: Bowel sounds are normal       Palpations: Abdomen is soft  Tenderness: There is no abdominal tenderness  Musculoskeletal:         General: Normal range of motion  Cervical back: Normal range of motion and neck supple  Right lower leg: Edema present  Left lower leg: Edema present  Skin:     General: Skin is warm and dry  Capillary Refill: Capillary refill takes less than 2 seconds  Findings: Rash present  Comments: Chronic scaly rash to left shin   Neurological:      General: No focal deficit present  Mental Status: He is alert and oriented to person, place, and time  Additional Data:     Lab Results: I have personally reviewed pertinent reports        Results from last 7 days   Lab Units 08/08/21  0556   WBC Thousand/uL 5 21   HEMOGLOBIN g/dL 13 2   HEMATOCRIT % 41 3   PLATELETS Thousands/uL 116*   NEUTROS PCT % 70   LYMPHS PCT % 20   MONOS PCT % 10   EOS PCT % 0     Results from last 7 days   Lab Units 08/08/21  0556 08/07/21  1849   SODIUM mmol/L 145 145   POTASSIUM mmol/L 4 0 3 9   CHLORIDE mmol/L 106 107   CO2 mmol/L 34* 30   BUN mg/dL 23 24   CREATININE mg/dL 1 56* 1 54*   ANION GAP mmol/L 5 8   CALCIUM mg/dL 8 6 8 9   ALBUMIN g/dL  --  3 4*   TOTAL BILIRUBIN mg/dL  --  0 71   ALK PHOS U/L  --  208*   ALT U/L --  22   AST U/L  --  17   GLUCOSE RANDOM mg/dL 84 116                       Imaging: I have personally reviewed pertinent reports  and I have personally reviewed pertinent films in PACS    CTA ED chest PE Study   Final Result by Vianca Ochoa MD (08/07 2030)      Moderate right effusion resulting in right basilar compressive atelectatic changes  Superimposed infection must be excluded clinically  This is superimposed upon a baseline of COPD  Pericardial calcifications unchanged from prior exam       Workstation performed: [id number]         CT head without contrast   Final Result by Kenya Andujar MD (08/07 2018)      No acute intracranial abnormality or significant change from priors  Workstation performed: [id number]         XR foot 3+ views LEFT   Final Result by Vianca Ochoa MD (08/07 2041)      Extensive soft tissue swelling is seen throughout the foot  3 mm calcified density overlying the dorsal metatarsals raising the possibility of foreign body  No subcutaneous emphysema  Workstation performed: [id number]             EKG, Pathology, and Other Studies Reviewed on Admission:  · EKG:  NSR rate of 60  Allscripts / Epic Records Reviewed: Yes     ** Please Note: This note has been constructed using a voice recognition system   **

## 2021-08-08 NOTE — ASSESSMENT & PLAN NOTE
· Seizure disorder with recent increase in breakthrough seizures  · Mother of patient at bedside very specific with medication instructions-ordered as such  · CT head: No acute intracranial abnormality or significant change from priors   · Seizure precautions  · He was transferred to [place] [address]  in January 2020 with an essentially unremarkable workup

## 2021-08-08 NOTE — RESPIRATORY THERAPY NOTE
Resp Care      08/08/21 1123   Respiratory Assessment   Assessment Type Assess only   General Appearance Sleeping   Respiratory Pattern Normal   Chest Assessment Chest expansion symmetrical   Bilateral Breath Sounds Diminished   Resp Comments Pt resting comforatably at this time  No resp distress noted  Pt is on 2 L NC  Will d/c protocol at this time      O2 Device 2 L NC

## 2021-08-08 NOTE — PROGRESS NOTES
Patient is alert with stimulation at times  Patient ate meals while alert sitting upright but in between disturbances is sleeping  Dr Shannon Hernandez aware  Patient encouraged and performed Incentive spirometer well  Seizure precautions with no seizure activity  Patient in bed during shift and was weight shifted turned and propped  Patient did not have productive cough for sputum sample that is needed  Patients mom will bring in the [address] as pharmacy contacted Dr Shannon Hernandez due to not having medication available  No complaints of pain from patient  Alarms are on and functioning no incautious behavior

## 2021-08-08 NOTE — RESPIRATORY THERAPY NOTE
RT Protocol Note  Wilmar Harris 49 y o  male MRN: [id number]  Unit/Bed#: -01 Encounter: [id number]    Assessment    Active Problems: Thrombocytopenia (HCC)    Nonintractable generalized idiopathic epilepsy without status epilepticus ([facility] )    Acute respiratory failure with hypoxia (HCC)    Developmental delay    Dysphagia    EDWIGE (acute kidney injury) ([facility] )    Pleural effusion    History of COVID-19    S/P pericardial window creation    Lower extremity edema    Lower extremity pain, left      Home Pulmonary Medications:  none       Past Medical History:   Diagnosis Date    Hypertension     Mentally challenged     Pericardial effusion     Pneumonia     Seizure ([facility] )      Social History     Socioeconomic History    Marital status: Single     Spouse name: None    Number of children: None    Years of education: None    Highest education level: None   Occupational History    None   Tobacco Use    Smoking status: Never Smoker    Smokeless tobacco: Never Used   Vaping Use    Vaping Use: Never used   Substance and Sexual Activity    Alcohol use: Never    Drug use: Never    Sexual activity: None   Other Topics Concern    None   Social History Narrative    None     Social Determinants of Health     Financial Resource Strain:     Difficulty of Paying Living Expenses:    Food Insecurity:     Worried About Running Out of Food in the Last Year:     Ran Out of Food in the Last Year:    Transportation Needs:     Lack of Transportation (Medical):      Lack of Transportation (Non-Medical):    Physical Activity:     Days of Exercise per Week:     Minutes of Exercise per Session:    Stress:     Feeling of Stress :    Social Connections:     Frequency of Communication with Friends and Family:     Frequency of Social Gatherings with Friends and Family:     Attends Nondenominational Services:     Active Member of Clubs or Organizations:     Attends Club or Organization Meetings:     Marital Status: Intimate Partner Violence:     Fear of Current or Ex-Partner:     Emotionally Abused:     Physically Abused:     Sexually Abused:        Subjective         Objective    Physical Exam:   Assessment Type: Assess only  General Appearance: Awake  Respiratory Pattern: Normal  Chest Assessment: Chest expansion symmetrical  O2 Device: 2lpm NC    Vitals:  Blood pressure 163/91, pulse 56, temperature 98 1 °F (36 7 °C), resp  rate 14, height 5' 6" (1 676 m), weight 99 4 kg (219 lb 2 2 oz), SpO2 92 %  Imaging and other studies: I have personally reviewed pertinent reports        O2 Device: 2lpm NC     Plan    Respiratory Plan: No distress/Pulmonary history        Resp Comments: 49 yr old pt hospitalized for seizure activity, Hx of Covid early in the year, pt hx is develpmentally delayed, mother at bedside to support and aid,  pt assessed on 2lpm NC, Sats of 97%, breathing pattern unlabored,  mother indicates pt has not required oxygen at home or any type of respiratory txs,

## 2021-08-08 NOTE — ASSESSMENT & PLAN NOTE
· Creatinine 1 5 with baseline around 0 9    Suspect multifactorial due to diuretic use and decreased oral intake  · Will hold Lasix  · Daily metabolic panel and trend creatinine

## 2021-08-08 NOTE — ASSESSMENT & PLAN NOTE
· EDWIGE secondary to diuretic use    Continue holding furosemide    Results from last 7 days   Lab Units 08/09/21  0627 08/08/21  0556 08/07/21  1849   BUN mg/dL 27* 23 24   CREATININE mg/dL 1 53* 1 56* 1 54*   EGFR ml/min/1 73sq m 51 50 50

## 2021-08-09 PROBLEM — K59.00 CONSTIPATION: Status: ACTIVE | Noted: 2021-08-09

## 2021-08-09 LAB
ALBUMIN SERPL BCP-MCNC: 3.3 G/DL (ref 3.5–5)
ALP SERPL-CCNC: 195 U/L (ref 46–116)
ALT SERPL W P-5'-P-CCNC: 21 U/L (ref 12–78)
ANION GAP SERPL CALCULATED.3IONS-SCNC: 7 MMOL/L (ref 4–13)
AST SERPL W P-5'-P-CCNC: 16 U/L (ref 5–45)
BILIRUB SERPL-MCNC: 0.66 MG/DL (ref 0.2–1)
BUN SERPL-MCNC: 27 MG/DL (ref 5–25)
CALCIUM ALBUM COR SERPL-MCNC: 9.2 MG/DL (ref 8.3–10.1)
CALCIUM SERPL-MCNC: 8.6 MG/DL (ref 8.3–10.1)
CHLORIDE SERPL-SCNC: 106 MMOL/L (ref 100–108)
CO2 SERPL-SCNC: 30 MMOL/L (ref 21–32)
CREAT SERPL-MCNC: 1.53 MG/DL (ref 0.6–1.3)
ERYTHROCYTE [DISTWIDTH] IN BLOOD BY AUTOMATED COUNT: 14.9 % (ref 11.6–15.1)
GFR SERPL CREATININE-BSD FRML MDRD: 51 ML/MIN/1.73SQ M
GLUCOSE SERPL-MCNC: 84 MG/DL (ref 65–140)
HCT VFR BLD AUTO: 42.3 % (ref 36.5–49.3)
HGB BLD-MCNC: 13.5 G/DL (ref 12–17)
MCH RBC QN AUTO: 32.3 PG (ref 26.8–34.3)
MCHC RBC AUTO-ENTMCNC: 31.9 G/DL (ref 31.4–37.4)
MCV RBC AUTO: 101 FL (ref 82–98)
PLATELET # BLD AUTO: 120 THOUSANDS/UL (ref 149–390)
PMV BLD AUTO: 12 FL (ref 8.9–12.7)
POTASSIUM SERPL-SCNC: 4.2 MMOL/L (ref 3.5–5.3)
PROCALCITONIN SERPL-MCNC: 0.05 NG/ML
PROT SERPL-MCNC: 8.7 G/DL (ref 6.4–8.2)
RBC # BLD AUTO: 4.18 MILLION/UL (ref 3.88–5.62)
SODIUM SERPL-SCNC: 143 MMOL/L (ref 136–145)
WBC # BLD AUTO: 5.34 THOUSAND/UL (ref 4.31–10.16)

## 2021-08-09 PROCEDURE — 99232 SBSQ HOSP IP/OBS MODERATE 35: CPT | Performed by: INTERNAL MEDICINE

## 2021-08-09 PROCEDURE — 85027 COMPLETE CBC AUTOMATED: CPT | Performed by: INTERNAL MEDICINE

## 2021-08-09 PROCEDURE — 84145 PROCALCITONIN (PCT): CPT | Performed by: NURSE PRACTITIONER

## 2021-08-09 PROCEDURE — 80053 COMPREHEN METABOLIC PANEL: CPT | Performed by: INTERNAL MEDICINE

## 2021-08-09 RX ORDER — AMOXICILLIN 250 MG
1 CAPSULE ORAL 2 TIMES DAILY
Status: DISCONTINUED | OUTPATIENT
Start: 2021-08-09 | End: 2021-08-11 | Stop reason: HOSPADM

## 2021-08-09 RX ADMIN — DOCUSATE SODIUM AND SENNOSIDES 1 TABLET: 8.6; 5 TABLET ORAL at 16:54

## 2021-08-09 RX ADMIN — ZONISAMIDE 100 MG: 100 CAPSULE ORAL at 21:04

## 2021-08-09 RX ADMIN — LAMOTRIGINE 300 MG: 100 TABLET ORAL at 21:01

## 2021-08-09 RX ADMIN — CLONAZEPAM 0.5 MG: 0.5 TABLET ORAL at 21:01

## 2021-08-09 RX ADMIN — ZINC SULFATE 220 MG (50 MG) CAPSULE 220 MG: CAPSULE at 08:15

## 2021-08-09 RX ADMIN — DOCUSATE SODIUM AND SENNOSIDES 1 TABLET: 8.6; 5 TABLET ORAL at 12:09

## 2021-08-09 RX ADMIN — HEPARIN SODIUM 5000 UNITS: 5000 INJECTION INTRAVENOUS; SUBCUTANEOUS at 21:01

## 2021-08-09 RX ADMIN — HEPARIN SODIUM 5000 UNITS: 5000 INJECTION INTRAVENOUS; SUBCUTANEOUS at 14:47

## 2021-08-09 RX ADMIN — LAMOTRIGINE 250 MG: 100 TABLET ORAL at 14:47

## 2021-08-09 RX ADMIN — PRIMIDONE 150 MG: 50 TABLET ORAL at 08:16

## 2021-08-09 RX ADMIN — GLYCERIN, HYPROMELLOSE, POLYETHYLENE GLYCOL 1 DROP: .2; .2; 1 LIQUID OPHTHALMIC at 11:12

## 2021-08-09 RX ADMIN — LEVOCARNITINE 330 MG: 330 TABLET ORAL at 08:17

## 2021-08-09 RX ADMIN — HEPARIN SODIUM 5000 UNITS: 5000 INJECTION INTRAVENOUS; SUBCUTANEOUS at 06:01

## 2021-08-09 RX ADMIN — OXYCODONE HYDROCHLORIDE AND ACETAMINOPHEN 500 MG: 500 TABLET ORAL at 08:15

## 2021-08-09 RX ADMIN — GLYCERIN, HYPROMELLOSE, POLYETHYLENE GLYCOL 1 DROP: .2; .2; 1 LIQUID OPHTHALMIC at 16:55

## 2021-08-09 RX ADMIN — PRIMIDONE 100 MG: 50 TABLET ORAL at 14:46

## 2021-08-09 RX ADMIN — Medication 400 UNITS: at 08:16

## 2021-08-09 RX ADMIN — METOPROLOL TARTRATE 25 MG: 25 TABLET, FILM COATED ORAL at 08:15

## 2021-08-09 RX ADMIN — GLYCERIN, HYPROMELLOSE, POLYETHYLENE GLYCOL 1 DROP: .2; .2; 1 LIQUID OPHTHALMIC at 08:17

## 2021-08-09 RX ADMIN — LAMOTRIGINE 200 MG: 100 TABLET ORAL at 08:15

## 2021-08-09 RX ADMIN — ASPIRIN 81 MG: 81 TABLET, COATED ORAL at 08:16

## 2021-08-09 RX ADMIN — PRIMIDONE 150 MG: 50 TABLET ORAL at 21:01

## 2021-08-09 RX ADMIN — CALCIUM 1 TABLET: 500 TABLET ORAL at 08:15

## 2021-08-09 RX ADMIN — LEVOCARNITINE 330 MG: 330 TABLET ORAL at 16:55

## 2021-08-09 RX ADMIN — GLYCERIN, HYPROMELLOSE, POLYETHYLENE GLYCOL 1 DROP: .2; .2; 1 LIQUID OPHTHALMIC at 21:02

## 2021-08-09 RX ADMIN — LEVOCARNITINE 330 MG: 330 TABLET ORAL at 11:13

## 2021-08-09 RX ADMIN — METOPROLOL TARTRATE 25 MG: 25 TABLET, FILM COATED ORAL at 16:54

## 2021-08-09 RX ADMIN — LEVOCARNITINE 330 MG: 330 TABLET ORAL at 21:04

## 2021-08-09 NOTE — ASSESSMENT & PLAN NOTE
· Mother concerned about constipation  Did have bowel movement last night formed    · Start docusate/senna

## 2021-08-09 NOTE — PROGRESS NOTES
[address]  Progress Note - Jus Garvin 1965, 49 y o  male MRN: [id number]  Unit/Bed#: -01 Encounter: [id number]  Primary Care Provider: Aidan Barth DO   Date and time admitted to hospital: 8/7/2021  6:14 PM    * Acute respiratory failure with hypoxia (HCC)  Assessment & Plan  · Acute respiratory failure/hypoxia currently requires 2 L nasal cannula to maintain saturations  · Does have moderate right pleural effusion  History of this requiring chest tube during hospitalization at [facility]  · Consultation to pulmonology ordered  · Continue bronchodilators    Constipation  Assessment & Plan  · Mother concerned about constipation  Did have bowel movement last night formed  · Start docusate/senna    Abnormal finding on CT scan  Assessment & Plan  · 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 would benefit from dedicated MRI as outpatient    Lower extremity pain, left  Assessment & Plan  · Left foot pain question of opacity  Will have podiatry evaluate    Lower extremity edema  Assessment & Plan  · Chronic lymphedema currently furosemide on hold due to kidney injury    History of COVID-19  Assessment & Plan  · History of COVID-19 January 2021  EDWIGE (acute kidney injury) ([facility] [address] )  Assessment & Plan  · EDWIGE secondary to diuretic use  Continue holding furosemide    Results from last 7 days   Lab Units 08/09/21  0627 08/08/21  0556 08/07/21  1849   BUN mg/dL 27* 23 24   CREATININE mg/dL 1 53* 1 56* 1 54*   EGFR ml/min/1 73sq m 51 50 50       Dysphagia  Assessment & Plan  · Continue mechanical soft diet    Nonintractable generalized idiopathic epilepsy without status epilepticus ([facility] [address] )  Assessment & Plan  · Seizure disorder follows with neurology as outpatient on four agents  · Continue lamotrigine clonazepam zonisamide and primidone as taken      VTE Pharmacologic Prophylaxis: VTE Score: 5 High Risk (Score >/= 5) - Pharmacological DVT Prophylaxis Ordered: Heparin   Sequential Compression Devices Ordered  Patient Centered Rounds: I have performed bedside rounds with nursing staff today  Discussions with Specialists or Other Care Team Provider:  Case management    Education and Discussions with Family / Patient:  Mother on telephone    Time Spent for Care: 25 mins  More than 50% of total time spent on counseling and coordination of care as described above  Current Length of Stay: 2 day(s)  Current Patient Status: Inpatient   Certification Statement: The patient will continue to require additional inpatient hospital stay due to pleural effusion  Discharge Plan / Estimated Discharge Date: 24-48 hours when cleared by pulmonology    Code Status: Level 1 - Full Code      Subjective:   Patient seen and examined  More awake today, no new complaints  Objective:   Vitals: Blood pressure 129/70, pulse 64, temperature 98 4 °F (36 9 °C), resp  rate 18, height 5' 6" (1 676 m), weight 101 kg (223 lb 12 3 oz), SpO2 92 %  Physical Exam  Vitals reviewed  Constitutional:       General: He is not in acute distress  HENT:      Head: Atraumatic  Cardiovascular:      Rate and Rhythm: Regular rhythm  Heart sounds: Normal heart sounds  Pulmonary:      Effort: Pulmonary effort is normal       Breath sounds: Decreased breath sounds present  No wheezing  Abdominal:      General: Bowel sounds are normal       Palpations: Abdomen is soft  Tenderness: There is no abdominal tenderness  There is no rebound  Musculoskeletal:         General: Swelling (2+ lower extremities bilaterally) present  No tenderness  Skin:     General: Skin is warm and dry  Neurological:      General: No focal deficit present  Mental Status: He is alert and oriented to person, place, and time  Cranial Nerves: No cranial nerve deficit     Psychiatric:         Mood and Affect: Mood normal        Additional Data:   Labs:  Results from last 7 days   Lab Units 08/09/21  0627 08/08/21  0556 08/07/21  1849   WBC Thousand/uL 5  34 5 21 5 54   HEMOGLOBIN g/dL 13 5 13 2 13 3   HEMATOCRIT % 42 3 41 3 41 3   MCV fL 101* 101* 100*   PLATELETS Thousands/uL 120* 116* 132*     Results from last 7 days   Lab Units 08/09/21  0627 08/08/21  0556 08/07/21  1849   SODIUM mmol/L 143 145 145   POTASSIUM mmol/L 4 2 4 0 3 9   CHLORIDE mmol/L 106 106 107   CO2 mmol/L 30 34* 30   ANION GAP mmol/L 7 5 8   BUN mg/dL 27* 23 24   CREATININE mg/dL 1 53* 1 56* 1 54*   CALCIUM mg/dL 8 6 8 6 8 9   ALBUMIN g/dL 3 3*  --  3 4*   TOTAL BILIRUBIN mg/dL 0 66  --  0 71   ALK PHOS U/L 195*  --  208*   ALT U/L 21  --  22   AST U/L 16  --  17   EGFR ml/min/1 73sq m 51 50 50   GLUCOSE RANDOM mg/dL 84 84 116         Results from last 7 days   Lab Units 08/07/21  1849   TROPONIN I ng/mL <0 02     Results from last 7 days   Lab Units 08/07/21  1849   NT-PRO BNP pg/mL 158*      Results from last 7 days   Lab Units 08/08/21  0556   PROCALCITONIN ng/ml <0 05                 * I Have Reviewed All Lab Data Listed Above  Cultures:                   Lines/Drains:  Invasive Devices     Peripheral Intravenous Line            Peripheral IV 08/07/21 Left Antecubital 1 day              Telemetry:      Imaging:  Imaging Reports Reviewed Today Include:   XR foot 3+ views LEFT    Result Date: 8/7/2021  Impression: Extensive soft tissue swelling is seen throughout the foot  3 mm calcified density overlying the dorsal metatarsals raising the possibility of foreign body  No subcutaneous emphysema  Workstation performed: [id number]     CT head without contrast    Result Date: 8/7/2021  Impression: No acute intracranial abnormality or significant change from priors  Workstation performed: [id number]     CTA ED chest PE Study    Result Date: 8/7/2021  Impression: Moderate right effusion resulting in right basilar compressive atelectatic changes  Superimposed infection must be excluded clinically  This is superimposed upon a baseline of COPD   Pericardial calcifications unchanged from prior exam  Workstation performed: [id number]     Scheduled Meds:  Current Facility-Administered Medications   Medication Dose Route Frequency Provider Last Rate    acetaminophen  650 mg Oral Q6H PRN Relda Fought, CRNP      ascorbic acid  500 mg Oral Daily Relda Fought, CRNP      aspirin  81 mg Oral Daily Relda Fought, CRNP      calcium carbonate  1 tablet Oral Daily With Breakfast Relda Fought, CRNP      clonazePAM  0 5 mg Oral HS Relda Fought, CRNP      glycerin-hypromellose-  1 drop Both Eyes 4x Daily Brandt Casillas DO      heparin (porcine)  5,000 Units Subcutaneous [facility] Brandt Casillas DO      lamoTRIgine  200 mg Oral Early Morning Relda Fought, CRNP      lamoTRIgine  250 mg Oral Daily Relda Fought, CRNP      lamoTRIgine  300 mg Oral HS Relda Fought, CRNP      levOCARNitine  330 mg Oral 4x Daily (with meals and at bedtime) Brandt Casillas DO      metoprolol tartrate  25 mg Oral BID Relda Fought, CRNP      ondansetron  4 mg Intravenous Q6H PRN Relda Fought, CRNP      primidone  100 mg Oral Daily With Lunch Relda Fought, CRNP      primidone  150 mg Oral After Breakfast Relda Fought, CRNP      primidone  150 mg Oral HS Relda Fought, CRNP      vitamin E (tocopherol)  400 Units Oral Daily Relda Fought, CRNP      zinc sulfate  220 mg Oral Daily Relda Fought, CRNP      zonisamide  100 mg Oral HS Relda Fought, CRNP         [address] [facility]  Hospitalist    ** Please Note: This note has been constructed using a voice recognition system   **

## 2021-08-09 NOTE — CONSULTS
Consultation - [address]  49 y o  male MRN: [id number]  Unit/Bed#: -01 Encounter: [id number]    Assessment/Plan     Assessment:  Chronic venous insufficiency bilateral lower extremity  Concern for foreign body left foot; radiographic presence of radiopaque density dorsal left foot subcutaneous tissue  Plan:  Recommend compression therapy bilateral lower extremities with 4 in Ace bandages at 50% stretch  No clinical evidence of active process consistent with foreign body reaction left foot    History of Present Illness   HPI:  Shavonne Ruano is a 49 y o  male who presents with bilateral lower extremity swelling and per patient's mother foreign body left foot  The mother relates that the patient had fallen prior to his admission and was subsequently complaining about pain into the left foot  She states that he does use lymphedema compression pumps once daily and that does typically manages swelling  Inpatient consult to Podiatry  Consult performed by: Beau Moore DPM  Consult ordered by: Margaret Myers DO          Review of Systems   Skin: Negative for color change and wound  Historical Information   Past Medical History:   Diagnosis Date    Hypertension     Mentally challenged     Pericardial effusion     Pneumonia     Seizure [facility])      Past Surgical History:   Procedure Laterality Date    FRACTURE SURGERY      clavicle, left humerus, radial, and ulna  Right radius    HIP ARTHROSCOPY Right     MO COLONOSCOPY FLX DX W/COLLJ SPEC WHEN PFRMD N/A 4/1/2019    Procedure: COLONOSCOPY;  Surgeon: Maria Eugenia Hunter MD;  Location: [facility];   Service: Colorectal     Social History   Social History     Substance and Sexual Activity   Alcohol Use Never     Social History     Substance and Sexual Activity   Drug Use Never     E-Cigarette/Vaping    E-Cigarette Use Never User      E-Cigarette/Vaping Substances    Nicotine No     THC No     CBD No     Flavoring No     Other No     Unknown No      Social History     Tobacco Use   Smoking Status Never Smoker   Smokeless Tobacco Never Used     Family History: non-contributory    Meds/Allergies   current meds:   Current Facility-Administered Medications   Medication Dose Route Frequency    acetaminophen (TYLENOL) tablet 650 mg  650 mg Oral Q6H PRN    ascorbic acid (VITAMIN C) tablet 500 mg  500 mg Oral Daily    aspirin (ECOTRIN LOW STRENGTH) EC tablet 81 mg  81 mg Oral Daily    calcium carbonate (OYSTER SHELL,OSCAL) 500 mg tablet 1 tablet  1 tablet Oral Daily With Breakfast    clonazePAM (KlonoPIN) tablet 0 5 mg  0 5 mg Oral HS    glycerin-hypromellose- (ARTIFICIAL TEARS) ophthalmic solution 1 drop  1 drop Both Eyes 4x Daily    heparin (porcine) subcutaneous injection 5,000 Units  5,000 Units Subcutaneous Q8H [address]    lamoTRIgine (LaMICtal) tablet 200 mg  200 mg Oral Early Morning    lamoTRIgine (LaMICtal) tablet 250 mg  250 mg Oral Daily    lamoTRIgine (LaMICtal) tablet 300 mg  300 mg Oral HS    levOCARNitine (CARNITOR) tablet 330 mg  330 mg Oral 4x Daily (with meals and at bedtime)    metoprolol tartrate (LOPRESSOR) tablet 25 mg  25 mg Oral BID    ondansetron (ZOFRAN) injection 4 mg  4 mg Intravenous Q6H PRN    primidone (MYSOLINE) tablet 100 mg  100 mg Oral Daily With Lunch    primidone (MYSOLINE) tablet 150 mg  150 mg Oral After Breakfast    primidone (MYSOLINE) tablet 150 mg  150 mg Oral HS    senna-docusate sodium (SENOKOT S) 8 6-50 mg per tablet 1 tablet  1 tablet Oral BID    vitamin E (tocopherol) capsule 400 Units  400 Units Oral Daily    zinc sulfate (ZINCATE) capsule 220 mg  220 mg Oral Daily    zonisamide (ZONEGRAN) capsule 100 mg  100 mg Oral HS     Allergies   Allergen Reactions    Dilantin [Phenytoin]      Pericardial effusion    Phenobarbital Hyperactivity       Objective   Vitals: Blood pressure 123/69, pulse 67, temperature 97 9 °F (36 6 °C), resp   rate 18, height 5' 6" (1 676 m), weight 101 kg (223 lb 12 3 oz), SpO2 92 %  Wounds:     Wound 08/07/21 Other (comment) Pretibial Distal;Left (Active)   Wound Image   08/09/21 1034   Wound Description Brown;Edema;Fragile; Non-blanchable erythema;Swelling 08/09/21 1034   Jy[place]-wound Assessment Dry;Brown;Callus;Edema; Erythema; Excoriated;Fragile; Hyperpigmented;Pink;Scaly 08/09/21 1034   Wound Length (cm) 1 cm 08/09/21 1034   Wound Width (cm) 0 5 cm 08/09/21 1034   Wound Surface Area (cm^2) 0 5 cm^2 08/09/21 1034   Drainage Amount Scant 08/09/21 1034   Drainage Description Brown 08/09/21 1034   Treatments Cleansed;Site care;Elevated 08/09/21 1034   Dressing ABD; Non adherent 08/09/21 1034   Patient Tolerance Tolerated well 08/09/21 1034   Dressing Status Clean;Dry; Intact 08/09/21 1034         Physical Exam  Constitutional:       General: He is awake  Appearance: He is obese  Cardiovascular:      Pulses:           Dorsalis pedis pulses are 0 on the right side and 0 on the left side  Posterior tibial pulses are 0 on the right side and 0 on the left side  Musculoskeletal:         General: No tenderness  Right lower leg: Edema present  Left lower leg: Edema present  Feet:      Comments: Plus four pitting edema bilaterally extremities  No open lesions present at this time  No erythema no blanchable area  No pinpoint discoloration consistent with entry of foreign body  Skin:     Capillary Refill: Capillary refill takes 2 to 3 seconds  Neurological:      Mental Status: He is alert  Psychiatric:         Behavior: Behavior is cooperative  Lab Results: I have personally reviewed pertinent films in PACS  Imaging: I have personally reviewed pertinent films in PACS radiodense circular foreign body with the subcutaneous tissues left dorsal foot    No cortical disruption appreciated through the metatarsals  EKG, Pathology, and Other Studies: I have personally reviewed pertinent films in PACS    Code Status: Level 1 - Full Code  Advance Directive and Living Will:      Power of :    POLST:      Counseling / Coordination of Care  Total floor / unit time spent today 25 minutes  Greater than 50% of total time was spent with the patient and / or family counseling and / or coordination of care  A description of the counseling / coordination of care:  Discussed plan of care with patient, patient's mother and hospitalist service

## 2021-08-09 NOTE — WOUND OSTOMY CARE
Consult Note - Wound   Sonal Lush 49 y o  male MRN: [id number]  Unit/Bed#: -Tad Encounter: [id number]        History and Present Illness:57 yo admitted with hypoxia  H/O EDWIGE developmentally delapyed, abnormal CT scan  Pt seated out of bed in recliner with lower extremities elevated  Alert and oriented  Pt is continent of bowel and bladder  Assessment Findings:   1)Bilateral heels intact  2) Left anterior tibia red, edema and erythema present  Tibia has brown scaly flakey skin with scant crusty brown drainage  Area cleansed and adaptic guaze applied and covered with ABD and wrapped with beatriz  Right tibia red, edematous and intact  Pt has + 3 pitting pedal and ankle edeam       Skin care plans:  1-Hydraguard to bilateral sacrum, buttock and heels BID and PRN  2-Elevate heels to offload pressure  3-Ehob cushion in chair when out of bed  4-Moisturize skin daily with skin nourishing cream   5-Turn/reposition q2h or when medically stable for pressure re-distribution on skin  6-Wash lower extremities with soap and water pat dry  Apply Adaptic guaze to left anterior tibia  Cover with Abd and beatriz  Change every other day and prn  Wounds:  Wound 08/07/21 Other (comment) Pretibial Distal;Left (Active)   Wound Image   08/09/21 1034   Wound Description Brown;Edema;Fragile; Non-blanchable erythema;Swelling 08/09/21 1034   Jyotsna-wound Assessment Dry;Brown;Callus;Edema; Erythema; Excoriated;Fragile; Hyperpigmented;Pink;Scaly 08/09/21 1034   Wound Length (cm) 1 cm 08/09/21 1034   Wound Width (cm) 0 5 cm 08/09/21 1034   Wound Surface Area (cm^2) 0 5 cm^2 08/09/21 1034   Drainage Amount Scant 08/09/21 1034   Drainage Description Brown 08/09/21 1034   Treatments Cleansed;Site care;Elevated 08/09/21 1034   Dressing ABD; Non adherent 08/09/21 1034   Patient Tolerance Tolerated well 08/09/21 1034   Dressing Status Clean;Dry; Intact 08/09/21 1034     Call or tigertext with any questions  Wound Care will continue to follow    Ryan Brittaney RN

## 2021-08-09 NOTE — PLAN OF CARE
Problem: Potential for Falls  Goal: Patient will remain free of falls  Description: INTERVENTIONS:  - Educate patient/family on patient safety including physical limitations  - Instruct patient to call for assistance with activity   - Consult OT/PT to assist with strengthening/mobility   - Keep Call bell within reach  - Keep bed low and locked with side rails adjusted as appropriate  - Keep care items and personal belongings within reach  - Initiate and maintain comfort rounds  - Make Fall Risk Sign visible to staff  - Offer Toileting every   Hours, in advance of need  - Initiate/Maintain   alarm  - Obtain necessary fall risk management equipment:   - Apply yellow socks and bracelet for high fall risk patients  - Consider moving patient to room near nurses station  Outcome: Progressing     Problem: MOBILITY - ADULT  Goal: Maintain or return to baseline ADL function  Description: INTERVENTIONS:  -  Assess patient's ability to carry out ADLs; assess patient's baseline for ADL function and identify physical deficits which impact ability to perform ADLs (bathing, care of mouth/teeth, toileting, grooming, dressing, etc )  - Assess/evaluate cause of self-care deficits   - Assess range of motion  - Assess patient's mobility; develop plan if impaired  - Assess patient's need for assistive devices and provide as appropriate  - Encourage maximum independence but intervene and supervise when necessary  - Involve family in performance of ADLs  - Assess for home care needs following discharge   - Consider OT consult to assist with ADL evaluation and planning for discharge  - Provide patient education as appropriate  Outcome: Progressing  Goal: Maintains/Returns to pre admission functional level  Description: INTERVENTIONS:  - Perform BMAT or MOVE assessment daily    - Set and communicate daily mobility goal to care team and patient/family/caregiver     - Collaborate with rehabilitation services on mobility goals if consulted  - Perform Range of Motion   times a day  - Reposition patient every   hours  - Dangle patient   times a day  - Stand patient   times a day  - Ambulate patient   times a day  - Out of bed to chair   times a day   - Out of bed for meals   times a day  - Out of bed for toileting  - Record patient progress and toleration of activity level   Outcome: Progressing     Problem: NEUROSENSORY - ADULT  Goal: Achieves stable or improved neurological status  Description: INTERVENTIONS  - Monitor and report changes in neurological status  - Monitor vital signs such as temperature, blood pressure, glucose, and any other labs ordered   - Initiate measures to prevent increased intracranial pressure  - Monitor for seizure activity and implement precautions if appropriate      Outcome: Progressing  Goal: Remains free of injury related to seizures activity  Description: INTERVENTIONS  - Maintain airway, patient safety  and administer oxygen as ordered  - Monitor patient for seizure activity, document and report duration and description of seizure to physician/advanced practitioner  - If seizure occurs,  ensure patient safety during seizure  - Reorient patient post seizure  - Seizure pads on all 4 side rails  - Instruct patient/family to notify RN of any seizure activity including if an aura is experienced  - Instruct patient/family to call for assistance with activity based on nursing assessment  - Administer anti-seizure medications if ordered    Outcome: Progressing  Goal: Achieves maximal functionality and self care  Description: INTERVENTIONS  - Monitor swallowing and airway patency with patient fatigue and changes in neurological status  - Encourage and assist patient to increase activity and self care     - Encourage visually impaired, hearing impaired and aphasic patients to use assistive/communication devices  Outcome: Progressing     Problem: RESPIRATORY - ADULT  Goal: Achieves optimal ventilation and oxygenation  Description: INTERVENTIONS:  - Assess for changes in respiratory status  - Assess for changes in mentation and behavior  - Position to facilitate oxygenation and minimize respiratory effort  - Oxygen administered by appropriate delivery if ordered  - Initiate smoking cessation education as indicated  - Encourage broncho-pulmonary hygiene including cough, deep breathe, Incentive Spirometry  - Assess the need for suctioning and aspirate as needed  - Assess and instruct to report SOB or any respiratory difficulty  - Respiratory Therapy support as indicated  Outcome: Progressing     Problem: METABOLIC, FLUID AND ELECTROLYTES - ADULT  Goal: Electrolytes maintained within normal limits  Description: INTERVENTIONS:  - Monitor labs and assess patient for signs and symptoms of electrolyte imbalances  - Administer electrolyte replacement as ordered  - Monitor response to electrolyte replacements, including repeat lab results as appropriate  - Instruct patient on fluid and nutrition as appropriate  Outcome: Progressing  Goal: Fluid balance maintained  Description: INTERVENTIONS:  - Monitor labs   - Monitor I/O and WT  - Instruct patient on fluid and nutrition as appropriate  - Assess for signs & symptoms of volume excess or deficit  Outcome: Progressing     Problem: SKIN/TISSUE INTEGRITY - ADULT  Goal: Skin Integrity remains intact(Skin Breakdown Prevention)  Description: Assess:  -Perform Gilberto assessment every    -Clean and moisturize skin every    -Inspect skin when repositioning, toileting, and assisting with ADLS  -Assess under medical devices such as   every    -Assess extremities for adequate circulation and sensation     Bed Management:  -Have minimal linens on bed & keep smooth, unwrinkled  -Change linens as needed when moist or perspiring  -Avoid sitting or lying in one position for more than   hours while in bed  -Keep HOB at  degrees     Toileting:  -Offer bedside commode  -Assess for incontinence every     -Use incontinent care products after each incontinent episode such as   Activity:  -Mobilize patient   times a day  -Encourage activity and walks on unit  -Encourage or provide ROM exercises   -Turn and reposition patient every   Hours  -Use appropriate equipment to lift or move patient in bed  -Instruct/ Assist with weight shifting every   when out of bed in chair  -Consider limitation of chair time   hour intervals    Skin Care:  -Avoid use of baby powder, tape, friction and shearing, hot water or constrictive clothing  -Relieve pressure over bony prominences using    -Do not massage red bony areas    Next Steps:  -Teach patient strategies to minimize risks such as     -Consider consults to  interdisciplinary teams such as     Outcome: Progressing     Problem: MUSCULOSKELETAL - ADULT  Goal: Maintain or return mobility to safest level of function  Description: INTERVENTIONS:  - Assess patient's ability to carry out ADLs; assess patient's baseline for ADL function and identify physical deficits which impact ability to perform ADLs (bathing, care of mouth/teeth, toileting, grooming, dressing, etc )  - Assess/evaluate cause of self-care deficits   - Assess range of motion  - Assess patient's mobility  - Assess patient's need for assistive devices and provide as appropriate  - Encourage maximum independence but intervene and supervise when necessary  - Involve family in performance of ADLs  - Assess for home care needs following discharge   - Consider OT consult to assist with ADL evaluation and planning for discharge  - Provide patient education as appropriate  Outcome: Progressing     Problem: PAIN - ADULT  Goal: Verbalizes/displays adequate comfort level or baseline comfort level  Description: Interventions:  - Encourage patient to monitor pain and request assistance  - Assess pain using appropriate pain scale  - Administer analgesics based on type and severity of pain and evaluate response  - Implement non-pharmacological measures as appropriate and evaluate response  - Consider cultural and social influences on pain and pain management  - Notify physician/advanced practitioner if interventions unsuccessful or patient reports new pain  Outcome: Progressing     Problem: INFECTION - ADULT  Goal: Absence or prevention of progression during hospitalization  Description: INTERVENTIONS:  - Assess and monitor for signs and symptoms of infection  - Monitor lab/diagnostic results  - Monitor all insertion sites, i e  indwelling lines, tubes, and drains  - Monitor endotracheal if appropriate and nasal secretions for changes in amount and color  - [place] appropriate cooling/warming therapies per order  - Administer medications as ordered  - Instruct and encourage patient and family to use good hand hygiene technique  - Identify and instruct in appropriate isolation precautions for identified infection/condition  Outcome: Progressing  Goal: Absence of fever/infection during neutropenic period  Description: INTERVENTIONS:  - Monitor WBC    Outcome: Progressing     Problem: SAFETY ADULT  Goal: Patient will remain free of falls  Description: INTERVENTIONS:  - Educate patient/family on patient safety including physical limitations  - Instruct patient to call for assistance with activity   - Consult OT/PT to assist with strengthening/mobility   - Keep Call bell within reach  - Keep bed low and locked with side rails adjusted as appropriate  - Keep care items and personal belongings within reach  - Initiate and maintain comfort rounds  - Make Fall Risk Sign visible to staff  - Offer Toileting every   Hours, in advance of need  - Initiate/Maintain   alarm  - Obtain necessary fall risk management equipment:     - Apply yellow socks and bracelet for high fall risk patients  - Consider moving patient to room near nurses station  Outcome: Progressing  Goal: Maintain or return to baseline ADL function  Description: INTERVENTIONS:  -  Assess patient's ability to carry out ADLs; assess patient's baseline for ADL function and identify physical deficits which impact ability to perform ADLs (bathing, care of mouth/teeth, toileting, grooming, dressing, etc )  - Assess/evaluate cause of self-care deficits   - Assess range of motion  - Assess patient's mobility; develop plan if impaired  - Assess patient's need for assistive devices and provide as appropriate  - Encourage maximum independence but intervene and supervise when necessary  - Involve family in performance of ADLs  - Assess for home care needs following discharge   - Consider OT consult to assist with ADL evaluation and planning for discharge  - Provide patient education as appropriate  Outcome: Progressing  Goal: Maintains/Returns to pre admission functional level  Description: INTERVENTIONS:  - Perform BMAT or MOVE assessment daily    - Set and communicate daily mobility goal to care team and patient/family/caregiver  - Collaborate with rehabilitation services on mobility goals if consulted  - Perform Range of Motion   times a day  - Reposition patient every   hours  - Dangle patient   times a day  - Stand patient   times a day  - Ambulate patient   times a day  - Out of bed to chair   times a day   - Out of bed for meals     times a day  - Out of bed for toileting  - Record patient progress and toleration of activity level   Outcome: Progressing     Problem: DISCHARGE PLANNING  Goal: Discharge to home or other facility with appropriate resources  Description: INTERVENTIONS:  - Identify barriers to discharge w/patient and caregiver  - Arrange for needed discharge resources and transportation as appropriate  - Identify discharge learning needs (meds, wound care, etc )  - Arrange for interpretive services to assist at discharge as needed  - Refer to Case Management Department for coordinating discharge planning if the patient needs post-hospital services based on physician/advanced practitioner order or

## 2021-08-09 NOTE — DISCHARGE INSTR - OTHER ORDERS
Skin care plans:  1-Hydraguard to bilateral sacrum, buttock and heels BID and PRN  2-Elevate heels to offload pressure  3-Ehob cushion in chair when out of bed  4-Moisturize skin daily with skin nourishing cream   5-Turn/reposition q2h or when medically stable for pressure re-distribution on skin  6-Wash lower extremities with soap and water pat dry  Apply Adaptic guaze to left anterior tibia  Cover with Abd and beatriz  Change every other day and prn

## 2021-08-09 NOTE — CASE MANAGEMENT
Case Management Assessment & Discharge Planning Note    Patient name Lacey Mascorro  Location /-80 MRN [id number]  : 1965 Date 2021       Current Admission Date: 2021  Current Admission Diagnosis:  Acute respiratory failure with hypoxia [facility])   Patient Active Problem List   Diagnosis    Polyp of colon    Pneumonia due to infectious organism    [facility]ocytopenia ([facility] )    Nonintractable generalized idiopathic epilepsy without status epilepticus ([facility] )    Contusion, flank, subsequent encounter    T wave inversion in EKG    Essential hypertension    RSV (acute bronchiolitis due to respiratory syncytial virus)    Acute respiratory failure with hypoxia ([facility] )    Developmental delay    Dysphagia    Acute encephalopathy    MRSA nasal colonization    EDWIGE (acute kidney injury) ([place] [address] )    Pleural effusion    History of COVID-19    S/P pericardial window creation    Lower extremity edema    Lower extremity pain, left    Abnormal finding on CT scan    Constipation    Previous Admission - Discharge Date:20   LOS (days): 2  Geometric Mean LOS (GMLOS) (days): 4 30  Days to GMLOS:2 5 Previous Discharge Diagnosis:  There are no discharge diagnoses documented for the most recent discharge         Risk of Unplanned Readmission Score  Predictive Model Details          12 (Low)  Factor Value    Calculated 2021 16:03 37% Number of active Rx orders 37    Risk of Unplanned Readmission Model 13% ECG/EKG order present in last 6 months     11% Latest BUN high (27 mg/dL)     10% Imaging order present in last 6 months     8% Number of ED visits in last six months 1     6% Active anticoagulant Rx order present     6% Age 49     6% Latest creatinine high (1 53 mg/dL)     3% Current length of stay 1 801 days         BUNDLE:      Reason for Referral:    OBJECTIVE:  Pt is a 49y o  year old Single, white or  [1], male with Restoration[employer] preference of Unknown admitted on 2021  6:14 PM  Pt is admitted to [facility] [address] at [address] with complaints of Acute respiratory failure with hypoxia ([address] )   Current admission status: Inpatient       Preferred Pharmacy:   [address] [address]  Phone: [phone] Fax: [phone]    Primary Care Provider: Jose Manuel Lott DO    Primary Insurance: MEDICARE  Secondary Insurance: [place] [employer] [place]    ASSESSMENT:  [address] Representative - Mother   Primary Phone: [phone] (Home)               Advance Directives  Does patient have a [address]?: No  Was patient offered paperwork?: Yes  Does patient currently have a Health Care decision maker?: Yes, please see Health Care Proxy section (pts mother, Girish Nunez)  Does patient have Advance Directives?: No  Was patient offered paperwork?: Yes                   Patient Information  Mental Status: Alert  During Assessment patient was accompanied by: Parent  Assessment information provided by[de-identified] Parent  Primary Caregiver: Family  Caregiver's Relationship to Patient[de-identified] Family Member  Support Systems: Parent  Home entry access options   Select all that apply : Stairs  Number of steps to enter home : 3  Type of Current Residence: [address] [place] home  Upon entering residence, is there a bedroom on the main floor (no further steps)?: No  A bedroom is located on the following floor levels of residence (select all that apply):: 2nd [address] [facility] which floors of current residence have a bathroom (select all the apply):: 2nd Floor  Number of steps to 2nd floor from main floor: One Flight  Living Arrangements: Lives w/ Parent(s)  Is patient a ?: No    Activities of Daily Living Prior to Admission  Functional Status: Assistance  Completes ADLs independently?: No  Level of ADL dependence: Assistance  Ambulates independently?: Yes  Does patient use assisted devices?: Yes  Assisted Devices (DME) used: Grace Dhaliwal  Does patient currently own DME?: Yes  What DME does the patient currently own?: Grace Dhaliwal  Does patient have a history of Outpatient Therapy (PT/OT)?: Yes (history of [facility] OP PT in [address])  Does the patient have a history of Short-Term Rehab?: Yes ([place])  Does patient currently have [facility]?: No         Patient Information Continued  Income Source: SSI/SSD  Does patient have prescription coverage?: Yes  Does patient receive dialysis treatments?: No  Does patient have a history of substance abuse?: No  Does patient have a history of Mental Health Diagnosis?: No         Means of Transportation  Means of Transport to Appts[de-identified] Family transport  In the past 12 months, has lack of transportation kept you from medical appointments or from getting medications?: No  In the past 12 months, has lack of transportation kept you from meetings, work, or from getting things needed for daily living?: No  Was application for public transport provided?: No    DISCHARGE DETAILS:    Discharge planning discussed with[de-identified] pt and pts mother  Freedom of Choice: Yes    Contacts  Patient Contacts: Keturah Chapa, mother  Realtionship to Patient[de-identified] Family  Contact Method: In Person  Reason/Outcome: Discharge Planning                   We would like to be able to fill any required prescriptions on discharge at our [address] [place] [address] and have them delivered to you at discharge in your room  Would you like to participate in this program? : No - Declined      PT lives with his mother  Pt recently requires assistance with ADL's  And getting in and out of the shower/tub  At baseline pt is independent with most tasks  Pt is currently utilizing a walker since a "fall" in February  Pt was at [facility] rehab/[facility] after hospitalization for Covid in February  As per pts mother, pt did not have a positive experience there and she does not wish for his to "ever" return there

## 2021-08-10 LAB
ALBUMIN SERPL BCP-MCNC: 3 G/DL (ref 3.5–5)
ALP SERPL-CCNC: 186 U/L (ref 46–116)
ALT SERPL W P-5'-P-CCNC: 19 U/L (ref 12–78)
ANION GAP SERPL CALCULATED.3IONS-SCNC: 4 MMOL/L (ref 4–13)
AST SERPL W P-5'-P-CCNC: 16 U/L (ref 5–45)
BILIRUB SERPL-MCNC: 0.46 MG/DL (ref 0.2–1)
BUN SERPL-MCNC: 28 MG/DL (ref 5–25)
CALCIUM ALBUM COR SERPL-MCNC: 9 MG/DL (ref 8.3–10.1)
CALCIUM SERPL-MCNC: 8.2 MG/DL (ref 8.3–10.1)
CHLORIDE SERPL-SCNC: 106 MMOL/L (ref 100–108)
CO2 SERPL-SCNC: 33 MMOL/L (ref 21–32)
CREAT SERPL-MCNC: 1.32 MG/DL (ref 0.6–1.3)
ERYTHROCYTE [DISTWIDTH] IN BLOOD BY AUTOMATED COUNT: 14.5 % (ref 11.6–15.1)
GFR SERPL CREATININE-BSD FRML MDRD: 61 ML/MIN/1.73SQ M
GLUCOSE SERPL-MCNC: 103 MG/DL (ref 65–140)
HCT VFR BLD AUTO: 38.8 % (ref 36.5–49.3)
HGB BLD-MCNC: 12.5 G/DL (ref 12–17)
MCH RBC QN AUTO: 32.5 PG (ref 26.8–34.3)
MCHC RBC AUTO-ENTMCNC: 32.2 G/DL (ref 31.4–37.4)
MCV RBC AUTO: 101 FL (ref 82–98)
PLATELET # BLD AUTO: 116 THOUSANDS/UL (ref 149–390)
PMV BLD AUTO: 11.6 FL (ref 8.9–12.7)
POTASSIUM SERPL-SCNC: 4.3 MMOL/L (ref 3.5–5.3)
PROT SERPL-MCNC: 8.1 G/DL (ref 6.4–8.2)
RBC # BLD AUTO: 3.85 MILLION/UL (ref 3.88–5.62)
SODIUM SERPL-SCNC: 143 MMOL/L (ref 136–145)
WBC # BLD AUTO: 5.08 THOUSAND/UL (ref 4.31–10.16)

## 2021-08-10 PROCEDURE — 99222 1ST HOSP IP/OBS MODERATE 55: CPT | Performed by: INTERNAL MEDICINE

## 2021-08-10 PROCEDURE — 99232 SBSQ HOSP IP/OBS MODERATE 35: CPT | Performed by: FAMILY MEDICINE

## 2021-08-10 PROCEDURE — 85027 COMPLETE CBC AUTOMATED: CPT | Performed by: INTERNAL MEDICINE

## 2021-08-10 PROCEDURE — 80053 COMPREHEN METABOLIC PANEL: CPT | Performed by: INTERNAL MEDICINE

## 2021-08-10 PROCEDURE — 94761 N-INVAS EAR/PLS OXIMETRY MLT: CPT

## 2021-08-10 RX ORDER — FUROSEMIDE 10 MG/ML
40 INJECTION INTRAMUSCULAR; INTRAVENOUS ONCE
Status: COMPLETED | OUTPATIENT
Start: 2021-08-10 | End: 2021-08-10

## 2021-08-10 RX ADMIN — GLYCERIN, HYPROMELLOSE, POLYETHYLENE GLYCOL 1 DROP: .2; .2; 1 LIQUID OPHTHALMIC at 17:20

## 2021-08-10 RX ADMIN — LAMOTRIGINE 200 MG: 100 TABLET ORAL at 07:18

## 2021-08-10 RX ADMIN — CLONAZEPAM 0.5 MG: 0.5 TABLET ORAL at 22:00

## 2021-08-10 RX ADMIN — GLYCERIN, HYPROMELLOSE, POLYETHYLENE GLYCOL 1 DROP: .2; .2; 1 LIQUID OPHTHALMIC at 12:10

## 2021-08-10 RX ADMIN — DOCUSATE SODIUM AND SENNOSIDES 1 TABLET: 8.6; 5 TABLET ORAL at 08:32

## 2021-08-10 RX ADMIN — LEVOCARNITINE 330 MG: 330 TABLET ORAL at 12:10

## 2021-08-10 RX ADMIN — CALCIUM 1 TABLET: 500 TABLET ORAL at 07:18

## 2021-08-10 RX ADMIN — Medication 400 UNITS: at 08:32

## 2021-08-10 RX ADMIN — METOPROLOL TARTRATE 25 MG: 25 TABLET, FILM COATED ORAL at 08:32

## 2021-08-10 RX ADMIN — LAMOTRIGINE 250 MG: 100 TABLET ORAL at 14:22

## 2021-08-10 RX ADMIN — PRIMIDONE 150 MG: 50 TABLET ORAL at 22:00

## 2021-08-10 RX ADMIN — HEPARIN SODIUM 5000 UNITS: 5000 INJECTION INTRAVENOUS; SUBCUTANEOUS at 22:01

## 2021-08-10 RX ADMIN — ZONISAMIDE 100 MG: 100 CAPSULE ORAL at 22:01

## 2021-08-10 RX ADMIN — LEVOCARNITINE 330 MG: 330 TABLET ORAL at 22:01

## 2021-08-10 RX ADMIN — ZINC SULFATE 220 MG (50 MG) CAPSULE 220 MG: CAPSULE at 08:32

## 2021-08-10 RX ADMIN — DOCUSATE SODIUM AND SENNOSIDES 1 TABLET: 8.6; 5 TABLET ORAL at 17:20

## 2021-08-10 RX ADMIN — GLYCERIN, HYPROMELLOSE, POLYETHYLENE GLYCOL 1 DROP: .2; .2; 1 LIQUID OPHTHALMIC at 08:32

## 2021-08-10 RX ADMIN — GLYCERIN, HYPROMELLOSE, POLYETHYLENE GLYCOL 1 DROP: .2; .2; 1 LIQUID OPHTHALMIC at 22:00

## 2021-08-10 RX ADMIN — FUROSEMIDE 40 MG: 10 INJECTION, SOLUTION INTRAMUSCULAR; INTRAVENOUS at 13:14

## 2021-08-10 RX ADMIN — METOPROLOL TARTRATE 25 MG: 25 TABLET, FILM COATED ORAL at 17:20

## 2021-08-10 RX ADMIN — LAMOTRIGINE 300 MG: 100 TABLET ORAL at 22:00

## 2021-08-10 RX ADMIN — HEPARIN SODIUM 5000 UNITS: 5000 INJECTION INTRAVENOUS; SUBCUTANEOUS at 14:22

## 2021-08-10 RX ADMIN — PRIMIDONE 150 MG: 50 TABLET ORAL at 07:19

## 2021-08-10 RX ADMIN — PRIMIDONE 100 MG: 50 TABLET ORAL at 15:50

## 2021-08-10 RX ADMIN — OXYCODONE HYDROCHLORIDE AND ACETAMINOPHEN 500 MG: 500 TABLET ORAL at 08:32

## 2021-08-10 RX ADMIN — LEVOCARNITINE 330 MG: 330 TABLET ORAL at 15:50

## 2021-08-10 RX ADMIN — HEPARIN SODIUM 5000 UNITS: 5000 INJECTION INTRAVENOUS; SUBCUTANEOUS at 05:14

## 2021-08-10 RX ADMIN — LEVOCARNITINE 330 MG: 330 TABLET ORAL at 07:19

## 2021-08-10 RX ADMIN — ASPIRIN 81 MG: 81 TABLET, COATED ORAL at 08:32

## 2021-08-10 NOTE — PLAN OF CARE
Problem: Potential for Falls  Goal: Patient will remain free of falls  Description: INTERVENTIONS:  - Educate patient/family on patient safety including physical limitations  - Instruct patient to call for assistance with activity   - Consult OT/PT to assist with strengthening/mobility   - Keep Call bell within reach  - Keep bed low and locked with side rails adjusted as appropriate  - Keep care items and personal belongings within reach  - Initiate and maintain comfort rounds  - Make Fall Risk Sign visible to staff  - Offer Toileting every 2Hours, in advance of need  - Initiate/Maintain bedalarm  - Obtain necessary fall risk management equipment: bed alarm  - Apply yellow socks and bracelet for high fall risk patients  - Consider moving patient to room near nurses station  Outcome: Progressing     Problem: RESPIRATORY - ADULT  Goal: Achieves optimal ventilation and oxygenation  Description: INTERVENTIONS:  - Assess for changes in respiratory status  - Assess for changes in mentation and behavior  - Position to facilitate oxygenation and minimize respiratory effort  - Oxygen administered by appropriate delivery if ordered  - Initiate smoking cessation education as indicated  - Encourage broncho-pulmonary hygiene including cough, deep breathe, Incentive Spirometry  - Assess the need for suctioning and aspirate as needed  - Assess and instruct to report SOB or any respiratory difficulty  - Respiratory Therapy support as indicated  Outcome: Progressing

## 2021-08-10 NOTE — ASSESSMENT & PLAN NOTE
· Seizure disorder follows with neurology as outpatient on four agents  · Continue lamotrigine clonazepam zonisamide and primidone as taken  · Stable with no recent seizures reported

## 2021-08-10 NOTE — CONSULTS
Pulmonary Medicine-Consultation  Nohemy Solo 49 y o  male MRN: [id number]  Unit/Bed#: -01 Encounter: [id number]      Assessment/Plan:  1  Acute hypoxemic respiratory failure  · Suspect acute on chronic process, ? Diastolic heart failure (had moderate concentric hypertrophy on last TTE)/contribution from bibasilar atelectasis S  · Likely has a chronic hypercapnia given the elevated bicarbonate/kyphosis likely causing chronic limited chest expansion   · Now improving, on room air today with normal SpO2 above 90%  · Encourage to use ISB, improve mobility/physical therapy  · Diuretics as tolerated  · Check TTE  · Check a m  ABG check for hypercarbia to see if he qualifys for a BiPAP based on diagnosis of kyphosis/chest wall disease  · If no hypercarbia on a m  ABG, consider nocturnal oximetry, suspect to have CHASE/nocturnal hypoxemia at baseline  2  Right pleural effusion   · Reviewed bedside lung ultrasound images performed by ICU team  · Has a very small pleural effusion, noted lung sliding/13 sign with some atelectatic changes/B lines  · Continue diuresis as tolerated  · ISB to improve atelectatic changes at the bases  3  History of constrictive pericarditis/pericardial effusion-no significant pericardial effusion noted on CT chest   4  History of COVID-19 pneumonia 01/2021-appears to be results, no significant residual infiltrates on CT chest        History of Present Illness   Physician Requesting Consult: Jose L Baker DO    Reason for Consult / Principal Problem:  Right pleural effusion    HPI:   49 y o  male with a h/o seizure disorder, HTN, COVID-19 in 01/2021, who was hospitalized 8/7 for hypoxemic respiratory failure with SpO2 86 on room air  She was brought to the ED after having increased seizure episodes and increasing difficulty ambulating  No fever or septic picture  He was found to be in [place], continued on antiseizure medications, held diuretics    Chest imaging showed right basilar atelectasis /moderate pleural effusion  It was reported that he had pericardial effusion 20 years ago, underwent pericardial window, per outside report this was for constrictive pericarditis  On my evaluation, patient is sitting in chair  Currently on room air, no symptoms or signs of respiratory distress  Denies shortness of breath at rest or cough  Inpatient consult to Pulmonology  Consult performed by: Yojana Oviedo MD  Consult ordered by: NADEGE Cruz          Review of Systems  No fever, chills, cough, sputum production, headache, dizziness or dyspnea at rest   All other systems reviewed and were negative    Studies:    Lab Results: I have personally reviewed pertinent lab results  Imaging Studies: I have personally reviewed pertinent films in PACS     EKG, Pathology, and Other Studies: I have personally reviewed pertinent films in PACS  TTE 01/10/2020-EF 70%, LV hypertrophy/concentric grade 1 diastolic dysfunction, mildly dilated LA  Normal RV size and function       Pulmonary Results (PFTs, PSG): None in file S    Historical Information   Past Medical History:   Diagnosis Date    Hypertension     Mentally challenged     Pericardial effusion     Pneumonia     Seizure [facility])      Past Surgical History:   Procedure Laterality Date    FRACTURE SURGERY      clavicle, left humerus, radial, and ulna  Right radius    HIP ARTHROSCOPY Right     NC COLONOSCOPY FLX DX W/COLLJ SPEC WHEN PFRMD N/A 4/1/2019    Procedure: COLONOSCOPY;  Surgeon: Alicia Martinez MD;  Location: [facility];   Service: Colorectal     Social History   Social History     Substance and Sexual Activity   Alcohol Use Never     Social History     Substance and Sexual Activity   Drug Use Never     Social History     Tobacco Use   Smoking Status Never Smoker   Smokeless Tobacco Never Used     E-Cigarette/Vaping    E-Cigarette Use Never User      E-Cigarette/Vaping Substances    Nicotine No     THC No     CBD No     Flavoring No     Other No     Unknown No          Family History: non-contributory    Meds/Allergies   all current active meds have been reviewed    Allergies   Allergen Reactions    Dilantin [Phenytoin]      Pericardial effusion    Phenobarbital Hyperactivity       Objective   Vitals: Blood pressure 129/71, pulse 65, temperature 97 8 °F (36 6 °C), resp  rate 16, height 5' 6" (1 676 m), weight 104 kg (229 lb 0 9 oz), SpO2 97 %  ,Body mass index is 36 97 kg/m²  Intake/Output Summary (Last 24 hours) at 8/10/2021 0744  Last data filed at 8/10/2021 0703  Gross per 24 hour   Intake 1260 ml   Output 1350 ml   Net -90 ml     Invasive Devices     Peripheral Intravenous Line            Peripheral IV 08/07/21 Left Antecubital 2 days                Physical Exam      Body mass index is 36 97 kg/m²  Gen: not in acute distress,   Neck/Eyes: supple, no adenopathy, PERRLA, kyphosis  Ear: normal appearance, no significant hearing impairment  Nose:  normal nasal mucosa, no drainage  Mouth:  unremarkable/normal appearance of lips, teeth and gums  Oropharynx: mucosa is moist, no focal lesions or erythema  Salivary glands: soft nontender  Chest: normal respiratory efforts, diminished breath sounds at the right base, clear breath sounds bilaterally  CV: RRR, no murmurs appreciated, no edema  Abdomen: soft, non tender, normal bowel sounds  Extremities:  No observed deformity   Skin: unremarkable  Neuro: AAO X3, no focal motor deficit              None    Portions of the record may have been created with voice recognition software  Occasional wrong word or "sound a like" substitutions may have occurred due to the inherent limitations of voice recognition software  Read the chart carefully and recognize, using context, where substitutions have occurred

## 2021-08-10 NOTE — PROGRESS NOTES
[address]  Progress Note - Luis Chaudhari 1965, 49 y o  male MRN: [id number]  Unit/Bed#: -Tad Encounter: [id number]  Primary Care Provider: Luis Guo DO   Date and time admitted to hospital: 8/7/2021  6:14 PM    * Acute respiratory failure with hypoxia ([facility] )  Assessment & Plan  · Acute respiratory failure/hypoxia currently requires 2 L nasal cannula to maintain saturations  Does not use any oxygen at home  · Does have moderate right pleural effusion  History of this requiring chest tube during hospitalization at [facility]  · Consultation to pulmonology ordered and bedside ultrasound reviewed with not enough fluid present for thoracentesis  · Continue bronchodilators  · Will give 1 dose of Lasix 40 mg IV x1 as it appears to be secondary to diastolic CHF exacerbation  · Will do ambulatory oxygen requirement study today and overnight oxygen requirement study to qualify him for home oxygen  Will need outpatient follow-up with pulmonology  · Also need outpatient sleep study scheduled    EDWIGE (acute kidney injury) ([facility] )  Assessment & Plan  · EDWIGE noted  Will give 1 dose of Lasix 40 mg IV x1 and recheck BMP tomorrow    Results from last 7 days   Lab Units 08/10/21  0523 08/09/21  0627 08/08/21  0556 08/07/21  1849   BUN mg/dL 28* 27* 23 24   CREATININE mg/dL 1 32* 1 53* 1 56* 1 54*   EGFR ml/min/1 73sq m 61 51 50 50       Constipation  Assessment & Plan  · Mother concerned about constipation  Did have bowel movement last night formed  · Start docusate/senna    Lower extremity pain, left  Assessment & Plan  · Left foot pain question of opacity  Will have podiatry evaluate  · Continue conservative management  No foreign body noted    History of COVID-19  Assessment & Plan  · History of COVID-19 January 2021      Dysphagia  Assessment & Plan  · Continue mechanical soft diet    Nonintractable generalized idiopathic epilepsy without status epilepticus ([facility] )  Assessment & Plan  · Seizure disorder follows with neurology as outpatient on four agents  · Continue lamotrigine clonazepam zonisamide and primidone as taken  · Stable with no recent seizures reported      VTE Pharmacologic Prophylaxis:   Pharmacologic: Pharmacologic VTE Prophylaxis contraindicated due to Thrombocytopenia  Mechanical VTE Prophylaxis in Place: Yes    Patient Centered Rounds: I have performed bedside rounds with nursing staff today  Discussions with Specialists or Other Care Team Provider:  Discussed with pulmonology and Critical Care    Education and Discussions with Family / Patient:  Discussed with patient and mother    Time Spent for Care: 30 minutes  More than 50% of total time spent on counseling and coordination of care as described above  Current Length of Stay: 3 day(s)    Current Patient Status: Inpatient   Certification Statement: The patient will continue to require additional inpatient hospital stay due to Acute respiratory failure with hypoxia    Discharge Plan:  Anticipate discharge home tomorrow    Code Status: Level 1 - Full Code      Subjective:   Patient denies any chest pain or shortness of breath while awake  Weaned off of oxygen this morning however noted to have episode of desaturation overnight    Objective:     Vitals:   Temp (24hrs), Av 7 °F (36 5 °C), Min:97 4 °F (36 3 °C), Max:97 9 °F (36 6 °C)    Temp:  [97 4 °F (36 3 °C)-97 9 °F (36 6 °C)] 97 8 °F (36 6 °C)  HR:  [57-67] 65  Resp:  [16-22] 16  BP: (123-129)/(69-71) 129/71  SpO2:  [92 %-98 %] 97 %  Body mass index is 36 97 kg/m²  Input and Output Summary (last 24 hours): Intake/Output Summary (Last 24 hours) at 8/10/2021 1251  Last data filed at 8/10/2021 0814  Gross per 24 hour   Intake 1380 ml   Output 850 ml   Net 530 ml       Physical Exam:     Physical Exam  Vitals and nursing note reviewed  Constitutional:       Appearance: Normal appearance  HENT:      Head: Normocephalic and atraumatic        Right Ear: External ear normal       Left Ear: External ear normal       Nose: Nose normal       Mouth/Throat:      Pharynx: Oropharynx is clear  Cardiovascular:      Rate and Rhythm: Normal rate and regular rhythm  Heart sounds: Normal heart sounds  Pulmonary:      Effort: Pulmonary effort is normal       Comments: Moderate air entry on the right side with decreased breath sounds the right base  Abdominal:      General: Bowel sounds are normal       Palpations: Abdomen is soft  Tenderness: There is no abdominal tenderness  Musculoskeletal:         General: Normal range of motion  Cervical back: Normal range of motion and neck supple  Skin:     General: Skin is warm and dry  Capillary Refill: Capillary refill takes less than 2 seconds  Neurological:      General: No focal deficit present  Mental Status: He is alert and oriented to person, place, and time  Psychiatric:         Mood and Affect: Mood normal            Additional Data:     Labs:    Results from last 7 days   Lab Units 08/10/21  0523 08/08/21  0556   WBC Thousand/uL 5 08 5 21   HEMOGLOBIN g/dL 12 5 13 2   HEMATOCRIT % 38 8 41 3   PLATELETS Thousands/uL 116* 116*   NEUTROS PCT %  --  70   LYMPHS PCT %  --  20   MONOS PCT %  --  10   EOS PCT %  --  0     Results from last 7 days   Lab Units 08/10/21  0523   SODIUM mmol/L 143   POTASSIUM mmol/L 4 3   CHLORIDE mmol/L 106   CO2 mmol/L 33*   BUN mg/dL 28*   CREATININE mg/dL 1 32*   ANION GAP mmol/L 4   CALCIUM mg/dL 8 2*   ALBUMIN g/dL 3 0*   TOTAL BILIRUBIN mg/dL 0 46   ALK PHOS U/L 186*   ALT U/L 19   AST U/L 16   GLUCOSE RANDOM mg/dL 103                 Results from last 7 days   Lab Units 08/09/21  0627 08/08/21  0556   PROCALCITONIN ng/ml 0 05 <0 05           * I Have Reviewed All Lab Data Listed Above  * Additional Pertinent Lab Tests Reviewed:  All Labs For Current Hospital Admission Reviewed    Imaging:    Imaging Reports Reviewed Today Include:  CT chest  Imaging Personally Reviewed by Myself Includes:  CT chest    Recent Cultures (last 7 days):           Last 24 Hours Medication List:   Current Facility-Administered Medications   Medication Dose Route Frequency Provider Last Rate    acetaminophen  650 mg Oral Q6H PRN NADEGE José      ascorbic acid  500 mg Oral Daily NADEGE José      aspirin  81 mg Oral Daily NADEGE José      calcium carbonate  1 tablet Oral Daily With Breakfast NADEGE José      clonazePAM  0 5 mg Oral HS NADEGE José      furosemide  40 mg Intravenous Once Christiano Dang MD      glycerin-hypromellose-  1 drop Both Eyes 4x Daily Margaret Pillow, DO      heparin (porcine)  5,000 Units Subcutaneous Q8H [address] Margaret Pillow, DO      lamoTRIgine  200 mg Oral Early Morning NADEGE José      lamoTRIgine  250 mg Oral Daily NADEGE José      lamoTRIgine  300 mg Oral HS NADEGE José      levOCARNitine  330 mg Oral 4x Daily (with meals and at bedtime) Margaret Myers, DO      metoprolol tartrate  25 mg Oral BID NADEGE José      ondansetron  4 mg Intravenous Q6H PRN NADEGE José      primidone  100 mg Oral Daily With Lunch NADEGE José      primidone  150 mg Oral After Breakfast NADEGE José      primidone  150 mg Oral HS NADEGE José      senna-docusate sodium  1 tablet Oral BID Margaret Myers, DO      vitamin E (tocopherol)  400 Units Oral Daily NADEGE José      zinc sulfate  220 mg Oral Daily NADEGE José      zonisamide  100 mg Oral HS NADEGE José          Today, Patient Was Seen By: Christiano Dang MD    ** Please Note: Dictation voice to text software may have been used in the creation of this document   **

## 2021-08-10 NOTE — RESPIRATORY THERAPY NOTE
Paged to patient's room by nurse  Nurse reports patient had a desaturation event while asleep on 3L nasal cannula   Patient wore CPAP with V30 on his previous admission in January  Unknown if CPAP was for sleep apnea or COVID -19 infection    SLIM made aware of event

## 2021-08-10 NOTE — NURSING NOTE
Pt was able to the doorway with use of walker with minimal assist, standby  Gait steady  Respiratory in room, ambulatory study completed

## 2021-08-10 NOTE — ASSESSMENT & PLAN NOTE
· EDWIGE noted    Will give 1 dose of Lasix 40 mg IV x1 and recheck BMP tomorrow    Results from last 7 days   Lab Units 08/10/21  0523 08/09/21  0627 08/08/21  0556 08/07/21  1849   BUN mg/dL 28* 27* 23 24   CREATININE mg/dL 1 32* 1 53* 1 56* 1 54*   EGFR ml/min/1 73sq m 61 51 50 50

## 2021-08-10 NOTE — ASSESSMENT & PLAN NOTE
· Acute respiratory failure/hypoxia currently requires 2 L nasal cannula to maintain saturations  Does not use any oxygen at home  · Does have moderate right pleural effusion  History of this requiring chest tube during hospitalization at [facility]  · Consultation to pulmonology ordered and bedside ultrasound reviewed with not enough fluid present for thoracentesis  · Continue bronchodilators  · Will give 1 dose of Lasix 40 mg IV x1 as it appears to be secondary to diastolic CHF exacerbation  · Will do ambulatory oxygen requirement study today and overnight oxygen requirement study to qualify him for home oxygen    Will need outpatient follow-up with pulmonology  · Also need outpatient sleep study scheduled

## 2021-08-10 NOTE — ASSESSMENT & PLAN NOTE
· Left foot pain question of opacity  Will have podiatry evaluate  · Continue conservative management    No foreign body noted

## 2021-08-10 NOTE — RESPIRATORY THERAPY NOTE
Home Oxygen Qualifying Test       Patient name: Kaleb Telles        : 1965   Date of Test:  August 10, 2021  Diagnosis:      Home Oxygen Test:    **Medicare Guidelines require item(s) 1-5 on all ambulatory patients or 1 and 2 on non-ambulatory patients  1   Baseline SPO2 on Room Air at rest 97 %  2   SPO2 during exercise on Room Air 92 %  During exercise monitor SpO2  If SPO2 increases >=89% with ambulation do not add supplemental             oxygen  If <= 88% on room air add O2 via NC and titrate patient  Patient must be ambulated with O2 and titrated to > 88% with exertion  3   SPO2 on Oxygen at rest N/A % N/A lpm     4   SPO2 during exercise on Oxygen  N/A% a liter flow of N/A lpm     5   Exercise performed:          walking          []  Supplemental Home Oxygen is indicated  [x]  Client does not qualify for home oxygen  Respiratory Additional Notes- SpO2 never below 92% during ambulation       Latoya Marino, RT

## 2021-08-11 ENCOUNTER — APPOINTMENT (INPATIENT)
Dept: NON INVASIVE DIAGNOSTICS | Facility: HOSPITAL | Age: 56
DRG: 291 | End: 2021-08-11
Payer: MEDICARE

## 2021-08-11 VITALS
DIASTOLIC BLOOD PRESSURE: 73 MMHG | OXYGEN SATURATION: 93 % | SYSTOLIC BLOOD PRESSURE: 131 MMHG | WEIGHT: 226.63 LBS | HEIGHT: 66 IN | RESPIRATION RATE: 18 BRPM | TEMPERATURE: 98.5 F | BODY MASS INDEX: 36.42 KG/M2 | HEART RATE: 73 BPM

## 2021-08-11 LAB
ANION GAP SERPL CALCULATED.3IONS-SCNC: 6 MMOL/L (ref 4–13)
ARTERIAL PATENCY WRIST A: YES
BASE EXCESS BLDA CALC-SCNC: 4.3 MMOL/L
BODY TEMPERATURE: 98.6 DEGREES FEHRENHEIT
BUN SERPL-MCNC: 26 MG/DL (ref 5–25)
CALCIUM SERPL-MCNC: 8.3 MG/DL (ref 8.3–10.1)
CHLORIDE SERPL-SCNC: 105 MMOL/L (ref 100–108)
CO2 SERPL-SCNC: 31 MMOL/L (ref 21–32)
CREAT SERPL-MCNC: 1.41 MG/DL (ref 0.6–1.3)
DME PARACHUTE DELIVERY DATE ACTUAL: NORMAL
DME PARACHUTE DELIVERY DATE EXPECTED: NORMAL
DME PARACHUTE DELIVERY DATE REQUESTED: NORMAL
DME PARACHUTE ITEM DESCRIPTION: NORMAL
DME PARACHUTE ORDER STATUS: NORMAL
DME PARACHUTE SUPPLIER NAME: NORMAL
DME PARACHUTE SUPPLIER PHONE: NORMAL
GFR SERPL CREATININE-BSD FRML MDRD: 56 ML/MIN/1.73SQ M
GLUCOSE SERPL-MCNC: 111 MG/DL (ref 65–140)
HCO3 BLDA-SCNC: 29.6 MMOL/L (ref 22–28)
NON VENT ROOM AIR: 21 %
O2 CT BLDA-SCNC: 16.8 ML/DL (ref 16–23)
OXYHGB MFR BLDA: 91 % (ref 94–97)
PCO2 BLDA: 47 MM HG (ref 36–44)
PCO2 TEMP ADJ BLDA: 47 MM HG (ref 36–44)
PH BLD: 7.42 [PH] (ref 7.35–7.45)
PH BLDA: 7.42 [PH] (ref 7.35–7.45)
PO2 BLD: 67.1 MM HG (ref 75–129)
PO2 BLDA: 67.1 MM HG (ref 75–129)
POTASSIUM SERPL-SCNC: 4.1 MMOL/L (ref 3.5–5.3)
SODIUM SERPL-SCNC: 142 MMOL/L (ref 136–145)
SPECIMEN SOURCE: ABNORMAL

## 2021-08-11 PROCEDURE — 82805 BLOOD GASES W/O2 SATURATION: CPT | Performed by: FAMILY MEDICINE

## 2021-08-11 PROCEDURE — 87081 CULTURE SCREEN ONLY: CPT | Performed by: FAMILY MEDICINE

## 2021-08-11 PROCEDURE — 36600 WITHDRAWAL OF ARTERIAL BLOOD: CPT

## 2021-08-11 PROCEDURE — 93306 TTE W/DOPPLER COMPLETE: CPT

## 2021-08-11 PROCEDURE — 99239 HOSP IP/OBS DSCHRG MGMT >30: CPT | Performed by: FAMILY MEDICINE

## 2021-08-11 PROCEDURE — 93306 TTE W/DOPPLER COMPLETE: CPT | Performed by: INTERNAL MEDICINE

## 2021-08-11 PROCEDURE — 94762 N-INVAS EAR/PLS OXIMTRY CONT: CPT

## 2021-08-11 PROCEDURE — 80048 BASIC METABOLIC PNL TOTAL CA: CPT | Performed by: FAMILY MEDICINE

## 2021-08-11 RX ORDER — POLYETHYLENE GLYCOL 3350 17 G/17G
17 POWDER, FOR SOLUTION ORAL DAILY PRN
Qty: 10 EACH | Refills: 0 | Status: SHIPPED | OUTPATIENT
Start: 2021-08-11 | End: 2021-09-10

## 2021-08-11 RX ORDER — FUROSEMIDE 20 MG/1
20 TABLET ORAL
Qty: 30 TABLET | Refills: 0 | Status: SHIPPED | OUTPATIENT
Start: 2021-08-11 | End: 2021-09-10

## 2021-08-11 RX ORDER — FUROSEMIDE 40 MG/1
40 TABLET ORAL
Qty: 30 TABLET | Refills: 0 | Status: SHIPPED | OUTPATIENT
Start: 2021-08-11 | End: 2021-09-10

## 2021-08-11 RX ORDER — FUROSEMIDE 10 MG/ML
40 INJECTION INTRAMUSCULAR; INTRAVENOUS ONCE
Status: COMPLETED | OUTPATIENT
Start: 2021-08-11 | End: 2021-08-11

## 2021-08-11 RX ORDER — AMOXICILLIN 250 MG
1 CAPSULE ORAL 2 TIMES DAILY
Qty: 60 TABLET | Refills: 0 | Status: SHIPPED | OUTPATIENT
Start: 2021-08-11 | End: 2021-09-10

## 2021-08-11 RX ORDER — ACETAMINOPHEN 325 MG/1
650 TABLET ORAL EVERY 6 HOURS PRN
Refills: 0
Start: 2021-08-11

## 2021-08-11 RX ADMIN — GLYCERIN, HYPROMELLOSE, POLYETHYLENE GLYCOL 1 DROP: .2; .2; 1 LIQUID OPHTHALMIC at 09:20

## 2021-08-11 RX ADMIN — LAMOTRIGINE 200 MG: 100 TABLET ORAL at 07:50

## 2021-08-11 RX ADMIN — HEPARIN SODIUM 5000 UNITS: 5000 INJECTION INTRAVENOUS; SUBCUTANEOUS at 05:48

## 2021-08-11 RX ADMIN — LAMOTRIGINE 250 MG: 100 TABLET ORAL at 13:10

## 2021-08-11 RX ADMIN — ASPIRIN 81 MG: 81 TABLET, COATED ORAL at 09:18

## 2021-08-11 RX ADMIN — METOPROLOL TARTRATE 25 MG: 25 TABLET, FILM COATED ORAL at 09:18

## 2021-08-11 RX ADMIN — PRIMIDONE 150 MG: 50 TABLET ORAL at 07:50

## 2021-08-11 RX ADMIN — CALCIUM 1 TABLET: 500 TABLET ORAL at 07:51

## 2021-08-11 RX ADMIN — LEVOCARNITINE 330 MG: 330 TABLET ORAL at 11:22

## 2021-08-11 RX ADMIN — ZINC SULFATE 220 MG (50 MG) CAPSULE 220 MG: CAPSULE at 09:18

## 2021-08-11 RX ADMIN — LEVOCARNITINE 330 MG: 330 TABLET ORAL at 17:05

## 2021-08-11 RX ADMIN — Medication 400 UNITS: at 09:18

## 2021-08-11 RX ADMIN — DOCUSATE SODIUM AND SENNOSIDES 1 TABLET: 8.6; 5 TABLET ORAL at 17:06

## 2021-08-11 RX ADMIN — OXYCODONE HYDROCHLORIDE AND ACETAMINOPHEN 500 MG: 500 TABLET ORAL at 09:18

## 2021-08-11 RX ADMIN — DOCUSATE SODIUM AND SENNOSIDES 1 TABLET: 8.6; 5 TABLET ORAL at 09:18

## 2021-08-11 RX ADMIN — FUROSEMIDE 40 MG: 10 INJECTION, SOLUTION INTRAMUSCULAR; INTRAVENOUS at 10:29

## 2021-08-11 RX ADMIN — GLYCERIN, HYPROMELLOSE, POLYETHYLENE GLYCOL 1 DROP: .2; .2; 1 LIQUID OPHTHALMIC at 11:22

## 2021-08-11 RX ADMIN — HEPARIN SODIUM 5000 UNITS: 5000 INJECTION INTRAVENOUS; SUBCUTANEOUS at 13:10

## 2021-08-11 RX ADMIN — METOPROLOL TARTRATE 25 MG: 25 TABLET, FILM COATED ORAL at 17:06

## 2021-08-11 RX ADMIN — LEVOCARNITINE 330 MG: 330 TABLET ORAL at 07:52

## 2021-08-11 RX ADMIN — PRIMIDONE 100 MG: 50 TABLET ORAL at 13:33

## 2021-08-11 NOTE — DISCHARGE SUMMARY
[address]  Discharge- Luis [facility] 1965, 49 y o  male MRN: [id number]  Unit/Bed#: -01 Encounter: [id number]  Primary Care Provider: Luis Guo DO   Date and time admitted to hospital: 8/7/2021  6:14 PM    * Acute respiratory failure with hypoxia (HCC)  Assessment & Plan  · Acute respiratory failure/hypoxia currently requires 2 L nasal cannula to maintain saturations  Does not use any oxygen at home  · Does have moderate right pleural effusion  History of this requiring chest tube during hospitalization at [address] [place] [address] [address]  · Consultation to pulmonology ordered and bedside ultrasound reviewed with not enough fluid present for thoracentesis  · Continue bronchodilators  · received 1 dose of Lasix 40 mg IV x1 yesterday and 1 dose today as it appears to be secondary to diastolic CHF exacerbation  · Ambulatory oxygen requirement study did not show need for oxygen use however overnight oxygen study showed need for 2 L of oxygen while sleeping  · Also need outpatient sleep study scheduled and outpatient follow-up with pulmonology    EDWIGE (acute kidney injury) ([facility] )  Assessment & Plan  · EDWIGE noted  Acceptable secondary to IV diuretic use    Results from last 7 days   Lab Units 08/11/21  0539 08/10/21  0523 08/09/21  0627 08/08/21  0556 08/07/21  1849   BUN mg/dL 26* 28* 27* 23 24   CREATININE mg/dL 1 41* 1 32* 1 53* 1 56* 1 54*   EGFR ml/min/1 73sq m 56 61 51 50 50       History of COVID-19  Assessment & Plan  · History of COVID-19 January 2021  Dysphagia  Assessment & Plan  · Continue mechanical soft diet    Nonintractable generalized idiopathic epilepsy without status epilepticus ([facility] )  Assessment & Plan  · Seizure disorder follows with neurology as outpatient on four agents  · Continue lamotrigine clonazepam zonisamide and primidone as taken  · Stable with no recent seizures reported    Acute on chronic diastolic CHF exacerbation:  Will continue with diuretics    Clinically improving and diuresing  Also increased oral diuretics upon discharge and received 2 doses of IV Lasix while here  Discharging Physician / Practitioner: Naomie Albarran MD  PCP: Cammy Martinez DO  Admission Date:   Admission Orders (From admission, onward)     Ordered        08/07/21 2051  Inpatient Admission  Once                   Discharge Date: 08/11/21    Medical Problems     Resolved Problems  Date Reviewed: 8/11/2021    None                Consultations During Hospital Stay:  · Pulmonology    Procedures Performed:   · None    Significant Findings / Test Results:   XR foot 3+ views LEFT    Result Date: 8/7/2021  Impression: Extensive soft tissue swelling is seen throughout the foot  3 mm calcified density overlying the dorsal metatarsals raising the possibility of foreign body  No subcutaneous emphysema  Workstation performed: [id number]     CT head without contrast    Result Date: 8/7/2021  Impression: No acute intracranial abnormality or significant change from priors  Workstation performed: [id number]     CTA ED chest PE Study    Result Date: 8/7/2021  Impression: Moderate right effusion resulting in right basilar compressive atelectatic changes  Superimposed infection must be excluded clinically  This is superimposed upon a baseline of COPD  Pericardial calcifications unchanged from prior exam  Workstation performed: [id number]     Incidental Findings:   · None     Test Results Pending at Discharge (will require follow up):   · 2D echo     Outpatient Tests Requested:  · BMP outpatient in 2 weeks  · Outpatient follow-up with pulmonology    Complications:  None    Reason for Admission:  Worsening shortness of breath    Hospital Course:     Chris Ma is a 49 y o  male patient who originally presented to the hospital on 8/7/2021 due to worsening shortness of breath and acute respiratory failure with hypoxia noted initially requiring oxygen    He was found to have a moderate-sized right pleural effusion however not amenable to thoracentesis  Did receive some IV Lasix doses following which he started to improve  Weaned off oxygen completely during the daytime but needs 2 L at nighttime  Also recommended an outpatient sleep study and follow-up with pulmonology  Known history of constrictive pericarditis which may also be playing an impact along with kyphosis and chest wall disease  Encourage use of incentive spirometry due to extensive atelectasis  Increase Lasix to 40 mg daily in the morning and 20 mg daily in the afternoon with repeat blood work to be done outpatient in 7-10 days  Patient also had an abnormal x-ray of the foot with concern for foreign body  Seen by Podiatry and no evidence of an active process consistent with foreign body reaction of the left foot  Continue conservative care and compression to help decrease edema        Please see above list of diagnoses and related plan for additional information  Condition at Discharge: fair     Discharge Day Visit / Exam:     Subjective:  Patient denies any chest pain or shortness of breath or abdominal pain  States he feels better  Vitals: Blood Pressure: 122/69 (08/11/21 0622)  Pulse: 70 (08/11/21 0622)  Temperature: 98 2 °F (36 8 °C) (08/11/21 0622)  Temp Source: Oral (08/08/21 1508)  Respirations: 21 (08/11/21 0622)  Height: 5' 6" (167 6 cm) (08/07/21 2125)  Weight - Scale: 103 kg (226 lb 10 1 oz) (08/11/21 0539)  SpO2: 91 % (08/11/21 0622)  Exam:   Physical Exam  Vitals and nursing note reviewed  Constitutional:       Appearance: Normal appearance  HENT:      Head: Normocephalic and atraumatic  Right Ear: External ear normal       Left Ear: External ear normal       Nose: Nose normal       Mouth/Throat:      Pharynx: Oropharynx is clear  Eyes:      Pupils: Pupils are equal, round, and reactive to light  Cardiovascular:      Rate and Rhythm: Normal rate and regular rhythm  Heart sounds: Normal heart sounds     Pulmonary:      Effort: Pulmonary effort is normal       Comments: Moderate air entry with decreased breath sounds on the right base  Abdominal:      General: Bowel sounds are normal       Palpations: Abdomen is soft  Tenderness: There is no abdominal tenderness  Musculoskeletal:         General: Normal range of motion  Cervical back: Normal range of motion and neck supple  Skin:     General: Skin is warm and dry  Capillary Refill: Capillary refill takes less than 2 seconds  Neurological:      General: No focal deficit present  Mental Status: He is alert and oriented to person, place, and time  Psychiatric:         Mood and Affect: Mood normal          Discussion with Family:  Discussed with mother    Discharge instructions/Information to patient and family:   See after visit summary for information provided to patient and family  Provisions for Follow-Up Care:  See after visit summary for information related to follow-up care and any pertinent home health orders  Disposition:     Home    For Discharges to [facility] SNF:   · Not Applicable to this Patient - Not Applicable to this Patient    Planned Readmission:  None     Discharge Statement:  I spent 35 minutes discharging the patient  This time was spent on the day of discharge  I had direct contact with the patient on the day of discharge  Greater than 50% of the total time was spent examining patient, answering all patient questions, arranging and discussing plan of care with patient as well as directly providing post-discharge instructions  Additional time then spent on discharge activities  Discharge Medications:  See after visit summary for reconciled discharge medications provided to patient and family        ** Please Note: This note has been constructed using a voice recognition system **

## 2021-08-11 NOTE — ASSESSMENT & PLAN NOTE
· EDWIGE noted    Acceptable secondary to IV diuretic use    Results from last 7 days   Lab Units 08/11/21  0539 08/10/21  0523 08/09/21  0627 08/08/21  0556 08/07/21  1849   BUN mg/dL 26* 28* 27* 23 24   CREATININE mg/dL 1 41* 1 32* 1 53* 1 56* 1 54*   EGFR ml/min/1 73sq m 56 61 51 50 50

## 2021-08-11 NOTE — ASSESSMENT & PLAN NOTE
· Acute respiratory failure/hypoxia currently requires 2 L nasal cannula to maintain saturations  Does not use any oxygen at home  · Does have moderate right pleural effusion    History of this requiring chest tube during hospitalization at [facility]  · Consultation to pulmonology ordered and bedside ultrasound reviewed with not enough fluid present for thoracentesis  · Continue bronchodilators  · received 1 dose of Lasix 40 mg IV x1 yesterday and 1 dose today as it appears to be secondary to diastolic CHF exacerbation  · Ambulatory oxygen requirement study did not show need for oxygen use however overnight oxygen study showed need for 2 L of oxygen while sleeping  · Also need outpatient sleep study scheduled and outpatient follow-up with pulmonology

## 2021-08-11 NOTE — PLAN OF CARE
Problem: Potential for Falls  Goal: Patient will remain free of falls  Description: INTERVENTIONS:  - Educate patient/family on patient safety including physical limitations  - Instruct patient to call for assistance with activity   - Consult OT/PT to assist with strengthening/mobility   - Keep Call bell within reach  - Keep bed low and locked with side rails adjusted as appropriate  - Keep care items and personal belongings within reach  - Initiate and maintain comfort rounds  - Make Fall Risk Sign visible to staff  - Offer Toileting every 2Hours, in advance of need  - Initiate/Maintain bedalarm  - Obtain necessary fall risk management equipment: bed alarm  - Apply yellow socks and bracelet for high fall risk patients  - Consider moving patient to room near nurses station  8/11/2021 1143 by Peyton Mejias RN  Outcome: Progressing  8/11/2021 1142 by Peyton Mejias RN  Outcome: Progressing     Problem: MOBILITY - ADULT  Goal: Maintain or return to baseline ADL function  Description: INTERVENTIONS:  -  Assess patient's ability to carry out ADLs; assess patient's baseline for ADL function and identify physical deficits which impact ability to perform ADLs (bathing, care of mouth/teeth, toileting, grooming, dressing, etc )  - Assess/evaluate cause of self-care deficits   - Assess range of motion  - Assess patient's mobility; develop plan if impaired  - Assess patient's need for assistive devices and provide as appropriate  - Encourage maximum independence but intervene and supervise when necessary  - Involve family in performance of ADLs  - Assess for home care needs following discharge   - Consider OT consult to assist with ADL evaluation and planning for discharge  - Provide patient education as appropriate  8/11/2021 1143 by Peyton Mejias RN  Outcome: Progressing  8/11/2021 1142 by Peyton Mejias RN  Outcome: Progressing  Goal: Maintains/Returns to pre admission functional level  Description: INTERVENTIONS:  - Perform BMAT or MOVE assessment daily    - Set and communicate daily mobility goal to care team and patient/family/caregiver  - Collaborate with rehabilitation services on mobility goals if consulted  - Perform Range of Motion 3 times a day  - Reposition patient every2 hours    - Dangle patient 3times a day  - Stand patient3 times a day  - Ambulate patient 3times a day  - Out of bed to [place] times a day   - Out of bed for meals 3times a day  - Out of bed for toileting  - Record patient progress and toleration of activity level   8/11/2021 1143 by Kamille Hernandez RN  Outcome: Progressing  8/11/2021 1142 by Kamille Hernandez RN  Outcome: Progressing     Problem: NEUROSENSORY - ADULT  Goal: Achieves stable or improved neurological status  Description: INTERVENTIONS  - Monitor and report changes in neurological status  - Monitor vital signs such as temperature, blood pressure, glucose, and any other labs ordered   - Initiate measures to prevent increased intracranial pressure  - Monitor for seizure activity and implement precautions if appropriate      8/11/2021 1143 by Kamille Hernandez RN  Outcome: Progressing  8/11/2021 1142 by Kamille Hernandez RN  Outcome: Progressing  Goal: Remains free of injury related to seizures activity  Description: INTERVENTIONS  - Maintain airway, patient safety  and administer oxygen as ordered  - Monitor patient for seizure activity, document and report duration and description of seizure to physician/advanced practitioner  - If seizure occurs,  ensure patient safety during seizure  - Reorient patient post seizure  - Seizure pads on all 4 side rails  - Instruct patient/family to notify RN of any seizure activity including if an aura is experienced  - Instruct patient/family to call for assistance with activity based on nursing assessment  - Administer anti-seizure medications if ordered    8/11/2021 1143 by Kamille Hernandez RN  Outcome: Progressing  8/11/2021 1142 by Blanche Em RN  Outcome: Progressing  Goal: Achieves maximal functionality and self care  Description: INTERVENTIONS  - Monitor swallowing and airway patency with patient fatigue and changes in neurological status  - Encourage and assist patient to increase activity and self care     - Encourage visually impaired, hearing impaired and aphasic patients to use assistive/communication devices  8/11/2021 1143 by Blanche Em RN  Outcome: Progressing  8/11/2021 1142 by Blanche Em RN  Outcome: Progressing     Problem: RESPIRATORY - ADULT  Goal: Achieves optimal ventilation and oxygenation  Description: INTERVENTIONS:  - Assess for changes in respiratory status  - Assess for changes in mentation and behavior  - Position to facilitate oxygenation and minimize respiratory effort  - Oxygen administered by appropriate delivery if ordered  - Initiate smoking cessation education as indicated  - Encourage broncho-pulmonary hygiene including cough, deep breathe, Incentive Spirometry  - Assess the need for suctioning and aspirate as needed  - Assess and instruct to report SOB or any respiratory difficulty  - Respiratory Therapy support as indicated  8/11/2021 1143 by Blanche Em RN  Outcome: Progressing  8/11/2021 1142 by Blanche Em RN  Outcome: Progressing     Problem: METABOLIC, FLUID AND ELECTROLYTES - ADULT  Goal: Electrolytes maintained within normal limits  Description: INTERVENTIONS:  - Monitor labs and assess patient for signs and symptoms of electrolyte imbalances  - Administer electrolyte replacement as ordered  - Monitor response to electrolyte replacements, including repeat lab results as appropriate  - Instruct patient on fluid and nutrition as appropriate  8/11/2021 1143 by Blanche Em RN  Outcome: Progressing  8/11/2021 1142 by Blanche Em RN  Outcome: Progressing  Goal: Fluid balance maintained  Description: INTERVENTIONS:  - Monitor labs   - Monitor I/O and WT  - Instruct patient on fluid and nutrition as appropriate  - Assess for signs & symptoms of volume excess or deficit  8/11/2021 1143 by Rena Duncan RN  Outcome: Progressing  8/11/2021 1142 by Rena Duncan RN  Outcome: Progressing     Problem: SKIN/TISSUE INTEGRITY - ADULT  Goal: Skin Integrity remains intact(Skin Breakdown Prevention)  Description: Assess:    -Assess extremities for adequate circulation and sensation     Bed Management:  -Have minimal linens on bed & keep smooth, unwrinkled  -Change linens as needed when moist or perspiring          Skin Care:  -Avoid use of baby powder, tape, friction and shearing, hot water or constrictive clothing    8/11/2021 1143 by Rena Duncan RN  Outcome: Progressing  8/11/2021 1142 by Rena Duncan RN  Outcome: Progressing     Problem: MUSCULOSKELETAL - ADULT  Goal: Maintain or return mobility to safest level of function  Description: INTERVENTIONS:  - Assess patient's ability to carry out ADLs; assess patient's baseline for ADL function and identify physical deficits which impact ability to perform ADLs (bathing, care of mouth/teeth, toileting, grooming, dressing, etc )  - Assess/evaluate cause of self-care deficits   - Assess range of motion  - Assess patient's mobility  - Assess patient's need for assistive devices and provide as appropriate  - Encourage maximum independence but intervene and supervise when necessary  - Involve family in performance of ADLs  - Assess for home care needs following discharge   - Consider OT consult to assist with ADL evaluation and planning for discharge  - Provide patient education as appropriate  8/11/2021 1143 by Rena Duncan RN  Outcome: Progressing  8/11/2021 1142 by Rena Duncan RN  Outcome: Progressing     Problem: PAIN - ADULT  Goal: Verbalizes/displays adequate comfort level or baseline comfort level  Description: Interventions:  - Encourage patient to monitor pain and request assistance  - Assess pain using appropriate pain scale0-10  - Administer analgesics based on type and severity of pain and evaluate response  - Implement non-pharmacological measures as appropriate and evaluate response  - Consider cultural and social influences on pain and pain management  - Notify physician/advanced practitioner if interventions unsuccessful or patient reports new pain  8/11/2021 1143 by Maci Holloway RN  Outcome: Progressing  8/11/2021 1142 by Maci Holloway RN  Outcome: Progressing     Problem: INFECTION - ADULT  Goal: Absence or prevention of progression during hospitalization  Description: INTERVENTIONS:  - Assess and monitor for signs and symptoms of infection  - Monitor lab/diagnostic results  - Monitor all insertion sites, i e  indwelling lines, tubes, and drains  - Monitor endotracheal if appropriate and nasal secretions for changes in amount and color  - Word[place] appropriate cooling/warming therapies per order  - Administer medications as ordered  - Instruct and encourage patient and family to use good hand hygiene technique  - Identify and instruct in appropriate isolation precautions for identified infection/condition  8/11/2021 1143 by Maci Holloway RN  Outcome: Progressing  8/11/2021 1142 by Maci Holloway RN  Outcome: Progressing  Goal: Absence of fever/infection during neutropenic period  Description: INTERVENTIONS:  - Monitor WBC    8/11/2021 1143 by Maci Holloway RN  Outcome: Progressing  8/11/2021 1142 by Maci Holloway RN  Outcome: Progressing     Problem: SAFETY ADULT  Goal: Patient will remain free of falls  Description: INTERVENTIONS:  - Educate patient/family on patient safety including physical limitations  - Instruct patient to call for assistance with activity   - Consult OT/PT to assist with strengthening/mobility   - Keep Call bell within reach  - Keep bed low and locked with side rails adjusted as appropriate  - Keep care items and personal belongings within reach  - Initiate and maintain comfort rounds  - Make Fall Risk Sign visible to staff  - Offer Toileting every 2Hours, in advance of need  - Initiate/Maintain bedalarm  - Obtain necessary fall risk management equipment: bed alarm  - Apply yellow socks and bracelet for high fall risk patients  - Consider moving patient to room near nurses station  8/11/2021 1143 by Efren Hutson RN  Outcome: Progressing  8/11/2021 1142 by Efren Hutson RN  Outcome: Progressing  Goal: Maintain or return to baseline ADL function  Description: INTERVENTIONS:  -  Assess patient's ability to carry out ADLs; assess patient's baseline for ADL function and identify physical deficits which impact ability to perform ADLs (bathing, care of mouth/teeth, toileting, grooming, dressing, etc )  - Assess/evaluate cause of self-care deficits   - Assess range of motion  - Assess patient's mobility; develop plan if impaired  - Assess patient's need for assistive devices and provide as appropriate  - Encourage maximum independence but intervene and supervise when necessary  - Involve family in performance of ADLs  - Assess for home care needs following discharge   - Consider OT consult to assist with ADL evaluation and planning for discharge  - Provide patient education as appropriate  8/11/2021 1143 by Efren Hutson RN  Outcome: Progressing  8/11/2021 1142 by Efren Hutson RN  Outcome: Progressing  Goal: Maintains/Returns to pre admission functional level  Description: INTERVENTIONS:  - Perform BMAT or MOVE assessment daily    - Set and communicate daily mobility goal to care team and patient/family/caregiver  - Collaborate with rehabilitation services on mobility goals if consulted  - Perform Range of Motion 3 times a day  - Reposition patient every2 hours    - Dangle patient 3times a day  - Stand patient3 times a day  - Ambulate patient 3times a day  - Out of bed to [place] times a day   - Out of bed for meals 3times a day  - Out of bed for toileting  - Record patient progress and toleration of activity level   8/11/2021 1143 by Tim Wang RN  Outcome: Progressing  8/11/2021 1142 by Tim Wang RN  Outcome: Progressing     Problem: DISCHARGE PLANNING  Goal: Discharge to home or other facility with appropriate resources  Description: INTERVENTIONS:  - Identify barriers to discharge w/patient and caregiver  - Arrange for needed discharge resources and transportation as appropriate  - Identify discharge learning needs (meds, wound care, etc )  - Arrange for interpretive services to assist at discharge as needed  - Refer to Case Management Department for coordinating discharge planning if the patient needs post-hospital services based on physician/advanced practitioner order or complex needs related to functional status, cognitive ability, or social support system  8/11/2021 1143 by Tim Wang RN  Outcome: Progressing  8/11/2021 1142 by Tim Wang RN  Outcome: Progressing     Problem: Knowledge Deficit  Goal: Patient/family/caregiver demonstrates understanding of disease process, treatment plan, medications, and discharge instructions  Description: Complete learning assessment and assess knowledge base    Interventions:  - Provide teaching at level of understanding  - Provide teaching via preferred learning methods  8/11/2021 1143 by Tim Wang RN  Outcome: Progressing  8/11/2021 1142 by Tim Wang RN  Outcome: Progressing     Problem: Prexisting or High Potential for Compromised Skin Integrity  Goal: Skin integrity is maintained or improved  Description: INTERVENTIONS:  - Identify patients at risk for skin breakdown  - Assess and monitor skin integrity  - Assess and monitor nutrition and hydration status  - Monitor labs   - Assess for incontinence   - Turn and reposition patient  - Assist with mobility/ambulation  - Relieve pressure over bony prominences  - Avoid friction and shearing  - Provide appropriate hygiene as needed including keeping skin clean and dry  - Evaluate need for skin moisturizer/barrier cream  - Collaborate with interdisciplinary team   - Patient/family teaching  - Consider wound care consult   8/11/2021 1143 by Sunitha Donis RN  Outcome: Progressing  8/11/2021 1142 by Sunitha Donis RN  Outcome: Progressing

## 2021-08-11 NOTE — CASE MANAGEMENT
Case Management Progress Note    Patient name Jorge Valencia  Location /-74 MRN [id number]  : 1965 Date 2021       LOS (days): 4  Geometric Mean LOS (GMLOS) (days): 4 30  Days to GMLOS:0 7        PROGRESS NOTE:    Nocturnal O2 has been recommended for pt, O2 2L HS  Cm spoke with pts mother who sts she does not have a preference to any DME company  CM placing order in parachute  Awaiting reply     [employer] of choice was provided in regards to needing a home medical equipment company, pt does not have preference  Pt is aware that we frequently utilized [employer] as we have a working relationship with this company  Patients choice is to use [employer]

## 2021-08-11 NOTE — PLAN OF CARE
Problem: Potential for Falls  Goal: Patient will remain free of falls  Description: INTERVENTIONS:  - Educate patient/family on patient safety including physical limitations  - Instruct patient to call for assistance with activity   - Consult OT/PT to assist with strengthening/mobility   - Keep Call bell within reach  - Keep bed low and locked with side rails adjusted as appropriate  - Keep care items and personal belongings within reach  - Initiate and maintain comfort rounds  - Make Fall Risk Sign visible to staff  - Offer Toileting every 2Hours, in advance of need  - Initiate/Maintain bedalarm  - Obtain necessary fall risk management equipment: bed alarm  - Apply yellow socks and bracelet for high fall risk patients  - Consider moving patient to room near nurses station  8/11/2021 1231 by Alesha Rios RN  Outcome: Adequate for Discharge  8/11/2021 1143 by Alesha Rios RN  Outcome: Progressing  8/11/2021 1142 by Alesha Rios RN  Outcome: Progressing     Problem: MOBILITY - ADULT  Goal: Maintain or return to baseline ADL function  Description: INTERVENTIONS:  -  Assess patient's ability to carry out ADLs; assess patient's baseline for ADL function and identify physical deficits which impact ability to perform ADLs (bathing, care of mouth/teeth, toileting, grooming, dressing, etc )  - Assess/evaluate cause of self-care deficits   - Assess range of motion  - Assess patient's mobility; develop plan if impaired  - Assess patient's need for assistive devices and provide as appropriate  - Encourage maximum independence but intervene and supervise when necessary  - Involve family in performance of ADLs  - Assess for home care needs following discharge   - Consider OT consult to assist with ADL evaluation and planning for discharge  - Provide patient education as appropriate  8/11/2021 1231 by Alesha Rios RN  Outcome: Adequate for Discharge  8/11/2021 1143 by Alesha Rios RN  Outcome: Progressing  8/11/2021 1142 by Nahun Ernandez RN  Outcome: Progressing  Goal: Maintains/Returns to pre admission functional level  Description: INTERVENTIONS:  - Perform BMAT or MOVE assessment daily    - Set and communicate daily mobility goal to care team and patient/family/caregiver  - Collaborate with rehabilitation services on mobility goals if consulted  - Perform Range of Motion 3 times a day  - Reposition patient every2 hours    - Dangle patient 3times a day  - Stand patient3 times a day  - Ambulate patient 3times a day  - Out of bed to [place] times a day   - Out of bed for meals 3times a day  - Out of bed for toileting  - Record patient progress and toleration of activity level   8/11/2021 1231 by Nahun Ernandez RN  Outcome: Adequate for Discharge  8/11/2021 1143 by Nahun Ernandez RN  Outcome: Progressing  8/11/2021 1142 by Nahun Ernandez RN  Outcome: Progressing     Problem: NEUROSENSORY - ADULT  Goal: Achieves stable or improved neurological status  Description: INTERVENTIONS  - Monitor and report changes in neurological status  - Monitor vital signs such as temperature, blood pressure, glucose, and any other labs ordered   - Initiate measures to prevent increased intracranial pressure  - Monitor for seizure activity and implement precautions if appropriate      8/11/2021 1231 by Nahun Ernandez RN  Outcome: Adequate for Discharge  8/11/2021 1143 by Nahun Ernandez RN  Outcome: Progressing  8/11/2021 1142 by Nahun Ernandez RN  Outcome: Progressing  Goal: Remains free of injury related to seizures activity  Description: INTERVENTIONS  - Maintain airway, patient safety  and administer oxygen as ordered  - Monitor patient for seizure activity, document and report duration and description of seizure to physician/advanced practitioner  - If seizure occurs,  ensure patient safety during seizure  - Reorient patient post seizure  - Seizure pads on all 4 side rails  - Instruct patient/family to notify RN of any seizure activity including if an aura is experienced  - Instruct patient/family to call for assistance with activity based on nursing assessment  - Administer anti-seizure medications if ordered    8/11/2021 1231 by Meggan Sebastian RN  Outcome: Adequate for Discharge  8/11/2021 1143 by Meggan Sebastian RN  Outcome: Progressing  8/11/2021 1142 by Meggan Sebastian RN  Outcome: Progressing  Goal: Achieves maximal functionality and self care  Description: INTERVENTIONS  - Monitor swallowing and airway patency with patient fatigue and changes in neurological status  - Encourage and assist patient to increase activity and self care     - Encourage visually impaired, hearing impaired and aphasic patients to use assistive/communication devices  8/11/2021 1231 by Meggan Sebastian RN  Outcome: Adequate for Discharge  8/11/2021 1143 by Meggan Sebastian RN  Outcome: Progressing  8/11/2021 1142 by Meggan Sebastian RN  Outcome: Progressing     Problem: RESPIRATORY - ADULT  Goal: Achieves optimal ventilation and oxygenation  Description: INTERVENTIONS:  - Assess for changes in respiratory status  - Assess for changes in mentation and behavior  - Position to facilitate oxygenation and minimize respiratory effort  - Oxygen administered by appropriate delivery if ordered  - Initiate smoking cessation education as indicated  - Encourage broncho-pulmonary hygiene including cough, deep breathe, Incentive Spirometry  - Assess the need for suctioning and aspirate as needed  - Assess and instruct to report SOB or any respiratory difficulty  - Respiratory Therapy support as indicated  8/11/2021 1231 by Meggan Sebastian RN  Outcome: Adequate for Discharge  8/11/2021 1143 by Meggan Sebastian RN  Outcome: Progressing  8/11/2021 1142 by Meggan Sebastian RN  Outcome: Progressing     Problem: METABOLIC, FLUID AND ELECTROLYTES - ADULT  Goal: Electrolytes maintained within normal limits  Description: INTERVENTIONS:  - Monitor labs and assess patient for signs and symptoms of electrolyte imbalances  - Administer electrolyte replacement as ordered  - Monitor response to electrolyte replacements, including repeat lab results as appropriate  - Instruct patient on fluid and nutrition as appropriate  8/11/2021 1231 by Lisbeth Aponte RN  Outcome: Adequate for Discharge  8/11/2021 1143 by Lisbeth Aponte RN  Outcome: Progressing  8/11/2021 1142 by Lisbeth Aponte RN  Outcome: Progressing  Goal: Fluid balance maintained  Description: INTERVENTIONS:  - Monitor labs   - Monitor I/O and WT  - Instruct patient on fluid and nutrition as appropriate  - Assess for signs & symptoms of volume excess or deficit  8/11/2021 1231 by Lisbeth Aponte RN  Outcome: Adequate for Discharge  8/11/2021 1143 by Lisbeth Aponte RN  Outcome: Progressing  8/11/2021 1142 by Lisbeth Aponte RN  Outcome: Progressing     Problem: SKIN/TISSUE INTEGRITY - ADULT  Goal: Skin Integrity remains intact(Skin Breakdown Prevention)    -Assess extremities for adequate circulation and sensation     Bed Management:  -Have minimal linens on bed & keep smooth, unwrinkled  -Change linens as needed when moist or perspiring      Activity:      Skin Care:  -Avoid use of baby powder, tape, friction and shearing, hot water or constrictive clothing    8/11/2021 1231 by Lisbeth Aponte RN  Outcome: Adequate for Discharge  8/11/2021 1143 by Lisbeth Aponte RN  Outcome: Progressing  8/11/2021 1142 by Lisbeth Aponte RN  Outcome: Progressing     Problem: MUSCULOSKELETAL - ADULT  Goal: Maintain or return mobility to safest level of function  Description: INTERVENTIONS:  - Assess patient's ability to carry out ADLs; assess patient's baseline for ADL function and identify physical deficits which impact ability to perform ADLs (bathing, care of mouth/teeth, toileting, grooming, dressing, etc )  - Assess/evaluate cause of self-care deficits   - Assess range of motion  - Assess patient's mobility  - Assess patient's need for assistive devices and provide as appropriate  - Encourage maximum independence but intervene and supervise when necessary  - Involve family in performance of ADLs  - Assess for home care needs following discharge   - Consider OT consult to assist with ADL evaluation and planning for discharge  - Provide patient education as appropriate  8/11/2021 1231 by Bard Jorge RN  Outcome: Adequate for Discharge  8/11/2021 1143 by Bard Jorge RN  Outcome: Progressing  8/11/2021 1142 by Bard Jorge RN  Outcome: Progressing     Problem: PAIN - ADULT  Goal: Verbalizes/displays adequate comfort level or baseline comfort level  Description: Interventions:  - Encourage patient to monitor pain and request assistance  - Assess pain using appropriate pain scale0-10  - Administer analgesics based on type and severity of pain and evaluate response  - Implement non-pharmacological measures as appropriate and evaluate response  - Consider cultural and social influences on pain and pain management  - Notify physician/advanced practitioner if interventions unsuccessful or patient reports new pain  8/11/2021 1231 by Bard Jorge RN  Outcome: Adequate for Discharge  8/11/2021 1143 by Bard Jorge RN  Outcome: Progressing  8/11/2021 1142 by Bard Jorge RN  Outcome: Progressing     Problem: INFECTION - ADULT  Goal: Absence or prevention of progression during hospitalization  Description: INTERVENTIONS:  - Assess and monitor for signs and symptoms of infection  - Monitor lab/diagnostic results  - Monitor all insertion sites, i e  indwelling lines, tubes, and drains  - Monitor endotracheal if appropriate and nasal secretions for changes in amount and color  - [place] appropriate cooling/warming therapies per order  - Administer medications as ordered  - Instruct and encourage patient and family to use good hand hygiene technique  - Identify and instruct in appropriate isolation precautions for identified infection/condition  8/11/2021 1231 by Sanjeev Escalera RN  Outcome: Adequate for Discharge  8/11/2021 1143 by Sanjeev Escalera RN  Outcome: Progressing  8/11/2021 1142 by Sanjeev Escalera RN  Outcome: Progressing  Goal: Absence of fever/infection during neutropenic period  Description: INTERVENTIONS:  - Monitor WBC    8/11/2021 1231 by Sanjeev Escalera RN  Outcome: Adequate for Discharge  8/11/2021 1143 by Sanjeev Escalera RN  Outcome: Progressing  8/11/2021 1142 by Sanjeev Escalera RN  Outcome: Progressing     Problem: SAFETY ADULT  Goal: Patient will remain free of falls  Description: INTERVENTIONS:  - Educate patient/family on patient safety including physical limitations  - Instruct patient to call for assistance with activity   - Consult OT/PT to assist with strengthening/mobility   - Keep Call bell within reach  - Keep bed low and locked with side rails adjusted as appropriate  - Keep care items and personal belongings within reach  - Initiate and maintain comfort rounds  - Make Fall Risk Sign visible to staff  - Offer Toileting every 2Hours, in advance of need  - Initiate/Maintain bedalarm  - Obtain necessary fall risk management equipment: bed alarm  - Apply yellow socks and bracelet for high fall risk patients  - Consider moving patient to room near nurses station  8/11/2021 1231 by Sanjeev Escalera RN  Outcome: Adequate for Discharge  8/11/2021 1143 by Sanjeev Escalera RN  Outcome: Progressing  8/11/2021 1142 by Sanjeev Escalera RN  Outcome: Progressing  Goal: Maintain or return to baseline ADL function  Description: INTERVENTIONS:  -  Assess patient's ability to carry out ADLs; assess patient's baseline for ADL function and identify physical deficits which impact ability to perform ADLs (bathing, care of mouth/teeth, toileting, grooming, dressing, etc )  - Assess/evaluate cause of self-care deficits   - Assess range of motion  - Assess patient's mobility; develop plan if impaired  - Assess patient's need for assistive devices and provide as appropriate  - Encourage maximum independence but intervene and supervise when necessary  - Involve family in performance of ADLs  - Assess for home care needs following discharge   - Consider OT consult to assist with ADL evaluation and planning for discharge  - Provide patient education as appropriate  8/11/2021 1231 by Veda Andrade RN  Outcome: Adequate for Discharge  8/11/2021 1143 by Veda Andrade RN  Outcome: Progressing  8/11/2021 1142 by Veda Andrade RN  Outcome: Progressing  Goal: Maintains/Returns to pre admission functional level  Description: INTERVENTIONS:  - Perform BMAT or MOVE assessment daily    - Set and communicate daily mobility goal to care team and patient/family/caregiver  - Collaborate with rehabilitation services on mobility goals if consulted  - Perform Range of Motion 3 times a day  - Reposition patient every2 hours    - Dangle patient 3times a day  - Stand patient3 times a day  - Ambulate patient 3times a day  - Out of bed to [place] times a day   - Out of bed for meals 3times a day  - Out of bed for toileting  - Record patient progress and toleration of activity level   8/11/2021 1231 by Veda Andrade RN  Outcome: Adequate for Discharge  8/11/2021 1143 by Veda Andrade RN  Outcome: Progressing  8/11/2021 1142 by Veda Andrade RN  Outcome: Progressing     Problem: DISCHARGE PLANNING  Goal: Discharge to home or other facility with appropriate resources  Description: INTERVENTIONS:  - Identify barriers to discharge w/patient and caregiver  - Arrange for needed discharge resources and transportation as appropriate  - Identify discharge learning needs (meds, wound care, etc )  - Arrange for interpretive services to assist at discharge as needed  - Refer to Case Management Department for coordinating discharge planning if the patient needs post-hospital services based on physician/advanced practitioner order or complex needs related to functional status, cognitive ability, or social support system  8/11/2021 1231 by Brock Simmons RN  Outcome: Adequate for Discharge  8/11/2021 1143 by Brock Simmons RN  Outcome: Progressing  8/11/2021 1142 by Brock Simmons RN  Outcome: Progressing     Problem: Knowledge Deficit  Goal: Patient/family/caregiver demonstrates understanding of disease process, treatment plan, medications, and discharge instructions  Description: Complete learning assessment and assess knowledge base    Interventions:  - Provide teaching at level of understanding  - Provide teaching via preferred learning methods  8/11/2021 1231 by Brock Simmons RN  Outcome: Adequate for Discharge  8/11/2021 1143 by Brock Simmons RN  Outcome: Progressing  8/11/2021 1142 by Brock Simmons RN  Outcome: Progressing     Problem: Prexisting or High Potential for Compromised Skin Integrity  Goal: Skin integrity is maintained or improved  Description: INTERVENTIONS:  - Identify patients at risk for skin breakdown  - Assess and monitor skin integrity  - Assess and monitor nutrition and hydration status  - Monitor labs   - Assess for incontinence   - Turn and reposition patient  - Assist with mobility/ambulation  - Relieve pressure over bony prominences  - Avoid friction and shearing  - Provide appropriate hygiene as needed including keeping skin clean and dry  - Evaluate need for skin moisturizer/barrier cream  - Collaborate with interdisciplinary team   - Patient/family teaching  - Consider wound care consult   8/11/2021 1231 by Brock Simmons RN  Outcome: Adequate for Discharge  8/11/2021 1143 by Brock Simmons RN  Outcome: Progressing  8/11/2021 1142 by Brock Simmons RN  Outcome: Progressing

## 2021-08-11 NOTE — PLAN OF CARE
Problem: Potential for Falls  Goal: Patient will remain free of falls  Description: INTERVENTIONS:  - Educate patient/family on patient safety including physical limitations  - Instruct patient to call for assistance with activity   - Consult OT/PT to assist with strengthening/mobility   - Keep Call bell within reach  - Keep bed low and locked with side rails adjusted as appropriate  - Keep care items and personal belongings within reach  - Initiate and maintain comfort rounds  - Make Fall Risk Sign visible to staff  - Offer Toileting every 2Hours, in advance of need  - Initiate/Maintain bedalarm  - Obtain necessary fall risk management equipment: bed alarm  - Apply yellow socks and bracelet for high fall risk patients  - Consider moving patient to room near nurses station  Outcome: Progressing     Problem: PAIN - ADULT  Goal: Verbalizes/displays adequate comfort level or baseline comfort level  Description: Interventions:  - Encourage patient to monitor pain and request assistance  - Assess pain using appropriate pain scale0-10  - Administer analgesics based on type and severity of pain and evaluate response  - Implement non-pharmacological measures as appropriate and evaluate response  - Consider cultural and social influences on pain and pain management  - Notify physician/advanced practitioner if interventions unsuccessful or patient reports new pain  Outcome: Progressing     Problem: SAFETY ADULT  Goal: Patient will remain free of falls  Description: INTERVENTIONS:  - Educate patient/family on patient safety including physical limitations  - Instruct patient to call for assistance with activity   - Consult OT/PT to assist with strengthening/mobility   - Keep Call bell within reach  - Keep bed low and locked with side rails adjusted as appropriate  - Keep care items and personal belongings within reach  - Initiate and maintain comfort rounds  - Make Fall Risk Sign visible to staff  - Offer Toileting every 2Hours, in advance of need  - Initiate/Maintain bedalarm  - Obtain necessary fall risk management equipment: bed alarm  - Apply yellow socks and bracelet for high fall risk patients  - Consider moving patient to room near nurses station  Outcome: Progressing

## 2021-08-12 LAB
MRSA NOSE QL CULT: ABNORMAL
MRSA NOSE QL CULT: ABNORMAL

## 2021-08-12 NOTE — CASE MANAGEMENT
Case Management Progress Note    Patient name Teri Johnson  Location /-07 MRN [id number]  : 1965 Date 2021       LOS (days): 4  Geometric Mean LOS (GMLOS) (days): 4 30  Days to GMLOS:0 4          PROGRESS NOTE:  CM received a call from pts mother, requesting a home visiting nurse and PT for pt  As per request, a referral has been placed to [facility] will accept pt  CM faxing order and dc info to agency

## 2021-08-13 LAB
ATRIAL RATE: 60 BPM
P AXIS: 69 DEGREES
PR INTERVAL: 156 MS
QRS AXIS: 101 DEGREES
QRSD INTERVAL: 108 MS
QT INTERVAL: 418 MS
QTC INTERVAL: 418 MS
T WAVE AXIS: 197 DEGREES
VENTRICULAR RATE: 60 BPM

## 2023-08-23 NOTE — PROGRESS NOTES
Competed and mom notified   Progress Note - Neurology   Aime Romero 48 y o  male MRN: [id number]  Unit/Bed#: [facility] 996-46 Encounter: [id number]    Assessment/ Plan:  1)  Breakthrough recurrent generalized tonic-clonic seizures in setting of patient with known intractable epilepsy, static encephalopathy and cognitive impairment with recent pneumonia and subtherapeutic levels of zonisamide   -MRI brain with without contrast demonstrates no acute intracranial abnormality or abnormal FLAIR signal   No evidence of mass or mass effect    -video EEG thus far without captured nonepileptic events  Patient does demonstrate continuous background irregularity and intermixed theta slowing with frequent brief bursts of generalized spike wave discharges  These findings are consistent with moderate diffuse cerebral dysfunction generalized epilepsy      -video EEG discontinued   -will continue AED regimen as follows:    -Lamictal 200 mg q a m , 250 mg at 3:00 p m , and 300 mg q h s     -primidone 150 mg q a m , 100 mg at 3:00 p m , 150 mg q h s     -zonisamide 100 mg q h s     -clonazepam 0 5 mg q h s    -upon discharge, patient is to follow up with [address] [place] [address] Neurology epilepsy Clinic, appointment has been requested   -patient is cleared for discharge from Neurology standpoint    2)  Acute hypoxic failure secondary to RSV/pneumonia   -appreciate pulmonology   -patient is currently on prednisone for 5 days   -supportive care and medication management per primary team    Subjective:   Patient is a 68-year-old male with a history of static encephalopathy and and cognitive impairment with intractable epilepsy who initially presented to [address] [place] [address] with shortness of breath hypoxic respiratory failure secondary to pneumonia  The patient then demonstrated breakthrough recurrent generalized tonic-clonic seizures and was transferred to [facility] for video EEG  Of note, the patient's initial mild level was low on admission    Video EEG was abnormal as expected with patient's neurologic history, however there were no further electrographic or clinical generalized tonic-clonic seizures since initiation  MRI brain was completed with and without contrast, no acute abnormality noted  On exam today, the patient is resting comfortably in bed in no acute distress  He does appear despondent and sad  A 12 point review systems was completed and is negative  Unchanged neurological exam as detailed below        ROS:  See Subjective     Medications:  Scheduled Meds:    Current Facility-Administered Medications:  acetaminophen 650 mg Oral Q6H PRN Cinthia Pigmary, DO   albuterol 2 5 mg Nebulization Q4H PRN Angie Ngo MD   ascorbic acid 500 mg Oral Daily Cinthia Piggs, DO   aspirin 81 mg Oral Daily Cinthia Pigmary, DO   budesonide 0 5 mg Nebulization Q12H Eamon Estrella MD   calcium carbonate-vitamin D 1 tablet Oral Daily With Breakfast Cinthia Pigmary, DO   clonazePAM 0 5 mg Oral HS Cinthia Pigmary, DO   dextran 70-hypromellose 1 drop Both Eyes 4x Daily Cinthia Pigmary, DO   guaiFENesin 600 mg Oral BID Cinthia Pigmary, DO   heparin (porcine) 5,000 Units Subcutaneous [facility] Cinthia Pigmary, DO   ipratropium 0 5 mg Nebulization TID Eamon Estrella MD   lamoTRIgine 200 mg Oral Early Morning Cinthia Pigmary, DO   lamoTRIgine 250 mg Oral Daily With Lunch Silvestrewtania Tello PA-C   lamoTRIgine 300 mg Oral HS Cinthia Pigmary, DO   levalbuterol 1 25 mg Nebulization TID Angie Ngo MD   levOCARNitine 330 mg Oral 4x Daily (PC & HS) Cinthia Pigmary, DO   miconazole  Topical BID Cinthia Pigmary, DO   multivitamin stress formula 1 tablet Oral Daily Cinthia Pigmary, DO   ondansetron 4 mg Intravenous Q6H PRN Cinthia Pigmary, DO   predniSONE 40 mg Oral Daily Eamon Estrella MD   primidone 100 mg Oral Daily With Lunch Silvestrewyna Pankaj Tello PA-C   primidone 150 mg Oral HS Cinthia Piggs, DO   primidone 150 mg Oral After Breakfast Cinthia Piggs, DO   vitamin E (tocopherol) 400 Units Oral Daily Cinthia Pigmary, DO zinc gluconate 50 mg Oral Daily Claudia Ruiz, DO   zonisamide 100 mg Oral HS [place],      Continuous Infusions:   PRN Meds:   acetaminophen    albuterol    ondansetron      Vitals: Blood pressure 116/76, pulse 82, temperature 98 °F (36 7 °C), resp  rate 18, height 5' 6" (1 676 m), weight 110 kg (242 lb 8 1 oz), SpO2 95 %  ,Body mass index is 39 14 kg/m²  Physical Exam:   Physical Exam   Constitutional: He is oriented to person, place, and time  He appears well-developed and well-nourished  No distress  HENT:   Head: Normocephalic and atraumatic  Right Ear: External ear normal    Left Ear: External ear normal    Mouth/Throat: Oropharynx is clear and moist  No oropharyngeal exudate  Eyes:   Injected conjunctiva versus small vessel hemorrhage in left eye   Neck: Normal range of motion  Neck supple  Pulmonary/Chest: Effort normal  No respiratory distress  Musculoskeletal: Normal range of motion  He exhibits no edema, tenderness or deformity  Neurological: He is oriented to person, place, and time  He has normal strength  Skin: Skin is warm and dry  No rash noted  He is not diaphoretic  No erythema  No pallor  Psychiatric: His speech is normal    Patient appears sad   Nursing note and vitals reviewed  Neurologic Exam     Mental Status   Oriented to person, place, and time  Follows 2 step commands  Attention: normal  Concentration: normal    Speech: speech is normal   Level of consciousness: alert  Knowledge: good  Normal comprehension  Cranial Nerves   Cranial nerves II through XII intact  With exception of left eye exotropia and left eye baseline blindness     Motor Exam   Muscle bulk: normal  Overall muscle tone: normal    Strength   Strength 5/5 throughout   Moves all extremities equally antigravity     Sensory Exam   Light touch normal      Gait, Coordination, and Reflexes     Gait  Gait: (Deferred for safety)    Tremor   Resting tremor: absent      Lab, Imaging and other studies: I have personally reviewed pertinent reports  I have personally reviewed pertinent imaging in PACs  No results found for this or any previous visit (from the past 24 hour(s)) ]    VTE Prophylaxis: Sequential compression device (Venodyne)  and Heparin SQ    Counseling / Coordination of Care  Total time spent today 25 minutes  Greater than 50% of total time was spent with the patient and / or family counseling and / or coordination of care  A description of the counseling / coordination of care: Luis A Pac The pt was seen and examined by myself and the attending physician  The chart was reviewed thoroughly, including laboratory values and imaging studies  The pt was counseled in the room  After care was arranged

## 2023-11-17 ENCOUNTER — APPOINTMENT (EMERGENCY)
Dept: RADIOLOGY | Facility: HOSPITAL | Age: 58
End: 2023-11-17
Payer: MEDICARE

## 2023-11-17 ENCOUNTER — APPOINTMENT (EMERGENCY)
Dept: CT IMAGING | Facility: HOSPITAL | Age: 58
End: 2023-11-17
Payer: MEDICARE

## 2023-11-17 ENCOUNTER — HOSPITAL ENCOUNTER (INPATIENT)
Facility: HOSPITAL | Age: 58
LOS: 21 days | Discharge: NURSING, REHAB OR LONG-TERM CARE | End: 2023-12-08
Attending: EMERGENCY MEDICINE | Admitting: FAMILY MEDICINE
Payer: MEDICARE

## 2023-11-17 DIAGNOSIS — J96.12 CHRONIC RESPIRATORY FAILURE WITH HYPERCAPNIA (HCC): ICD-10-CM

## 2023-11-17 DIAGNOSIS — N17.9 AKI (ACUTE KIDNEY INJURY) (HCC): ICD-10-CM

## 2023-11-17 DIAGNOSIS — R60.0 LOWER EXTREMITY EDEMA: ICD-10-CM

## 2023-11-17 DIAGNOSIS — R04.0 EPISTAXIS: ICD-10-CM

## 2023-11-17 DIAGNOSIS — N18.30 STAGE 3 CHRONIC KIDNEY DISEASE (HCC): ICD-10-CM

## 2023-11-17 DIAGNOSIS — R26.2 AMBULATORY DYSFUNCTION: ICD-10-CM

## 2023-11-17 DIAGNOSIS — R60.0 BILATERAL LOWER EXTREMITY EDEMA: Primary | ICD-10-CM

## 2023-11-17 DIAGNOSIS — J90 PLEURAL EFFUSION: ICD-10-CM

## 2023-11-17 DIAGNOSIS — Z74.2 NO OTHER HOUSEHOLD MEMBER ABLE TO RENDER CARE: ICD-10-CM

## 2023-11-17 DIAGNOSIS — I50.33 ACUTE ON CHRONIC HEART FAILURE WITH PRESERVED EJECTION FRACTION (HCC): ICD-10-CM

## 2023-11-17 DIAGNOSIS — J18.9 PNEUMONIA DUE TO INFECTIOUS ORGANISM, UNSPECIFIED LATERALITY, UNSPECIFIED PART OF LUNG: ICD-10-CM

## 2023-11-17 LAB
2HR DELTA HS TROPONIN: -4 NG/L
4HR DELTA HS TROPONIN: -9 NG/L
ALBUMIN SERPL BCP-MCNC: 4 G/DL (ref 3.5–5)
ALP SERPL-CCNC: 195 U/L (ref 34–104)
ALT SERPL W P-5'-P-CCNC: 20 U/L (ref 7–52)
AMMONIA PLAS-SCNC: 66 UMOL/L (ref 18–72)
ANION GAP SERPL CALCULATED.3IONS-SCNC: 7 MMOL/L
APTT PPP: 33 SECONDS (ref 23–37)
AST SERPL W P-5'-P-CCNC: 27 U/L (ref 13–39)
BASE EX.OXY STD BLDV CALC-SCNC: 65.9 % (ref 60–80)
BASE EXCESS BLDV CALC-SCNC: 7.3 MMOL/L
BASOPHILS # BLD AUTO: 0.02 THOUSANDS/ÂΜL (ref 0–0.1)
BASOPHILS NFR BLD AUTO: 0 % (ref 0–1)
BILIRUB SERPL-MCNC: 0.98 MG/DL (ref 0.2–1)
BILIRUB UR QL STRIP: NEGATIVE
BNP SERPL-MCNC: 298 PG/ML (ref 0–100)
BUN SERPL-MCNC: 30 MG/DL (ref 5–25)
CALCIUM SERPL-MCNC: 9.9 MG/DL (ref 8.4–10.2)
CARDIAC TROPONIN I PNL SERPL HS: 23 NG/L
CARDIAC TROPONIN I PNL SERPL HS: 28 NG/L
CARDIAC TROPONIN I PNL SERPL HS: 32 NG/L
CHLORIDE SERPL-SCNC: 100 MMOL/L (ref 96–108)
CLARITY UR: CLEAR
CO2 SERPL-SCNC: 33 MMOL/L (ref 21–32)
COLOR UR: YELLOW
CREAT SERPL-MCNC: 1.59 MG/DL (ref 0.6–1.3)
EOSINOPHIL # BLD AUTO: 0.01 THOUSAND/ÂΜL (ref 0–0.61)
EOSINOPHIL NFR BLD AUTO: 0 % (ref 0–6)
ERYTHROCYTE [DISTWIDTH] IN BLOOD BY AUTOMATED COUNT: 16.4 % (ref 11.6–15.1)
FLUAV RNA RESP QL NAA+PROBE: NEGATIVE
FLUBV RNA RESP QL NAA+PROBE: NEGATIVE
GFR SERPL CREATININE-BSD FRML MDRD: 47 ML/MIN/1.73SQ M
GLUCOSE SERPL-MCNC: 90 MG/DL (ref 65–140)
GLUCOSE UR STRIP-MCNC: NEGATIVE MG/DL
HCO3 BLDV-SCNC: 34.5 MMOL/L (ref 24–30)
HCT VFR BLD AUTO: 38.1 % (ref 36.5–49.3)
HGB BLD-MCNC: 11.9 G/DL (ref 12–17)
HGB UR QL STRIP.AUTO: NEGATIVE
IMM GRANULOCYTES # BLD AUTO: 0.02 THOUSAND/UL (ref 0–0.2)
IMM GRANULOCYTES NFR BLD AUTO: 0 % (ref 0–2)
INR PPP: 1.15 (ref 0.84–1.19)
KETONES UR STRIP-MCNC: NEGATIVE MG/DL
LACTATE SERPL-SCNC: 1.2 MMOL/L (ref 0.5–2)
LEUKOCYTE ESTERASE UR QL STRIP: NEGATIVE
LIPASE SERPL-CCNC: 46 U/L (ref 11–82)
LYMPHOCYTES # BLD AUTO: 0.89 THOUSANDS/ÂΜL (ref 0.6–4.47)
LYMPHOCYTES NFR BLD AUTO: 14 % (ref 14–44)
MAGNESIUM SERPL-MCNC: 1.9 MG/DL (ref 1.9–2.7)
MCH RBC QN AUTO: 32.2 PG (ref 26.8–34.3)
MCHC RBC AUTO-ENTMCNC: 31.2 G/DL (ref 31.4–37.4)
MCV RBC AUTO: 103 FL (ref 82–98)
MONOCYTES # BLD AUTO: 0.55 THOUSAND/ÂΜL (ref 0.17–1.22)
MONOCYTES NFR BLD AUTO: 8 % (ref 4–12)
NEUTROPHILS # BLD AUTO: 5.06 THOUSANDS/ÂΜL (ref 1.85–7.62)
NEUTS SEG NFR BLD AUTO: 78 % (ref 43–75)
NITRITE UR QL STRIP: NEGATIVE
NRBC BLD AUTO-RTO: 0 /100 WBCS
O2 CT BLDV-SCNC: 12.1 ML/DL
PCO2 BLDV: 61 MM HG (ref 42–50)
PH BLDV: 7.37 [PH] (ref 7.3–7.4)
PH UR STRIP.AUTO: 5 [PH]
PLATELET # BLD AUTO: 143 THOUSANDS/UL (ref 149–390)
PMV BLD AUTO: 11.5 FL (ref 8.9–12.7)
PO2 BLDV: 36.3 MM HG (ref 35–45)
POTASSIUM SERPL-SCNC: 4.1 MMOL/L (ref 3.5–5.3)
PROCALCITONIN SERPL-MCNC: 0.31 NG/ML
PROT SERPL-MCNC: 9.4 G/DL (ref 6.4–8.4)
PROT UR STRIP-MCNC: NEGATIVE MG/DL
PROTHROMBIN TIME: 15.1 SECONDS (ref 11.6–14.5)
RBC # BLD AUTO: 3.7 MILLION/UL (ref 3.88–5.62)
RSV RNA RESP QL NAA+PROBE: NEGATIVE
SARS-COV-2 RNA RESP QL NAA+PROBE: NEGATIVE
SODIUM SERPL-SCNC: 140 MMOL/L (ref 135–147)
SP GR UR STRIP.AUTO: 1.02 (ref 1–1.03)
UROBILINOGEN UR QL STRIP.AUTO: 0.2 E.U./DL
WBC # BLD AUTO: 6.55 THOUSAND/UL (ref 4.31–10.16)

## 2023-11-17 PROCEDURE — 84484 ASSAY OF TROPONIN QUANT: CPT | Performed by: PHYSICIAN ASSISTANT

## 2023-11-17 PROCEDURE — 70450 CT HEAD/BRAIN W/O DYE: CPT

## 2023-11-17 PROCEDURE — 99285 EMERGENCY DEPT VISIT HI MDM: CPT | Performed by: PHYSICIAN ASSISTANT

## 2023-11-17 PROCEDURE — 74176 CT ABD & PELVIS W/O CONTRAST: CPT

## 2023-11-17 PROCEDURE — 87040 BLOOD CULTURE FOR BACTERIA: CPT | Performed by: PHYSICIAN ASSISTANT

## 2023-11-17 PROCEDURE — 81003 URINALYSIS AUTO W/O SCOPE: CPT | Performed by: PHYSICIAN ASSISTANT

## 2023-11-17 PROCEDURE — 0241U HB NFCT DS VIR RESP RNA 4 TRGT: CPT | Performed by: PHYSICIAN ASSISTANT

## 2023-11-17 PROCEDURE — 83880 ASSAY OF NATRIURETIC PEPTIDE: CPT | Performed by: PHYSICIAN ASSISTANT

## 2023-11-17 PROCEDURE — 72125 CT NECK SPINE W/O DYE: CPT

## 2023-11-17 PROCEDURE — 71045 X-RAY EXAM CHEST 1 VIEW: CPT

## 2023-11-17 PROCEDURE — 80053 COMPREHEN METABOLIC PANEL: CPT | Performed by: PHYSICIAN ASSISTANT

## 2023-11-17 PROCEDURE — 85610 PROTHROMBIN TIME: CPT | Performed by: PHYSICIAN ASSISTANT

## 2023-11-17 PROCEDURE — 99223 1ST HOSP IP/OBS HIGH 75: CPT | Performed by: PHYSICIAN ASSISTANT

## 2023-11-17 PROCEDURE — 83690 ASSAY OF LIPASE: CPT | Performed by: PHYSICIAN ASSISTANT

## 2023-11-17 PROCEDURE — 82805 BLOOD GASES W/O2 SATURATION: CPT | Performed by: PHYSICIAN ASSISTANT

## 2023-11-17 PROCEDURE — 84145 PROCALCITONIN (PCT): CPT | Performed by: PHYSICIAN ASSISTANT

## 2023-11-17 PROCEDURE — 83605 ASSAY OF LACTIC ACID: CPT | Performed by: PHYSICIAN ASSISTANT

## 2023-11-17 PROCEDURE — 85025 COMPLETE CBC W/AUTO DIFF WBC: CPT | Performed by: PHYSICIAN ASSISTANT

## 2023-11-17 PROCEDURE — 36415 COLL VENOUS BLD VENIPUNCTURE: CPT | Performed by: PHYSICIAN ASSISTANT

## 2023-11-17 PROCEDURE — 96374 THER/PROPH/DIAG INJ IV PUSH: CPT

## 2023-11-17 PROCEDURE — 93005 ELECTROCARDIOGRAM TRACING: CPT

## 2023-11-17 PROCEDURE — 99285 EMERGENCY DEPT VISIT HI MDM: CPT

## 2023-11-17 PROCEDURE — 83735 ASSAY OF MAGNESIUM: CPT | Performed by: PHYSICIAN ASSISTANT

## 2023-11-17 PROCEDURE — G1004 CDSM NDSC: HCPCS

## 2023-11-17 PROCEDURE — 71250 CT THORAX DX C-: CPT

## 2023-11-17 PROCEDURE — 85730 THROMBOPLASTIN TIME PARTIAL: CPT | Performed by: PHYSICIAN ASSISTANT

## 2023-11-17 PROCEDURE — 82140 ASSAY OF AMMONIA: CPT | Performed by: PHYSICIAN ASSISTANT

## 2023-11-17 RX ORDER — LACTULOSE 20 G/30ML
20 SOLUTION ORAL DAILY
COMMUNITY

## 2023-11-17 RX ORDER — CEFTRIAXONE 1 G/50ML
1000 INJECTION, SOLUTION INTRAVENOUS ONCE
Status: COMPLETED | OUTPATIENT
Start: 2023-11-17 | End: 2023-11-17

## 2023-11-17 RX ORDER — SPIRONOLACTONE 100 MG/1
100 TABLET, FILM COATED ORAL DAILY
COMMUNITY

## 2023-11-17 RX ORDER — FUROSEMIDE 10 MG/ML
40 INJECTION INTRAMUSCULAR; INTRAVENOUS ONCE
Status: COMPLETED | OUTPATIENT
Start: 2023-11-17 | End: 2023-11-17

## 2023-11-17 RX ADMIN — FUROSEMIDE 40 MG: 10 INJECTION, SOLUTION INTRAMUSCULAR; INTRAVENOUS at 20:45

## 2023-11-17 RX ADMIN — CEFTRIAXONE 1000 MG: 1 INJECTION, SOLUTION INTRAVENOUS at 20:47

## 2023-11-17 NOTE — Clinical Note
Case was discussed with juventino plummer  and the patient's admission status was agreed to be Admission Status: observation status to the service of Dr. Bill Marques .

## 2023-11-18 PROBLEM — K74.60 CIRRHOSIS (HCC): Status: ACTIVE | Noted: 2021-08-09

## 2023-11-18 PROBLEM — G47.30 SLEEP APNEA: Status: ACTIVE | Noted: 2023-11-18
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
## 2023-11-18 PROBLEM — R06.09 DOE (DYSPNEA ON EXERTION): Status: ACTIVE | Noted: 2020-01-10

## 2023-11-18 PROBLEM — J96.12 CHRONIC RESPIRATORY FAILURE WITH HYPERCAPNIA (HCC): Status: ACTIVE | Noted: 2023-11-18

## 2023-11-18 PROBLEM — I50.33 ACUTE ON CHRONIC HEART FAILURE WITH PRESERVED EJECTION FRACTION (HCC): Status: ACTIVE | Noted: 2021-08-07

## 2023-11-18 PROBLEM — R06.09 DOE (DYSPNEA ON EXERTION): Status: RESOLVED | Noted: 2020-01-10 | Resolved: 2023-11-18

## 2023-11-18 PROBLEM — Z74.2: Status: ACTIVE | Noted: 2023-11-18

## 2023-11-18 PROBLEM — N18.30 STAGE 3 CHRONIC KIDNEY DISEASE (HCC): Status: ACTIVE | Noted: 2023-11-18

## 2023-11-18 PROCEDURE — 99232 SBSQ HOSP IP/OBS MODERATE 35: CPT | Performed by: FAMILY MEDICINE

## 2023-11-18 PROCEDURE — 80188 ASSAY OF PRIMIDONE: CPT | Performed by: PHYSICIAN ASSISTANT

## 2023-11-18 PROCEDURE — 87081 CULTURE SCREEN ONLY: CPT | Performed by: FAMILY MEDICINE

## 2023-11-18 PROCEDURE — 80203 DRUG SCREEN QUANT ZONISAMIDE: CPT | Performed by: PHYSICIAN ASSISTANT

## 2023-11-18 PROCEDURE — 87147 CULTURE TYPE IMMUNOLOGIC: CPT | Performed by: FAMILY MEDICINE

## 2023-11-18 PROCEDURE — 80175 DRUG SCREEN QUAN LAMOTRIGINE: CPT | Performed by: PHYSICIAN ASSISTANT

## 2023-11-18 RX ORDER — ACETAMINOPHEN 325 MG/1
650 TABLET ORAL EVERY 6 HOURS PRN
Status: DISCONTINUED | OUTPATIENT
Start: 2023-11-18 | End: 2023-12-08 | Stop reason: HOSPADM

## 2023-11-18 RX ORDER — ENOXAPARIN SODIUM 100 MG/ML
40 INJECTION SUBCUTANEOUS DAILY
Status: DISCONTINUED | OUTPATIENT
Start: 2023-11-18 | End: 2023-11-20

## 2023-11-18 RX ORDER — SPIRONOLACTONE 100 MG/1
100 TABLET, FILM COATED ORAL DAILY
Status: DISCONTINUED | OUTPATIENT
Start: 2023-11-18 | End: 2023-11-19

## 2023-11-18 RX ORDER — POLYETHYLENE GLYCOL 3350 17 G/17G
17 POWDER, FOR SOLUTION ORAL DAILY PRN
Status: DISCONTINUED | OUTPATIENT
Start: 2023-11-18 | End: 2023-12-08 | Stop reason: HOSPADM

## 2023-11-18 RX ORDER — LORAZEPAM 2 MG/ML
2 INJECTION INTRAMUSCULAR EVERY 6 HOURS PRN
Status: DISCONTINUED | OUTPATIENT
Start: 2023-11-18 | End: 2023-12-08 | Stop reason: HOSPADM

## 2023-11-18 RX ORDER — LEVOCARNITINE 1 G/10ML
330 SOLUTION ORAL
Status: DISCONTINUED | OUTPATIENT
Start: 2023-11-18 | End: 2023-11-25

## 2023-11-18 RX ORDER — ASCORBIC ACID 500 MG
500 TABLET ORAL DAILY
Status: DISCONTINUED | OUTPATIENT
Start: 2023-11-18 | End: 2023-12-08 | Stop reason: HOSPADM

## 2023-11-18 RX ORDER — LACTULOSE 10 G/15ML
20 SOLUTION ORAL
Status: DISCONTINUED | OUTPATIENT
Start: 2023-11-18 | End: 2023-11-26

## 2023-11-18 RX ORDER — LAMOTRIGINE 100 MG/1
300 TABLET ORAL
Status: DISCONTINUED | OUTPATIENT
Start: 2023-11-18 | End: 2023-12-08 | Stop reason: HOSPADM

## 2023-11-18 RX ORDER — ONDANSETRON 2 MG/ML
4 INJECTION INTRAMUSCULAR; INTRAVENOUS EVERY 6 HOURS PRN
Status: DISCONTINUED | OUTPATIENT
Start: 2023-11-18 | End: 2023-12-08 | Stop reason: HOSPADM

## 2023-11-18 RX ORDER — ZONISAMIDE 100 MG/1
100 CAPSULE ORAL
Status: DISCONTINUED | OUTPATIENT
Start: 2023-11-18 | End: 2023-12-08 | Stop reason: HOSPADM

## 2023-11-18 RX ORDER — PRIMIDONE 50 MG/1
150 TABLET ORAL
Status: DISCONTINUED | OUTPATIENT
Start: 2023-11-18 | End: 2023-12-08 | Stop reason: HOSPADM

## 2023-11-18 RX ORDER — PRIMIDONE 50 MG/1
100 TABLET ORAL
Status: DISCONTINUED | OUTPATIENT
Start: 2023-11-18 | End: 2023-11-25

## 2023-11-18 RX ORDER — FUROSEMIDE 40 MG/1
40 TABLET ORAL
Status: DISCONTINUED | OUTPATIENT
Start: 2023-11-18 | End: 2023-11-18

## 2023-11-18 RX ORDER — FUROSEMIDE 10 MG/ML
40 INJECTION INTRAMUSCULAR; INTRAVENOUS
Status: DISCONTINUED | OUTPATIENT
Start: 2023-11-18 | End: 2023-11-19

## 2023-11-18 RX ORDER — NYSTATIN 100000 [USP'U]/G
POWDER TOPICAL 2 TIMES DAILY
Status: DISCONTINUED | OUTPATIENT
Start: 2023-11-18 | End: 2023-12-08 | Stop reason: HOSPADM

## 2023-11-18 RX ORDER — PRIMIDONE 50 MG/1
100 TABLET ORAL
Status: DISCONTINUED | OUTPATIENT
Start: 2023-11-18 | End: 2023-12-08 | Stop reason: HOSPADM

## 2023-11-18 RX ORDER — AMOXICILLIN 250 MG
1 CAPSULE ORAL 2 TIMES DAILY
Status: DISCONTINUED | OUTPATIENT
Start: 2023-11-18 | End: 2023-12-08 | Stop reason: HOSPADM

## 2023-11-18 RX ORDER — LANOLIN ALCOHOL/MO/W.PET/CERES
1 CREAM (GRAM) TOPICAL
Status: DISCONTINUED | OUTPATIENT
Start: 2023-11-18 | End: 2023-11-25

## 2023-11-18 RX ORDER — GUAIFENESIN/DEXTROMETHORPHAN 100-10MG/5
10 SYRUP ORAL EVERY 4 HOURS PRN
Status: DISCONTINUED | OUTPATIENT
Start: 2023-11-18 | End: 2023-12-08 | Stop reason: HOSPADM

## 2023-11-18 RX ORDER — LAMOTRIGINE 100 MG/1
200 TABLET ORAL
Status: DISCONTINUED | OUTPATIENT
Start: 2023-11-18 | End: 2023-12-08 | Stop reason: HOSPADM

## 2023-11-18 RX ADMIN — Medication 400 UNITS: at 09:23

## 2023-11-18 RX ADMIN — METOPROLOL TARTRATE 25 MG: 25 TABLET, FILM COATED ORAL at 09:23

## 2023-11-18 RX ADMIN — AMPICILLIN SODIUM AND SULBACTAM SODIUM 3 G: 2; 1 INJECTION, POWDER, FOR SOLUTION INTRAMUSCULAR; INTRAVENOUS at 15:33

## 2023-11-18 RX ADMIN — ZONISAMIDE 100 MG: 100 CAPSULE ORAL at 00:45

## 2023-11-18 RX ADMIN — FUROSEMIDE 40 MG: 10 INJECTION, SOLUTION INTRAMUSCULAR; INTRAVENOUS at 15:18

## 2023-11-18 RX ADMIN — OXYCODONE HYDROCHLORIDE AND ACETAMINOPHEN 500 MG: 500 TABLET ORAL at 09:23

## 2023-11-18 RX ADMIN — LAMOTRIGINE 300 MG: 100 TABLET ORAL at 00:45

## 2023-11-18 RX ADMIN — PRIMIDONE 150 MG: 50 TABLET ORAL at 22:36

## 2023-11-18 RX ADMIN — ENOXAPARIN SODIUM 40 MG: 40 INJECTION SUBCUTANEOUS at 09:26

## 2023-11-18 RX ADMIN — AMPICILLIN SODIUM AND SULBACTAM SODIUM 3 G: 2; 1 INJECTION, POWDER, FOR SOLUTION INTRAMUSCULAR; INTRAVENOUS at 22:36

## 2023-11-18 RX ADMIN — LEVOCARNITINE 330 MG: 1 SOLUTION ORAL at 12:30

## 2023-11-18 RX ADMIN — ASPIRIN 81 MG: 81 TABLET, COATED ORAL at 09:23

## 2023-11-18 RX ADMIN — PRIMIDONE 100 MG: 50 TABLET ORAL at 09:31

## 2023-11-18 RX ADMIN — ZONISAMIDE 100 MG: 100 CAPSULE ORAL at 22:37

## 2023-11-18 RX ADMIN — PRIMIDONE 100 MG: 50 TABLET ORAL at 15:14

## 2023-11-18 RX ADMIN — LAMOTRIGINE 200 MG: 100 TABLET ORAL at 05:44

## 2023-11-18 RX ADMIN — LEVOCARNITINE 330 MG: 1 SOLUTION ORAL at 22:38

## 2023-11-18 RX ADMIN — DOCUSATE SODIUM AND SENNOSIDES 1 TABLET: 8.6; 5 TABLET, FILM COATED ORAL at 18:06

## 2023-11-18 RX ADMIN — SPIRONOLACTONE 100 MG: 100 TABLET, FILM COATED ORAL at 09:23

## 2023-11-18 RX ADMIN — NYSTATIN: 100000 POWDER TOPICAL at 09:30

## 2023-11-18 RX ADMIN — LEVOCARNITINE 330 MG: 1 SOLUTION ORAL at 18:06

## 2023-11-18 RX ADMIN — DOCUSATE SODIUM AND SENNOSIDES 1 TABLET: 8.6; 5 TABLET, FILM COATED ORAL at 09:23

## 2023-11-18 RX ADMIN — LAMOTRIGINE 250 MG: 100 TABLET ORAL at 15:14

## 2023-11-18 RX ADMIN — LEVOCARNITINE 330 MG: 1 SOLUTION ORAL at 09:32

## 2023-11-18 RX ADMIN — METOPROLOL TARTRATE 25 MG: 25 TABLET, FILM COATED ORAL at 22:37

## 2023-11-18 RX ADMIN — LAMOTRIGINE 300 MG: 100 TABLET ORAL at 22:40

## 2023-11-18 RX ADMIN — PRIMIDONE 150 MG: 50 TABLET ORAL at 00:45

## 2023-11-18 RX ADMIN — LACTULOSE 20 G: 20 SOLUTION ORAL at 18:06

## 2023-11-18 RX ADMIN — Medication 1 TABLET: at 09:31

## 2023-11-18 RX ADMIN — NYSTATIN: 100000 POWDER TOPICAL at 18:07

## 2023-11-18 NOTE — ASSESSMENT & PLAN NOTE
Chronic problem, unchanged from prior imaging  This did require a chest tube about 20 years ago according to the mother's typed medical history list  No hypoxia  Cannot rule out superimposed pneumonia on CXR however there is no sepsis. Will hold off on antibiotics at this time.   Continue home diuretic  Continue incentive spirometer for atelectatic changes  Aspiration precautions

## 2023-11-18 NOTE — ASSESSMENT & PLAN NOTE
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

## 2023-11-18 NOTE — ASSESSMENT & PLAN NOTE
Patient does use pumps at home he has chronic lymphedema but discussed with the mother that edema is worsened he has been compliant with Lasix when he was in the nursing home his Lasix was 60 but decreased to 40 secondary to kidney numbers. He does have evidence of a small effusion on the left as well as the right which he has loculated see below he did receive Lasix 40 mg IV x1 on admission. We will hold his p.o.  Lasix transition him to Lasix 40 mg IV twice daily monitor his electrolytes and kidney function as he is also on Aldactone 100 mg p.o. daily which we will continue CT abdomen did not reveal any paracentesis as the mother did report that his girth has increased  proBNP is elevated  2D echo done in 2022 with mild concentric hypertrophy and EF of 60 to 65%  Am labs  Daily weights and strict I and o

## 2023-11-18 NOTE — ED NOTES
Pt to bedside commode. 2 assist needed. Obtained urine sample. Pt had a small BM. Swetha Ferrer, [place]  11/17/23 [id number]

## 2023-11-18 NOTE — ASSESSMENT & PLAN NOTE
Wears 2L O2 QHS, continue  Likely has a chronic hypercapnia given the elevated bicarbonate/kyphosis likely causing chronic limited chest expansion

## 2023-11-18 NOTE — ASSESSMENT & PLAN NOTE
Continue home seizure medications with seizure precautions  His last seizure was 2-3 days ago according to his uncle. Lamictal, zonesamide and primidone levels ordered. The last few times he was hospitalized for seizure the AEDs were not changed because the breakthrough seizure etiology were infections.

## 2023-11-18 NOTE — H&P
[address]  H&P  Name: Melida Hubbard 66 y.o. male I MRN: [id number]  Unit/Bed#: -[facility] I Date of Admission: 11/17/2023   Date of Service: 11/18/2023 I Hospital Day: 1      Assessment/Plan   * No other household member able to render care  Assessment & Plan  Patient's main caregiver is his mother who is hospitalized today at [facility]  She assists him with his ADLs and assist x2  Might need placement    Chronic respiratory failure with hypercapnia ([address])  Assessment & Plan  Wears 2L O2 QHS, continue  Likely has a chronic hypercapnia given the elevated bicarbonate/kyphosis likely causing chronic limited chest expansion     Cirrhosis ([address])  Assessment & Plan  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 Continue home Lasix    Pleural effusion on right  Assessment & Plan  Chronic problem, unchanged from prior imaging  This did require a chest tube about 20 years ago according to the mother's typed medical history list  No hypoxia  Cannot rule out superimposed pneumonia on CXR however there is no sepsis. Will hold off on antibiotics at this time.   Continue home diuretic  Continue incentive spirometer for atelectatic changes  Aspiration precautions    Dysphagia  Assessment & Plan  Continue mechanical soft diet from prior  Aspiration precautions    Developmental delay  Assessment & Plan  Daily involvement with mother  Supportive care  PT/OT/ST  Pt AAOx1   At baseline AAOx3    Falls  Assessment & Plan  Frequent falls for his entire life because of unsteady gait according to the mother's medical history list  Fall precautions  Emergency room trauma eval no acute injury    Nonintractable generalized idiopathic epilepsy without status epilepticus ([address])  Assessment & Plan  Continue home seizure medications with seizure precautions  His last seizure was 2-3 days ago according to his uncle. Lamictal, zonesamide and primidone levels ordered. The last few times he was hospitalized for seizure the AEDs were not changed because the breakthrough seizure etiology were infections. MCKEON (dyspnea on exertion)  Assessment & Plan  Patient does have an aspect of chronic MCKEON given his history of restrictive pericarditis from history of pericardial window in the distant past as well as kyphosis, chest wall disease, COPD, CHF  There is atelectasis which is chronic on CAT scan today, he is not dyspneic or hypoxic. He uses 2 L O2 via nasal cannula nightly which is supporting his SPO2 adequately greater than 90% at this time  Titrate O2 as needed  If patient develops SIRS would obtain urine antigens and treat for pneumonia, at this time holding off on antibiotic treatment. Of note he does have chronic MRSA nares colonization  Patient was given 1 dose of IV Lasix in the emergency room, he is not overtly fluid overloaded, we will resume his home Lasix dosing. Encourage out of bed with an assistant and incentive spirometer         VTE Pharmacologic Prophylaxis: VTE Score: 5 High Risk (Score >/= 5) - Pharmacological DVT Prophylaxis Ordered: enoxaparin (Lovenox). Sequential Compression Devices Ordered. Code Status: Level 1 - Full Code   Discussion with family:  RN updated mother who is in hospital room across the cardona. Anticipated Length of Stay: Patient will be admitted on an inpatient basis with an anticipated length of stay of greater than 2 midnights secondary to placement, labs, serial exams, monitor vs and resp effort. Total Time Spent on Date of Encounter in care of patient: 55 mins.  This time was spent on one or more of the following: performing physical exam; counseling and coordination of care; obtaining or reviewing history; documenting in the medical record; reviewing/ordering tests, medications or procedures; communicating with other healthcare professionals and discussing with patient's family/caregivers. Chief Complaint: frequent falls, increased lethargy and MCKEON x 2 days    History of Present Illness:  Tatianna Sarmiento is a 66 y.o. male with a PMH of epilepsy since childhood, history of pericardial window with subsequent restrictive pericarditis, kyphosis, chronic atelectasis, history of right-sided pleural effusion requiring chest tube in the distant past, recurring pneumonia, lymphedema bilateral lower extremities, ambulatory dysfunction, developmental delay, chronic O2 use 2 L nightly, CHF, cirrhosis, recurring falls who presents with frequent falls, increased lethargy and MCKEON x2 days without home health or someone to care for him at home. I am unable to obtain a history from the patient due to him being very sleepy on my encounter as well as electrical disability. According to the emergency room provider patient was having increased lethargy, frequent falls and MCKEON at home. Because of the falls she obtained trauma CAT scan imaging. Patient was not complaining of any pain in his head, neck arms legs or abdomen at the time of my encounter. Patient's family does not have the resources to safely take care of him at home at this time because he is a 2+ assist and his mother manages his ADLs, she fell and has a broken hip and was admitted to this hospital earlier today. According to the patient's uncle with whom the RN spoke over the phone, he has been having frequent seizures last one was 2 to 3 days ago. Chart review this patient had a few seizures in the last few years which were always blamed on breakthrough from infection, in the recent past mother was concerned about almost monthly seizures spite her managing this patient's medications religiously. Review of Systems:  Review of Systems   Reason unable to perform ROS: patient is intellectually disabled and lethargic. Respiratory:  Positive for shortness of breath. Cardiovascular:  Positive for leg swelling. Gastrointestinal:  Negative for vomiting. Allergic/Immunologic: Negative. Neurological:  Positive for seizures. Past Medical and Surgical History:   Past Medical History:   Diagnosis Date    Hypertension     Mentally challenged     Pericardial effusion     Pneumonia     Seizure [facility])        Past Surgical History:   Procedure Laterality Date    FRACTURE SURGERY      clavicle, left humerus, radial, and ulna. Right radius    HIP ARTHROSCOPY Right     OR COLONOSCOPY FLX DX W/COLLJ SPEC WHEN PFRMD N/A 4/1/2019    Procedure: COLONOSCOPY;  Surgeon: Michele Michele MD;  Location: [facility]; Service: Colorectal       Meds/Allergies:  Prior to Admission medications    Medication Sig Start Date End Date Taking?  Authorizing Provider   ascorbic acid (VITAMIN C) 500 mg tablet Take 500 mg by mouth daily   Yes Historical Provider, MD   aspirin 81 MG tablet Take 81 mg by mouth daily   Yes Historical Provider, MD   calcium-vitamin D 250-100 MG-UNIT per tablet Take 1 tablet by mouth daily    Yes Historical Provider, MD   furosemide (LASIX) 20 mg tablet Take 1 tablet (20 mg total) by mouth daily after lunch  Patient taking differently: Take 40 mg by mouth daily after lunch 8/11/21 11/17/23 Yes Koby Alexis MD   lactulose 20 g/30 mL Take 20 g by mouth daily Daily after supper in 4oz of juice   Yes Historical Provider, MD   lamoTRIgine (LaMICtal) 100 mg tablet Take 250 mg by mouth daily after lunch   Yes Historical Provider, MD   lamoTRIgine (LaMICtal) 150 MG tablet Take 300 mg by mouth daily at bedtime   Yes Historical Provider, MD   lamoTRIgine (LaMICtal) 200 MG tablet Take 200 mg by mouth daily in the early morning    Yes Historical Provider, MD   metoprolol tartrate (LOPRESSOR) 25 mg tablet Take 25 mg by mouth 2 (two) times a day 3/18/21  Yes Historical Provider, MD   primidone (MYSOLINE) 50 mg tablet Take 100 mg by mouth daily after breakfast   Yes Historical Provider, MD   primidone (MYSOLINE) 50 mg tablet Take 100 mg by mouth daily with lunch   Yes Historical Provider, MD   primidone (MYSOLINE) 50 mg tablet Take 3 tablets (150 mg total) by mouth daily at bedtime 1/21/20  Yes Anders Garcia MD   spironolactone (ALDACTONE) 100 mg tablet Take 100 mg by mouth daily   Yes Historical Provider, MD   thiamine 50 MG tablet Take by mouth daily Unsure dose   Yes Historical Provider, MD   vitamin E, tocopherol, 400 units capsule Take 400 Units by mouth daily   Yes Historical Provider, MD   zonisamide (ZONEGRAN) 100 mg capsule Take 1 capsule (100 mg total) by mouth daily at bedtime 1/21/20  Yes Anders Garcia MD   acetaminophen (TYLENOL) 325 mg tablet Take 2 tablets (650 mg total) by mouth every 6 (six) hours as needed for mild pain, headaches or fever 8/11/21   Deja Segura MD   levOCARNitine (CARNITOR) 330 MG tablet Take 330 mg by mouth 4 (four) times daily (after meals and at bedtime)     Historical Provider, MD   polyethylene glycol (MIRALAX) 17 g packet Take 17 g by mouth daily as needed (constipation) 8/11/21 9/10/21  Deja Segura MD   senna-docusate sodium (SENOKOT S) 8.6-50 mg per tablet Take 1 tablet by mouth 2 (two) times a day 8/11/21 9/10/21  Deja Segura MD   zinc gluconate 50 mg tablet Take 50 mg by mouth daily    Historical Provider, MD   clonazePAM (KlonoPIN) 0.5 mg tablet Take 0.5 mg by mouth daily at bedtime   Patient not taking: Reported on 11/17/2023 11/18/23  Historical Provider, MD     I have reviewed home medications with patient family member. Allergies:    Allergies   Allergen Reactions    Budesonide Seizures     Other reaction(s): Seizure    Dilantin [Phenytoin]      Pericardial effusion    Flurazepam Other (See Comments)     dalmane    Other reaction(s): MADW HIM RAMMEY   dalmane    Ipratropium-Albuterol Seizures     Other reaction(s): Seizures    Phenobarbital Hyperactivity    Vyvanse [Lisdexamfetamine Dimesylate] Other (See Comments) Unknown        Social History:  Marital Status: Single   Occupation: none, disability  Patient Pre-hospital Living Situation: Home w/ mother  Patient Pre-hospital Level of Mobility: unable to be assessed at time of evaluation  Patient Pre-hospital Diet Restrictions: mechanical soft  Substance Use History:   Social History     Substance and Sexual Activity   Alcohol Use Never     Social History     Tobacco Use   Smoking Status Never   Smokeless Tobacco Never     Social History     Substance and Sexual Activity   Drug Use Never       Family History:  Family History   Adopted: Yes       Physical Exam:     Vitals:   Blood Pressure: 143/87 (11/17/23 2240)  Pulse: 76 (11/17/23 2240)  Temperature: 97.7 °F (36.5 °C) (11/17/23 2240)  Temp Source: Temporal (11/17/23 2240)  Respirations: 17 (11/17/23 2240)  Height: 5' 6" (167.6 cm) (11/18/23 0334)  Weight - Scale: 89 kg (196 lb 3.4 oz) (11/17/23 1729)  SpO2: 90 % (11/17/23 2240)    Physical Exam  Vitals and nursing note reviewed. Constitutional:       General: He is not in acute distress. Appearance: He is ill-appearing. Comments: somnolent   HENT:      Head: Normocephalic and atraumatic. Nose: Congestion present. Mouth/Throat:      Comments: Excessive secretions  Eyes:      Conjunctiva/sclera: Conjunctivae normal.      Comments: Eyes are not midline   Cardiovascular:      Rate and Rhythm: Normal rate and regular rhythm. Pulmonary:      Breath sounds: No wheezing. Comments: Wet sounding cough no respiratory distress, belly breathing or hypoxia    Wearing is baseline O2 qhs at this time  Abdominal:      Tenderness: There is no abdominal tenderness. Musculoskeletal:         General: Swelling present. Cervical back: Neck supple.    Skin:     Comments: Skin changes from chronic lymphedema bl le no open wounds no excessive warmth   Psychiatric:      Comments: Intellectual disability          Additional Data:     Lab Results:  Results from last 7 days   Lab Units 11/17/23  1800   WBC Thousand/uL 6.55   HEMOGLOBIN g/dL 11.9*   HEMATOCRIT % 38.1   PLATELETS Thousands/uL 143*   NEUTROS PCT % 78*   LYMPHS PCT % 14   MONOS PCT % 8   EOS PCT % 0     Results from last 7 days   Lab Units 11/17/23  1800   SODIUM mmol/L 140   POTASSIUM mmol/L 4.1   CHLORIDE mmol/L 100   CO2 mmol/L 33*   BUN mg/dL 30*   CREATININE mg/dL 1.59*   ANION GAP mmol/L 7   CALCIUM mg/dL 9.9   ALBUMIN g/dL 4.0   TOTAL BILIRUBIN mg/dL 0.98   ALK PHOS U/L 195*   ALT U/L 20   AST U/L 27   GLUCOSE RANDOM mg/dL 90     Results from last 7 days   Lab Units 11/17/23  1800   INR  1.15             Results from last 7 days   Lab Units 11/17/23  1800   LACTIC ACID mmol/L 1.2   PROCALCITONIN ng/ml 0.31*       Lines/Drains:  Invasive Devices       Peripheral Intravenous Line  Duration             Peripheral IV 11/17/23 Left Antecubital <1 day                        Imaging: Reviewed radiology reports from this admission including: CT head and ct c spine, ct chest/abd/pelvis and Personally reviewed the following imaging: chest xray  CT head without contrast   Final Result by Mona Cash MD (11/17 2007)      No acute intracranial abnormality. Workstation performed: [id number]         CT cervical spine without contrast   Final Result by Mona Cash MD (11/17 2009)      No cervical spine fracture or traumatic malalignment. Workstation performed: [id number]         CT chest abdomen pelvis wo contrast   Final Result by Mona Cash MD (11/17 2020)      No evidence of acute traumatic injury throughout the chest, abdomen or pelvis. Moderate size loculated right pleural effusion. Right middle and lower lobe consolidation compatible with compressive atelectasis. Underlying pneumonia should be excluded clinically. Mild wall thickening and edema with surrounding fluid at the proximal duodenum.  Correlate for signs and symptoms of peptic ulcer disease Workstation performed: [id number]         XR chest portable    (Results Pending)       EKG and Other Studies Reviewed on Admission:   EKG: No EKG obtained. ** Please Note: This note has been constructed using a voice recognition system.  **

## 2023-11-18 NOTE — CASE MANAGEMENT
Case Management Assessment & Discharge Planning Note    Patient name Delphine Espinoza  Location [address] [facility] [place] 337/-12 MRN [id number]  : 1965 Date 2023       Current Admission Date: 2023  Current Admission Diagnosis:Pleural effusion on right   Patient Active Problem List    Diagnosis Date Noted    No other household member able to render care 2023    Chronic respiratory failure with hypercapnia ([address]) 2023    Stage 3 chronic kidney disease ([address]) 2023    Liver cirrhosis ([address]) 2021    Abnormal finding on CT scan 2021    EDWIGE (acute kidney injury) ([address]) 2021    Pleural effusion on right 2021    History of COVID-19 2021    S/P pericardial window creation 2021    Acute on chronic heart failure with preserved ejection fraction ([address]) 2021    Lower extremity pain, left 2021    MRSA nasal colonization 2020    Developmental delay 2020    Dysphagia 2020    Acute encephalopathy 2020    Pneumonia due to infectious organism 01/10/2020    Nonintractable generalized idiopathic epilepsy without status epilepticus ([address]) 01/10/2020    Falls 01/10/2020    T wave inversion in EKG 01/10/2020    Essential hypertension 01/10/2020    RSV (acute bronchiolitis due to respiratory syncytial virus) 01/10/2020    Acute respiratory failure with hypoxia ([address]) 01/10/2020    Polyp of colon 03/15/2019      LOS (days): 1  Geometric Mean LOS (GMLOS) (days): 3.10  Days to GMLOS:2.3     OBJECTIVE:    Risk of Unplanned Readmission Score: 15.28         Current admission status: Inpatient  Referral Reason:  (Post Acute Placement (specify))    Preferred Pharmacy:   [address] [place] [address] Road  [address] [address] [facility]  Phone: [phone] Fax: [phone]    Primary Care Provider: Mindy Quan DO    Primary Insurance: MEDICARE  Secondary Insurance: [employer]    ASSESSMENT:  Active Health Care Proxies       Alonso Loa[address] Representative - Mother   Primary Phone: [phone] (Home)                 Advance Directives  Does patient have a [address]?: Yes  Does patient have Advance Directives?: Yes  Advance Directives: Power of  for health care  Primary Contact: mother Fry         Readmission Root Cause  30 Day Readmission: No    Patient Information  Admitted from[de-identified] Home  Mental Status: Alert  During Assessment patient was accompanied by: Parent  Assessment information provided by[de-identified] Parent  Primary Caregiver: Parent  Caregiver's Name[de-identified] Williams Agarwal mother  Caregiver's Relationship to Patient[de-identified] Family Member  Caregiver's Telephone Number[de-identified] [phone] (H)  Support Systems: Parent, Family members  [place] of Residence: [address] [place] [address] do you live in?: [address] entry access options. Select all that apply.: Stairs  Number of steps to enter home.: 3  Do the steps have railings?: Yes  Type of Current Residence: 2 story home  Upon entering residence, is there a bedroom on the main floor (no further steps)?: No  A bedroom is located on the following floor levels of residence (select all that apply):: 2nd Floor  Upon entering residence, is there a bathroom on the main floor (no further steps)?: No  Indicate which floors of current residence have a bathroom (select all the apply):: 2nd Floor  Number of steps to 2nd floor from main floor: One Flight (stair chair)  Living Arrangements: Lives w/ Parent(s)  Is patient a ?: No    Activities of Daily Living Prior to Admission  Functional Status: Total dependent  Completes ADLs independently?: No  Level of ADL dependence: Total Dependent  Ambulates independently?: No  Level of ambulatory dependence: Assistance  Does patient use assisted devices?: Yes  Assisted Devices (DME) used: Bedside Commode, Wheelchair, Veleria , Home Oxygen concentrator  DME Company Name (respiratory supplies):  [employer]  O2 Rate(s): 2L at night  Does patient currently own DME?: Yes  What DME does the patient currently own?: Bedside Commode, Wheelchair, Walker, Home Oxygen concentrator  Does patient have a history of Outpatient Therapy (PT/OT)?: Yes ([facility])  Does the patient have a history of Short-Term Rehab?: Yes ([facility], [facility], [facility])  Does patient have a history of HHC?: Yes (numerous, [facility] most recently)  Does patient currently have [address]?: No         Patient Information Continued  Income Source: SSI/SSD  Does patient have prescription coverage?: Yes  Does patient receive dialysis treatments?: No  Does patient have a history of substance abuse?: No  Does patient have a history of Mental Health Diagnosis?: No         Means of Transportation  Means of Transport to Appts[de-identified] Family transport      Housing Stability: Low Risk  (11/18/2023)    Housing Stability Vital Sign     Unable to Pay for Housing in the Last Year: No     Number of [address] in the Last Year: 2     Unstable Housing in the Last Year: No   Food Insecurity: No Food Insecurity (11/18/2023)    Hunger Vital Sign     Worried About Running Out of Food in the Last Year: Never true     [address] in the Last Year: Never true   Transportation Needs: No Transportation Needs (11/18/2023)    PRAPARE - Transportation     Lack of Transportation (Medical): No     Lack of Transportation (Non-Medical): No   Utilities: Not At Risk (11/18/2023)    [facility] Utilities     Threatened with loss of utilities: No       DISCHARGE DETAILS:    Discharge planning discussed with[de-identified] patient and motherMengper of Choice: Yes     CM contacted family/caregiver?: Yes             Contacts  Patient Contacts: Maverick Roles, mother  Relationship to Patient[de-identified] Family  Contact Method: In Person  Reason/Outcome: Discharge Planning, Continuity of Care            CM met with patient and mother Jeremiah Roles at the bedside, baseline information was obtained.  CM discussed the role of CM in helping the patient develop a discharge plan and assist the patient in carry out their plan. Patient lives with mother and brother in 2 story home. Patient has been in numerous STR and acute rehab facilities,has had [address] and most recently had [facility] therapy in the home. Patient was ambulatory sometime ago, [facility], but patients mobility has declined. Patient typically can stand pivot to [facility] with assistance although on a very good day can take a few steps. Patients mother is primary caregiver for patient and mother reports although patients brother lives in the home he cannot care for patients needs. Patient attended [facility] as child and has LD. Should patient need rehab he would need LOC. Patient has [facility]. CM to follow for CM discharge referral needs.

## 2023-11-18 NOTE — ASSESSMENT & PLAN NOTE
Lab Results   Component Value Date    EGFR 47 11/17/2023    EGFR 56 08/11/2021    EGFR 61 08/10/2021    CREATININE 1.59 (H) 11/17/2023    CREATININE 1.41 (H) 08/11/2021    CREATININE 1.32 (H) 08/10/2021   Has been anywhere between 1.4-1.5

## 2023-11-18 NOTE — ASSESSMENT & PLAN NOTE
Frequent falls for his entire life because of unsteady gait according to the mother's medical history list  Fall precautions  Emergency room trauma eval no acute injury

## 2023-11-18 NOTE — ASSESSMENT & PLAN NOTE
This did require a chest tube about 20 years ago according to the mother's typed medical history list  Discussed with the mother patient has been having worsening shortness of breath and for the past week and has been falling asleep a lot he usually wears oxygen 2 L at night but the mother has been using 2 L throughout the day. No cough has been reported but she did state that his lower extremity edema has worsened  There is loculated right-sided moderate effusion with inability to rule out pneumonia questionable can be parapneumonic effusion that needs chest tube for drainage in unable to be done with a thoracocentesis.   I discussed with the mother and if needed okay to proceed with the chest tube discussed that interventional radiology will see the patient on Monday and decide which way to go we will order pleural fluid studies  Procalcitonin is elevated will start Unasyn  Consult pulmonology  We will do an LDH serum to calculate lights criteria  Incentive spirometer  Mrsa screen  Patient had pneumonia 6 times he does have history of aspiration as well he is on a modified diet we will ask speech to evaluate to ensure there is no evidence of aspiration seen

## 2023-11-18 NOTE — PROGRESS NOTES
[address] [facility]  Progress Note  Name: Renee Evans  MRN: [id number]  Unit/Bed#: -57 I Date of Admission: 11/17/2023   Date of Service: 11/18/2023 I Hospital Day: 1    Assessment/Plan   * Pleural effusion on right  Assessment & Plan    This did require a chest tube about 20 years ago according to the mother's typed medical history list  Discussed with the mother patient has been having worsening shortness of breath and for the past week and has been falling asleep a lot he usually wears oxygen 2 L at night but the mother has been using 2 L throughout the day. No cough has been reported but she did state that his lower extremity edema has worsened  There is loculated right-sided moderate effusion with inability to rule out pneumonia questionable can be parapneumonic effusion that needs chest tube for drainage in unable to be done with a thoracocentesis.   I discussed with the mother and if needed okay to proceed with the chest tube discussed that interventional radiology will see the patient on Monday and decide which way to go we will order pleural fluid studies  Procalcitonin is elevated will start Unasyn  Consult pulmonology  We will do an LDH serum to calculate lights criteria  Incentive spirometer  Mrsa screen  Patient had pneumonia 6 times he does have history of aspiration as well he is on a modified diet we will ask speech to evaluate to ensure there is no evidence of aspiration seen    Stage 3 chronic kidney disease [facility])  Assessment & Plan  Lab Results   Component Value Date    EGFR 47 11/17/2023    EGFR 56 08/11/2021    EGFR 61 08/10/2021    CREATININE 1.59 (H) 11/17/2023    CREATININE 1.41 (H) 08/11/2021    CREATININE 1.32 (H) 08/10/2021   Has been anywhere between 1.4-1.5    Chronic respiratory failure with hypercapnia (HCC)  Assessment & Plan  Wears 2L O2 QHS, continue  Likely has a chronic hypercapnia given the elevated bicarbonate/kyphosis likely causing chronic limited chest expansion and sleepiness during day will obtain vbg    No other household member able to render care  Assessment & Plan  Patient's main caregiver is his mother who is hospitalized today at [address]  She assists him with his ADLs and assist x2  Might need placement  Pt/ot ordered    Liver cirrhosis ([address])  Assessment & Plan  Cirrhotic morphology of the liver chronic problem  Ammonia is normal there is no ascites continue Lasix and Aldactone    Acute on chronic heart failure with preserved ejection fraction [facility])  Assessment & Plan  Patient does use pumps at home he has chronic lymphedema but discussed with the mother that edema is worsened he has been compliant with Lasix when he was in the nursing home his Lasix was 60 but decreased to 40 secondary to kidney numbers. He does have evidence of a small effusion on the left as well as the right which he has loculated see below he did receive Lasix 40 mg IV x1 on admission. We will hold his p.o.  Lasix transition him to Lasix 40 mg IV twice daily monitor his electrolytes and kidney function as he is also on Aldactone 100 mg p.o. daily which we will continue CT abdomen did not reveal any paracentesis as the mother did report that his girth has increased  proBNP is elevated  2D echo done in 2022 with mild concentric hypertrophy and EF of 60 to 65%  Am labs  Daily weights and strict I and o    Dysphagia  Assessment & Plan  Continue mechanical soft diet from prior  Aspiration precautions obtain speech    Developmental delay  Assessment & Plan  Daily involvement with mother  Supportive care  PT/OT/ST  Pt AAOx1   At baseline AAOx3    Falls  Assessment & Plan  Frequent falls for his entire life because of unsteady gait according to the mother's medical history list  Fall precautions  Emergency room trauma eval no acute injury    Nonintractable generalized idiopathic epilepsy without status epilepticus [facility])  Assessment & Plan  Continue home seizure medications with seizure precautions  His last seizure was 2-3 days ago according to his uncle. Lamictal, zonesamide and primidone levels ordered. The last few times he was hospitalized for seizure the AEDs were not changed because the breakthrough seizure etiology were infections. VTE Pharmacologic Prophylaxis: VTE Score: 5 High Risk (Score >/= 5) - Pharmacological DVT Prophylaxis Ordered: enoxaparin (Lovenox). Sequential Compression Devices Ordered. Mobility:   -[facility] Achieved: 1: Laying in bed  [facility] Goal NOT achieved. Continue with multidisciplinary rounding and encourage appropriate mobility to improve upon [facility] goals. Patient Centered Rounds: I performed bedside rounds with nursing staff today. Discussions with Specialists or Other Care Team Provider: none    Education and Discussions with Family / Patient: pt will update family     Total Time Spent on Date of Encounter in care of patient: >35 mins. This time was spent on one or more of the following: performing physical exam; counseling and coordination of care; obtaining or reviewing history; documenting in the medical record; reviewing/ordering tests, medications or procedures; communicating with other healthcare professionals and discussing with patient's family/caregivers. Current Length of Stay: 1 day(s)  Current Patient Status: Inpatient   Certification Statement: The patient will continue to require additional inpatient hospital stay due to 2/2 effusion  Discharge Plan: Anticipate discharge in >72 hrs to discharge location to be determined pending rehab evaluations. Code Status: Level 1 - Full Code    Subjective:   Seen and examined sob is improved has cough    Objective:     Vitals:   Temp (24hrs), Av.7 °F (36.5 °C), Min:97.7 °F (36.5 °C), Max:97.7 °F (36.5 °C)    Temp:  [97.7 °F (36.5 °C)] 97.7 °F (36.5 °C)  HR:  [61-77] 77  Resp:  [17-27] 18  BP: (122-153)/(60-87) 122/64  SpO2:  [90 %-95 %] 95 %  Body mass index is 31.67 kg/m². Input and Output Summary (last 24 hours): Intake/Output Summary (Last 24 hours) at 11/18/2023 1320  Last data filed at 11/18/2023 0924  Gross per 24 hour   Intake 590 ml   Output 550 ml   Net 40 ml       Physical Exam:   Physical Exam  Vitals and nursing note reviewed. Constitutional:       General: He is not in acute distress. Appearance: He is well-developed. HENT:      Head: Normocephalic and atraumatic. Eyes:      Conjunctiva/sclera: Conjunctivae normal.   Cardiovascular:      Rate and Rhythm: Normal rate and regular rhythm. Heart sounds: No murmur heard. Pulmonary:      Effort: Pulmonary effort is normal. No respiratory distress. Breath sounds: Rales (right lung) present. Abdominal:      General: There is no distension. Palpations: Abdomen is soft. Tenderness: There is no abdominal tenderness. Musculoskeletal:         General: Swelling present. Cervical back: Neck supple. Skin:     General: Skin is warm and dry. Capillary Refill: Capillary refill takes less than 2 seconds. Neurological:      Mental Status: He is alert. Mental status is at baseline.    Psychiatric:         Mood and Affect: Mood normal.          Additional Data:     Labs:  Results from last 7 days   Lab Units 11/17/23  1800   WBC Thousand/uL 6.55   HEMOGLOBIN g/dL 11.9*   HEMATOCRIT % 38.1   PLATELETS Thousands/uL 143*   NEUTROS PCT % 78*   LYMPHS PCT % 14   MONOS PCT % 8   EOS PCT % 0     Results from last 7 days   Lab Units 11/17/23  1800   SODIUM mmol/L 140   POTASSIUM mmol/L 4.1   CHLORIDE mmol/L 100   CO2 mmol/L 33*   BUN mg/dL 30*   CREATININE mg/dL 1.59*   ANION GAP mmol/L 7   CALCIUM mg/dL 9.9   ALBUMIN g/dL 4.0   TOTAL BILIRUBIN mg/dL 0.98   ALK PHOS U/L 195*   ALT U/L 20   AST U/L 27   GLUCOSE RANDOM mg/dL 90     Results from last 7 days   Lab Units 11/17/23  1800   INR  1.15             Results from last 7 days   Lab Units 11/17/23  1800   LACTIC ACID mmol/L 1.2   PROCALCITONIN ng/ml 0.31*       Lines/Drains:  Invasive Devices       Peripheral Intravenous Line  Duration             Peripheral IV 11/17/23 Left Antecubital <1 day                          Imaging: Reviewed radiology reports from this admission including: chest CT scan    Recent Cultures (last 7 days):   Results from last 7 days   Lab Units 11/17/23  1802 11/17/23  1800   BLOOD CULTURE  Received in Microbiology Lab. Culture in Progress. Received in Microbiology Lab. Culture in Progress.        Last 24 Hours Medication List:   Current Facility-Administered Medications   Medication Dose Route Frequency Provider Last Rate    acetaminophen  650 mg Oral Q6H PRN Dana Demo, PA-C      ampicillin-sulbactam  3 g Intravenous Q6H Latoya Centeno MD      ascorbic acid  500 mg Oral Daily Dana Demo, PA-C      aspirin  81 mg Oral Daily Dana Demo, PA-C      calcium carbonate-vitamin D  1 tablet Oral Daily With Breakfast Dana Demo, PA-C      dextromethorphan-guaiFENesin  10 mL Oral Q4H PRN Dana Demo, PA-C      enoxaparin  40 mg Subcutaneous Daily Dana Demo, PA-C      furosemide  40 mg Intravenous BID (diuretic) Latoya Centeno MD      lactulose  20 g Oral After [employer] [place], PA-JAMIE      lamoTRIgine  200 mg Oral Early Morning Dana Demo, PA-C      lamoTRIgine  250 mg Oral After Lunch Dana Demo, PA-C      lamoTRIgine  300 mg Oral HS Dana Demo, PA-C      levOCARNitine  330 mg Oral 4x Daily (with meals and at bedtime) Jose Luis Bolton PA-C      LORazepam  2 mg Intravenous Q6H PRN Dana Demo, PA-JAMIE      metoprolol tartrate  25 mg Oral BID Dana Demo, PA-C      nystatin   Topical BID Dana Demo, PA-C      ondansetron  4 mg Intravenous Q6H PRN Dana Demo, PA-C      polyethylene glycol  17 g Oral Daily PRN Dana Demo, PA-C      primidone  100 mg Oral After Breakfast Dana Demo, PA-C      primidone  100 mg Oral Daily With Lunch Dana Demo, PA-C      primidone  150 mg Oral HS Dana Demo, PA-C      senna-docusate sodium  1 tablet Oral BID Jose Luis Bolton, WHITNEY spironolactone  100 mg Oral Daily Dana Asif PA-C      vitamin E (tocopherol)  400 Units Oral Daily Dana Asif PA-C      zonisamide  100 mg Oral HS Karina Dunn PA-C          Today, Patient Was Seen By: Tammie Connor MD    **Please Note: This note may have been constructed using a voice recognition system. **

## 2023-11-18 NOTE — ASSESSMENT & PLAN NOTE
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 He uses 2 L O2 via nasal cannula nightly which is supporting his SPO2 adequately greater than 90% at this time  Titrate O2 as needed  If patient develops SIRS would obtain urine antigens and treat for pneumonia, at this time holding off on antibiotic treatment. Of note he does have chronic MRSA nares colonization  Patient was given 1 dose of IV Lasix in the emergency room, he is not overtly fluid overloaded, we will resume his home Lasix dosing.   Encourage out of bed with an assistant and incentive spirometer

## 2023-11-18 NOTE — ASSESSMENT & PLAN NOTE
Patient's main caregiver is his mother who is hospitalized today at [address]  She assists him with his ADLs and assist x2  Might need placement  Pt/ot ordered

## 2023-11-18 NOTE — ED PROVIDER NOTES
History  Chief Complaint   Patient presents with    Edema     Pt having increased SOB and b/l lower extremity edema     The patient is a 65-year-old male with a past medical history of an intellectual disability, CHF, cirrhosis induced encephalopathy, seizure disorder, who presents by EMS from home for the concern of worsening shortness of breath and edema. He lives with his mother at home and requires full time care. He has been sleeping a lot at home and mom states that he is very short of breath with minimal activity. He has been having worsening shortness of breath for several weeks at home. Patient has been taking his medications per mother. Patient at several times was discharged from hospital stays with home health. She states that at 1 point she had a private agency come in and perform daily therapy with him and at 1 point he was able to stand with a walker and take a few steps. The patient has not been able to do so. Patient is needing significant distance with standing or any daily tasks. The patient due to his unsteady gait has been falling. Mother states that he most recently fell yesterday but had no LOC. History provided by:  Parent  Shortness of Breath  Severity:  Severe  Onset quality:  Gradual  Progression:  Worsening  Context: activity    Relieved by:  Rest and sitting up  Worsened by: Activity  Ineffective treatments:  Diuretics  Associated symptoms: no abdominal pain, no chest pain and no cough        Prior to Admission Medications   Prescriptions Last Dose Informant Patient Reported?  Taking?   acetaminophen (TYLENOL) 325 mg tablet Unknown  No No   Sig: Take 2 tablets (650 mg total) by mouth every 6 (six) hours as needed for mild pain, headaches or fever   ascorbic acid (VITAMIN C) 500 mg tablet 11/17/2023  Yes Yes   Sig: Take 500 mg by mouth daily   aspirin 81 MG tablet 11/17/2023  Yes Yes   Sig: Take 81 mg by mouth daily   calcium-vitamin D 250-100 MG-UNIT per tablet 11/17/2023  Yes Yes   Sig: Take 1 tablet by mouth daily    furosemide (LASIX) 20 mg tablet 11/17/2023  No Yes   Sig: Take 1 tablet (20 mg total) by mouth daily after lunch   Patient taking differently: Take 40 mg by mouth daily after lunch   lactulose 20 g/30 mL 11/17/2023  Yes Yes   Sig: Take 20 g by mouth daily Daily after supper in 4oz of juice   lamoTRIgine (LaMICtal) 100 mg tablet 11/17/2023 Mother Yes Yes   Sig: Take 250 mg by mouth daily after lunch   lamoTRIgine (LaMICtal) 150 MG tablet 11/16/2023 Mother Yes Yes   Sig: Take 300 mg by mouth daily at bedtime   lamoTRIgine (LaMICtal) 200 MG tablet 11/17/2023  Yes Yes   Sig: Take 200 mg by mouth daily in the early morning    levOCARNitine (CARNITOR) 330 MG tablet   Yes No   Sig: Take 330 mg by mouth 4 (four) times daily (after meals and at bedtime)    metoprolol tartrate (LOPRESSOR) 25 mg tablet 11/17/2023  Yes Yes   Sig: Take 25 mg by mouth 2 (two) times a day   polyethylene glycol (MIRALAX) 17 g packet   No No   Sig: Take 17 g by mouth daily as needed (constipation)   primidone (MYSOLINE) 50 mg tablet 11/17/2023  Yes Yes   Sig: Take 100 mg by mouth daily after breakfast   primidone (MYSOLINE) 50 mg tablet 11/17/2023  Yes Yes   Sig: Take 100 mg by mouth daily with lunch   primidone (MYSOLINE) 50 mg tablet 11/16/2023  No Yes   Sig: Take 3 tablets (150 mg total) by mouth daily at bedtime   senna-docusate sodium (SENOKOT S) 8.6-50 mg per tablet   No No   Sig: Take 1 tablet by mouth 2 (two) times a day   spironolactone (ALDACTONE) 100 mg tablet 11/17/2023  Yes Yes   Sig: Take 100 mg by mouth daily   thiamine 50 MG tablet 11/17/2023  Yes Yes   Sig: Take by mouth daily Unsure dose   vitamin E, tocopherol, 400 units capsule 11/17/2023  Yes Yes   Sig: Take 400 Units by mouth daily   zinc gluconate 50 mg tablet   Yes No   Sig: Take 50 mg by mouth daily   zonisamide (ZONEGRAN) 100 mg capsule 11/16/2023  No Yes   Sig: Take 1 capsule (100 mg total) by mouth daily at bedtime      Facility-Administered Medications: None       Past Medical History:   Diagnosis Date    Hypertension     Mentally challenged     Pericardial effusion     Pneumonia     Seizure [facility])        Past Surgical History:   Procedure Laterality Date    FRACTURE SURGERY      clavicle, left humerus, radial, and ulna. Right radius    HIP ARTHROSCOPY Right     GA COLONOSCOPY FLX DX W/COLLJ SPEC WHEN PFRMD N/A 4/1/2019    Procedure: COLONOSCOPY;  Surgeon: Keon Fox MD;  Location: [facility]; Service: Colorectal       Family History   Adopted: Yes     I have reviewed and agree with the history as documented. E-Cigarette/Vaping    E-Cigarette Use Never User      E-Cigarette/Vaping Substances    Nicotine No     THC No     CBD No     Flavoring No     Other No     Unknown No      Social History     Tobacco Use    Smoking status: Never    Smokeless tobacco: Never   Vaping Use    Vaping Use: Never used   Substance Use Topics    Alcohol use: Never    Drug use: Never       Review of Systems   Respiratory:  Positive for shortness of breath. Negative for cough. Cardiovascular:  Negative for chest pain. Gastrointestinal:  Negative for abdominal pain. All other systems reviewed and are negative. Physical Exam  Physical Exam  Vitals and nursing note reviewed. Constitutional:       General: He is not in acute distress. Appearance: He is well-developed. HENT:      Head: Normocephalic and atraumatic. Mouth/Throat:      Mouth: Mucous membranes are dry. Eyes:      Extraocular Movements: Extraocular movements intact. Pupils: Pupils are equal, round, and reactive to light. Cardiovascular:      Rate and Rhythm: Normal rate and regular rhythm. Pulses: Normal pulses. Heart sounds: No murmur heard. Pulmonary:      Effort: Pulmonary effort is normal. No respiratory distress. Breath sounds: Rhonchi present.    Abdominal:      General: Bowel sounds are normal.      Palpations: Abdomen is soft. Tenderness: There is no abdominal tenderness. There is no right CVA tenderness or left CVA tenderness. Musculoskeletal:         General: Tenderness present. Cervical back: Normal range of motion. Right lower leg: Edema present. Left lower leg: Edema present. Skin:     General: Skin is warm and dry. Capillary Refill: Capillary refill takes less than 2 seconds. Neurological:      Mental Status: He is alert and oriented to person, place, and time. Mental status is at baseline.    Psychiatric:         Behavior: Behavior normal.         Vital Signs  ED Triage Vitals   Temperature Pulse Respirations Blood Pressure SpO2   11/17/23 2240 11/17/23 1729 11/17/23 1729 11/17/23 1729 11/17/23 1729   97.7 °F (36.5 °C) 64 20 135/74 92 %      Temp Source Heart Rate Source Patient Position - Orthostatic VS BP Location FiO2 (%)   11/17/23 2240 11/17/23 1729 11/17/23 1729 11/17/23 1729 --   Temporal Monitor Sitting Left arm       Pain Score       11/17/23 2238       No Pain           Vitals:    11/17/23 2100 11/17/23 2145 11/17/23 2240 11/18/23 0733   BP: 126/61 131/71 143/87 122/64   Pulse: 75 77 76 77   Patient Position - Orthostatic VS:   Lying          Visual Acuity      ED Medications  Medications   ascorbic acid (VITAMIN C) tablet 500 mg (500 mg Oral Given 11/18/23 0923)   aspirin (ECOTRIN LOW STRENGTH) EC tablet 81 mg (81 mg Oral Given 11/18/23 0923)   calcium carbonate-vitamin D 500 mg-5 mcg tablet 1 tablet (1 tablet Oral Given 11/18/23 0931)   furosemide (LASIX) tablet 40 mg (has no administration in time range)   lactulose (CHRONULAC) oral solution 20 g (has no administration in time range)   lamoTRIgine (LaMICtal) tablet 250 mg (has no administration in time range)   lamoTRIgine (LaMICtal) tablet 300 mg (300 mg Oral Given 11/18/23 0045)   lamoTRIgine (LaMICtal) tablet 200 mg (200 mg Oral Given 11/18/23 0544)   metoprolol tartrate (LOPRESSOR) tablet 25 mg (25 mg Oral Given 11/18/23 0923)   levOCARNitine (CARNITOR) oral solution 330 mg (330 mg Oral Given 11/18/23 0932)   primidone (MYSOLINE) tablet 100 mg (100 mg Oral Given 11/18/23 0931)   primidone (MYSOLINE) tablet 100 mg (has no administration in time range)   primidone (MYSOLINE) tablet 150 mg (150 mg Oral Given 11/18/23 0045)   senna-docusate sodium (SENOKOT S) 8.6-50 mg per tablet 1 tablet (1 tablet Oral Given 11/18/23 0923)   spironolactone (ALDACTONE) tablet 100 mg (100 mg Oral Given 11/18/23 0923)   vitamin E (TOCOPHEROL) capsule 400 Units (400 Units Oral Given 11/18/23 0923)   zonisamide (ZONEGRAN) capsule 100 mg (100 mg Oral Given 11/18/23 0045)   acetaminophen (TYLENOL) tablet 650 mg (has no administration in time range)   polyethylene glycol (MIRALAX) packet 17 g (has no administration in time range)   ondansetron (ZOFRAN) injection 4 mg (has no administration in time range)   enoxaparin (LOVENOX) subcutaneous injection 40 mg (40 mg Subcutaneous Given 11/18/23 0926)   dextromethorphan-guaiFENesin (ROBITUSSIN DM) oral syrup 10 mL (has no administration in time range)   nystatin (MYCOSTATIN) powder (has no administration in time range)   LORazepam (ATIVAN) injection 2 mg (has no administration in time range)   cefTRIAXone (ROCEPHIN) IVPB (premix in dextrose) 1,000 mg 50 mL (0 mg Intravenous Stopped 11/17/23 2144)   furosemide (LASIX) injection 40 mg (40 mg Intravenous Given 11/17/23 2045)       Diagnostic Studies  Results Reviewed       Procedure Component Value Units Date/Time    HS Troponin I 4hr [[id number]]  (Normal) Collected: 11/17/23 2251    Lab Status: Final result Specimen: Blood from Arm, Right Updated: 11/17/23 2330     hs TnI 4hr 23 ng/L      Delta 4hr hsTnI -9 ng/L     Blood culture #1 [[id number]] Collected: 11/17/23 1800    Lab Status: Preliminary result Specimen: Blood from Arm, Left Updated: 11/17/23 8823     Blood Culture Received in Microbiology Lab. Culture in Progress.     Blood culture #2 [[id number]] Collected: 11/17/23 1802    Lab Status: Preliminary result Specimen: Blood from Arm, Right Updated: 11/17/23 2302     Blood Culture Received in Microbiology Lab. Culture in Progress. UA w Reflex to Microscopic w Reflex to Culture [[id number]] Collected: 11/17/23 2215    Lab Status: Final result Specimen: Urine, Clean Catch Updated: 11/17/23 2252     Color, UA Yellow     Clarity, UA Clear     Specific Gravity, UA 1.025     pH, UA 5.0     Leukocytes, UA Negative     Nitrite, UA Negative     Protein, UA Negative mg/dl      Glucose, UA Negative mg/dl      Ketones, UA Negative mg/dl      Urobilinogen, UA 0.2 E.U./dl      Bilirubin, UA Negative     Occult Blood, UA Negative    HS Troponin I 2hr [[id number]]  (Normal) Collected: 11/17/23 2004    Lab Status: Final result Specimen: Blood from Arm, Left Updated: 11/17/23 2035     hs TnI 2hr 28 ng/L      Delta 2hr hsTnI -4 ng/L     FLU/RSV/COVID - if FLU/RSV clinically relevant [[id number]]  (Normal) Collected: 11/17/23 1800    Lab Status: Final result Specimen: Nares from Nose Updated: 11/17/23 1911     SARS-CoV-2 Negative     INFLUENZA A PCR Negative     INFLUENZA B PCR Negative     RSV PCR Negative    Narrative:      FOR PEDIATRIC PATIENTS - copy/paste COVID Guidelines URL to browser: [email or web address]    SARS-CoV-2 assay is a Nucleic Acid Amplification assay intended for the  qualitative detection of nucleic acid from SARS-CoV-2 in nasopharyngeal  swabs. Results are for the presumptive identification of SARS-CoV-2 RNA. Positive results are indicative of infection with SARS-CoV-2, the virus  causing COVID-19, but do not rule out bacterial infection or co-infection  with other viruses. Laboratories within the [facility] and its  territories are required to report all positive results to the appropriate  public health authorities.  Negative results do not preclude SARS-CoV-2  infection and should not be used as the sole basis for treatment or other  patient management decisions. Negative results must be combined with  clinical observations, patient history, and epidemiological information. This test has not been FDA cleared or approved. This test has been authorized by FDA under an Emergency Use Authorization  (EUA). This test is only authorized for the duration of time the  declaration that circumstances exist justifying the authorization of the  emergency use of an in vitro diagnostic tests for detection of SARS-CoV-2  virus and/or diagnosis of COVID-19 infection under section 564(b)(1) of  the Act, 21 U. S.C. 494FRB-0(M)(6), unless the authorization is terminated  or revoked sooner. The test has been validated but independent review by FDA  and CLIA is pending. Test performed using [employer]: This RT-PCR assay targets N2,  a region unique to SARS-CoV-2. A conserved region in the E-gene was chosen  for pan-Sarbecovirus detection which includes SARS-CoV-2. According to CMS-2020-01-R, this platform meets the definition of high-throughput technology. Procalcitonin [[id number]]  (Abnormal) Collected: 11/17/23 1800    Lab Status: Final result Specimen: Blood from Arm, Left Updated: 11/17/23 1838     Procalcitonin 0.31 ng/ml     B-Type Natriuretic Peptide(BNP) [[id number]]  (Abnormal) Collected: 11/17/23 1800    Lab Status: Final result Specimen: Blood from Arm, Left Updated: 11/17/23 1835      pg/mL     Ammonia [[id number]]  (Normal) Collected: 11/17/23 1800    Lab Status: Final result Specimen: Blood from Arm, Left Updated: 11/17/23 1834     Ammonia 66 umol/L     Lactic acid [[id number]]  (Normal) Collected: 11/17/23 1800    Lab Status: Final result Specimen: Blood from Arm, Left Updated: 11/17/23 1834     LACTIC ACID 1.2 mmol/L     Narrative:      Result may be elevated if tourniquet was used during collection.     Comprehensive metabolic panel [[id number]]  (Abnormal) Collected: 11/17/23 1800    Lab Status: Final result Specimen: Blood from Arm, Left Updated: 11/17/23 1834     Sodium 140 mmol/L      Potassium 4.1 mmol/L      Chloride 100 mmol/L      CO2 33 mmol/L      ANION GAP 7 mmol/L      BUN 30 mg/dL      Creatinine 1.59 mg/dL      Glucose 90 mg/dL      Calcium 9.9 mg/dL      AST 27 U/L      ALT 20 U/L      Alkaline Phosphatase 195 U/L      Total Protein 9.4 g/dL      Albumin 4.0 g/dL      Total Bilirubin 0.98 mg/dL      eGFR 47 ml/min/1.73sq m     Narrative:      [place] guidelines for Chronic Kidney Disease (CKD):     Stage 1 with normal or high GFR (GFR > 90 mL/min/1.73 square meters)    Stage 2 Mild CKD (GFR = 60-89 mL/min/1.73 square meters)    Stage 3A Moderate CKD (GFR = 45-59 mL/min/1.73 square meters)    Stage 3B Moderate CKD (GFR = 30-44 mL/min/1.73 square meters)    Stage 4 Severe CKD (GFR = 15-29 mL/min/1.73 square meters)    Stage 5 End Stage CKD (GFR <15 mL/min/1.73 square meters)  Note: GFR calculation is accurate only with a steady state creatinine    Magnesium [[id number]]  (Normal) Collected: 11/17/23 1800    Lab Status: Final result Specimen: Blood from Arm, Left Updated: 11/17/23 1834     Magnesium 1.9 mg/dL     Lipase [[id number]]  (Normal) Collected: 11/17/23 1800    Lab Status: Final result Specimen: Blood from Arm, Left Updated: 11/17/23 1834     Lipase 46 u/L     Protime-INR [[id number]]  (Abnormal) Collected: 11/17/23 1800    Lab Status: Final result Specimen: Blood from Arm, Left Updated: 11/17/23 1833     Protime 15.1 seconds      INR 1.15    APTT [[id number]]  (Normal) Collected: 11/17/23 1800    Lab Status: Final result Specimen: Blood from Arm, Left Updated: 11/17/23 1833     PTT 33 seconds     HS Troponin 0hr (reflex protocol) [[id number]]  (Normal) Collected: 11/17/23 1800    Lab Status: Final result Specimen: Blood from Arm, Left Updated: 11/17/23 1833     hs TnI 0hr 32 ng/L     Blood gas, Venous [[id number]]  (Abnormal) Collected: 11/17/23 1800    Lab Status: Final result Specimen: Blood from Arm, Left Updated: 11/17/23 1819     pH, Pk 7.370     pCO2, Pk 61.0 mm Hg      pO2, Pk 36.3 mm Hg      HCO3, Pk 34.5 mmol/L      Base Excess, Pk 7.3 mmol/L      O2 Content, Pk 12.1 ml/dL      O2 HGB, VENOUS 65.9 %     CBC and differential [[id number]]  (Abnormal) Collected: 11/17/23 1800    Lab Status: Final result Specimen: Blood from Arm, Left Updated: 11/17/23 1817     WBC 6.55 Thousand/uL      RBC 3.70 Million/uL      Hemoglobin 11.9 g/dL      Hematocrit 38.1 %       fL      MCH 32.2 pg      MCHC 31.2 g/dL      RDW 16.4 %      MPV 11.5 fL      Platelets 866 Thousands/uL      nRBC 0 /100 WBCs      Neutrophils Relative 78 %      Immat GRANS % 0 %      Lymphocytes Relative 14 %      Monocytes Relative 8 %      Eosinophils Relative 0 %      Basophils Relative 0 %      Neutrophils Absolute 5.06 Thousands/µL      Immature Grans Absolute 0.02 Thousand/uL      Lymphocytes Absolute 0.89 Thousands/µL      Monocytes Absolute 0.55 Thousand/µL      Eosinophils Absolute 0.01 Thousand/µL      Basophils Absolute 0.02 Thousands/µL                    XR chest portable   Final Result by Deep Peralta MD (11/18 1110)      Moderate right pleural effusion with associated compressive atelectasis. However underlying or superimposed pneumonia cannot be excluded in the appropriate clinical setting. Small left pleural effusion. Resident: Tracie Garcia, the attending radiologist, have reviewed the images and agree with the final report above. Workstation performed: [id number]         CT head without contrast   Final Result by Dawn Pina MD (11/17 2007)      No acute intracranial abnormality. Workstation performed: [id number]         CT cervical spine without contrast   Final Result by Dawn Pina MD (11/17 2009)      No cervical spine fracture or traumatic malalignment.                   Workstation performed: [id number]         CT chest abdomen pelvis wo contrast   Final Result by Denise Pascual MD (11/17 2020)      No evidence of acute traumatic injury throughout the chest, abdomen or pelvis. Moderate size loculated right pleural effusion. Right middle and lower lobe consolidation compatible with compressive atelectasis. Underlying pneumonia should be excluded clinically. Mild wall thickening and edema with surrounding fluid at the proximal duodenum. Correlate for signs and symptoms of peptic ulcer disease                  Workstation performed: [id number]                    Procedures  Procedures         ED Course  ED Course as of 11/18/23 1143   Fri Nov 17, 2023   1842 Procalcitonin(!): 0.31   1854 Ammonia: 66   1854 Creatinine(!): 1.59   1854 Creatinine(!): 1.59  1.6 is baseline                                              Medical Decision Making  The patient is a 32-year-old male with a past medical history of an intellectual disability, CHF, cirrhosis induced encephalopathy, seizure disorder, who presents by EMS from home for the concern of worsening shortness of breath and edema. He lives with his mother at home and requires full time care. He has been sleeping a lot at home and mom states that he is very short of breath with minimal activity. He has been having worsening shortness of breath for several weeks at home. Patient has been taking his medications per mother. Patient at several times was discharged from hospital stays with home health. She states that at 1 point she had a private agency come in and perform daily therapy with him and at 1 point he was able to stand with a walker and take a few steps. The patient has not been able to do so. Patient is needing significant distance with standing or any daily tasks. The patient due to his unsteady gait has been falling. Mother states that he most recently fell yesterday but had no LOC. On examination the patient is awake and interactive.   The patient has significant bilateral lower edema and rales on examination. No respiratory distress and is on room air with sitting upright in the bed. Patient's lab work overall demonstrated elevated BNP. Cts preformed did not reveal any acute traumatic injury from the multiple falls. Patient currently in need of more care then he can safely have at home. He needs 2+ assistance in the ED. Due to the ambulatory dysfunction and multiple falls his case was discussed with BUD Salazar for SLIM for care management, PT/OT evaluation. His mother is currently hospitalized for a hip fracture    Differential diagnosis included but was not limited to :Pneumonia, Sinusitis, URI, ACS, GERD, PE, Viral syndrome,CHF, dehydration, traumatic injury          Amount and/or Complexity of Data Reviewed  Independent Historian: parent  External Data Reviewed: labs and notes. Labs: ordered. Decision-making details documented in ED Course. Radiology: ordered and independent interpretation performed. Decision-making details documented in ED Course. ECG/medicine tests: ordered and independent interpretation performed. Decision-making details documented in ED Course. Discussion of management or test interpretation with external provider(s): BUD Salazar    Risk  Prescription drug management. Decision regarding hospitalization.              Disposition  Final diagnoses:   Bilateral lower extremity edema   Ambulatory dysfunction     Time reflects when diagnosis was documented in both MDM as applicable and the Disposition within this note       Time User Action Codes Description Comment    11/17/2023  8:35 PM Genia Mckeesport Add [R60.0] Bilateral lower extremity edema     11/17/2023  8:36 PM Melda Mckeesport Add [R26.2] Ambulatory dysfunction     11/18/2023 12:21 AM Ross Asif Add [J96.12] Chronic respiratory failure with hypercapnia ([address])     11/18/2023 12:21 AM Ross Asif Add [Z74.2] No other household member able to render care     11/18/2023 12:21 RAINER Nichols Add [J18.9] Pneumonia due to infectious organism, unspecified laterality, unspecified part of lung           ED Disposition       ED Disposition   Admit    Condition   Stable    Date/Time   Fri Nov 17, 2023  8:45 PM    Comment   Case was discussed with juventino plummer  and the patient's admission status was agreed to be Admission Status: observation status to the service of Dr. Lui Leblanc    None         Current Discharge Medication List        CONTINUE these medications which have NOT CHANGED    Details   ascorbic acid (VITAMIN C) 500 mg tablet Take 500 mg by mouth daily      aspirin 81 MG tablet Take 81 mg by mouth daily      calcium-vitamin D 250-100 MG-UNIT per tablet Take 1 tablet by mouth daily       furosemide (LASIX) 20 mg tablet Take 1 tablet (20 mg total) by mouth daily after lunch  Qty: 30 tablet, Refills: 0    Associated Diagnoses: Lower extremity edema      lactulose 20 g/30 mL Take 20 g by mouth daily Daily after supper in 4oz of juice      !! lamoTRIgine (LaMICtal) 100 mg tablet Take 250 mg by mouth daily after lunch      !! lamoTRIgine (LaMICtal) 150 MG tablet Take 300 mg by mouth daily at bedtime      !! lamoTRIgine (LaMICtal) 200 MG tablet Take 200 mg by mouth daily in the early morning       metoprolol tartrate (LOPRESSOR) 25 mg tablet Take 25 mg by mouth 2 (two) times a day      !! primidone (MYSOLINE) 50 mg tablet Take 100 mg by mouth daily after breakfast      !! primidone (MYSOLINE) 50 mg tablet Take 100 mg by mouth daily with lunch      !! primidone (MYSOLINE) 50 mg tablet Take 3 tablets (150 mg total) by mouth daily at bedtime  Refills: 0    Associated Diagnoses: Nonintractable generalized idiopathic epilepsy without status epilepticus (HCC)      spironolactone (ALDACTONE) 100 mg tablet Take 100 mg by mouth daily      thiamine 50 MG tablet Take by mouth daily Unsure dose      vitamin E, tocopherol, 400 units capsule Take 400 Units by mouth daily zonisamide (ZONEGRAN) 100 mg capsule Take 1 capsule (100 mg total) by mouth daily at bedtime  Refills: 0    Associated Diagnoses: Nonintractable generalized idiopathic epilepsy without status epilepticus (HCC)      acetaminophen (TYLENOL) 325 mg tablet Take 2 tablets (650 mg total) by mouth every 6 (six) hours as needed for mild pain, headaches or fever  Refills: 0    Associated Diagnoses: Acute respiratory failure with hypoxia (HCC)      levOCARNitine (CARNITOR) 330 MG tablet Take 330 mg by mouth 4 (four) times daily (after meals and at bedtime)       polyethylene glycol (MIRALAX) 17 g packet Take 17 g by mouth daily as needed (constipation)  Qty: 10 each, Refills: 0    Associated Diagnoses: Constipation, unspecified constipation type      senna-docusate sodium (SENOKOT S) 8.6-50 mg per tablet Take 1 tablet by mouth 2 (two) times a day  Qty: 60 tablet, Refills: 0    Associated Diagnoses: Constipation, unspecified constipation type      zinc gluconate 50 mg tablet Take 50 mg by mouth daily       ! ! - Potential duplicate medications found. Please discuss with provider. No discharge procedures on file.     PDMP Review       None            ED Provider  Electronically Signed by             Joshua Solis PA-C  11/18/23 [id number]

## 2023-11-18 NOTE — ASSESSMENT & PLAN NOTE
Cirrhotic morphology of the liver chronic problem  Ammonia is normal there is no ascites continue Lasix and Aldactone

## 2023-11-18 NOTE — ASSESSMENT & PLAN NOTE
Patient's main caregiver is his mother who is hospitalized today at [address]  She assists him with his ADLs and assist x2  Might need placement

## 2023-11-18 NOTE — ASSESSMENT & PLAN NOTE
Wears 2L O2 QHS, continue  Likely has a chronic hypercapnia given the elevated bicarbonate/kyphosis likely causing chronic limited chest expansion and sleepiness during day will obtain vbg

## 2023-11-18 NOTE — ED NOTES
Pt updated on POC. Visitors at bedside. Swetha Cassidy, [facility]  11/17/23 [id number]

## 2023-11-19 ENCOUNTER — APPOINTMENT (INPATIENT)
Dept: ULTRASOUND IMAGING | Facility: HOSPITAL | Age: 58
End: 2023-11-19
Payer: MEDICARE

## 2023-11-19 LAB
ANION GAP SERPL CALCULATED.3IONS-SCNC: 6 MMOL/L
ATRIAL RATE: 66 BPM
BASE EX.OXY STD BLDV CALC-SCNC: 75.2 % (ref 60–80)
BASE EXCESS BLDV CALC-SCNC: 10.1 MMOL/L
BUN SERPL-MCNC: 34 MG/DL (ref 5–25)
CALCIUM SERPL-MCNC: 9.2 MG/DL (ref 8.4–10.2)
CHLORIDE SERPL-SCNC: 102 MMOL/L (ref 96–108)
CO2 SERPL-SCNC: 33 MMOL/L (ref 21–32)
CREAT SERPL-MCNC: 1.88 MG/DL (ref 0.6–1.3)
CREAT UR-MCNC: 143.7 MG/DL
CREAT UR-MCNC: 143.7 MG/DL
ERYTHROCYTE [DISTWIDTH] IN BLOOD BY AUTOMATED COUNT: 16.4 % (ref 11.6–15.1)
GFR SERPL CREATININE-BSD FRML MDRD: 39 ML/MIN/1.73SQ M
GLUCOSE SERPL-MCNC: 81 MG/DL (ref 65–140)
HCO3 BLDV-SCNC: 37.9 MMOL/L (ref 24–30)
HCT VFR BLD AUTO: 32.6 % (ref 36.5–49.3)
HGB BLD-MCNC: 10.2 G/DL (ref 12–17)
LDH SERPL-CCNC: 176 U/L (ref 140–271)
MAGNESIUM SERPL-MCNC: 1.8 MG/DL (ref 1.9–2.7)
MCH RBC QN AUTO: 32.4 PG (ref 26.8–34.3)
MCHC RBC AUTO-ENTMCNC: 31.3 G/DL (ref 31.4–37.4)
MCV RBC AUTO: 104 FL (ref 82–98)
MRSA NOSE QL CULT: ABNORMAL
MRSA NOSE QL CULT: ABNORMAL
O2 CT BLDV-SCNC: 12.1 ML/DL
P AXIS: 53 DEGREES
PCO2 BLDV: 69.6 MM HG (ref 42–50)
PH BLDV: 7.35 [PH] (ref 7.3–7.4)
PLATELET # BLD AUTO: 113 THOUSANDS/UL (ref 149–390)
PMV BLD AUTO: 11.5 FL (ref 8.9–12.7)
PO2 BLDV: 43.3 MM HG (ref 35–45)
POTASSIUM SERPL-SCNC: 4.3 MMOL/L (ref 3.5–5.3)
PR INTERVAL: 184 MS
PROCALCITONIN SERPL-MCNC: 0.36 NG/ML
PROT UR-MCNC: 17 MG/DL
PROT/CREAT UR: 0.12 MG/G{CREAT} (ref 0–0.1)
QRS AXIS: 84 DEGREES
QRSD INTERVAL: 94 MS
QT INTERVAL: 364 MS
QTC INTERVAL: 381 MS
RBC # BLD AUTO: 3.15 MILLION/UL (ref 3.88–5.62)
SODIUM 24H UR-SCNC: 54 MOL/L
SODIUM SERPL-SCNC: 141 MMOL/L (ref 135–147)
T WAVE AXIS: 171 DEGREES
VENTRICULAR RATE: 66 BPM
WBC # BLD AUTO: 5.49 THOUSAND/UL (ref 4.31–10.16)

## 2023-11-19 PROCEDURE — 83615 LACTATE (LD) (LDH) ENZYME: CPT | Performed by: FAMILY MEDICINE

## 2023-11-19 PROCEDURE — 99232 SBSQ HOSP IP/OBS MODERATE 35: CPT | Performed by: FAMILY MEDICINE

## 2023-11-19 PROCEDURE — 82805 BLOOD GASES W/O2 SATURATION: CPT | Performed by: FAMILY MEDICINE

## 2023-11-19 PROCEDURE — 84145 PROCALCITONIN (PCT): CPT | Performed by: PHYSICIAN ASSISTANT

## 2023-11-19 PROCEDURE — 76775 US EXAM ABDO BACK WALL LIM: CPT

## 2023-11-19 PROCEDURE — 99223 1ST HOSP IP/OBS HIGH 75: CPT | Performed by: INTERNAL MEDICINE

## 2023-11-19 PROCEDURE — 84300 ASSAY OF URINE SODIUM: CPT | Performed by: INTERNAL MEDICINE

## 2023-11-19 PROCEDURE — 93010 ELECTROCARDIOGRAM REPORT: CPT | Performed by: INTERNAL MEDICINE

## 2023-11-19 PROCEDURE — 85027 COMPLETE CBC AUTOMATED: CPT | Performed by: PHYSICIAN ASSISTANT

## 2023-11-19 PROCEDURE — 80048 BASIC METABOLIC PNL TOTAL CA: CPT | Performed by: PHYSICIAN ASSISTANT

## 2023-11-19 PROCEDURE — 82570 ASSAY OF URINE CREATININE: CPT | Performed by: INTERNAL MEDICINE

## 2023-11-19 PROCEDURE — 84156 ASSAY OF PROTEIN URINE: CPT | Performed by: INTERNAL MEDICINE

## 2023-11-19 PROCEDURE — 83735 ASSAY OF MAGNESIUM: CPT | Performed by: FAMILY MEDICINE

## 2023-11-19 RX ORDER — OXYMETAZOLINE HYDROCHLORIDE 0.05 G/100ML
2 SPRAY NASAL EVERY 12 HOURS SCHEDULED
Status: COMPLETED | OUTPATIENT
Start: 2023-11-19 | End: 2023-11-21

## 2023-11-19 RX ORDER — MAGNESIUM SULFATE HEPTAHYDRATE 40 MG/ML
2 INJECTION, SOLUTION INTRAVENOUS ONCE
Status: COMPLETED | OUTPATIENT
Start: 2023-11-19 | End: 2023-11-19

## 2023-11-19 RX ADMIN — DOCUSATE SODIUM AND SENNOSIDES 1 TABLET: 8.6; 5 TABLET, FILM COATED ORAL at 08:22

## 2023-11-19 RX ADMIN — OXYMETAZOLINE HYDROCHLORIDE 2 SPRAY: 0.05 SPRAY NASAL at 22:46

## 2023-11-19 RX ADMIN — ZONISAMIDE 100 MG: 100 CAPSULE ORAL at 22:45

## 2023-11-19 RX ADMIN — LEVOCARNITINE 330 MG: 1 SOLUTION ORAL at 17:09

## 2023-11-19 RX ADMIN — AMPICILLIN SODIUM AND SULBACTAM SODIUM 3 G: 2; 1 INJECTION, POWDER, FOR SOLUTION INTRAMUSCULAR; INTRAVENOUS at 03:47

## 2023-11-19 RX ADMIN — NYSTATIN: 100000 POWDER TOPICAL at 08:24

## 2023-11-19 RX ADMIN — LEVOCARNITINE 330 MG: 1 SOLUTION ORAL at 22:46

## 2023-11-19 RX ADMIN — METOPROLOL TARTRATE 25 MG: 25 TABLET, FILM COATED ORAL at 22:48

## 2023-11-19 RX ADMIN — NYSTATIN: 100000 POWDER TOPICAL at 17:09

## 2023-11-19 RX ADMIN — Medication 1 TABLET: at 08:22

## 2023-11-19 RX ADMIN — SPIRONOLACTONE 100 MG: 100 TABLET, FILM COATED ORAL at 08:22

## 2023-11-19 RX ADMIN — ENOXAPARIN SODIUM 40 MG: 40 INJECTION SUBCUTANEOUS at 08:22

## 2023-11-19 RX ADMIN — LAMOTRIGINE 200 MG: 100 TABLET ORAL at 05:57

## 2023-11-19 RX ADMIN — Medication 400 UNITS: at 08:22

## 2023-11-19 RX ADMIN — LAMOTRIGINE 300 MG: 100 TABLET ORAL at 22:45

## 2023-11-19 RX ADMIN — AMPICILLIN SODIUM AND SULBACTAM SODIUM 3 G: 2; 1 INJECTION, POWDER, FOR SOLUTION INTRAMUSCULAR; INTRAVENOUS at 09:14

## 2023-11-19 RX ADMIN — LACTULOSE 20 G: 20 SOLUTION ORAL at 17:02

## 2023-11-19 RX ADMIN — PRIMIDONE 100 MG: 50 TABLET ORAL at 15:12

## 2023-11-19 RX ADMIN — ASPIRIN 81 MG: 81 TABLET, COATED ORAL at 08:22

## 2023-11-19 RX ADMIN — LEVOCARNITINE 330 MG: 1 SOLUTION ORAL at 12:10

## 2023-11-19 RX ADMIN — OXYCODONE HYDROCHLORIDE AND ACETAMINOPHEN 500 MG: 500 TABLET ORAL at 08:22

## 2023-11-19 RX ADMIN — PRIMIDONE 150 MG: 50 TABLET ORAL at 22:45

## 2023-11-19 RX ADMIN — METOPROLOL TARTRATE 25 MG: 25 TABLET, FILM COATED ORAL at 08:21

## 2023-11-19 RX ADMIN — DOCUSATE SODIUM AND SENNOSIDES 1 TABLET: 8.6; 5 TABLET, FILM COATED ORAL at 17:02

## 2023-11-19 RX ADMIN — PRIMIDONE 100 MG: 50 TABLET ORAL at 05:57

## 2023-11-19 RX ADMIN — LAMOTRIGINE 250 MG: 100 TABLET ORAL at 15:12

## 2023-11-19 RX ADMIN — LEVOCARNITINE 330 MG: 1 SOLUTION ORAL at 08:24

## 2023-11-19 RX ADMIN — AMPICILLIN SODIUM AND SULBACTAM SODIUM 3 G: 2; 1 INJECTION, POWDER, FOR SOLUTION INTRAMUSCULAR; INTRAVENOUS at 22:45

## 2023-11-19 RX ADMIN — OXYMETAZOLINE HYDROCHLORIDE 2 SPRAY: 0.05 SPRAY NASAL at 15:13

## 2023-11-19 RX ADMIN — AMPICILLIN SODIUM AND SULBACTAM SODIUM 3 G: 2; 1 INJECTION, POWDER, FOR SOLUTION INTRAMUSCULAR; INTRAVENOUS at 15:13

## 2023-11-19 RX ADMIN — MAGNESIUM SULFATE HEPTAHYDRATE 2 G: 40 INJECTION, SOLUTION INTRAVENOUS at 12:08

## 2023-11-19 NOTE — ASSESSMENT & PLAN NOTE
Wears 2L O2 QHS, continue  Likely has a chronic hypercapnia given the elevated bicarbonate/kyphosis likely causing chronic limited chest expansion and sleepiness during day will obtain vbg- reviewed 71

## 2023-11-19 NOTE — PLAN OF CARE
Problem: PAIN - ADULT  Goal: Verbalizes/displays adequate comfort level or baseline comfort level  Description: Interventions:  - Encourage patient to monitor pain and request assistance  - Assess pain using appropriate pain scale  - Administer analgesics based on type and severity of pain and evaluate response  - Implement non-pharmacological measures as appropriate and evaluate response  - Consider cultural and social influences on pain and pain management  - Notify physician/advanced practitioner if interventions unsuccessful or patient reports new pain  Outcome: Progressing     Problem: INFECTION - ADULT  Goal: Absence or prevention of progression during hospitalization  Description: INTERVENTIONS:  - Assess and monitor for signs and symptoms of infection  - Monitor lab/diagnostic results  - Monitor all insertion sites, i.e. indwelling lines, tubes, and drains  - Monitor endotracheal if appropriate and nasal secretions for changes in amount and color  - [place] appropriate cooling/warming therapies per order  - Administer medications as ordered  - Instruct and encourage patient and family to use good hand hygiene technique  - Identify and instruct in appropriate isolation precautions for identified infection/condition  Outcome: Progressing  Goal: Absence of fever/infection during neutropenic period  Description: INTERVENTIONS:  - Monitor WBC    Outcome: Progressing     Problem: SAFETY ADULT  Goal: Patient will remain free of falls  Description: INTERVENTIONS:  - Educate patient/family on patient safety including physical limitations  - Instruct patient to call for assistance with activity   - Consult OT/PT to assist with strengthening/mobility   - Keep Call bell within reach  - Keep bed low and locked with side rails adjusted as appropriate  - Keep care items and personal belongings within reach  - Initiate and maintain comfort rounds  - Make Fall Risk Sign visible to staff  - Offer Toileting every 2 Hours, in advance of need  - Initiate/Maintain bed/chair alarm  - Obtain necessary fall risk management equipment: bed/chair alarm  - Apply yellow socks and bracelet for high fall risk patients  - Consider moving patient to room near nurses station  Outcome: Progressing  Goal: Maintain or return to baseline ADL function  Description: INTERVENTIONS:  -  Assess patient's ability to carry out ADLs; assess patient's baseline for ADL function and identify physical deficits which impact ability to perform ADLs (bathing, care of mouth/teeth, toileting, grooming, dressing, etc.)  - Assess/evaluate cause of self-care deficits   - Assess range of motion  - Assess patient's mobility; develop plan if impaired  - Assess patient's need for assistive devices and provide as appropriate  - Encourage maximum independence but intervene and supervise when necessary  - Involve family in performance of ADLs  - Assess for home care needs following discharge   - Consider OT consult to assist with ADL evaluation and planning for discharge  - Provide patient education as appropriate  Outcome: Progressing  Goal: Maintains/Returns to pre admission functional level  Description: INTERVENTIONS:  - Perform AM-PAC 6 Click Basic Mobility/ Daily Activity assessment daily.  - Set and communicate daily mobility goal to care team and patient/family/caregiver. - Collaborate with rehabilitation services on mobility goals if consulted  - Perform Range of Motion 3 times a day. - Reposition patient every 2 hours.   - Dangle patient 3 times a day  - Stand patient 3 times a day  - Ambulate patient 3 times a day  - Out of bed to chair 3 times a day   - Out of bed for meals 3 times a day  - Out of bed for toileting  - Record patient progress and toleration of activity level   Outcome: Progressing     Problem: DISCHARGE PLANNING  Goal: Discharge to home or other facility with appropriate resources  Description: INTERVENTIONS:  - Identify barriers to discharge w/patient and caregiver  - Arrange for needed discharge resources and transportation as appropriate  - Identify discharge learning needs (meds, wound care, etc.)  - Arrange for interpretive services to assist at discharge as needed  - Refer to Case Management Department for coordinating discharge planning if the patient needs post-hospital services based on physician/advanced practitioner order or complex needs related to functional status, cognitive ability, or social support system  Outcome: Progressing     Problem: Knowledge Deficit  Goal: Patient/family/caregiver demonstrates understanding of disease process, treatment plan, medications, and discharge instructions  Description: Complete learning assessment and assess knowledge base.   Interventions:  - Provide teaching at level of understanding  - Provide teaching via preferred learning methods  Outcome: Progressing     Problem: Prexisting or High Potential for Compromised Skin Integrity  Goal: Skin integrity is maintained or improved  Description: INTERVENTIONS:  - Identify patients at risk for skin breakdown  - Assess and monitor skin integrity  - Assess and monitor nutrition and hydration status  - Monitor labs   - Assess for incontinence   - Turn and reposition patient  - Assist with mobility/ambulation  - Relieve pressure over bony prominences  - Avoid friction and shearing  - Provide appropriate hygiene as needed including keeping skin clean and dry  - Evaluate need for skin moisturizer/barrier cream  - Collaborate with interdisciplinary team   - Patient/family teaching  - Consider wound care consult   Outcome: Progressing

## 2023-11-19 NOTE — ASSESSMENT & PLAN NOTE
Patient does use pumps at home he has chronic lymphedema but discussed with the mother that edema is worsened he has been compliant with Lasix when he was in the nursing home his Lasix was 60 but decreased to 40 secondary to kidney numbers. He does have evidence of a small effusion on the left as well as the right which he has loculated see below he did receive Lasix 40 mg IV x1 on admission.   which we will continue CT abdomen did not reveal any paracentesis as the mother did report that his girth has increased  proBNP is elevated  2D echo done in 2022 with mild concentric hypertrophy and EF of 60 to 65%  Am labs  Daily weights and strict I and o not documented  Hold further lasix and aldactone as renal function worsened will ask nephrology to see with diuretics as his legs are still swollen

## 2023-11-19 NOTE — PROGRESS NOTES
[facility]  Progress Note  Name: Megan Owens  MRN: [id number]  Unit/Bed#: -05 I Date of Admission: 11/17/2023   Date of Service: 11/19/2023 I Hospital Day: 2    Assessment/Plan   * Pleural effusion on right  Assessment & Plan    This did require a chest tube about 20 years ago according to the mother's typed medical history list  Discussed with the mother patient has been having worsening shortness of breath and for the past week and has been falling asleep a lot he usually wears oxygen 2 L at night but the mother has been using 2 L throughout the day. No cough has been reported but she did state that his lower extremity edema has worsened  There is loculated right-sided moderate effusion with inability to rule out pneumonia questionable can be parapneumonic effusion that needs chest tube for drainage in unable to be done with a thoracocentesis. I discussed with the mother and if needed okay to proceed with the chest tube discussed that interventional radiology will see the patient on Monday and decide which way to go we will order pleural fluid studies  Procalcitonin is elevated and trended up  will start Unasyn  Consult pulmonology  Ldh serum reviewed to calculate lights criteria  Incentive spirometer  Mrsa screen  Patient had pneumonia 6 times he does have history of aspiration as well he is on a modified diet we will ask speech to evaluate to ensure there is no evidence of aspiration seen    Stage 3 chronic kidney disease [facility])  Assessment & Plan  Lab Results   Component Value Date    EGFR 39 11/19/2023    EGFR 47 11/17/2023    EGFR 56 08/11/2021    CREATININE 1.88 (H) 11/19/2023    CREATININE 1.59 (H) 11/17/2023    CREATININE 1.41 (H) 08/11/2021   Has been anywhere between 1.4-1.5, cr worsened since lasix iv and also on high dose ladactone (unfortunately received .  Will ask nephro to eval hold iv lasix at this time as his legs are still swollen     Chronic respiratory failure with hypercapnia (HCC)  Assessment & Plan  Wears 2L O2 QHS, continue  Likely has a chronic hypercapnia given the elevated bicarbonate/kyphosis likely causing chronic limited chest expansion and sleepiness during day will obtain vbg- reviewed 71    No other household member able to render care  Assessment & Plan  Patient's main caregiver is his mother who is hospitalized today at [address]  She assists him with his ADLs and assist x2  Might need placement  Pt/ot ordered    Liver cirrhosis ([address])  Assessment & Plan  Cirrhotic morphology of the liver chronic problem  Ammonia is normal there is no ascites continue Lasix and Aldactone- hold in light of worsening renal function     Acute on chronic heart failure with preserved ejection fraction [facility])  Assessment & Plan  Patient does use pumps at home he has chronic lymphedema but discussed with the mother that edema is worsened he has been compliant with Lasix when he was in the nursing home his Lasix was 60 but decreased to 40 secondary to kidney numbers. He does have evidence of a small effusion on the left as well as the right which he has loculated see below he did receive Lasix 40 mg IV x1 on admission.   which we will continue CT abdomen did not reveal any paracentesis as the mother did report that his girth has increased  proBNP is elevated  2D echo done in 2022 with mild concentric hypertrophy and EF of 60 to 65%  Am labs  Daily weights and strict I and o not documented  Hold further lasix and aldactone as renal function worsened will ask nephrology to see with diuretics as his legs are still swollen     Dysphagia  Assessment & Plan  Continue mechanical soft diet from prior  Aspiration precautions obtain speech    Developmental delay  Assessment & Plan  Daily involvement with mother  Supportive care  PT/OT/ST  Pt AAOx1   At baseline AAOx3    Falls  Assessment & Plan  Frequent falls for his entire life because of unsteady gait according to the mother's medical history list  Fall precautions  Emergency room trauma eval no acute injury    Nonintractable generalized idiopathic epilepsy without status epilepticus ([address])  Assessment & Plan  Continue home seizure medications with seizure precautions  His last seizure was 2-3 days ago according to his uncle. Lamictal, zonesamide and primidone levels ordered. The last few times he was hospitalized for seizure the AEDs were not changed because the breakthrough seizure etiology were infections. VTE Pharmacologic Prophylaxis: VTE Score: 5 Moderate Risk (Score 3-4) - Pharmacological DVT Prophylaxis Ordered: enoxaparin (Lovenox). Mobility:   -[facility] Achieved: 1: Laying in bed  [facility] Goal NOT achieved. Continue with multidisciplinary rounding and encourage appropriate mobility to improve upon [facility] goals. Patient Centered Rounds: I performed bedside rounds with nursing staff today. Discussions with Specialists or Other Care Team Provider: will discuss with nephro     Education and Discussions with Family / Patient: pt will update mom    Total Time Spent on Date of Encounter in care of patient: >35 mins. This time was spent on one or more of the following: performing physical exam; counseling and coordination of care; obtaining or reviewing history; documenting in the medical record; reviewing/ordering tests, medications or procedures; communicating with other healthcare professionals and discussing with patient's family/caregivers. Current Length of Stay: 2 day(s)  Current Patient Status: Inpatient   Certification Statement: The patient will continue to require additional inpatient hospital stay due to chf padma and pleural effusion   Discharge Plan: Anticipate discharge in 48 hrs to discharge location to be determined pending rehab evaluations.     Code Status: Level 1 - Full Code    Subjective:   Seen and examined no complaints    Objective:     Vitals:   Temp (24hrs), Av.5 °F (36.4 °C), Min:97.3 °F (36.3 °C), Max:97.7 °F (36.5 °C)    Temp:  [97.3 °F (36.3 °C)-97.7 °F (36.5 °C)] 97.3 °F (36.3 °C)  HR:  [68-72] 72  Resp:  [18] 18  BP: (119-131)/(70-74) 119/74  SpO2:  [91 %-95 %] 91 %  Body mass index is 31.67 kg/m². Input and Output Summary (last 24 hours): Intake/Output Summary (Last 24 hours) at 11/19/2023 1138  Last data filed at 11/19/2023 0848  Gross per 24 hour   Intake 720 ml   Output 500 ml   Net 220 ml       Physical Exam:   Physical Exam  Vitals and nursing note reviewed. Constitutional:       General: He is not in acute distress. Appearance: He is well-developed. HENT:      Head: Normocephalic and atraumatic. Eyes:      Conjunctiva/sclera: Conjunctivae normal.   Cardiovascular:      Rate and Rhythm: Normal rate and regular rhythm. Heart sounds: No murmur heard. Pulmonary:      Effort: Pulmonary effort is normal. No respiratory distress. Breath sounds: Rales present. Abdominal:      General: There is no distension. Palpations: Abdomen is soft. Tenderness: There is no abdominal tenderness. Musculoskeletal:         General: Swelling present. Cervical back: Neck supple. Skin:     General: Skin is warm and dry. Capillary Refill: Capillary refill takes less than 2 seconds. Neurological:      General: No focal deficit present. Mental Status: He is alert.    Psychiatric:         Mood and Affect: Mood normal.          Additional Data:     Labs:  Results from last 7 days   Lab Units 11/19/23  0513 11/17/23  1800   WBC Thousand/uL 5.49 6.55   HEMOGLOBIN g/dL 10.2* 11.9*   HEMATOCRIT % 32.6* 38.1   PLATELETS Thousands/uL 113* 143*   NEUTROS PCT %  --  78*   LYMPHS PCT %  --  14   MONOS PCT %  --  8   EOS PCT %  --  0     Results from last 7 days   Lab Units 11/19/23  0513 11/17/23  1800   SODIUM mmol/L 141 140   POTASSIUM mmol/L 4.3 4.1   CHLORIDE mmol/L 102 100   CO2 mmol/L 33* 33*   BUN mg/dL 34* 30*   CREATININE mg/dL 1.88* 1.59*   ANION GAP mmol/L 6 7 CALCIUM mg/dL 9.2 9.9   ALBUMIN g/dL  --  4.0   TOTAL BILIRUBIN mg/dL  --  0.98   ALK PHOS U/L  --  195*   ALT U/L  --  20   AST U/L  --  27   GLUCOSE RANDOM mg/dL 81 90     Results from last 7 days   Lab Units 11/17/23  1800   INR  1.15             Results from last 7 days   Lab Units 11/19/23  0513 11/17/23  1800   LACTIC ACID mmol/L  --  1.2   PROCALCITONIN ng/ml 0.36* 0.31*       Lines/Drains:  Invasive Devices       Peripheral Intravenous Line  Duration             Peripheral IV 11/17/23 Left Antecubital 1 day                          Imaging: Reviewed radiology reports from this admission including: chest CT scan and abdominal/pelvic CT    Recent Cultures (last 7 days):   Results from last 7 days   Lab Units 11/17/23  1802 11/17/23  1800   BLOOD CULTURE  No Growth at 24 hrs. No Growth at 24 hrs.        Last 24 Hours Medication List:   Current Facility-Administered Medications   Medication Dose Route Frequency Provider Last Rate    acetaminophen  650 mg Oral Q6H PRN Dana Asif PA-C      ampicillin-sulbactam  3 g Intravenous Q6H Ivette Anguiano MD 3 g (11/19/23 0914)    ascorbic acid  500 mg Oral Daily Dana REMBERTO Asif-JAMIE      aspirin  81 mg Oral Daily REMBERTO Joseph-C      calcium carbonate-vitamin D  1 tablet Oral Daily With Breakfast REMBERTO Joseph-JAMIE      dextromethorphan-guaiFENesin  10 mL Oral Q4H PRN REMBERTO Joseph-JAMIE      enoxaparin  40 mg Subcutaneous Daily Dana Asif PA-C      lactulose  20 g Oral After [employer], PA-JAMIE      lamoTRIgine  200 mg Oral Early Morning Dana Yefri, PA-JAMIE      lamoTRIgine  250 mg Oral After Lunch REMBERTO Joseph-JAMIE      lamoTRIgine  300 mg Oral HS REMBERTO Joseph-JAMIE      levOCARNitine  330 mg Oral 4x Daily (with meals and at bedtime) Celso Goncalves PA-C      LORazepam  2 mg Intravenous Q6H PRN Dana Asif, PA-C      magnesium sulfate  2 g Intravenous Once Ivette Anguiano MD      metoprolol tartrate  25 mg Oral BID Dana Mykeo, PA-JAMIE      nystatin   Topical BID Dana Yefri, PA-JAMIE      ondansetron 4 mg Intravenous Q6H PRN Danatuan Stringerjoe, WHITNEY      polyethylene glycol  17 g Oral Daily PRN Dana Stringerjoe, WHITNEY      primidone  100 mg Oral After Breakfast Dana WHITNEY Asfi      primidone  100 mg Oral Daily With Lunch Danatuan Stringerjoe, WHITNEY      primidone  150 mg Oral HS Dana WHITNEY Asif      senna-docusate sodium  1 tablet Oral BID Dana Stringerjoe, WHITNEY      vitamin E (tocopherol)  400 Units Oral Daily Dana Stringerjoe, WHITNEY      zonisamide  100 mg Oral HS Rosemary Duke PA-C          Today, Patient Was Seen By: Kim Johnson MD    **Please Note: This note may have been constructed using a voice recognition system. **

## 2023-11-19 NOTE — ASSESSMENT & PLAN NOTE
Patient's main caregiver is his mother who is hospitalized today at [facility]  She assists him with his ADLs and assist x2  Might need placement  Pt/ot ordered

## 2023-11-19 NOTE — ASSESSMENT & PLAN NOTE
Lab Results   Component Value Date    EGFR 39 11/19/2023    EGFR 47 11/17/2023    EGFR 56 08/11/2021    CREATININE 1.88 (H) 11/19/2023    CREATININE 1.59 (H) 11/17/2023    CREATININE 1.41 (H) 08/11/2021   Has been anywhere between 1.4-1.5, cr worsened since lasix iv and also on high dose ladactone (unfortunately received .  Will ask nephro to eval hold iv lasix at this time as his legs are still swollen

## 2023-11-19 NOTE — PLAN OF CARE
Problem: PAIN - ADULT  Goal: Verbalizes/displays adequate comfort level or baseline comfort level  Description: Interventions:  - Encourage patient to monitor pain and request assistance  - Assess pain using appropriate pain scale  - Administer analgesics based on type and severity of pain and evaluate response  - Implement non-pharmacological measures as appropriate and evaluate response  - Consider cultural and social influences on pain and pain management  - Notify physician/advanced practitioner if interventions unsuccessful or patient reports new pain  Outcome: Progressing     Problem: INFECTION - ADULT  Goal: Absence or prevention of progression during hospitalization  Description: INTERVENTIONS:  - Assess and monitor for signs and symptoms of infection  - Monitor lab/diagnostic results  - Monitor all insertion sites, i.e. indwelling lines, tubes, and drains  - Monitor endotracheal if appropriate and nasal secretions for changes in amount and color  - [place] appropriate cooling/warming therapies per order  - Administer medications as ordered  - Instruct and encourage patient and family to use good hand hygiene technique  - Identify and instruct in appropriate isolation precautions for identified infection/condition  Outcome: Progressing  Goal: Absence of fever/infection during neutropenic period  Description: INTERVENTIONS:  - Monitor WBC    Outcome: Progressing     Problem: SAFETY ADULT  Goal: Patient will remain free of falls  Description: INTERVENTIONS:  - Educate patient/family on patient safety including physical limitations  - Instruct patient to call for assistance with activity   - Consult OT/PT to assist with strengthening/mobility   - Keep Call bell within reach  - Keep bed low and locked with side rails adjusted as appropriate  - Keep care items and personal belongings within reach  - Initiate and maintain comfort rounds  - Make Fall Risk Sign visible to staff  - Offer Toileting every 2 Hours, in advance of need  - Initiate/Maintain bed/chair alarm  - Obtain necessary fall risk management equipment:   - Apply yellow socks and bracelet for high fall risk patients  - Consider moving patient to room near nurses station  Outcome: Progressing  Goal: Maintain or return to baseline ADL function  Description: INTERVENTIONS:  -  Assess patient's ability to carry out ADLs; assess patient's baseline for ADL function and identify physical deficits which impact ability to perform ADLs (bathing, care of mouth/teeth, toileting, grooming, dressing, etc.)  - Assess/evaluate cause of self-care deficits   - Assess range of motion  - Assess patient's mobility; develop plan if impaired  - Assess patient's need for assistive devices and provide as appropriate  - Encourage maximum independence but intervene and supervise when necessary  - Involve family in performance of ADLs  - Assess for home care needs following discharge   - Consider OT consult to assist with ADL evaluation and planning for discharge  - Provide patient education as appropriate  Outcome: Progressing  Goal: Maintains/Returns to pre admission functional level  Description: INTERVENTIONS:  - Perform AM-PAC 6 Click Basic Mobility/ Daily Activity assessment daily.  - Set and communicate daily mobility goal to care team and patient/family/caregiver. - Collaborate with rehabilitation services on mobility goals if consulted  - Perform Range of Motion 3 times a day. - Reposition patient every 2 hours.   - Dangle patient 3 times a day  - Stand patient 3 times a day  - Ambulate patient 3 times a day  - Out of bed to chair 3 times a day   - Out of bed for meals 3 times a day  - Out of bed for toileting  - Record patient progress and toleration of activity level   Outcome: Progressing     Problem: DISCHARGE PLANNING  Goal: Discharge to home or other facility with appropriate resources  Description: INTERVENTIONS:  - Identify barriers to discharge w/patient and caregiver  - Arrange for needed discharge resources and transportation as appropriate  - Identify discharge learning needs (meds, wound care, etc.)  - Arrange for interpretive services to assist at discharge as needed  - Refer to Case Management Department for coordinating discharge planning if the patient needs post-hospital services based on physician/advanced practitioner order or complex needs related to functional status, cognitive ability, or social support system  Outcome: Progressing     Problem: Knowledge Deficit  Goal: Patient/family/caregiver demonstrates understanding of disease process, treatment plan, medications, and discharge instructions  Description: Complete learning assessment and assess knowledge base.   Interventions:  - Provide teaching at level of understanding  - Provide teaching via preferred learning methods  Outcome: Progressing     Problem: Prexisting or High Potential for Compromised Skin Integrity  Goal: Skin integrity is maintained or improved  Description: INTERVENTIONS:  - Identify patients at risk for skin breakdown  - Assess and monitor skin integrity  - Assess and monitor nutrition and hydration status  - Monitor labs   - Assess for incontinence   - Turn and reposition patient  - Assist with mobility/ambulation  - Relieve pressure over bony prominences  - Avoid friction and shearing  - Provide appropriate hygiene as needed including keeping skin clean and dry  - Evaluate need for skin moisturizer/barrier cream  - Collaborate with interdisciplinary team   - Patient/family teaching  - Consider wound care consult   Outcome: Progressing

## 2023-11-19 NOTE — CONSULTS
Consultation - Nephrology   Erlinda Medellin 66 y.o. male MRN: [id number]  Unit/Bed#: -01 Encounter: [id number]      A/P:  1. Acute kidney injury superimposed on CKD 3A   - baseline creatinine 1.3-1.4   - admitted with a creatinine of 1.59 --> 1.88 mg/dl   - received IV diuretic and spironolactone and may have cardiorenal syndrome. Does not appear volume  depleted. - He probably needs more diuresis, however, would await chest tube insertion    - consider repeat echo    2. Chronic kidney disease IIIA   - due to chronic diuretic therapy? 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 No monoclonal gammopathy in the urine. 3.Chronic respiratory failure with hypercapnia   - pleural effusion is large and loculated   - he has had prior pneumonia and cannot rule out current infection    - has relatively new presentation of  metabolic alkalosis  as bicarbonate was normal in June    - would  check an ABG      4. Acute on chronic heart failure pEF   - as above             Thank you for allowing us to participate in the care of your patient. Please feel free to contact us regarding the care of this patient, or any other questions/concerns that may be applicable.     Patient Active Problem List   Diagnosis    Polyp of colon    Pneumonia due to infectious organism    Nonintractable generalized idiopathic epilepsy without status epilepticus ([address])    Falls    T wave inversion in EKG    Essential hypertension    RSV (acute bronchiolitis due to respiratory syncytial virus)    Acute respiratory failure with hypoxia (HCC)    Developmental delay    Dysphagia    Acute encephalopathy    MRSA nasal colonization    EDWIGE (acute kidney injury) ([address])    Pleural effusion on right    History of COVID-19    S/P pericardial window creation    Acute on chronic heart failure with preserved ejection fraction (HCC)    Lower extremity pain, left    Abnormal finding on CT scan    Liver cirrhosis (HCC)    No other household member able to render care    Chronic respiratory failure with hypercapnia ([address])    Stage 3 chronic kidney disease ([address])       History of Present Illness   Physician Requesting Consult: Candis Olvera MD  Reason for Consult / Principal Problem: Acute kidney injury  Hx and PE limited by:   HPI: Erlinda Medellin is a 66y.o. year old male who presents with worsening kidney function. He has a baseline creatinine of 1.3-1.4 and presented with a creatinine of 1.59mg/dl. Level trended up to 1.88 mg/dl today precipitating a renal consult. Lasix and spironolactone were given but held today. but lactulose continued. IV Lasix was given due to LE edema. He has chronic respiratory failure with hypercapnia and uses home oxygen. He has a very large right pleural effusion (personally reviewed) and and a chest tube was discussed with his mother and primary team. He has acute on chronic HFpEF. History includes developmental delay cared for by his mother, epilepsy, cirrhosis (NALDFD?)     History obtained from chart review and unobtainable from patient due to mental status  symptoms. .     ROS: Denies chest pain, has mild dyspnea, no abdominal pain, has a nose bleed and wants to know if he is actively bleeding. Has leg swelling. Has a seizure disorder    Historical Information   Past Medical History:   Diagnosis Date    Hypertension     Mentally challenged     Pericardial effusion     Pneumonia     Seizure [facility])      Past Surgical History:   Procedure Laterality Date    FRACTURE SURGERY      clavicle, left humerus, radial, and ulna. Right radius    HIP ARTHROSCOPY Right     MS COLONOSCOPY FLX DX W/COLLJ SPEC WHEN PFRMD N/A 4/1/2019    Procedure: COLONOSCOPY;  Surgeon: Tenny Rubinstein, MD;  Location: [facility];   Service: Colorectal     Social History   Social History     Substance and Sexual Activity   Alcohol Use Never     Social History     Substance and Sexual Activity   Drug Use Never     Social History     Tobacco Use   Smoking Status Never   Smokeless Tobacco Never     Family History   Adopted: Yes       Meds/Allergies   all current active meds have been reviewed, current meds:   Current Facility-Administered Medications   Medication Dose Route Frequency    acetaminophen (TYLENOL) tablet 650 mg  650 mg Oral Q6H PRN    ampicillin-sulbactam (UNASYN) 3 g in sodium chloride 0.9 % 100 mL IVPB  3 g Intravenous Q6H    ascorbic acid (VITAMIN C) tablet 500 mg  500 mg Oral Daily    aspirin (ECOTRIN LOW STRENGTH) EC tablet 81 mg  81 mg Oral Daily    calcium carbonate-vitamin D 500 mg-5 mcg tablet 1 tablet  1 tablet Oral Daily With Breakfast    dextromethorphan-guaiFENesin (ROBITUSSIN DM) oral syrup 10 mL  10 mL Oral Q4H PRN    enoxaparin (LOVENOX) subcutaneous injection 40 mg  40 mg Subcutaneous Daily    lactulose (CHRONULAC) oral solution 20 g  20 g Oral After Dinner    lamoTRIgine (LaMICtal) tablet 200 mg  200 mg Oral Early Morning    lamoTRIgine (LaMICtal) tablet 250 mg  250 mg Oral After Lunch    lamoTRIgine (LaMICtal) tablet 300 mg  300 mg Oral HS    levOCARNitine (CARNITOR) oral solution 330 mg  330 mg Oral 4x Daily (with meals and at bedtime)    LORazepam (ATIVAN) injection 2 mg  2 mg Intravenous Q6H PRN    metoprolol tartrate (LOPRESSOR) tablet 25 mg  25 mg Oral BID    nystatin (MYCOSTATIN) powder   Topical BID    ondansetron (ZOFRAN) injection 4 mg  4 mg Intravenous Q6H PRN    oxymetazoline (AFRIN) 0.05 % nasal spray 2 spray  2 spray Each Nare Q12H SON    polyethylene glycol (MIRALAX) packet 17 g  17 g Oral Daily PRN    primidone (MYSOLINE) tablet 100 mg  100 mg Oral After Breakfast    primidone (MYSOLINE) tablet 100 mg  100 mg Oral Daily With Lunch    primidone (MYSOLINE) tablet 150 mg  150 mg Oral HS    senna-docusate sodium (SENOKOT S) 8.6-50 mg per tablet 1 tablet  1 tablet Oral BID    vitamin E (TOCOPHEROL) capsule 400 Units  400 Units Oral Daily    zonisamide (ZONEGRAN) capsule 100 mg  100 mg Oral HS    and PTA meds:    Medications Prior to Admission   Medication    ascorbic acid (VITAMIN C) 500 mg tablet    aspirin 81 MG tablet    calcium-vitamin D 250-100 MG-UNIT per tablet    furosemide (LASIX) 20 mg tablet    lactulose 20 g/30 mL    lamoTRIgine (LaMICtal) 100 mg tablet    lamoTRIgine (LaMICtal) 150 MG tablet    lamoTRIgine (LaMICtal) 200 MG tablet    metoprolol tartrate (LOPRESSOR) 25 mg tablet    primidone (MYSOLINE) 50 mg tablet    primidone (MYSOLINE) 50 mg tablet    primidone (MYSOLINE) 50 mg tablet    spironolactone (ALDACTONE) 100 mg tablet    thiamine 50 MG tablet    vitamin E, tocopherol, 400 units capsule    zonisamide (ZONEGRAN) 100 mg capsule    acetaminophen (TYLENOL) 325 mg tablet    levOCARNitine (CARNITOR) 330 MG tablet    polyethylene glycol (MIRALAX) 17 g packet    senna-docusate sodium (SENOKOT S) 8.6-50 mg per tablet    zinc gluconate 50 mg tablet         Allergies   Allergen Reactions    Budesonide Seizures     Other reaction(s): Seizure    Dilantin [Phenytoin]      Pericardial effusion    Flurazepam Other (See Comments)     dalmane    Other reaction(s): MADW HIM RAMMEY   dalmane    Ipratropium-Albuterol Seizures     Other reaction(s): Seizures    Phenobarbital Hyperactivity    Vyvanse [Lisdexamfetamine Dimesylate] Other (See Comments)     Unknown        Objective     Intake/Output Summary (Last 24 hours) at 11/19/2023 1441  Last data filed at 11/19/2023 0848  Gross per 24 hour   Intake 720 ml   Output 500 ml   Net 220 ml       Invasive Devices:        Physical Exam      I/O last 3 completed shifts: In: 4348 [P.O.:1020; IV Piggyback:50]  Out: 1050 [Urine:1050]    Vitals:    11/19/23 0900   BP:    Pulse:    Resp:    Temp:    SpO2: 95%       General Appearance:    No acute distress. Cooperative. Appears stated age. Head:    Normocephalic. Atraumatic. Normal jaw occlusion.    Eyes: Lids, conjunctiva normal. No scleral icterus. Ears:    Normal external ears. Nose:   Nares has gauze in right nare   Mouth:   Lips, tongue normal. Mucosa normal. Phonation normal.   Neck:   Supple. Symmetrical.   Back:     Symmetric. No CVA tenderness. Lungs:     Normal respiratory effort. Scattered exp crackles to auscultation bilaterally. Chest wall:    No tenderness or deformity. Heart:    Regular rate and rhythm. Normal S1 and S2. No murmur. No JVD. No edema. Abdomen:     Soft. Non-tender. Bowel sounds active. Genitourinary:   No Azul catheter present. Extremities:   Extremities has edema and erythema of lower extremities   Skin:   Warm and dry. Bruising on left chest and arms   Neurologic:   Alert and pleasant and conversant. Current Weight: Weight - Scale: 89 kg (196 lb 3.4 oz)  First Weight: Weight - Scale: 89 kg (196 lb 3.4 oz)    Lab Results:  I have personally reviewed pertinent labs.     CBC:   Lab Results   Component Value Date    WBC 5.49 11/19/2023    HGB 10.2 (L) 11/19/2023    HCT 32.6 (L) 11/19/2023     (H) 11/19/2023     (L) 11/19/2023    RBC 3.15 (L) 11/19/2023    MCH 32.4 11/19/2023    MCHC 31.3 (L) 11/19/2023    RDW 16.4 (H) 11/19/2023    MPV 11.5 11/19/2023     CMP:   Lab Results   Component Value Date    K 4.3 11/19/2023     11/19/2023    CO2 33 (H) 11/19/2023    BUN 34 (H) 11/19/2023    CREATININE 1.88 (H) 11/19/2023    CALCIUM 9.2 11/19/2023    EGFR 39 11/19/2023     Phosphorus: No results found for: "PHOS"  Magnesium:   Lab Results   Component Value Date    MG 1.8 (L) 11/19/2023     Urinalysis: No results found for: "COLORU", "CLARITYU", "SPECGRAV", "PHUR", "LEUKOCYTESUR", "NITRITE", "PROTEINUA", "GLUCOSEU", "[place]", "BILIRUBINUR", "BLOODU"  Ionized Calcium: No results found for: "CAION"  Coagulation: No results found for: "PT", "INR", "APTT"  Troponin: No results found for: "TROPONINI"  ABG: No results found for: "PHART", "ZYA0PCZ", "PO2ART", "YVK8OXI", "V3YBOUJQ", "BEART", "SOURCE"    Results from last 7 days   Lab Units 11/19/23  0513 11/17/23  1800   POTASSIUM mmol/L 4.3 4.1   CHLORIDE mmol/L 102 100   CO2 mmol/L 33* 33*   BUN mg/dL 34* 30*   CREATININE mg/dL 1.88* 1.59*   CALCIUM mg/dL 9.2 9.9   ALK PHOS U/L  --  195*   ALT U/L  --  20   AST U/L  --  27       Radiology review:  Procedure: XR chest portable    Result Date: 11/18/2023  Narrative: CHEST INDICATION:   lethargy. COMPARISON: Chest radiograph 1/18/2020. Chest, abdomen, pelvic CT from 11/17/2023. EXAM PERFORMED/VIEWS:  XR CHEST PORTABLE FINDINGS: Cardiomediastinal silhouette appears unremarkable. Opacification of the lower right hemithorax with obscured diaphragmatic border, which may represent compressive atelectasis. However, underlying or superimposed pneumonia cannot be excluded. Moderate right pleural effusion, small left pleural effusion. No pneumothorax. Osseous structures appear within normal limits for patient age. Impression: Moderate right pleural effusion with associated compressive atelectasis. However underlying or superimposed pneumonia cannot be excluded in the appropriate clinical setting. Small left pleural effusion. Resident: Raisa Shelley, the attending radiologist, have reviewed the images and agree with the final report above. Workstation performed: [id number]     Procedure: CT chest abdomen pelvis wo contrast    Result Date: 11/17/2023  Narrative: CT CHEST, ABDOMEN AND PELVIS WITHOUT IV CONTRAST INDICATION:   falls. COMPARISON:  None. TECHNIQUE: CT examination of the chest, abdomen and pelvis was performed without intravenous contrast. Multiplanar 2D reformatted images were created from the source data. This examination, like all CT scans performed in the [facility], was performed utilizing techniques to minimize radiation dose exposure, including the use of iterative reconstruction and automated exposure control.  Radiation dose length product (DLP) for this visit:  1246 mGy-cm Enteric contrast was administered. FINDINGS: CHEST LUNGS: Right middle and lower lobe consolidation There is no tracheal or endobronchial lesion. PLEURA: Moderate sized loculated right pleural effusion small left pleural effusion. Anahi Angles HEART/GREAT VESSELS: Pericardial calcifications change from prior exam no thoracic aortic aneurysm. MEDIASTINUM AND MIKEY:  Unremarkable. CHEST WALL AND LOWER NECK:  Unremarkable. ABDOMEN LIVER/BILIARY TREE:  The liver demonstrates cirrhotic morphology. Within the limitations of this examination there is no evidence of suspicious hepatic mass. No biliary dilatation. GALLBLADDER:  There are gallstone(s) within the gallbladder, without pericholecystic inflammatory changes. SPLEEN:  Unremarkable. PANCREAS:  Unremarkable. ADRENAL GLANDS:  Unremarkable. KIDNEYS/URETERS:  One or more simple renal cyst(s) is noted. Otherwise unremarkable kidneys. No hydronephrosis. STOMACH AND BOWEL: Wall thickening and edema at the proximal duodenum with surrounding fluid. APPENDIX:  No findings to suggest appendicitis. ABDOMINOPELVIC CAVITY: No ascites. No pneumoperitoneum. No lymphadenopathy. VESSELS:  Unremarkable for patient's age. PELVIS REPRODUCTIVE ORGANS:  Unremarkable for patient's age. URINARY BLADDER:  Unremarkable. ABDOMINAL WALL/INGUINAL REGIONS:  There is a small fat-containing umbilical hernia. OSSEOUS STRUCTURES:  No acute fracture or destructive osseous lesion. Status post ORIF of right proximal femur. Impression: No evidence of acute traumatic injury throughout the chest, abdomen or pelvis. Moderate size loculated right pleural effusion. Right middle and lower lobe consolidation compatible with compressive atelectasis. Underlying pneumonia should be excluded clinically. Mild wall thickening and edema with surrounding fluid at the proximal duodenum.  Correlate for signs and symptoms of peptic ulcer disease Workstation performed: [id number]     Procedure: CT cervical spine without contrast    Result Date: 11/17/2023  Narrative: CT CERVICAL SPINE - WITHOUT CONTRAST INDICATION:   Neck trauma, uncomplicated (NEXUS/CCR neg) (Age 16-64y) fall. COMPARISON:  None. TECHNIQUE:  CT examination of the cervical spine was performed without intravenous contrast.  Contiguous axial images were obtained. Multiplanar 2D reformatted images were created from the source data. Radiation dose length product (DLP) for this visit:  505 mGy-cm . This examination, like all CT scans performed in the [facility], was performed utilizing techniques to minimize radiation dose exposure, including the use of iterative reconstruction and automated exposure control. IMAGE QUALITY:  Diagnostic. FINDINGS: ALIGNMENT:  Normal alignment of the cervical spine. No subluxation. VERTEBRAE:  No fracture. DEGENERATIVE CHANGES:  No significant cervical degenerative changes are noted. PREVERTEBRAL AND PARASPINAL SOFT TISSUES: Unremarkable THORACIC INLET:  Normal.     Impression: No cervical spine fracture or traumatic malalignment. Workstation performed: [id number]     Procedure: CT head without contrast    Result Date: 11/17/2023  Narrative: CT BRAIN - WITHOUT CONTRAST INDICATION:   Ataxia, head trauma fall. COMPARISON: 8/7/2021. TECHNIQUE:  CT examination of the brain was performed. Multiplanar 2D reformatted images were created from the source data. Radiation dose length product (DLP) for this visit:  1077 mGy-cm . This examination, like all CT scans performed in the [facility], was performed utilizing techniques to minimize radiation dose exposure, including the use of iterative reconstruction and automated exposure control. IMAGE QUALITY:  Diagnostic. FINDINGS: PARENCHYMA:  No intracranial mass, mass effect or midline shift. No CT signs of acute infarction. No acute parenchymal hemorrhage.  VENTRICLES AND EXTRA-AXIAL SPACES:  Ventricles and extra-axial CSF spaces are prominent commensurate with the degree of volume loss. No hydrocephalus. No acute extra-axial hemorrhage. VISUALIZED ORBITS: Normal visualized orbits. PARANASAL SINUSES: Mild mucosal thickening of the visualized paranasal sinuses. CALVARIUM AND EXTRACRANIAL SOFT TISSUES:  Normal.     Impression: No acute intracranial abnormality. Workstation performed: [id number]         EKG, Pathology, and Other Studies: I personally reviewed the CXR which demonstrates a large right pleural effusion      Counseling / Coordination of Care  Total ADDITIONAL floor / unit time spent today 35 minutes. Greater than 50% of total time was spent with the patient and / or family counseling and / or coordination of care. A description of the counseling / coordination of care: will discuss with primary team    Rudolph Carranza MD      This consultation note was produced in part using a dictation device which may document imprecise wording from author's original intent.

## 2023-11-19 NOTE — ASSESSMENT & PLAN NOTE
This did require a chest tube about 20 years ago according to the mother's typed medical history list  Discussed with the mother patient has been having worsening shortness of breath and for the past week and has been falling asleep a lot he usually wears oxygen 2 L at night but the mother has been using 2 L throughout the day. No cough has been reported but she did state that his lower extremity edema has worsened  There is loculated right-sided moderate effusion with inability to rule out pneumonia questionable can be parapneumonic effusion that needs chest tube for drainage in unable to be done with a thoracocentesis.   I discussed with the mother and if needed okay to proceed with the chest tube discussed that interventional radiology will see the patient on Monday and decide which way to go we will order pleural fluid studies  Procalcitonin is elevated and trended up  will start Unasyn  Consult pulmonology  Ldh serum reviewed to calculate lights criteria  Incentive spirometer  Mrsa screen  Patient had pneumonia 6 times he does have history of aspiration as well he is on a modified diet we will ask speech to evaluate to ensure there is no evidence of aspiration seen

## 2023-11-20 ENCOUNTER — APPOINTMENT (INPATIENT)
Dept: INTERVENTIONAL RADIOLOGY/VASCULAR | Facility: HOSPITAL | Age: 58
End: 2023-11-20
Attending: FAMILY MEDICINE
Payer: MEDICARE

## 2023-11-20 PROBLEM — D62 ACUTE BLOOD LOSS ANEMIA: Status: ACTIVE | Noted: 2023-11-20

## 2023-11-20 PROBLEM — R04.0 EPISTAXIS: Status: ACTIVE | Noted: 2023-11-20

## 2023-11-20 LAB
ANION GAP SERPL CALCULATED.3IONS-SCNC: 6 MMOL/L
APPEARANCE FLD: ABNORMAL
BASOPHILS # BLD AUTO: 0.01 THOUSANDS/ÂΜL (ref 0–0.1)
BASOPHILS NFR BLD AUTO: 0 % (ref 0–1)
BUN SERPL-MCNC: 34 MG/DL (ref 5–25)
CALCIUM SERPL-MCNC: 9.2 MG/DL (ref 8.4–10.2)
CHLORIDE SERPL-SCNC: 102 MMOL/L (ref 96–108)
CO2 SERPL-SCNC: 33 MMOL/L (ref 21–32)
COLOR FLD: ABNORMAL
CREAT SERPL-MCNC: 1.84 MG/DL (ref 0.6–1.3)
CREAT UR-MCNC: 168.9 MG/DL
EOSINOPHIL # BLD AUTO: 0.01 THOUSAND/ÂΜL (ref 0–0.61)
EOSINOPHIL NFR BLD AUTO: 0 % (ref 0–6)
ERYTHROCYTE [DISTWIDTH] IN BLOOD BY AUTOMATED COUNT: 16.5 % (ref 11.6–15.1)
GFR SERPL CREATININE-BSD FRML MDRD: 40 ML/MIN/1.73SQ M
GLUCOSE FLD-MCNC: 104 MG/DL
GLUCOSE SERPL-MCNC: 92 MG/DL (ref 65–140)
HCT VFR BLD AUTO: 31.6 % (ref 36.5–49.3)
HGB BLD-MCNC: 9.8 G/DL (ref 12–17)
HISTIOCYTES NFR FLD: 21 %
IMM GRANULOCYTES # BLD AUTO: 0.02 THOUSAND/UL (ref 0–0.2)
IMM GRANULOCYTES NFR BLD AUTO: 0 % (ref 0–2)
LDH FLD L TO P-CCNC: 84 U/L
LYMPHOCYTES # BLD AUTO: 0.63 THOUSANDS/ÂΜL (ref 0.6–4.47)
LYMPHOCYTES NFR BLD AUTO: 12 % (ref 14–44)
LYMPHOCYTES NFR BLD AUTO: 48 %
MCH RBC QN AUTO: 32 PG (ref 26.8–34.3)
MCHC RBC AUTO-ENTMCNC: 31 G/DL (ref 31.4–37.4)
MCV RBC AUTO: 103 FL (ref 82–98)
MICROALBUMIN UR-MCNC: 8 MG/L
MICROALBUMIN/CREAT 24H UR: 5 MG/G CREATININE (ref 0–30)
MONOCYTES # BLD AUTO: 0.51 THOUSAND/ÂΜL (ref 0.17–1.22)
MONOCYTES NFR BLD AUTO: 10 % (ref 4–12)
NEUTROPHILS # BLD AUTO: 3.96 THOUSANDS/ÂΜL (ref 1.85–7.62)
NEUTS SEG NFR BLD AUTO: 31 %
NEUTS SEG NFR BLD AUTO: 78 % (ref 43–75)
NRBC BLD AUTO-RTO: 0 /100 WBCS
PH BODY FLUID: 7.6
PLATELET # BLD AUTO: 109 THOUSANDS/UL (ref 149–390)
PMV BLD AUTO: 11.8 FL (ref 8.9–12.7)
POTASSIUM SERPL-SCNC: 4.3 MMOL/L (ref 3.5–5.3)
PROCALCITONIN SERPL-MCNC: 0.41 NG/ML
PROT FLD-MCNC: <3 G/DL
RBC # BLD AUTO: 3.06 MILLION/UL (ref 3.88–5.62)
SITE: ABNORMAL
SODIUM SERPL-SCNC: 141 MMOL/L (ref 135–147)
TOTAL CELLS COUNTED SPEC: 100
UUN 24H UR-MCNC: 856 MG/DL
WBC # BLD AUTO: 5.14 THOUSAND/UL (ref 4.31–10.16)
WBC # FLD MANUAL: 177 /UL
ZONISAMIDE SERPL-MCNC: 5.1 UG/ML (ref 10–40)

## 2023-11-20 PROCEDURE — 99232 SBSQ HOSP IP/OBS MODERATE 35: CPT | Performed by: INTERNAL MEDICINE

## 2023-11-20 PROCEDURE — 32555 ASPIRATE PLEURA W/ IMAGING: CPT

## 2023-11-20 PROCEDURE — 88112 CYTOPATH CELL ENHANCE TECH: CPT | Performed by: STUDENT IN AN ORGANIZED HEALTH CARE EDUCATION/TRAINING PROGRAM

## 2023-11-20 PROCEDURE — 87070 CULTURE OTHR SPECIMN AEROBIC: CPT | Performed by: FAMILY MEDICINE

## 2023-11-20 PROCEDURE — 99232 SBSQ HOSP IP/OBS MODERATE 35: CPT | Performed by: FAMILY MEDICINE

## 2023-11-20 PROCEDURE — 87205 SMEAR GRAM STAIN: CPT | Performed by: FAMILY MEDICINE

## 2023-11-20 PROCEDURE — 89051 BODY FLUID CELL COUNT: CPT | Performed by: FAMILY MEDICINE

## 2023-11-20 PROCEDURE — 82043 UR ALBUMIN QUANTITATIVE: CPT | Performed by: INTERNAL MEDICINE

## 2023-11-20 PROCEDURE — 85025 COMPLETE CBC W/AUTO DIFF WBC: CPT | Performed by: FAMILY MEDICINE

## 2023-11-20 PROCEDURE — 83615 LACTATE (LD) (LDH) ENZYME: CPT | Performed by: FAMILY MEDICINE

## 2023-11-20 PROCEDURE — 84540 ASSAY OF URINE/UREA-N: CPT | Performed by: INTERNAL MEDICINE

## 2023-11-20 PROCEDURE — 82570 ASSAY OF URINE CREATININE: CPT | Performed by: INTERNAL MEDICINE

## 2023-11-20 PROCEDURE — 82945 GLUCOSE OTHER FLUID: CPT | Performed by: FAMILY MEDICINE

## 2023-11-20 PROCEDURE — 84157 ASSAY OF PROTEIN OTHER: CPT | Performed by: FAMILY MEDICINE

## 2023-11-20 PROCEDURE — 97163 PT EVAL HIGH COMPLEX 45 MIN: CPT

## 2023-11-20 PROCEDURE — 83986 ASSAY PH BODY FLUID NOS: CPT | Performed by: FAMILY MEDICINE

## 2023-11-20 PROCEDURE — 92610 EVALUATE SWALLOWING FUNCTION: CPT

## 2023-11-20 PROCEDURE — 80048 BASIC METABOLIC PNL TOTAL CA: CPT | Performed by: FAMILY MEDICINE

## 2023-11-20 PROCEDURE — 0W993ZX DRAINAGE OF RIGHT PLEURAL CAVITY, PERCUTANEOUS APPROACH, DIAGNOSTIC: ICD-10-PCS | Performed by: RADIOLOGY

## 2023-11-20 PROCEDURE — 88305 TISSUE EXAM BY PATHOLOGIST: CPT | Performed by: STUDENT IN AN ORGANIZED HEALTH CARE EDUCATION/TRAINING PROGRAM

## 2023-11-20 PROCEDURE — 84145 PROCALCITONIN (PCT): CPT | Performed by: FAMILY MEDICINE

## 2023-11-20 PROCEDURE — 97167 OT EVAL HIGH COMPLEX 60 MIN: CPT

## 2023-11-20 RX ORDER — ECHINACEA PURPUREA EXTRACT 125 MG
2 TABLET ORAL 3 TIMES DAILY
Status: DISCONTINUED | OUTPATIENT
Start: 2023-11-20 | End: 2023-12-08 | Stop reason: HOSPADM

## 2023-11-20 RX ORDER — LIDOCAINE WITH 8.4% SOD BICARB 0.9%(10ML)
SYRINGE (ML) INJECTION AS NEEDED
Status: COMPLETED | OUTPATIENT
Start: 2023-11-20 | End: 2023-11-20

## 2023-11-20 RX ORDER — SODIUM CHLORIDE/ALOE VERA
1 GEL (GRAM) NASAL 3 TIMES DAILY
Status: DISCONTINUED | OUTPATIENT
Start: 2023-11-20 | End: 2023-12-08 | Stop reason: HOSPADM

## 2023-11-20 RX ORDER — OXYMETAZOLINE HYDROCHLORIDE 0.05 G/100ML
2 SPRAY NASAL EVERY 12 HOURS SCHEDULED
Status: DISCONTINUED | OUTPATIENT
Start: 2023-11-20 | End: 2023-11-20 | Stop reason: SDUPTHER

## 2023-11-20 RX ADMIN — METOPROLOL TARTRATE 25 MG: 25 TABLET, FILM COATED ORAL at 21:32

## 2023-11-20 RX ADMIN — OXYCODONE HYDROCHLORIDE AND ACETAMINOPHEN 500 MG: 500 TABLET ORAL at 08:20

## 2023-11-20 RX ADMIN — LEVOCARNITINE 330 MG: 1 SOLUTION ORAL at 16:28

## 2023-11-20 RX ADMIN — AMPICILLIN SODIUM AND SULBACTAM SODIUM 3 G: 2; 1 INJECTION, POWDER, FOR SOLUTION INTRAMUSCULAR; INTRAVENOUS at 03:04

## 2023-11-20 RX ADMIN — Medication 10 ML: at 13:20

## 2023-11-20 RX ADMIN — Medication 400 UNITS: at 08:20

## 2023-11-20 RX ADMIN — PRIMIDONE 100 MG: 50 TABLET ORAL at 06:10

## 2023-11-20 RX ADMIN — SALINE NASAL SPRAY 2 SPRAY: 1.5 SOLUTION NASAL at 16:28

## 2023-11-20 RX ADMIN — ZONISAMIDE 100 MG: 100 CAPSULE ORAL at 22:15

## 2023-11-20 RX ADMIN — NYSTATIN: 100000 POWDER TOPICAL at 17:19

## 2023-11-20 RX ADMIN — OXYMETAZOLINE HYDROCHLORIDE 2 SPRAY: 0.05 SPRAY NASAL at 21:30

## 2023-11-20 RX ADMIN — Medication 1 APPLICATION: at 16:28

## 2023-11-20 RX ADMIN — LEVOCARNITINE 330 MG: 1 SOLUTION ORAL at 13:34

## 2023-11-20 RX ADMIN — ASPIRIN 81 MG: 81 TABLET, COATED ORAL at 08:20

## 2023-11-20 RX ADMIN — NYSTATIN: 100000 POWDER TOPICAL at 08:21

## 2023-11-20 RX ADMIN — LACTULOSE 20 G: 20 SOLUTION ORAL at 17:25

## 2023-11-20 RX ADMIN — LAMOTRIGINE 300 MG: 100 TABLET ORAL at 22:15

## 2023-11-20 RX ADMIN — AMPICILLIN SODIUM AND SULBACTAM SODIUM 3 G: 2; 1 INJECTION, POWDER, FOR SOLUTION INTRAMUSCULAR; INTRAVENOUS at 09:26

## 2023-11-20 RX ADMIN — Medication 1 APPLICATION: at 21:32

## 2023-11-20 RX ADMIN — OXYMETAZOLINE HYDROCHLORIDE 2 SPRAY: 0.05 SPRAY NASAL at 08:20

## 2023-11-20 RX ADMIN — Medication 1 TABLET: at 08:20

## 2023-11-20 RX ADMIN — LAMOTRIGINE 250 MG: 100 TABLET ORAL at 14:42

## 2023-11-20 RX ADMIN — LAMOTRIGINE 200 MG: 100 TABLET ORAL at 06:10

## 2023-11-20 RX ADMIN — AMPICILLIN SODIUM AND SULBACTAM SODIUM 3 G: 2; 1 INJECTION, POWDER, FOR SOLUTION INTRAMUSCULAR; INTRAVENOUS at 14:42

## 2023-11-20 RX ADMIN — SALINE NASAL SPRAY 2 SPRAY: 1.5 SOLUTION NASAL at 21:31

## 2023-11-20 RX ADMIN — AMPICILLIN SODIUM AND SULBACTAM SODIUM 3 G: 2; 1 INJECTION, POWDER, FOR SOLUTION INTRAMUSCULAR; INTRAVENOUS at 21:28

## 2023-11-20 RX ADMIN — DOCUSATE SODIUM AND SENNOSIDES 1 TABLET: 8.6; 5 TABLET, FILM COATED ORAL at 08:20

## 2023-11-20 RX ADMIN — LEVOCARNITINE 330 MG: 1 SOLUTION ORAL at 08:20

## 2023-11-20 RX ADMIN — DOCUSATE SODIUM AND SENNOSIDES 1 TABLET: 8.6; 5 TABLET, FILM COATED ORAL at 17:25

## 2023-11-20 RX ADMIN — ENOXAPARIN SODIUM 40 MG: 40 INJECTION SUBCUTANEOUS at 08:20

## 2023-11-20 RX ADMIN — PRIMIDONE 100 MG: 50 TABLET ORAL at 14:43

## 2023-11-20 RX ADMIN — LEVOCARNITINE 330 MG: 1 SOLUTION ORAL at 21:32

## 2023-11-20 RX ADMIN — METOPROLOL TARTRATE 25 MG: 25 TABLET, FILM COATED ORAL at 08:20

## 2023-11-20 RX ADMIN — PRIMIDONE 150 MG: 50 TABLET ORAL at 22:14

## 2023-11-20 NOTE — ASSESSMENT & PLAN NOTE
Cirrhotic morphology of the liver chronic problem  Ammonia is normal there is no ascites continue Lasix and Aldactone- hold in light of worsening renal function

## 2023-11-20 NOTE — CASE MANAGEMENT
Case Management Discharge Planning Note    Patient name Bebeto Estes  Location [address] [facility] [place] [id number] MRN [id number]  : 1965 Date 2023       Current Admission Date: 2023  Current Admission Diagnosis:Pleural effusion on right   Patient Active Problem List    Diagnosis Date Noted    Acute blood loss anemia 2023    Epistaxis 2023    No other household member able to render care 2023    Chronic respiratory failure with hypercapnia ([address]) 2023    Stage 3 chronic kidney disease ([address]) 2023    Liver cirrhosis ([address]) 2021    Abnormal finding on CT scan 2021    EDWIGE (acute kidney injury) ([address]) 2021    Pleural effusion on right 2021    History of COVID-19 2021    S/P pericardial window creation 2021    Acute on chronic heart failure with preserved ejection fraction ([address]) 2021    Lower extremity pain, left 2021    MRSA nasal colonization 2020    Developmental delay 2020    Dysphagia 2020    Acute encephalopathy 2020    Pneumonia due to infectious organism 01/10/2020    Nonintractable generalized idiopathic epilepsy without status epilepticus ([address]) 01/10/2020    Falls 01/10/2020    T wave inversion in EKG 01/10/2020    Essential hypertension 01/10/2020    RSV (acute bronchiolitis due to respiratory syncytial virus) 01/10/2020    Acute respiratory failure with hypoxia ([address]) 01/10/2020    Polyp of colon 03/15/2019      LOS (days): 3  Geometric Mean LOS (GMLOS) (days): 3.90  Days to GMLOS:1.1     OBJECTIVE:  Risk of Unplanned Readmission Score: 14.33         Current admission status: Inpatient   Preferred Pharmacy:   [address] [place] [address] Road  [address] [place] [id number]  Phone: [phone] Fax: [phone]    Primary Care Provider: Avery Hogan DO    Primary Insurance: MEDICARE  Secondary Insurance: [employer]    DISCHARGE DETAILS:     CM contacted Tom Castillo, DHARMESH Magallon. CM left message inquiring if patient has current/active letter indicating patient Nursing Home Eligible from recent stay at [facility].

## 2023-11-20 NOTE — SPEECH THERAPY NOTE
Speech Language/Pathology  Speech-Language Pathology Bedside Swallow Evaluation      Patient Name: Joss Larios    GKZMB'T Date: 11/20/2023     Summary   Consult received for bedside swallow assessment. Pt admitted w/ chronic respiratory failure w/ hypercapnia, cirrhosis, and LE edema. PMHx includes pleural effusion, dysphagia, developmental delay, falls, epilepsy, MCKEON. Currently on mechanical soft and thin liquids which appears to be baseline for patient, will clarify w/ mother who is a patient at [address] at this time. Pt reports he eats whatever he wants at home, denies dysphagia. Seen w/ breakfast meal, pt upright in recliner chair. Pt demonstrating difficulty self feeding, frequently spilling from cups. Trialed straw, did not improve. Pt presents w/ mild oral dysphagia characterized by slow mastication and mild oral residua. Suspect pharyngeal phase grossly WFL. NO overt s/s aspiration w/ observed intake. RN reported pt took meds whole w/ thins    Recommend to continue [facility] soft and thin liquids  Meds whole as tolerated  SLP will continue to follow    Risk/s for Aspiration: Low     Recommended Diet: mechanically altered/level 2 diet and thin liquids   Recommended Form of Meds: whole with liquid   Aspiration precautions and swallowing strategies: upright posture  Other Recommendations: Continue frequent oral care        Current Medical Status  Joss Larios is a 66 y.o. male with a PMH of epilepsy since childhood, history of pericardial window with subsequent restrictive pericarditis, kyphosis, chronic atelectasis, history of right-sided pleural effusion requiring chest tube in the distant past, recurring pneumonia, lymphedema bilateral lower extremities, ambulatory dysfunction, developmental delay, chronic O2 use 2 L nightly, CHF, cirrhosis, recurring falls who presents with frequent falls, increased lethargy and MCKEON x2 days without home health or someone to care for him at home.   I am unable to obtain a history from the patient due to him being very sleepy on my encounter as well as electrical disability. According to the emergency room provider patient was having increased lethargy, frequent falls and MCKEON at home. Because of the falls she obtained trauma CAT scan imaging. Patient was not complaining of any pain in his head, neck arms legs or abdomen at the time of my encounter. Patient's family does not have the resources to safely take care of him at home at this time because he is a 2+ assist and his mother manages his ADLs, she fell and has a broken hip and was admitted to this hospital earlier today. According to the patient's uncle with whom the RN spoke over the phone, he has been having frequent seizures last one was 2 to 3 days ago. Chart review this patient had a few seizures in the last few years which were always blamed on breakthrough from infection, in the recent past mother was concerned about almost monthly seizures spite her managing this patient's medications religiously. Current Precautions:  Fall  Aspiration      Allergies:  No known food allergies    Past medical history:  Please see H&P for details    Special Studies:  CXR:  Moderate right pleural effusion with associated compressive atelectasis. However underlying or superimposed pneumonia cannot be excluded in the appropriate clinical setting. Small left pleural effusion. VBS 12/21:  Overall, pt presented with oropharyngeal dysphagia and ?esophageal dysphagia characterized primarily by impaired lingual movements, prolonged/reduced vertical-munch chew pattern, prolonged manipulation/transfer of all consistencies, premature transfer of liquid consistencies, minimal to absent hyolaryngeal excursion resulting in intermittent reduced airway protection, and pharyngeal port residuals post-swallow.  Suspect oral symptoms r/t dentition status and partially to consistency of solids (e.g. drier solids resulted in increased difficulty) and pharyngeal symptoms primarily caused by premature transfer to valleculae d/t poor lingual control of liquids. The following impairments were noted:  · Slow tongue movement and reduced bolus control resulting in prolonged A-P transfer of all consistencies   · Lingual pumping noted with chewable solids especially L6 soft & bite-sized solids suspect partially r/t dryness of solid (cracker)  · Reduced mastication with limited breakdown d/t vertical munch-chew pattern especially with L6 solids, resulted in lingual pumping to transfer solids to BOT/valleculae. · Premature transfer of L0 thin liquids via straw to level of valleculae 2/2 slow lingual movement and bolus control (pt noted to consume larger amounts of liquid at a time via straw) resulting in posterior spillage of liquid into vestibule with penetration to level of vocal cords (PAS 4) and unable to determine if eventual silent aspiration d/t fluoro off at that time. · Trace BOT residual (<10%) post-swallow all presented trials of L0 thin liquid  · Min vallecular residual (<25%) post-swallow all presented consistencies; increased to moderate (<50%) post-swallow L6 solids which cleared to <10% with L0 thin liquid wash via straw  · Min amount stasis above level of UES post-swallow all presented solids and liquid wash yielded no change, suspect could be r/t impaired UES opening but unable to determine d/t obstructed view from pt's L shoulder and unable to reposition pt  · Diminished to absent hyolaryngeal excursion, however, airway protection adequate d/t complete vestibule closure and epiglottic inversion, with exception of L0 via straw x 1 - see above. No penetration/aspiration with L0 via tsp, cup, or presented solids (PAS 1).     The following components were WFL:  · Labial seal formation  · Velopharyngeal port closure  · Epiglottic inversion  · Pharyngeal stripping wave    Recommend initiate PO diet L5 minced and moist solids with minced meats and L0 thin liquids via tsp or cup only, no straws, direct supervision during meals. Discussed results of study w/ pt, however, given baseline intellectual disability he was unable to fully comprehend the results and rationale for recommendations. Further dysphagia tx is not recommended at this time given pt's chronic h/o dysphagia with poor ability to follow commands/strategies d/t positioning and reduced comprehension as well as impulsivity. I called pt's mother Robina Songist and discussed results of study with her. She was in agreement with minced & moist diet/L0 thin liquids. She reported PTA, pt was unable to eat I d/t involuntary movements from seizures and was frequently falling asleep/with poor JEFF. She stated he took all medications at once with thin liquids. I encouraged her to present medications one at a time with liquid wash and crush or place whole in puree if signs of difficulty noted. Robina Songist reported that PTA the pt ate everything, however, she ensured that foods were chopped into small pieces and moistened with sauces or condiments. She stated that pt was observed to chew for long periods of time at home. I reviewed the importance of ensuring pt is fully awake and alert during meds/meals. Robina Songist verbalized understanding of all information. IDDSI L5 minced & moist solids written information placed in pt's room and informed Robina Songist of same. Provided SLP phone # should she have additional questions or concerns re: his dysphagia.     Social/Education/Vocational Hx:  Pt lives with family    Swallow Information   Current Risks for Dysphagia & Aspiration: known history of dysphagia  Current Symptoms/Concerns: coughing with po  Current Diet: mechanically altered/level 2 diet and thin liquids   Baseline Diet: mechanically altered/level 2 diet and thin liquids      Baseline Assessment   Behavior/Cognition: alert  Speech/Language Status: able to participate in basic conversation  Patient Positioning: upright in chair  Pain Status/Interventions/Response to Interventions:   No report of or nonverbal indications of pain. Swallow Mechanism Exam  Facial: symmetrical  Labial: unable to test 2/2 limited command following  Lingual: unable to test 2/2 limited command following  Velum: symmetrical  Mandible: adequate ROM  Dentition: adequate  Vocal quality:clear/adequate   Volitional Cough: strong/productive   Respiratory Status: on RA         Consistencies Assessed and Performance   Consistencies Administered: thin liquids, puree, and mechanical soft solids    Oral Stage: mild-moderate  Mastication was prolonged. Bolus formation and transfer were functional with [place] oral residual. No overt s/s reduced oral control. Pharyngeal Stage:  suspect WFL  Swallow Mechanics:  Swallowing initiation appeared prompt. Laryngeal rise was palpated and judged to be within functional limits. Intermittent coughing prior to intake    Esophageal Concerns: none reported    Summary and Recommendations (see above)    Results Reviewed with: patient and RN     Treatment Recommended: dysphagia therapy for diet upgrade    Frequency of treatment: 2x/week    Dysphagia LTG  -Patient will demonstrate safe and effective oral intake (without overt s/s significant oral/pharyngeal dysphagia including s/s penetration or aspiration) for the highest appropriate diet level. Short Term Goals:    -Pt will tolerate Dysphagia 2/mechanical soft diet and  thin liquid with no significant s/s oral or pharyngeal dysphagia across 1-3 diagnostic session/s.    -Patient will tolerate trials of upgraded food and/or liquid texture with no significant s/s of oral or pharyngeal dysphagia including aspiration across 1-3 diagnostic sessions     Re: Mastication  -Patient will demonstrate adequate mastication to safely consume the  least restrictive diet with no verbal, visual or tactile cues needed.     Re: Compensatory Strategies  - Patient’s caregiver will demonstrate adherence to recommended diet, as well as application of aspiration precautions and compensatory strategies.       Speech Therapy Prognosis   Prognosis: fair    Prognosis Considerations: cognitive status    Jackson Luna, [id number] [facility]-SL[facility]  11/20/2023

## 2023-11-20 NOTE — DISCHARGE INSTRUCTIONS
Thoracentesis   WHAT YOU NEED TO KNOW:   A thoracentesis is a procedure to remove extra fluid or air from between your lungs and your inner chest wall. Air or fluid buildup may make it hard for you to breathe. A thoracentesis allows your lungs to expand fully so you can breathe more easily. DISCHARGE INSTRUCTIONS:   Medicines:   Pain medicine: You may be given a prescription medicine to decrease pain. Do not wait until the pain is severe before you take your medicine. Antibiotics: This medicine helps fight or prevent an infection. Take your medicine as directed. Call your healthcare provider if you think your medicine is not helping or if you have side effects. Tell him if you are allergic to any medicine. Keep a list of the medicines, vitamins, and herbs you take. Include the amounts, and when and why you take them. Bring the list or the pill bottles to follow-up visits. Carry your medicine list with you in case of an emergency. Follow up with your healthcare provider as directed:  Write down your questions so you remember to ask them during your visits. Rest:  Rest when you feel it is needed. Slowly start to do more each day. Return to your daily activities as directed. Do not smoke: If you smoke, it is never too late to quit. Ask for information about how to stop smoking if you need help. Contact your healthcare provider if:   You have a fever. Your puncture site is red, warm, swollen, or draining pus. You have questions or concerns about your procedure, medicine, or care. If you have any questions regarding, call the IR department @ [phone]. Seek care immediately or call 911 if:   Blood soaks through your bandage. There is blood in your spit.

## 2023-11-20 NOTE — ASSESSMENT & PLAN NOTE
Secondary to recurrent epistaxis.   Now controlled asked ENT to evaluate hold aspirin and Lovenox for now

## 2023-11-20 NOTE — CONSULTS
Consultation - ENT   Robert Marques 66 y.o. male MRN: [id number]  Unit/Bed#: -01 Encounter: [id number]      Assessment/Plan     Assessment:  Patient with a history of nasal prong usage and admitted with an exacerbation of shortness of breath secondary to a pleural effusion. Has had intermittent nosebleeds for many years and most recently had one this morning, but no active bleeding since and his hemoglobin has been stable. His examination shows a deviated septum on the right which is likely the site of previous bleeding  Plan:  Recommending conservative therapy at the present time with the change from nasal prongs to facemask or face tent with humidity. [place] Spray and salt water gel 3 times daily with Afrin twice daily for 3 days. Patient should be reassessed in 2 weeks as an outpatient. His aspirin may be held for as long as possible over the next week, but conservative measures should suffice in this patient at the present time. History of Present Illness   Physician Requesting Consult: Jessa Henderson MD  Reason for Consult / Principal Problem: Epistaxis  HPI: Robert Marques is a 66y.o. year old male who presents with intermittent epistaxis over the long period of time but admitted with acute exacerbation of shortness of breath. He does wear nasal prongs for oxygen therapy at home and has had intermittent history of epistaxis for many years. Inpatient consult to ENT  Consult performed by: Hugo Davila MD  Consult ordered by: Jessa Henderson MD          Review of Systems    Historical Information   Past Medical History:   Diagnosis Date    Hypertension     Mentally challenged     Pericardial effusion     Pneumonia     Seizure [facility])      Past Surgical History:   Procedure Laterality Date    FRACTURE SURGERY      clavicle, left humerus, radial, and ulna.   Right radius    HIP ARTHROSCOPY Right     IR THORACENTESIS  11/20/2023    [place] COLONOSCOPY FLX DX W/COLLJ SPEC WHEN PFRMD N/A 4/1/2019 Procedure: COLONOSCOPY;  Surgeon: Cain Gonzalez MD;  Location: [facility]; Service: Colorectal     Social History   Social History     Substance and Sexual Activity   Alcohol Use Never     Social History     Substance and Sexual Activity   Drug Use Never     E-Cigarette/Vaping    E-Cigarette Use Never User      E-Cigarette/Vaping Substances    Nicotine No     THC No     CBD No     Flavoring No     Other No     Unknown No      Social History     Tobacco Use   Smoking Status Never   Smokeless Tobacco Never     Family History: non-contributory    Meds/Allergies   all current active meds have been reviewed    Allergies   Allergen Reactions    Budesonide Seizures     Other reaction(s): Seizure    Dilantin [Phenytoin]      Pericardial effusion    Flurazepam Other (See Comments)     dalmane    Other reaction(s): MADW HIM RAMMEY   dalmane    Ipratropium-Albuterol Seizures     Other reaction(s): Seizures    Phenobarbital Hyperactivity    Vyvanse [Lisdexamfetamine Dimesylate] Other (See Comments)     Unknown        Objective       Intake/Output Summary (Last 24 hours) at 11/20/2023 1554  Last data filed at 11/20/2023 1328  Gross per 24 hour   Intake 340 ml   Output 860 ml   Net -520 ml       Invasive Devices       Peripheral Intravenous Line  Duration             Peripheral IV 11/17/23 Left Antecubital 2 days                    Physical Exam    Ears: Tympanic membranes are neutral and there is mild cerumen but it is not obstructing. Nasal: Deviated septum to the right with crusting and no active bleeding. Posteriorly, no epistaxis is seen. Oral cavity/oropharynx: Carious teeth without petechia or significant gum disease. Indirect laryngoscopy: True vocal folds mobile without lesions. Neck: No adenopathy or masses noted. Lab Results: I have personally reviewed pertinent lab results. Imaging Studies: I have personally reviewed pertinent reports.         Code Status: Level 1 - Full Code  Advance Directive and Living Will:      Power of :    POLST:      Counseling/Coordination of Care: Total floor / unit time spent today 30 minutes. Greater than 50% of total time was spent with the patient and / or family counseling and / or coordination of care.  A description of the counseling / coordination of care:

## 2023-11-20 NOTE — PROGRESS NOTES
NEPHROLOGY PROGRESS NOTE   Nan Mojica 66 y.o. male MRN: [id number]  Unit/Bed#: -01 Encounter: [id number]      ASSESSMENT & PLAN:  #1 acute kidney injury on top of CKD stage III baseline reported 1.3-1.4. Kidney function so far remained stable with a creatinine of 1.8, diuretic currently on hold. Plan for possible thoracentesis  Avoid relative hypotension and follow daily labs    2 right pleural effusion, pulmonology consulted, may need thoracentesis    #3 chronic HFpEF, on diuretics as an outpatient, currently on hold in the setting of EDWIGE. Renal functions are fairly stable.   Consider resume diuretics tomorrow    #4 chronic hypercapnic respiratory failure    5 developmental delay with reported MR    6 mild anemia, hemoglobin low but is stable, monitor H&H    Plan and recommendation were discussed with internal medicine attending Dr. Fabrice Nichols, currently diuretics on hold, awaiting pulmonology consult, may need thoracentesis, if renal function remains stable okay to resume diuretics tomorrow      SUBJECTIVE:  Patient seen and examined, ROS limited due to MR, denies any chest pain, no shortness of breath, answer some questions with "I am fine"    OBJECTIVE:  Current Weight: Weight - Scale: 89.7 kg (197 lb 12.8 oz)  Vitals:    11/19/23 2248   BP: 126/76   Pulse: 71   Resp:    Temp:    SpO2:        Intake/Output Summary (Last 24 hours) at 11/20/2023 0955  Last data filed at 11/19/2023 1700  Gross per 24 hour   Intake 0 ml   Output --   Net 0 ml       General: conscious, cooperative, in not acute distress  Eyes: conjunctivae pale, anicteric sclerae  ENT: lips and mucous membranes moist  Neck: supple, no JVD  Chest: clear breath sounds bilateral, no crackles, ronchus or wheezings  CVS: distinct S1 & S2, normal rate, regular rhythm  Abdomen: Obese, non-tender, non-distended, normoactive bowel sounds  Extremities:  + edema of both legs  Skin: Chronic skin changes in lower extremity, seems like psoriatic plaques  Neuro: awake, alert, interactive      Medications:    Current Facility-Administered Medications:     acetaminophen (TYLENOL) tablet 650 mg, 650 mg, Oral, Q6H PRN, Dana Asif PA-C    ampicillin-sulbactam (UNASYN) 3 g in sodium chloride 0.9 % 100 mL IVPB, 3 g, Intravenous, Q6H, Jessa Henderson MD, Last Rate: 200 mL/hr at 11/20/23 0926, 3 g at 11/20/23 8499    ascorbic acid (VITAMIN C) tablet 500 mg, 500 mg, Oral, Daily, REMBERTO Joseph-C, 500 mg at 11/20/23 0820    calcium carbonate-vitamin D 500 mg-5 mcg tablet 1 tablet, 1 tablet, Oral, Daily With Breakfast, Dana Asif PA-C, 1 tablet at 11/20/23 0820    dextromethorphan-guaiFENesin (ROBITUSSIN DM) oral syrup 10 mL, 10 mL, Oral, Q4H PRN, Dana Asif PA-C    lactulose (CHRONULAC) oral solution 20 g, 20 g, Oral, After Samantha Garcia, PA-C, 20 g at 11/19/23 1702    lamoTRIgine (LaMICtal) tablet 200 mg, 200 mg, Oral, Early Morning, REMBERTO Joseph-C, 200 mg at 11/20/23 6179    lamoTRIgine (LaMICtal) tablet 250 mg, 250 mg, Oral, After Lunch, REMBERTO Joseph-C, 250 mg at 11/19/23 1512    lamoTRIgine (LaMICtal) tablet 300 mg, 300 mg, Oral, HS, REMBERTO Joseph-C, 300 mg at 11/19/23 2245    levOCARNitine (CARNITOR) oral solution 330 mg, 330 mg, Oral, 4x Daily (with meals and at bedtime), REMBERTO Joseph-C, 330 mg at 11/20/23 0820    LORazepam (ATIVAN) injection 2 mg, 2 mg, Intravenous, Q6H PRN, Dana Asif PA-C    metoprolol tartrate (LOPRESSOR) tablet 25 mg, 25 mg, Oral, BID, REMBERTO Joseph-C, 25 mg at 11/20/23 0820    nystatin (MYCOSTATIN) powder, , Topical, BID, Dana Asif PA-C, Given at 11/20/23 0821    ondansetron (ZOFRAN) injection 4 mg, 4 mg, Intravenous, Q6H PRN, Dana Asif PA-C    oxymetazoline (AFRIN) 0.05 % nasal spray 2 spray, 2 spray, Each Nare, Q12H [facility], Rolando Lerner MD, 2 spray at 11/20/23 0820    polyethylene glycol (MIRALAX) packet 17 g, 17 g, Oral, Daily PRN, Dana Asif PA-C    primidone (MYSOLINE) tablet 100 mg, 100 mg, Oral, After Breakfast, Ninoska Winchester, PA-C, 100 mg at 11/20/23 8425    primidone (MYSOLINE) tablet 100 mg, 100 mg, Oral, Daily With Lunch, Dana Stringero, PA-C, 100 mg at 11/19/23 1512    primidone (MYSOLINE) tablet 150 mg, 150 mg, Oral, HS, Dana Stringero, PA-C, 150 mg at 11/19/23 2245    senna-docusate sodium (SENOKOT S) 8.6-50 mg per tablet 1 tablet, 1 tablet, Oral, BID, Dana Stringero, PA-C, 1 tablet at 11/20/23 0820    vitamin E (TOCOPHEROL) capsule 400 Units, 400 Units, Oral, Daily, Dana Stringero, PA-C, 400 Units at 11/20/23 0820    zonisamide (ZONEGRAN) capsule 100 mg, 100 mg, Oral, HS, Dana Stringero, PA-C, 100 mg at 11/19/23 2245    Invasive Devices:        Lab Results:   Results from last 7 days   Lab Units 11/20/23  0512 11/19/23  0513 11/17/23  1800   WBC Thousand/uL 5.14 5.49 6.55   HEMOGLOBIN g/dL 9.8* 10.2* 11.9*   HEMATOCRIT % 31.6* 32.6* 38.1   PLATELETS Thousands/uL 109* 113* 143*   SODIUM mmol/L 141 141 140   POTASSIUM mmol/L 4.3 4.3 4.1   CHLORIDE mmol/L 102 102 100   CO2 mmol/L 33* 33* 33*   BUN mg/dL 34* 34* 30*   CREATININE mg/dL 1.84* 1.88* 1.59*   CALCIUM mg/dL 9.2 9.2 9.9   MAGNESIUM mg/dL  --  1.8* 1.9   ALK PHOS U/L  --   --  195*   ALT U/L  --   --  20   AST U/L  --   --  27       Previous work up:  See previous notes      Portions of the record may have been created with voice recognition software. Occasional wrong word or "sound a like" substitutions may have occurred due to the inherent limitations of voice recognition software. Read the chart carefully and recognize, using context, where substitutions have occurred. If you have any questions, please contact the dictating provider.

## 2023-11-20 NOTE — OCCUPATIONAL THERAPY NOTE
Occupational Therapy Evaluation     Patient Name: Corby PARKER Date: 11/20/2023  Problem List  Principal Problem:    Pleural effusion on right  Active Problems:    Nonintractable generalized idiopathic epilepsy without status epilepticus ([address])    Falls    Developmental delay    Dysphagia    Acute on chronic heart failure with preserved ejection fraction (HCC)    Liver cirrhosis ([address])    No other household member able to render care    Chronic respiratory failure with hypercapnia ([address])    Stage 3 chronic kidney disease ([address])    Acute blood loss anemia    Epistaxis    Past Medical History  Past Medical History:   Diagnosis Date    Hypertension     Mentally challenged     Pericardial effusion     Pneumonia     Seizure [facility])      Past Surgical History  Past Surgical History:   Procedure Laterality Date    FRACTURE SURGERY      clavicle, left humerus, radial, and ulna. Right radius    HIP ARTHROSCOPY Right     IR THORACENTESIS  11/20/2023    FL COLONOSCOPY FLX DX W/COLLJ SPEC WHEN PFRMD N/A 4/1/2019    Procedure: COLONOSCOPY;  Surgeon: Sonya Barrera MD;  Location:  GI LAB; Service: Colorectal        11/20/23 0948   Note Type   Note type Evaluation   Pain Assessment   Pain Assessment Tool 0-10   Pain Score No Pain   Restrictions/Precautions   Other Precautions Cognitive; Chair Alarm; Bed Alarm;Multiple lines; [address] [facility]  (3 [place])   Home Layout Two level;Bed/bath upstairs   [address]; Wheelchair-manual   Additional Comments Pt poor historian. Per chart review, pt lives in a [facility] with mother and brother. Pt will typically perform spt with assistance to/from . On a "really good day", able to take a few steps forward. Prior Function   Level of [place] Needs assistance with ADLs; Needs assistance with functional mobility; Needs assistance with IADLS   Lives With Family  (Mother, brother)   Cheyanne Thompson Help From Family   IADLs Family/Friend/Other provides transportation; Family/Friend/Other provides meals; Family/Friend/Other provides medication management   Comments PTA, pt had assistance for all ADLs, IADLs, and functional mobility. ADL   UB Dressing Assistance 1  Total Assistance   LB Dressing Assistance 1  Total Assistance   Additional Comments Pt requiring total assist for all ADLs. Bed Mobility   Additional Comments Not assessed, pt received in chair upon start of session. Transfers   Sit to Stand 3  Moderate assistance   Additional items Assist x 2; Increased time required;Verbal cues   Stand to Sit 3  Moderate assistance   Additional items Assist x 2; Increased time required;Verbal cues   Additional Comments Pt sit > stand from chair with mod assist x 2 with increased time, VCs for a safe transfer technique and for hand placement on RW. Pt able to maintain standing ~15 seconds with moderate B knee buckling and tremulous movements of BUEs and BLEs noted. Deferred further mobility due to safety concerns at this time. At end of session, pt seated in chair with chair alarm on, call bell in reach, and all needs met. Functional Mobility   Functional Mobility   (Unable to assess)   Balance   Static Sitting Fair   Dynamic Sitting Fair -   Static Standing Poor -   Activity Tolerance   Activity Tolerance Patient limited by fatigue   Medical Staff Made Aware Isa GORMAN   Nurse Made Aware RNPrabha   RUKEVIN Assessment   RUE Assessment X   RUE Overall AROM   R Shoulder Flexion Decreased, ~90 *   RUE Strength   R Shoulder Flexion   (Strength grossly 3-/5)   LUE Assessment   LUE Assessment X   LUE Overall AROM   L Shoulder Flexion Decreased, ~90*   LUE Strength   L Shoulder Flexion   (Strength grossly 3-/5)   Hand Function   Gross Motor Coordination Functional   Fine Motor Coordination Impaired   Hand Function Comments Pt is R hand dominant. Cognition   Overall Cognitive Status Impaired   Arousal/Participation Alert; Cooperative   Attention Difficulty attending to directions   Orientation Level Oriented X4   Memory Unable to assess   Following Commands Follows one step commands with increased time or repetition   Assessment   Limitation Decreased ADL status; Decreased UE ROM; Decreased UE strength;Decreased Safe judgement during ADL;Decreased cognition;Decreased endurance;Decreased self-care trans;Decreased high-level ADLs   Assessment Pt is a 66 y.o. male, admitted to [address] [place] [address] 11/17/2023 d/t edema. Dx: R pleural effusion. Pt w PMHx impacting performance during ADL tasks: chronic respiratory failure w hypercapnia, lower extremity edema, R pleural effusion, dysphagia, developmental delay, falls, nonintractable generalized idiopathic epilepsy without status epilepticus, MCKEON, CHF, recurring pneumonia, pericardial window with subsequent restrictive pericarditis, kyphosis, chronic atelectasis. Prior to admission to the hospital Pt was performing ADLs with physical assistance. IADLs w physical assistance. Functional transfers/ambulation w physical assistance. Cognitive status PTA was impaired. OT order placed to assess Pt's ADLs, cognitive status, and performance during functional tasks in order to maximize safety and independence while making most appropriate d/c recommendations. PT/OT co-evaluation completed at this time d/t significant mobility deficits and safety concerns.  Pt's clinical presentation is currently unstable/unpredictable given new onset deficits that affect Pt's occupational performance and ability to safely return to PLOF: decrease activity tolerance, decrease standing balance, decrease performance during ADL tasks, decrease cognition, decrease safety awareness , decrease BUE ROM, decrease UB MS, decrease generalized strength, decrease activity engagement, decrease performance during functional transfers, decrease FM control, limited family support, frequent falls, high fall risk, limited insight to deficits, and inability to express needs combined with medical complications of abnormal renal lab values, abnormal H&H, abnormal CBC, abnormal CO2 values, edema/swelling, elevated BNP, decreased skin integrity, fear/retreat, & need for input for mobility technique/safety. Personal factors affecting Pt at time of initial evaluation include: inaccessible home environment, [place] environment, multi-level environment, limited home support, inability to perform IADLs, inability to perform ADLs, inability to ambulate household distances, inability to navigate community distances, limited insight into impairments, decreased initiation and engagement, difficulty communicating, and recent fall(s)/fall history. Pt will benefit from continued skilled OT services to address deficits as defined above & to maximize level independence/participation during ADLs & functional tasks to facilitate return toward PLOF & improved QoL. From an OT standpoint, Level I (Maximum Resource Intensity) is recommended. Plan   Treatment Interventions ADL retraining;Functional transfer training;UE strengthening/ROM; Endurance training;Cognitive reorientation;Patient/family training;Equipment evaluation/education; Compensatory technique education;Continued evaluation; Energy conservation; Activityengagement   Goal Expiration Date 12/04/23   OT Frequency 1-2x/wk   Discharge Recommendation   Rehab Resource Intensity Level, OT I (Maximum Resource Intensity)   AM-PAC Daily Activity Inpatient   Lower Body Dressing 1   Bathing 1   Toileting 1   Upper Body Dressing 1   Grooming 1   Eating 3   Daily Activity Raw Score 8   Turning Head Towards Sound 3   Follow Simple Instructions 2   Low Function Daily Activity Raw Score 13   Low Function Daily Activity Standardized Score  23.16   AM-PAC Applied Cognition Inpatient   Following a Speech/Presentation 1   Understanding Ordinary Conversation 2   Taking Medications 1   Remembering Where Things Are Placed or Put Away 1   Remembering List of 4-5 Errands 1 Taking Care of Complicated Tasks 1   Applied Cognition Raw Score 7   Applied Cognition Standardized Score 15.17     The patient's raw score on the AM-PAC Daily Activity Inpatient Short Form is 8. A raw score of less than 19 suggests the patient may benefit from discharge to post-acute rehabilitation services. Please refer to the recommendation of the Occupational Therapist for safe discharge planning. Pt goals to be met by 12/4/2023:    Pt will demonstrate ability to complete grooming/hygiene tasks @ mod assist after set-up. Pt will demonstrate ability to complete supine<>sit @ mod assist in order to increase safety and independence during ADL tasks. Pt will demonstrate ability to complete UB ADLs including washing/dressing @ mod assist in order to increase performance and participation during meaningful tasks  Pt will demonstrate ability to complete LB dressing @ max assist in order to increase safety and independence during meaningful tasks. Pt will demonstrate ability to suzy/doff socks/shoes while sitting EOB/chair @ max assist in order to increase safety and independence during meaningful tasks. Pt will demonstrate ability to complete toileting tasks including CM and pericare @ max assist in order to increase safety and independence during meaningful tasks. Pt will demonstrate ability to complete EOB, chair, toilet/commode transfers @ mod assist in order to increase performance and participation during functional tasks. Pt will demonstrate ability to stand for 2 minutes while maintaining F+ balance with use of RW for UB support PRN. Pt will demonstrate ability to tolerate 10 minute OT session with no vc'ing for deep breathing or use of energy conservation techniques in order to increase activity tolerance during functional tasks.    Pt will demonstrate Good carryover of use of energy conservation/compensatory strategies during ADLs and functional tasks in order to increase safety and reduce risk for falls. Pt will follow 100% simple 2-step commands and be at least A&O x3 consistently with environmental cues to increase participation in functional activities. Pt will identify 3 areas of interest/hobbies and 1 intervention on how to incorporate into daily life in order to increase interaction with environment and peers as well as increase participation in meaningful tasks. Pt will demonstrate 100% carryover of BUE HEP in order to increase BUE MS and increase performance during functional tasks upon d/c home.     [employer], MS, OTR/L

## 2023-11-20 NOTE — ASSESSMENT & PLAN NOTE
Lab Results   Component Value Date    EGFR 40 11/20/2023    EGFR 39 11/19/2023    EGFR 47 11/17/2023    CREATININE 1.84 (H) 11/20/2023    CREATININE 1.88 (H) 11/19/2023    CREATININE 1.59 (H) 11/17/2023   Has been anywhere between 1.4-1.5, cr worsened since lasix iv and also on high dose ladactone .  Cr stable today hold both still discussed with nephro if cr stable linden restart diuretics

## 2023-11-20 NOTE — ASSESSMENT & PLAN NOTE
Patient does use pumps at home he has chronic lymphedema but discussed with the mother that edema is worsened he has been compliant with Lasix when he was in the nursing home his Lasix was 60 but decreased to 40 secondary to kidney numbers. He does have evidence of a small effusion on the left as well as the right which he has loculated see below he did receive Lasix 40 mg IV x1 on admission.   which we will continue CT abdomen did not reveal any paracentesis as the mother did report that his girth has increased  proBNP is elevated  2D echo done in 2022 with mild concentric hypertrophy and EF of 60 to 65%  Am labs  Daily weights and strict I and o not documented  Hold further lasix and aldactone today as discussed with nephro as getting thora- if renal stable linden restart diuretics

## 2023-11-20 NOTE — PROGRESS NOTES
[address] [facility]  Progress Note  Name: Renee Evans  MRN: [id number]  Unit/Bed#: -53 I Date of Admission: 11/17/2023   Date of Service: 11/20/2023 I Hospital Day: 3    Assessment/Plan   * Pleural effusion on right  Assessment & Plan    This did require a chest tube about 20 years ago according to the mother's typed medical history list  Discussed with the mother patient has been having worsening shortness of breath and for the past week and has been falling asleep a lot he usually wears oxygen 2 L at night but the mother has been using 2 L throughout the day. No cough has been reported but she did state that his lower extremity edema has worsened  There is loculated right-sided moderate effusion with inability to rule out pneumonia questionable can be parapneumonic effusion that needs chest tube for drainage in unable to be done with a thoracocentesis. I discussed with the mother and if needed okay to proceed with the chest tube discussed that interventional radiology will see the patient on Monday and decide which way to go we will order pleural fluid studies  Procalcitonin is elevated and trended up  will start Unasyn  Consult pulmonology  Ldh serum reviewed to calculate lights criteria  Incentive spirometer  Mrsa screen  Patient had pneumonia 6 times he does have history of aspiration as well he is on a modified diet we will ask speech to evaluate to ensure there is no evidence of aspiration seen    Epistaxis  Assessment & Plan  Yesterday and today controlled at bedside  Per mom has been having them since 13years old  Ask ent to see  Hold asa and lovenox for now  Use oxygen with humidifier     Acute blood loss anemia  Assessment & Plan  Secondary to recurrent epistaxis.   Now controlled asked ENT to evaluate hold aspirin and Lovenox for now    Stage 3 chronic kidney disease [facility])  Assessment & Plan  Lab Results   Component Value Date    EGFR 40 11/20/2023    EGFR 39 11/19/2023 EGFR 47 11/17/2023    CREATININE 1.84 (H) 11/20/2023    CREATININE 1.88 (H) 11/19/2023    CREATININE 1.59 (H) 11/17/2023   Has been anywhere between 1.4-1.5, cr worsened since lasix iv and also on high dose ladactone . Cr stable today hold both still discussed with nephro if cr stable linden restart diuretics     Chronic respiratory failure with hypercapnia (HCC)  Assessment & Plan  Wears 2L O2 QHS, continue  Likely has a chronic hypercapnia given the elevated bicarbonate/kyphosis likely causing chronic limited chest expansion and sleepiness during day will obtain vbg- reviewed 71    No other household member able to render care  Assessment & Plan  Patient's main caregiver is his mother who is hospitalized today at [facility]  She assists him with his ADLs and assist x2  Might need placement  Pt/ot ordered    Liver cirrhosis ([address])  Assessment & Plan  Cirrhotic morphology of the liver chronic problem  Ammonia is normal there is no ascites continue Lasix and Aldactone- hold in light of worsening renal function     Acute on chronic heart failure with preserved ejection fraction [facility])  Assessment & Plan  Patient does use pumps at home he has chronic lymphedema but discussed with the mother that edema is worsened he has been compliant with Lasix when he was in the nursing home his Lasix was 60 but decreased to 40 secondary to kidney numbers. He does have evidence of a small effusion on the left as well as the right which he has loculated see below he did receive Lasix 40 mg IV x1 on admission.   which we will continue CT abdomen did not reveal any paracentesis as the mother did report that his girth has increased  proBNP is elevated  2D echo done in 2022 with mild concentric hypertrophy and EF of 60 to 65%  Am labs  Daily weights and strict I and o not documented  Hold further lasix and aldactone today as discussed with nephro as getting thora- if renal stable linden restart diuretics    Dysphagia  Assessment & Plan  Continue mechanical soft diet from prior  Aspiration precautions obtain speech    Developmental delay  Assessment & Plan  Daily involvement with mother  Supportive care  PT/OT/ST  Pt AAOx1   At baseline AAOx3    Falls  Assessment & Plan  Frequent falls for his entire life because of unsteady gait according to the mother's medical history list  Fall precautions  Emergency room trauma eval no acute injury    Nonintractable generalized idiopathic epilepsy without status epilepticus ([address])  Assessment & Plan  Continue home seizure medications with seizure precautions  His last seizure was 2-3 days ago according to his uncle. Lamictal, zonesamide and primidone levels ordered. The last few times he was hospitalized for seizure the AEDs were not changed because the breakthrough seizure etiology were infections. VTE Pharmacologic Prophylaxis: VTE Score: 5 High Risk (Score >/= 5) - Pharmacological DVT Prophylaxis Contraindicated. Sequential Compression Devices Ordered. Mobility:   Basic Mobility Inpatient Raw Score: 8  -HLM Goal: 3: Sit at edge of bed  JH-HLM Achieved: 4: Move to chair/commode  HLM Goal NOT achieved. Continue with multidisciplinary rounding and encourage appropriate mobility to improve upon [facility] goals. Patient Centered Rounds: I performed bedside rounds with nursing staff today. Discussions with Specialists or Other Care Team Provider: nephro and ent    Education and Discussions with Family / Patient: pt will update mother    Total Time Spent on Date of Encounter in care of patient: >35 mins. This time was spent on one or more of the following: performing physical exam; counseling and coordination of care; obtaining or reviewing history; documenting in the medical record; reviewing/ordering tests, medications or procedures; communicating with other healthcare professionals and discussing with patient's family/caregivers.     Current Length of Stay: 3 day(s)  Current Patient Status: Inpatient Certification Statement: The patient will continue to require additional inpatient hospital stay due to chf padma effusion and epistaxis  Discharge Plan: Anticipate discharge in >72 hrs to discharge location to be determined pending rehab evaluations. Code Status: Level 1 - Full Code    Subjective:   Seen and examined having nose bleed    Objective:     Vitals:   Temp (24hrs), Av.6 °F (36.4 °C), Min:97.5 °F (36.4 °C), Max:97.7 °F (36.5 °C)    Temp:  [97.5 °F (36.4 °C)-97.7 °F (36.5 °C)] 97.5 °F (36.4 °C)  HR:  [69-71] 69  Resp:  [16-17] 17  BP: (125-126)/(71-76) 125/71  SpO2:  [91 %] 91 %  Body mass index is 31.93 kg/m². Input and Output Summary (last 24 hours): Intake/Output Summary (Last 24 hours) at 2023 1221  Last data filed at 2023 1001  Gross per 24 hour   Intake 340 ml   Output --   Net 340 ml       Physical Exam:   Physical Exam  Vitals and nursing note reviewed. Constitutional:       General: He is not in acute distress. Appearance: He is well-developed. HENT:      Head: Normocephalic and atraumatic. Eyes:      Conjunctiva/sclera: Conjunctivae normal.   Cardiovascular:      Rate and Rhythm: Normal rate and regular rhythm. Heart sounds: No murmur heard. Pulmonary:      Effort: Pulmonary effort is normal. No respiratory distress. Breath sounds: Rales present. No wheezing. Abdominal:      General: There is no distension. Palpations: Abdomen is soft. Tenderness: There is no abdominal tenderness. Musculoskeletal:         General: Swelling present. Cervical back: Neck supple. Skin:     General: Skin is warm and dry. Capillary Refill: Capillary refill takes less than 2 seconds. Neurological:      Mental Status: He is alert. Mental status is at baseline.    Psychiatric:         Mood and Affect: Mood normal.          Additional Data:     Labs:  Results from last 7 days   Lab Units 23  0512   WBC Thousand/uL 5.14   HEMOGLOBIN g/dL 9.8* HEMATOCRIT % 31.6*   PLATELETS Thousands/uL 109*   NEUTROS PCT % 78*   LYMPHS PCT % 12*   MONOS PCT % 10   EOS PCT % 0     Results from last 7 days   Lab Units 11/20/23  0512 11/19/23  0513 11/17/23  1800   SODIUM mmol/L 141   < > 140   POTASSIUM mmol/L 4.3   < > 4.1   CHLORIDE mmol/L 102   < > 100   CO2 mmol/L 33*   < > 33*   BUN mg/dL 34*   < > 30*   CREATININE mg/dL 1.84*   < > 1.59*   ANION GAP mmol/L 6   < > 7   CALCIUM mg/dL 9.2   < > 9.9   ALBUMIN g/dL  --   --  4.0   TOTAL BILIRUBIN mg/dL  --   --  0.98   ALK PHOS U/L  --   --  195*   ALT U/L  --   --  20   AST U/L  --   --  27   GLUCOSE RANDOM mg/dL 92   < > 90    < > = values in this interval not displayed. Results from last 7 days   Lab Units 11/17/23  1800   INR  1.15             Results from last 7 days   Lab Units 11/20/23  0512 11/19/23  0513 11/17/23  1800   LACTIC ACID mmol/L  --   --  1.2   PROCALCITONIN ng/ml 0.41* 0.36* 0.31*       Lines/Drains:  Invasive Devices       Peripheral Intravenous Line  Duration             Peripheral IV 11/17/23 Left Antecubital 2 days                          Imaging: Reviewed radiology reports from this admission including: chest CT scan    Recent Cultures (last 7 days):   Results from last 7 days   Lab Units 11/17/23  1802 11/17/23  1800   BLOOD CULTURE  No Growth at 48 hrs. No Growth at 48 hrs.        Last 24 Hours Medication List:   Current Facility-Administered Medications   Medication Dose Route Frequency Provider Last Rate    acetaminophen  650 mg Oral Q6H PRN Dana Asif PA-C      ampicillin-sulbactam  3 g Intravenous Q6H Ivette Anguiano MD 3 g (11/20/23 6918)    ascorbic acid  500 mg Oral Daily Dana Asif PA-C      calcium carbonate-vitamin D  1 tablet Oral Daily With Breakfast Dana Asif PA-C      dextromethorphan-guaiFENesin  10 mL Oral Q4H PRN Dana Asif PA-C      lactulose  20 g Oral After [place] [employer]      lamoTRIgine  200 mg Oral Early Morning Dana Asif PA-C      lamoTRIgine  250 mg Oral After [employer] Demo, WHITNEY      lamoTRIgine  300 mg Oral HS Dana Demo, WHITNEY      levOCARNitine  330 mg Oral 4x Daily (with meals and at bedtime) Anabela Chen, WHITNEY      LORazepam  2 mg Intravenous Q6H PRN Dana Demo, PA-JAMIE      metoprolol tartrate  25 mg Oral BID Dana Demo, PA-JAMIE      nystatin   Topical BID Dana Demo, WHITNEY      ondansetron  4 mg Intravenous Q6H PRN Dana Demo, WHITNEY      oxymetazoline  2 spray Each Nare Q12H [address] Stephy Delgado MD      polyethylene glycol  17 g Oral Daily PRN Dana Demo, WHITNEY      primidone  100 mg Oral After Breakfast Dana Demo, PA-JAMIE      primidone  100 mg Oral Daily With Lunch Dana Demo, PA-JAMIE      primidone  150 mg Oral HS Dana Demo, WHITNEY      senna-docusate sodium  1 tablet Oral BID Dana Demo, WHITNEY      vitamin E (tocopherol)  400 Units Oral Daily Dana Demo, WHITNEY      zonisamide  100 mg Oral HS Anabela Chen PA-C          Today, Patient Was Seen By: Khoa Torrez MD    **Please Note: This note may have been constructed using a voice recognition system. **

## 2023-11-20 NOTE — PLAN OF CARE
Problem: PAIN - ADULT  Goal: Verbalizes/displays adequate comfort level or baseline comfort level  Description: Interventions:  - Encourage patient to monitor pain and request assistance  - Assess pain using appropriate pain scale  - Administer analgesics based on type and severity of pain and evaluate response  - Implement non-pharmacological measures as appropriate and evaluate response  - Consider cultural and social influences on pain and pain management  - Notify physician/advanced practitioner if interventions unsuccessful or patient reports new pain  Outcome: Progressing     Problem: INFECTION - ADULT  Goal: Absence or prevention of progression during hospitalization  Description: INTERVENTIONS:  - Assess and monitor for signs and symptoms of infection  - Monitor lab/diagnostic results  - Monitor all insertion sites, i.e. indwelling lines, tubes, and drains  - Monitor endotracheal if appropriate and nasal secretions for changes in amount and color  - [place] appropriate cooling/warming therapies per order  - Administer medications as ordered  - Instruct and encourage patient and family to use good hand hygiene technique  - Identify and instruct in appropriate isolation precautions for identified infection/condition  Outcome: Progressing  Goal: Absence of fever/infection during neutropenic period  Description: INTERVENTIONS:  - Monitor WBC    Outcome: Progressing     Problem: SAFETY ADULT  Goal: Patient will remain free of falls  Description: INTERVENTIONS:  - Educate patient/family on patient safety including physical limitations  - Instruct patient to call for assistance with activity   - Consult OT/PT to assist with strengthening/mobility   - Keep Call bell within reach  - Keep bed low and locked with side rails adjusted as appropriate  - Keep care items and personal belongings within reach  - Initiate and maintain comfort rounds  - Make Fall Risk Sign visible to staff  - Offer Toileting every 2 Hours, in advance of need  - Initiate/Maintain bed/chair alarm  - Obtain necessary fall risk management equipment:   - Apply yellow socks and bracelet for high fall risk patients  - Consider moving patient to room near nurses station  Outcome: Progressing  Goal: Maintain or return to baseline ADL function  Description: INTERVENTIONS:  -  Assess patient's ability to carry out ADLs; assess patient's baseline for ADL function and identify physical deficits which impact ability to perform ADLs (bathing, care of mouth/teeth, toileting, grooming, dressing, etc.)  - Assess/evaluate cause of self-care deficits   - Assess range of motion  - Assess patient's mobility; develop plan if impaired  - Assess patient's need for assistive devices and provide as appropriate  - Encourage maximum independence but intervene and supervise when necessary  - Involve family in performance of ADLs  - Assess for home care needs following discharge   - Consider OT consult to assist with ADL evaluation and planning for discharge  - Provide patient education as appropriate  Outcome: Progressing  Goal: Maintains/Returns to pre admission functional level  Description: INTERVENTIONS:  - Perform AM-PAC 6 Click Basic Mobility/ Daily Activity assessment daily.  - Set and communicate daily mobility goal to care team and patient/family/caregiver. - Collaborate with rehabilitation services on mobility goals if consulted  - Perform Range of Motion 3 times a day. - Reposition patient every 2 hours.   - Dangle patient 3 times a day  - Stand patient 3 times a day  - Ambulate patient 3 times a day  - Out of bed to chair 3 times a day   - Out of bed for meals 3 times a day  - Out of bed for toileting  - Record patient progress and toleration of activity level   Outcome: Progressing     Problem: DISCHARGE PLANNING  Goal: Discharge to home or other facility with appropriate resources  Description: INTERVENTIONS:  - Identify barriers to discharge w/patient and caregiver  - Arrange for needed discharge resources and transportation as appropriate  - Identify discharge learning needs (meds, wound care, etc.)  - Arrange for interpretive services to assist at discharge as needed  - Refer to Case Management Department for coordinating discharge planning if the patient needs post-hospital services based on physician/advanced practitioner order or complex needs related to functional status, cognitive ability, or social support system  Outcome: Progressing     Problem: Knowledge Deficit  Goal: Patient/family/caregiver demonstrates understanding of disease process, treatment plan, medications, and discharge instructions  Description: Complete learning assessment and assess knowledge base.   Interventions:  - Provide teaching at level of understanding  - Provide teaching via preferred learning methods  Outcome: Progressing     Problem: Prexisting or High Potential for Compromised Skin Integrity  Goal: Skin integrity is maintained or improved  Description: INTERVENTIONS:  - Identify patients at risk for skin breakdown  - Assess and monitor skin integrity  - Assess and monitor nutrition and hydration status  - Monitor labs   - Assess for incontinence   - Turn and reposition patient  - Assist with mobility/ambulation  - Relieve pressure over bony prominences  - Avoid friction and shearing  - Provide appropriate hygiene as needed including keeping skin clean and dry  - Evaluate need for skin moisturizer/barrier cream  - Collaborate with interdisciplinary team   - Patient/family teaching  - Consider wound care consult   Outcome: Progressing

## 2023-11-20 NOTE — ASSESSMENT & PLAN NOTE
Yesterday and today controlled at bedside  Per mom has been having them since 13years old  Ask ent to see  Hold asa and lovenox for now  Use oxygen with humidifier

## 2023-11-20 NOTE — PLAN OF CARE
Problem: PHYSICAL THERAPY ADULT  Goal: Performs mobility at highest level of function for planned discharge setting. See evaluation for individualized goals. Description: Treatment/Interventions: Functional transfer training, LE strengthening/ROM, Therapeutic exercise, Endurance training, Patient/family training, Equipment eval/education, Bed mobility, Gait training, Compensatory technique education, Spoke to nursing, OT          See flowsheet documentation for full assessment, interventions and recommendations. Note: Prognosis: Good  Problem List: Decreased strength, Impaired balance, Decreased endurance, Decreased mobility, Decreased cognition, Impaired judgement, Decreased safety awareness, Obesity  Assessment: Pt is a 66 y.o. male seen for PT evaluation s/p admission to [address] [place] [address] on 11/17/2023 with Pleural effusion. Order placed for PT services. Upon evaluation: Pt is presenting with impaired functional mobility due to decreased strength, decreased endurance, impaired balance, impaired cognition, decreased safety awareness, impaired judgment, and fall risk requiring  mod x 2 assistance for transfers. Pt's clinical presentation is currently unstable/unpredictable given the functional mobility deficits above coupled with fall risks as indicated by AM-PAC 6-Clicks: 2/92 as well as hx of falls, impaired balance, polypharmacy, impaired judgement, decreased safety awareness, decreased cognition, and obesity and combined with medical complications of abnormal renal lab values, abnormal H&H, abnormal CBC, abnormal CO2 values, and need for input for mobility technique/safety. Pt's PMHx and comorbidities that may affect physical performance and progress include: CHF, seizure disorder, obesity, and limited cognition.  Personal factors affecting pt at time of IE include: inaccessible home environment, step(s) to enter environment, limited home support, inability to perform IADLs, inability to perform ADLs, inability to navigate level surfaces without external assistance, inability to ambulate household distances, inability to navigate community distances, limited insight into impairments, and recent fall(s)/fall history. Pt will benefit from continued skilled PT services to address deficits as defined above and to maximize level of functional mobility to facilitate return toward PLOF and improved QOL. From PT/mobility standpoint, recommendation at time of d/c would be Level I (Maximum Resource Intensity) pending progress in order to reduce fall risk and maximize pt's functional independence and consistency with mobility in order to facilitate return to PLOF. Recommend trial with walker next 1-2 sessions and ther ex next 1-2 sessions. Barriers to Discharge: Decreased caregiver support, Inaccessible home environment  Barriers to Discharge Comments: Pt requires A x 2 for mobility, 3 ALESIA, caregiver currently admitted to this facility  Rehab Resource Intensity Level, PT: I (Maximum Resource Intensity)    See flowsheet documentation for full assessment.

## 2023-11-20 NOTE — PLAN OF CARE
Problem: OCCUPATIONAL THERAPY ADULT  Goal: Performs self-care activities at highest level of function for planned discharge setting. See evaluation for individualized goals. Description: Treatment Interventions: ADL retraining, Functional transfer training, UE strengthening/ROM, Endurance training, Cognitive reorientation, Patient/family training, Equipment evaluation/education, Compensatory technique education, Continued evaluation, Energy conservation, Activityengagement          See flowsheet documentation for full assessment, interventions and recommendations. Outcome: Not Progressing  Note: Limitation: Decreased ADL status, Decreased UE ROM, Decreased UE strength, Decreased Safe judgement during ADL, Decreased cognition, Decreased endurance, Decreased self-care trans, Decreased high-level ADLs     Assessment: Pt is a 66 y.o. male, admitted to [address] [place] [address] 11/17/2023 d/t edema. Dx: R pleural effusion. Pt w PMHx impacting performance during ADL tasks: chronic respiratory failure w hypercapnia, lower extremity edema, R pleural effusion, dysphagia, developmental delay, falls, nonintractable generalized idiopathic epilepsy without status epilepticus, MCKEON, CHF, recurring pneumonia, pericardial window with subsequent restrictive pericarditis, kyphosis, chronic atelectasis. Prior to admission to the hospital Pt was performing ADLs with physical assistance. IADLs w physical assistance. Functional transfers/ambulation w physical assistance. Cognitive status PTA was impaired. OT order placed to assess Pt's ADLs, cognitive status, and performance during functional tasks in order to maximize safety and independence while making most appropriate d/c recommendations. PT/OT co-evaluation completed at this time d/t significant mobility deficits and safety concerns.  Pt's clinical presentation is currently unstable/unpredictable given new onset deficits that affect Pt's occupational performance and ability to safely return to PLOF: decrease activity tolerance, decrease standing balance, decrease performance during ADL tasks, decrease cognition, decrease safety awareness , decrease BUE ROM, decrease UB MS, decrease generalized strength, decrease activity engagement, decrease performance during functional transfers, decrease FM control, limited family support, frequent falls, high fall risk, limited insight to deficits, and inability to express needs combined with medical complications of abnormal renal lab values, abnormal H&H, abnormal CBC, abnormal CO2 values, edema/swelling, elevated BNP, decreased skin integrity, fear/retreat, & need for input for mobility technique/safety. Personal factors affecting Pt at time of initial evaluation include: inaccessible home environment, ALESIA environment, multi-level environment, limited home support, inability to perform IADLs, inability to perform ADLs, inability to ambulate household distances, inability to navigate community distances, limited insight into impairments, decreased initiation and engagement, difficulty communicating, and recent fall(s)/fall history. Pt will benefit from continued skilled OT services to address deficits as defined above & to maximize level independence/participation during ADLs & functional tasks to facilitate return toward PLOF & improved QoL. From an OT standpoint, Level I (Maximum Resource Intensity) is recommended.      Rehab Resource Intensity Level, OT: I (Maximum Resource Intensity)

## 2023-11-20 NOTE — PHYSICAL THERAPY NOTE
PHYSICAL THERAPY EVALUATION  NAME:  Parth Glass  DATE: 11/20/23    AGE:   66 y.o. Mrn:   [id number]  ADMIT DX:  Edema [R60.9]  Bilateral lower extremity edema [R60.0]  Ambulatory dysfunction [R26.2]    Past Medical History:   Diagnosis Date    Hypertension     Mentally challenged     Pericardial effusion     Pneumonia     Seizure ([address])      Length Of Stay: 3  Performed at least 2 patient identifiers during session: Name and Birthday  PHYSICAL THERAPY EVALUATION :        11/20/23 0947   Note Type   Note type Evaluation   Pain Assessment   Pain Assessment Tool 0-10   Pain Score No Pain   Restrictions/Precautions   Other Precautions Cognitive; Chair Alarm; Bed Alarm;Multiple lines; Fall Risk   Home Living   Type of Home House  (3 ALESIA)   Home Layout Two level;Bed/bath upstairs   Home Equipment Walker; Wheelchair-manual;Stair glide   Additional Comments Pt is a poor historian, limited PLOF and home setup provided. Per pt, he lives with his mother and brother in [address] [place] [address] with 3 ALESIA. Per chart review, pt primarily completes SPT with assistance to/from W/C. Occasionally ambulates with RW   Prior Function   Level of [place] Needs assistance with ADLs; Needs assistance with functional mobility; Needs assistance with IADLS   Lives With Other (Comment)  (mother, brother)   Luna Benavidez Help From Family   IADLs Family/Friend/Other provides transportation; Family/Friend/Other provides meals; Family/Friend/Other provides medication management   Comments Pt has assistance for ADLs, IADLs, mobility and transportation   Cognition   Overall Cognitive Status Impaired   Orientation Level Oriented X4   Following Commands Follows one step commands without difficulty   RLE Assessment   RLE Assessment WFL  (3+/5 strength)   LLE Assessment   LLE Assessment WFL  (3+/5 strength)   Bed Mobility   Additional Comments Pt seated OOB in recliner at start and end of session; bed mobility not assessed this session   Transfers   Sit to Stand 3  Moderate assistance   Additional items Assist x 2; Increased time required;Verbal cues   Stand to Sit 3  Moderate assistance   Additional items Assist x 2; Increased time required;Verbal cues   Additional Comments STS with RW and mod x 2 for force production with VC for hand placement. Pt able to maintain x 15 sec with moderate B/L knee buckling and tremulous movements of BUEs and BLes. Deferred gait due to safety concerns. Pt limited by fatigue   Balance   Static Sitting Fair   Dynamic Sitting Fair -   Static Standing Poor   Endurance Deficit   Endurance Deficit Yes   Endurance Deficit Description fatigue   Activity Tolerance   Activity Tolerance Patient limited by fatigue   Medical Staff Made Aware OTNavneet   Nurse Made Aware RN aware   Assessment   Prognosis Good   Problem List Decreased strength; Impaired balance;Decreased endurance;Decreased mobility; Decreased cognition; Impaired judgement;Decreased safety awareness; Obesity   Barriers to Discharge Decreased caregiver support; Inaccessible home environment   Barriers to Discharge Comments Pt requires A x 2 for mobility, 3 ALESIA, caregiver currently admitted to this facility   Goals   Patient Goals Go home   STG Expiration Date 12/04/23   Plan   Treatment/Interventions Functional transfer training;LE strengthening/ROM; Therapeutic exercise; Endurance training;Patient/family training;Equipment eval/education; Bed mobility;Gait training; Compensatory technique education;Spoke to nursing;OT   PT Frequency 3-5x/wk   Discharge Recommendation   Rehab Resource Intensity Level, PT I (Maximum Resource Intensity)   AM-PAC Basic Mobility Inpatient   Turning in Flat Bed Without Bedrails 2   Lying on Back to Sitting on Edge of Flat Bed Without Bedrails 2   Moving Bed to Chair 1   Standing Up From Chair Using Arms 1   Walk in Room 1   Climb 3-5 Stairs With Railing 1   Basic Mobility Inpatient Raw Score 8   Turning Head Towards Sound 4   Follow Simple Instructions 3   Low Function Basic Mobility Raw Score  15   Low Function Basic Mobility Standardized Score  23.9   Highest Level Of Mobility   -[facility] Goal 3: Sit at edge of bed   -HL Achieved 3: Sit at edge of bed   End of Consult   Patient Position at End of Consult Bedside chair;Bed/Chair alarm activated; All needs within reach     The patient's AM-PAC Basic Mobility Inpatient Short Form Raw Score is 8  . A Raw score of less than or equal to 16 suggests the patient may benefit from discharge to post-acute rehabilitation services. Please also refer to the recommendation of the Physical Therapist for safe discharge planning. (Please find full objective findings from PT assessment regarding body systems outlined above). Assessment: Pt is a 66 y.o. male seen for PT evaluation s/p admission to [address] [place] [address] on 11/17/2023 with Pleural effusion. Order placed for PT services. Upon evaluation: Pt is presenting with impaired functional mobility due to decreased strength, decreased endurance, impaired balance, impaired cognition, decreased safety awareness, impaired judgment, and fall risk requiring  mod x 2 assistance for transfers. Pt's clinical presentation is currently unstable/unpredictable given the functional mobility deficits above coupled with fall risks as indicated by AM-PAC 6-Clicks: 1/00 as well as hx of falls, impaired balance, polypharmacy, impaired judgement, decreased safety awareness, decreased cognition, and obesity and combined with medical complications of abnormal renal lab values, abnormal H&H, abnormal CBC, abnormal CO2 values, and need for input for mobility technique/safety. Pt's PMHx and comorbidities that may affect physical performance and progress include: CHF, seizure disorder, obesity, and limited cognition.  Personal factors affecting pt at time of IE include: inaccessible home environment, step(s) to enter environment, limited home support, inability to perform IADLs, inability to perform ADLs, inability to navigate level surfaces without external assistance, inability to ambulate household distances, inability to navigate community distances, limited insight into impairments, and recent fall(s)/fall history. Pt will benefit from continued skilled PT services to address deficits as defined above and to maximize level of functional mobility to facilitate return toward PLOF and improved QOL. From PT/mobility standpoint, recommendation at time of d/c would be Level I (Maximum Resource Intensity) pending progress in order to reduce fall risk and maximize pt's functional independence and consistency with mobility in order to facilitate return to PLOF. Recommend trial with walker next 1-2 sessions and ther ex next 1-2 sessions. Goals: Pt will: Perform bed mobility tasks with consistent min A of 1 to reposition in bed and prepare for transfers. Pt will perform transfers with no more than min A to increase Indep in home environment and prepare for ambulation. Pt will ambulate with RW for >/= 10' with  no more than min A  to improve gait quality and promote proper use of assistive device and to access home environment. Pt will complete >/= 3 steps with with unilateral handrail with no more than min A to return to home with ALESIA. Increase bilateral LE strength 1/2 grade to facilitate independent mobility and Increase all balance 1/2 grade to decrease risk for falls.         Ayush Collier, PT,DPT

## 2023-11-20 NOTE — ASSESSMENT & PLAN NOTE
Patient's main caregiver is his mother who is hospitalized today at [facility]  She assists him with his ADLs and assist x2  Might need placement  Pt/ot ordered

## 2023-11-21 ENCOUNTER — APPOINTMENT (INPATIENT)
Dept: NON INVASIVE DIAGNOSTICS | Facility: HOSPITAL | Age: 58
End: 2023-11-21
Payer: MEDICARE

## 2023-11-21 LAB
ANION GAP SERPL CALCULATED.3IONS-SCNC: 7 MMOL/L
AORTIC ROOT: 3.4 CM
AORTIC VALVE MEAN VELOCITY: 12.1 M/S
APICAL FOUR CHAMBER EJECTION FRACTION: 63 %
ASCENDING AORTA: 3 CM
AV AREA BY CONTINUOUS VTI: 2 CM2
AV AREA PEAK VELOCITY: 1.7 CM2
AV LVOT MEAN GRADIENT: 1 MMHG
AV LVOT PEAK GRADIENT: 3 MMHG
AV MEAN GRADIENT: 7 MMHG
AV PEAK GRADIENT: 12 MMHG
AV VALVE AREA: 1.95 CM2
AV VELOCITY RATIO: 0.49
BASOPHILS # BLD AUTO: 0.01 THOUSANDS/ÂΜL (ref 0–0.1)
BASOPHILS NFR BLD AUTO: 0 % (ref 0–1)
BUN SERPL-MCNC: 32 MG/DL (ref 5–25)
CALCIUM SERPL-MCNC: 9.5 MG/DL (ref 8.4–10.2)
CHLORIDE SERPL-SCNC: 103 MMOL/L (ref 96–108)
CO2 SERPL-SCNC: 31 MMOL/L (ref 21–32)
CREAT SERPL-MCNC: 1.59 MG/DL (ref 0.6–1.3)
DOP CALC AO PEAK VEL: 1.75 M/S
DOP CALC AO VTI: 35.14 CM
DOP CALC LVOT AREA: 3.46 CM2
DOP CALC LVOT CARDIAC INDEX: 2.19 L/MIN/M2
DOP CALC LVOT CARDIAC OUTPUT: 4.72 L/MIN
DOP CALC LVOT DIAMETER: 2.1 CM
DOP CALC LVOT PEAK VEL VTI: 19.81 CM
DOP CALC LVOT PEAK VEL: 0.86 M/S
DOP CALC LVOT STROKE INDEX: 30.7 ML/M2
DOP CALC LVOT STROKE VOLUME: 68.58
E WAVE DECELERATION TIME: 168 MS
E/A RATIO: 1.64
EOSINOPHIL # BLD AUTO: 0 THOUSAND/ÂΜL (ref 0–0.61)
EOSINOPHIL NFR BLD AUTO: 0 % (ref 0–6)
ERYTHROCYTE [DISTWIDTH] IN BLOOD BY AUTOMATED COUNT: 16.4 % (ref 11.6–15.1)
FRACTIONAL SHORTENING: 22 (ref 28–44)
GFR SERPL CREATININE-BSD FRML MDRD: 47 ML/MIN/1.73SQ M
GLUCOSE SERPL-MCNC: 95 MG/DL (ref 65–140)
HCT VFR BLD AUTO: 34.9 % (ref 36.5–49.3)
HGB BLD-MCNC: 10.9 G/DL (ref 12–17)
IMM GRANULOCYTES # BLD AUTO: 0.03 THOUSAND/UL (ref 0–0.2)
IMM GRANULOCYTES NFR BLD AUTO: 0 % (ref 0–2)
INTERVENTRICULAR SEPTUM IN DIASTOLE (PARASTERNAL SHORT AXIS VIEW): 1.4 CM
INTERVENTRICULAR SEPTUM: 1.4 CM (ref 0.6–1.1)
LAAS-AP2: 25.4 CM2
LAAS-AP4: 22.1 CM2
LAMOTRIGINE SERPL-MCNC: 20 UG/ML (ref 2–20)
LEFT ATRIUM SIZE: 4.4 CM
LEFT ATRIUM VOLUME (MOD BIPLANE): 72 ML
LEFT ATRIUM VOLUME INDEX (MOD BIPLANE): 33.5 ML/M2
LEFT INTERNAL DIMENSION IN SYSTOLE: 2.5 CM (ref 2.1–4)
LEFT VENTRICLE DIASTOLIC VOLUME (MOD BIPLANE): 71 ML
LEFT VENTRICLE SYSTOLIC VOLUME (MOD BIPLANE): 25 ML
LEFT VENTRICULAR INTERNAL DIMENSION IN DIASTOLE: 3.2 CM (ref 3.5–6)
LEFT VENTRICULAR POSTERIOR WALL IN END DIASTOLE: 1.5 CM
LEFT VENTRICULAR STROKE VOLUME: 19 ML
LV EF: 65 %
LVSV (TEICH): 19 ML
LYMPHOCYTES # BLD AUTO: 0.59 THOUSANDS/ÂΜL (ref 0.6–4.47)
LYMPHOCYTES NFR BLD AUTO: 8 % (ref 14–44)
MCH RBC QN AUTO: 32.2 PG (ref 26.8–34.3)
MCHC RBC AUTO-ENTMCNC: 31.2 G/DL (ref 31.4–37.4)
MCV RBC AUTO: 103 FL (ref 82–98)
MONOCYTES # BLD AUTO: 0.56 THOUSAND/ÂΜL (ref 0.17–1.22)
MONOCYTES NFR BLD AUTO: 8 % (ref 4–12)
MV E'TISSUE VEL-LAT: 11 CM/S
MV E'TISSUE VEL-SEP: 18 CM/S
MV PEAK A VEL: 0.5 M/S
MV PEAK E VEL: 82 CM/S
MV STENOSIS PRESSURE HALF TIME: 49 MS
MV VALVE AREA P 1/2 METHOD: 4.49
NEUTROPHILS # BLD AUTO: 6.13 THOUSANDS/ÂΜL (ref 1.85–7.62)
NEUTS SEG NFR BLD AUTO: 84 % (ref 43–75)
NRBC BLD AUTO-RTO: 0 /100 WBCS
PHENOBARB SERPL-MCNC: 24 UG/ML (ref 15–40)
PLATELET # BLD AUTO: 114 THOUSANDS/UL (ref 149–390)
PMV BLD AUTO: 11.5 FL (ref 8.9–12.7)
POTASSIUM SERPL-SCNC: 4.3 MMOL/L (ref 3.5–5.3)
PRIMIDONE SERPL-MCNC: 8.4 UG/ML (ref 5–12)
RBC # BLD AUTO: 3.38 MILLION/UL (ref 3.88–5.62)
RIGHT ATRIUM AREA SYSTOLE A4C: 16.9 CM2
RIGHT VENTRICLE ID DIMENSION: 2.7 CM
SL CV LEFT ATRIUM LENGTH A2C: 6.4 CM
SL CV PED ECHO LEFT VENTRICLE DIASTOLIC VOLUME (MOD BIPLANE) 2D: 41 ML
SL CV PED ECHO LEFT VENTRICLE SYSTOLIC VOLUME (MOD BIPLANE) 2D: 22 ML
SODIUM SERPL-SCNC: 141 MMOL/L (ref 135–147)
TRICUSPID ANNULAR PLANE SYSTOLIC EXCURSION: 1.2 CM
WBC # BLD AUTO: 7.32 THOUSAND/UL (ref 4.31–10.16)

## 2023-11-21 PROCEDURE — 93306 TTE W/DOPPLER COMPLETE: CPT | Performed by: INTERNAL MEDICINE

## 2023-11-21 PROCEDURE — 99232 SBSQ HOSP IP/OBS MODERATE 35: CPT | Performed by: INTERNAL MEDICINE

## 2023-11-21 PROCEDURE — 99223 1ST HOSP IP/OBS HIGH 75: CPT | Performed by: INTERNAL MEDICINE

## 2023-11-21 PROCEDURE — 80048 BASIC METABOLIC PNL TOTAL CA: CPT | Performed by: FAMILY MEDICINE

## 2023-11-21 PROCEDURE — C8929 TTE W OR WO FOL WCON,DOPPLER: HCPCS

## 2023-11-21 PROCEDURE — 99233 SBSQ HOSP IP/OBS HIGH 50: CPT | Performed by: FAMILY MEDICINE

## 2023-11-21 PROCEDURE — 85025 COMPLETE CBC W/AUTO DIFF WBC: CPT | Performed by: FAMILY MEDICINE

## 2023-11-21 RX ORDER — FUROSEMIDE 40 MG/1
40 TABLET ORAL DAILY
Status: DISCONTINUED | OUTPATIENT
Start: 2023-11-21 | End: 2023-11-28

## 2023-11-21 RX ADMIN — OXYMETAZOLINE HYDROCHLORIDE 2 SPRAY: 0.05 SPRAY NASAL at 08:24

## 2023-11-21 RX ADMIN — PERFLUTREN 2 ML/MIN: 6.52 INJECTION, SUSPENSION INTRAVENOUS at 07:30

## 2023-11-21 RX ADMIN — Medication 1 TABLET: at 08:23

## 2023-11-21 RX ADMIN — AMPICILLIN SODIUM AND SULBACTAM SODIUM 3 G: 2; 1 INJECTION, POWDER, FOR SOLUTION INTRAMUSCULAR; INTRAVENOUS at 09:07

## 2023-11-21 RX ADMIN — DOCUSATE SODIUM AND SENNOSIDES 1 TABLET: 8.6; 5 TABLET, FILM COATED ORAL at 08:23

## 2023-11-21 RX ADMIN — FUROSEMIDE 40 MG: 40 TABLET ORAL at 10:08

## 2023-11-21 RX ADMIN — LEVOCARNITINE 330 MG: 1 SOLUTION ORAL at 08:23

## 2023-11-21 RX ADMIN — SALINE NASAL SPRAY 2 SPRAY: 1.5 SOLUTION NASAL at 08:24

## 2023-11-21 RX ADMIN — PRIMIDONE 100 MG: 50 TABLET ORAL at 08:22

## 2023-11-21 RX ADMIN — PRIMIDONE 100 MG: 50 TABLET ORAL at 14:36

## 2023-11-21 RX ADMIN — SALINE NASAL SPRAY 2 SPRAY: 1.5 SOLUTION NASAL at 21:15

## 2023-11-21 RX ADMIN — LEVOCARNITINE 330 MG: 1 SOLUTION ORAL at 11:57

## 2023-11-21 RX ADMIN — LAMOTRIGINE 200 MG: 100 TABLET ORAL at 05:48

## 2023-11-21 RX ADMIN — LAMOTRIGINE 250 MG: 100 TABLET ORAL at 14:36

## 2023-11-21 RX ADMIN — Medication 400 UNITS: at 08:23

## 2023-11-21 RX ADMIN — PRIMIDONE 150 MG: 50 TABLET ORAL at 21:56

## 2023-11-21 RX ADMIN — LAMOTRIGINE 300 MG: 100 TABLET ORAL at 21:56

## 2023-11-21 RX ADMIN — NYSTATIN: 100000 POWDER TOPICAL at 08:24

## 2023-11-21 RX ADMIN — DOCUSATE SODIUM AND SENNOSIDES 1 TABLET: 8.6; 5 TABLET, FILM COATED ORAL at 17:12

## 2023-11-21 RX ADMIN — METOPROLOL TARTRATE 25 MG: 25 TABLET, FILM COATED ORAL at 08:23

## 2023-11-21 RX ADMIN — AMPICILLIN SODIUM AND SULBACTAM SODIUM 3 G: 2; 1 INJECTION, POWDER, FOR SOLUTION INTRAMUSCULAR; INTRAVENOUS at 21:15

## 2023-11-21 RX ADMIN — LEVOCARNITINE 330 MG: 1 SOLUTION ORAL at 21:15

## 2023-11-21 RX ADMIN — AMPICILLIN SODIUM AND SULBACTAM SODIUM 3 G: 2; 1 INJECTION, POWDER, FOR SOLUTION INTRAMUSCULAR; INTRAVENOUS at 14:38

## 2023-11-21 RX ADMIN — Medication 1 APPLICATION: at 21:15

## 2023-11-21 RX ADMIN — AMPICILLIN SODIUM AND SULBACTAM SODIUM 3 G: 2; 1 INJECTION, POWDER, FOR SOLUTION INTRAMUSCULAR; INTRAVENOUS at 03:10

## 2023-11-21 RX ADMIN — NYSTATIN: 100000 POWDER TOPICAL at 17:12

## 2023-11-21 RX ADMIN — OXYMETAZOLINE HYDROCHLORIDE 2 SPRAY: 0.05 SPRAY NASAL at 21:15

## 2023-11-21 RX ADMIN — Medication 1 APPLICATION: at 08:24

## 2023-11-21 RX ADMIN — LEVOCARNITINE 330 MG: 1 SOLUTION ORAL at 15:50

## 2023-11-21 RX ADMIN — LACTULOSE 20 G: 20 SOLUTION ORAL at 17:12

## 2023-11-21 RX ADMIN — ZONISAMIDE 100 MG: 100 CAPSULE ORAL at 21:55

## 2023-11-21 RX ADMIN — METOPROLOL TARTRATE 25 MG: 25 TABLET, FILM COATED ORAL at 21:15

## 2023-11-21 RX ADMIN — OXYCODONE HYDROCHLORIDE AND ACETAMINOPHEN 500 MG: 500 TABLET ORAL at 08:23

## 2023-11-21 NOTE — PROGRESS NOTES
[facility]  Progress Note  Name: Tom Burnett  MRN: [id number]  Unit/Bed#: -85 I Date of Admission: 11/17/2023   Date of Service: 11/21/2023 I Hospital Day: 4    Assessment/Plan   * Pleural effusion on right  Assessment & Plan    This did require a chest tube about 20 years ago according to the mother's typed medical history list  Discussed with the mother patient has been having worsening shortness of breath and for the past week and has been falling asleep a lot he usually wears oxygen 2 L at night but the mother has been using 2 L throughout the day. No cough has been reported but she did state that his lower extremity edema has worsened  There is loculated right-sided moderate effusion with inability to rule out pneumonia questionable can be parapneumonic effusion that needs chest tube for drainage in unable to be done with a thoracocentesis. I discussed with the mother and if needed okay to proceed with the chest tube discussed that interventional radiology will see the patient on Monday and decide which way to go we will order pleural fluid studies  Patient is status post IR guided thoracentesis    Epistaxis  Assessment & Plan  Yesterday and today controlled at bedside  Per mom has been having them since 13years old  Ask ent to see  Hold asa and lovenox for now-will discuss with ENT about the aspirin and Lovenox  Use oxygen with humidifier     Acute blood loss anemia  Assessment & Plan  Secondary to recurrent epistaxis. Appreciate ENT consult, aspirin and Lovenox remain on hold, if hemoglobin remained stable and ENT agrees, can resume in a.m.     Stage 3 chronic kidney disease [facility])  Assessment & Plan  Lab Results   Component Value Date    EGFR 47 11/21/2023    EGFR 40 11/20/2023    EGFR 39 11/19/2023    CREATININE 1.59 (H) 11/21/2023    CREATININE 1.84 (H) 11/20/2023    CREATININE 1.88 (H) 11/19/2023   Has been anywhere between 1.4-1.5, cr worsened since lasix iv and also on high dose ladactone . Cr stable today hold both still discussed with nephro if cr stable linden restart diuretics     Chronic respiratory failure with hypercapnia (HCC)  Assessment & Plan  Wears 2L O2 QHS, continue  Likely has a chronic hypercapnia given the elevated bicarbonate/kyphosis likely causing chronic limited chest expansion and sleepiness during day will obtain vbg- reviewed 69  Status post thoracentesis. Due to recent epistaxis, patient unable to place nasal cannula-was trying to use facemask but patient is not keeping the facemask. As long as patient saturation 89%, will keep monitoring. No other household member able to render care  Assessment & Plan  Patient's main caregiver is his mother who is hospitalized today at [address]  She assists him with his ADLs and assist x2  Might need placement  Pt/ot ordered    Liver cirrhosis ([address])  Assessment & Plan  Cirrhotic morphology of the liver chronic problem  Ammonia is normal there is no ascites continue Lasix and Aldactone- hold in light of worsening renal function     Acute on chronic heart failure with preserved ejection fraction [facility])  Assessment & Plan  Patient does use pumps at home he has chronic lymphedema but discussed with the mother that edema is worsened he has been compliant with Lasix when he was in the nursing home his Lasix was 60 but decreased to 40 secondary to kidney numbers. He does have evidence of a small effusion on the left as well as the right which he has loculated see below he did receive Lasix 40 mg IV x1 on admission.   which we will continue CT abdomen did not reveal any paracentesis as the mother did report that his girth has increased  proBNP is elevated  2D echo done in 2022 with mild concentric hypertrophy and EF of 60 to 65%  Am labs  Daily weights and strict I and o not documented  Patient is status post IR guided thoracentesis, after discussion is done with nephrology, will resume home dose of Lasix, hold spironolactone at this time. Due to recent epistaxis from nasal cannula prong, patient is to use facemask-patient is noncompliant. Dysphagia  Assessment & Plan  Continue mechanical soft diet from prior  Aspiration precautions obtain speech    Developmental delay  Assessment & Plan  Daily involvement with mother  Supportive care  PT/OT/ST  Pt AAOx1   At baseline AAOx3    Falls  Assessment & Plan  Frequent falls for his entire life because of unsteady gait according to the mother's medical history list  Fall precautions  Emergency room trauma eval no acute injury    Nonintractable generalized idiopathic epilepsy without status epilepticus ([address])  Assessment & Plan  Continue home seizure medications with seizure precautions  His last seizure was 2-3 days ago according to his uncle. Lamictal, zonesamide and primidone levels ordered. The last few times he was hospitalized for seizure the AEDs were not changed because the breakthrough seizure etiology were infections. VTE Pharmacologic Prophylaxis: VTE Score: 5  patient was on aspirin and Lovenox, remained on hold due to epistaxis    Mobility:   Basic Mobility Inpatient Raw Score: 8  -[facility] Goal: 3: Sit at edge of bed  -[facility] Achieved: 3: Sit at edge of bed  [facility] Goal achieved. Continue to encourage appropriate mobility. Patient Centered Rounds: I performed bedside rounds with nursing staff today. Discussions with Specialists or Other Care Team Provider: Pulmonary, nephrology    Education and Discussions with Family / Patient: Updated  (mother) via phone. Current Length of Stay: 4 day(s)  Current Patient Status: Inpatient   Certification Statement: The patient will continue to require additional inpatient hospital stay due to to monitor above conditions  Discharge Plan: Anticipate discharge in 48-72 hrs to rehab facility. Code Status: Level 1 - Full Code    Subjective:   Seen and evaluated and examined.   Patient sitting upright position. Feels much comfortable. Denies any significant complaint. Objective:     Vitals:   Temp (24hrs), Av.8 °F (36.6 °C), Min:97.7 °F (36.5 °C), Max:97.9 °F (36.6 °C)    Temp:  [97.7 °F (36.5 °C)-97.9 °F (36.6 °C)] 97.9 °F (36.6 °C)  HR:  [78-90] 80  Resp:  [18-19] 18  BP: (117-154)/(72-88) 138/75  SpO2:  [87 %-90 %] 89 %  Body mass index is 38.43 kg/m². Input and Output Summary (last 24 hours): Intake/Output Summary (Last 24 hours) at 2023 1810  Last data filed at 2023 0835  Gross per 24 hour   Intake 480 ml   Output 200 ml   Net 280 ml       Physical Exam:   Physical Exam  Vitals and nursing note reviewed. Constitutional:       Appearance: He is not ill-appearing or diaphoretic. HENT:      Mouth/Throat:      Mouth: Mucous membranes are moist.      Pharynx: Oropharynx is clear. No oropharyngeal exudate. Eyes:      General: No scleral icterus. Left eye: No discharge. Extraocular Movements: Extraocular movements intact. Conjunctiva/sclera: Conjunctivae normal.      Pupils: Pupils are equal, round, and reactive to light. Cardiovascular:      Rate and Rhythm: Normal rate. Heart sounds: No murmur heard. No friction rub. No gallop. Pulmonary:      Effort: Pulmonary effort is normal. No respiratory distress. Breath sounds: No stridor. No wheezing or rhonchi. Abdominal:      General: Abdomen is flat. Bowel sounds are normal. There is no distension. Palpations: There is no mass. Tenderness: There is no abdominal tenderness. Hernia: No hernia is present. Skin:     Findings: No lesion or rash. Neurological:      Mental Status: He is alert. Mental status is at baseline.           Additional Data:     Labs:  Results from last 7 days   Lab Units 23  0435   WBC Thousand/uL 7.32   HEMOGLOBIN g/dL 10.9*   HEMATOCRIT % 34.9*   PLATELETS Thousands/uL 114*   NEUTROS PCT % 84*   LYMPHS PCT % 8*   MONOS PCT % 8   EOS PCT % 0     Results from last 7 days   Lab Units 11/21/23  0435 11/19/23  0513 11/17/23  1800   SODIUM mmol/L 141   < > 140   POTASSIUM mmol/L 4.3   < > 4.1   CHLORIDE mmol/L 103   < > 100   CO2 mmol/L 31   < > 33*   BUN mg/dL 32*   < > 30*   CREATININE mg/dL 1.59*   < > 1.59*   ANION GAP mmol/L 7   < > 7   CALCIUM mg/dL 9.5   < > 9.9   ALBUMIN g/dL  --   --  4.0   TOTAL BILIRUBIN mg/dL  --   --  0.98   ALK PHOS U/L  --   --  195*   ALT U/L  --   --  20   AST U/L  --   --  27   GLUCOSE RANDOM mg/dL 95   < > 90    < > = values in this interval not displayed. Results from last 7 days   Lab Units 11/17/23  1800   INR  1.15             Results from last 7 days   Lab Units 11/20/23  0512 11/19/23  0513 11/17/23  1800   LACTIC ACID mmol/L  --   --  1.2   PROCALCITONIN ng/ml 0.41* 0.36* 0.31*       Lines/Drains:  Invasive Devices       Peripheral Intravenous Line  Duration             Peripheral IV 11/17/23 Left Antecubital 4 days                          Imaging: No pertinent imaging reviewed. Recent Cultures (last 7 days):   Results from last 7 days   Lab Units 11/20/23  1335 11/17/23  1802 11/17/23  1800   BLOOD CULTURE   --  No Growth at 72 hrs. No Growth at 72 hrs.    GRAM STAIN RESULT  2+ Polys  No organisms seen  --   --    BODY FLUID CULTURE, STERILE  No growth  --   --        Last 24 Hours Medication List:   Current Facility-Administered Medications   Medication Dose Route Frequency Provider Last Rate    acetaminophen  650 mg Oral Q6H PRN Dana Asif PA-C      ampicillin-sulbactam  3 g Intravenous Q6H Florinda Marquez MD 3 g (11/21/23 3126)    ascorbic acid  500 mg Oral Daily Dana Asif PA-C      calcium carbonate-vitamin D  1 tablet Oral Daily With Breakfast Dana Asif PA-C      dextromethorphan-guaiFENesin  10 mL Oral Q4H PRN Dana Asif PA-C      furosemide  40 mg Oral Daily Ashtyn Romero MD      lactulose  20 g Oral After [employer], WHITNEY      lamoTRIgine  200 mg Oral Early Morning Dana Asif PA-C      lamoTRIgine 250 mg Oral After Lunch Dana Demo, WHITNEY      lamoTRIgine  300 mg Oral HS Dana Demo, WHITNEY      levOCARNitine  330 mg Oral 4x Daily (with meals and at bedtime) Karina Dunn PA-C      LORazepam  2 mg Intravenous Q6H PRN Dana Demo, WHITNEY      metoprolol tartrate  25 mg Oral BID Dana Demo, WHITNEY      nystatin   Topical BID Dana Demo, WHITNEY      ondansetron  4 mg Intravenous Q6H PRN Dana Demo, WHITNEY      oxymetazoline  2 spray Each Nare Q12H [address] Marilu Canales MD      polyethylene glycol  17 g Oral Daily PRN Dana Demo, WHITNEY      primidone  100 mg Oral After Breakfast Dana Demo, WHITNEY      primidone  100 mg Oral Daily With Lunch Dana Mykeo, WHITNEY      primidone  150 mg Oral HS Dana Mykeo, WHITNEY      senna-docusate sodium  1 tablet Oral BID Dana Mykeo, WHITNEY      sodium chloride  1 Application Nasal TID Jamison Campbell MD      sodium chloride  2 spray Each Nare TID Jamison Campbell MD      vitamin E (tocopherol)  400 Units Oral Daily Dana Demo, WHITNEY      zonisamide  100 mg Oral HS Karina Dunn PA-C          Today, Patient Was Seen By: Harry Bonds MD    **Please Note: This note may have been constructed using a voice recognition system. **

## 2023-11-21 NOTE — ASSESSMENT & PLAN NOTE
Yesterday and today controlled at bedside  Per mom has been having them since 13years old  Ask ent to see  Hold asa and lovenox for now-will discuss with ENT about the aspirin and Lovenox  Use oxygen with humidifier

## 2023-11-21 NOTE — PROGRESS NOTES
Pastoral Care Progress Note    2023  Patient: Can Matthews : 1965  Admission Date & Time: 2023 1720  MRN: [id number] CSN: [id number]    [facility] in consult with RN, initiated support visit to pt. Pt received [facility] upright in his chair, no family present. Pt shared regarding his favorite biblical passage and some of his thoughts regarding it. Pt shared that he had received visitors recently. Pt was awake for a short visit, but then fell asleep. CH took her leave. CH provided empathetic listening.                Chaplaincy Interventions Utilized:   Collaboration: Consulted with interdisciplinary team    Relationship Building: Cultivated a relationship of care and support      Chaplaincy Outcomes Achieved:  Expressed peace    Spiritual Coping Strategies Utilized:   Connectedness       23 1400   Clinical Encounter Type   Visited With Patient   Routine Visit Introduction   Referral From Nurse

## 2023-11-21 NOTE — CONSULTS
Pulmonary Medicine-Consultation  Jovon Duarte 66 y.o. male MRN: [id number]  Unit/Bed#: -01 Encounter: [id number]      Assessment:  Acute respiratory insufficiency/dyspnea  Right pleural effusion  Morbid obesity/chronic hypoventilation  CHF  CKD  Intellectual disability    Recommendations/discussion:  Respiratory distress/dyspnea seems to be improved following the thoracentesis  IR thoracentesis 11/7 removed 800 and cc of isidro/dark fluid fluid analysis consistent with a transudate likely from CHF/CKD/hypervolemia/cirrhosis hydrothorax  No signs of recurrence, absent breath sounds bilaterally to the bases  Follow pleural fluid cytology/and cultures  Recommend diuresis as tolerated/currently in EDWIG[facility]  If noted recurrence, need further evaluation for liver cirrhosis/as a potential source for the effusion/hepatic hydrothorax    Pulmonary will sign off, please do not hesitate to call with any questions      Physician Requesting Consult: Jan Young MD    Reason for Consult / Principal Problem: Pleural effusion    HPI:   66 y.o. male with a h/o intellectual disability, HTN, morbid obesity, seizure disorder, restrictive pericarditis, pericardial window, chronic hypoxic respiratory failure with hypercapnia, cirrhosis    Presented to the ED 11/17 with frequent falls, increased lethargy and dyspnea. Noted to have right pleural effusion, underwent thoracentesis by IR removed 860 cc of fluid, dark isidro-colored. Fluid analysis consistent with a transudate, LDH 84, lymphocytic predominant. Cultures NGTD, pending cytology. On my assessment, patient is resting in bed, no signs of respiratory distress. Answers questions by saying yes or no, looking around not clearly communicative.         Inpatient consult to Pulmonology  Consult performed by: Keara Ramirez MD  Consult ordered by: Laquita Dudley MD          Review of Systems  Unable to obtain  Studies:    Imaging Studies: I have personally reviewed pertinent films in PACS    EKG, Pathology, and Other Studies: I have personally reviewed pertinent films in PACS    Pulmonary Results (PFTs, PSG): None in file    Historical Information   Past Medical History:   Diagnosis Date    Hypertension     Mentally challenged     Pericardial effusion     Pneumonia     Seizure [facility])      Past Surgical History:   Procedure Laterality Date    FRACTURE SURGERY      clavicle, left humerus, radial, and ulna. Right radius    HIP ARTHROSCOPY Right     IR THORACENTESIS  11/20/2023    CA COLONOSCOPY FLX DX W/COLLJ SPEC WHEN PFRMD N/A 4/1/2019    Procedure: COLONOSCOPY;  Surgeon: Tenny Rubinstein, MD;  Location: [facility]; Service: Colorectal     Social History   Social History     Substance and Sexual Activity   Alcohol Use Never     Social History     Substance and Sexual Activity   Drug Use Never     Social History     Tobacco Use   Smoking Status Never   Smokeless Tobacco Never     E-Cigarette/Vaping    E-Cigarette Use Never User      E-Cigarette/Vaping Substances    Nicotine No     THC No     CBD No     Flavoring No     Other No     Unknown No          Family History:   Family History   Adopted: Yes       Meds/Allergies   all current active meds have been reviewed    Allergies   Allergen Reactions    Budesonide Seizures     Other reaction(s): Seizure    Dilantin [Phenytoin]      Pericardial effusion    Flurazepam Other (See Comments)     dalmane    Other reaction(s): MADW HIM RAMMEY   dalmane    Ipratropium-Albuterol Seizures     Other reaction(s): Seizures    Phenobarbital Hyperactivity    Vyvanse [Lisdexamfetamine Dimesylate] Other (See Comments)     Unknown        Objective   Vitals: Blood pressure 138/75, pulse 80, temperature 97.9 °F (36.6 °C), temperature source Temporal, resp. rate 18, height 5' 6" (1.676 m), weight 108 kg (238 lb 1.6 oz), SpO2 (!) 89 %. ,Body mass index is 38.43 kg/m².     Intake/Output Summary (Last 24 hours) at 11/21/2023 6997  Last data filed at 11/21/2023 1816  Gross per 24 hour   Intake 720 ml   Output 500 ml   Net 220 ml     Invasive Devices       Peripheral Intravenous Line  Duration             Peripheral IV 11/17/23 Left Antecubital 4 days                    Physical Exam  Body mass index is 38.43 kg/m². Gen: not in acute distress, obese  Neck/Eyes: supple, PERRL  Ear: normal appearance  Chest: normal respiratory efforts, noted air movement, no wheezes, mild crackles posterior/basal  CV: RRR, no murmurs appreciated, 1-2+ pitting lower extremity edema  Abdomen: soft, non tender  Extremities: Chronic venous changes of the legs  Neuro: Following commands, no focal motor deficit       Lab Results: I have personally reviewed pertinent lab results. None    Portions of the record may have been created with voice recognition software. Occasional wrong word or "sound a like" substitutions may have occurred due to the inherent limitations of voice recognition software. Read the chart carefully and recognize, using context, where substitutions have occurred.

## 2023-11-21 NOTE — PLAN OF CARE
Problem: PAIN - ADULT  Goal: Verbalizes/displays adequate comfort level or baseline comfort level  Description: Interventions:  - Encourage patient to monitor pain and request assistance  - Assess pain using appropriate pain scale  - Administer analgesics based on type and severity of pain and evaluate response  - Implement non-pharmacological measures as appropriate and evaluate response  - Consider cultural and social influences on pain and pain management  - Notify physician/advanced practitioner if interventions unsuccessful or patient reports new pain  Outcome: Progressing     Problem: INFECTION - ADULT  Goal: Absence or prevention of progression during hospitalization  Description: INTERVENTIONS:  - Assess and monitor for signs and symptoms of infection  - Monitor lab/diagnostic results  - Monitor all insertion sites, i.e. indwelling lines, tubes, and drains  - Monitor endotracheal if appropriate and nasal secretions for changes in amount and color  - [place] appropriate cooling/warming therapies per order  - Administer medications as ordered  - Instruct and encourage patient and family to use good hand hygiene technique  - Identify and instruct in appropriate isolation precautions for identified infection/condition  Outcome: Progressing  Goal: Absence of fever/infection during neutropenic period  Description: INTERVENTIONS:  - Monitor WBC    Outcome: Progressing     Problem: SAFETY ADULT  Goal: Patient will remain free of falls  Description: INTERVENTIONS:  - Educate patient/family on patient safety including physical limitations  - Instruct patient to call for assistance with activity   - Consult OT/PT to assist with strengthening/mobility   - Keep Call bell within reach  - Keep bed low and locked with side rails adjusted as appropriate  - Keep care items and personal belongings within reach  - Initiate and maintain comfort rounds  - Make Fall Risk Sign visible to staff  - Offer Toileting every 2 Hours, in advance of need  - Initiate/Maintain bed/chair alarm  - Obtain necessary fall risk management equipment:   - Apply yellow socks and bracelet for high fall risk patients  - Consider moving patient to room near nurses station  Outcome: Progressing  Goal: Maintain or return to baseline ADL function  Description: INTERVENTIONS:  -  Assess patient's ability to carry out ADLs; assess patient's baseline for ADL function and identify physical deficits which impact ability to perform ADLs (bathing, care of mouth/teeth, toileting, grooming, dressing, etc.)  - Assess/evaluate cause of self-care deficits   - Assess range of motion  - Assess patient's mobility; develop plan if impaired  - Assess patient's need for assistive devices and provide as appropriate  - Encourage maximum independence but intervene and supervise when necessary  - Involve family in performance of ADLs  - Assess for home care needs following discharge   - Consider OT consult to assist with ADL evaluation and planning for discharge  - Provide patient education as appropriate  Outcome: Progressing  Goal: Maintains/Returns to pre admission functional level  Description: INTERVENTIONS:  - Perform AM-PAC 6 Click Basic Mobility/ Daily Activity assessment daily.  - Set and communicate daily mobility goal to care team and patient/family/caregiver. - Collaborate with rehabilitation services on mobility goals if consulted  - Perform Range of Motion 3 times a day. - Reposition patient every 2 hours.   - Dangle patient 3 times a day  - Stand patient 3 times a day  - Ambulate patient 3 times a day  - Out of bed to chair 3 times a day   - Out of bed for meals 3 times a day  - Out of bed for toileting  - Record patient progress and toleration of activity level   Outcome: Progressing     Problem: DISCHARGE PLANNING  Goal: Discharge to home or other facility with appropriate resources  Description: INTERVENTIONS:  - Identify barriers to discharge w/patient and caregiver  - Arrange for needed discharge resources and transportation as appropriate  - Identify discharge learning needs (meds, wound care, etc.)  - Arrange for interpretive services to assist at discharge as needed  - Refer to Case Management Department for coordinating discharge planning if the patient needs post-hospital services based on physician/advanced practitioner order or complex needs related to functional status, cognitive ability, or social support system  Outcome: Progressing     Problem: Knowledge Deficit  Goal: Patient/family/caregiver demonstrates understanding of disease process, treatment plan, medications, and discharge instructions  Description: Complete learning assessment and assess knowledge base.   Interventions:  - Provide teaching at level of understanding  - Provide teaching via preferred learning methods  Outcome: Progressing     Problem: Prexisting or High Potential for Compromised Skin Integrity  Goal: Skin integrity is maintained or improved  Description: INTERVENTIONS:  - Identify patients at risk for skin breakdown  - Assess and monitor skin integrity  - Assess and monitor nutrition and hydration status  - Monitor labs   - Assess for incontinence   - Turn and reposition patient  - Assist with mobility/ambulation  - Relieve pressure over bony prominences  - Avoid friction and shearing  - Provide appropriate hygiene as needed including keeping skin clean and dry  - Evaluate need for skin moisturizer/barrier cream  - Collaborate with interdisciplinary team   - Patient/family teaching  - Consider wound care consult   Outcome: Progressing Graft Donor Site Bandage (Optional-Leave Blank If You Don't Want In Note): Steri-strips and a pressure bandage were applied to the donor site.

## 2023-11-21 NOTE — ASSESSMENT & PLAN NOTE
Wears 2L O2 QHS, continue  Likely has a chronic hypercapnia given the elevated bicarbonate/kyphosis likely causing chronic limited chest expansion and sleepiness during day will obtain vbg- reviewed 69  Status post thoracentesis. Due to recent epistaxis, patient unable to place nasal cannula-was trying to use facemask but patient is not keeping the facemask. As long as patient saturation 89%, will keep monitoring.

## 2023-11-21 NOTE — ASSESSMENT & PLAN NOTE
Lab Results   Component Value Date    EGFR 47 11/21/2023    EGFR 40 11/20/2023    EGFR 39 11/19/2023    CREATININE 1.59 (H) 11/21/2023    CREATININE 1.84 (H) 11/20/2023    CREATININE 1.88 (H) 11/19/2023   Has been anywhere between 1.4-1.5, cr worsened since lasix iv and also on high dose ladactone .  Cr stable today hold both still discussed with nephro if cr stable linden restart diuretics

## 2023-11-21 NOTE — CASE MANAGEMENT
Case Management Discharge Planning Note    Patient name Nas Sickle  Location [address] [facility] [place] 337/-40 MRN [id number]  : 1965 Date 2023       Current Admission Date: 2023  Current Admission Diagnosis:Pleural effusion on right   Patient Active Problem List    Diagnosis Date Noted    Acute blood loss anemia 2023    Epistaxis 2023    No other household member able to render care 2023    Chronic respiratory failure with hypercapnia ([address]) 2023    Stage 3 chronic kidney disease ([address]) 2023    Liver cirrhosis ([address]) 2021    Abnormal finding on CT scan 2021    EDWIGE (acute kidney injury) ([address]) 2021    Pleural effusion on right 2021    History of COVID-19 2021    S/P pericardial window creation 2021    Acute on chronic heart failure with preserved ejection fraction ([address]) 2021    Lower extremity pain, left 2021    MRSA nasal colonization 2020    Developmental delay 2020    Dysphagia 2020    Acute encephalopathy 2020    Pneumonia due to infectious organism 01/10/2020    Nonintractable generalized idiopathic epilepsy without status epilepticus ([address]) 01/10/2020    Falls 01/10/2020    T wave inversion in EKG 01/10/2020    Essential hypertension 01/10/2020    RSV (acute bronchiolitis due to respiratory syncytial virus) 01/10/2020    Acute respiratory failure with hypoxia ([address]) 01/10/2020    Polyp of colon 03/15/2019      LOS (days): 4  Geometric Mean LOS (GMLOS) (days): 3.90  Days to GMLOS:0.2     OBJECTIVE:  Risk of Unplanned Readmission Score: 15.64         Current admission status: Inpatient   Preferred Pharmacy:   [address] [place] [address] Road  [address] [place] [id number]  Phone: [phone] Fax: [phone]    Primary Care Provider: Sheral Gaucher, DO    Primary Insurance: MEDICARE  Secondary Insurance: [employer]    DISCHARGE DETAILS:        ETHAN submitted blanket STR referral in [facility]. CM contacted patients uncle Rachele Cha who happened to be at [facility] visiting patients mother. CM spoke with uncle Nikolay Meredith and patients mother regarding STR referrals submitted. Mother requested [facility] and [place] be removed from list.  Mothers first choice would be [address] [place] [address] where mother currently is then [facility] where patients has been before. Bela Florence suggested [address]. CM to follow for acceptance and review options with mother. Mother can be reached at [facility] [phone] room [phone]. CM explained to mother and uncle CM will also need to assure LOC Assessment active for patient which CM has tried to obtain from patients [facility]  or [facility]. CM explained to mother if LOC Assessment not active CM will need to re refer patient for assessment.

## 2023-11-21 NOTE — ASSESSMENT & PLAN NOTE
This did require a chest tube about 20 years ago according to the mother's typed medical history list  Discussed with the mother patient has been having worsening shortness of breath and for the past week and has been falling asleep a lot he usually wears oxygen 2 L at night but the mother has been using 2 L throughout the day. No cough has been reported but she did state that his lower extremity edema has worsened  There is loculated right-sided moderate effusion with inability to rule out pneumonia questionable can be parapneumonic effusion that needs chest tube for drainage in unable to be done with a thoracocentesis.   I discussed with the mother and if needed okay to proceed with the chest tube discussed that interventional radiology will see the patient on Monday and decide which way to go we will order pleural fluid studies  Patient is status post IR guided thoracentesis

## 2023-11-21 NOTE — CASE MANAGEMENT
Case Management Discharge Planning Note    Patient name Cameron Hung  [facility] MRN [id number]  : 1965 Date 2023       Current Admission Date: 2023  Current Admission Diagnosis:Pleural effusion on right   Patient Active Problem List    Diagnosis Date Noted    Acute blood loss anemia 2023    Epistaxis 2023    No other household member able to render care 2023    Chronic respiratory failure with hypercapnia ([address]) 2023    Stage 3 chronic kidney disease ([address]) 2023    Liver cirrhosis ([address]) 2021    Abnormal finding on CT scan 2021    EDWIGE (acute kidney injury) ([address]) 2021    Pleural effusion on right 2021    History of COVID-19 2021    S/P pericardial window creation 2021    Acute on chronic heart failure with preserved ejection fraction ([address]) 2021    Lower extremity pain, left 2021    MRSA nasal colonization 2020    Developmental delay 2020    Dysphagia 2020    Acute encephalopathy 2020    Pneumonia due to infectious organism 01/10/2020    Nonintractable generalized idiopathic epilepsy without status epilepticus ([address]) 01/10/2020    Falls 01/10/2020    T wave inversion in EKG 01/10/2020    Essential hypertension 01/10/2020    RSV (acute bronchiolitis due to respiratory syncytial virus) 01/10/2020    Acute respiratory failure with hypoxia ([address]) 01/10/2020    Polyp of colon 03/15/2019      LOS (days): 4  Geometric Mean LOS (GMLOS) (days): 3.90  Days to GMLOS:0.3     OBJECTIVE:  Risk of Unplanned Readmission Score: 15.43         Current admission status: Inpatient   Preferred Pharmacy:   [address] [place] [address] Road  [address] [place] [address]  Phone: [phone] Fax: [phone]    Primary Care Provider: Abdoulaye Hernandez DO    Primary Insurance: MEDICARE  Secondary Insurance: [employer]    DISCHARGE DETAILS:        ETHAN received return telephone call from Leopoldo Denmark, IDD  from [facility] for patient. CM informed Charly Godinez of circumstances surrounding patients admission and inquired if she knows if patient has current LOC Determination Letter from [place] or [place] for [place] referral.  Charly Hilger indicated she would email the individual in her office who does those assessments and let CM know. Charlydayanara Godinez indicated she is not aware, however, if she can provide letter to CM without Release. CM will secure release is needed. Charly Marierows indicated her greatest fear for patient is mothers inability to care for him as she will accept no help in the home for patient. CM left voice message for Alon Doe, inquiring if she has copy of letter from patients admission in summer.

## 2023-11-21 NOTE — ASSESSMENT & PLAN NOTE
Secondary to recurrent epistaxis. Appreciate ENT consult, aspirin and Lovenox remain on hold, if hemoglobin remained stable and ENT agrees, can resume in a.m.

## 2023-11-21 NOTE — PROGRESS NOTES
NEPHROLOGY PROGRESS NOTE   Tia Figures 66 y.o. male MRN: [id number]  Unit/Bed#: -01 Encounter: [id number]      ASSESSMENT & PLAN:  #1 acute kidney injury on top of CKD stage III baseline reported 1.3-1.4. Kidney function improving after holding diuretics, creatinine down to 1.59  Okay to resume home dose furosemide as below  Avoid relative hypotension and follow daily labs    2 right pleural effusion, status post right-sided thoracentesis with 860 cc of fluid removal on 11/20     #3 chronic HFpEF, on diuretics as an outpatient, previously on hold in the setting of EDWIGE. Renal functions improving, okay to resume diuretics, recommend to restart furosemide home dose, keep holding spironolactone, follow daily labs, follow daily weight  Follow low-salt diet    #4 chronic hypercapnic respiratory failure    5 developmental delay with reported MR    6 mild anemia, hemoglobin low but is stable, monitor H&H last hemoglobin 10.9 this morning    Plan and recommendation were discussed with internal medicine attending Dr. Zbigniew Alfred, status post right-sided paracentesis 11/20  with 860 cc of fluid removed, kidney function improving okay to resume diuretics, recommend to resume home dose of furosemide, keep holding spironolactone for now, follow daily labs, he agreed with the plan      SUBJECTIVE:  Seen and examined, denies significant complaints, no chest pain, no shortness of breath, no nausea, no vomiting.   ROS limited due to MR    OBJECTIVE:  Current Weight: Weight - Scale: 108 kg (238 lb 1.6 oz)  Vitals:    11/21/23 0704   BP: 154/88   Pulse: 78   Resp:    Temp:    SpO2: (!) 87%       Intake/Output Summary (Last 24 hours) at 11/21/2023 0945  Last data filed at 11/21/2023 4828  Gross per 24 hour   Intake 840 ml   Output 1060 ml   Net -220 ml       General: conscious, cooperative, in not acute distress  Eyes: conjunctivae pink, anicteric sclerae  ENT: lips and mucous membranes moist, on room air  Neck: supple, no JVD  Chest: clear breath sounds bilateral, no crackles, ronchus or wheezings  CVS: distinct S1 & S2, normal rate, regular rhythm  Abdomen: Obese, non-tender, non-distended, normoactive bowel sounds  Extremities: +  edema of both legs  Skin: Chronic skin changes in lower extremities look like psoriatic plaques  Neuro: awake, alert, interactive        Medications:    Current Facility-Administered Medications:     acetaminophen (TYLENOL) tablet 650 mg, 650 mg, Oral, Q6H PRN, REMBERTO Joseph-C    ampicillin-sulbactam (UNASYN) 3 g in sodium chloride 0.9 % 100 mL IVPB, 3 g, Intravenous, Q6H, Ashley Garcia MD, Last Rate: 200 mL/hr at 11/21/23 0907, 3 g at 11/21/23 0907    ascorbic acid (VITAMIN C) tablet 500 mg, 500 mg, Oral, Daily, REMBERTO Joseph-C, 500 mg at 11/21/23 4927    calcium carbonate-vitamin D 500 mg-5 mcg tablet 1 tablet, 1 tablet, Oral, Daily With Breakfast, REMBERTO Joseph-C, 1 tablet at 11/21/23 5880    dextromethorphan-guaiFENesin (ROBITUSSIN DM) oral syrup 10 mL, 10 mL, Oral, Q4H PRN, MARTINA JosephC    furosemide (LASIX) tablet 40 mg, 40 mg, Oral, Daily, Ashtyn Romero MD    lactulose (CHRONULAC) oral solution 20 g, 20 g, Oral, After Dinner, REMBERTO Joseph-C, 20 g at 11/20/23 1725    lamoTRIgine (LaMICtal) tablet 200 mg, 200 mg, Oral, Early Morning, Dana Asif PA-C, 200 mg at 11/21/23 0548    lamoTRIgine (LaMICtal) tablet 250 mg, 250 mg, Oral, After Lunch, Dana Asif PA-C, 250 mg at 11/20/23 1442    lamoTRIgine (LaMICtal) tablet 300 mg, 300 mg, Oral, HS, Dnaa Asif PA-C, 300 mg at 11/20/23 2215    levOCARNitine (CARNITOR) oral solution 330 mg, 330 mg, Oral, 4x Daily (with meals and at bedtime), Dana Asif PA-C, 330 mg at 11/21/23 0823    LORazepam (ATIVAN) injection 2 mg, 2 mg, Intravenous, Q6H PRN, Dana Asif PA-C    metoprolol tartrate (LOPRESSOR) tablet 25 mg, 25 mg, Oral, BID, Dana Asif PA-C, 25 mg at 11/21/23 3554    nystatin (MYCOSTATIN) powder, , Topical, BID, Dana Asif PA-C, Given at 11/21/23 0824    ondansetron (ZOFRAN) injection 4 mg, 4 mg, Intravenous, Q6H PRN, Dana Asif PA-C    oxymetazoline (AFRIN) 0.05 % nasal spray 2 spray, 2 spray, Each Nare, Q12H [facility], Klaudia Baires MD, 2 spray at 11/21/23 0824    polyethylene glycol (MIRALAX) packet 17 g, 17 g, Oral, Daily PRN, Dana Asif PA-C    primidone (MYSOLINE) tablet 100 mg, 100 mg, Oral, After Breakfast, Dana Demo, PA-C, 100 mg at 11/21/23 8792    primidone (MYSOLINE) tablet 100 mg, 100 mg, Oral, Daily With Lunch, Dana Demo, PA-C, 100 mg at 11/20/23 1443    primidone (MYSOLINE) tablet 150 mg, 150 mg, Oral, HS, Dana Demo, PA-C, 150 mg at 11/20/23 2214    senna-docusate sodium (SENOKOT S) 8.6-50 mg per tablet 1 tablet, 1 tablet, Oral, BID, Dana Asif, PA-C, 1 tablet at 11/21/23 0417    sodium chloride (AYR SALINE NASAL) nasal gel 1 Application, 1 Application, Nasal, TID, Caprice Nguyen MD, 1 Application at 41/56/98 0824    sodium chloride (OCEAN) 0.65 % nasal spray 2 spray, 2 spray, Each Nare, TID, Caprice Nguyen MD, 2 spray at 11/21/23 0824    vitamin E (TOCOPHEROL) capsule 400 Units, 400 Units, Oral, Daily, Dana Demo, PA-C, 400 Units at 11/21/23 0823    zonisamide (ZONEGRAN) capsule 100 mg, 100 mg, Oral, HS, Dana Demo, PA-C, 100 mg at 11/20/23 2215    Invasive Devices:        Lab Results:   Results from last 7 days   Lab Units 11/21/23  0435 11/20/23  0512 11/19/23  0513 11/17/23  1800   WBC Thousand/uL 7.32 5.14 5.49 6.55   HEMOGLOBIN g/dL 10.9* 9.8* 10.2* 11.9*   HEMATOCRIT % 34.9* 31.6* 32.6* 38.1   PLATELETS Thousands/uL 114* 109* 113* 143*   SODIUM mmol/L 141 141 141 140   POTASSIUM mmol/L 4.3 4.3 4.3 4.1   CHLORIDE mmol/L 103 102 102 100   CO2 mmol/L 31 33* 33* 33*   BUN mg/dL 32* 34* 34* 30*   CREATININE mg/dL 1.59* 1.84* 1.88* 1.59*   CALCIUM mg/dL 9.5 9.2 9.2 9.9   MAGNESIUM mg/dL  --   --  1.8* 1.9   ALK PHOS U/L  --   --   --  195*   ALT U/L  --   --   --  20   AST U/L  --   --   --  27       Previous work up:  See previous notes      Portions of the record may have been created with voice recognition software. Occasional wrong word or "sound a like" substitutions may have occurred due to the inherent limitations of voice recognition software. Read the chart carefully and recognize, using context, where substitutions have occurred. If you have any questions, please contact the dictating provider.

## 2023-11-21 NOTE — ASSESSMENT & PLAN NOTE
Patient does use pumps at home he has chronic lymphedema but discussed with the mother that edema is worsened he has been compliant with Lasix when he was in the nursing home his Lasix was 60 but decreased to 40 secondary to kidney numbers. He does have evidence of a small effusion on the left as well as the right which he has loculated see below he did receive Lasix 40 mg IV x1 on admission. which we will continue CT abdomen did not reveal any paracentesis as the mother did report that his girth has increased  proBNP is elevated  2D echo done in 2022 with mild concentric hypertrophy and EF of 60 to 65%  Am labs  Daily weights and strict I and o not documented  Patient is status post IR guided thoracentesis, after discussion is done with nephrology, will resume home dose of Lasix, hold spironolactone at this time. Due to recent epistaxis from nasal cannula prong, patient is to use facemask-patient is noncompliant.

## 2023-11-22 LAB
ALBUMIN SERPL BCP-MCNC: 3.3 G/DL (ref 3.5–5)
ALP SERPL-CCNC: 167 U/L (ref 34–104)
ALT SERPL W P-5'-P-CCNC: 14 U/L (ref 7–52)
ANION GAP SERPL CALCULATED.3IONS-SCNC: 6 MMOL/L
AST SERPL W P-5'-P-CCNC: 21 U/L (ref 13–39)
BACTERIA BLD CULT: NORMAL
BACTERIA BLD CULT: NORMAL
BASOPHILS # BLD AUTO: 0.01 THOUSANDS/ÂΜL (ref 0–0.1)
BASOPHILS NFR BLD AUTO: 0 % (ref 0–1)
BILIRUB SERPL-MCNC: 1.07 MG/DL (ref 0.2–1)
BUN SERPL-MCNC: 32 MG/DL (ref 5–25)
CALCIUM ALBUM COR SERPL-MCNC: 9.8 MG/DL (ref 8.3–10.1)
CALCIUM SERPL-MCNC: 9.2 MG/DL (ref 8.4–10.2)
CHLORIDE SERPL-SCNC: 104 MMOL/L (ref 96–108)
CO2 SERPL-SCNC: 32 MMOL/L (ref 21–32)
CREAT SERPL-MCNC: 1.62 MG/DL (ref 0.6–1.3)
EOSINOPHIL # BLD AUTO: 0 THOUSAND/ÂΜL (ref 0–0.61)
EOSINOPHIL NFR BLD AUTO: 0 % (ref 0–6)
ERYTHROCYTE [DISTWIDTH] IN BLOOD BY AUTOMATED COUNT: 16.6 % (ref 11.6–15.1)
GFR SERPL CREATININE-BSD FRML MDRD: 46 ML/MIN/1.73SQ M
GLUCOSE SERPL-MCNC: 94 MG/DL (ref 65–140)
HCT VFR BLD AUTO: 33.4 % (ref 36.5–49.3)
HGB BLD-MCNC: 10.6 G/DL (ref 12–17)
IMM GRANULOCYTES # BLD AUTO: 0.02 THOUSAND/UL (ref 0–0.2)
IMM GRANULOCYTES NFR BLD AUTO: 0 % (ref 0–2)
LYMPHOCYTES # BLD AUTO: 0.71 THOUSANDS/ÂΜL (ref 0.6–4.47)
LYMPHOCYTES NFR BLD AUTO: 11 % (ref 14–44)
MCH RBC QN AUTO: 32.5 PG (ref 26.8–34.3)
MCHC RBC AUTO-ENTMCNC: 31.7 G/DL (ref 31.4–37.4)
MCV RBC AUTO: 103 FL (ref 82–98)
MONOCYTES # BLD AUTO: 0.73 THOUSAND/ÂΜL (ref 0.17–1.22)
MONOCYTES NFR BLD AUTO: 11 % (ref 4–12)
NEUTROPHILS # BLD AUTO: 5.32 THOUSANDS/ÂΜL (ref 1.85–7.62)
NEUTS SEG NFR BLD AUTO: 78 % (ref 43–75)
NRBC BLD AUTO-RTO: 0 /100 WBCS
PLATELET # BLD AUTO: 104 THOUSANDS/UL (ref 149–390)
PMV BLD AUTO: 11.4 FL (ref 8.9–12.7)
POTASSIUM SERPL-SCNC: 4 MMOL/L (ref 3.5–5.3)
PROT SERPL-MCNC: 8.3 G/DL (ref 6.4–8.4)
RBC # BLD AUTO: 3.26 MILLION/UL (ref 3.88–5.62)
SODIUM SERPL-SCNC: 142 MMOL/L (ref 135–147)
WBC # BLD AUTO: 6.79 THOUSAND/UL (ref 4.31–10.16)

## 2023-11-22 PROCEDURE — 80053 COMPREHEN METABOLIC PANEL: CPT | Performed by: FAMILY MEDICINE

## 2023-11-22 PROCEDURE — 99232 SBSQ HOSP IP/OBS MODERATE 35: CPT | Performed by: FAMILY MEDICINE

## 2023-11-22 PROCEDURE — 97530 THERAPEUTIC ACTIVITIES: CPT

## 2023-11-22 PROCEDURE — 97535 SELF CARE MNGMENT TRAINING: CPT

## 2023-11-22 PROCEDURE — 99232 SBSQ HOSP IP/OBS MODERATE 35: CPT | Performed by: INTERNAL MEDICINE

## 2023-11-22 PROCEDURE — 92526 ORAL FUNCTION THERAPY: CPT

## 2023-11-22 PROCEDURE — 97110 THERAPEUTIC EXERCISES: CPT

## 2023-11-22 PROCEDURE — 85025 COMPLETE CBC W/AUTO DIFF WBC: CPT | Performed by: FAMILY MEDICINE

## 2023-11-22 RX ADMIN — NYSTATIN: 100000 POWDER TOPICAL at 17:42

## 2023-11-22 RX ADMIN — LAMOTRIGINE 200 MG: 100 TABLET ORAL at 06:02

## 2023-11-22 RX ADMIN — LACTULOSE 20 G: 20 SOLUTION ORAL at 17:39

## 2023-11-22 RX ADMIN — ZONISAMIDE 100 MG: 100 CAPSULE ORAL at 22:27

## 2023-11-22 RX ADMIN — LEVOCARNITINE 330 MG: 1 SOLUTION ORAL at 09:02

## 2023-11-22 RX ADMIN — AMPICILLIN SODIUM AND SULBACTAM SODIUM 3 G: 2; 1 INJECTION, POWDER, FOR SOLUTION INTRAMUSCULAR; INTRAVENOUS at 09:16

## 2023-11-22 RX ADMIN — LEVOCARNITINE 330 MG: 1 SOLUTION ORAL at 17:39

## 2023-11-22 RX ADMIN — PRIMIDONE 100 MG: 50 TABLET ORAL at 09:09

## 2023-11-22 RX ADMIN — AMPICILLIN SODIUM AND SULBACTAM SODIUM 3 G: 2; 1 INJECTION, POWDER, FOR SOLUTION INTRAMUSCULAR; INTRAVENOUS at 15:57

## 2023-11-22 RX ADMIN — Medication 400 UNITS: at 09:00

## 2023-11-22 RX ADMIN — FUROSEMIDE 40 MG: 40 TABLET ORAL at 08:59

## 2023-11-22 RX ADMIN — LAMOTRIGINE 250 MG: 100 TABLET ORAL at 15:56

## 2023-11-22 RX ADMIN — METOPROLOL TARTRATE 25 MG: 25 TABLET, FILM COATED ORAL at 21:18

## 2023-11-22 RX ADMIN — LEVOCARNITINE 330 MG: 1 SOLUTION ORAL at 21:18

## 2023-11-22 RX ADMIN — LAMOTRIGINE 300 MG: 100 TABLET ORAL at 22:27

## 2023-11-22 RX ADMIN — PRIMIDONE 100 MG: 50 TABLET ORAL at 15:55

## 2023-11-22 RX ADMIN — OXYCODONE HYDROCHLORIDE AND ACETAMINOPHEN 500 MG: 500 TABLET ORAL at 09:00

## 2023-11-22 RX ADMIN — Medication 1 TABLET: at 09:09

## 2023-11-22 RX ADMIN — DOCUSATE SODIUM AND SENNOSIDES 1 TABLET: 8.6; 5 TABLET, FILM COATED ORAL at 09:00

## 2023-11-22 RX ADMIN — AMPICILLIN SODIUM AND SULBACTAM SODIUM 3 G: 2; 1 INJECTION, POWDER, FOR SOLUTION INTRAMUSCULAR; INTRAVENOUS at 21:18

## 2023-11-22 RX ADMIN — AMPICILLIN SODIUM AND SULBACTAM SODIUM 3 G: 2; 1 INJECTION, POWDER, FOR SOLUTION INTRAMUSCULAR; INTRAVENOUS at 03:06

## 2023-11-22 RX ADMIN — NYSTATIN: 100000 POWDER TOPICAL at 09:08

## 2023-11-22 RX ADMIN — PRIMIDONE 150 MG: 50 TABLET ORAL at 22:27

## 2023-11-22 RX ADMIN — METOPROLOL TARTRATE 25 MG: 25 TABLET, FILM COATED ORAL at 09:00

## 2023-11-22 RX ADMIN — LEVOCARNITINE 330 MG: 1 SOLUTION ORAL at 14:10

## 2023-11-22 NOTE — PLAN OF CARE
Problem: OCCUPATIONAL THERAPY ADULT  Goal: Performs self-care activities at highest level of function for planned discharge setting. See evaluation for individualized goals. Description: Treatment Interventions: ADL retraining, Functional transfer training, UE strengthening/ROM, Endurance training, Cognitive reorientation, Patient/family training, Equipment evaluation/education, Compensatory technique education, Continued evaluation, Energy conservation, Activityengagement          See flowsheet documentation for full assessment, interventions and recommendations. Outcome: Progressing  Note: Limitation: Decreased ADL status, Decreased UE ROM, Decreased UE strength, Decreased Safe judgement during ADL, Decreased cognition, Decreased endurance, Decreased self-care trans, Decreased high-level ADLs     Assessment: Pt completed OT tx session #1 focused on ADL performance and functional mobility. Pt alert and agreeable to participate. PT/OT co-treat completed d/t significant mobility deficits and safety concerns. Pt demonstrated improvements in UB ADL performance, standing tolerance/balance and participation this session but continues to be limited by baseline decreased cognition. Pt currently completing UB ADLs @ Min-Mod A, LB ADLs @ Total A, and functional mobility with RW @ Mod A x2. Pt demonstrating Good participation and Good potential to achieve goals but is currently demonstrating deficits in decrease activity tolerance, decrease standing balance, decrease sitting balance, decrease performance during ADL tasks, decrease cognition, decrease safety awareness , decrease UB MS, decrease generalized strength, decrease activity engagement, and decrease performance during functional transfers. Continue to recommend Level I: Maximum Resource Intensity Therapy upon discharge to increase safety and independence in ADL tasks and functional mobility.      Rehab Resource Intensity Level, OT: I (Maximum Resource Intensity)

## 2023-11-22 NOTE — PLAN OF CARE
Problem: PAIN - ADULT  Goal: Verbalizes/displays adequate comfort level or baseline comfort level  Description: Interventions:  - Encourage patient to monitor pain and request assistance  - Assess pain using appropriate pain scale  - Administer analgesics based on type and severity of pain and evaluate response  - Implement non-pharmacological measures as appropriate and evaluate response  - Consider cultural and social influences on pain and pain management  - Notify physician/advanced practitioner if interventions unsuccessful or patient reports new pain  Outcome: Progressing     Problem: INFECTION - ADULT  Goal: Absence or prevention of progression during hospitalization  Description: INTERVENTIONS:  - Assess and monitor for signs and symptoms of infection  - Monitor lab/diagnostic results  - Monitor all insertion sites, i.e. indwelling lines, tubes, and drains  - Monitor endotracheal if appropriate and nasal secretions for changes in amount and color  - [place] appropriate cooling/warming therapies per order  - Administer medications as ordered  - Instruct and encourage patient and family to use good hand hygiene technique  - Identify and instruct in appropriate isolation precautions for identified infection/condition  Outcome: Progressing  Goal: Absence of fever/infection during neutropenic period  Description: INTERVENTIONS:  - Monitor WBC    Outcome: Progressing     Problem: SAFETY ADULT  Goal: Patient will remain free of falls  Description: INTERVENTIONS:  - Educate patient/family on patient safety including physical limitations  - Instruct patient to call for assistance with activity   - Consult OT/PT to assist with strengthening/mobility   - Keep Call bell within reach  - Keep bed low and locked with side rails adjusted as appropriate  - Keep care items and personal belongings within reach  - Initiate and maintain comfort rounds  - Make Fall Risk Sign visible to staff  - Offer Toileting every 2 Hours, in advance of need  - Initiate/Maintain bed/chair alarm  - Obtain necessary fall risk management equipment:   - Apply yellow socks and bracelet for high fall risk patients  - Consider moving patient to room near nurses station  Outcome: Progressing  Goal: Maintain or return to baseline ADL function  Description: INTERVENTIONS:  -  Assess patient's ability to carry out ADLs; assess patient's baseline for ADL function and identify physical deficits which impact ability to perform ADLs (bathing, care of mouth/teeth, toileting, grooming, dressing, etc.)  - Assess/evaluate cause of self-care deficits   - Assess range of motion  - Assess patient's mobility; develop plan if impaired  - Assess patient's need for assistive devices and provide as appropriate  - Encourage maximum independence but intervene and supervise when necessary  - Involve family in performance of ADLs  - Assess for home care needs following discharge   - Consider OT consult to assist with ADL evaluation and planning for discharge  - Provide patient education as appropriate  Outcome: Progressing  Goal: Maintains/Returns to pre admission functional level  Description: INTERVENTIONS:  - Perform AM-PAC 6 Click Basic Mobility/ Daily Activity assessment daily.  - Set and communicate daily mobility goal to care team and patient/family/caregiver. - Collaborate with rehabilitation services on mobility goals if consulted  - Perform Range of Motion 3 times a day. - Reposition patient every 2 hours.   - Dangle patient 3 times a day  - Stand patient 3 times a day  - Ambulate patient 3 times a day  - Out of bed to chair 3 times a day   - Out of bed for meals 3 times a day  - Out of bed for toileting  - Record patient progress and toleration of activity level   Outcome: Progressing     Problem: DISCHARGE PLANNING  Goal: Discharge to home or other facility with appropriate resources  Description: INTERVENTIONS:  - Identify barriers to discharge w/patient and caregiver  - Arrange for needed discharge resources and transportation as appropriate  - Identify discharge learning needs (meds, wound care, etc.)  - Arrange for interpretive services to assist at discharge as needed  - Refer to Case Management Department for coordinating discharge planning if the patient needs post-hospital services based on physician/advanced practitioner order or complex needs related to functional status, cognitive ability, or social support system  Outcome: Progressing     Problem: Knowledge Deficit  Goal: Patient/family/caregiver demonstrates understanding of disease process, treatment plan, medications, and discharge instructions  Description: Complete learning assessment and assess knowledge base.   Interventions:  - Provide teaching at level of understanding  - Provide teaching via preferred learning methods  Outcome: Progressing     Problem: Prexisting or High Potential for Compromised Skin Integrity  Goal: Skin integrity is maintained or improved  Description: INTERVENTIONS:  - Identify patients at risk for skin breakdown  - Assess and monitor skin integrity  - Assess and monitor nutrition and hydration status  - Monitor labs   - Assess for incontinence   - Turn and reposition patient  - Assist with mobility/ambulation  - Relieve pressure over bony prominences  - Avoid friction and shearing  - Provide appropriate hygiene as needed including keeping skin clean and dry  - Evaluate need for skin moisturizer/barrier cream  - Collaborate with interdisciplinary team   - Patient/family teaching  - Consider wound care consult   Outcome: Progressing

## 2023-11-22 NOTE — SPEECH THERAPY NOTE
Speech Language/Pathology    Speech/Language Pathology Progress Note    Patient Name: Tatianna Sarmiento  IVHQH'K Date: 11/22/2023    Assessment:  Pt seen w/ breakfast meal for f/u dysphagia therapy. Pt upright in bed. Continues to demonstrate difficulty self feeding which pt's mother reported is baseline. Currently on mechanical soft and thin liquids. Pt's mother reported he was on a level 3 diet at home (she would chop meats) but pt had been persistently masticating items for a long time lately. Pt observed tolerating level 2 [facility] soft/ thin liquids.  No overt s/s aspiration    Plan/Recommendations:  Recommend to continue level 2 [facility] soft/ thin liquids  Meds as tolerated  SLP will s/o as pt is on baseline/least restrictive diet    Lucille Gallagher MS CCC-SLP  11/22/2023

## 2023-11-22 NOTE — CASE MANAGEMENT
Case Management Discharge Planning Note    Patient name Sandip Hollins  Location [address] [facility] [place] [id number] MRN [id number]  : 1965 Date 2023       Current Admission Date: 2023  Current Admission Diagnosis:Pleural effusion on right   Patient Active Problem List    Diagnosis Date Noted    Acute blood loss anemia 2023    Epistaxis 2023    No other household member able to render care 2023    Chronic respiratory failure with hypercapnia ([address]) 2023    Stage 3 chronic kidney disease ([address]) 2023    Liver cirrhosis ([address]) 2021    Abnormal finding on CT scan 2021    EDWIGE (acute kidney injury) ([address]) 2021    Pleural effusion on right 2021    History of COVID-19 2021    S/P pericardial window creation 2021    Acute on chronic heart failure with preserved ejection fraction ([address]) 2021    Lower extremity pain, left 2021    MRSA nasal colonization 2020    Developmental delay 2020    Dysphagia 2020    Acute encephalopathy 2020    Pneumonia due to infectious organism 01/10/2020    Nonintractable generalized idiopathic epilepsy without status epilepticus ([address]) 01/10/2020    Falls 01/10/2020    T wave inversion in EKG 01/10/2020    Essential hypertension 01/10/2020    RSV (acute bronchiolitis due to respiratory syncytial virus) 01/10/2020    Acute respiratory failure with hypoxia ([address]) 01/10/2020    Polyp of colon 03/15/2019      LOS (days): 5  Geometric Mean LOS (GMLOS) (days): 3.90  Days to GMLOS:-0.9     OBJECTIVE:  Risk of Unplanned Readmission Score: 15.54         Current admission status: Inpatient   Preferred Pharmacy:   [address] [place] [address] Road  [address] [address] [facility]  Phone: [phone] Fax: [phone]    Primary Care Provider: Neel Castillo DO    Primary Insurance: MEDICARE  Secondary Insurance: [employer]    DISCHARGE DETAILS:    Unable to local any letter on level of care for skilled facility. Pt does have IDD and has a DHARMESH CM. Decision made to requested a LOC assessment for Tahmina Solis, as pt can be an exceptional admission for a SNF, unsure if he will be able to return home in 30 days as his Mother is currently receiving rehab herself. Mrs Kathrin Guardado is aware we will start the process for a Level 2 Assessment. All clinical information/ passr/ MA 51 was forward to the OSS. Will need assessment done prior to dc.

## 2023-11-22 NOTE — OCCUPATIONAL THERAPY NOTE
Occupational Therapy Progress Note     Patient Name: Jovon Duarte  [id number] Date: 11/22/2023  Problem List  Principal Problem:    Pleural effusion on right  Active Problems:    Nonintractable generalized idiopathic epilepsy without status epilepticus ([address])    Falls    Developmental delay    Dysphagia    Acute on chronic heart failure with preserved ejection fraction (HCC)    Liver cirrhosis ([address])    No other household member able to render care    Chronic respiratory failure with hypercapnia ([address])    Stage 3 chronic kidney disease ([address])    Acute blood loss anemia    Epistaxis     11/22/23 1011   Note Type   Note Type Treatment   Pain Assessment   Pain Assessment Tool 0-10   Pain Score No Pain   Restrictions/Precautions   Other Precautions Cognitive; Chair Alarm; Bed Alarm; Fall Risk;Multiple lines   ADL   Where Assessed Chair   UB Bathing Assistance 4  Minimal Assistance   UB Bathing Deficit Setup;Verbal cueing; Increased time to complete   UB Bathing Comments Cues for thoroughness and task initiation/sequencing   LB Bathing Assistance 1  Total Assistance   LB Bathing Deficit Setup;Verbal cueing; Increased time to complete;Left lower leg including foot;Right lower leg including foot; Left upper leg;Buttocks;Right upper leg;Perineal area   UB Dressing Assistance 3  Moderate Assistance   UB Dressing Deficit Setup;Verbal cueing; Increased time to complete;Pull over head;Pull around back;Pull down in back   LB Dressing Assistance 1  Total Assistance   LB Dressing Deficit Setup; Requires assistive device for steadying;Verbal cueing;Supervision/safety; Increased time to complete; Don/doff L sock; Don/doff R sock; Thread LLE into underwear; Thread RLE into underwear;Pull up over hips   Bed Mobility   Supine to Sit 3  Moderate assistance   Additional items Assist x 2; Increased time required;Verbal cues;LE management   Additional Comments Pt supine in bed at beginning of session. Supine to sit @ Mod A x2.    Transfers   Sit to Stand 3 Moderate assistance   Additional items Assist x 1; Increased time required;Verbal cues   Stand to Sit 3  Moderate assistance   Additional items Assist x 1; Increased time required;Verbal cues   Stand pivot 3  Moderate assistance   Additional items Assist x 2; Increased time required;Verbal cues   Additional Comments STS from EOB/chair to RW @ Mod A. STS from chair to bed rail @ Min A. Pt able to maintain standing at bed rail for approximately 2 minutes during LB ADLs with Min-Max A d/t fatigue with cues to maintain upright standing. Pt able to maintain standing at RW for 1 minute during LB ADLs with Mod A with cues to maintain upright standing. Pt seated OOB in chair at end of session with chair alarm intact, call bell within reach and all needs met. Subjective   Subjective "My favorite movies are Gene IV and Creed II"  (Pt with increased participation during [employer] throughout session)   Cognition   Overall Cognitive Status Impaired   Arousal/Participation Alert; Responsive; Cooperative   Attention Attends with cues to redirect   Orientation Level Oriented X4   Memory Unable to assess   Following Commands Follows one step commands with increased time or repetition   Activity Tolerance   Activity Tolerance Patient limited by fatigue   Medical Staff Made Aware Spoke with PT Romero   Assessment   Assessment Pt completed OT tx session #1 focused on ADL performance and functional mobility. Pt alert and agreeable to participate. PT/OT co-treat completed d/t significant mobility deficits and safety concerns. Pt demonstrated improvements in UB ADL performance, standing tolerance/balance and participation this session but continues to be limited by baseline decreased cognition. Pt currently completing UB ADLs @ Min-Mod A, LB ADLs @ Total A, and functional mobility with RW @ Mod A x2.  Pt demonstrating Good participation and Good potential to achieve goals but is currently demonstrating deficits in decrease activity tolerance, decrease standing balance, decrease sitting balance, decrease performance during ADL tasks, decrease cognition, decrease safety awareness , decrease UB MS, decrease generalized strength, decrease activity engagement, and decrease performance during functional transfers. Continue to recommend Level I: Maximum Resource Intensity Therapy upon discharge to increase safety and independence in ADL tasks and functional mobility. Plan   Treatment Interventions ADL retraining;Functional transfer training   Goal Expiration Date 12/04/23   OT Treatment Day 1   OT Frequency 1-2x/wk   Discharge Recommendation   Rehab Resource Intensity Level, OT I (Maximum Resource Intensity)   AM-PAC Daily Activity Inpatient   Lower Body Dressing 1   Bathing 2   Toileting 1   Upper Body Dressing 2   Grooming 3   Eating 3   Daily Activity Raw Score 12   Daily Activity Standardized Score (Calc for Raw Score >=11) 30.6   Turning Head Towards Sound 3   Follow Simple Instructions 2   Low Function Daily Activity Raw Score 17   Low Function Daily Activity Standardized Score  28.95   AM-PAC Applied Cognition Inpatient   Following a Speech/Presentation 1   Understanding Ordinary Conversation 2   Taking Medications 1   Remembering Where Things Are Placed or Put Away 1   Remembering List of 4-5 Errands 1   Taking Care of Complicated Tasks 1   Applied Cognition Raw Score 7   Applied Cognition Standardized Score 15.17     The patient's raw score on the AM-PAC Daily Activity Inpatient Short Form is 12. A raw score of less than 19 suggests the patient may benefit from discharge to post-acute rehabilitation services. Please refer to the recommendation of the Occupational Therapist for safe discharge planning. Pt goals to be met by 12/4/2023:     Pt will demonstrate ability to complete grooming/hygiene tasks @ mod assist after set-up.   Pt will demonstrate ability to complete supine<>sit @ mod assist in order to increase safety and independence during ADL tasks. Pt will demonstrate ability to complete UB ADLs including washing/dressing @ mod assist in order to increase performance and participation during meaningful tasks  Pt will demonstrate ability to complete LB dressing @ max assist in order to increase safety and independence during meaningful tasks. Pt will demonstrate ability to suzy/doff socks/shoes while sitting EOB/chair @ max assist in order to increase safety and independence during meaningful tasks. Pt will demonstrate ability to complete toileting tasks including CM and pericare @ max assist in order to increase safety and independence during meaningful tasks. Pt will demonstrate ability to complete EOB, chair, toilet/commode transfers @ mod assist in order to increase performance and participation during functional tasks. Pt will demonstrate ability to stand for 2 minutes while maintaining F+ balance with use of RW for UB support PRN. Pt will demonstrate ability to tolerate 10 minute OT session with no vc'ing for deep breathing or use of energy conservation techniques in order to increase activity tolerance during functional tasks. Pt will demonstrate Good carryover of use of energy conservation/compensatory strategies during ADLs and functional tasks in order to increase safety and reduce risk for falls. Pt will follow 100% simple 2-step commands and be at least A&O x3 consistently with environmental cues to increase participation in functional activities. Pt will identify 3 areas of interest/hobbies and 1 intervention on how to incorporate into daily life in order to increase interaction with environment and peers as well as increase participation in meaningful tasks. Pt will demonstrate 100% carryover of BUE HEP in order to increase BUE MS and increase performance during functional tasks upon d/c home.     Dennis Kowalski, OTR/L

## 2023-11-22 NOTE — PHYSICAL THERAPY NOTE
PHYSICAL THERAPY TREATMENT NOTE    NAME:  Niya Fitting  DATE: 11/22/23    Length Of Stay: 5  Performed at least 2 patient identifiers during session: Name and Birthday    TREATMENT FLOW SHEET:       11/22/23 1010   PT Last Visit   PT Visit Date 11/22/23   Note Type   Note Type Treatment   Pain Assessment   Pain Assessment Tool 0-10   Pain Score No Pain   Restrictions/Precautions   Weight Bearing Precautions Per Order No   Other Precautions Chair Alarm;Cognitive; Bed Alarm; Fall Risk;Multiple lines   General   Chart Reviewed Yes   Family/Caregiver Present No   Cognition   Overall Cognitive Status Impaired   Arousal/Participation Alert   Attention Attends with cues to redirect   Orientation Level Oriented X4   Following Commands Follows one step commands with increased time or repetition   Bed Mobility   Supine to Sit 3  Moderate assistance   Additional items Assist x 2   Transfers   Sit to Stand 3  Moderate assistance   Additional items Assist x 1   Stand to Sit 3  Moderate assistance   Additional items Assist x 1   Stand pivot 3  Moderate assistance   Additional items Assist x 2; Increased time required;Verbal cues   Additional Comments Sit to stand from EOB up to RW with mod A.  SPT completed with RW with A x 2 and cues. Sit to stands completed up to anterior rail with min A x 1 however with static stance pt eventually requiring max A x 1 due to posterior lean and slight B knee instability. PT providing cues for upright posture and knee/hip EXT in stance (tolerance roughly 2 minutes). Another sit to stand up to RW from recliner chair with mod A x 1.    Ambulation/Elevation   Additional items   (non-ambulatory at baseline)   Balance   Static Sitting Fair   Dynamic Sitting Fair -   Static Standing Poor -   Dynamic Standing Poor -   Endurance Deficit   Endurance Deficit Yes   Activity Tolerance   Activity Tolerance Patient limited by fatigue   Medical Staff Made Aware OT Sherita   Exercises   Hip Flexion Sitting;10 reps;AROM; Bilateral   Hip Abduction Sitting;10 reps;AROM; Bilateral   Knee PROM Flexion Supine;10 reps;AAROM; Bilateral   Knee AROM Long Arc Quad Sitting;10 reps;AROM; Bilateral   Ankle Pumps Sitting;20 reps;AROM; Bilateral   Assessment   Prognosis Fair   Assessment Pt tolerated tx session fairly. Interventions consisting of LE ther-ex, bed mobility, and transfer training at [facility] and . Pt limited by body habitus, cognitive deficits, B LE edema, & LE strength deficits. Barriers to Discharge Decreased caregiver support   Barriers to Discharge Comments Caregiver currently in rehab facility   Goals   STG Expiration Date 12/04/23   PT Treatment Day 1   Plan   Progress Slow progress, decreased activity tolerance   PT Frequency 3-5x/wk   Discharge Recommendation   Rehab Resource Intensity Level, PT I (Maximum Resource Intensity)   AM-PAC Basic Mobility Inpatient   Turning in Flat Bed Without Bedrails 2   Lying on Back to Sitting on Edge of Flat Bed Without Bedrails 2   Moving Bed to Chair 1   Standing Up From Chair Using Arms 1   Walk in Room 1   Climb 3-5 Stairs With Railing 1   Basic Mobility Inpatient Raw Score 8   Turning Head Towards Sound 4   Follow Simple Instructions 3   Low Function Basic Mobility Raw Score  15   Low Function Basic Mobility Standardized Score  23.9   Highest Level Of Mobility   -HLM Goal 3: Sit at edge of bed   JH-HLM Achieved 5: Stand (1 or more minutes)   Education   Education Provided Mobility training   Patient Demonstrates verbal understanding   End of Consult   Patient Position at End of Consult Bedside chair;Bed/Chair alarm activated; All needs within reach       The patient's AM-PAC Basic Mobility Inpatient Short Form Raw Score is 8. A Raw score of less than or equal to 16 suggests the patient may benefit from discharge to post-acute rehabilitation services. Please also refer to the recommendation of the Physical Therapist for safe discharge planning.        Jaswant Bush PT,DPT

## 2023-11-22 NOTE — PLAN OF CARE
Problem: PHYSICAL THERAPY ADULT  Goal: Performs mobility at highest level of function for planned discharge setting. See evaluation for individualized goals. Description: Treatment/Interventions: Functional transfer training, LE strengthening/ROM, Therapeutic exercise, Endurance training, Patient/family training, Equipment eval/education, Bed mobility, Gait training, Compensatory technique education, Spoke to nursing, OT          See flowsheet documentation for full assessment, interventions and recommendations. Outcome: Progressing  Note: Prognosis: Fair  Problem List: Decreased strength, Impaired balance, Decreased endurance, Decreased mobility, Decreased cognition, Impaired judgement, Decreased safety awareness, Obesity  Assessment: Pt tolerated tx session fairly. Interventions consisting of LE ther-ex, bed mobility, and transfer training at anterior rail and . Pt limited by body habitus, cognitive deficits, B LE edema, & LE strength deficits. Barriers to Discharge: Decreased caregiver support  Barriers to Discharge Comments: Caregiver currently in rehab facility  Rehab Resource Intensity Level, PT: I (Maximum Resource Intensity)    See flowsheet documentation for full assessment.

## 2023-11-22 NOTE — PROGRESS NOTES
NEPHROLOGY PROGRESS NOTE   Can Matthews 66 y.o. male MRN: [id number]  Unit/Bed#: -01 Encounter: [id number]      ASSESSMENT & PLAN:  #1 acute kidney injury on top of CKD stage III baseline reported 1.3-1.4. Creatinine peaked 1.88 on 11/19, diuretics were held. Kidney function improved down to 1.59 yesterday, home furosemide was resumed on 11/21. Creatinine is stable at 1.62. Continue current regimen, keep holding spironolactone, follow daily labs  Avoid relative hypotension and follow daily labs    2 right pleural effusion, status post right-sided thoracentesis with 860 cc of fluid removal on 11/20  Pulmonology on board    #3 chronic HFpEF, on diuretics as an outpatient, previously on hold in the setting of EDWIGE. Renal functions improving, furosemide home dose was resumed on 11/21, keep holding spironolactone, follow daily labs, follow daily weight  Follow low-salt diet    #4 chronic hypercapnic respiratory failure chronic hypoventilation/morbid obesity, pulmonology on board    5 developmental delay with reported MR    6 mild anemia, hemoglobin low but is stable, monitor H&H last hemoglobin 10.6 this a.m. Plan and recommendation was discussed with internal medicine attending Dr. Tee Congress, home diuretics furosemide was resumed on 11/21, kidney functions remains stable, continue current regimen, spironolactone can be resumed at discharge, he agrees with the plan      SUBJECTIVE:  Patient seen and examined, sitting in recliner eating breakfast, denies significant complaints.   ROS limited given intellectual disability    OBJECTIVE:  Current Weight: Weight - Scale: 110 kg (243 lb 9.7 oz)  Vitals:    11/22/23 0730   BP: 116/66   Pulse: 76   Resp:    Temp: 97.5 °F (36.4 °C)   SpO2: 90%       Intake/Output Summary (Last 24 hours) at 11/22/2023 5716  Last data filed at 11/22/2023 0726  Gross per 24 hour   Intake 480 ml   Output 980 ml   Net -500 ml       General: Obese, conscious, cooperative, in not acute distress  Eyes: conjunctivae pink, anicteric sclerae  ENT: lips and mucous membranes moist  Neck: supple, no JVD  Chest: clear breath sounds bilateral, no crackles, ronchus or wheezings  CVS: distinct S1 & S2, normal rate, regular rhythm  Abdomen: Obese, non-tender, non-distended, normoactive bowel sounds  Extremities: 1+  edema of both legs  Skin: Chronic skin changes in lower extremities  Neuro: awake, alert, interactive        Medications:    Current Facility-Administered Medications:     acetaminophen (TYLENOL) tablet 650 mg, 650 mg, Oral, Q6H PRN, REMBERTO Joseph-JAMIE    ampicillin-sulbactam (UNASYN) 3 g in sodium chloride 0.9 % 100 mL IVPB, 3 g, Intravenous, Q6H, Yeimi Aguiar MD, Last Rate: 200 mL/hr at 11/22/23 0916, 3 g at 11/22/23 7710    ascorbic acid (VITAMIN C) tablet 500 mg, 500 mg, Oral, Daily, REMBERTO Joseph-C, 500 mg at 11/22/23 0900    calcium carbonate-vitamin D 500 mg-5 mcg tablet 1 tablet, 1 tablet, Oral, Daily With Breakfast, REMBERTO Joseph-C, 1 tablet at 11/22/23 0909    dextromethorphan-guaiFENesin (ROBITUSSIN DM) oral syrup 10 mL, 10 mL, Oral, Q4H PRN, REMBERTO Joseph-C    furosemide (LASIX) tablet 40 mg, 40 mg, Oral, Daily, Inocencio Romero MD, 40 mg at 11/22/23 0859    lactulose (CHRONULAC) oral solution 20 g, 20 g, Oral, After Dinner, REMBERTO Joseph-C, 20 g at 11/21/23 1712    lamoTRIgine (LaMICtal) tablet 200 mg, 200 mg, Oral, Early Morning, REMBERTO Joseph-C, 200 mg at 11/22/23 0602    lamoTRIgine (LaMICtal) tablet 250 mg, 250 mg, Oral, After Lunch, REMBERTO Joseph-C, 250 mg at 11/21/23 1436    lamoTRIgine (LaMICtal) tablet 300 mg, 300 mg, Oral, HS, REMBERTO Joseph-C, 300 mg at 11/21/23 2156    levOCARNitine (CARNITOR) oral solution 330 mg, 330 mg, Oral, 4x Daily (with meals and at bedtime), Dana Asif PA-C, 330 mg at 11/22/23 0902    LORazepam (ATIVAN) injection 2 mg, 2 mg, Intravenous, Q6H PRN, Dana Asif PA-C    metoprolol tartrate (LOPRESSOR) tablet 25 mg, 25 mg, Oral, BID, Dana Asif PA-C, 25 mg at 11/22/23 0900    nystatin (MYCOSTATIN) powder, , Topical, BID, Dana Demo, PA-C, Given at 11/22/23 0908    ondansetron (ZOFRAN) injection 4 mg, 4 mg, Intravenous, Q6H PRN, Danatuan Stringero, PA-C    polyethylene glycol (MIRALAX) packet 17 g, 17 g, Oral, Daily PRN, Dana Mykeo, PA-C    primidone (MYSOLINE) tablet 100 mg, 100 mg, Oral, After Breakfast, Dana Demo, PA-C, 100 mg at 11/22/23 7094    primidone (MYSOLINE) tablet 100 mg, 100 mg, Oral, Daily With Lunch, Dana Demo, PA-C, 100 mg at 11/21/23 1436    primidone (MYSOLINE) tablet 150 mg, 150 mg, Oral, HS, Dana Demo, PA-C, 150 mg at 11/21/23 2156    senna-docusate sodium (SENOKOT S) 8.6-50 mg per tablet 1 tablet, 1 tablet, Oral, BID, Dana Demo, PA-C, 1 tablet at 11/22/23 0900    sodium chloride (AYR SALINE NASAL) nasal gel 1 Application, 1 Application, Nasal, TID, Jayro Massey MD, 1 Application at 93/79/59 2115    sodium chloride (OCEAN) 0.65 % nasal spray 2 spray, 2 spray, Each Nare, TID, Jayro Massey MD, 2 spray at 11/21/23 2115    vitamin E (TOCOPHEROL) capsule 400 Units, 400 Units, Oral, Daily, Dana Demo, PA-C, 400 Units at 11/22/23 0900    zonisamide (ZONEGRAN) capsule 100 mg, 100 mg, Oral, HS, Dana Demo, PA-C, 100 mg at 11/21/23 2155    Invasive Devices:        Lab Results:   Results from last 7 days   Lab Units 11/22/23  0558 11/21/23  0435 11/20/23  0512 11/19/23  0513 11/17/23  1800   WBC Thousand/uL 6.79 7.32 5.14 5.49 6.55   HEMOGLOBIN g/dL 10.6* 10.9* 9.8* 10.2* 11.9*   HEMATOCRIT % 33.4* 34.9* 31.6* 32.6* 38.1   PLATELETS Thousands/uL 104* 114* 109* 113* 143*   SODIUM mmol/L 142 141 141 141 140   POTASSIUM mmol/L 4.0 4.3 4.3 4.3 4.1   CHLORIDE mmol/L 104 103 102 102 100   CO2 mmol/L 32 31 33* 33* 33*   BUN mg/dL 32* 32* 34* 34* 30*   CREATININE mg/dL 1.62* 1.59* 1.84* 1.88* 1.59*   CALCIUM mg/dL 9.2 9.5 9.2 9.2 9.9   MAGNESIUM mg/dL  --   --   --  1.8* 1.9   ALK PHOS U/L 167*  --   --   --  195*   ALT U/L 14  --   --   --  20   AST U/L 21  --   --   --  27 Previous work up:  See previous notes      Portions of the record may have been created with voice recognition software. Occasional wrong word or "sound a like" substitutions may have occurred due to the inherent limitations of voice recognition software. Read the chart carefully and recognize, using context, where substitutions have occurred. If you have any questions, please contact the dictating provider.

## 2023-11-23 LAB
ALBUMIN SERPL BCP-MCNC: 3.3 G/DL (ref 3.5–5)
ALP SERPL-CCNC: 165 U/L (ref 34–104)
ALT SERPL W P-5'-P-CCNC: 15 U/L (ref 7–52)
ANION GAP SERPL CALCULATED.3IONS-SCNC: 7 MMOL/L
AST SERPL W P-5'-P-CCNC: 22 U/L (ref 13–39)
BACTERIA SPEC BFLD CULT: NO GROWTH
BASOPHILS # BLD MANUAL: 0 THOUSAND/UL (ref 0–0.1)
BASOPHILS NFR MAR MANUAL: 0 % (ref 0–1)
BILIRUB SERPL-MCNC: 1.08 MG/DL (ref 0.2–1)
BUN SERPL-MCNC: 30 MG/DL (ref 5–25)
CALCIUM ALBUM COR SERPL-MCNC: 9.9 MG/DL (ref 8.3–10.1)
CALCIUM SERPL-MCNC: 9.3 MG/DL (ref 8.4–10.2)
CHLORIDE SERPL-SCNC: 102 MMOL/L (ref 96–108)
CO2 SERPL-SCNC: 32 MMOL/L (ref 21–32)
CREAT SERPL-MCNC: 1.58 MG/DL (ref 0.6–1.3)
EOSINOPHIL # BLD MANUAL: 0 THOUSAND/UL (ref 0–0.4)
EOSINOPHIL NFR BLD MANUAL: 0 % (ref 0–6)
ERYTHROCYTE [DISTWIDTH] IN BLOOD BY AUTOMATED COUNT: 16.3 % (ref 11.6–15.1)
GFR SERPL CREATININE-BSD FRML MDRD: 48 ML/MIN/1.73SQ M
GLUCOSE SERPL-MCNC: 91 MG/DL (ref 65–140)
GRAM STN SPEC: NORMAL
GRAM STN SPEC: NORMAL
HCT VFR BLD AUTO: 32.9 % (ref 36.5–49.3)
HGB BLD-MCNC: 10.3 G/DL (ref 12–17)
LYMPHOCYTES # BLD AUTO: 0.31 THOUSAND/UL (ref 0.6–4.47)
LYMPHOCYTES # BLD AUTO: 4 % (ref 14–44)
MACROCYTES BLD QL AUTO: PRESENT
MCH RBC QN AUTO: 32.2 PG (ref 26.8–34.3)
MCHC RBC AUTO-ENTMCNC: 31.3 G/DL (ref 31.4–37.4)
MCV RBC AUTO: 103 FL (ref 82–98)
MONOCYTES # BLD AUTO: 0.55 THOUSAND/UL (ref 0–1.22)
MONOCYTES NFR BLD: 7 % (ref 4–12)
NEUTROPHILS # BLD MANUAL: 6.93 THOUSAND/UL (ref 1.85–7.62)
NEUTS SEG NFR BLD AUTO: 89 % (ref 43–75)
NRBC BLD AUTO-RTO: 2 /100 WBC (ref 0–2)
PLATELET # BLD AUTO: 106 THOUSANDS/UL (ref 149–390)
PLATELET BLD QL SMEAR: ABNORMAL
PMV BLD AUTO: 11.1 FL (ref 8.9–12.7)
POTASSIUM SERPL-SCNC: 4 MMOL/L (ref 3.5–5.3)
PROT SERPL-MCNC: 8.6 G/DL (ref 6.4–8.4)
RBC # BLD AUTO: 3.2 MILLION/UL (ref 3.88–5.62)
RBC MORPH BLD: PRESENT
SODIUM SERPL-SCNC: 141 MMOL/L (ref 135–147)
WBC # BLD AUTO: 7.79 THOUSAND/UL (ref 4.31–10.16)

## 2023-11-23 PROCEDURE — 99232 SBSQ HOSP IP/OBS MODERATE 35: CPT | Performed by: INTERNAL MEDICINE

## 2023-11-23 PROCEDURE — 85007 BL SMEAR W/DIFF WBC COUNT: CPT | Performed by: FAMILY MEDICINE

## 2023-11-23 PROCEDURE — 85027 COMPLETE CBC AUTOMATED: CPT | Performed by: FAMILY MEDICINE

## 2023-11-23 PROCEDURE — 99233 SBSQ HOSP IP/OBS HIGH 50: CPT | Performed by: FAMILY MEDICINE

## 2023-11-23 PROCEDURE — 80053 COMPREHEN METABOLIC PANEL: CPT | Performed by: FAMILY MEDICINE

## 2023-11-23 RX ORDER — ENOXAPARIN SODIUM 100 MG/ML
40 INJECTION SUBCUTANEOUS
Status: DISCONTINUED | OUTPATIENT
Start: 2023-11-23 | End: 2023-12-08 | Stop reason: HOSPADM

## 2023-11-23 RX ORDER — FUROSEMIDE 10 MG/ML
20 INJECTION INTRAMUSCULAR; INTRAVENOUS ONCE
Status: COMPLETED | OUTPATIENT
Start: 2023-11-23 | End: 2023-11-23

## 2023-11-23 RX ADMIN — Medication 400 UNITS: at 08:02

## 2023-11-23 RX ADMIN — AMPICILLIN SODIUM AND SULBACTAM SODIUM 3 G: 2; 1 INJECTION, POWDER, FOR SOLUTION INTRAMUSCULAR; INTRAVENOUS at 03:46

## 2023-11-23 RX ADMIN — FUROSEMIDE 40 MG: 40 TABLET ORAL at 08:02

## 2023-11-23 RX ADMIN — LEVOCARNITINE 330 MG: 1 SOLUTION ORAL at 16:10

## 2023-11-23 RX ADMIN — Medication 1 TABLET: at 08:02

## 2023-11-23 RX ADMIN — LAMOTRIGINE 250 MG: 100 TABLET ORAL at 15:28

## 2023-11-23 RX ADMIN — NYSTATIN: 100000 POWDER TOPICAL at 17:25

## 2023-11-23 RX ADMIN — LACTULOSE 20 G: 20 SOLUTION ORAL at 17:25

## 2023-11-23 RX ADMIN — AMPICILLIN SODIUM AND SULBACTAM SODIUM 3 G: 2; 1 INJECTION, POWDER, FOR SOLUTION INTRAMUSCULAR; INTRAVENOUS at 15:30

## 2023-11-23 RX ADMIN — LAMOTRIGINE 300 MG: 100 TABLET ORAL at 22:06

## 2023-11-23 RX ADMIN — ZONISAMIDE 100 MG: 100 CAPSULE ORAL at 22:06

## 2023-11-23 RX ADMIN — ASPIRIN 81 MG: 81 TABLET, COATED ORAL at 15:28

## 2023-11-23 RX ADMIN — METOPROLOL TARTRATE 25 MG: 25 TABLET, FILM COATED ORAL at 22:07

## 2023-11-23 RX ADMIN — PRIMIDONE 100 MG: 50 TABLET ORAL at 08:05

## 2023-11-23 RX ADMIN — AMPICILLIN SODIUM AND SULBACTAM SODIUM 3 G: 2; 1 INJECTION, POWDER, FOR SOLUTION INTRAMUSCULAR; INTRAVENOUS at 09:02

## 2023-11-23 RX ADMIN — PRIMIDONE 150 MG: 50 TABLET ORAL at 22:05

## 2023-11-23 RX ADMIN — ENOXAPARIN SODIUM 40 MG: 40 INJECTION SUBCUTANEOUS at 15:28

## 2023-11-23 RX ADMIN — FUROSEMIDE 20 MG: 10 INJECTION, SOLUTION INTRAMUSCULAR; INTRAVENOUS at 16:10

## 2023-11-23 RX ADMIN — AMPICILLIN SODIUM AND SULBACTAM SODIUM 3 G: 2; 1 INJECTION, POWDER, FOR SOLUTION INTRAMUSCULAR; INTRAVENOUS at 22:02

## 2023-11-23 RX ADMIN — LEVOCARNITINE 330 MG: 1 SOLUTION ORAL at 12:34

## 2023-11-23 RX ADMIN — DOCUSATE SODIUM AND SENNOSIDES 1 TABLET: 8.6; 5 TABLET, FILM COATED ORAL at 17:25

## 2023-11-23 RX ADMIN — NYSTATIN: 100000 POWDER TOPICAL at 08:05

## 2023-11-23 RX ADMIN — PRIMIDONE 100 MG: 50 TABLET ORAL at 15:28

## 2023-11-23 RX ADMIN — LEVOCARNITINE 330 MG: 1 SOLUTION ORAL at 08:02

## 2023-11-23 RX ADMIN — OXYCODONE HYDROCHLORIDE AND ACETAMINOPHEN 500 MG: 500 TABLET ORAL at 08:01

## 2023-11-23 RX ADMIN — DOCUSATE SODIUM AND SENNOSIDES 1 TABLET: 8.6; 5 TABLET, FILM COATED ORAL at 08:02

## 2023-11-23 RX ADMIN — LAMOTRIGINE 200 MG: 100 TABLET ORAL at 06:01

## 2023-11-23 NOTE — PROGRESS NOTES
[address] [facility]  Progress Note  Name: Eddye Cushing  MRN: [id number]  Unit/Bed#: -86 I Date of Admission: 11/17/2023   Date of Service: 11/23/2023 I Hospital Day: 6    Assessment/Plan   * Pleural effusion on right  Assessment & Plan    This did require a chest tube about 20 years ago according to the mother's typed medical history list  Discussed with the mother patient has been having worsening shortness of breath and for the past week and has been falling asleep a lot he usually wears oxygen 2 L at night but the mother has been using 2 L throughout the day. No cough has been reported but she did state that his lower extremity edema has worsened  There is loculated right-sided moderate effusion with inability to rule out pneumonia questionable can be parapneumonic effusion that needs chest tube for drainage in unable to be done with a thoracocentesis. I discussed with the mother and if needed okay to proceed with the chest tube discussed that interventional radiology will see the patient on Monday and decide which way to go we will order pleural fluid studies  Patient is status post IR guided thoracentesis    Epistaxis  Assessment & Plan  Yesterday and today controlled at bedside  Per mom has been having them since 13years old  Ask ent to see  Hold asa and lovenox for now-will discuss with ENT about the aspirin and Lovenox  Use oxygen with humidifier     Acute blood loss anemia  Assessment & Plan  Secondary to recurrent epistaxis. Appreciate ENT consult, aspirin and Lovenox remain on hold, if hemoglobin remained stable and ENT agrees, can resume in a.m.     Stage 3 chronic kidney disease [facility])  Assessment & Plan  Lab Results   Component Value Date    EGFR 48 11/23/2023    EGFR 46 11/22/2023    EGFR 47 11/21/2023    CREATININE 1.58 (H) 11/23/2023    CREATININE 1.62 (H) 11/22/2023    CREATININE 1.59 (H) 11/21/2023   Has been anywhere between 1.4-1.5, cr worsened since lasix iv and also on high dose ladactone . Cr stable today hold both still discussed with nephro if cr stable linden restart diuretics     Chronic respiratory failure with hypercapnia (HCC)  Assessment & Plan  Wears 2L O2 QHS, continue  Likely has a chronic hypercapnia given the elevated bicarbonate/kyphosis likely causing chronic limited chest expansion and sleepiness during day will obtain vbg- reviewed 69  Status post thoracentesis. Due to recent epistaxis, patient unable to place nasal cannula-was trying to use facemask but patient is not keeping the facemask. As long as patient saturation 89%, will keep monitoring. No other household member able to render care  Assessment & Plan  Patient's main caregiver is his mother who is hospitalized today at [facility]  She assists him with his ADLs and assist x2  Might need placement  Pt/ot ordered    Liver cirrhosis ([address])  Assessment & Plan  Cirrhotic morphology of the liver chronic problem  Ammonia is normal there is no ascites continue Lasix and Aldactone- hold in light of worsening renal function     Acute on chronic heart failure with preserved ejection fraction [facility])  Assessment & Plan  Patient does use pumps at home he has chronic lymphedema but discussed with the mother that edema is worsened he has been compliant with Lasix when he was in the nursing home his Lasix was 60 but decreased to 40 secondary to kidney numbers. He does have evidence of a small effusion on the left as well as the right which he has loculated see below he did receive Lasix 40 mg IV x1 on admission.   which we will continue CT abdomen did not reveal any paracentesis as the mother did report that his girth has increased  proBNP is elevated  2D echo done in 2022 with mild concentric hypertrophy and EF of 60 to 65%  Am labs  Daily weights and strict I and o not documented  Patient is status post IR guided thoracentesis, after discussion is done with nephrology, will resume home dose of Lasix, hold spironolactone at this time. Due to recent epistaxis from nasal cannula prong, patient is to use facemask-patient is noncompliant. Dysphagia  Assessment & Plan  Continue mechanical soft diet from prior  Aspiration precautions obtain speech    Developmental delay  Assessment & Plan  Daily involvement with mother  Supportive care  PT/OT/ST  Pt AAOx1   At baseline AAOx3    Falls  Assessment & Plan  Frequent falls for his entire life because of unsteady gait according to the mother's medical history list  Fall precautions  Emergency room trauma eval no acute injury    Nonintractable generalized idiopathic epilepsy without status epilepticus ([address])  Assessment & Plan  Continue home seizure medications with seizure precautions  His last seizure was 2-3 days ago according to his uncle. Lamictal, zonesamide and primidone levels ordered. The last few times he was hospitalized for seizure the AEDs were not changed because the breakthrough seizure etiology were infections. VTE Pharmacologic Prophylaxis: VTE Score: 5  consulted to place heparin for    Mobility:   Basic Mobility Inpatient Raw Score: 8  JH-HLM Goal: 3: Sit at edge of bed  JH-HLM Achieved: 5: Stand (1 or more minutes)  HLM Goal achieved. Continue to encourage appropriate mobility. Patient Centered Rounds: I performed bedside rounds with nursing staff today. Discussions with Specialists or Other Care Team Provider: Nephrology    Education and Discussions with Family / Patient:  None. Current Length of Stay: 6 day(s)  Current Patient Status: Inpatient   Certification Statement: The patient will continue to require additional inpatient hospital stay due to to monitor above conditions  Discharge Plan: Anticipate discharge in >72 hrs to to be determined at    Code Status: Level 1 - Full Code    Subjective:   Seen and evaluated and examined. Resting comfortably. Denies any significant complaint.   As per case management, patient was living with patient's mother before to hospitalization. Mother recently had medical problem and went to nursing facility. Unsure, if mother can take care of the patient's. Objective:     Vitals:   Temp (24hrs), Av.8 °F (36.6 °C), Min:97.7 °F (36.5 °C), Max:97.9 °F (36.6 °C)    Temp:  [97.7 °F (36.5 °C)-97.9 °F (36.6 °C)] 97.7 °F (36.5 °C)  HR:  [58-83] 80  BP: (108-142)/(67-83) 142/83  SpO2:  [89 %-95 %] 90 %  Body mass index is 39.18 kg/m². Input and Output Summary (last 24 hours): Intake/Output Summary (Last 24 hours) at 2023 0820  Last data filed at 2023 1300  Gross per 24 hour   Intake 480 ml   Output --   Net 480 ml       Physical Exam:   Physical Exam  Vitals and nursing note reviewed. Constitutional:       Appearance: He is obese. He is not ill-appearing or diaphoretic. Eyes:      General: No scleral icterus. Left eye: No discharge. Extraocular Movements: Extraocular movements intact. Conjunctiva/sclera: Conjunctivae normal.      Pupils: Pupils are equal, round, and reactive to light. Cardiovascular:      Rate and Rhythm: Normal rate. Heart sounds: No murmur heard. No friction rub. No gallop. Pulmonary:      Effort: Pulmonary effort is normal. No respiratory distress. Breath sounds: No stridor. No wheezing or rhonchi. Abdominal:      General: Abdomen is flat. Bowel sounds are normal. There is no distension. Palpations: There is no mass. Tenderness: There is no abdominal tenderness. Hernia: No hernia is present. Musculoskeletal:      Comments: Chronic lymphedema. Neurological:      Mental Status: He is alert.           Additional Data:     Labs:  Results from last 7 days   Lab Units 23  0436 23  0558   WBC Thousand/uL 7.79 6.79   HEMOGLOBIN g/dL 10.3* 10.6*   HEMATOCRIT % 32.9* 33.4*   PLATELETS Thousands/uL 106* 104*   NEUTROS PCT %  --  78*   LYMPHS PCT %  --  11*   LYMPHO PCT % 4*  --    MONOS PCT %  --  11   MONO PCT % 7  --    EOS PCT % 0 0     Results from last 7 days   Lab Units 11/23/23  0436   SODIUM mmol/L 141   POTASSIUM mmol/L 4.0   CHLORIDE mmol/L 102   CO2 mmol/L 32   BUN mg/dL 30*   CREATININE mg/dL 1.58*   ANION GAP mmol/L 7   CALCIUM mg/dL 9.3   ALBUMIN g/dL 3.3*   TOTAL BILIRUBIN mg/dL 1.08*   ALK PHOS U/L 165*   ALT U/L 15   AST U/L 22   GLUCOSE RANDOM mg/dL 91     Results from last 7 days   Lab Units 11/17/23  1800   INR  1.15             Results from last 7 days   Lab Units 11/20/23  0512 11/19/23  0513 11/17/23  1800   LACTIC ACID mmol/L  --   --  1.2   PROCALCITONIN ng/ml 0.41* 0.36* 0.31*       Lines/Drains:  Invasive Devices       Peripheral Intravenous Line  Duration             Peripheral IV 11/21/23 Dorsal (posterior); Right Hand 1 day                          Imaging: No pertinent imaging reviewed. Recent Cultures (last 7 days):   Results from last 7 days   Lab Units 11/20/23  1335 11/17/23  1802 11/17/23  1800   BLOOD CULTURE   --  No Growth After 5 Days. No Growth After 5 Days.    GRAM STAIN RESULT  2+ Polys  No organisms seen  --   --    BODY FLUID CULTURE, STERILE  No growth  --   --        Last 24 Hours Medication List:   Current Facility-Administered Medications   Medication Dose Route Frequency Provider Last Rate    acetaminophen  650 mg Oral Q6H PRN Dana Asif PA-C      ampicillin-sulbactam  3 g Intravenous Q6H Ebony Shaw MD 3 g (11/23/23 0346)    ascorbic acid  500 mg Oral Daily Dana Asif PA-C      calcium carbonate-vitamin D  1 tablet Oral Daily With Breakfast Dana Asif PA-C      dextromethorphan-guaiFENesin  10 mL Oral Q4H PRN Dana Asif PA-C      furosemide  40 mg Oral Daily Ashtyn Romero MD      lactulose  20 g Oral After [place]      lamoTRIgine  200 mg Oral Early Morning Dana Asif PA-C      lamoTRIgine  250 mg Oral After Lunch Dana Asif PA-C      lamoTRIgine  300 mg Oral HS Dana Asif PA-C      levOCARNitine  330 mg Oral 4x Daily (with meals and at bedtime) Gregory Horan PA-C      LORazepam  2 mg Intravenous Q6H PRN Dana Demo, WHITNEY      metoprolol tartrate  25 mg Oral BID Dana Demo, WHITNEY      nystatin   Topical BID Dana Demo, WHITNEY      ondansetron  4 mg Intravenous Q6H PRN Dana Demo, PA-JAMIE      polyethylene glycol  17 g Oral Daily PRN Dana Demo, PA-JAMIE      primidone  100 mg Oral After Breakfast Dana Demo, PA-JAIME      primidone  100 mg Oral Daily With Lunch Dana Demo, WHITNEY      primidone  150 mg Oral HS Dana Demo, WHITNEY      senna-docusate sodium  1 tablet Oral BID Dana Demo, WHITNEY      sodium chloride  1 Application Nasal TID Ramón Catherine MD      sodium chloride  2 spray Each Nare TID Ramón Catherine MD      vitamin E (tocopherol)  400 Units Oral Daily Dana Demo, WHITNEY      zonisamide  100 mg Oral HS Gregory Horan PA-C          Today, Patient Was Seen By: Javier Mendosa MD    **Please Note: This note may have been constructed using a voice recognition system. **

## 2023-11-23 NOTE — PROGRESS NOTES
[address] [facility]  Progress Note  Name: Zuri Silveira  MRN: [id number]  Unit/Bed#: -24 I Date of Admission: 11/17/2023   Date of Service: 11/23/2023 I Hospital Day: 6    Assessment/Plan   * Pleural effusion on right  Assessment & Plan    This did require a chest tube about 20 years ago according to the mother's typed medical history list  Discussed with the mother patient has been having worsening shortness of breath and for the past week and has been falling asleep a lot he usually wears oxygen 2 L at night but the mother has been using 2 L throughout the day. No cough has been reported but she did state that his lower extremity edema has worsened  There is loculated right-sided moderate effusion with inability to rule out pneumonia questionable can be parapneumonic effusion that needs chest tube for drainage in unable to be done with a thoracocentesis. I discussed with the mother and if needed okay to proceed with the chest tube discussed that interventional radiology will see the patient on Monday and decide which way to go we will order pleural fluid studies  Patient is status post IR guided thoracentesis    Epistaxis  Assessment & Plan  Yesterday and today controlled at bedside  Per mom has been having them since 13years old  Use oxygen with humidifier     Acute blood loss anemia  Assessment & Plan  Secondary to recurrent epistaxis. Appreciate ENT consult,  Resume aspirin and Lovenox. Stage 3 chronic kidney disease [facility])  Assessment & Plan  Lab Results   Component Value Date    EGFR 48 11/23/2023    EGFR 46 11/22/2023    EGFR 47 11/21/2023    CREATININE 1.58 (H) 11/23/2023    CREATININE 1.62 (H) 11/22/2023    CREATININE 1.59 (H) 11/21/2023   Has been anywhere between 1.4-1.5, cr worsened since lasix iv and also on high dose ladactone .  Cr stable today hold both still discussed with nephro if cr stable linden restart diuretics     Chronic respiratory failure with hypercapnia [facility])  Assessment & Plan  Wears 2L O2 QHS, continue  Likely has a chronic hypercapnia given the elevated bicarbonate/kyphosis likely causing chronic limited chest expansion and sleepiness during day will obtain vbg- reviewed 69  Status post thoracentesis. Due to recent epistaxis, patient unable to place nasal cannula-was trying to use facemask but patient is not keeping the facemask. As long as patient saturation 89%, will keep monitoring. No other household member able to render care  Assessment & Plan  Patient's main caregiver is his mother who is hospitalized today at [address]  She assists him with his ADLs and assist x2  Might need placement  Pt/ot ordered    Liver cirrhosis ([address])  Assessment & Plan  Cirrhotic morphology of the liver chronic problem  Ammonia is normal there is no ascites continue Lasix and Aldactone- hold in light of worsening renal function     Acute on chronic heart failure with preserved ejection fraction [facility])  Assessment & Plan  Patient does use pumps at home he has chronic lymphedema but discussed with the mother that edema is worsened he has been compliant with Lasix when he was in the nursing home his Lasix was 60 but decreased to 40 secondary to kidney numbers. He does have evidence of a small effusion on the left as well as the right which he has loculated see below he did receive Lasix 40 mg IV x1 on admission. which we will continue CT abdomen did not reveal any paracentesis as the mother did report that his girth has increased  proBNP is elevated  2D echo done in 2022 with mild concentric hypertrophy and EF of 60 to 65%  Patient is status post IR guided thoracentesis, after discussion is done with nephrology, will resume home dose of Lasix, hold spironolactone at this time. Due to recent epistaxis from nasal cannula prong, patient is to use facemask-patient is noncompliant.     Dysphagia  Assessment & Plan  Continue mechanical soft diet from prior  Aspiration precautions obtain speech    Developmental delay  Assessment & Plan  Daily involvement with mother  Supportive care  PT/OT/ST  Pt AAOx1   At baseline AAOx3  Due to social situation, patient will need skilled nursing facility, case management on board (patient was living with his mother, mother recently hospitalized due to medical issue and get discharged to skilled nursing facility. No other family member is able to take care of the patient.)    Falls  Assessment & Plan  Frequent falls for his entire life because of unsteady gait according to the mother's medical history list  Fall precautions  Emergency room trauma eval no acute injury    Nonintractable generalized idiopathic epilepsy without status epilepticus [facility])  Assessment & Plan  Continue home seizure medications with seizure precautions  His last seizure was 2-3 days ago according to his uncle. Lamictal, zonesamide and primidone levels ordered. The last few times he was hospitalized for seizure the AEDs were not changed because the breakthrough seizure etiology were infections. VTE Pharmacologic Prophylaxis: VTE Score: 5 High Risk (Score >/= 5) - Pharmacological DVT Prophylaxis Ordered: enoxaparin (Lovenox). Sequential Compression Devices Ordered. Mobility:   Basic Mobility Inpatient Raw Score: 8  -[facility] Goal: 3: Sit at edge of bed  -HLM Achieved: 5: Stand (1 or more minutes)  HLM Goal achieved. Continue to encourage appropriate mobility. Patient Centered Rounds: I performed bedside rounds with nursing staff today. Discussions with Specialists or Other Care Team Provider: None    Education and Discussions with Family / Patient: Updated  (mother) via phone.     Current Length of Stay: 6 day(s)  Current Patient Status: Inpatient   Certification Statement: The patient will continue to require additional inpatient hospital stay due to to monitor above conditions  Discharge Plan: Anticipate discharge in >72 hrs to skilled nursing facility    Code Status: Level 1 - Full Code    Subjective:   Seen and evaluated and examined. Sitting up position, trying to watch TV. Denies any significant complaint. Objective:     Vitals:   Temp (24hrs), Av.8 °F (36.6 °C), Min:97.7 °F (36.5 °C), Max:97.9 °F (36.6 °C)    Temp:  [97.7 °F (36.5 °C)-97.9 °F (36.6 °C)] 97.7 °F (36.5 °C)  HR:  [58-83] 80  BP: (108-142)/(67-83) 142/83  SpO2:  [89 %-95 %] 90 %  Body mass index is 39.18 kg/m². Input and Output Summary (last 24 hours): Intake/Output Summary (Last 24 hours) at 2023 1435  Last data filed at 2023 1059  Gross per 24 hour   Intake 240 ml   Output 350 ml   Net -110 ml       Physical Exam:   Physical Exam  Vitals and nursing note reviewed. Constitutional:       Appearance: Normal appearance. He is obese. He is not ill-appearing or diaphoretic. Eyes:      General: No scleral icterus. Left eye: No discharge. Extraocular Movements: Extraocular movements intact. Conjunctiva/sclera: Conjunctivae normal.      Pupils: Pupils are equal, round, and reactive to light. Cardiovascular:      Rate and Rhythm: Normal rate. Heart sounds: No murmur heard. No friction rub. No gallop. Pulmonary:      Effort: Pulmonary effort is normal. No respiratory distress. Breath sounds: No stridor. No wheezing or rhonchi. Abdominal:      General: Abdomen is flat. Bowel sounds are normal. There is no distension. Palpations: There is no mass. Tenderness: There is no abdominal tenderness. Hernia: No hernia is present. Musculoskeletal:         General: No swelling or tenderness. Normal range of motion. Skin:     General: Skin is warm. Capillary Refill: Capillary refill takes less than 2 seconds. Coloration: Skin is not jaundiced or pale. Findings: No bruising or erythema. Neurological:      General: No focal deficit present. Mental Status: He is alert and oriented to person, place, and time. Cranial Nerves: No cranial nerve deficit. Sensory: No sensory deficit. Motor: No weakness. Coordination: Coordination normal.          Additional Data:     Labs:  Results from last 7 days   Lab Units 11/23/23  0436 11/22/23  0558   WBC Thousand/uL 7.79 6.79   HEMOGLOBIN g/dL 10.3* 10.6*   HEMATOCRIT % 32.9* 33.4*   PLATELETS Thousands/uL 106* 104*   NEUTROS PCT %  --  78*   LYMPHS PCT %  --  11*   LYMPHO PCT % 4*  --    MONOS PCT %  --  11   MONO PCT % 7  --    EOS PCT % 0 0     Results from last 7 days   Lab Units 11/23/23  0436   SODIUM mmol/L 141   POTASSIUM mmol/L 4.0   CHLORIDE mmol/L 102   CO2 mmol/L 32   BUN mg/dL 30*   CREATININE mg/dL 1.58*   ANION GAP mmol/L 7   CALCIUM mg/dL 9.3   ALBUMIN g/dL 3.3*   TOTAL BILIRUBIN mg/dL 1.08*   ALK PHOS U/L 165*   ALT U/L 15   AST U/L 22   GLUCOSE RANDOM mg/dL 91     Results from last 7 days   Lab Units 11/17/23  1800   INR  1.15             Results from last 7 days   Lab Units 11/20/23  0512 11/19/23  0513 11/17/23  1800   LACTIC ACID mmol/L  --   --  1.2   PROCALCITONIN ng/ml 0.41* 0.36* 0.31*       Lines/Drains:  Invasive Devices       Peripheral Intravenous Line  Duration             Peripheral IV 11/21/23 Dorsal (posterior); Right Hand 2 days                          Imaging: No pertinent imaging reviewed. Recent Cultures (last 7 days):   Results from last 7 days   Lab Units 11/20/23  1335 11/17/23  1802 11/17/23  1800   BLOOD CULTURE   --  No Growth After 5 Days. No Growth After 5 Days.    GRAM STAIN RESULT  2+ Polys  No organisms seen  --   --    BODY FLUID CULTURE, STERILE  No growth  --   --        Last 24 Hours Medication List:   Current Facility-Administered Medications   Medication Dose Route Frequency Provider Last Rate    acetaminophen  650 mg Oral Q6H PRN Dana Asif PA-C      ampicillin-sulbactam  3 g Intravenous Q6H Tammie Connor MD 3 g (11/23/23 0902)    ascorbic acid  500 mg Oral Daily Dana Asif PA-C      aspirin  81 mg Oral Daily Li Bang MD      calcium carbonate-vitamin D  1 tablet Oral Daily With Breakfast Dana Demo, PA-C      dextromethorphan-guaiFENesin  10 mL Oral Q4H PRN Dana Demo, PA-C      enoxaparin  40 mg Subcutaneous Q24H [address] Ashtyn Romero MD      furosemide  40 mg Oral Daily Ashtyn Romero MD      lactulose  20 g Oral After [place], PA-C      lamoTRIgine  200 mg Oral Early Morning Dana Demo, PA-C      lamoTRIgine  250 mg Oral After Lunch Dana Demo, PA-C      lamoTRIgine  300 mg Oral HS Dana Demo, PA-C      levOCARNitine  330 mg Oral 4x Daily (with meals and at bedtime) Dana Demo, PA-C      LORazepam  2 mg Intravenous Q6H PRN Dana Demo, PA-C      metoprolol tartrate  25 mg Oral BID Dana Demo, PA-C      nystatin   Topical BID Dana Demo, PA-C      ondansetron  4 mg Intravenous Q6H PRN Dana Demo, PA-C      polyethylene glycol  17 g Oral Daily PRN Dana Demo, PA-C      primidone  100 mg Oral After Breakfast Dana Demo, PA-C      primidone  100 mg Oral Daily With Lunch Dana Demo, PA-C      primidone  150 mg Oral HS Dana Demo, PA-C      senna-docusate sodium  1 tablet Oral BID Dana Demo, PA-C      sodium chloride  1 Application Nasal TID Vic Russo MD      sodium chloride  2 spray Each Nare TID Vic Russo MD      vitamin E (tocopherol)  400 Units Oral Daily Dana Demo, PA-C      zonisamide  100 mg Oral HS Elias Hagen PA-C          Today, Patient Was Seen By: Li Bang MD    **Please Note: This note may have been constructed using a voice recognition system. **

## 2023-11-23 NOTE — ASSESSMENT & PLAN NOTE
Patient does use pumps at home he has chronic lymphedema but discussed with the mother that edema is worsened he has been compliant with Lasix when he was in the nursing home his Lasix was 60 but decreased to 40 secondary to kidney numbers. He does have evidence of a small effusion on the left as well as the right which he has loculated see below he did receive Lasix 40 mg IV x1 on admission. which we will continue CT abdomen did not reveal any paracentesis as the mother did report that his girth has increased  proBNP is elevated  2D echo done in 2022 with mild concentric hypertrophy and EF of 60 to 65%  Patient is status post IR guided thoracentesis, after discussion is done with nephrology, will resume home dose of Lasix, hold spironolactone at this time. Due to recent epistaxis from nasal cannula prong, patient is to use facemask-patient is noncompliant.

## 2023-11-23 NOTE — ASSESSMENT & PLAN NOTE
Daily involvement with mother  Supportive care  PT/OT/ST  Pt AAOx1   At baseline AAOx3  Due to social situation, patient will need skilled nursing facility, case management on board (patient was living with his mother, mother recently hospitalized due to medical issue and get discharged to skilled nursing facility.   No other family member is able to take care of the patient.)

## 2023-11-23 NOTE — PLAN OF CARE
Problem: PAIN - ADULT  Goal: Verbalizes/displays adequate comfort level or baseline comfort level  Description: Interventions:  - Encourage patient to monitor pain and request assistance  - Assess pain using appropriate pain scale  - Administer analgesics based on type and severity of pain and evaluate response  - Implement non-pharmacological measures as appropriate and evaluate response  - Consider cultural and social influences on pain and pain management  - Notify physician/advanced practitioner if interventions unsuccessful or patient reports new pain  Outcome: Progressing     Problem: INFECTION - ADULT  Goal: Absence or prevention of progression during hospitalization  Description: INTERVENTIONS:  - Assess and monitor for signs and symptoms of infection  - Monitor lab/diagnostic results  - Monitor all insertion sites, i.e. indwelling lines, tubes, and drains  - Monitor endotracheal if appropriate and nasal secretions for changes in amount and color  - [place] appropriate cooling/warming therapies per order  - Administer medications as ordered  - Instruct and encourage patient and family to use good hand hygiene technique  - Identify and instruct in appropriate isolation precautions for identified infection/condition  Outcome: Progressing  Goal: Absence of fever/infection during neutropenic period  Description: INTERVENTIONS:  - Monitor WBC    Outcome: Progressing     Problem: SAFETY ADULT  Goal: Patient will remain free of falls  Description: INTERVENTIONS:  - Educate patient/family on patient safety including physical limitations  - Instruct patient to call for assistance with activity   - Consult OT/PT to assist with strengthening/mobility   - Keep Call bell within reach  - Keep bed low and locked with side rails adjusted as appropriate  - Keep care items and personal belongings within reach  - Initiate and maintain comfort rounds  - Make Fall Risk Sign visible to staff  - Offer Toileting every 2 Hours, in advance of need  - Initiate/Maintain bed/chair alarm  - Obtain necessary fall risk management equipment:   - Apply yellow socks and bracelet for high fall risk patients  - Consider moving patient to room near nurses station  Outcome: Progressing  Goal: Maintain or return to baseline ADL function  Description: INTERVENTIONS:  -  Assess patient's ability to carry out ADLs; assess patient's baseline for ADL function and identify physical deficits which impact ability to perform ADLs (bathing, care of mouth/teeth, toileting, grooming, dressing, etc.)  - Assess/evaluate cause of self-care deficits   - Assess range of motion  - Assess patient's mobility; develop plan if impaired  - Assess patient's need for assistive devices and provide as appropriate  - Encourage maximum independence but intervene and supervise when necessary  - Involve family in performance of ADLs  - Assess for home care needs following discharge   - Consider OT consult to assist with ADL evaluation and planning for discharge  - Provide patient education as appropriate  Outcome: Progressing  Goal: Maintains/Returns to pre admission functional level  Description: INTERVENTIONS:  - Perform AM-PAC 6 Click Basic Mobility/ Daily Activity assessment daily.  - Set and communicate daily mobility goal to care team and patient/family/caregiver. - Collaborate with rehabilitation services on mobility goals if consulted  - Perform Range of Motion 3 times a day. - Reposition patient every 2 hours.   - Dangle patient 3 times a day  - Stand patient 3 times a day  - Ambulate patient 3 times a day  - Out of bed to chair 3 times a day   - Out of bed for meals 3 times a day  - Out of bed for toileting  - Record patient progress and toleration of activity level   Outcome: Progressing     Problem: DISCHARGE PLANNING  Goal: Discharge to home or other facility with appropriate resources  Description: INTERVENTIONS:  - Identify barriers to discharge w/patient and caregiver  - Arrange for needed discharge resources and transportation as appropriate  - Identify discharge learning needs (meds, wound care, etc.)  - Arrange for interpretive services to assist at discharge as needed  - Refer to Case Management Department for coordinating discharge planning if the patient needs post-hospital services based on physician/advanced practitioner order or complex needs related to functional status, cognitive ability, or social support system  Outcome: Progressing     Problem: Knowledge Deficit  Goal: Patient/family/caregiver demonstrates understanding of disease process, treatment plan, medications, and discharge instructions  Description: Complete learning assessment and assess knowledge base.   Interventions:  - Provide teaching at level of understanding  - Provide teaching via preferred learning methods  Outcome: Progressing     Problem: Prexisting or High Potential for Compromised Skin Integrity  Goal: Skin integrity is maintained or improved  Description: INTERVENTIONS:  - Identify patients at risk for skin breakdown  - Assess and monitor skin integrity  - Assess and monitor nutrition and hydration status  - Monitor labs   - Assess for incontinence   - Turn and reposition patient  - Assist with mobility/ambulation  - Relieve pressure over bony prominences  - Avoid friction and shearing  - Provide appropriate hygiene as needed including keeping skin clean and dry  - Evaluate need for skin moisturizer/barrier cream  - Collaborate with interdisciplinary team   - Patient/family teaching  - Consider wound care consult   Outcome: Progressing

## 2023-11-23 NOTE — ASSESSMENT & PLAN NOTE
Lab Results   Component Value Date    EGFR 48 11/23/2023    EGFR 46 11/22/2023    EGFR 47 11/21/2023    CREATININE 1.58 (H) 11/23/2023    CREATININE 1.62 (H) 11/22/2023    CREATININE 1.59 (H) 11/21/2023   Has been anywhere between 1.4-1.5, cr worsened since lasix iv and also on high dose ladactone .  Cr stable today hold both still discussed with nephro if cr stable linden restart diuretics

## 2023-11-23 NOTE — ASSESSMENT & PLAN NOTE
Patient's main caregiver is his mother who is hospitalized today at [facility]  She assists him with his ADLs and assist x2  Might need placement  Pt/ot ordered

## 2023-11-23 NOTE — ASSESSMENT & PLAN NOTE
Yesterday and today controlled at bedside  Per mom has been having them since 13years old  Use oxygen with humidifier

## 2023-11-23 NOTE — PLAN OF CARE
Problem: PAIN - ADULT  Goal: Verbalizes/displays adequate comfort level or baseline comfort level  Description: Interventions:  - Encourage patient to monitor pain and request assistance  - Assess pain using appropriate pain scale  - Administer analgesics based on type and severity of pain and evaluate response  - Implement non-pharmacological measures as appropriate and evaluate response  - Consider cultural and social influences on pain and pain management  - Notify physician/advanced practitioner if interventions unsuccessful or patient reports new pain  Outcome: Progressing     Problem: INFECTION - ADULT  Goal: Absence or prevention of progression during hospitalization  Description: INTERVENTIONS:  - Assess and monitor for signs and symptoms of infection  - Monitor lab/diagnostic results  - Monitor all insertion sites, i.e. indwelling lines, tubes, and drains  - Monitor endotracheal if appropriate and nasal secretions for changes in amount and color  - [place] appropriate cooling/warming therapies per order  - Administer medications as ordered  - Instruct and encourage patient and family to use good hand hygiene technique  - Identify and instruct in appropriate isolation precautions for identified infection/condition  Outcome: Progressing  Goal: Absence of fever/infection during neutropenic period  Description: INTERVENTIONS:  - Monitor WBC    Outcome: Progressing

## 2023-11-23 NOTE — PROGRESS NOTES
Progress Note - Nephrology   Tatianna Sarmiento 66 y.o. male MRN: [id number]  Unit/Bed#: -01 Encounter: [id number]      Assessment/plan:      1. Acute kidney injury on chronic kidney disease stage III, baseline reported 1.3 to 1.4 mg/dL. Creatinine peaked at 1.88 on 11/19 and diuretics were held. Creatinine trend stable today 1.5-1.6 range. Volume status: Chronically hypervolemic with significant lower extremity edema. Hemodynamics: Blood pressure trend stable. Recommend:  Given significant lower extremity edema, recommend no added salt diet. We will dose Lasix 20 mg IV x1 as he is weight is trending upward and he has drainage from lower extremities from significant edema. If creatinine trends are stable, consider resuming spironolactone at reduced dose to help with diuresis. He does have a history of cirrhosis so spironolactone can be quite useful in these patients. 2.  Chronic heart failure with preserved ejection fraction and cirrhosis  Currently on home Lasix dosing 40 mg/day. We will utilize additional 20 mg IV dose to help with diuresis. He did have weight gain and drainage from lower extremities due to significant edema. 3.  Right pleural effusion, status post IR thoracentesis 11/17 which removed 800 mL. Fluid transudative unlikely due to cirrhosis/CHF/CKD. 4.  Acute respiratory insufficiency  He appears comfortable from a respiratory perspective. Diuretics reintroduced. Will give additional 20 mg IV. 5.  Cirrhosis  Consider a source for effusions with hepatic hydrothorax. Can have diaphragmatic defects that lead to this. 6.  Mild anemia  Hemoglobin stable at 10.3. Continue to follow. Subjective:   Patient seen and examined today. Patient denies any complaints. Pt cousin at the bedside. Discussed care with pt RN at bedside as well, no acute issues. He has significant leg swelling and did have draining of left leg.      ROS limited by baseline mentation and disability. Objective:    Vitals: Blood pressure 142/83, pulse 80, temperature 97.7 °F (36.5 °C), resp. rate 18, height 5' 6" (1.676 m), weight 110 kg (242 lb 11.6 oz), SpO2 90 %. ,Body mass index is 39.18 kg/m². Weight (last 2 days)       Date/Time Weight    11/23/23 0532 110 (242.73)    11/22/23 0600 110 (243.61)    11/21/23 0704 108 (238.1)    11/21/23 0559 108 (238.76)    11/21/23 0441 108 (238.76)              Intake/Output Summary (Last 24 hours) at 11/23/2023 1540  Last data filed at 11/23/2023 1059  Gross per 24 hour   Intake 240 ml   Output 350 ml   Net -110 ml            Physical Exam: /83   Pulse 80   Temp 97.7 °F (36.5 °C)   Resp 18   Ht 5' 6" (1.676 m)   Wt 110 kg (242 lb 11.6 oz)   SpO2 90%   BMI 39.18 kg/m²   General appearance: alert, no acute distress, obese  Throat:lips and mucous membranes moist   Lungs: decrease BS bilaterally, no rales or wheezing   Heart: regular rate and rhythm, S1, S2 normal, no murmur, click, rub or gallop  Abdomen: soft, nontender, nondistended   Extremities: +2 pitting BL leg swelling  Neurologic: awake, alert, at baseline   Skin chronic venous changes to BL LE     Lab, Imaging and other studies: I have personally reviewed pertinent labs.

## 2023-11-24 PROBLEM — N18.9 CHRONIC KIDNEY DISEASE: Status: ACTIVE | Noted: 2023-11-24

## 2023-11-24 PROBLEM — N18.9 CHRONIC KIDNEY DISEASE: Status: RESOLVED | Noted: 2023-11-24 | Resolved: 2023-11-24

## 2023-11-24 LAB
ALBUMIN SERPL BCP-MCNC: 3.2 G/DL (ref 3.5–5)
ALP SERPL-CCNC: 169 U/L (ref 34–104)
ALT SERPL W P-5'-P-CCNC: 14 U/L (ref 7–52)
ANION GAP SERPL CALCULATED.3IONS-SCNC: 8 MMOL/L
AST SERPL W P-5'-P-CCNC: 21 U/L (ref 13–39)
BASOPHILS # BLD AUTO: 0.01 THOUSANDS/ÂΜL (ref 0–0.1)
BASOPHILS NFR BLD AUTO: 0 % (ref 0–1)
BILIRUB SERPL-MCNC: 1.12 MG/DL (ref 0.2–1)
BUN SERPL-MCNC: 31 MG/DL (ref 5–25)
CALCIUM ALBUM COR SERPL-MCNC: 9.5 MG/DL (ref 8.3–10.1)
CALCIUM SERPL-MCNC: 8.9 MG/DL (ref 8.4–10.2)
CHLORIDE SERPL-SCNC: 102 MMOL/L (ref 96–108)
CO2 SERPL-SCNC: 31 MMOL/L (ref 21–32)
CREAT SERPL-MCNC: 1.65 MG/DL (ref 0.6–1.3)
EOSINOPHIL # BLD AUTO: 0.01 THOUSAND/ÂΜL (ref 0–0.61)
EOSINOPHIL NFR BLD AUTO: 0 % (ref 0–6)
ERYTHROCYTE [DISTWIDTH] IN BLOOD BY AUTOMATED COUNT: 16.3 % (ref 11.6–15.1)
GFR SERPL CREATININE-BSD FRML MDRD: 45 ML/MIN/1.73SQ M
GLUCOSE SERPL-MCNC: 84 MG/DL (ref 65–140)
HCT VFR BLD AUTO: 31.9 % (ref 36.5–49.3)
HGB BLD-MCNC: 10.1 G/DL (ref 12–17)
IMM GRANULOCYTES # BLD AUTO: 0.03 THOUSAND/UL (ref 0–0.2)
IMM GRANULOCYTES NFR BLD AUTO: 1 % (ref 0–2)
LYMPHOCYTES # BLD AUTO: 0.64 THOUSANDS/ÂΜL (ref 0.6–4.47)
LYMPHOCYTES NFR BLD AUTO: 10 % (ref 14–44)
MAGNESIUM SERPL-MCNC: 1.8 MG/DL (ref 1.9–2.7)
MCH RBC QN AUTO: 32.3 PG (ref 26.8–34.3)
MCHC RBC AUTO-ENTMCNC: 31.7 G/DL (ref 31.4–37.4)
MCV RBC AUTO: 102 FL (ref 82–98)
MONOCYTES # BLD AUTO: 0.6 THOUSAND/ÂΜL (ref 0.17–1.22)
MONOCYTES NFR BLD AUTO: 10 % (ref 4–12)
NEUTROPHILS # BLD AUTO: 4.88 THOUSANDS/ÂΜL (ref 1.85–7.62)
NEUTS SEG NFR BLD AUTO: 79 % (ref 43–75)
NRBC BLD AUTO-RTO: 0 /100 WBCS
PLATELET # BLD AUTO: 113 THOUSANDS/UL (ref 149–390)
PMV BLD AUTO: 11.6 FL (ref 8.9–12.7)
POTASSIUM SERPL-SCNC: 3.9 MMOL/L (ref 3.5–5.3)
PROT SERPL-MCNC: 8.1 G/DL (ref 6.4–8.4)
RBC # BLD AUTO: 3.13 MILLION/UL (ref 3.88–5.62)
SODIUM SERPL-SCNC: 141 MMOL/L (ref 135–147)
WBC # BLD AUTO: 6.17 THOUSAND/UL (ref 4.31–10.16)

## 2023-11-24 PROCEDURE — 83735 ASSAY OF MAGNESIUM: CPT | Performed by: FAMILY MEDICINE

## 2023-11-24 PROCEDURE — 99232 SBSQ HOSP IP/OBS MODERATE 35: CPT | Performed by: INTERNAL MEDICINE

## 2023-11-24 PROCEDURE — 80053 COMPREHEN METABOLIC PANEL: CPT | Performed by: FAMILY MEDICINE

## 2023-11-24 PROCEDURE — 85025 COMPLETE CBC W/AUTO DIFF WBC: CPT | Performed by: FAMILY MEDICINE

## 2023-11-24 PROCEDURE — 99232 SBSQ HOSP IP/OBS MODERATE 35: CPT | Performed by: FAMILY MEDICINE

## 2023-11-24 RX ORDER — MAGNESIUM SULFATE HEPTAHYDRATE 40 MG/ML
2 INJECTION, SOLUTION INTRAVENOUS ONCE
Status: COMPLETED | OUTPATIENT
Start: 2023-11-24 | End: 2023-11-24

## 2023-11-24 RX ADMIN — PRIMIDONE 100 MG: 50 TABLET ORAL at 09:34

## 2023-11-24 RX ADMIN — Medication 400 UNITS: at 09:34

## 2023-11-24 RX ADMIN — LAMOTRIGINE 200 MG: 100 TABLET ORAL at 06:13

## 2023-11-24 RX ADMIN — AMPICILLIN SODIUM AND SULBACTAM SODIUM 3 G: 2; 1 INJECTION, POWDER, FOR SOLUTION INTRAMUSCULAR; INTRAVENOUS at 21:14

## 2023-11-24 RX ADMIN — Medication 1 TABLET: at 09:34

## 2023-11-24 RX ADMIN — ACETAMINOPHEN 650 MG: 325 TABLET, FILM COATED ORAL at 09:34

## 2023-11-24 RX ADMIN — ENOXAPARIN SODIUM 40 MG: 40 INJECTION SUBCUTANEOUS at 09:46

## 2023-11-24 RX ADMIN — NYSTATIN 1 APPLICATION: 100000 POWDER TOPICAL at 09:35

## 2023-11-24 RX ADMIN — METOPROLOL TARTRATE 25 MG: 25 TABLET, FILM COATED ORAL at 09:34

## 2023-11-24 RX ADMIN — LEVOCARNITINE 330 MG: 1 SOLUTION ORAL at 09:37

## 2023-11-24 RX ADMIN — AMPICILLIN SODIUM AND SULBACTAM SODIUM 3 G: 2; 1 INJECTION, POWDER, FOR SOLUTION INTRAMUSCULAR; INTRAVENOUS at 03:32

## 2023-11-24 RX ADMIN — FUROSEMIDE 40 MG: 40 TABLET ORAL at 09:34

## 2023-11-24 RX ADMIN — ZONISAMIDE 100 MG: 100 CAPSULE ORAL at 21:15

## 2023-11-24 RX ADMIN — LAMOTRIGINE 250 MG: 100 TABLET ORAL at 15:52

## 2023-11-24 RX ADMIN — PRIMIDONE 100 MG: 50 TABLET ORAL at 15:53

## 2023-11-24 RX ADMIN — AMPICILLIN SODIUM AND SULBACTAM SODIUM 3 G: 2; 1 INJECTION, POWDER, FOR SOLUTION INTRAMUSCULAR; INTRAVENOUS at 15:56

## 2023-11-24 RX ADMIN — NYSTATIN 1 APPLICATION: 100000 POWDER TOPICAL at 18:18

## 2023-11-24 RX ADMIN — LEVOCARNITINE 330 MG: 1 SOLUTION ORAL at 15:53

## 2023-11-24 RX ADMIN — DOCUSATE SODIUM AND SENNOSIDES 1 TABLET: 8.6; 5 TABLET, FILM COATED ORAL at 18:18

## 2023-11-24 RX ADMIN — LAMOTRIGINE 300 MG: 100 TABLET ORAL at 21:15

## 2023-11-24 RX ADMIN — PRIMIDONE 150 MG: 50 TABLET ORAL at 21:15

## 2023-11-24 RX ADMIN — OXYCODONE HYDROCHLORIDE AND ACETAMINOPHEN 500 MG: 500 TABLET ORAL at 09:34

## 2023-11-24 RX ADMIN — LEVOCARNITINE 330 MG: 1 SOLUTION ORAL at 12:04

## 2023-11-24 RX ADMIN — AMPICILLIN SODIUM AND SULBACTAM SODIUM 3 G: 2; 1 INJECTION, POWDER, FOR SOLUTION INTRAMUSCULAR; INTRAVENOUS at 09:49

## 2023-11-24 RX ADMIN — LEVOCARNITINE 330 MG: 1 SOLUTION ORAL at 21:16

## 2023-11-24 RX ADMIN — DOCUSATE SODIUM AND SENNOSIDES 1 TABLET: 8.6; 5 TABLET, FILM COATED ORAL at 09:35

## 2023-11-24 RX ADMIN — MAGNESIUM SULFATE HEPTAHYDRATE 2 G: 40 INJECTION, SOLUTION INTRAVENOUS at 10:47

## 2023-11-24 RX ADMIN — ASPIRIN 81 MG: 81 TABLET, COATED ORAL at 09:34

## 2023-11-24 NOTE — ASSESSMENT & PLAN NOTE
Lab Results   Component Value Date    EGFR 45 11/24/2023    EGFR 48 11/23/2023    EGFR 46 11/22/2023    CREATININE 1.65 (H) 11/24/2023    CREATININE 1.58 (H) 11/23/2023    CREATININE 1.62 (H) 11/22/2023   Baseline creatinine range between 1.3-1.4  Now creatinine is ranging little high, appreciate nephrology consult.

## 2023-11-24 NOTE — PLAN OF CARE
Problem: INFECTION - ADULT  Goal: Absence or prevention of progression during hospitalization  Description: INTERVENTIONS:  - Assess and monitor for signs and symptoms of infection  - Monitor lab/diagnostic results  - Monitor all insertion sites, i.e. indwelling lines, tubes, and drains  - Monitor endotracheal if appropriate and nasal secretions for changes in amount and color  - [place] appropriate cooling/warming therapies per order  - Administer medications as ordered  - Instruct and encourage patient and family to use good hand hygiene technique  - Identify and instruct in appropriate isolation precautions for identified infection/condition  Outcome: Progressing  Goal: Absence of fever/infection during neutropenic period  Description: INTERVENTIONS:  - Monitor WBC    Outcome: Progressing     Problem: PAIN - ADULT  Goal: Verbalizes/displays adequate comfort level or baseline comfort level  Description: Interventions:  - Encourage patient to monitor pain and request assistance  - Assess pain using appropriate pain scale  - Administer analgesics based on type and severity of pain and evaluate response  - Implement non-pharmacological measures as appropriate and evaluate response  - Consider cultural and social influences on pain and pain management  - Notify physician/advanced practitioner if interventions unsuccessful or patient reports new pain  Outcome: Progressing     Problem: Prexisting or High Potential for Compromised Skin Integrity  Goal: Skin integrity is maintained or improved  Description: INTERVENTIONS:  - Identify patients at risk for skin breakdown  - Assess and monitor skin integrity  - Assess and monitor nutrition and hydration status  - Monitor labs   - Assess for incontinence   - Turn and reposition patient  - Assist with mobility/ambulation  - Relieve pressure over bony prominences  - Avoid friction and shearing  - Provide appropriate hygiene as needed including keeping skin clean and dry  - Evaluate need for skin moisturizer/barrier cream  - Collaborate with interdisciplinary team   - Patient/family teaching  - Consider wound care consult   Outcome: Progressing

## 2023-11-24 NOTE — PROGRESS NOTES
[address] [facility]  Progress Note  Name: Tom Burnett  MRN: [id number]  Unit/Bed#: -76 I Date of Admission: 11/17/2023   Date of Service: 11/24/2023 I Hospital Day: 7    Assessment/Plan   * Pleural effusion on right  Assessment & Plan    This did require a chest tube about 20 years ago according to the mother's typed medical history list  Discussed with the mother patient has been having worsening shortness of breath and for the past week and has been falling asleep a lot he usually wears oxygen 2 L at night but the mother has been using 2 L throughout the day. No cough has been reported but she did state that his lower extremity edema has worsened  There is loculated right-sided moderate effusion with inability to rule out pneumonia questionable can be parapneumonic effusion that needs chest tube for drainage in unable to be done with a thoracocentesis. I discussed with the mother and if needed okay to proceed with the chest tube discussed that interventional radiology will see the patient on Monday and decide which way to go we will order pleural fluid studies  Patient is status post IR guided thoracentesis    Epistaxis  Assessment & Plan  Yesterday and today controlled at bedside  Per mom has been having them since 13years old  Use oxygen with humidifier     Acute blood loss anemia  Assessment & Plan  Secondary to recurrent epistaxis. Appreciate ENT consult,  Resume aspirin and Lovenox. Stage 3 chronic kidney disease [facility])  Assessment & Plan  Lab Results   Component Value Date    EGFR 45 11/24/2023    EGFR 48 11/23/2023    EGFR 46 11/22/2023    CREATININE 1.65 (H) 11/24/2023    CREATININE 1.58 (H) 11/23/2023    CREATININE 1.62 (H) 11/22/2023   Has been anywhere between 1.4-1.5, cr worsened since lasix iv and also on high dose ladactone .  Cr stable today hold both still discussed with nephro if cr stable linden restart diuretics     Chronic respiratory failure with hypercapnia [facility])  Assessment & Plan  Wears 2L O2 QHS, continue  Likely has a chronic hypercapnia given the elevated bicarbonate/kyphosis likely causing chronic limited chest expansion and sleepiness during day will obtain vbg- reviewed 69  Status post thoracentesis. Due to recent epistaxis, patient unable to place nasal cannula-was trying to use facemask but patient is not keeping the facemask. As long as patient saturation 89%, will keep monitoring. No other household member able to render care  Assessment & Plan  Patient's main caregiver is his mother who is hospitalized today at [address]  She assists him with his ADLs and assist x2  Might need placement  Pt/ot ordered    Liver cirrhosis ([address])  Assessment & Plan  Cirrhotic morphology of the liver chronic problem  Ammonia is normal there is no ascites continue Lasix and Aldactone- hold in light of worsening renal function     Acute on chronic heart failure with preserved ejection fraction [facility])  Assessment & Plan  Patient does use pumps at home he has chronic lymphedema but discussed with the mother that edema is worsened he has been compliant with Lasix when he was in the nursing home his Lasix was 60 but decreased to 40 secondary to kidney numbers. He does have evidence of a small effusion on the left as well as the right which he has loculated see below he did receive Lasix 40 mg IV x1 on admission. which we will continue CT abdomen did not reveal any paracentesis as the mother did report that his girth has increased  proBNP is elevated  2D echo done in 2022 with mild concentric hypertrophy and EF of 60 to 65%  Patient is status post IR guided thoracentesis, after discussion is done with nephrology, will resume home dose of Lasix, hold spironolactone at this time. Due to recent epistaxis from nasal cannula prong, patient needs to use facemask-patient is noncompliant.     Dysphagia  Assessment & Plan  Continue mechanical soft diet from prior  Aspiration precautions obtain speech    Developmental delay  Assessment & Plan  Daily involvement with mother  Supportive care  PT/OT/ST  Pt AAOx1   At baseline AAOx3  Due to social situation, patient will need skilled nursing facility, case management on board (patient was living with his mother, mother recently hospitalized due to medical issue and get discharged to skilled nursing facility. No other family member is able to take care of the patient.)    Falls  Assessment & Plan  Frequent falls for his entire life because of unsteady gait according to the mother's medical history list  Fall precautions  Emergency room trauma eval no acute injury    Nonintractable generalized idiopathic epilepsy without status epilepticus [facility])  Assessment & Plan  Continue home seizure medications with seizure precautions  His last seizure was 2-3 days ago according to his uncle. Lamictal, zonesamide and primidone levels ordered. The last few times he was hospitalized for seizure the AEDs were not changed because the breakthrough seizure etiology were infections. Chronic kidney disease-resolved as of 11/24/2023  Assessment & Plan  Lab Results   Component Value Date    EGFR 45 11/24/2023    EGFR 48 11/23/2023    EGFR 46 11/22/2023    CREATININE 1.65 (H) 11/24/2023    CREATININE 1.58 (H) 11/23/2023    CREATININE 1.62 (H) 11/22/2023   Baseline creatinine range between 1.3-1.4  Now creatinine is ranging little high, appreciate nephrology consult. VTE Pharmacologic Prophylaxis: VTE Score: 5 High Risk (Score >/= 5) - Pharmacological DVT Prophylaxis Ordered: enoxaparin (Lovenox). Sequential Compression Devices Ordered. Mobility:   Basic Mobility Inpatient Raw Score: 8  -[facility] Goal: 3: Sit at edge of bed  -HLM Achieved: 4: Move to chair/commode  HLM Goal achieved. Continue to encourage appropriate mobility. Patient Centered Rounds: I performed bedside rounds with nursing staff today.    Discussions with Specialists or Other Care Team Provider: Nephrology    Education and Discussions with Family / Patient: Updated  (mother) via phone. Current Length of Stay: 7 day(s)  Current Patient Status: Inpatient   Certification Statement: The patient will continue to require additional inpatient hospital stay due to to monitor above condition  Discharge Plan: Anticipate discharge in >72 hrs to rehab facility. Code Status: Level 1 - Full Code    Subjective:   Seen and evaluated and examined. Sitting upright position, denies any significant complaint other than mild pain in the both lower leg. Objective:     Vitals:   Temp (24hrs), Av.8 °F (36.6 °C), Min:97.7 °F (36.5 °C), Max:97.9 °F (36.6 °C)    Temp:  [97.7 °F (36.5 °C)-97.9 °F (36.6 °C)] 97.7 °F (36.5 °C)  HR:  [84-85] 85  Resp:  [18] 18  BP: (137-145)/(74-77) 137/74  SpO2:  [88 %-91 %] 91 %  Body mass index is 39.64 kg/m². Input and Output Summary (last 24 hours): Intake/Output Summary (Last 24 hours) at 2023 1427  Last data filed at 2023 0942  Gross per 24 hour   Intake 900 ml   Output 150 ml   Net 750 ml       Physical Exam:   Physical Exam  Vitals and nursing note reviewed. Constitutional:       Appearance: He is obese. He is not ill-appearing or diaphoretic. Eyes:      Extraocular Movements: Extraocular movements intact. Conjunctiva/sclera: Conjunctivae normal.      Pupils: Pupils are equal, round, and reactive to light. Cardiovascular:      Rate and Rhythm: Normal rate. Heart sounds: No murmur heard. No friction rub. No gallop. Pulmonary:      Effort: Pulmonary effort is normal. No respiratory distress. Breath sounds: No stridor. No wheezing or rhonchi. Abdominal:      General: Abdomen is flat. Bowel sounds are normal. There is no distension. Palpations: There is no mass. Tenderness: There is no abdominal tenderness. Hernia: No hernia is present. Musculoskeletal:      Right lower leg: Edema present.       Left lower leg: Edema present. Skin:     Findings: Erythema (both lower extremities) present. Neurological:      Mental Status: He is alert. Mental status is at baseline. Additional Data:     Labs:  Results from last 7 days   Lab Units 11/24/23  0556   WBC Thousand/uL 6.17   HEMOGLOBIN g/dL 10.1*   HEMATOCRIT % 31.9*   PLATELETS Thousands/uL 113*   NEUTROS PCT % 79*   LYMPHS PCT % 10*   MONOS PCT % 10   EOS PCT % 0     Results from last 7 days   Lab Units 11/24/23  0556   SODIUM mmol/L 141   POTASSIUM mmol/L 3.9   CHLORIDE mmol/L 102   CO2 mmol/L 31   BUN mg/dL 31*   CREATININE mg/dL 1.65*   ANION GAP mmol/L 8   CALCIUM mg/dL 8.9   ALBUMIN g/dL 3.2*   TOTAL BILIRUBIN mg/dL 1.12*   ALK PHOS U/L 169*   ALT U/L 14   AST U/L 21   GLUCOSE RANDOM mg/dL 84     Results from last 7 days   Lab Units 11/17/23  1800   INR  1.15             Results from last 7 days   Lab Units 11/20/23  0512 11/19/23  0513 11/17/23  1800   LACTIC ACID mmol/L  --   --  1.2   PROCALCITONIN ng/ml 0.41* 0.36* 0.31*       Lines/Drains:  Invasive Devices       Peripheral Intravenous Line  Duration             Peripheral IV 11/23/23 Left;Ventral (anterior) Wrist <1 day                          Imaging: No pertinent imaging reviewed. Recent Cultures (last 7 days):   Results from last 7 days   Lab Units 11/20/23  1335 11/17/23  1802 11/17/23  1800   BLOOD CULTURE   --  No Growth After 5 Days. No Growth After 5 Days.    GRAM STAIN RESULT  2+ Polys  No organisms seen  --   --    BODY FLUID CULTURE, STERILE  No growth  --   --        Last 24 Hours Medication List:   Current Facility-Administered Medications   Medication Dose Route Frequency Provider Last Rate    acetaminophen  650 mg Oral Q6H PRN Dana Asif PA-C      ampicillin-sulbactam  3 g Intravenous Q6H Ismael Briscoe MD Stopped (11/24/23 1045)    ascorbic acid  500 mg Oral Daily Dana Asif PA-C      aspirin  81 mg Oral Daily Javier Mendosa MD      calcium carbonate-vitamin D  1 tablet Oral Daily With Breakfast Dana Demo, PA-C      dextromethorphan-guaiFENesin  10 mL Oral Q4H PRN Dana Demo, PA-C      enoxaparin  40 mg Subcutaneous Q24H [address] Ashtyn Romero MD      furosemide  40 mg Oral Daily Ashtyn Romero MD      lactulose  20 g Oral After [employer], PA-JAMIE      lamoTRIgine  200 mg Oral Early Morning Dana Demo, PA-C      lamoTRIgine  250 mg Oral After Lunch Dana Demo, PA-C      lamoTRIgine  300 mg Oral HS Dana Demo, PA-C      levOCARNitine  330 mg Oral 4x Daily (with meals and at bedtime) Dana Demo, PA-JAMIE      LORazepam  2 mg Intravenous Q6H PRN Dana Demo, PA-C      metoprolol tartrate  25 mg Oral BID Dana Demo, PA-C      nystatin   Topical BID Dana Demo, PA-C      ondansetron  4 mg Intravenous Q6H PRN Dana Demo, PA-C      polyethylene glycol  17 g Oral Daily PRN Dana Demo, PA-C      primidone  100 mg Oral After Breakfast Dana Demo, PA-C      primidone  100 mg Oral Daily With Lunch Dana Demo, PA-C      primidone  150 mg Oral HS Dana Demo, PA-JAMIE      senna-docusate sodium  1 tablet Oral BID Dana Demo, PA-C      sodium chloride  1 Application Nasal TID Kwasi Clemens MD      sodium chloride  2 spray Each Nare TID Kwasi Clemens MD      vitamin E (tocopherol)  400 Units Oral Daily Dana Demo, PA-JAMIE      zonisamide  100 mg Oral HS Pamella Lee PA-C          Today, Patient Was Seen By: Jai Ross MD    **Please Note: This note may have been constructed using a voice recognition system. **

## 2023-11-24 NOTE — ASSESSMENT & PLAN NOTE
Patient does use pumps at home he has chronic lymphedema but discussed with the mother that edema is worsened he has been compliant with Lasix when he was in the nursing home his Lasix was 60 but decreased to 40 secondary to kidney numbers. He does have evidence of a small effusion on the left as well as the right which he has loculated see below he did receive Lasix 40 mg IV x1 on admission. which we will continue CT abdomen did not reveal any paracentesis as the mother did report that his girth has increased  proBNP is elevated  2D echo done in 2022 with mild concentric hypertrophy and EF of 60 to 65%  Patient is status post IR guided thoracentesis, after discussion is done with nephrology, will resume home dose of Lasix, hold spironolactone at this time. Due to recent epistaxis from nasal cannula prong, patient needs to use facemask-patient is noncompliant.

## 2023-11-24 NOTE — ASSESSMENT & PLAN NOTE
Patient's main caregiver is his mother who is hospitalized today at [facility]  She assists him with his ADLs and assist x2  Might need placement  Pt/ot ordered

## 2023-11-24 NOTE — PROGRESS NOTES
NEPHROLOGY PROGRESS NOTE    Cameron Hung 66 y.o. male MRN: [id number]  Unit/Bed#: -01 Encounter: [id number]  Reason for Consult: EDWIGE on mild CKD    Patient was awake alert sitting up in a chair had no acute complaints for me said was breathing comfortably. Otherwise no changes or complaints. ASSESSMENT/PLAN:  1. Renal    Apparently patient's baseline creatinine is 1.3-1.4 and has been running a little bit higher than baseline in the hospital.  Creatinine is 1.6 today with normal electrolytes. Given that the patient has cirrhosis he has effective volume depletion and with his volume overload adjusting diuretics he is likely running a little bit higher with respect to his creatinine. It has been stable he has been implemented on an oral diuretic regimen. He has been implemented on Lasix and at home has been on spironolactone. In cirrhotic patients it is very helpful to have spironolactone as they have secondary hyperaldosteronism. This can be resumed on discharge and for now monitor on current oral diuretics. Continue current medications  Consider reintroducing spironolactone at some point  BMP a.m.    2.  Pulmonary    Patient's respiratory status seems to have improved he underwent thoracentesis earlier in hospitalization for pleural effusion. 3.  Cirrhosis of the liver. SUBJECTIVE:  Review of Systems   Constitutional: Negative for chills, diaphoresis, fever and night sweats. HENT: Negative. Eyes: Negative. Cardiovascular:  Positive for leg swelling. Negative for chest pain, dyspnea on exertion and orthopnea. Respiratory: Negative. Negative for cough, shortness of breath, sputum production and wheezing. Gastrointestinal:  Negative for abdominal pain, diarrhea, nausea and vomiting. Genitourinary: Negative. Neurological:  Negative for dizziness, focal weakness and headaches.    Psychiatric/Behavioral:  Negative for altered mental status, hallucinations and hypervigilance. The patient is not nervous/anxious. OBJECTIVE:  Current Weight: Weight - Scale: 111 kg (245 lb 9.5 oz)  Vitals:Temp (24hrs), Av.8 °F (36.6 °C), Min:97.7 °F (36.5 °C), Max:97.9 °F (36.6 °C)  Current: Temperature: 97.7 °F (36.5 °C)   Blood pressure 137/74, pulse 85, temperature 97.7 °F (36.5 °C), resp. rate 18, height 5' 6" (1.676 m), weight 111 kg (245 lb 9.5 oz), SpO2 (!) 88 %. , Body mass index is 39.64 kg/m². Intake/Output Summary (Last 24 hours) at 2023 1041  Last data filed at 2023 0942  Gross per 24 hour   Intake 900 ml   Output 500 ml   Net 400 ml       Physical Exam: /74   Pulse 85   Temp 97.7 °F (36.5 °C)   Resp 18   Ht 5' 6" (1.676 m)   Wt 111 kg (245 lb 9.5 oz)   SpO2 (!) 88%   BMI 39.64 kg/m²   Physical Exam  Constitutional:       General: He is not in acute distress. Appearance: He is not toxic-appearing or diaphoretic. HENT:      Head: Normocephalic and atraumatic. Nose: Nose normal.      Mouth/Throat:      Mouth: Mucous membranes are moist.   Eyes:      General: No scleral icterus. Extraocular Movements: Extraocular movements intact. Cardiovascular:      Rate and Rhythm: Normal rate and regular rhythm. Heart sounds: No friction rub. No gallop. Comments: Positive edema. Pulmonary:      Effort: Pulmonary effort is normal. No respiratory distress. Breath sounds: No wheezing, rhonchi or rales. Abdominal:      General: Bowel sounds are normal. There is no distension. Palpations: Abdomen is soft. Tenderness: There is no abdominal tenderness. There is no rebound. Musculoskeletal:      Cervical back: Normal range of motion and neck supple. Neurological:      Mental Status: He is alert. Psychiatric:         Mood and Affect: Mood normal.         Behavior: Behavior normal.         Thought Content:  Thought content normal.         Medications:    Current Facility-Administered Medications: acetaminophen (TYLENOL) tablet 650 mg, 650 mg, Oral, Q6H PRN, Dana Asif PA-C, 650 mg at 11/24/23 0934    ampicillin-sulbactam (UNASYN) 3 g in sodium chloride 0.9 % 100 mL IVPB, 3 g, Intravenous, Q6H, Latoya Centeno MD, Last Rate: 200 mL/hr at 11/24/23 0949, 3 g at 11/24/23 0949    ascorbic acid (VITAMIN C) tablet 500 mg, 500 mg, Oral, Daily, MARTINA JosephC, 500 mg at 11/24/23 2590    aspirin (ECOTRIN LOW STRENGTH) EC tablet 81 mg, 81 mg, Oral, Daily, Marva Castleman, MD, 81 mg at 11/24/23 0934    calcium carbonate-vitamin D 500 mg-5 mcg tablet 1 tablet, 1 tablet, Oral, Daily With Breakfast, Dana Asif PA-C, 1 tablet at 11/24/23 0934    dextromethorphan-guaiFENesin (ROBITUSSIN DM) oral syrup 10 mL, 10 mL, Oral, Q4H PRN, Dana Asif PA-C    enoxaparin (LOVENOX) subcutaneous injection 40 mg, 40 mg, Subcutaneous, Q24H [address], Gunner Romero MD, 40 mg at 11/24/23 0946    furosemide (LASIX) tablet 40 mg, 40 mg, Oral, Daily, Gunner Romero MD, 40 mg at 11/24/23 0934    lactulose (CHRONULAC) oral solution 20 g, 20 g, Oral, After Dinner, Dana Asif PA-C, 20 g at 11/23/23 1725    lamoTRIgine (LaMICtal) tablet 200 mg, 200 mg, Oral, Early Morning, REMBERTO Joseph-C, 200 mg at 11/24/23 8149    lamoTRIgine (LaMICtal) tablet 250 mg, 250 mg, Oral, After Lunch, REMBERTO Joseph-C, 250 mg at 11/23/23 1528    lamoTRIgine (LaMICtal) tablet 300 mg, 300 mg, Oral, HS, REMBERTO Joseph-C, 300 mg at 11/23/23 2206    levOCARNitine (CARNITOR) oral solution 330 mg, 330 mg, Oral, 4x Daily (with meals and at bedtime), Dana Asif PA-C, 330 mg at 11/24/23 0937    LORazepam (ATIVAN) injection 2 mg, 2 mg, Intravenous, Q6H PRN, Dana Asif PA-C    magnesium sulfate 2 g/50 mL IVPB (premix) 2 g, 2 g, Intravenous, Once, Ashtyn Romero MD    metoprolol tartrate (LOPRESSOR) tablet 25 mg, 25 mg, Oral, BID, Dana Asif PA-C, 25 mg at 11/24/23 0934    nystatin (MYCOSTATIN) powder, , Topical, BID, Dana Asif PA-C, 1 Application at 40/98/07 0935    ondansetron [facility]) injection 4 mg, 4 mg, Intravenous, Q6H PRN, Dana Demo, PA-C    polyethylene glycol (MIRALAX) packet 17 g, 17 g, Oral, Daily PRN, Dana Demo, PA-C    primidone (MYSOLINE) tablet 100 mg, 100 mg, Oral, After Breakfast, Dana Demo, PA-C, 100 mg at 11/24/23 0934    primidone (MYSOLINE) tablet 100 mg, 100 mg, Oral, Daily With Lunch, Dana Demo, PA-C, 100 mg at 11/23/23 1528    primidone (MYSOLINE) tablet 150 mg, 150 mg, Oral, HS, Dana Demo, PA-C, 150 mg at 11/23/23 2205    senna-docusate sodium (SENOKOT S) 8.6-50 mg per tablet 1 tablet, 1 tablet, Oral, BID, Dana Demo, PA-C, 1 tablet at 11/24/23 0935    sodium chloride (AYR SALINE NASAL) nasal gel 1 Application, 1 Application, Nasal, TID, Alban Rivas MD, 1 Application at 21/58/11 2115    sodium chloride (OCEAN) 0.65 % nasal spray 2 spray, 2 spray, Each Nare, TID, Alban Rivas MD, 2 spray at 11/21/23 2115    vitamin E (TOCOPHEROL) capsule 400 Units, 400 Units, Oral, Daily, Dana Demo, PA-C, 400 Units at 11/24/23 0934    zonisamide (ZONEGRAN) capsule 100 mg, 100 mg, Oral, HS, Dana Demo, PA-C, 100 mg at 11/23/23 2206    Laboratory Results:  Lab Results   Component Value Date    WBC 6.17 11/24/2023    HGB 10.1 (L) 11/24/2023    HCT 31.9 (L) 11/24/2023     (H) 11/24/2023     (L) 11/24/2023     Lab Results   Component Value Date    SODIUM 141 11/24/2023    K 3.9 11/24/2023     11/24/2023    CO2 31 11/24/2023    BUN 31 (H) 11/24/2023    CREATININE 1.65 (H) 11/24/2023    GLUC 84 11/24/2023    CALCIUM 8.9 11/24/2023     Lab Results   Component Value Date    CALCIUM 8.9 11/24/2023    PHOS 3.0 01/18/2020     No results found for: "LABPROT"

## 2023-11-24 NOTE — ASSESSMENT & PLAN NOTE
Lab Results   Component Value Date    EGFR 45 11/24/2023    EGFR 48 11/23/2023    EGFR 46 11/22/2023    CREATININE 1.65 (H) 11/24/2023    CREATININE 1.58 (H) 11/23/2023    CREATININE 1.62 (H) 11/22/2023   Has been anywhere between 1.4-1.5, cr worsened since lasix iv and also on high dose ladactone .  Cr stable today hold both still discussed with nephro if cr stable linden restart diuretics

## 2023-11-24 NOTE — PLAN OF CARE
Problem: PAIN - ADULT  Goal: Verbalizes/displays adequate comfort level or baseline comfort level  Description: Interventions:  - Encourage patient to monitor pain and request assistance  - Assess pain using appropriate pain scale  - Administer analgesics based on type and severity of pain and evaluate response  - Implement non-pharmacological measures as appropriate and evaluate response  - Consider cultural and social influences on pain and pain management  - Notify physician/advanced practitioner if interventions unsuccessful or patient reports new pain  Outcome: Progressing     Problem: INFECTION - ADULT  Goal: Absence or prevention of progression during hospitalization  Description: INTERVENTIONS:  - Assess and monitor for signs and symptoms of infection  - Monitor lab/diagnostic results  - Monitor all insertion sites, i.e. indwelling lines, tubes, and drains  - Monitor endotracheal if appropriate and nasal secretions for changes in amount and color  - [place] appropriate cooling/warming therapies per order  - Administer medications as ordered  - Instruct and encourage patient and family to use good hand hygiene technique  - Identify and instruct in appropriate isolation precautions for identified infection/condition  Outcome: Progressing  Goal: Absence of fever/infection during neutropenic period  Description: INTERVENTIONS:  - Monitor WBC    Outcome: Progressing     Problem: SAFETY ADULT  Goal: Patient will remain free of falls  Description: INTERVENTIONS:  - Educate patient/family on patient safety including physical limitations  - Instruct patient to call for assistance with activity   - Consult OT/PT to assist with strengthening/mobility   - Keep Call bell within reach  - Keep bed low and locked with side rails adjusted as appropriate  - Keep care items and personal belongings within reach  - Initiate and maintain comfort rounds  - Make Fall Risk Sign visible to staff  - Offer Toileting every 2 Hours, in advance of need  - Initiate/Maintain bed/chair alarm  - Obtain necessary fall risk management equipment:   - Apply yellow socks and bracelet for high fall risk patients  - Consider moving patient to room near nurses station  Outcome: Progressing  Goal: Maintain or return to baseline ADL function  Description: INTERVENTIONS:  -  Assess patient's ability to carry out ADLs; assess patient's baseline for ADL function and identify physical deficits which impact ability to perform ADLs (bathing, care of mouth/teeth, toileting, grooming, dressing, etc.)  - Assess/evaluate cause of self-care deficits   - Assess range of motion  - Assess patient's mobility; develop plan if impaired  - Assess patient's need for assistive devices and provide as appropriate  - Encourage maximum independence but intervene and supervise when necessary  - Involve family in performance of ADLs  - Assess for home care needs following discharge   - Consider OT consult to assist with ADL evaluation and planning for discharge  - Provide patient education as appropriate  Outcome: Progressing  Goal: Maintains/Returns to pre admission functional level  Description: INTERVENTIONS:  - Perform AM-PAC 6 Click Basic Mobility/ Daily Activity assessment daily.  - Set and communicate daily mobility goal to care team and patient/family/caregiver. - Collaborate with rehabilitation services on mobility goals if consulted  - Perform Range of Motion 3 times a day. - Reposition patient every 2 hours.   - Dangle patient 3 times a day  - Stand patient 3 times a day  - Ambulate patient 3 times a day  - Out of bed to chair 3 times a day   - Out of bed for meals 3 times a day  - Out of bed for toileting  - Record patient progress and toleration of activity level   Outcome: Progressing     Problem: DISCHARGE PLANNING  Goal: Discharge to home or other facility with appropriate resources  Description: INTERVENTIONS:  - Identify barriers to discharge w/patient and caregiver  - Arrange for needed discharge resources and transportation as appropriate  - Identify discharge learning needs (meds, wound care, etc.)  - Arrange for interpretive services to assist at discharge as needed  - Refer to Case Management Department for coordinating discharge planning if the patient needs post-hospital services based on physician/advanced practitioner order or complex needs related to functional status, cognitive ability, or social support system  Outcome: Progressing     Problem: Knowledge Deficit  Goal: Patient/family/caregiver demonstrates understanding of disease process, treatment plan, medications, and discharge instructions  Description: Complete learning assessment and assess knowledge base.   Interventions:  - Provide teaching at level of understanding  - Provide teaching via preferred learning methods  Outcome: Progressing     Problem: Prexisting or High Potential for Compromised Skin Integrity  Goal: Skin integrity is maintained or improved  Description: INTERVENTIONS:  - Identify patients at risk for skin breakdown  - Assess and monitor skin integrity  - Assess and monitor nutrition and hydration status  - Monitor labs   - Assess for incontinence   - Turn and reposition patient  - Assist with mobility/ambulation  - Relieve pressure over bony prominences  - Avoid friction and shearing  - Provide appropriate hygiene as needed including keeping skin clean and dry  - Evaluate need for skin moisturizer/barrier cream  - Collaborate with interdisciplinary team   - Patient/family teaching  - Consider wound care consult   Outcome: Progressing     Problem: RESPIRATORY - ADULT  Goal: Achieves optimal ventilation and oxygenation  Description: INTERVENTIONS:  - Assess for changes in respiratory status  - Assess for changes in mentation and behavior  - Position to facilitate oxygenation and minimize respiratory effort  - Oxygen administered by appropriate delivery if ordered  - Initiate smoking cessation education as indicated  - Encourage broncho-pulmonary hygiene including cough, deep breathe, Incentive Spirometry  - Assess the need for suctioning and aspirate as needed  - Assess and instruct to report SOB or any respiratory difficulty  - Respiratory Therapy support as indicated  Outcome: Progressing

## 2023-11-25 LAB
ANION GAP SERPL CALCULATED.3IONS-SCNC: 8 MMOL/L
BUN SERPL-MCNC: 32 MG/DL (ref 5–25)
CALCIUM SERPL-MCNC: 9 MG/DL (ref 8.4–10.2)
CHLORIDE SERPL-SCNC: 101 MMOL/L (ref 96–108)
CO2 SERPL-SCNC: 30 MMOL/L (ref 21–32)
CREAT SERPL-MCNC: 1.69 MG/DL (ref 0.6–1.3)
GFR SERPL CREATININE-BSD FRML MDRD: 44 ML/MIN/1.73SQ M
GLUCOSE SERPL-MCNC: 89 MG/DL (ref 65–140)
POTASSIUM SERPL-SCNC: 4.5 MMOL/L (ref 3.5–5.3)
SODIUM SERPL-SCNC: 139 MMOL/L (ref 135–147)

## 2023-11-25 PROCEDURE — 99232 SBSQ HOSP IP/OBS MODERATE 35: CPT | Performed by: INTERNAL MEDICINE

## 2023-11-25 PROCEDURE — 99232 SBSQ HOSP IP/OBS MODERATE 35: CPT | Performed by: FAMILY MEDICINE

## 2023-11-25 PROCEDURE — 80048 BASIC METABOLIC PNL TOTAL CA: CPT | Performed by: INTERNAL MEDICINE

## 2023-11-25 RX ORDER — LANOLIN ALCOHOL/MO/W.PET/CERES
1 CREAM (GRAM) TOPICAL
Status: DISCONTINUED | OUTPATIENT
Start: 2023-11-25 | End: 2023-12-08 | Stop reason: HOSPADM

## 2023-11-25 RX ORDER — SPIRONOLACTONE 25 MG/1
50 TABLET ORAL DAILY
Status: DISCONTINUED | OUTPATIENT
Start: 2023-11-25 | End: 2023-12-06

## 2023-11-25 RX ORDER — PRIMIDONE 50 MG/1
100 TABLET ORAL
Status: DISCONTINUED | OUTPATIENT
Start: 2023-11-25 | End: 2023-12-08 | Stop reason: HOSPADM

## 2023-11-25 RX ORDER — LEVOCARNITINE 1 G/10ML
330 SOLUTION ORAL
Status: DISCONTINUED | OUTPATIENT
Start: 2023-11-25 | End: 2023-12-08 | Stop reason: HOSPADM

## 2023-11-25 RX ADMIN — PRIMIDONE 150 MG: 50 TABLET ORAL at 21:32

## 2023-11-25 RX ADMIN — METOPROLOL TARTRATE 25 MG: 25 TABLET, FILM COATED ORAL at 09:13

## 2023-11-25 RX ADMIN — AMPICILLIN SODIUM AND SULBACTAM SODIUM 3 G: 2; 1 INJECTION, POWDER, FOR SOLUTION INTRAMUSCULAR; INTRAVENOUS at 09:09

## 2023-11-25 RX ADMIN — METOPROLOL TARTRATE 25 MG: 25 TABLET, FILM COATED ORAL at 21:32

## 2023-11-25 RX ADMIN — AMPICILLIN SODIUM AND SULBACTAM SODIUM 3 G: 2; 1 INJECTION, POWDER, FOR SOLUTION INTRAMUSCULAR; INTRAVENOUS at 03:08

## 2023-11-25 RX ADMIN — Medication 1 TABLET: at 09:12

## 2023-11-25 RX ADMIN — OXYCODONE HYDROCHLORIDE AND ACETAMINOPHEN 500 MG: 500 TABLET ORAL at 09:12

## 2023-11-25 RX ADMIN — DOCUSATE SODIUM AND SENNOSIDES 1 TABLET: 8.6; 5 TABLET, FILM COATED ORAL at 09:13

## 2023-11-25 RX ADMIN — NYSTATIN 1 APPLICATION: 100000 POWDER TOPICAL at 17:36

## 2023-11-25 RX ADMIN — LAMOTRIGINE 300 MG: 100 TABLET ORAL at 21:32

## 2023-11-25 RX ADMIN — LEVOCARNITINE 330 MG: 1 SOLUTION ORAL at 09:13

## 2023-11-25 RX ADMIN — Medication 400 UNITS: at 09:12

## 2023-11-25 RX ADMIN — ZONISAMIDE 100 MG: 100 CAPSULE ORAL at 21:32

## 2023-11-25 RX ADMIN — LEVOCARNITINE 330 MG: 1 SOLUTION ORAL at 12:21

## 2023-11-25 RX ADMIN — LAMOTRIGINE 250 MG: 100 TABLET ORAL at 14:23

## 2023-11-25 RX ADMIN — SPIRONOLACTONE 50 MG: 25 TABLET ORAL at 12:21

## 2023-11-25 RX ADMIN — ASPIRIN 81 MG: 81 TABLET, COATED ORAL at 09:12

## 2023-11-25 RX ADMIN — NYSTATIN 1 APPLICATION: 100000 POWDER TOPICAL at 09:07

## 2023-11-25 RX ADMIN — LEVOCARNITINE 330 MG: 1 SOLUTION ORAL at 21:32

## 2023-11-25 RX ADMIN — ENOXAPARIN SODIUM 40 MG: 40 INJECTION SUBCUTANEOUS at 09:08

## 2023-11-25 RX ADMIN — DOCUSATE SODIUM AND SENNOSIDES 1 TABLET: 8.6; 5 TABLET, FILM COATED ORAL at 17:36

## 2023-11-25 RX ADMIN — PRIMIDONE 100 MG: 50 TABLET ORAL at 14:23

## 2023-11-25 RX ADMIN — AMPICILLIN SODIUM AND SULBACTAM SODIUM 3 G: 2; 1 INJECTION, POWDER, FOR SOLUTION INTRAMUSCULAR; INTRAVENOUS at 14:26

## 2023-11-25 RX ADMIN — LEVOCARNITINE 330 MG: 1 SOLUTION ORAL at 14:24

## 2023-11-25 RX ADMIN — LAMOTRIGINE 200 MG: 100 TABLET ORAL at 06:34

## 2023-11-25 RX ADMIN — PRIMIDONE 100 MG: 50 TABLET ORAL at 09:13

## 2023-11-25 RX ADMIN — FUROSEMIDE 40 MG: 40 TABLET ORAL at 09:13

## 2023-11-25 RX ADMIN — AMPICILLIN SODIUM AND SULBACTAM SODIUM 3 G: 2; 1 INJECTION, POWDER, FOR SOLUTION INTRAMUSCULAR; INTRAVENOUS at 21:31

## 2023-11-25 NOTE — ASSESSMENT & PLAN NOTE
Patient does use pumps at home he has chronic lymphedema but discussed with the mother that edema is worsened he has been compliant with Lasix when he was in the nursing home his Lasix was 60 but decreased to 40 secondary to kidney numbers. He does have evidence of a small effusion on the left as well as the right which he has loculated see below he did receive Lasix 40 mg IV x1 on admission.   which we will continue CT abdomen did not reveal any paracentesis as the mother did report that his girth has increased  proBNP is elevated  2D echo done in 2022 with mild concentric hypertrophy and EF of 60 to 65%  Patient is status post IR guided thoracentesis, after discussion is done with nephrology, will resume home dose of Lasix will resume spironolactone-but lower dose, 50 mg

## 2023-11-25 NOTE — ASSESSMENT & PLAN NOTE
Frequent falls for his entire life because of unsteady gait according to the mother's medical history list  Fall precautions  Emergency room trauma eval no acute injury  Due to patient overall medical conditions, social issues, patient is in need of extensive care. Case management on board.

## 2023-11-25 NOTE — PROGRESS NOTES
[address] [facility]  Progress Note  Name: Home Harrington  MRN: [id number]  Unit/Bed#: -43 I Date of Admission: 11/17/2023   Date of Service: 11/25/2023 I Hospital Day: 8    Assessment/Plan   * Pleural effusion on right  Assessment & Plan    This did require a chest tube about 20 years ago according to the mother's typed medical history list  Discussed with the mother patient has been having worsening shortness of breath and for the past week and has been falling asleep a lot he usually wears oxygen 2 L at night but the mother has been using 2 L throughout the day. No cough has been reported but she did state that his lower extremity edema has worsened  There is loculated right-sided moderate effusion with inability to rule out pneumonia questionable can be parapneumonic effusion that needs chest tube for drainage in unable to be done with a thoracocentesis. I discussed with the mother and if needed okay to proceed with the chest tube discussed that interventional radiology will see the patient on Monday and decide which way to go we will order pleural fluid studies  Patient is status post IR guided thoracentesis    Epistaxis  Assessment & Plan  Resolved. Appreciate ENT consult. Acute blood loss anemia  Assessment & Plan  Secondary to recurrent epistaxis. Appreciate ENT consult,  Patient is on aspirin and Lovenox, continue to monitor. Stage 3 chronic kidney disease [facility])  Assessment & Plan  Lab Results   Component Value Date    EGFR 44 11/25/2023    EGFR 45 11/24/2023    EGFR 48 11/23/2023    CREATININE 1.69 (H) 11/25/2023    CREATININE 1.65 (H) 11/24/2023    CREATININE 1.58 (H) 11/23/2023   Has been anywhere between 1.4-1.5, cr worsened since lasix iv and also on high dose ladactone .  Cr stable today hold both still discussed with nephro if cr stable linden restart diuretics     Chronic respiratory failure with hypercapnia (HCC)  Assessment & Plan  Wears 2L O2 QHS, continue  Likely has a chronic hypercapnia given the elevated bicarbonate/kyphosis likely causing chronic limited chest expansion and sleepiness during day will obtain vbg- reviewed 69  Status post thoracentesis. Due to recent epistaxis, patient unable to place nasal cannula-was trying to use facemask but patient is not keeping the facemask. As long as patient saturation 89%, will keep monitoring. No other household member able to render care  Assessment & Plan  Patient's main caregiver is his mother who is hospitalized today at [address]  She assists him with his ADLs and assist x2  Might need placement  Pt/ot ordered    Liver cirrhosis ([address])  Assessment & Plan  Cirrhotic morphology of the liver chronic problem  Ammonia is normal there is no ascites continue Lasix   Aldactone resume and lower dose, 50 mg daily    Acute on chronic heart failure with preserved ejection fraction [facility])  Assessment & Plan  Patient does use pumps at home he has chronic lymphedema but discussed with the mother that edema is worsened he has been compliant with Lasix when he was in the nursing home his Lasix was 60 but decreased to 40 secondary to kidney numbers. He does have evidence of a small effusion on the left as well as the right which he has loculated see below he did receive Lasix 40 mg IV x1 on admission.   which we will continue CT abdomen did not reveal any paracentesis as the mother did report that his girth has increased  proBNP is elevated  2D echo done in 2022 with mild concentric hypertrophy and EF of 60 to 65%  Patient is status post IR guided thoracentesis, after discussion is done with nephrology, will resume home dose of Lasix will resume spironolactone-but lower dose, 50 mg    Dysphagia  Assessment & Plan  Continue mechanical soft diet from prior  Aspiration precautions obtain speech    Developmental delay  Assessment & Plan  Daily involvement with mother  Supportive care  PT/OT/ST  Pt AAOx1   At baseline AAOx3  Due to social situation, patient will need skilled nursing facility, case management on board (patient was living with his mother, mother recently hospitalized due to medical issue and get discharged to skilled nursing facility. No other family member is able to take care of the patient.)    Falls  Assessment & Plan  Frequent falls for his entire life because of unsteady gait according to the mother's medical history list  Fall precautions  Emergency room trauma eval no acute injury  Due to patient overall medical conditions, social issues, patient is in need of extensive care. Case management on board. Nonintractable generalized idiopathic epilepsy without status epilepticus ([address])  Assessment & Plan  Continue home seizure medications with seizure precautions  His last seizure was 2-3 days ago according to his uncle. Lamictal, zonesamide and primidone levels ordered. The last few times he was hospitalized for seizure the AEDs were not changed because the breakthrough seizure etiology were infections. VTE Pharmacologic Prophylaxis: VTE Score: 5 High Risk (Score >/= 5) - Pharmacological DVT Prophylaxis Ordered: enoxaparin (Lovenox). Sequential Compression Devices Ordered. Mobility:   Basic Mobility Inpatient Raw Score: 8  -[facility] Goal: 3: Sit at edge of bed  -HLM Achieved: 4: Move to chair/commode  M Goal achieved. Continue to encourage appropriate mobility. Patient Centered Rounds: I performed bedside rounds with nursing staff today. Discussions with Specialists or Other Care Team Provider: Nephrology    Education and Discussions with Family / Patient: Updated  (mother) via phone. Current Length of Stay: 8 day(s)  Current Patient Status: Inpatient   Certification Statement: The patient will continue to require additional inpatient hospital stay due to to monitor above conditions  Discharge Plan: Anticipate discharge in >72 hrs to to be determined.     Code Status: Level 1 - Full Code    Subjective:   Seen and evaluated and examined. Resting comfortably. Denies any significant complaint. Objective:     Vitals:   Temp (24hrs), Av.6 °F (36.4 °C), Min:97.5 °F (36.4 °C), Max:97.7 °F (36.5 °C)    Temp:  [97.5 °F (36.4 °C)-97.7 °F (36.5 °C)] 97.5 °F (36.4 °C)  HR:  [61-79] 79  Resp:  [16] 16  BP: ()/(62-78) 135/78  SpO2:  [89 %-93 %] 89 %  Body mass index is 39.89 kg/m². Input and Output Summary (last 24 hours): Intake/Output Summary (Last 24 hours) at 2023 1055  Last data filed at 2023 0735  Gross per 24 hour   Intake 480 ml   Output 200 ml   Net 280 ml       Physical Exam:   Physical Exam  Vitals reviewed. Constitutional:       Appearance: Normal appearance. He is obese. Eyes:      Extraocular Movements: Extraocular movements intact. Conjunctiva/sclera: Conjunctivae normal.      Pupils: Pupils are equal, round, and reactive to light. Cardiovascular:      Rate and Rhythm: Normal rate. Heart sounds: No murmur heard. No friction rub. No gallop. Pulmonary:      Effort: Pulmonary effort is normal. No respiratory distress. Breath sounds: No stridor. No wheezing or rhonchi. Abdominal:      General: There is no distension. Palpations: There is no mass. Tenderness: There is no abdominal tenderness. Hernia: No hernia is present. Musculoskeletal:      Right lower leg: Edema present. Left lower leg: Edema present. Skin:     General: Skin is warm. Coloration: Skin is not jaundiced or pale. Findings: No bruising or erythema. Neurological:      Mental Status: He is alert. Mental status is at baseline.          Additional Data:     Labs:  Results from last 7 days   Lab Units 23  0556   WBC Thousand/uL 6.17   HEMOGLOBIN g/dL 10.1*   HEMATOCRIT % 31.9*   PLATELETS Thousands/uL 113*   NEUTROS PCT % 79*   LYMPHS PCT % 10*   MONOS PCT % 10   EOS PCT % 0     Results from last 7 days   Lab Units 23  0457 11/24/23  0556   SODIUM mmol/L 139 141   POTASSIUM mmol/L 4.5 3.9   CHLORIDE mmol/L 101 102   CO2 mmol/L 30 31   BUN mg/dL 32* 31*   CREATININE mg/dL 1.69* 1.65*   ANION GAP mmol/L 8 8   CALCIUM mg/dL 9.0 8.9   ALBUMIN g/dL  --  3.2*   TOTAL BILIRUBIN mg/dL  --  1.12*   ALK PHOS U/L  --  169*   ALT U/L  --  14   AST U/L  --  21   GLUCOSE RANDOM mg/dL 89 84                 Results from last 7 days   Lab Units 11/20/23  0512 11/19/23  0513   PROCALCITONIN ng/ml 0.41* 0.36*       Lines/Drains:  Invasive Devices       Peripheral Intravenous Line  Duration             Peripheral IV 11/23/23 Left;Ventral (anterior) Wrist 1 day                          Imaging: No pertinent imaging reviewed.     Recent Cultures (last 7 days):   Results from last 7 days   Lab Units 11/20/23  1335   GRAM STAIN RESULT  2+ Polys  No organisms seen   BODY FLUID CULTURE, STERILE  No growth       Last 24 Hours Medication List:   Current Facility-Administered Medications   Medication Dose Route Frequency Provider Last Rate    acetaminophen  650 mg Oral Q6H PRN Dana Asif PA-C      ampicillin-sulbactam  3 g Intravenous Q6H Paola Valdes MD 3 g (11/25/23 0107)    ascorbic acid  500 mg Oral Daily Dana Asif PA-C      aspirin  81 mg Oral Daily Moody Fraser MD      calcium carbonate-vitamin D  1 tablet Oral Daily With Breakfast Ashtyn Romero MD      dextromethorphan-guaiFENesin  10 mL Oral Q4H PRN Dana Asif PA-C      enoxaparin  40 mg Subcutaneous Q24H [address] Ashtyn Romero MD      furosemide  40 mg Oral Daily Ashtyn Romero MD      lactulose  20 g Oral After [place], WHITNEY      lamoTRIgine  200 mg Oral Early Morning Dana Asif PA-C      lamoTRIgine  250 mg Oral After Lunch Dana Asif PA-C      lamoTRIgine  300 mg Oral HS Dana Asif PA-C      levOCARNitine  330 mg Oral 4x Daily (with meals and at bedtime) Alba Romero MD      LORazepam  2 mg Intravenous Q6H PRN Danatuan Asif, PA-C      metoprolol tartrate  25 mg Oral BID Dana WHITNEY Asif      nystatin   Topical BID Dana Asif PA-C      ondansetron  4 mg Intravenous Q6H PRN Dana Asif PA-C      polyethylene glycol  17 g Oral Daily PRN Dana Asif PA-C      primidone  100 mg Oral Daily With Lunch Dana Asif PA-C      primidone  100 mg Oral After Breakfast Ashtyn Romero MD      primidone  150 mg Oral HS Dana Asif PA-C      senna-docusate sodium  1 tablet Oral BID Dana Asif PA-C      sodium chloride  1 Application Nasal TID Gladys Zayas MD      sodium chloride  2 spray Each Nare TID Gladys Zayas MD      spironolactone  50 mg Oral Daily Arlyn Flores MD      vitamin E (tocopherol)  400 Units Oral Daily Dana Asif PA-C      zonisamide  100 mg Oral HS Laura Ohara PA-C          Today, Patient Was Seen By: Arlyn Flores MD    **Please Note: This note may have been constructed using a voice recognition system. **

## 2023-11-25 NOTE — PLAN OF CARE
Problem: PAIN - ADULT  Goal: Verbalizes/displays adequate comfort level or baseline comfort level  Description: Interventions:  - Encourage patient to monitor pain and request assistance  - Assess pain using appropriate pain scale  - Administer analgesics based on type and severity of pain and evaluate response  - Implement non-pharmacological measures as appropriate and evaluate response  - Consider cultural and social influences on pain and pain management  - Notify physician/advanced practitioner if interventions unsuccessful or patient reports new pain  Outcome: Progressing     Problem: INFECTION - ADULT  Goal: Absence or prevention of progression during hospitalization  Description: INTERVENTIONS:  - Assess and monitor for signs and symptoms of infection  - Monitor lab/diagnostic results  - Monitor all insertion sites, i.e. indwelling lines, tubes, and drains  - Monitor endotracheal if appropriate and nasal secretions for changes in amount and color  - [place] appropriate cooling/warming therapies per order  - Administer medications as ordered  - Instruct and encourage patient and family to use good hand hygiene technique  - Identify and instruct in appropriate isolation precautions for identified infection/condition  Outcome: Progressing  Goal: Absence of fever/infection during neutropenic period  Description: INTERVENTIONS:  - Monitor WBC    Outcome: Progressing     Problem: SAFETY ADULT  Goal: Patient will remain free of falls  Description: INTERVENTIONS:  - Educate patient/family on patient safety including physical limitations  - Instruct patient to call for assistance with activity   - Consult OT/PT to assist with strengthening/mobility   - Keep Call bell within reach  - Keep bed low and locked with side rails adjusted as appropriate  - Keep care items and personal belongings within reach  - Initiate and maintain comfort rounds  - Make Fall Risk Sign visible to staff  - Offer Toileting every 2 Hours, in advance of need  - Initiate/Maintain bed/chair alarm  - Obtain necessary fall risk management equipment:   - Apply yellow socks and bracelet for high fall risk patients  - Consider moving patient to room near nurses station  Outcome: Progressing  Goal: Maintain or return to baseline ADL function  Description: INTERVENTIONS:  -  Assess patient's ability to carry out ADLs; assess patient's baseline for ADL function and identify physical deficits which impact ability to perform ADLs (bathing, care of mouth/teeth, toileting, grooming, dressing, etc.)  - Assess/evaluate cause of self-care deficits   - Assess range of motion  - Assess patient's mobility; develop plan if impaired  - Assess patient's need for assistive devices and provide as appropriate  - Encourage maximum independence but intervene and supervise when necessary  - Involve family in performance of ADLs  - Assess for home care needs following discharge   - Consider OT consult to assist with ADL evaluation and planning for discharge  - Provide patient education as appropriate  Outcome: Progressing  Goal: Maintains/Returns to pre admission functional level  Description: INTERVENTIONS:  - Perform AM-PAC 6 Click Basic Mobility/ Daily Activity assessment daily.  - Set and communicate daily mobility goal to care team and patient/family/caregiver. - Collaborate with rehabilitation services on mobility goals if consulted  - Perform Range of Motion 3 times a day. - Reposition patient every 2 hours.   - Dangle patient 3 times a day  - Stand patient 3 times a day  - Ambulate patient 3 times a day  - Out of bed to chair 3 times a day   - Out of bed for meals 3 times a day  - Out of bed for toileting  - Record patient progress and toleration of activity level   Outcome: Progressing     Problem: DISCHARGE PLANNING  Goal: Discharge to home or other facility with appropriate resources  Description: INTERVENTIONS:  - Identify barriers to discharge w/patient and caregiver  - Arrange for needed discharge resources and transportation as appropriate  - Identify discharge learning needs (meds, wound care, etc.)  - Arrange for interpretive services to assist at discharge as needed  - Refer to Case Management Department for coordinating discharge planning if the patient needs post-hospital services based on physician/advanced practitioner order or complex needs related to functional status, cognitive ability, or social support system  Outcome: Progressing     Problem: Knowledge Deficit  Goal: Patient/family/caregiver demonstrates understanding of disease process, treatment plan, medications, and discharge instructions  Description: Complete learning assessment and assess knowledge base.   Interventions:  - Provide teaching at level of understanding  - Provide teaching via preferred learning methods  Outcome: Progressing     Problem: Prexisting or High Potential for Compromised Skin Integrity  Goal: Skin integrity is maintained or improved  Description: INTERVENTIONS:  - Identify patients at risk for skin breakdown  - Assess and monitor skin integrity  - Assess and monitor nutrition and hydration status  - Monitor labs   - Assess for incontinence   - Turn and reposition patient  - Assist with mobility/ambulation  - Relieve pressure over bony prominences  - Avoid friction and shearing  - Provide appropriate hygiene as needed including keeping skin clean and dry  - Evaluate need for skin moisturizer/barrier cream  - Collaborate with interdisciplinary team   - Patient/family teaching  - Consider wound care consult   Outcome: Progressing     Problem: RESPIRATORY - ADULT  Goal: Achieves optimal ventilation and oxygenation  Description: INTERVENTIONS:  - Assess for changes in respiratory status  - Assess for changes in mentation and behavior  - Position to facilitate oxygenation and minimize respiratory effort  - Oxygen administered by appropriate delivery if ordered  - Initiate smoking cessation education as indicated  - Encourage broncho-pulmonary hygiene including cough, deep breathe, Incentive Spirometry  - Assess the need for suctioning and aspirate as needed  - Assess and instruct to report SOB or any respiratory difficulty  - Respiratory Therapy support as indicated  Outcome: Progressing

## 2023-11-25 NOTE — PROGRESS NOTES
Progress Note - Nephrology   Renee Stoll 66 y.o. male MRN: [id number]  Unit/Bed#: -01 Encounter: [id number]      Assessment:      1. Acute kidney injury on chronic kidney disease stage III, baseline reported 1.3 to 1.4 mg/dL. Creatinine peaked at 1.88 on 11/19 and diuretics were held. Creatinine trend stable today 1.5-1.6 range. Volume status: Chronically hypervolemic with significant lower extremity edema. Hemodynamics: Blood pressure trend stable. Recommend:  Cr in 1.6 range, follow and accept permissive hypercreatininemia with diuretics to manage volume status. Spironolactone added back at reduced dose today, 50mg, per primary team, agree. Spironolactone ideal in cirrhotic patient to manage volume. Continue to evaluate volume status/BP. Follow daily weights. 2.  Chronic heart failure with preserved ejection fraction and cirrhosis  Currently on home Lasix dosing 40 mg/day. Spironolactone resumed today. 3.  Right pleural effusion, status post IR thoracentesis 11/17 which removed 800 mL. Fluid transudative unlikely due to cirrhosis/CHF/CKD. 4.  Acute respiratory insufficiency  He appears comfortable from a respiratory perspective. Resume spironolactone. If worsening, can diurese PRN with IV lasix 40mg. 5.  Cirrhosis  Consider a source for effusions with hepatic hydrothorax. Can have diaphragmatic defects that lead to this. Resume spironolactone as above. 6.  Mild anemia  Hemoglobin stable at 10.1. Continue to follow. Subjective:   Patient seen and examined today. He is a poor historian but appears comfortable. Limited ROS with mental disability. Objective:     Vitals: Blood pressure 147/88, pulse 70, temperature (!) 97.3 °F (36.3 °C), resp. rate 16, height 5' 6" (1.676 m), weight 112 kg (247 lb 2.2 oz), SpO2 93 %. ,Body mass index is 39.89 kg/m².     Weight (last 2 days)       Date/Time Weight    11/25/23 0600 112 (247.14)    11/25/23 0300 112 (247.14)    11/24/23 0600 111 (245.59)    11/24/23 0556 111 (245.59)     Weight: positioner sheet and pad on bed also at 11/24/23 0556    11/23/23 0532 110 (242.73)              Intake/Output Summary (Last 24 hours) at 11/25/2023 1629  Last data filed at 11/25/2023 1418  Gross per 24 hour   Intake 480 ml   Output 400 ml   Net 80 ml            Physical Exam: /88   Pulse 70   Temp (!) 97.3 °F (36.3 °C)   Resp 16   Ht 5' 6" (1.676 m)   Wt 112 kg (247 lb 2.2 oz)   SpO2 93%   BMI 39.89 kg/m²   General appearance: alert and oriented, in no acute distress, obese, chronically ill appearing  Lungs: decrease BS at bases   Heart: regular rate and rhythm, S1, S2 normal, no murmur, click, rub or gallop  Abdomen: soft, non-tender; bowel sounds normal; no masses,  no organomegaly  Extremities:moderate/severe BL LE  Skin chronic skin changes BL LE   Neurologic: alert, at baseline, moves ext x 4. Lab, Imaging and other studies: I have personally reviewed pertinent labs.

## 2023-11-25 NOTE — PLAN OF CARE
Problem: PAIN - ADULT  Goal: Verbalizes/displays adequate comfort level or baseline comfort level  Description: Interventions:  - Encourage patient to monitor pain and request assistance  - Assess pain using appropriate pain scale  - Administer analgesics based on type and severity of pain and evaluate response  - Implement non-pharmacological measures as appropriate and evaluate response  - Consider cultural and social influences on pain and pain management  - Notify physician/advanced practitioner if interventions unsuccessful or patient reports new pain  Outcome: Progressing     Problem: SAFETY ADULT  Goal: Patient will remain free of falls  Description: INTERVENTIONS:  - Educate patient/family on patient safety including physical limitations  - Instruct patient to call for assistance with activity   - Consult OT/PT to assist with strengthening/mobility   - Keep Call bell within reach  - Keep bed low and locked with side rails adjusted as appropriate  - Keep care items and personal belongings within reach  - Initiate and maintain comfort rounds  - Make Fall Risk Sign visible to staff  - Offer Toileting every 2 Hours, in advance of need  - Initiate/Maintain bed/chair alarm  - Obtain necessary fall risk management equipment:   - Apply yellow socks and bracelet for high fall risk patients  - Consider moving patient to room near nurses station  Outcome: Progressing     Problem: RESPIRATORY - ADULT  Goal: Achieves optimal ventilation and oxygenation  Description: INTERVENTIONS:  - Assess for changes in respiratory status  - Assess for changes in mentation and behavior  - Position to facilitate oxygenation and minimize respiratory effort  - Oxygen administered by appropriate delivery if ordered  - Initiate smoking cessation education as indicated  - Encourage broncho-pulmonary hygiene including cough, deep breathe, Incentive Spirometry  - Assess the need for suctioning and aspirate as needed  - Assess and instruct to report SOB or any respiratory difficulty  - Respiratory Therapy support as indicated  Outcome: Progressing

## 2023-11-25 NOTE — ASSESSMENT & PLAN NOTE
Cirrhotic morphology of the liver chronic problem  Ammonia is normal there is no ascites continue Lasix   Aldactone resume and lower dose, 50 mg daily

## 2023-11-25 NOTE — ASSESSMENT & PLAN NOTE
Lab Results   Component Value Date    EGFR 44 11/25/2023    EGFR 45 11/24/2023    EGFR 48 11/23/2023    CREATININE 1.69 (H) 11/25/2023    CREATININE 1.65 (H) 11/24/2023    CREATININE 1.58 (H) 11/23/2023   Has been anywhere between 1.4-1.5, cr worsened since lasix iv and also on high dose ladactone .  Cr stable today hold both still discussed with nephro if cr stable linden restart diuretics

## 2023-11-25 NOTE — ASSESSMENT & PLAN NOTE
Secondary to recurrent epistaxis. Appreciate ENT consult,  Patient is on aspirin and Lovenox, continue to monitor.

## 2023-11-26 PROBLEM — R04.0 EPISTAXIS: Status: RESOLVED | Noted: 2023-11-20 | Resolved: 2023-11-26

## 2023-11-26 LAB
ANION GAP SERPL CALCULATED.3IONS-SCNC: 9 MMOL/L
BUN SERPL-MCNC: 31 MG/DL (ref 5–25)
CALCIUM SERPL-MCNC: 9.2 MG/DL (ref 8.4–10.2)
CHLORIDE SERPL-SCNC: 102 MMOL/L (ref 96–108)
CO2 SERPL-SCNC: 27 MMOL/L (ref 21–32)
CREAT SERPL-MCNC: 1.53 MG/DL (ref 0.6–1.3)
GFR SERPL CREATININE-BSD FRML MDRD: 50 ML/MIN/1.73SQ M
GLUCOSE SERPL-MCNC: 107 MG/DL (ref 65–140)
POTASSIUM SERPL-SCNC: 4.7 MMOL/L (ref 3.5–5.3)
SODIUM SERPL-SCNC: 138 MMOL/L (ref 135–147)

## 2023-11-26 PROCEDURE — 99232 SBSQ HOSP IP/OBS MODERATE 35: CPT

## 2023-11-26 PROCEDURE — 80048 BASIC METABOLIC PNL TOTAL CA: CPT | Performed by: INTERNAL MEDICINE

## 2023-11-26 RX ORDER — LACTULOSE 10 G/15ML
20 SOLUTION ORAL
Status: DISCONTINUED | OUTPATIENT
Start: 2023-11-27 | End: 2023-12-08 | Stop reason: HOSPADM

## 2023-11-26 RX ADMIN — LEVOCARNITINE 330 MG: 1 SOLUTION ORAL at 21:27

## 2023-11-26 RX ADMIN — LEVOCARNITINE 330 MG: 1 SOLUTION ORAL at 13:49

## 2023-11-26 RX ADMIN — SPIRONOLACTONE 50 MG: 25 TABLET ORAL at 10:41

## 2023-11-26 RX ADMIN — NYSTATIN: 100000 POWDER TOPICAL at 10:44

## 2023-11-26 RX ADMIN — METOPROLOL TARTRATE 25 MG: 25 TABLET, FILM COATED ORAL at 10:41

## 2023-11-26 RX ADMIN — LACTULOSE 20 G: 20 SOLUTION ORAL at 10:42

## 2023-11-26 RX ADMIN — OXYCODONE HYDROCHLORIDE AND ACETAMINOPHEN 500 MG: 500 TABLET ORAL at 10:41

## 2023-11-26 RX ADMIN — LAMOTRIGINE 200 MG: 100 TABLET ORAL at 06:05

## 2023-11-26 RX ADMIN — ENOXAPARIN SODIUM 40 MG: 40 INJECTION SUBCUTANEOUS at 10:44

## 2023-11-26 RX ADMIN — PRIMIDONE 150 MG: 50 TABLET ORAL at 21:27

## 2023-11-26 RX ADMIN — PRIMIDONE 100 MG: 50 TABLET ORAL at 13:48

## 2023-11-26 RX ADMIN — METOPROLOL TARTRATE 25 MG: 25 TABLET, FILM COATED ORAL at 21:27

## 2023-11-26 RX ADMIN — ZONISAMIDE 100 MG: 100 CAPSULE ORAL at 21:27

## 2023-11-26 RX ADMIN — PRIMIDONE 100 MG: 50 TABLET ORAL at 10:41

## 2023-11-26 RX ADMIN — AMPICILLIN SODIUM AND SULBACTAM SODIUM 3 G: 2; 1 INJECTION, POWDER, FOR SOLUTION INTRAMUSCULAR; INTRAVENOUS at 02:42

## 2023-11-26 RX ADMIN — LEVOCARNITINE 330 MG: 1 SOLUTION ORAL at 17:49

## 2023-11-26 RX ADMIN — ASPIRIN 81 MG: 81 TABLET, COATED ORAL at 10:41

## 2023-11-26 RX ADMIN — LEVOCARNITINE 330 MG: 1 SOLUTION ORAL at 10:42

## 2023-11-26 RX ADMIN — Medication 400 UNITS: at 10:46

## 2023-11-26 RX ADMIN — FUROSEMIDE 40 MG: 40 TABLET ORAL at 10:41

## 2023-11-26 RX ADMIN — Medication 1 TABLET: at 10:41

## 2023-11-26 RX ADMIN — LAMOTRIGINE 300 MG: 100 TABLET ORAL at 21:28

## 2023-11-26 RX ADMIN — LAMOTRIGINE 250 MG: 100 TABLET ORAL at 13:48

## 2023-11-26 RX ADMIN — DOCUSATE SODIUM AND SENNOSIDES 1 TABLET: 8.6; 5 TABLET, FILM COATED ORAL at 17:49

## 2023-11-26 RX ADMIN — NYSTATIN 1 APPLICATION: 100000 POWDER TOPICAL at 17:49

## 2023-11-26 NOTE — ASSESSMENT & PLAN NOTE
Patient's main caregiver is his mother who was recently hospitalized and discharged to SNF for rehab  She assists him with his ADLs and assist x2  Pt/ot ordered  CM following for placement

## 2023-11-26 NOTE — ASSESSMENT & PLAN NOTE
Secondary to recurrent epistaxis.     Appreciate ENT consult -conservative management  Patient is on aspirin and Lovenox, continue to monitor  Hemoglobin has been stable

## 2023-11-26 NOTE — ASSESSMENT & PLAN NOTE
Lab Results   Component Value Date    EGFR 44 11/25/2023    EGFR 45 11/24/2023    EGFR 48 11/23/2023    CREATININE 1.69 (H) 11/25/2023    CREATININE 1.65 (H) 11/24/2023    CREATININE 1.58 (H) 11/23/2023   Nephrology following  Previous baseline reported to be 1.3-1.4  Creatinine has been stable around 1.5-1.6  Resume spironolactone 11/25, will Lasix 11/21  May need to accept permissive hyper creatinine anemia with diuretics to manage volume status

## 2023-11-26 NOTE — ASSESSMENT & PLAN NOTE
Resolved. Patient's mother endorses that this is a recurrent issue for him  Appreciate ENT consult.   Conservative therapy  Recommended change from nasal prongs oxygen to facemask or face tent with humidity -patient refuses this  [place] spray and salt water gel 3 times daily  -patient refusing  Reassess in 2 weeks as outpatient

## 2023-11-26 NOTE — ASSESSMENT & PLAN NOTE
Patient with worsening shortness of breath for the past week falling asleep more often  Previously utilize oxygen 2 L only at nighttime, family endorsing he has been utilizing throughout the day prior to admission as well  Imaging showing loculated right-sided moderate effusion with inability to rule out pneumonia on imaging  Labs without leukocytosis, Pro-Janusz was mildly elevated, finished a course of antibiotics for possible pneumonia x 7 days  Due to loculated effusion IR consulted for thoracentesis which was completed 11/20  There is no significant loculation noted on ultrasound necessitating a chest tube  860 mL dark isidro-colored right pleural fluid  Fluid studies no bacterial growth - likely transudative secondary to CHF/cirrhosis

## 2023-11-26 NOTE — ASSESSMENT & PLAN NOTE
Continue home seizure medications with seizure precautions  His last seizure was 2-3 days prior to admission  Lamictal, zonesamide and primidone levels ordered. The last few times he was hospitalized for seizure the AEDs were not changed because the breakthrough seizure etiology were infections.

## 2023-11-26 NOTE — ASSESSMENT & PLAN NOTE
Patient previously maintained on Lasix as an outpatient -during a nursing home admission it was decreased to 40 mg daily given no his kidney function  proBNP elevated  2D echo done 2022 with mild concentric hypertrophy and EF 60 to 65%  Updated echo this admission with mild concentric left ventricular hypertrophy, LVEF 63% with normal diastolic function -overall similar from 2022 without significant change  Is s/p IR guided thoracentesis due to loculated effusion  Nephrology following and aiding with diuretic use  Patient is currently resumed on home dose of Lasix (11/21) and resume spironolactone 50 mg (11/25)  Patient still mildly hypervolemic, does have chronic lymphedema in his legs as well which contributes to exam

## 2023-11-26 NOTE — QUICK NOTE
Patient not seen and examined today, chart reviewed. Cr checked 11/25. Check chem today and in AM.   Nephrology will follow up 11/27, please TT with questions. BP trends stable. UOP ? Accuracy. Weight in bed scale ? Accuracy. Standing weight ideal if able to obtain, unclear patient mobility.

## 2023-11-26 NOTE — ASSESSMENT & PLAN NOTE
Cirrhotic morphology of the liver chronic problem  Ammonia is normal there is no ascites  Continue lactulose daily -re-time for AM patient has been refusing at night as he does not want to have bowel movements during bedtime  Continue lasix 40 mg daily  Aldactone resume and lower dose, 50 mg daily

## 2023-11-26 NOTE — PLAN OF CARE
Problem: PAIN - ADULT  Goal: Verbalizes/displays adequate comfort level or baseline comfort level  Description: Interventions:  - Encourage patient to monitor pain and request assistance  - Assess pain using appropriate pain scale  - Administer analgesics based on type and severity of pain and evaluate response  - Implement non-pharmacological measures as appropriate and evaluate response  - Consider cultural and social influences on pain and pain management  - Notify physician/advanced practitioner if interventions unsuccessful or patient reports new pain  Outcome: Progressing     Problem: INFECTION - ADULT  Goal: Absence or prevention of progression during hospitalization  Description: INTERVENTIONS:  - Assess and monitor for signs and symptoms of infection  - Monitor lab/diagnostic results  - Monitor all insertion sites, i.e. indwelling lines, tubes, and drains  - Monitor endotracheal if appropriate and nasal secretions for changes in amount and color  - [place] appropriate cooling/warming therapies per order  - Administer medications as ordered  - Instruct and encourage patient and family to use good hand hygiene technique  - Identify and instruct in appropriate isolation precautions for identified infection/condition  Outcome: Progressing  Goal: Absence of fever/infection during neutropenic period  Description: INTERVENTIONS:  - Monitor WBC    Outcome: Progressing     Problem: SAFETY ADULT  Goal: Patient will remain free of falls  Description: INTERVENTIONS:  - Educate patient/family on patient safety including physical limitations  - Instruct patient to call for assistance with activity   - Consult OT/PT to assist with strengthening/mobility   - Keep Call bell within reach  - Keep bed low and locked with side rails adjusted as appropriate  - Keep care items and personal belongings within reach  - Initiate and maintain comfort rounds  - Make Fall Risk Sign visible to staff  - Offer Toileting every 2 Hours, in advance of need  - Initiate/Maintain bed/chair alarm  - Obtain necessary fall risk management equipment:   - Apply yellow socks and bracelet for high fall risk patients  - Consider moving patient to room near nurses station  Outcome: Progressing  Goal: Maintain or return to baseline ADL function  Description: INTERVENTIONS:  -  Assess patient's ability to carry out ADLs; assess patient's baseline for ADL function and identify physical deficits which impact ability to perform ADLs (bathing, care of mouth/teeth, toileting, grooming, dressing, etc.)  - Assess/evaluate cause of self-care deficits   - Assess range of motion  - Assess patient's mobility; develop plan if impaired  - Assess patient's need for assistive devices and provide as appropriate  - Encourage maximum independence but intervene and supervise when necessary  - Involve family in performance of ADLs  - Assess for home care needs following discharge   - Consider OT consult to assist with ADL evaluation and planning for discharge  - Provide patient education as appropriate  Outcome: Progressing  Goal: Maintains/Returns to pre admission functional level  Description: INTERVENTIONS:  - Perform AM-PAC 6 Click Basic Mobility/ Daily Activity assessment daily.  - Set and communicate daily mobility goal to care team and patient/family/caregiver. - Collaborate with rehabilitation services on mobility goals if consulted  - Perform Range of Motion 3 times a day. - Reposition patient every 2 hours.   - Dangle patient 3 times a day  - Stand patient 3 times a day  - Ambulate patient 3 times a day  - Out of bed to chair 3 times a day   - Out of bed for meals 3 times a day  - Out of bed for toileting  - Record patient progress and toleration of activity level   Outcome: Progressing     Problem: DISCHARGE PLANNING  Goal: Discharge to home or other facility with appropriate resources  Description: INTERVENTIONS:  - Identify barriers to discharge w/patient and caregiver  - Arrange for needed discharge resources and transportation as appropriate  - Identify discharge learning needs (meds, wound care, etc.)  - Arrange for interpretive services to assist at discharge as needed  - Refer to Case Management Department for coordinating discharge planning if the patient needs post-hospital services based on physician/advanced practitioner order or complex needs related to functional status, cognitive ability, or social support system  Outcome: Progressing     Problem: Knowledge Deficit  Goal: Patient/family/caregiver demonstrates understanding of disease process, treatment plan, medications, and discharge instructions  Description: Complete learning assessment and assess knowledge base.   Interventions:  - Provide teaching at level of understanding  - Provide teaching via preferred learning methods  Outcome: Progressing     Problem: Prexisting or High Potential for Compromised Skin Integrity  Goal: Skin integrity is maintained or improved  Description: INTERVENTIONS:  - Identify patients at risk for skin breakdown  - Assess and monitor skin integrity  - Assess and monitor nutrition and hydration status  - Monitor labs   - Assess for incontinence   - Turn and reposition patient  - Assist with mobility/ambulation  - Relieve pressure over bony prominences  - Avoid friction and shearing  - Provide appropriate hygiene as needed including keeping skin clean and dry  - Evaluate need for skin moisturizer/barrier cream  - Collaborate with interdisciplinary team   - Patient/family teaching  - Consider wound care consult   Outcome: Progressing     Problem: RESPIRATORY - ADULT  Goal: Achieves optimal ventilation and oxygenation  Description: INTERVENTIONS:  - Assess for changes in respiratory status  - Assess for changes in mentation and behavior  - Position to facilitate oxygenation and minimize respiratory effort  - Oxygen administered by appropriate delivery if ordered  - Initiate smoking cessation education as indicated  - Encourage broncho-pulmonary hygiene including cough, deep breathe, Incentive Spirometry  - Assess the need for suctioning and aspirate as needed  - Assess and instruct to report SOB or any respiratory difficulty  - Respiratory Therapy support as indicated  Outcome: Progressing

## 2023-11-26 NOTE — PROGRESS NOTES
[facility]  Progress Note  Name: Felipe Lerner  MRN: [id number]  Unit/Bed#: -76 I Date of Admission: 11/17/2023   Date of Service: 11/26/2023 I Hospital Day: 9    Assessment/Plan   * Pleural effusion on right  Assessment & Plan  Patient with worsening shortness of breath for the past week falling asleep more often  Previously utilize oxygen 2 L only at nighttime, family endorsing he has been utilizing throughout the day prior to admission as well  Imaging showing loculated right-sided moderate effusion with inability to rule out pneumonia on imaging  Labs without leukocytosis, Pro-Janusz was mildly elevated, finished a course of antibiotics for possible pneumonia x 7 days  Due to loculated effusion IR consulted for thoracentesis which was completed 11/20  There is no significant loculation noted on ultrasound necessitating a chest tube  860 mL dark isidro-colored right pleural fluid  Fluid studies no bacterial growth - likely transudative secondary to CHF/cirrhosis     Acute blood loss anemia  Assessment & Plan  Secondary to recurrent epistaxis.     Appreciate ENT consult -conservative management  Patient is on aspirin and Lovenox, continue to monitor  Hemoglobin has been stable    Stage 3 chronic kidney disease [facility])  Assessment & Plan  Lab Results   Component Value Date    EGFR 44 11/25/2023    EGFR 45 11/24/2023    EGFR 48 11/23/2023    CREATININE 1.69 (H) 11/25/2023    CREATININE 1.65 (H) 11/24/2023    CREATININE 1.58 (H) 11/23/2023   Nephrology following  Previous baseline reported to be 1.3-1.4  Creatinine has been stable around 1.5-1.6  Resume spironolactone 11/25, will Lasix 11/21  May need to accept permissive hyper creatinine anemia with diuretics to manage volume status    Chronic respiratory failure with hypercapnia (HCC)  Assessment & Plan  Wears 2L O2 QHS, continue  Likely has a chronic hypercapnia given the elevated bicarbonate/kyphosis likely causing chronic limited chest expansion and sleepiness during day will obtain vbg- reviewed 71    No other household member able to render care  Assessment & Plan  Patient's main caregiver is his mother who was recently hospitalized and discharged to SNF for rehab  She assists him with his ADLs and assist x2  Pt/ot ordered  CM following for placement    Liver cirrhosis ([address])  Assessment & Plan  Cirrhotic morphology of the liver chronic problem  Ammonia is normal there is no ascites  Continue lactulose daily -re-time for AM patient has been refusing at night as he does not want to have bowel movements during bedtime  Continue lasix 40 mg daily  Aldactone resume and lower dose, 50 mg daily    Acute on chronic heart failure with preserved ejection fraction [facility])  Assessment & Plan  Patient previously maintained on Lasix as an outpatient -during a nursing home admission it was decreased to 40 mg daily given no his kidney function  proBNP elevated  2D echo done 2022 with mild concentric hypertrophy and EF 60 to 65%  Updated echo this admission with mild concentric left ventricular hypertrophy, LVEF 63% with normal diastolic function -overall similar from 2022 without significant change  Is s/p IR guided thoracentesis due to loculated effusion  Nephrology following and aiding with diuretic use  Patient is currently resumed on home dose of Lasix (11/21) and resume spironolactone 50 mg (11/25)  Patient still mildly hypervolemic, does have chronic lymphedema in his legs as well which contributes to exam    Dysphagia  Assessment & Plan  Continue mechanical soft diet from prior  Aspiration precautions obtain speech    Developmental delay  Assessment & Plan  Daily involvement with mother  Supportive care  PT/OT/ST  Pt AAOx1   At baseline AAOx3  Due to social situation, patient will need skilled nursing facility, case management on board (patient was living with his mother, mother recently hospitalized due to medical issue and get discharged to skilled nursing facility. No other family member is able to take care of the patient.)    Falls  Assessment & Plan  Frequent falls for his entire life because of unsteady gait according to the mother's medical history list  Fall precautions  Emergency room trauma eval no acute injury  Due to patient overall medical conditions, social issues, patient is in need of extensive care. Case management on board. Nonintractable generalized idiopathic epilepsy without status epilepticus [facility])  Assessment & Plan  Continue home seizure medications with seizure precautions  His last seizure was 2-3 days prior to admission  Lamictal, zonesamide and primidone levels ordered. The last few times he was hospitalized for seizure the AEDs were not changed because the breakthrough seizure etiology were infections. Epistaxis-resolved as of 11/26/2023  Assessment & Plan  Resolved. Patient's mother endorses that this is a recurrent issue for him  Appreciate ENT consult. Conservative therapy  Recommended change from nasal prongs oxygen to facemask or face tent with humidity -patient refuses this  Benoit spray and salt water gel 3 times daily  -patient refusing  Reassess in 2 weeks as outpatient           VTE Pharmacologic Prophylaxis: VTE Score: 5 High Risk (Score >/= 5) - Pharmacological DVT Prophylaxis Ordered: enoxaparin (Lovenox). Sequential Compression Devices Ordered. Mobility:   Basic Mobility Inpatient Raw Score: 7  JH-HLM Goal: 2: Bed activities/Dependent transfer  JH-HLM Achieved: 4: Move to chair/commode  HLM Goal achieved. Continue to encourage appropriate mobility. Patient Centered Rounds: I performed bedside rounds with nursing staff today. Discussions with Specialists or Other Care Team Provider: ETHAN    Education and Discussions with Family / Patient: Updated  (mother) via phone. Total Time Spent on Date of Encounter in care of patient:  mins.  This time was spent on one or more of the following: performing physical exam; counseling and coordination of care; obtaining or reviewing history; documenting in the medical record; reviewing/ordering tests, medications or procedures; communicating with other healthcare professionals and discussing with patient's family/caregivers. Current Length of Stay: 9 day(s)  Current Patient Status: Inpatient   Certification Statement: The patient will continue to require additional inpatient hospital stay due to monitoring pleural effusions and kidney function as reinitiating diuresis  Discharge Plan:  Likely will be medically ready in the next few days for discharge, pending rehab availability and LOC assessments    Code Status: Level 1 - Full Code    Subjective:   Patient seen and examined. Endorses no concerns. Objective:     Vitals:   Temp (24hrs), Av.6 °F (36.4 °C), Min:97.3 °F (36.3 °C), Max:97.7 °F (36.5 °C)    Temp:  [97.3 °F (36.3 °C)-97.7 °F (36.5 °C)] 97.7 °F (36.5 °C)  HR:  [70-85] 79  Resp:  [16-19] 19  BP: (139-147)/(72-88) 140/86  SpO2:  [90 %-95 %] 91 %  Body mass index is 40.92 kg/m². Input and Output Summary (last 24 hours): Intake/Output Summary (Last 24 hours) at 2023 1349  Last data filed at 2023 1100  Gross per 24 hour   Intake 240 ml   Output 400 ml   Net -160 ml       Physical Exam:   Physical Exam  Vitals and nursing note reviewed. Constitutional:       General: He is not in acute distress. Appearance: Normal appearance. He is obese. HENT:      Head: Normocephalic and atraumatic. Nose: No congestion. Mouth/Throat:      Mouth: Mucous membranes are moist.   Eyes:      Conjunctiva/sclera: Conjunctivae normal.   Cardiovascular:      Rate and Rhythm: Normal rate and regular rhythm. Pulses: Normal pulses. Heart sounds: Normal heart sounds. No murmur heard. Pulmonary:      Effort: Pulmonary effort is normal. No respiratory distress. Breath sounds: Normal breath sounds.    Abdominal: General: Bowel sounds are normal.      Palpations: Abdomen is soft. Tenderness: There is no abdominal tenderness. Musculoskeletal:         General: Normal range of motion. Right lower leg: Edema present. Left lower leg: Edema present. Skin:     General: Skin is warm and dry. Findings: Erythema (Bilateral lower extremity chronic erythema and skin changes) present. Neurological:      Mental Status: He is alert. Mental status is at baseline. Additional Data:     Labs:  Results from last 7 days   Lab Units 11/24/23  0556   WBC Thousand/uL 6.17   HEMOGLOBIN g/dL 10.1*   HEMATOCRIT % 31.9*   PLATELETS Thousands/uL 113*   NEUTROS PCT % 79*   LYMPHS PCT % 10*   MONOS PCT % 10   EOS PCT % 0     Results from last 7 days   Lab Units 11/25/23  0457 11/24/23  0556   SODIUM mmol/L 139 141   POTASSIUM mmol/L 4.5 3.9   CHLORIDE mmol/L 101 102   CO2 mmol/L 30 31   BUN mg/dL 32* 31*   CREATININE mg/dL 1.69* 1.65*   ANION GAP mmol/L 8 8   CALCIUM mg/dL 9.0 8.9   ALBUMIN g/dL  --  3.2*   TOTAL BILIRUBIN mg/dL  --  1.12*   ALK PHOS U/L  --  169*   ALT U/L  --  14   AST U/L  --  21   GLUCOSE RANDOM mg/dL 89 84                 Results from last 7 days   Lab Units 11/20/23  0512   PROCALCITONIN ng/ml 0.41*       Lines/Drains:  Invasive Devices       Peripheral Intravenous Line  Duration             Peripheral IV 11/23/23 Left;Ventral (anterior) Wrist 2 days                          Imaging: No pertinent imaging reviewed.     Recent Cultures (last 7 days):   Results from last 7 days   Lab Units 11/20/23  1335   GRAM STAIN RESULT  2+ Polys  No organisms seen   BODY FLUID CULTURE, STERILE  No growth       Last 24 Hours Medication List:   Current Facility-Administered Medications   Medication Dose Route Frequency Provider Last Rate    acetaminophen  650 mg Oral Q6H PRN Dana Asif PA-C      ascorbic acid  500 mg Oral Daily Dana Asif PA-C      aspirin  81 mg Oral Daily Moody Fraser MD      calcium carbonate-vitamin D  1 tablet Oral Daily With Breakfast Mariela Gentile MD      dextromethorphan-guaiFENesin  10 mL Oral Q4H PRN Dana Demo, WHITNEY      enoxaparin  40 mg Subcutaneous Q24H [facility] longterm Ashtyn Romero MD      furosemide  40 mg Oral Daily Mariela Gentile MD      [START ON 11/27/2023] lactulose  20 g Oral Daily With Breakfast René Church, WHITNEY      lamoTRIgine  200 mg Oral Early Morning Dana Demo, WHITNEY      lamoTRIgine  250 mg Oral After Lunch Dana Demo, PA-JAMIE      lamoTRIgine  300 mg Oral HS Dana Demo, PA-C      levOCARNitine  330 mg Oral 4x Daily (with meals and at bedtime) Mariela Gentile MD      LORazepam  2 mg Intravenous Q6H PRN Dana Demo, PA-C      metoprolol tartrate  25 mg Oral BID Dana Demo, PA-C      nystatin   Topical BID Dana Demo, PA-C      ondansetron  4 mg Intravenous Q6H PRN Dana Demo, PA-C      polyethylene glycol  17 g Oral Daily PRN Dana Demo, PA-C      primidone  100 mg Oral Daily With Lunch Dana Demo, PA-C      primidone  100 mg Oral After Breakfast Ashtyn Romero MD      primidone  150 mg Oral HS Dana Demo, WHITNEY      senna-docusate sodium  1 tablet Oral BID Zimmerman David, WHITNEY      sodium chloride  1 Application Nasal TID Beatrice Laura MD      sodium chloride  2 spray Each Nare TID Beatrice Laura MD      spironolactone  50 mg Oral Daily Mariela Gentile MD      vitamin E (tocopherol)  400 Units Oral Daily Dana Demo, WHITNEY      zonisamide  100 mg Oral HS Zimmerman WHITNEY Hernandez          Today, Patient Was Seen By: René Church PA-C    **Please Note: This note may have been constructed using a voice recognition system. **

## 2023-11-27 PROBLEM — N18.9 CHRONIC RENAL IMPAIRMENT: Status: ACTIVE | Noted: 2023-11-27

## 2023-11-27 PROBLEM — E87.70 HYPERVOLEMIA: Status: ACTIVE | Noted: 2023-11-27

## 2023-11-27 LAB
ANION GAP SERPL CALCULATED.3IONS-SCNC: 5 MMOL/L
BUN SERPL-MCNC: 32 MG/DL (ref 5–25)
CALCIUM SERPL-MCNC: 8.9 MG/DL (ref 8.4–10.2)
CHLORIDE SERPL-SCNC: 102 MMOL/L (ref 96–108)
CO2 SERPL-SCNC: 32 MMOL/L (ref 21–32)
CREAT SERPL-MCNC: 1.59 MG/DL (ref 0.6–1.3)
GFR SERPL CREATININE-BSD FRML MDRD: 47 ML/MIN/1.73SQ M
GLUCOSE SERPL-MCNC: 102 MG/DL (ref 65–140)
POTASSIUM SERPL-SCNC: 4.7 MMOL/L (ref 3.5–5.3)
SODIUM SERPL-SCNC: 139 MMOL/L (ref 135–147)

## 2023-11-27 PROCEDURE — 97535 SELF CARE MNGMENT TRAINING: CPT

## 2023-11-27 PROCEDURE — 97530 THERAPEUTIC ACTIVITIES: CPT

## 2023-11-27 PROCEDURE — 99232 SBSQ HOSP IP/OBS MODERATE 35: CPT | Performed by: INTERNAL MEDICINE

## 2023-11-27 PROCEDURE — 80048 BASIC METABOLIC PNL TOTAL CA: CPT | Performed by: INTERNAL MEDICINE

## 2023-11-27 PROCEDURE — 99232 SBSQ HOSP IP/OBS MODERATE 35: CPT

## 2023-11-27 RX ADMIN — OXYCODONE HYDROCHLORIDE AND ACETAMINOPHEN 500 MG: 500 TABLET ORAL at 08:09

## 2023-11-27 RX ADMIN — METOPROLOL TARTRATE 25 MG: 25 TABLET, FILM COATED ORAL at 21:07

## 2023-11-27 RX ADMIN — ASPIRIN 81 MG: 81 TABLET, COATED ORAL at 08:10

## 2023-11-27 RX ADMIN — PRIMIDONE 150 MG: 50 TABLET ORAL at 21:07

## 2023-11-27 RX ADMIN — LAMOTRIGINE 300 MG: 100 TABLET ORAL at 21:06

## 2023-11-27 RX ADMIN — DOCUSATE SODIUM AND SENNOSIDES 1 TABLET: 8.6; 5 TABLET, FILM COATED ORAL at 16:50

## 2023-11-27 RX ADMIN — LEVOCARNITINE 330 MG: 1 SOLUTION ORAL at 16:50

## 2023-11-27 RX ADMIN — Medication 1 TABLET: at 08:08

## 2023-11-27 RX ADMIN — FUROSEMIDE 40 MG: 40 TABLET ORAL at 08:10

## 2023-11-27 RX ADMIN — METOPROLOL TARTRATE 25 MG: 25 TABLET, FILM COATED ORAL at 08:08

## 2023-11-27 RX ADMIN — NYSTATIN: 100000 POWDER TOPICAL at 16:50

## 2023-11-27 RX ADMIN — LEVOCARNITINE 330 MG: 1 SOLUTION ORAL at 12:59

## 2023-11-27 RX ADMIN — DOCUSATE SODIUM AND SENNOSIDES 1 TABLET: 8.6; 5 TABLET, FILM COATED ORAL at 08:10

## 2023-11-27 RX ADMIN — LACTULOSE 20 G: 20 SOLUTION ORAL at 08:10

## 2023-11-27 RX ADMIN — LEVOCARNITINE 330 MG: 1 SOLUTION ORAL at 08:17

## 2023-11-27 RX ADMIN — LAMOTRIGINE 200 MG: 100 TABLET ORAL at 06:17

## 2023-11-27 RX ADMIN — Medication 400 UNITS: at 08:09

## 2023-11-27 RX ADMIN — ZONISAMIDE 100 MG: 100 CAPSULE ORAL at 21:07

## 2023-11-27 RX ADMIN — LAMOTRIGINE 250 MG: 100 TABLET ORAL at 14:56

## 2023-11-27 RX ADMIN — NYSTATIN: 100000 POWDER TOPICAL at 08:11

## 2023-11-27 RX ADMIN — SPIRONOLACTONE 50 MG: 25 TABLET ORAL at 08:09

## 2023-11-27 RX ADMIN — ENOXAPARIN SODIUM 40 MG: 40 INJECTION SUBCUTANEOUS at 08:10

## 2023-11-27 RX ADMIN — LEVOCARNITINE 330 MG: 1 SOLUTION ORAL at 21:07

## 2023-11-27 RX ADMIN — PRIMIDONE 100 MG: 50 TABLET ORAL at 08:10

## 2023-11-27 RX ADMIN — PRIMIDONE 100 MG: 50 TABLET ORAL at 14:57

## 2023-11-27 NOTE — PROGRESS NOTES
NEPHROLOGY PROGRESS NOTE    Romi Justice 66 y.o. male MRN: [id number]  Unit/Bed#: -Tad Encounter: [id number]  Reason for Consult: Acute on chronic kidney disease    Patient is sitting up in bed is awake and alert was in no distress says over the weekend things were okay. Eating a little bit better and breathing comfortably. ASSESSMENT/PLAN:  1. Renal    His baseline creatinine is 1.3-1.4. He has cirrhosis with effective arterial blood volume depletion he is required diuretics he has undergone thoracentesis to rule effusion. Having cirrhosis predisposes him to volume overload due to secondary hyperaldosteronism he has been placed on spironolactone which may need to be titrated up. He is also on loop diuretic. Creatinine is running slightly above baseline but is stable at 1.5. Potassium is 4.7. Continue spironolactone and furosemide  Monitor weights slowly rising and this would be indicative of fluid retention  Low-sodium diet  May have to tolerate higher creatinine in the setting of cirrhosis with need for diuretics. 2.  Cirrhosis of the liver    3. Pulmonary    Patient with recurrent pleural effusion underwent thoracentesis earlier in hospitalization. SUBJECTIVE:  Review of Systems   Constitutional: Negative for chills, diaphoresis, fever and night sweats. HENT: Negative. Eyes: Negative. Cardiovascular: Negative. Negative for chest pain, orthopnea and palpitations. Respiratory: Negative. Negative for cough, shortness of breath, sputum production and wheezing. Gastrointestinal:  Negative for abdominal pain, diarrhea, nausea and vomiting. Genitourinary: Negative. Neurological:  Negative for dizziness, headaches and light-headedness.        OBJECTIVE:  Current Weight: Weight - Scale: 114 kg (250 lb 10.6 oz)  Vitals:Temp (24hrs), Av.8 °F (36.6 °C), Min:97.7 °F (36.5 °C), Max:97.9 °F (36.6 °C)  Current: Temperature: 97.7 °F (36.5 °C)   Blood pressure 131/82, pulse 84, temperature 97.7 °F (36.5 °C), resp. rate 19, height 5' 6" (1.676 m), weight 114 kg (250 lb 10.6 oz), SpO2 (!) 87 %. , Body mass index is 40.46 kg/m². Intake/Output Summary (Last 24 hours) at 11/27/2023 0941  Last data filed at 11/27/2023 0801  Gross per 24 hour   Intake 946 ml   Output 450 ml   Net 496 ml       Physical Exam: /82   Pulse 84   Temp 97.7 °F (36.5 °C)   Resp 19   Ht 5' 6" (1.676 m)   Wt 114 kg (250 lb 10.6 oz)   SpO2 (!) 87%   BMI 40.46 kg/m²   Physical Exam  Constitutional:       General: He is not in acute distress. Appearance: He is not toxic-appearing or diaphoretic. HENT:      Head: Normocephalic and atraumatic. Nose: Nose normal.   Eyes:      General: No scleral icterus. Extraocular Movements: Extraocular movements intact. Cardiovascular:      Rate and Rhythm: Normal rate and regular rhythm. Heart sounds: No friction rub. No gallop. Pulmonary:      Effort: Pulmonary effort is normal. No respiratory distress. Breath sounds: No wheezing, rhonchi or rales. Abdominal:      General: Bowel sounds are normal. There is no distension. Palpations: Abdomen is soft. Tenderness: There is no abdominal tenderness. There is no rebound. Musculoskeletal:      Cervical back: Normal range of motion and neck supple. Neurological:      Mental Status: He is alert.          Medications:    Current Facility-Administered Medications:     acetaminophen (TYLENOL) tablet 650 mg, 650 mg, Oral, Q6H PRN, Dana Asif PA-C, 650 mg at 11/24/23 8280    ascorbic acid (VITAMIN C) tablet 500 mg, 500 mg, Oral, Daily, Dana Asif PA-C, 500 mg at 11/27/23 0809    aspirin (ECOTRIN LOW STRENGTH) EC tablet 81 mg, 81 mg, Oral, Daily, Mary Romero MD, 81 mg at 11/27/23 0810    calcium carbonate-vitamin D 500 mg-5 mcg tablet 1 tablet, 1 tablet, Oral, Daily With Breakfast, Davi Carpenter MD, 1 tablet at 11/27/23 0808    dextromethorphan-guaiFENesin (ROBITUSSIN DM) oral syrup 10 mL, 10 mL, Oral, Q4H PRN, Dana Asif PA-C    enoxaparin (LOVENOX) subcutaneous injection 40 mg, 40 mg, Subcutaneous, Q24H [address], Gary Romero MD, 40 mg at 11/27/23 0810    furosemide (LASIX) tablet 40 mg, 40 mg, Oral, Daily, Gary Romero MD, 40 mg at 11/27/23 0810    lactulose (CHRONULAC) oral solution 20 g, 20 g, Oral, Daily With Breakfast, REMBERTO Field-JAMIE, 20 g at 11/27/23 0810    lamoTRIgine (LaMICtal) tablet 200 mg, 200 mg, Oral, Early Morning, Dana Asif PA-C, 200 mg at 11/27/23 0617    lamoTRIgine (LaMICtal) tablet 250 mg, 250 mg, Oral, After Lunch, Dana Asif PA-C, 250 mg at 11/26/23 1348    lamoTRIgine (LaMICtal) tablet 300 mg, 300 mg, Oral, HS, REMBERTO Joseph-JAMIE, 300 mg at 11/26/23 2128    levOCARNitine (CARNITOR) oral solution 330 mg, 330 mg, Oral, 4x Daily (with meals and at bedtime), Ike Mcdaniel MD, 330 mg at 11/27/23 0817    LORazepam (ATIVAN) injection 2 mg, 2 mg, Intravenous, Q6H PRN, Dana Asif PA-C    metoprolol tartrate (LOPRESSOR) tablet 25 mg, 25 mg, Oral, BID, REMBERTO Joseph-JAMIE, 25 mg at 11/27/23 7926    nystatin (MYCOSTATIN) powder, , Topical, BID, REMBERTO Joseph-JAMIE, Given at 11/27/23 0811    ondansetron (ZOFRAN) injection 4 mg, 4 mg, Intravenous, Q6H PRN, Dana Asif PA-C    polyethylene glycol (MIRALAX) packet 17 g, 17 g, Oral, Daily PRN, Dana Asif PA-C    primidone (MYSOLINE) tablet 100 mg, 100 mg, Oral, Daily With Lunch, REMBERTO Joseph-JAMIE, 100 mg at 11/26/23 1348    primidone (MYSOLINE) tablet 100 mg, 100 mg, Oral, After Breakfast, Gary Romero MD, 100 mg at 11/27/23 0810    primidone (MYSOLINE) tablet 150 mg, 150 mg, Oral, HS, Dana Asif PA-C, 150 mg at 11/26/23 2127    senna-docusate sodium (SENOKOT S) 8.6-50 mg per tablet 1 tablet, 1 tablet, Oral, BID, Dana Asif PA-C, 1 tablet at 11/27/23 0810    sodium chloride (AYR SALINE NASAL) nasal gel 1 Application, 1 Application, Nasal, TID, Elias Wiggins MD, 1 Application at 52/58/78 2115    sodium chloride (OCEAN) 0.65 % nasal spray 2 spray, 2 spray, Each Nare, TID, Beatrice Laura MD, 2 spray at 11/21/23 2115    spironolactone (ALDACTONE) tablet 50 mg, 50 mg, Oral, Daily, Bartolo Romero MD, 50 mg at 11/27/23 0809    vitamin E (TOCOPHEROL) capsule 400 Units, 400 Units, Oral, Daily, Dana Asif PA-C, 400 Units at 11/27/23 0809    zonisamide (ZONEGRAN) capsule 100 mg, 100 mg, Oral, HS, REMBERTO Joseph-C, 100 mg at 11/26/23 2127    Laboratory Results:  Lab Results   Component Value Date    WBC 6.17 11/24/2023    HGB 10.1 (L) 11/24/2023    HCT 31.9 (L) 11/24/2023     (H) 11/24/2023     (L) 11/24/2023     Lab Results   Component Value Date    SODIUM 139 11/27/2023    K 4.7 11/27/2023     11/27/2023    CO2 32 11/27/2023    BUN 32 (H) 11/27/2023    CREATININE 1.59 (H) 11/27/2023    GLUC 102 11/27/2023    CALCIUM 8.9 11/27/2023     Lab Results   Component Value Date    CALCIUM 8.9 11/27/2023    PHOS 3.0 01/18/2020     No results found for: "LABPROT"

## 2023-11-27 NOTE — PLAN OF CARE
Problem: PAIN - ADULT  Goal: Verbalizes/displays adequate comfort level or baseline comfort level  Description: Interventions:  - Encourage patient to monitor pain and request assistance  - Assess pain using appropriate pain scale  - Administer analgesics based on type and severity of pain and evaluate response  - Implement non-pharmacological measures as appropriate and evaluate response  - Consider cultural and social influences on pain and pain management  - Notify physician/advanced practitioner if interventions unsuccessful or patient reports new pain  Outcome: Progressing     Problem: INFECTION - ADULT  Goal: Absence or prevention of progression during hospitalization  Description: INTERVENTIONS:  - Assess and monitor for signs and symptoms of infection  - Monitor lab/diagnostic results  - Monitor all insertion sites, i.e. indwelling lines, tubes, and drains  - Monitor endotracheal if appropriate and nasal secretions for changes in amount and color  - [place] appropriate cooling/warming therapies per order  - Administer medications as ordered  - Instruct and encourage patient and family to use good hand hygiene technique  - Identify and instruct in appropriate isolation precautions for identified infection/condition  Outcome: Progressing  Goal: Absence of fever/infection during neutropenic period  Description: INTERVENTIONS:  - Monitor WBC    Outcome: Progressing     Problem: SAFETY ADULT  Goal: Patient will remain free of falls  Description: INTERVENTIONS:  - Educate patient/family on patient safety including physical limitations  - Instruct patient to call for assistance with activity   - Consult OT/PT to assist with strengthening/mobility   - Keep Call bell within reach  - Keep bed low and locked with side rails adjusted as appropriate  - Keep care items and personal belongings within reach  - Initiate and maintain comfort rounds  - Make Fall Risk Sign visible to staff  - Offer Toileting every 2 Hours, in advance of need  - Initiate/Maintain bed/chair alarm  - Obtain necessary fall risk management equipment:   - Apply yellow socks and bracelet for high fall risk patients  - Consider moving patient to room near nurses station  Outcome: Progressing  Goal: Maintain or return to baseline ADL function  Description: INTERVENTIONS:  -  Assess patient's ability to carry out ADLs; assess patient's baseline for ADL function and identify physical deficits which impact ability to perform ADLs (bathing, care of mouth/teeth, toileting, grooming, dressing, etc.)  - Assess/evaluate cause of self-care deficits   - Assess range of motion  - Assess patient's mobility; develop plan if impaired  - Assess patient's need for assistive devices and provide as appropriate  - Encourage maximum independence but intervene and supervise when necessary  - Involve family in performance of ADLs  - Assess for home care needs following discharge   - Consider OT consult to assist with ADL evaluation and planning for discharge  - Provide patient education as appropriate  Outcome: Progressing  Goal: Maintains/Returns to pre admission functional level  Description: INTERVENTIONS:  - Perform AM-PAC 6 Click Basic Mobility/ Daily Activity assessment daily.  - Set and communicate daily mobility goal to care team and patient/family/caregiver. - Collaborate with rehabilitation services on mobility goals if consulted  - Perform Range of Motion 3 times a day. - Reposition patient every 2 hours.   - Dangle patient 3 times a day  - Stand patient 3 times a day  - Ambulate patient 3 times a day  - Out of bed to chair 3 times a day   - Out of bed for meals 3 times a day  - Out of bed for toileting  - Record patient progress and toleration of activity level   Outcome: Progressing     Problem: DISCHARGE PLANNING  Goal: Discharge to home or other facility with appropriate resources  Description: INTERVENTIONS:  - Identify barriers to discharge w/patient and caregiver  - Arrange for needed discharge resources and transportation as appropriate  - Identify discharge learning needs (meds, wound care, etc.)  - Arrange for interpretive services to assist at discharge as needed  - Refer to Case Management Department for coordinating discharge planning if the patient needs post-hospital services based on physician/advanced practitioner order or complex needs related to functional status, cognitive ability, or social support system  Outcome: Progressing     Problem: Knowledge Deficit  Goal: Patient/family/caregiver demonstrates understanding of disease process, treatment plan, medications, and discharge instructions  Description: Complete learning assessment and assess knowledge base.   Interventions:  - Provide teaching at level of understanding  - Provide teaching via preferred learning methods  Outcome: Progressing     Problem: Prexisting or High Potential for Compromised Skin Integrity  Goal: Skin integrity is maintained or improved  Description: INTERVENTIONS:  - Identify patients at risk for skin breakdown  - Assess and monitor skin integrity  - Assess and monitor nutrition and hydration status  - Monitor labs   - Assess for incontinence   - Turn and reposition patient  - Assist with mobility/ambulation  - Relieve pressure over bony prominences  - Avoid friction and shearing  - Provide appropriate hygiene as needed including keeping skin clean and dry  - Evaluate need for skin moisturizer/barrier cream  - Collaborate with interdisciplinary team   - Patient/family teaching  - Consider wound care consult   Outcome: Progressing     Problem: RESPIRATORY - ADULT  Goal: Achieves optimal ventilation and oxygenation  Description: INTERVENTIONS:  - Assess for changes in respiratory status  - Assess for changes in mentation and behavior  - Position to facilitate oxygenation and minimize respiratory effort  - Oxygen administered by appropriate delivery if ordered  - Initiate smoking cessation education as indicated  - Encourage broncho-pulmonary hygiene including cough, deep breathe, Incentive Spirometry  - Assess the need for suctioning and aspirate as needed  - Assess and instruct to report SOB or any respiratory difficulty  - Respiratory Therapy support as indicated  Outcome: Progressing

## 2023-11-27 NOTE — ASSESSMENT & PLAN NOTE
Lab Results   Component Value Date    EGFR 47 11/27/2023    EGFR 50 11/26/2023    EGFR 44 11/25/2023    CREATININE 1.59 (H) 11/27/2023    CREATININE 1.53 (H) 11/26/2023    CREATININE 1.69 (H) 11/25/2023

## 2023-11-27 NOTE — PHYSICAL THERAPY NOTE
PHYSICAL THERAPY TREATMENT NOTE    NAME:  Juhi Bragg  DATE: 11/27/23    Length Of Stay: 10  Performed at least 2 patient identifiers during session: Name and Birthday    TREATMENT FLOW SHEET:       11/27/23 1027   PT Last Visit   PT Visit Date 11/27/23   Note Type   Note Type Treatment   Pain Assessment   Pain Assessment Tool FLACC   Pain Rating: FLACC (Rest) - Face 0   Pain Rating: FLACC (Rest) - Legs 0   Pain Rating: FLACC (Rest) - Activity 0   Pain Rating: FLACC (Rest) - Cry 0   Pain Rating: FLACC (Rest) - Consolability 0   Score: FLACC (Rest) 0   Pain Rating: FLACC (Activity) - Face 1   Pain Rating: FLACC (Activity) - Legs 0   Pain Rating: FLACC (Activity) - Activity 0   Pain Rating: FLACC (Activity) - Cry 1   Pain Rating: FLACC (Activity) - Consolability 0   Score: FLACC (Activity) 2   Restrictions/Precautions   Weight Bearing Precautions Per Order No   Other Precautions Cognitive; Chair Alarm; Bed Alarm; Fall Risk   General   Chart Reviewed Yes   Response to Previous Treatment Patient unable to report, no changes reported from family or staff   Family/Caregiver Present No   Cognition   Overall Cognitive Status Impaired   Arousal/Participation Alert; Responsive   Attention Attends with cues to redirect   Orientation Level Oriented X4   Memory Decreased short term memory   Bed Mobility   Supine to Sit 2  Maximal assistance   Additional items Assist x 1; Increased time required;Verbal cues;LE management   Additional Comments Demonstrating intermittent posterior lean while sitting EOB with intermittent assist required to maintain seated balance. Transfers   Sit to Stand 3  Moderate assistance   Additional items Assist x 2; Increased time required;Verbal cues   Stand to Sit 3  Moderate assistance   Additional items Assist x 2; Increased time required;Verbal cues   Stand pivot 3  Moderate assistance   Additional items Assist x 2; Increased time required;Verbal cues   Additional Comments Use of RW for first sit to stand and SPT from EOB to recliner chair. Cues needed to pivot fully in order to not sit prematurely. Flexed knee posture noted in stance. 2nd trial sit to stand at anterior rail with mod A x 2 with a little bit more initiation from pt. Standing tolerance roughly 30 seconds with pt sitting without giving verbal warning to PT. Ambulation/Elevation   Additional items   (non-ambulatory at baseline)   Balance   Static Sitting Fair   Dynamic Sitting Fair -   Static Standing Poor -   Dynamic Standing Poor -   Endurance Deficit   Endurance Deficit Yes   Endurance Deficit Description Fatigue   Activity Tolerance   Activity Tolerance Patient limited by fatigue   Medical Staff Made Aware OT Javier Sanderson   Nurse Made Aware RN Lashawn   Assessment   Prognosis Fair   Problem List Decreased strength;Decreased range of motion;Decreased endurance; Impaired balance;Decreased mobility; Decreased cognition; Impaired judgement;Decreased safety awareness; Obesity; Decreased skin integrity   Assessment Pt tolerated tx session fair. Interventions consisting of bed mobility, transfer training, & standing tolerance. Pt limited by lack of initiation, body habitus, core strength deficits, and B LE strength impairment. Pt with very minimal to no progress since last session. Barriers to Discharge Inaccessible home environment;Decreased caregiver support   Barriers to Discharge Comments Caregiver currently in rehab facility   Goals   Patient Goals unwilling to state today   STG Expiration Date 12/04/23   PT Treatment Day 2   Plan   Treatment/Interventions Functional transfer training;LE strengthening/ROM; Elevations; Therapeutic exercise; Endurance training;Cognitive reorientation;Patient/family training;Equipment eval/education; Bed mobility;Gait training; Compensatory technique education;OT   Progress Slow progress, decreased activity tolerance   PT Frequency 3-5x/wk   Discharge Recommendation   Rehab Resource Intensity Level, PT I (Maximum Resource Intensity)   AM-PAC Basic Mobility Inpatient   Turning in Flat Bed Without Bedrails 2   Lying on Back to Sitting on Edge of Flat Bed Without Bedrails 2   Moving Bed to Chair 1   Standing Up From Chair Using Arms 1   Walk in Room 1   Climb 3-5 Stairs With Railing 1   Basic Mobility Inpatient Raw Score 8   Turning Head Towards Sound 3   Follow Simple Instructions 3   Low Function Basic Mobility Raw Score  14   Low Function Basic Mobility Standardized Score  22.01   Highest Level Of Mobility   -HLM Goal 3: Sit at edge of bed   JH-HLM Achieved 4: Move to chair/commode   Education   Education Provided Mobility training   Patient Reinforcement needed   End of Consult   Patient Position at End of Consult Bedside chair;Bed/Chair alarm activated; All needs within reach       The patient's AM-PAC Basic Mobility Inpatient Short Form Raw Score is 8. A Raw score of less than or equal to 16 suggests the patient may benefit from discharge to post-acute rehabilitation services. Please also refer to the recommendation of the Physical Therapist for safe discharge planning.        Lucy Morgan, PT,DPT

## 2023-11-27 NOTE — OCCUPATIONAL THERAPY NOTE
Occupational Therapy Progress Note     Patient Name: Melida Hubbard  MLTWH'S Date: 11/27/2023  Problem List  Principal Problem:    Pleural effusion on right  Active Problems:    Nonintractable generalized idiopathic epilepsy without status epilepticus ([address])    Falls    Developmental delay    Dysphagia    Acute on chronic heart failure with preserved ejection fraction (HCC)    Liver cirrhosis (HCC)    No other household member able to render care    Chronic respiratory failure with hypercapnia ([address])    Stage 3 chronic kidney disease ([address])    Acute blood loss anemia    Hypervolemia    Chronic renal impairment            11/27/23 1026   OT Last Visit   OT Visit Date 11/27/23   Note Type   Note Type Treatment   Pain Assessment   Pain Assessment Tool FLACC   Pain Rating: FLACC (Rest) - Face 0   Pain Rating: FLACC (Rest) - Legs 0   Pain Rating: FLACC (Rest) - Activity 0   Pain Rating: FLACC (Rest) - Cry 0   Pain Rating: FLACC (Rest) - Consolability 0   Score: FLACC (Rest) 0   Pain Rating: FLACC (Activity) - Face 1   Pain Rating: FLACC (Activity) - Legs 0   Pain Rating: FLACC (Activity) - Activity 0   Pain Rating: FLACC (Activity) - Cry 1   Pain Rating: FLACC (Activity) - Consolability 0   Score: FLACC (Activity) 2   Restrictions/Precautions   Weight Bearing Precautions Per Order No   Other Precautions Cognitive; Chair Alarm; Bed Alarm; Fall Risk   ADL   Grooming Assistance 1  Total Assistance   Grooming Deficit Setup   Grooming Comments setup with bathing wipes while seated EOB. Pt unable to follow through with directions to wash face given multi-modal cues.  Limited by decreased attention to task, problem solving, and initation/follow through   [address] 1  Total Assistance   LB Bathing Assistance 1  Total Assistance   Bed Mobility   Supine to Sit 2  Maximal assistance   Additional items Verbal cues   Additional Comments Retropulsion demo'ed while seated EOB x 3-5 minutes with intermittent manual cues to self-correct to midline position   Transfers   Sit to Stand 3  Moderate assistance   Additional items Assist x 2; Increased time required;Verbal cues   Stand to Sit 3  Moderate assistance   Additional items Assist x 2; Increased time required;Verbal cues   Stand pivot 3  Moderate assistance   Additional items Assist x 2; Increased time required;Verbal cues   Additional Comments Pt unable to maintain knee and hip extension to achieve fully upright position. RW utilized for all transfers. Trialed STS via pulling up from elevated bedrail with improvement noted in initiation and follow through. Subjective   Subjective "I just don't have anything to say."   Cognition   Overall Cognitive Status Impaired   Arousal/Participation Alert; Responsive   Attention Attends with cues to redirect   Orientation Level Oriented X4   Memory Decreased short term memory   Following Commands Follows one step commands without difficulty   Activity Tolerance   Activity Tolerance Patient limited by fatigue   Medical Staff Made Aware PT, Romero   Assessment   Assessment "Summermaci Doe" was received in bed and agreeable to follow up session focused on improving safety and independence with ADLs and functional transfers. Pt required total assistance for ADLs due to decreased initiation and attention to task. Mod A x 2 required for STS and SPT to the right side from bed -> chair with RW. Pt demo'ed premature sitting prior to arriving at transfer surface and required extra assist to ensure midline sitting position on the recliner. Pt would continue to benefit from acute OT to maximize return to PLOF. Plan   Treatment Interventions ADL retraining;Functional transfer training;UE strengthening/ROM; Endurance training;Cognitive reorientation;Patient/family training;Equipment evaluation/education; Compensatory technique education;Continued evaluation; Energy conservation; Activityengagement   Goal Expiration Date 12/04/23   OT Treatment Day 2   OT Frequency 1-2x/wk Discharge Recommendation   Rehab Resource Intensity Level, OT I (Maximum Resource Intensity)   AM-PAC Daily Activity Inpatient   Lower Body Dressing 1   Bathing 1   Toileting 1   Upper Body Dressing 1   Grooming 2   Eating 3   Daily Activity Raw Score 9   [facility] Applied Cognition Inpatient   Following a Speech/Presentation 1   Understanding Ordinary Conversation 2   Taking Medications 1   Remembering Where Things Are Placed or Put Away 1   Remembering List of 4-5 Errands 1   Taking Care of Complicated Tasks 1   Applied Cognition Raw Score 7   Applied Cognition Standardized Score 15.17     The patient's raw score on the AM-PAC Daily Activity Inpatient Short Form is 12. A raw score of less than 19 suggests the patient may benefit from discharge to post-acute rehabilitation services. Please refer to the recommendation of the Occupational Therapist for safe discharge planning. Pt goals to be met by 12/4/2023:     Pt will demonstrate ability to complete grooming/hygiene tasks @ mod assist after set-up. Pt will demonstrate ability to complete supine<>sit @ mod assist in order to increase safety and independence during ADL tasks. Pt will demonstrate ability to complete UB ADLs including washing/dressing @ mod assist in order to increase performance and participation during meaningful tasks  Pt will demonstrate ability to complete LB dressing @ max assist in order to increase safety and independence during meaningful tasks. Pt will demonstrate ability to suzy/doff socks/shoes while sitting EOB/chair @ max assist in order to increase safety and independence during meaningful tasks. Pt will demonstrate ability to complete toileting tasks including CM and pericare @ max assist in order to increase safety and independence during meaningful tasks. Pt will demonstrate ability to complete EOB, chair, toilet/commode transfers @ mod assist in order to increase performance and participation during functional tasks.   Pt will demonstrate ability to stand for 2 minutes while maintaining F+ balance with use of RW for UB support PRN. Pt will demonstrate ability to tolerate 10 minute OT session with no vc'ing for deep breathing or use of energy conservation techniques in order to increase activity tolerance during functional tasks. Pt will demonstrate Good carryover of use of energy conservation/compensatory strategies during ADLs and functional tasks in order to increase safety and reduce risk for falls. Pt will follow 100% simple 2-step commands and be at least A&O x3 consistently with environmental cues to increase participation in functional activities. Pt will identify 3 areas of interest/hobbies and 1 intervention on how to incorporate into daily life in order to increase interaction with environment and peers as well as increase participation in meaningful tasks. Pt will demonstrate 100% carryover of BUE HEP in order to increase BUE MS and increase performance during functional tasks upon d/c home.     Alec Giraldo, OTR/L

## 2023-11-27 NOTE — ASSESSMENT & PLAN NOTE
Lab Results   Component Value Date    EGFR 47 11/27/2023    EGFR 50 11/26/2023    EGFR 44 11/25/2023    CREATININE 1.59 (H) 11/27/2023    CREATININE 1.53 (H) 11/26/2023    CREATININE 1.69 (H) 11/25/2023   Nephrology following  Previous baseline reported to be 1.3-1.4  Creatinine has been stable around 1.5-1.6  Resume spironolactone 11/25, will Lasix 11/21  May need to accept permissive hyper creatinine anemia with diuretics to manage volume status

## 2023-11-27 NOTE — PLAN OF CARE
Problem: OCCUPATIONAL THERAPY ADULT  Goal: Performs self-care activities at highest level of function for planned discharge setting. See evaluation for individualized goals. Description: Treatment Interventions: ADL retraining, Functional transfer training, UE strengthening/ROM, Endurance training, Cognitive reorientation, Patient/family training, Equipment evaluation/education, Compensatory technique education, Continued evaluation, Energy conservation, Activityengagement          See flowsheet documentation for full assessment, interventions and recommendations. Outcome: Progressing  Note: Limitation: Decreased ADL status, Decreased UE ROM, Decreased UE strength, Decreased Safe judgement during ADL, Decreased cognition, Decreased endurance, Decreased self-care trans, Decreased high-level ADLs     Assessment: "Spencer Bates" was received in bed and agreeable to follow up session focused on improving safety and independence with ADLs and functional transfers. Pt required total assistance for ADLs due to decreased initiation and attention to task. Mod A x 2 required for STS and SPT to the right side from bed -> chair with RW. Pt demo'ed premature sitting prior to arriving at transfer surface and required extra assist to ensure midline sitting position on the recliner. Pt would continue to benefit from acute OT to maximize return to PLOF.      Rehab Resource Intensity Level, OT: I (Maximum Resource Intensity)

## 2023-11-27 NOTE — ASSESSMENT & PLAN NOTE
Wears 2L O2 QHS, continue  Likely has a chronic hypercapnia given the elevated bicarbonate/kyphosis likely causing chronic limited chest expansion and sleepiness during day will obtain vbg- reviewed

## 2023-11-27 NOTE — ASSESSMENT & PLAN NOTE
Patient previously maintained on Lasix as an outpatient -during a nursing home admission it was decreased to 40 mg daily given no his kidney function  proBNP elevated  2D echo done 2022 with mild concentric hypertrophy and EF 60 to 65%  Updated echo this admission with mild concentric left ventricular hypertrophy, LVEF 63% with normal diastolic function -overall similar from 2022 without significant change  Is s/p IR guided thoracentesis due to loculated effusion  Nephrology following and aiding with diuretic use  Patient is currently resumed on home dose of Lasix (11/21) and resume spironolactone 50 mg (11/25)  Patient still mildly hypervolemic, does have chronic lymphedema in his legs as well which contributes to exam  Will discuss with nephrology if creatinine stable tomorrow would increase to 100  mg spironolactone

## 2023-11-27 NOTE — PROGRESS NOTES
[address] [facility]  Progress Note  Name: Edmond Patino  MRN: [id number]  Unit/Bed#: -07 I Date of Admission: 11/17/2023   Date of Service: 11/27/2023 I Hospital Day: 10    Assessment/Plan   * Pleural effusion on right  Assessment & Plan  Patient with worsening shortness of breath for the past week falling asleep more often  Previously utilize oxygen 2 L only at nighttime, family endorsing he has been utilizing throughout the day prior to admission as well  Imaging showing loculated right-sided moderate effusion with inability to rule out pneumonia on imaging  Labs without leukocytosis, Pro-Janusz was mildly elevated, finished a course of antibiotics for possible pneumonia x 7 days  Due to loculated effusion IR consulted for thoracentesis which was completed 11/20  There is no significant loculation noted on ultrasound necessitating a chest tube  860 mL dark isidro-colored right pleural fluid  Fluid studies no bacterial growth - likely transudative secondary to CHF/cirrhosis     Chronic renal impairment  Assessment & Plan  Lab Results   Component Value Date    EGFR 47 11/27/2023    EGFR 50 11/26/2023    EGFR 44 11/25/2023    CREATININE 1.59 (H) 11/27/2023    CREATININE 1.53 (H) 11/26/2023    CREATININE 1.69 (H) 11/25/2023       Acute blood loss anemia  Assessment & Plan  Secondary to recurrent epistaxis.     Appreciate ENT consult -conservative management  Patient is on aspirin and Lovenox, continue to monitor  Hemoglobin has been stable    Stage 3 chronic kidney disease [facility])  Assessment & Plan  Lab Results   Component Value Date    EGFR 47 11/27/2023    EGFR 50 11/26/2023    EGFR 44 11/25/2023    CREATININE 1.59 (H) 11/27/2023    CREATININE 1.53 (H) 11/26/2023    CREATININE 1.69 (H) 11/25/2023   Nephrology following  Previous baseline reported to be 1.3-1.4  Creatinine has been stable around 1.5-1.6  Resume spironolactone 11/25, will Lasix 11/21  May need to accept permissive hyper creatinine anemia with diuretics to manage volume status    Chronic respiratory failure with hypercapnia (HCC)  Assessment & Plan  Wears 2L O2 QHS, continue  Likely has a chronic hypercapnia given the elevated bicarbonate/kyphosis likely causing chronic limited chest expansion and sleepiness during day will obtain vbg- reviewed     No other household member able to render care  Assessment & Plan  Patient's main caregiver is his mother who was recently hospitalized and discharged to SNF for rehab  She assists him with his ADLs and assist x2  Pt/ot ordered  CM following for placement    Liver cirrhosis ([address])  Assessment & Plan  Cirrhotic morphology of the liver chronic problem  Ammonia is normal there is no ascites  Continue lactulose daily -re-time for AM patient has been refusing at night as he does not want to have bowel movements during bedtime  Continue lasix 40 mg daily  Aldactone resume and lower dose, 50 mg daily    Acute on chronic heart failure with preserved ejection fraction [facility])  Assessment & Plan  Patient previously maintained on Lasix as an outpatient -during a nursing home admission it was decreased to 40 mg daily given no his kidney function  proBNP elevated  2D echo done 2022 with mild concentric hypertrophy and EF 60 to 65%  Updated echo this admission with mild concentric left ventricular hypertrophy, LVEF 63% with normal diastolic function -overall similar from 2022 without significant change  Is s/p IR guided thoracentesis due to loculated effusion  Nephrology following and aiding with diuretic use  Patient is currently resumed on home dose of Lasix (11/21) and resume spironolactone 50 mg (11/25)  Patient still mildly hypervolemic, does have chronic lymphedema in his legs as well which contributes to exam  Will discuss with nephrology if creatinine stable tomorrow would increase to 100  mg spironolactone     Dysphagia  Assessment & Plan  Continue mechanical soft diet from prior  Aspiration precautions obtain speech    Developmental delay  Assessment & Plan  Daily involvement with mother  Supportive care  PT/OT/ST  Pt AAOx1   At baseline AAOx3  Due to social situation, patient will need skilled nursing facility, case management on board (patient was living with his mother, mother recently hospitalized due to medical issue and get discharged to skilled nursing facility. No other family member is able to take care of the patient.)    Falls  Assessment & Plan  Frequent falls for his entire life because of unsteady gait according to the mother's medical history list  Fall precautions  Emergency room trauma eval no acute injury  Due to patient overall medical conditions, social issues, patient is in need of extensive care. Case management on board. Nonintractable generalized idiopathic epilepsy without status epilepticus [facility])  Assessment & Plan  Continue home seizure medications with seizure precautions  His last seizure was 2-3 days prior to admission  Lamictal, zonesamide and primidone levels ordered. The last few times he was hospitalized for seizure the AEDs were not changed because the breakthrough seizure etiology were infections. VTE Pharmacologic Prophylaxis: VTE Score: 5 High Risk (Score >/= 5) - Pharmacological DVT Prophylaxis Ordered: enoxaparin (Lovenox). Sequential Compression Devices Ordered. Mobility:   Basic Mobility Inpatient Raw Score: 10  JH-HLM Goal: 4: Move to chair/commode  JH-HLM Achieved: 5: Stand (1 or more minutes)  HLM Goal achieved. Continue to encourage appropriate mobility. Patient Centered Rounds: I performed bedside rounds with nursing staff today. Discussions with Specialists or Other Care Team Provider: CM    Education and Discussions with Family / Patient: Updated  (Uncle ) at bedside. Total Time Spent on Date of Encounter in care of patient:  mins.  This time was spent on one or more of the following: performing physical exam; counseling and coordination of care; obtaining or reviewing history; documenting in the medical record; reviewing/ordering tests, medications or procedures; communicating with other healthcare professionals and discussing with patient's family/caregivers. Current Length of Stay: 10 day(s)  Current Patient Status: Inpatient   Certification Statement: The patient will continue to require additional inpatient hospital stay due to monitoring of above conditions as well as pending [place] assessment for rehab. Discharge Plan:  Pending [facility] assessment    Code Status: Level 1 - Full Code    Subjective:   Seen and examined. Endorses no new concerns to me    Objective:     Vitals:   Temp (24hrs), Av.8 °F (36.6 °C), Min:97.7 °F (36.5 °C), Max:97.9 °F (36.6 °C)    Temp:  [97.7 °F (36.5 °C)-97.9 °F (36.6 °C)] 97.7 °F (36.5 °C)  HR:  [70-86] 84  BP: (129-138)/(76-86) 131/82  SpO2:  [87 %-93 %] 87 %  Body mass index is 40.46 kg/m². Input and Output Summary (last 24 hours): Intake/Output Summary (Last 24 hours) at 2023 1519  Last data filed at 2023 0900  Gross per 24 hour   Intake 826 ml   Output 450 ml   Net 376 ml       Physical Exam:   Physical Exam  Vitals and nursing note reviewed. Constitutional:       General: He is not in acute distress. Appearance: Normal appearance. He is obese. HENT:      Head: Normocephalic and atraumatic. Nose: No congestion. Mouth/Throat:      Mouth: Mucous membranes are moist.   Eyes:      Conjunctiva/sclera: Conjunctivae normal.   Cardiovascular:      Rate and Rhythm: Normal rate and regular rhythm. Pulses: Normal pulses. Heart sounds: Normal heart sounds. No murmur heard. Pulmonary:      Effort: Pulmonary effort is normal. No respiratory distress. Breath sounds: Normal breath sounds. Abdominal:      General: Bowel sounds are normal.      Palpations: Abdomen is soft. Tenderness: There is no abdominal tenderness. Musculoskeletal:         General: Normal range of motion. Right lower leg: Edema present. Left lower leg: Edema present. Skin:     General: Skin is warm and dry. Findings: Erythema (Bilateral lower extremity chronic erythema and skin changes) present. Neurological:      Mental Status: He is alert. Mental status is at baseline. Additional Data:     Labs:  Results from last 7 days   Lab Units 11/24/23  0556   WBC Thousand/uL 6.17   HEMOGLOBIN g/dL 10.1*   HEMATOCRIT % 31.9*   PLATELETS Thousands/uL 113*   NEUTROS PCT % 79*   LYMPHS PCT % 10*   MONOS PCT % 10   EOS PCT % 0     Results from last 7 days   Lab Units 11/27/23  0448 11/25/23  0457 11/24/23  0556   SODIUM mmol/L 139   < > 141   POTASSIUM mmol/L 4.7   < > 3.9   CHLORIDE mmol/L 102   < > 102   CO2 mmol/L 32   < > 31   BUN mg/dL 32*   < > 31*   CREATININE mg/dL 1.59*   < > 1.65*   ANION GAP mmol/L 5   < > 8   CALCIUM mg/dL 8.9   < > 8.9   ALBUMIN g/dL  --   --  3.2*   TOTAL BILIRUBIN mg/dL  --   --  1.12*   ALK PHOS U/L  --   --  169*   ALT U/L  --   --  14   AST U/L  --   --  21   GLUCOSE RANDOM mg/dL 102   < > 84    < > = values in this interval not displayed. Lines/Drains:  Invasive Devices       Peripheral Intravenous Line  Duration             Peripheral IV 11/27/23 Proximal;Right;Ventral (anterior) Forearm <1 day                          Imaging: No pertinent imaging reviewed.     Recent Cultures (last 7 days):         Last 24 Hours Medication List:   Current Facility-Administered Medications   Medication Dose Route Frequency Provider Last Rate    acetaminophen  650 mg Oral Q6H PRN Dana Asif PA-C      ascorbic acid  500 mg Oral Daily Dana Asif PA-C      aspirin  81 mg Oral Daily Justin Abdi MD      calcium carbonate-vitamin D  1 tablet Oral Daily With Breakfast Ashtyn Romero MD      dextromethorphan-guaiFENesin  10 mL Oral Q4H PRN Dana Asif PA-C      enoxaparin  40 mg Subcutaneous Q24H [address] Ike Mcdaniel MD      furosemide  40 mg Oral Daily Ike Mcdaniel MD      lactulose  20 g Oral Daily With Breakfast Rodriguez Trejo PA-C      lamoTRIgine  200 mg Oral Early Morning Dana Demo, WHITNEY      lamoTRIgine  250 mg Oral After Lunch Dana Demo, WHITNEY      lamoTRIgine  300 mg Oral HS Dana Demo, WHITNEY      levOCARNitine  330 mg Oral 4x Daily (with meals and at bedtime) Ike Mcdaniel MD      LORazepam  2 mg Intravenous Q6H PRN Dana Demo, WHITNEY      metoprolol tartrate  25 mg Oral BID Dana Demo, WHITNEY      nystatin   Topical BID Dana Demo, WHITNEY      ondansetron  4 mg Intravenous Q6H PRN Dana Demo, WHITNEY      polyethylene glycol  17 g Oral Daily PRN Dana Demo, WHITNEY      primidone  100 mg Oral Daily With Lunch Dana Demo, WHITNEY      primidone  100 mg Oral After Breakfast Gary Romero MD      primidone  150 mg Oral HS Dana Demo, WHITNEY      senna-docusate sodium  1 tablet Oral BID Dana Demo, WHITNEY      sodium chloride  1 Application Nasal TID Elias Wiggins MD      sodium chloride  2 spray Each Nare TID Elias Wiggins MD      spironolactone  50 mg Oral Daily Ike Mcdaniel MD      vitamin E (tocopherol)  400 Units Oral Daily Daan Demo, WHITNEY      zonisamide  100 mg Oral HS Joaquin Priest PA-C          Today, Patient Was Seen By: Rodriguez Trejo PA-C    **Please Note: This note may have been constructed using a voice recognition system. **

## 2023-11-27 NOTE — CASE MANAGEMENT
Case Management Discharge Planning Note    Patient name Parth Glass  [facility] [id number]  : 1965 Date 2023       Current Admission Date: 2023  Current Admission Diagnosis:Pleural effusion on right   Patient Active Problem List    Diagnosis Date Noted    Hypervolemia 2023    Chronic renal impairment 2023    Acute blood loss anemia 2023    No other household member able to render care 2023    Chronic respiratory failure with hypercapnia ([address]) 2023    Stage 3 chronic kidney disease ([address]) 2023    Liver cirrhosis ([address]) 2021    Abnormal finding on CT scan 2021    EDWIGE (acute kidney injury) ([address]) 2021    Pleural effusion on right 2021    History of COVID-19 2021    S/P pericardial window creation 2021    Acute on chronic heart failure with preserved ejection fraction ([address]) 2021    Lower extremity pain, left 2021    MRSA nasal colonization 2020    Developmental delay 2020    Dysphagia 2020    Acute encephalopathy 2020    Pneumonia due to infectious organism 01/10/2020    Nonintractable generalized idiopathic epilepsy without status epilepticus ([address]) 01/10/2020    Falls 01/10/2020    T wave inversion in EKG 01/10/2020    Essential hypertension 01/10/2020    RSV (acute bronchiolitis due to respiratory syncytial virus) 01/10/2020    Acute respiratory failure with hypoxia ([address]) 01/10/2020    Polyp of colon 03/15/2019      LOS (days): 10  Geometric Mean LOS (GMLOS) (days): 3.90  Days to GMLOS:-5.9     OBJECTIVE:  Risk of Unplanned Readmission Score: 19.58         Current admission status: Inpatient   Preferred Pharmacy:   [facility] [place], [place] [address]  [address] [address]  Phone: [phone] Fax: [phone]    Primary Care Provider: Jami Schneider DO    Primary Insurance: MEDICARE  Secondary Insurance: [employer]    DISCHARGE DETAILS:     CM reached out to [facility] to check status of LOC assessment. They do not have a timeframe to respond and complete assessment. CM spoke to [facility] - family member of patient who was in communication with patient's mother. We reviewed the two facilities that accepted patient however they are not agreeable to either one. CM will extend referral. CM informed them waiting on [facility] for LOC assessment as facilities not willing to accept without it other than [facility] that has no availability. Will reach out to them to see if anything changed. CM contacted Vera at [facility] - no changes with bed status - hoping to get some discharges and can follow patient through [facility]. CM to follow patient's care and discharge needs.

## 2023-11-27 NOTE — PLAN OF CARE
Problem: PHYSICAL THERAPY ADULT  Goal: Performs mobility at highest level of function for planned discharge setting. See evaluation for individualized goals. Description: Treatment/Interventions: Functional transfer training, LE strengthening/ROM, Therapeutic exercise, Endurance training, Patient/family training, Equipment eval/education, Bed mobility, Gait training, Compensatory technique education, Spoke to nursing, OT          See flowsheet documentation for full assessment, interventions and recommendations. Outcome: Not Progressing  Note: Prognosis: Fair  Problem List: Decreased strength, Decreased range of motion, Decreased endurance, Impaired balance, Decreased mobility, Decreased cognition, Impaired judgement, Decreased safety awareness, Obesity, Decreased skin integrity  Assessment: Pt tolerated tx session fair. Interventions consisting of bed mobility, transfer training, & standing tolerance. Pt limited by lack of initiation, body habitus, core strength deficits, and B LE strength impairment. Pt with very minimal to no progress since last session. Barriers to Discharge: Inaccessible home environment, Decreased caregiver support  Barriers to Discharge Comments: Caregiver currently in rehab facility  Rehab Resource Intensity Level, PT: I (Maximum Resource Intensity)    See flowsheet documentation for full assessment.

## 2023-11-28 ENCOUNTER — APPOINTMENT (INPATIENT)
Dept: RADIOLOGY | Facility: HOSPITAL | Age: 58
End: 2023-11-28
Payer: MEDICARE

## 2023-11-28 LAB
ANION GAP SERPL CALCULATED.3IONS-SCNC: 4 MMOL/L
BUN SERPL-MCNC: 31 MG/DL (ref 5–25)
CALCIUM SERPL-MCNC: 8.9 MG/DL (ref 8.4–10.2)
CHLORIDE SERPL-SCNC: 101 MMOL/L (ref 96–108)
CO2 SERPL-SCNC: 34 MMOL/L (ref 21–32)
CREAT SERPL-MCNC: 1.66 MG/DL (ref 0.6–1.3)
GFR SERPL CREATININE-BSD FRML MDRD: 45 ML/MIN/1.73SQ M
GLUCOSE SERPL-MCNC: 89 MG/DL (ref 65–140)
POTASSIUM SERPL-SCNC: 3.9 MMOL/L (ref 3.5–5.3)
SODIUM SERPL-SCNC: 139 MMOL/L (ref 135–147)

## 2023-11-28 PROCEDURE — 97110 THERAPEUTIC EXERCISES: CPT

## 2023-11-28 PROCEDURE — 71045 X-RAY EXAM CHEST 1 VIEW: CPT

## 2023-11-28 PROCEDURE — 80048 BASIC METABOLIC PNL TOTAL CA: CPT | Performed by: INTERNAL MEDICINE

## 2023-11-28 PROCEDURE — 88112 CYTOPATH CELL ENHANCE TECH: CPT | Performed by: STUDENT IN AN ORGANIZED HEALTH CARE EDUCATION/TRAINING PROGRAM

## 2023-11-28 PROCEDURE — 99232 SBSQ HOSP IP/OBS MODERATE 35: CPT | Performed by: INTERNAL MEDICINE

## 2023-11-28 PROCEDURE — 93005 ELECTROCARDIOGRAM TRACING: CPT

## 2023-11-28 PROCEDURE — 99232 SBSQ HOSP IP/OBS MODERATE 35: CPT

## 2023-11-28 PROCEDURE — 97535 SELF CARE MNGMENT TRAINING: CPT

## 2023-11-28 PROCEDURE — 88305 TISSUE EXAM BY PATHOLOGIST: CPT | Performed by: STUDENT IN AN ORGANIZED HEALTH CARE EDUCATION/TRAINING PROGRAM

## 2023-11-28 RX ORDER — FUROSEMIDE 40 MG/1
40 TABLET ORAL DAILY
Status: DISCONTINUED | OUTPATIENT
Start: 2023-11-29 | End: 2023-11-28

## 2023-11-28 RX ORDER — FUROSEMIDE 10 MG/ML
40 INJECTION INTRAMUSCULAR; INTRAVENOUS ONCE
Status: COMPLETED | OUTPATIENT
Start: 2023-11-28 | End: 2023-11-28

## 2023-11-28 RX ORDER — TORSEMIDE 20 MG/1
20 TABLET ORAL DAILY
Status: DISCONTINUED | OUTPATIENT
Start: 2023-11-28 | End: 2023-11-30

## 2023-11-28 RX ADMIN — LAMOTRIGINE 300 MG: 100 TABLET ORAL at 21:40

## 2023-11-28 RX ADMIN — SALINE NASAL SPRAY 2 SPRAY: 1.5 SOLUTION NASAL at 09:43

## 2023-11-28 RX ADMIN — LEVOCARNITINE 330 MG: 1 SOLUTION ORAL at 21:36

## 2023-11-28 RX ADMIN — Medication 1 TABLET: at 09:56

## 2023-11-28 RX ADMIN — ACETAMINOPHEN 650 MG: 325 TABLET, FILM COATED ORAL at 20:39

## 2023-11-28 RX ADMIN — FUROSEMIDE 40 MG: 10 INJECTION, SOLUTION INTRAMUSCULAR; INTRAVENOUS at 09:41

## 2023-11-28 RX ADMIN — LACTULOSE 20 G: 20 SOLUTION ORAL at 05:17

## 2023-11-28 RX ADMIN — SALINE NASAL SPRAY 2 SPRAY: 1.5 SOLUTION NASAL at 20:39

## 2023-11-28 RX ADMIN — DOCUSATE SODIUM AND SENNOSIDES 1 TABLET: 8.6; 5 TABLET, FILM COATED ORAL at 15:21

## 2023-11-28 RX ADMIN — LEVOCARNITINE 330 MG: 1 SOLUTION ORAL at 15:21

## 2023-11-28 RX ADMIN — ENOXAPARIN SODIUM 40 MG: 40 INJECTION SUBCUTANEOUS at 09:41

## 2023-11-28 RX ADMIN — OXYCODONE HYDROCHLORIDE AND ACETAMINOPHEN 500 MG: 500 TABLET ORAL at 09:41

## 2023-11-28 RX ADMIN — PRIMIDONE 100 MG: 50 TABLET ORAL at 15:21

## 2023-11-28 RX ADMIN — METOPROLOL TARTRATE 25 MG: 25 TABLET, FILM COATED ORAL at 20:39

## 2023-11-28 RX ADMIN — Medication 1 APPLICATION: at 20:39

## 2023-11-28 RX ADMIN — TORSEMIDE 20 MG: 20 TABLET ORAL at 11:59

## 2023-11-28 RX ADMIN — ASPIRIN 81 MG: 81 TABLET, COATED ORAL at 09:41

## 2023-11-28 RX ADMIN — PRIMIDONE 150 MG: 50 TABLET ORAL at 21:41

## 2023-11-28 RX ADMIN — SPIRONOLACTONE 50 MG: 25 TABLET ORAL at 09:41

## 2023-11-28 RX ADMIN — LAMOTRIGINE 200 MG: 100 TABLET ORAL at 05:16

## 2023-11-28 RX ADMIN — Medication 1 APPLICATION: at 15:20

## 2023-11-28 RX ADMIN — Medication 1 APPLICATION: at 09:43

## 2023-11-28 RX ADMIN — LAMOTRIGINE 250 MG: 100 TABLET ORAL at 15:21

## 2023-11-28 RX ADMIN — ZONISAMIDE 100 MG: 100 CAPSULE ORAL at 21:41

## 2023-11-28 RX ADMIN — Medication 400 UNITS: at 09:41

## 2023-11-28 RX ADMIN — NYSTATIN: 100000 POWDER TOPICAL at 09:41

## 2023-11-28 RX ADMIN — LEVOCARNITINE 330 MG: 1 SOLUTION ORAL at 11:59

## 2023-11-28 RX ADMIN — LEVOCARNITINE 330 MG: 1 SOLUTION ORAL at 09:40

## 2023-11-28 RX ADMIN — DOCUSATE SODIUM AND SENNOSIDES 1 TABLET: 8.6; 5 TABLET, FILM COATED ORAL at 09:41

## 2023-11-28 RX ADMIN — METOPROLOL TARTRATE 25 MG: 25 TABLET, FILM COATED ORAL at 09:41

## 2023-11-28 RX ADMIN — PRIMIDONE 100 MG: 50 TABLET ORAL at 09:41

## 2023-11-28 RX ADMIN — SALINE NASAL SPRAY 2 SPRAY: 1.5 SOLUTION NASAL at 15:20

## 2023-11-28 RX ADMIN — NYSTATIN: 100000 POWDER TOPICAL at 15:20

## 2023-11-28 NOTE — OCCUPATIONAL THERAPY NOTE
Occupational Therapy Evaluation     Patient Name: Griselda Flattery  JHFPJ'E Date: 11/28/2023  Problem List  Principal Problem:    Pleural effusion on right  Active Problems:    Nonintractable generalized idiopathic epilepsy without status epilepticus ([address])    Falls    Developmental delay    Dysphagia    Acute on chronic heart failure with preserved ejection fraction (HCC)    Liver cirrhosis ([address])    No other household member able to render care    Chronic respiratory failure with hypercapnia ([address])    Stage 3 chronic kidney disease ([address])    Acute blood loss anemia    Hypervolemia    Chronic renal impairment    Past Medical History  Past Medical History:   Diagnosis Date    Hypertension     Mentally challenged     Pericardial effusion     Pneumonia     Seizure [facility])      Past Surgical History  Past Surgical History:   Procedure Laterality Date    FRACTURE SURGERY      clavicle, left humerus, radial, and ulna. Right radius    HIP ARTHROSCOPY Right     IR THORACENTESIS  11/20/2023    GA COLONOSCOPY FLX DX W/COLLJ SPEC WHEN PFRMD N/A 4/1/2019    Procedure: COLONOSCOPY;  Surgeon: Elliot Clarke MD;  Location: [facility]; Service: Colorectal           11/28/23 1401   OT Last Visit   OT Visit Date 11/28/23   Note Type   Note Type Treatment   Pain Assessment   Pain Assessment Tool FLACC   Pain Rating: FLACC (Rest) - Face 0   Pain Rating: FLACC (Rest) - Legs 0   Pain Rating: FLACC (Rest) - Activity 0   Pain Rating: FLACC (Rest) - Cry 0   Pain Rating: FLACC (Rest) - Consolability 0   Score: FLACC (Rest) 0   Pain Rating: FLACC (Activity) - Face 0   Pain Rating: FLACC (Activity) - Legs 0   Pain Rating: FLACC (Activity) - Activity 0   Pain Rating: FLACC (Activity) - Cry 0   Pain Rating: FLACC (Activity) - Consolability 0   Score: FLACC (Activity) 0   Restrictions/Precautions   Weight Bearing Precautions Per Order No   Other Precautions O2;Cognitive; Chair Alarm;Multiple lines; Fall Risk   ADL   Where Assessed Chair Eating Assistance 5  Supervision/Setup   Eating Deficit Setup; Increased time to complete   Eating Comments Seated on chair, set up with opened containers of yogurt and chocolate pudding   Grooming Assistance 3  Moderate Assistance   Grooming Deficit Setup;Brushing hair;Verbal cueing  (VCs for follow through and attention to task)   Therapeutic Excerise-Strength   UE Strength   (BUE AROM exercises completed while seated upright in chair. Exercises: bicep curls, chest press. Pt instructed in modified situps via pulling self forward with hands on armrests of chair to improve transfer performance.)   Subjective   Subjective "I like the [place]. An iceberg took it down."   Cognition   Overall Cognitive Status Impaired   Arousal/Participation Alert; Cooperative   Attention Attends with cues to redirect   Orientation Level Oriented to person;Oriented to place;Oriented to time   Assessment   Assessment Pt completed UB and trunk strengthening exercises completed while seated on recliner. AxO to self, time, and place only. Pt minimally conversational noting an interest in [place] and "Can I be finished?" throughout session. Functional task performance is limited by decreased endurance, decreased strength, cognitive deficits including lack of insight into functional deficits/safety awareness, and impaired standing balance/tolerance. Pt would benefit from continued acute OT to maximize QOL and participation in tasks of choice. Plan   Treatment Interventions ADL retraining;Functional transfer training;UE strengthening/ROM; Endurance training;Cognitive reorientation;Patient/family training;Equipment evaluation/education; Compensatory technique education;Continued evaluation; Energy conservation; Activityengagement   Goal Expiration Date 12/04/23   OT Frequency 1-2x/wk   Discharge Recommendation   Rehab Resource Intensity Level, OT I (Maximum Resource Intensity)   AM-PAC Daily Activity Inpatient   Lower Body Dressing 1   Bathing 1 Toileting 1   Upper Body Dressing 2   Grooming 2   Eating 3   Daily Activity Raw Score 10   AM-PAC Applied Cognition Inpatient   Following a Speech/Presentation 1   Understanding Ordinary Conversation 2   Taking Medications 1   Remembering Where Things Are Placed or Put Away 1   Remembering List of 4-5 Errands 1   Taking Care of Complicated Tasks 1   Applied Cognition Raw Score 7   Applied Cognition Standardized Score 15.17     The patient's raw score on the AM-PAC Daily Activity Inpatient Short Form is 9. A raw score of less than 19 suggests the patient may benefit from discharge to post-acute rehabilitation services. Please refer to the recommendation of the Occupational Therapist for safe discharge planning. Pt goals to be met by 12/4/2023:     Pt will demonstrate ability to complete grooming/hygiene tasks @ mod assist after set-up. Pt will demonstrate ability to complete supine<>sit @ mod assist in order to increase safety and independence during ADL tasks. Pt will demonstrate ability to complete UB ADLs including washing/dressing @ mod assist in order to increase performance and participation during meaningful tasks  Pt will demonstrate ability to complete LB dressing @ max assist in order to increase safety and independence during meaningful tasks. Pt will demonstrate ability to suzy/doff socks/shoes while sitting EOB/chair @ max assist in order to increase safety and independence during meaningful tasks. Pt will demonstrate ability to complete toileting tasks including CM and pericare @ max assist in order to increase safety and independence during meaningful tasks. Pt will demonstrate ability to complete EOB, chair, toilet/commode transfers @ mod assist in order to increase performance and participation during functional tasks. Pt will demonstrate ability to stand for 2 minutes while maintaining F+ balance with use of RW for UB support PRN.   Pt will demonstrate ability to tolerate 10 minute OT session with no vc'ing for deep breathing or use of energy conservation techniques in order to increase activity tolerance during functional tasks. Pt will demonstrate Good carryover of use of energy conservation/compensatory strategies during ADLs and functional tasks in order to increase safety and reduce risk for falls. Pt will follow 100% simple 2-step commands and be at least A&O x3 consistently with environmental cues to increase participation in functional activities. Pt will identify 3 areas of interest/hobbies and 1 intervention on how to incorporate into daily life in order to increase interaction with environment and peers as well as increase participation in meaningful tasks. Pt will demonstrate 100% carryover of BUE HEP in order to increase BUE MS and increase performance during functional tasks upon d/c home.      Fredis North, OTR/L

## 2023-11-28 NOTE — ASSESSMENT & PLAN NOTE
Patient previously maintained on Lasix as an outpatient -during a nursing home admission it was decreased to 40 mg daily given no his kidney function  proBNP elevated  2D echo done 2022 with mild concentric hypertrophy and EF 60 to 65%  Updated echo this admission with mild concentric left ventricular hypertrophy, LVEF 63% with normal diastolic function -overall similar from 2022 without significant change  Is s/p IR guided thoracentesis due to loculated effusion  Nephrology following and aiding with diuretic use  Patient is currently resumed on home dose of Lasix (11/21) and resume spironolactone 50 mg (11/25)  Patient still hypervolemic, does have chronic lymphedema in his legs as well which contributes to exam  Given chest x-ray showing right pleural effusion increased from postprocedure Lasix changed to IV 40 mg today and nephrology added torsemide 20 mg daily starting today as well -monitor outputs

## 2023-11-28 NOTE — ASSESSMENT & PLAN NOTE
Patient with worsening shortness of breath for the past week falling asleep more often  Previously utilize oxygen 2 L only at nighttime, family endorsing he has been utilizing throughout the day prior to admission as well  Imaging showing loculated right-sided moderate effusion with inability to rule out pneumonia on imaging  Labs without leukocytosis, Pro-Janusz was mildly elevated, finished a course of antibiotics for possible pneumonia x 7 days  Due to loculated effusion IR consulted for thoracentesis which was completed 11/20  There is no significant loculation noted on ultrasound necessitating a chest tube  860 mL dark isidro-colored right pleural fluid  Fluid studies no bacterial growth - likely transudative secondary to CHF/cirrhosis   CXR 11/28 shows increase in right pleural effusion compared to CXR after thoracentesis - trial diuretics and repeat CXR in the AM - if no improvement may benefit with IR consult for repeat thoracentesis

## 2023-11-28 NOTE — ASSESSMENT & PLAN NOTE
Cirrhotic morphology of the liver chronic problem  Ammonia is normal there is no ascites  Continue lactulose daily -re-time for AM patient has been refusing at night as he does not want to have bowel movements during bedtime  Continue lasix 40 mg daily  Aldactone resume at lower dose, 50 mg daily- uptitrate as able

## 2023-11-28 NOTE — PLAN OF CARE
Problem: PAIN - ADULT  Goal: Verbalizes/displays adequate comfort level or baseline comfort level  Description: Interventions:  - Encourage patient to monitor pain and request assistance  - Assess pain using appropriate pain scale  - Administer analgesics based on type and severity of pain and evaluate response  - Implement non-pharmacological measures as appropriate and evaluate response  - Consider cultural and social influences on pain and pain management  - Notify physician/advanced practitioner if interventions unsuccessful or patient reports new pain  Outcome: Progressing     Problem: INFECTION - ADULT  Goal: Absence or prevention of progression during hospitalization  Description: INTERVENTIONS:  - Assess and monitor for signs and symptoms of infection  - Monitor lab/diagnostic results  - Monitor all insertion sites, i.e. indwelling lines, tubes, and drains  - Monitor endotracheal if appropriate and nasal secretions for changes in amount and color  - [place] appropriate cooling/warming therapies per order  - Administer medications as ordered  - Instruct and encourage patient and family to use good hand hygiene technique  - Identify and instruct in appropriate isolation precautions for identified infection/condition  Outcome: Progressing  Goal: Absence of fever/infection during neutropenic period  Description: INTERVENTIONS:  - Monitor WBC    Outcome: Progressing     Problem: SAFETY ADULT  Goal: Patient will remain free of falls  Description: INTERVENTIONS:  - Educate patient/family on patient safety including physical limitations  - Instruct patient to call for assistance with activity   - Consult OT/PT to assist with strengthening/mobility   - Keep Call bell within reach  - Keep bed low and locked with side rails adjusted as appropriate  - Keep care items and personal belongings within reach  - Initiate and maintain comfort rounds  - Make Fall Risk Sign visible to staff  - Offer Toileting every 2 Hours, in advance of need  - Initiate/Maintain bed/chair alarm  - Obtain necessary fall risk management equipment:   - Apply yellow socks and bracelet for high fall risk patients  - Consider moving patient to room near nurses station  Outcome: Progressing  Goal: Maintain or return to baseline ADL function  Description: INTERVENTIONS:  -  Assess patient's ability to carry out ADLs; assess patient's baseline for ADL function and identify physical deficits which impact ability to perform ADLs (bathing, care of mouth/teeth, toileting, grooming, dressing, etc.)  - Assess/evaluate cause of self-care deficits   - Assess range of motion  - Assess patient's mobility; develop plan if impaired  - Assess patient's need for assistive devices and provide as appropriate  - Encourage maximum independence but intervene and supervise when necessary  - Involve family in performance of ADLs  - Assess for home care needs following discharge   - Consider OT consult to assist with ADL evaluation and planning for discharge  - Provide patient education as appropriate  Outcome: Progressing  Goal: Maintains/Returns to pre admission functional level  Description: INTERVENTIONS:  - Perform AM-PAC 6 Click Basic Mobility/ Daily Activity assessment daily.  - Set and communicate daily mobility goal to care team and patient/family/caregiver. - Collaborate with rehabilitation services on mobility goals if consulted  - Perform Range of Motion 3 times a day. - Reposition patient every 2 hours.   - Dangle patient 3 times a day  - Stand patient 3 times a day  - Ambulate patient 3 times a day  - Out of bed to chair 3 times a day   - Out of bed for meals 3 times a day  - Out of bed for toileting  - Record patient progress and toleration of activity level   Outcome: Progressing     Problem: DISCHARGE PLANNING  Goal: Discharge to home or other facility with appropriate resources  Description: INTERVENTIONS:  - Identify barriers to discharge w/patient and caregiver  - Arrange for needed discharge resources and transportation as appropriate  - Identify discharge learning needs (meds, wound care, etc.)  - Arrange for interpretive services to assist at discharge as needed  - Refer to Case Management Department for coordinating discharge planning if the patient needs post-hospital services based on physician/advanced practitioner order or complex needs related to functional status, cognitive ability, or social support system  Outcome: Progressing     Problem: Knowledge Deficit  Goal: Patient/family/caregiver demonstrates understanding of disease process, treatment plan, medications, and discharge instructions  Description: Complete learning assessment and assess knowledge base.   Interventions:  - Provide teaching at level of understanding  - Provide teaching via preferred learning methods  Outcome: Progressing     Problem: Prexisting or High Potential for Compromised Skin Integrity  Goal: Skin integrity is maintained or improved  Description: INTERVENTIONS:  - Identify patients at risk for skin breakdown  - Assess and monitor skin integrity  - Assess and monitor nutrition and hydration status  - Monitor labs   - Assess for incontinence   - Turn and reposition patient  - Assist with mobility/ambulation  - Relieve pressure over bony prominences  - Avoid friction and shearing  - Provide appropriate hygiene as needed including keeping skin clean and dry  - Evaluate need for skin moisturizer/barrier cream  - Collaborate with interdisciplinary team   - Patient/family teaching  - Consider wound care consult   Outcome: Progressing     Problem: RESPIRATORY - ADULT  Goal: Achieves optimal ventilation and oxygenation  Description: INTERVENTIONS:  - Assess for changes in respiratory status confusion SOB tachycardia  - Assess for changes in mentation and behavior  - Position to facilitate oxygenation and minimize respiratory effort  - Oxygen administered by appropriate delivery if ordered  - Initiate smoking cessation education as indicated  - Encourage broncho-pulmonary hygiene including cough, deep breathe, Incentive Spirometry  - Assess the need for suctioning and aspirate as needed  - Assess and instruct to report SOB or any respiratory difficulty  - Respiratory Therapy support as indicated  Outcome: Progressing

## 2023-11-28 NOTE — PROGRESS NOTES
Pastoral Care Progress Note    2023  Patient: Nicki Reyes : 1965  Admission Date & Time: 2023 1720  MRN: [id number] CSN: [id number]      [id number] in consult with RN initiated support visit to pt. Pt received [phone] [address] upright in his chair, no family present. Pt shared a little with the [id number] [address] regarding the  at the [facility] he attended. Pt shared regarding his experience at the hospital.   [id number] provided empathetic listening and support.              Chaplaincy Interventions Utilized:     Collaboration: Consulted with interdisciplinary team    Relationship Building: Cultivated a relationship of care and support     23 1500   Clinical Encounter Type   Visited With Patient   Routine Visit Follow-up

## 2023-11-28 NOTE — PLAN OF CARE
Problem: OCCUPATIONAL THERAPY ADULT  Goal: Performs self-care activities at highest level of function for planned discharge setting. See evaluation for individualized goals. Description: Treatment Interventions: ADL retraining, Functional transfer training, UE strengthening/ROM, Endurance training, Cognitive reorientation, Patient/family training, Equipment evaluation/education, Compensatory technique education, Continued evaluation, Energy conservation, Activityengagement          See flowsheet documentation for full assessment, interventions and recommendations. Outcome: Progressing  Note: Limitation: Decreased ADL status, Decreased UE ROM, Decreased UE strength, Decreased Safe judgement during ADL, Decreased cognition, Decreased endurance, Decreased self-care trans, Decreased high-level ADLs     Assessment: Pt completed UB and trunk strengthening exercises completed while seated on recliner. AxO to self, time, and place only. Pt minimally conversational noting an interest in [place] and "Can I be finished?" throughout session. Functional task performance is limited by decreased endurance, decreased strength, cognitive deficits including lack of insight into functional deficits/safety awareness, and impaired standing balance/tolerance. Pt would benefit from continued acute OT to maximize QOL and participation in tasks of choice.      Rehab Resource Intensity Level, OT: I (Maximum Resource Intensity)

## 2023-11-28 NOTE — PROGRESS NOTES
Progress Note - Nephrology   [facility] 66 y.o. male MRN: [id number]  Unit/Bed#: -01 Encounter: [id number]    ASSESSMENT AND PLAN:  61-year-old male with a past medical history of epilepsy/restrictive pericarditis status post pericardial window/kyphosis/right-sided pleural effusion requiring chest tube/lymphedema/ambulatory dysfunction/developmental delay/chronic O2 use/CHF/cirrhosis/frequent falls who presents with increased lethargy shortness of breath. We are seeing him for acute on top of chronic kidney disease    1. EDWIGE/CKD: Acute component perhaps was related to prerenal azotemia  - Baseline creatinine reported as 1.3-1.4  - In hospital has been trending 1.5-1.6  - Peak creatinine 1.88  - UA: Negative proteinuria negative hematuria  - Renal ultrasound normal      Current creatinine: 0.66 on diuretics which is stable probably at new baseline    2. CKD 3: Reportedly 1.3-1.4 probably at new baseline please see above    3. Volume/blood pressure: Chronic CHF with preserved ejection fraction in the setting of cirrhosis  - Blood pressure: Relatively low normal hold parameters: On metoprolol 25 twice daily  - Chronic lymphedema: Currently on diuretics continue for now: Spironolactone 50 mg daily. Received 1 dose of furosemide. I would add torsemide 20 mg daily for ongoing right pleural effusion. 4.  Electrolytes/acid-base: Mild increase CO2 will monitor for now if persists consider VBG    5. MBD of CKD: We will recheck magnesium has been borderline low    6. Anemia: Hemoglobin 10.1 with mild thrombocytopenia most compatible with cirrhosis  - For completeness I will check myeloma evaluation with SPEP UPEP light chain ratio/iron studies/stool for occult blood/B12 and folate  - Monitor hemoglobin    7. Respiratory: On diuretics please see above may need thoracentesis in the future; may have a source from hepatic hydrothorax    8. Cirrhosis: Per primary service    9.   Other medical problems: Epilepsy/chronic hypoxemia/developmental delay/cirrhosis/history of pericarditis with pericardial window and restrictive nature now      Current creatinine:    Subjective:   Patient's is asymptomatic denying any pain or shortness of breath or GI symptoms or urinary complaints at this time    Objective:     Vitals: Blood pressure 122/71, pulse 78, temperature 97.5 °F (36.4 °C), resp. rate 18, height 5' 6" (1.676 m), weight 113 kg (250 lb 3.6 oz), SpO2 96 %. ,Body mass index is 40.39 kg/m².     Weight (last 2 days)       Date/Time Weight    11/28/23 0501 113 (250.22)    11/27/23 0637 114 (250.66)    11/26/23 0600 115 (253.53)              Intake/Output Summary (Last 24 hours) at 11/28/2023 0955  Last data filed at 11/27/2023 2152  Gross per 24 hour   Intake 120 ml   Output 100 ml   Net 20 ml            Physical Exam: General:  No acute distress  Skin:  No acute rash  Eyes:  No scleral icterus and noninjected  ENT:  Moist mucous membranes  Neck:  Supple, no jugular venous distention, trachea midline, overall appearance is normal  Chest:  Clear to auscultation but poor effort  CVS:  Regular rate and rhythm, without a rub or gallops  Abdomen:  Normal bowel sounds, soft and nontender and nondistended  Extremities: Lower extremity lymphedema with scaly type pretibial changes of his skin, and no cyanosis, no significant arthritic changes  Neuro:  No gross focality  Psych:  Alert                 Medications:    Scheduled Meds:  Current Facility-Administered Medications   Medication Dose Route Frequency Provider Last Rate    acetaminophen  650 mg Oral Q6H PRN Dana Asif PA-C      ascorbic acid  500 mg Oral Daily Dana Asif PA-C      aspirin  81 mg Oral Daily Ashtyn Romero MD      calcium carbonate-vitamin D  1 tablet Oral Daily With Breakfast Ashtyn Romero MD      dextromethorphan-guaiFENesin  10 mL Oral Q4H PRN Dana Asif PA-C      enoxaparin  40 mg Subcutaneous Q24H [facility]      lactulose  20 g Oral Daily With Breakfast René Church, WHITNEY      lamoTRIgine  200 mg Oral Early Morning Dana Demo, PA-C      lamoTRIgine  250 mg Oral After Lunch Dana Demo, PA-C      lamoTRIgine  300 mg Oral HS Dana Demo, PA-C      levOCARNitine  330 mg Oral 4x Daily (with meals and at bedtime) Mariela Gentile MD      LORazepam  2 mg Intravenous Q6H PRN Dana Demo, PA-C      metoprolol tartrate  25 mg Oral BID Dana Demo, PA-C      nystatin   Topical BID Dana Demo, PA-C      ondansetron  4 mg Intravenous Q6H PRN Dana Demo, PA-C      polyethylene glycol  17 g Oral Daily PRN Dana Demo, PA-C      primidone  100 mg Oral Daily With Lunch Dana Demo, PA-C      primidone  100 mg Oral After Breakfast Bartolo Romero MD      primidone  150 mg Oral HS Dana Demo, PA-C      senna-docusate sodium  1 tablet Oral BID Dana Demo, PA-C      sodium chloride  1 Application Nasal TID Beatrice Laura MD      sodium chloride  2 spray Each Nare TID Beatrice Laura MD      spironolactone  50 mg Oral Daily Mariela Gentile MD      vitamin E (tocopherol)  400 Units Oral Daily Dana Demo, PA-C      zonisamide  100 mg Oral HS Dana Demo, PA-C         PRN Meds:.  acetaminophen    dextromethorphan-guaiFENesin    LORazepam    ondansetron    polyethylene glycol    Continuous Infusions:     Lab, Imaging and other studies: I have personally reviewed pertinent labs.   Laboratory Results:  Results from last 7 days   Lab Units 11/28/23  0457 11/27/23  0448 11/26/23  1447 11/25/23  0457 11/24/23  0556 11/23/23  0436 11/22/23  0558   WBC Thousand/uL  --   --   --   --  6.17 7.79 6.79   HEMOGLOBIN g/dL  --   --   --   --  10.1* 10.3* 10.6*   HEMATOCRIT %  --   --   --   --  31.9* 32.9* 33.4*   PLATELETS Thousands/uL  --   --   --   --  113* 106* 104*   POTASSIUM mmol/L 3.9 4.7 4.7 4.5 3.9 4.0 4.0   CHLORIDE mmol/L 101 102 102 101 102 102 104   CO2 mmol/L 34* 32 27 30 31 32 32   BUN mg/dL 31* 32* 31* 32* 31* 30* 32*   CREATININE mg/dL 1.66* 1.59* 1.53* 1.69* 1.65* 1.58* 1.62* CALCIUM mg/dL 8.9 8.9 9.2 9.0 8.9 9.3 9.2   MAGNESIUM mg/dL  --   --   --   --  1.8*  --   --      Urinalysis:   Lab Results   Component Value Date    COLORU Yellow 11/17/2023    CLARITYU Clear 11/17/2023    SPECGRAV 1.025 11/17/2023    PHUR 5.0 11/17/2023    LEUKOCYTESUR Negative 11/17/2023    NITRITE Negative 11/17/2023    GLUCOSEU Negative 11/17/2023    KETONESU Negative 11/17/2023    BILIRUBINUR Negative 11/17/2023    BLOODU Negative 11/17/2023     ABGs: No results found for: "PH"  Radiology review:     Portions of the record may have been created with voice recognition software. Occasional wrong word or "sound a like" substitutions may have occurred due to the inherent limitations of voice recognition software. Read the chart carefully and recognize, using context, where substitutions have occurred.

## 2023-11-28 NOTE — ASSESSMENT & PLAN NOTE
Wears 2L O2 QHS, continue  Likely has a chronic hypercapnia given the elevated bicarbonate/kyphosis likely causing chronic limited chest expansion and sleepiness during day will obtain vbg- reviewed   Did require 3 L overnight rather than 2L   Chest x-ray showing reaccumulation of pleural effusion on the right side -diuretics have been adjusted, see under pleural effusion

## 2023-11-28 NOTE — PLAN OF CARE
Problem: RESPIRATORY - ADULT  Goal: Achieves optimal ventilation and oxygenation  Description: INTERVENTIONS:  - Assess for changes in respiratory status confusion SOB tachycardia  - Assess for changes in mentation and behavior  - Position to facilitate oxygenation and minimize respiratory effort  - Oxygen administered by appropriate delivery if ordered  - Initiate smoking cessation education as indicated  - Encourage broncho-pulmonary hygiene including cough, deep breathe, Incentive Spirometry  - Assess the need for suctioning and aspirate as needed  - Assess and instruct to report SOB or any respiratory difficulty  - Respiratory Therapy support as indicated  Outcome: Progressing

## 2023-11-28 NOTE — ASSESSMENT & PLAN NOTE
Lab Results   Component Value Date    EGFR 45 11/28/2023    EGFR 47 11/27/2023    EGFR 50 11/26/2023    CREATININE 1.66 (H) 11/28/2023    CREATININE 1.59 (H) 11/27/2023    CREATININE 1.53 (H) 11/26/2023   Nephrology following  Previous baseline reported to be 1.3-1.4  Creatinine has been stable around 1.5-1.6  Resume spironolactone 11/25, will Lasix 11/21  May need to accept permissive hypercreatininemia with diuretics to manage volume status

## 2023-11-28 NOTE — PROGRESS NOTES
[facility]  Progress Note  Name: Edita Hester  MRN: [id number]  Unit/Bed#: -94 I Date of Admission: 11/17/2023   Date of Service: 11/28/2023 I Hospital Day: 11    Assessment/Plan   * Pleural effusion on right  Assessment & Plan  Patient with worsening shortness of breath for the past week falling asleep more often  Previously utilize oxygen 2 L only at nighttime, family endorsing he has been utilizing throughout the day prior to admission as well  Imaging showing loculated right-sided moderate effusion with inability to rule out pneumonia on imaging  Labs without leukocytosis, Pro-Janusz was mildly elevated, finished a course of antibiotics for possible pneumonia x 7 days  Due to loculated effusion IR consulted for thoracentesis which was completed 11/20  There is no significant loculation noted on ultrasound necessitating a chest tube  860 mL dark isidro-colored right pleural fluid  Fluid studies no bacterial growth - likely transudative secondary to CHF/cirrhosis   CXR 11/28 shows increase in right pleural effusion compared to CXR after thoracentesis - trial diuretics and repeat CXR in the AM - if no improvement may benefit with IR consult for repeat thoracentesis     Acute blood loss anemia  Assessment & Plan  Secondary to recurrent epistaxis.     Appreciate ENT consult -conservative management  Patient is on aspirin and Lovenox, continue to monitor  Hemoglobin has been stable    Stage 3 chronic kidney disease [facility])  Assessment & Plan  Lab Results   Component Value Date    EGFR 45 11/28/2023    EGFR 47 11/27/2023    EGFR 50 11/26/2023    CREATININE 1.66 (H) 11/28/2023    CREATININE 1.59 (H) 11/27/2023    CREATININE 1.53 (H) 11/26/2023   Nephrology following  Previous baseline reported to be 1.3-1.4  Creatinine has been stable around 1.5-1.6  Resume spironolactone 11/25, will Lasix 11/21  May need to accept permissive hypercreatininemia with diuretics to manage volume status    Chronic respiratory failure with hypercapnia (HCC)  Assessment & Plan  Wears 2L O2 QHS, continue  Likely has a chronic hypercapnia given the elevated bicarbonate/kyphosis likely causing chronic limited chest expansion and sleepiness during day will obtain vbg- reviewed   Did require 3 L overnight rather than 2L   Chest x-ray showing reaccumulation of pleural effusion on the right side -diuretics have been adjusted, see under pleural effusion    No other household member able to render care  Assessment & Plan  Patient's main caregiver is his mother who was recently hospitalized and discharged to SNF for rehab  She assists him with his ADLs and assist x2  Pt/ot ordered  CM following for placement    Liver cirrhosis ([address])  Assessment & Plan  Cirrhotic morphology of the liver chronic problem  Ammonia is normal there is no ascites  Continue lactulose daily -re-time for AM patient has been refusing at night as he does not want to have bowel movements during bedtime  Continue lasix 40 mg daily  Aldactone resume at lower dose, 50 mg daily- uptitrate as able    Acute on chronic heart failure with preserved ejection fraction [facility])  Assessment & Plan  Patient previously maintained on Lasix as an outpatient -during a nursing home admission it was decreased to 40 mg daily given no his kidney function  proBNP elevated  2D echo done 2022 with mild concentric hypertrophy and EF 60 to 65%  Updated echo this admission with mild concentric left ventricular hypertrophy, LVEF 63% with normal diastolic function -overall similar from 2022 without significant change  Is s/p IR guided thoracentesis due to loculated effusion  Nephrology following and aiding with diuretic use  Patient is currently resumed on home dose of Lasix (11/21) and resume spironolactone 50 mg (11/25)  Patient still hypervolemic, does have chronic lymphedema in his legs as well which contributes to exam  Given chest x-ray showing right pleural effusion increased from postprocedure Lasix changed to IV 40 mg today and nephrology added torsemide 20 mg daily starting today as well -monitor outputs    Dysphagia  Assessment & Plan  Continue mechanical soft diet from prior  Aspiration precautions obtain speech    Developmental delay  Assessment & Plan  Daily involvement with mother  Supportive care  PT/OT/ST  Pt AAOx1   At baseline AAOx3  Due to social situation, patient will need skilled nursing facility, case management on board (patient was living with his mother, mother recently hospitalized due to medical issue and get discharged to skilled nursing facility. No other family member is able to take care of the patient.)    Falls  Assessment & Plan  Frequent falls for his entire life because of unsteady gait according to the mother's medical history list  Fall precautions  Emergency room trauma eval no acute injury  Due to patient overall medical conditions, social issues, patient is in need of extensive care. Case management on board. Nonintractable generalized idiopathic epilepsy without status epilepticus [facility])  Assessment & Plan  Continue home seizure medications with seizure precautions  His last seizure was 2-3 days prior to admission  Lamictal, zonesamide and primidone levels ordered. The last few times he was hospitalized for seizure the AEDs were not changed because the breakthrough seizure etiology were infections. VTE Pharmacologic Prophylaxis: VTE Score: 5 High Risk (Score >/= 5) - Pharmacological DVT Prophylaxis Ordered: enoxaparin (Lovenox). Sequential Compression Devices Ordered. Mobility:   Basic Mobility Inpatient Raw Score: 10  JH-HLM Goal: 4: Move to chair/commode  JH-HLM Achieved: 4: Move to chair/commode  HLM Goal achieved. Continue to encourage appropriate mobility. Patient Centered Rounds: I performed bedside rounds with nursing staff today.    Discussions with Specialists or Other Care Team Provider: ETHAN    Education and Discussions with Family / Patient:  Attempted to call patient's mother at [facility], left voicemail on their messaging system as she did not answer. Did update patient's uncle via phone he states he will contact the patient's mother. Total Time Spent on Date of Encounter in care of patient:  mins. This time was spent on one or more of the following: performing physical exam; counseling and coordination of care; obtaining or reviewing history; documenting in the medical record; reviewing/ordering tests, medications or procedures; communicating with other healthcare professionals and discussing with patient's family/caregivers. Current Length of Stay: 11 day(s)  Current Patient Status: Inpatient   Certification Statement: The patient will continue to require additional inpatient hospital stay due to CHF exacerbation and right pleural effusion, rehab planning  Discharge Plan: Anticipate discharge in 48-72 hrs to rehab facility. Code Status: Level 1 - Full Code    Subjective:   Seen and examined. Denies shortness of breath. Appear comfortable. Asked to be adjusted in bed    Objective:     Vitals:   Temp (24hrs), Av.6 °F (36.4 °C), Min:97.5 °F (36.4 °C), Max:97.7 °F (36.5 °C)    Temp:  [97.5 °F (36.4 °C)-97.7 °F (36.5 °C)] 97.5 °F (36.4 °C)  HR:  [72-78] 74  Resp:  [18] 18  BP: (117-137)/(69-84) 125/72  SpO2:  [89 %-96 %] 96 %  Body mass index is 40.39 kg/m². Input and Output Summary (last 24 hours): Intake/Output Summary (Last 24 hours) at 2023 1536  Last data filed at 2023 1200  Gross per 24 hour   Intake 360 ml   Output 100 ml   Net 260 ml       Physical Exam:   Physical Exam  Vitals and nursing note reviewed. Constitutional:       General: He is not in acute distress. Appearance: Normal appearance. HENT:      Head: Normocephalic and atraumatic. Nose: No congestion.       Mouth/Throat:      Mouth: Mucous membranes are moist.   Eyes:      Conjunctiva/sclera: Conjunctivae normal.   Cardiovascular:      Rate and Rhythm: Normal rate and regular rhythm. Pulses: Normal pulses. Heart sounds: Normal heart sounds. No murmur heard. Pulmonary:      Effort: Pulmonary effort is normal. No respiratory distress. Breath sounds: Normal breath sounds. Comments: Diminished breath sounds  Abdominal:      General: Bowel sounds are normal. There is distension. Palpations: Abdomen is soft. Tenderness: There is no abdominal tenderness. Musculoskeletal:         General: Normal range of motion. Right lower leg: Edema present. Left lower leg: Edema present. Skin:     General: Skin is warm and dry. Neurological:      Mental Status: He is alert. Mental status is at baseline. Additional Data:     Labs:  Results from last 7 days   Lab Units 11/24/23  0556   WBC Thousand/uL 6.17   HEMOGLOBIN g/dL 10.1*   HEMATOCRIT % 31.9*   PLATELETS Thousands/uL 113*   NEUTROS PCT % 79*   LYMPHS PCT % 10*   MONOS PCT % 10   EOS PCT % 0     Results from last 7 days   Lab Units 11/28/23  0457 11/25/23  0457 11/24/23  0556   SODIUM mmol/L 139   < > 141   POTASSIUM mmol/L 3.9   < > 3.9   CHLORIDE mmol/L 101   < > 102   CO2 mmol/L 34*   < > 31   BUN mg/dL 31*   < > 31*   CREATININE mg/dL 1.66*   < > 1.65*   ANION GAP mmol/L 4   < > 8   CALCIUM mg/dL 8.9   < > 8.9   ALBUMIN g/dL  --   --  3.2*   TOTAL BILIRUBIN mg/dL  --   --  1.12*   ALK PHOS U/L  --   --  169*   ALT U/L  --   --  14   AST U/L  --   --  21   GLUCOSE RANDOM mg/dL 89   < > 84    < > = values in this interval not displayed.                        Lines/Drains:  Invasive Devices       Peripheral Intravenous Line  Duration             Peripheral IV 11/27/23 Proximal;Right;Ventral (anterior) Forearm 1 day                          Imaging: Personally reviewed the following imaging: chest xray    Recent Cultures (last 7 days):         Last 24 Hours Medication List:   Current Facility-Administered Medications   Medication Dose Route Frequency Provider Last Rate    acetaminophen  650 mg Oral Q6H PRN Dana Demo, WHITNEY      ascorbic acid  500 mg Oral Daily Dana Demo, WHITNEY      aspirin  81 mg Oral Daily Ragini Hull MD      calcium carbonate-vitamin D  1 tablet Oral Daily With Breakfast Ragini Hull MD      dextromethorphan-guaiFENesin  10 mL Oral Q4H PRN Dana Demo, WHITNEY      enoxaparin  40 mg Subcutaneous Q24H [facility] Ashtyn Romero MD      lactulose  20 g Oral Daily With Breakfast Alek Johnson PA-C      lamoTRIgine  200 mg Oral Early Morning Dana Demo, WHITNEY      lamoTRIgine  250 mg Oral After Lunch Dana Demo, WHITNEY      lamoTRIgine  300 mg Oral HS Dana Demo, WHITNEY      levOCARNitine  330 mg Oral 4x Daily (with meals and at bedtime) Ragini Hull MD      LORazepam  2 mg Intravenous Q6H PRN Dana Demo, WHITNEY      metoprolol tartrate  25 mg Oral BID Dana Demo, WHITNEY      nystatin   Topical BID Dana Demo, WHITNEY      ondansetron  4 mg Intravenous Q6H PRN Dana Demo, WHITNEY      polyethylene glycol  17 g Oral Daily PRN Dana Demo, WHITNEY      primidone  100 mg Oral Daily With Lunch Dana Demo, WHITNEY      primidone  100 mg Oral After Breakfast Ashtyn Romero MD      primidone  150 mg Oral HS Dana Demo, WHITNEY      senna-docusate sodium  1 tablet Oral BID Dana Demo, WHITNEY      sodium chloride  1 Application Nasal TID Shira Quan MD      sodium chloride  2 spray Each Nare TID Shira Quan MD      spironolactone  50 mg Oral Daily Ragini Hull MD      torsemide  20 mg Oral Daily Rojelio Banks MD      vitamin E (tocopherol)  400 Units Oral Daily Dana Demo, WHITNEY      zonisamide  100 mg Oral HS Cristaethan Lopez PA-C          Today, Patient Was Seen By: Alek Johnson PA-C    **Please Note: This note may have been constructed using a voice recognition system. **

## 2023-11-29 LAB
ANION GAP SERPL CALCULATED.3IONS-SCNC: 6 MMOL/L
ATRIAL RATE: 74 BPM
BASOPHILS # BLD AUTO: 0.02 THOUSANDS/ÂΜL (ref 0–0.1)
BASOPHILS NFR BLD AUTO: 0 % (ref 0–1)
BUN SERPL-MCNC: 32 MG/DL (ref 5–25)
CALCIUM SERPL-MCNC: 9.1 MG/DL (ref 8.4–10.2)
CHLORIDE SERPL-SCNC: 100 MMOL/L (ref 96–108)
CO2 SERPL-SCNC: 34 MMOL/L (ref 21–32)
CREAT SERPL-MCNC: 1.75 MG/DL (ref 0.6–1.3)
EOSINOPHIL # BLD AUTO: 0 THOUSAND/ÂΜL (ref 0–0.61)
EOSINOPHIL NFR BLD AUTO: 0 % (ref 0–6)
ERYTHROCYTE [DISTWIDTH] IN BLOOD BY AUTOMATED COUNT: 15.9 % (ref 11.6–15.1)
FERRITIN SERPL-MCNC: 64 NG/ML (ref 24–336)
FOLATE SERPL-MCNC: >22.3 NG/ML
GFR SERPL CREATININE-BSD FRML MDRD: 42 ML/MIN/1.73SQ M
GLUCOSE SERPL-MCNC: 104 MG/DL (ref 65–140)
HCT VFR BLD AUTO: 33.1 % (ref 36.5–49.3)
HGB BLD-MCNC: 10.4 G/DL (ref 12–17)
IMM GRANULOCYTES # BLD AUTO: 0.03 THOUSAND/UL (ref 0–0.2)
IMM GRANULOCYTES NFR BLD AUTO: 1 % (ref 0–2)
IRON SATN MFR SERPL: 18 % (ref 15–50)
IRON SERPL-MCNC: 52 UG/DL (ref 50–212)
LYMPHOCYTES # BLD AUTO: 0.71 THOUSANDS/ÂΜL (ref 0.6–4.47)
LYMPHOCYTES NFR BLD AUTO: 14 % (ref 14–44)
MAGNESIUM SERPL-MCNC: 1.9 MG/DL (ref 1.9–2.7)
MCH RBC QN AUTO: 32.2 PG (ref 26.8–34.3)
MCHC RBC AUTO-ENTMCNC: 31.4 G/DL (ref 31.4–37.4)
MCV RBC AUTO: 103 FL (ref 82–98)
MONOCYTES # BLD AUTO: 0.49 THOUSAND/ÂΜL (ref 0.17–1.22)
MONOCYTES NFR BLD AUTO: 9 % (ref 4–12)
NEUTROPHILS # BLD AUTO: 3.96 THOUSANDS/ÂΜL (ref 1.85–7.62)
NEUTS SEG NFR BLD AUTO: 76 % (ref 43–75)
NRBC BLD AUTO-RTO: 0 /100 WBCS
P AXIS: 66 DEGREES
PLATELET # BLD AUTO: 161 THOUSANDS/UL (ref 149–390)
PMV BLD AUTO: 11.1 FL (ref 8.9–12.7)
POTASSIUM SERPL-SCNC: 3.7 MMOL/L (ref 3.5–5.3)
PR INTERVAL: 170 MS
QRS AXIS: 93 DEGREES
QRSD INTERVAL: 102 MS
QT INTERVAL: 394 MS
QTC INTERVAL: 437 MS
RBC # BLD AUTO: 3.23 MILLION/UL (ref 3.88–5.62)
SODIUM SERPL-SCNC: 140 MMOL/L (ref 135–147)
T WAVE AXIS: 210 DEGREES
TIBC SERPL-MCNC: 294 UG/DL (ref 250–450)
UIBC SERPL-MCNC: 242 UG/DL (ref 155–355)
VENTRICULAR RATE: 74 BPM
VIT B12 SERPL-MCNC: 1420 PG/ML (ref 180–914)
WBC # BLD AUTO: 5.21 THOUSAND/UL (ref 4.31–10.16)

## 2023-11-29 PROCEDURE — 83521 IG LIGHT CHAINS FREE EACH: CPT | Performed by: INTERNAL MEDICINE

## 2023-11-29 PROCEDURE — 82607 VITAMIN B-12: CPT | Performed by: INTERNAL MEDICINE

## 2023-11-29 PROCEDURE — 82728 ASSAY OF FERRITIN: CPT | Performed by: INTERNAL MEDICINE

## 2023-11-29 PROCEDURE — 85025 COMPLETE CBC W/AUTO DIFF WBC: CPT | Performed by: INTERNAL MEDICINE

## 2023-11-29 PROCEDURE — 84165 PROTEIN E-PHORESIS SERUM: CPT | Performed by: INTERNAL MEDICINE

## 2023-11-29 PROCEDURE — 99232 SBSQ HOSP IP/OBS MODERATE 35: CPT | Performed by: PHYSICIAN ASSISTANT

## 2023-11-29 PROCEDURE — 82270 OCCULT BLOOD FECES: CPT | Performed by: INTERNAL MEDICINE

## 2023-11-29 PROCEDURE — 83735 ASSAY OF MAGNESIUM: CPT | Performed by: INTERNAL MEDICINE

## 2023-11-29 PROCEDURE — 82746 ASSAY OF FOLIC ACID SERUM: CPT | Performed by: INTERNAL MEDICINE

## 2023-11-29 PROCEDURE — 99232 SBSQ HOSP IP/OBS MODERATE 35: CPT | Performed by: INTERNAL MEDICINE

## 2023-11-29 PROCEDURE — 83540 ASSAY OF IRON: CPT | Performed by: INTERNAL MEDICINE

## 2023-11-29 PROCEDURE — 80048 BASIC METABOLIC PNL TOTAL CA: CPT | Performed by: INTERNAL MEDICINE

## 2023-11-29 PROCEDURE — 86334 IMMUNOFIX E-PHORESIS SERUM: CPT | Performed by: INTERNAL MEDICINE

## 2023-11-29 PROCEDURE — 97530 THERAPEUTIC ACTIVITIES: CPT

## 2023-11-29 PROCEDURE — 83550 IRON BINDING TEST: CPT | Performed by: INTERNAL MEDICINE

## 2023-11-29 RX ORDER — MAGNESIUM SULFATE HEPTAHYDRATE 40 MG/ML
2 INJECTION, SOLUTION INTRAVENOUS ONCE
Status: COMPLETED | OUTPATIENT
Start: 2023-11-29 | End: 2023-11-29

## 2023-11-29 RX ADMIN — TORSEMIDE 20 MG: 20 TABLET ORAL at 08:53

## 2023-11-29 RX ADMIN — PRIMIDONE 150 MG: 50 TABLET ORAL at 22:38

## 2023-11-29 RX ADMIN — OXYCODONE HYDROCHLORIDE AND ACETAMINOPHEN 500 MG: 500 TABLET ORAL at 08:52

## 2023-11-29 RX ADMIN — LACTULOSE 20 G: 20 SOLUTION ORAL at 08:53

## 2023-11-29 RX ADMIN — PRIMIDONE 100 MG: 50 TABLET ORAL at 08:52

## 2023-11-29 RX ADMIN — LEVOCARNITINE 330 MG: 1 SOLUTION ORAL at 12:29

## 2023-11-29 RX ADMIN — SALINE NASAL SPRAY 2 SPRAY: 1.5 SOLUTION NASAL at 09:09

## 2023-11-29 RX ADMIN — NYSTATIN: 100000 POWDER TOPICAL at 17:16

## 2023-11-29 RX ADMIN — ASPIRIN 81 MG: 81 TABLET, COATED ORAL at 08:52

## 2023-11-29 RX ADMIN — LAMOTRIGINE 200 MG: 100 TABLET ORAL at 05:36

## 2023-11-29 RX ADMIN — Medication 1 TABLET: at 08:52

## 2023-11-29 RX ADMIN — DOCUSATE SODIUM AND SENNOSIDES 1 TABLET: 8.6; 5 TABLET, FILM COATED ORAL at 08:52

## 2023-11-29 RX ADMIN — ZONISAMIDE 100 MG: 100 CAPSULE ORAL at 22:37

## 2023-11-29 RX ADMIN — LAMOTRIGINE 250 MG: 100 TABLET ORAL at 17:14

## 2023-11-29 RX ADMIN — SPIRONOLACTONE 50 MG: 25 TABLET ORAL at 08:51

## 2023-11-29 RX ADMIN — LEVOCARNITINE 330 MG: 1 SOLUTION ORAL at 17:15

## 2023-11-29 RX ADMIN — LEVOCARNITINE 330 MG: 1 SOLUTION ORAL at 22:41

## 2023-11-29 RX ADMIN — ENOXAPARIN SODIUM 40 MG: 40 INJECTION SUBCUTANEOUS at 08:53

## 2023-11-29 RX ADMIN — METOPROLOL TARTRATE 25 MG: 25 TABLET, FILM COATED ORAL at 08:53

## 2023-11-29 RX ADMIN — PRIMIDONE 100 MG: 50 TABLET ORAL at 17:15

## 2023-11-29 RX ADMIN — Medication 1 APPLICATION: at 09:09

## 2023-11-29 RX ADMIN — MAGNESIUM SULFATE HEPTAHYDRATE 2 G: 40 INJECTION, SOLUTION INTRAVENOUS at 11:02

## 2023-11-29 RX ADMIN — METOPROLOL TARTRATE 25 MG: 25 TABLET, FILM COATED ORAL at 22:39

## 2023-11-29 RX ADMIN — LAMOTRIGINE 300 MG: 100 TABLET ORAL at 22:38

## 2023-11-29 RX ADMIN — NYSTATIN: 100000 POWDER TOPICAL at 09:08

## 2023-11-29 RX ADMIN — LEVOCARNITINE 330 MG: 1 SOLUTION ORAL at 08:53

## 2023-11-29 RX ADMIN — Medication 400 UNITS: at 08:52

## 2023-11-29 NOTE — ASSESSMENT & PLAN NOTE
Lab Results   Component Value Date    EGFR 42 11/29/2023    EGFR 45 11/28/2023    EGFR 47 11/27/2023    CREATININE 1.75 (H) 11/29/2023    CREATININE 1.66 (H) 11/28/2023    CREATININE 1.59 (H) 11/27/2023   Nephrology following  Previous baseline reported to be 1.3-1.4  Creatinine has been stable around 1.5-1.6  Resume spironolactone 11/25, will Lasix 11/21  May need to accept permissive hypercreatininemia with diuretics to manage volume status

## 2023-11-29 NOTE — PLAN OF CARE
Problem: PHYSICAL THERAPY ADULT  Goal: Performs mobility at highest level of function for planned discharge setting. See evaluation for individualized goals. Description: Treatment/Interventions: Functional transfer training, LE strengthening/ROM, Therapeutic exercise, Endurance training, Patient/family training, Equipment eval/education, Bed mobility, Gait training, Compensatory technique education, Spoke to nursing, OT          See flowsheet documentation for full assessment, interventions and recommendations. Outcome: Not Progressing  Note: Prognosis: Guarded  Problem List: Decreased strength, Decreased range of motion, Decreased endurance, Impaired balance, Decreased mobility, Decreased cognition, Impaired judgement, Decreased safety awareness, Obesity, Decreased skin integrity  Assessment: Pt tolerated tx session poorly. Interventions consisting of transfer training & standing tolerance. Pt limited by fatigue, body habitus, core strength deficits, B LE strength impairment, & poor activity tolerance. Pt requiring encouragement to participate and extensive cues for technique during sit>stand transition and to achieve extension of trunk/hip/knees in stance to prevent flexed posture. Pt with poor standing tolerance today. Barriers to Discharge: Decreased caregiver support, Inaccessible home environment  Barriers to Discharge Comments: Current assist level, A x 2, caregiver curently in rehab  Rehab Resource Intensity Level, PT: I (Maximum Resource Intensity)    See flowsheet documentation for full assessment.

## 2023-11-29 NOTE — ASSESSMENT & PLAN NOTE
Patient with worsening shortness of breath for the past week falling asleep more often  Previously utilize oxygen 2 L only at nighttime, family endorsing he has been utilizing throughout the day prior to admission as well  Imaging showing loculated right-sided moderate effusion with inability to rule out pneumonia on imaging  Labs without leukocytosis, Pro-Janusz was mildly elevated, finished a course of antibiotics for possible pneumonia x 7 days  Due to loculated effusion IR consulted for thoracentesis which was completed 11/20  There is no significant loculation noted on ultrasound necessitating a chest tube  860 mL dark isidro-colored right pleural fluid  Fluid studies no bacterial growth - likely transudative secondary to CHF/cirrhosis   CXR 11/28 shows increase in right pleural effusion compared to CXR after thoracentesis - trial diuretics and repeat CXR in the AM of 11/30

## 2023-11-29 NOTE — PROGRESS NOTES
Progress Note - Nephrology   [facility] 66 y.o. male MRN: [id number]  Unit/Bed#: -Tad Encounter: [id number]    ASSESSMENT AND PLAN:  61-year-old male with a past medical history of epilepsy/restrictive pericarditis status post pericardial window/kyphosis/right-sided pleural effusion requiring chest tube/lymphedema/ambulatory dysfunction/developmental delay/chronic O2 use/CHF/cirrhosis/frequent falls who presents with increased lethargy shortness of breath. We are seeing him for acute on top of chronic kidney disease     1. EDWIGE/CKD: Acute component perhaps was related to prerenal azotemia  - Baseline creatinine reported as 1.3-1.4  - In hospital has been trending 1.5-1.6  - Peak creatinine 1.88  - UA: Negative proteinuria negative hematuria  - Renal ultrasound normal        Current creatinine: 1.75 slightly higher on diuretics again may need to accept a higher baseline     2. CKD 3: Reportedly 1.3-1.4 probably at new baseline please see above: Possibly 1.7-2 may be the new baseline     3. Volume/blood pressure: Chronic CHF with preserved ejection fraction in the setting of cirrhosis  - Blood pressure: Relatively low normal hold parameters: On metoprolol 25 twice daily  - Chronic lymphedema: Currently on diuretics continue for now: Spironolactone 50 mg daily. On torsemide 20 mg daily monitor on this for now. Would recheck chest x-ray in a.m.     4.  Electrolytes/acid-base: Mild increase CO2 will check VBG     5. MBD of CKD: Magnesium 1.9 give 1 additional dose of magnesium sulfate IV     6. Anemia: Hemoglobin 10.4 with mild thrombocytopenia most compatible with cirrhosis  - For completeness I will check myeloma evaluation with SPEP UPEP light chain ratio/iron studies/stool for occult blood/B12 and folate: Pending  - Monitor hemoglobin     7. Respiratory: On diuretics please see above may need thoracentesis in the future; may have a source from hepatic hydrothorax     8.   Cirrhosis: Per primary service     9. Other medical problems: Epilepsy/chronic hypoxemia/developmental delay/cirrhosis/history of pericarditis with pericardial window and restrictive nature now        Subjective:   Patient is unable to give significant history but states he feels "okay "    Objective:     Vitals: Blood pressure 129/75, pulse 72, temperature (!) 97.3 °F (36.3 °C), resp. rate 18, height 5' 6" (1.676 m), weight 109 kg (241 lb 2.9 oz), SpO2 94 %. ,Body mass index is 38.93 kg/m².     Weight (last 2 days)       Date/Time Weight    11/29/23 0538 109 (241.18)    11/28/23 0501 113 (250.22)    11/27/23 0637 114 (250.66)              Intake/Output Summary (Last 24 hours) at 11/29/2023 0904  Last data filed at 11/28/2023 2101  Gross per 24 hour   Intake 120 ml   Output 238 ml   Net -118 ml            Physical Exam: General:  No acute distress slightly sleepy  Skin:  No acute rash  Eyes:  No scleral icterus and noninjected  ENT:  Moist mucous membranes  Neck:  Supple, no jugular venous distention, trachea midline, overall appearance is normal  Chest:  Clear to auscultation extremely poor cooperation and difficult exam however  CVS:  Regular rate and rhythm, without a rub or gallops  Abdomen:  Normal bowel sounds, soft and nontender and nondistended  Extremities: No change in lower extremity lymphedema and scaly erythematous changes in the pretibial region, and no cyanosis, no significant arthritic changes  Neuro:  No gross focality but not cooperative  Psych: Slightly sleepy today                Medications:    Scheduled Meds:  Current Facility-Administered Medications   Medication Dose Route Frequency Provider Last Rate    acetaminophen  650 mg Oral Q6H PRN Dana Asif PA-C      ascorbic acid  500 mg Oral Daily Dana Asif PA-C      aspirin  81 mg Oral Daily Ashtyn Romero MD      calcium carbonate-vitamin D  1 tablet Oral Daily With Breakfast Alvaro Menezes MD      dextromethorphan-guaiFENesin  10 mL Oral Q4H PRN Oanh WHITNEY Giordano enoxaparin  40 mg Subcutaneous Q24H [facility] Ashtyn Romero MD      lactulose  20 g Oral Daily With Breakfast Alek Johnson, PA-JAMIE      lamoTRIgine  200 mg Oral Early Morning Dana Demo, PA-C      lamoTRIgine  250 mg Oral After Lunch Dana Demo, PA-C      lamoTRIgine  300 mg Oral HS Dana Demo, PA-C      levOCARNitine  330 mg Oral 4x Daily (with meals and at bedtime) Ragini Hull MD      LORazepam  2 mg Intravenous Q6H PRN Dana Demo, PA-C      metoprolol tartrate  25 mg Oral BID Dana Demo, PA-C      nystatin   Topical BID Dana Demo, PA-C      ondansetron  4 mg Intravenous Q6H PRN Dana Demo, PA-C      polyethylene glycol  17 g Oral Daily PRN Dana Demo, PA-C      primidone  100 mg Oral Daily With Lunch Dana Demo, PA-C      primidone  100 mg Oral After Breakfast Waylan Holstein Islam, MD      primidone  150 mg Oral HS Dana Demo, PA-C      senna-docusate sodium  1 tablet Oral BID Dana Demo, PA-C      sodium chloride  1 Application Nasal TID Shira Quan MD      sodium chloride  2 spray Each Nare TID Shira Quan MD      spironolactone  50 mg Oral Daily Ragini Hull MD      torsemide  20 mg Oral Daily Rojelio Banks MD      vitamin E (tocopherol)  400 Units Oral Daily Dana Demo, PA-C      zonisamide  100 mg Oral HS Dana Demo, PA-C         PRN Meds:.  acetaminophen    dextromethorphan-guaiFENesin    LORazepam    ondansetron    polyethylene glycol    Continuous Infusions:     Lab, Imaging and other studies: I have personally reviewed pertinent labs.   Laboratory Results:  Results from last 7 days   Lab Units 11/29/23  0533 11/28/23  0457 11/27/23  0448 11/26/23  1447 11/25/23  0457 11/24/23  0556 11/23/23  0436   WBC Thousand/uL 5.21  --   --   --   --  6.17 7.79   HEMOGLOBIN g/dL 10.4*  --   --   --   --  10.1* 10.3*   HEMATOCRIT % 33.1*  --   --   --   --  31.9* 32.9*   PLATELETS Thousands/uL 161  --   --   --   --  113* 106*   POTASSIUM mmol/L 3.7 3.9 4.7 4.7 4.5 3.9 4.0   CHLORIDE mmol/L 100 101 102 102 101 102 102 CO2 mmol/L 34* 34* 32 27 30 31 32   BUN mg/dL 32* 31* 32* 31* 32* 31* 30*   CREATININE mg/dL 1.75* 1.66* 1.59* 1.53* 1.69* 1.65* 1.58*   CALCIUM mg/dL 9.1 8.9 8.9 9.2 9.0 8.9 9.3   MAGNESIUM mg/dL 1.9  --   --   --   --  1.8*  --      Urinalysis:   Lab Results   Component Value Date    COLORU Yellow 11/17/2023    CLARITYU Clear 11/17/2023    SPECGRAV 1.025 11/17/2023    PHUR 5.0 11/17/2023    LEUKOCYTESUR Negative 11/17/2023    NITRITE Negative 11/17/2023    GLUCOSEU Negative 11/17/2023    KETONESU Negative 11/17/2023    BILIRUBINUR Negative 11/17/2023    BLOODU Negative 11/17/2023     ABGs: No results found for: "PH"  Radiology review:     Portions of the record may have been created with voice recognition software. Occasional wrong word or "sound a like" substitutions may have occurred due to the inherent limitations of voice recognition software. Read the chart carefully and recognize, using context, where substitutions have occurred.

## 2023-11-29 NOTE — PHYSICAL THERAPY NOTE
PHYSICAL THERAPY TREATMENT NOTE    NAME:  Can Matthews  DATE: 11/29/23    Length Of Stay: 12  Performed at least 2 patient identifiers during session: Name and Birthday    TREATMENT FLOW SHEET:       11/29/23 1132   PT Last Visit   PT Visit Date 11/29/23   Note Type   Note Type Treatment   Pain Assessment   Pain Assessment Tool FLACC   Pain Rating: FLACC (Rest) - Face 0   Pain Rating: FLACC (Rest) - Legs 0   Pain Rating: FLACC (Rest) - Activity 0   Pain Rating: FLACC (Rest) - Cry 0   Pain Rating: FLACC (Rest) - Consolability 0   Score: FLACC (Rest) 0   Pain Rating: FLACC (Activity) - Face 1   Pain Rating: FLACC (Activity) - Legs 0   Pain Rating: FLACC (Activity) - Activity 1   Pain Rating: FLACC (Activity) - Cry 1   Pain Rating: FLACC (Activity) - Consolability 0   Score: FLACC (Activity) 3   Restrictions/Precautions   Weight Bearing Precautions Per Order No   Other Precautions Cognitive; Chair Alarm; Bed Alarm; Fall Risk;Multiple lines   General   Chart Reviewed Yes   Response to Previous Treatment Patient unable to report, no changes reported from family or staff   Family/Caregiver Present No   Cognition   Overall Cognitive Status Impaired   Arousal/Participation Alert   Attention Attends with cues to redirect   Memory Decreased short term memory   Following Commands Follows one step commands inconsistently   Subjective   Subjective "I'm not feeling too well today"   Bed Mobility   Additional Comments Not assessed. Pt OOB in recliner chair upon arrival to room. Transfers   Sit to Stand 3  Moderate assistance   Additional items Assist x 2; Increased time required;Verbal cues  (within [facility])   Stand to Sit 3  Moderate assistance   Additional items Assist x 2; Increased time required;Verbal cues   Stand pivot 1  Dependent   Additional items   (using Quick Move)   Toilet transfer 1  Dependent   Additional items Increased time required;Verbal cues; Commode;Assist x 2  (using Quick Move)   Additional Comments Pt performing sit to stands within [facility] using anterior rail to "pull-up" for sit to stand transfer. Pt requiring encouragement today with mod A x 2 to rise to stand and additional cueing and assistance to extend trunk/hips/knees in stance position. Standing tolerance in Quick Move roughly 40 seconds. Ambulation/Elevation   Additional items   (non-ambulatory at baseline)   Balance   Static Sitting Fair -   Dynamic Sitting Poor +   Static Standing Poor -   Endurance Deficit   Endurance Deficit Yes   Endurance Deficit Description Fatigue   Activity Tolerance   Activity Tolerance Patient limited by fatigue   Medical Staff Made Aware PCA   Assessment   Prognosis Guarded   Problem List Decreased strength;Decreased range of motion;Decreased endurance; Impaired balance;Decreased mobility; Decreased cognition; Impaired judgement;Decreased safety awareness; Obesity; Decreased skin integrity   Assessment Pt tolerated tx session poorly. Interventions consisting of transfer training & standing tolerance. Pt limited by fatigue, body habitus, core strength deficits, B LE strength impairment, & poor activity tolerance. Pt requiring encouragement to participate and extensive cues for technique during sit>stand transition and to achieve extension of trunk/hip/knees in stance to prevent flexed posture. Pt with poor standing tolerance today. Barriers to Discharge Decreased caregiver support; Inaccessible home environment   Barriers to Discharge Comments Current assist level, A x 2, caregiver curently in rehab   Goals   STG Expiration Date 12/04/23   PT Treatment Day 3   Plan   Treatment/Interventions ADL retraining;Functional transfer training;LE strengthening/ROM; Therapeutic exercise; Endurance training;Cognitive reorientation;Patient/family training;Equipment eval/education; Bed mobility;Gait training; Compensatory technique education;Spoke to nursing   Progress No functional improvements   PT Frequency 3-5x/wk   Discharge Recommendation   Rehab Resource Intensity Level, PT I (Maximum Resource Intensity)   AM-PAC Basic Mobility Inpatient   Turning in Flat Bed Without Bedrails 2   Lying on Back to Sitting on Edge of Flat Bed Without Bedrails 1   Moving Bed to Chair 1   Standing Up From Chair Using Arms 1   Walk in Room 1   Climb 3-5 Stairs With Railing 1   Basic Mobility Inpatient Raw Score 7   Turning Head Towards Sound 3   Follow Simple Instructions 3   Low Function Basic Mobility Raw Score  13   Low Function Basic Mobility Standardized Score  20.14   Highest Level Of Mobility   -HLM Goal 2: Bed activities/Dependent transfer   JH-HLM Achieved 4: Move to chair/commode   Education   Education Provided Mobility training   Patient Reinforcement needed   End of Consult   Patient Position at End of Consult Bedside chair; All needs within reach   End of Consult Comments PCA present in room completing hygiene needs       The patient's AM-PAC Basic Mobility Inpatient Short Form Raw Score is 7. A Raw score of less than or equal to 16 suggests the patient may benefit from discharge to post-acute rehabilitation services. Please also refer to the recommendation of the Physical Therapist for safe discharge planning.        Jaswant Bush, PT,DPT

## 2023-11-29 NOTE — ASSESSMENT & PLAN NOTE
Patient previously maintained on Lasix as an outpatient -during a nursing home admission it was decreased to 40 mg daily given no his kidney function  proBNP elevated  2D echo done 2022 with mild concentric hypertrophy and EF 60 to 65%  Updated echo this admission with mild concentric left ventricular hypertrophy, LVEF 63% with normal diastolic function -overall similar from 2022 without significant change  Is s/p IR guided thoracentesis due to loculated effusion  Nephrology following and aiding with diuretic use  Patient is currently resumed on home dose of Lasix (11/21) and resume spironolactone 50 mg (11/25)  Patient still hypervolemic, does have chronic lymphedema in his legs as well which contributes to exam  Currently maintaining on torsemide 20 mg daily

## 2023-11-29 NOTE — PLAN OF CARE
Problem: PAIN - ADULT  Goal: Verbalizes/displays adequate comfort level or baseline comfort level  Description: Interventions:  - Encourage patient to monitor pain and request assistance  - Assess pain using appropriate pain scale0-10  - Administer analgesics based on type and severity of pain and evaluate response  - Implement non-pharmacological measures as appropriate and evaluate response  - Consider cultural and social influences on pain and pain management  - Notify physician/advanced practitioner if interventions unsuccessful or patient reports new pain  11/29/2023 1427 by Mery Maki RN  Outcome: Progressing  11/29/2023 1427 by Mery Maki RN  Outcome: Progressing     Problem: INFECTION - ADULT  Goal: Absence or prevention of progression during hospitalization  Description: INTERVENTIONS:  - Assess and monitor for signs and symptoms of infection  - Monitor lab/diagnostic results  - Monitor all insertion sites, i.e. indwelling lines, tubes, and drains  - Monitor endotracheal if appropriate and nasal secretions for changes in amount and color  - [place] appropriate cooling/warming therapies per order  - Administer medications as ordered  - Instruct and encourage patient and family to use good hand hygiene technique  - Identify and instruct in appropriate isolation precautions for identified infection/condition  Outcome: Progressing  Goal: Absence of fever/infection during neutropenic period  Description: INTERVENTIONS:  - Monitor WBC    Outcome: Progressing     Problem: SAFETY ADULT  Goal: Patient will remain free of falls  Description: INTERVENTIONS:  - Educate patient/family on patient safety including physical limitations  - Instruct patient to call for assistance with activity   - Consult OT/PT to assist with strengthening/mobility   - Keep Call bell within reach  - Keep bed low and locked with side rails adjusted as appropriate  - Keep care items and personal belongings within reach  - Initiate and maintain comfort rounds  - Make Fall Risk Sign visible to staff  - Offer Toileting every 2 Hours, in advance of need  - Initiate/Maintain bed/chair alarm  - Obtain necessary fall risk management equipment:   - Apply yellow socks and bracelet for high fall risk patients  - Consider moving patient to room near nurses station  Outcome: Progressing  Goal: Maintain or return to baseline ADL function  Description: INTERVENTIONS:  -  Assess patient's ability to carry out ADLs; assess patient's baseline for ADL function and identify physical deficits which impact ability to perform ADLs (bathing, care of mouth/teeth, toileting, grooming, dressing, etc.)  - Assess/evaluate cause of self-care deficits   - Assess range of motion  - Assess patient's mobility; develop plan if impaired  - Assess patient's need for assistive devices and provide as appropriate  - Encourage maximum independence but intervene and supervise when necessary  - Involve family in performance of ADLs  - Assess for home care needs following discharge   - Consider OT consult to assist with ADL evaluation and planning for discharge  - Provide patient education as appropriate  Outcome: Progressing  Goal: Maintains/Returns to pre admission functional level  Description: INTERVENTIONS:  - Perform AM-PAC 6 Click Basic Mobility/ Daily Activity assessment daily.  - Set and communicate daily mobility goal to care team and patient/family/caregiver. - Collaborate with rehabilitation services on mobility goals if consulted  - Perform Range of Motion 3 times a day. - Reposition patient every 2 hours.   - Dangle patient 3 times a day  - Stand patient 3 times a day  - Ambulate patient 3 times a day  - Out of bed to chair 3 times a day   - Out of bed for meals 3 times a day  - Out of bed for toileting  - Record patient progress and toleration of activity level   Outcome: Progressing     Problem: DISCHARGE PLANNING  Goal: Discharge to home or other facility with appropriate resources  Description: INTERVENTIONS:  - Identify barriers to discharge w/patient and caregiver  - Arrange for needed discharge resources and transportation as appropriate  - Identify discharge learning needs (meds, wound care, etc.)  - Arrange for interpretive services to assist at discharge as needed  - Refer to Case Management Department for coordinating discharge planning if the patient needs post-hospital services based on physician/advanced practitioner order or complex needs related to functional status, cognitive ability, or social support system  Outcome: Progressing     Problem: Knowledge Deficit  Goal: Patient/family/caregiver demonstrates understanding of disease process, treatment plan, medications, and discharge instructions  Description: Complete learning assessment and assess knowledge base.   Interventions:  - Provide teaching at level of understanding  - Provide teaching via preferred learning methods  Outcome: Progressing     Problem: Prexisting or High Potential for Compromised Skin Integrity  Goal: Skin integrity is maintained or improved  Description: INTERVENTIONS:  - Identify patients at risk for skin breakdown  - Assess and monitor skin integrity  - Assess and monitor nutrition and hydration status  - Monitor labs   - Assess for incontinence   - Turn and reposition patient  - Assist with mobility/ambulation  - Relieve pressure over bony prominences  - Avoid friction and shearing  - Provide appropriate hygiene as needed including keeping skin clean and dry  - Evaluate need for skin moisturizer/barrier cream  - Collaborate with interdisciplinary team   - Patient/family teaching  - Consider wound care consult   Outcome: Progressing     Problem: RESPIRATORY - ADULT  Goal: Achieves optimal ventilation and oxygenation  Description: INTERVENTIONS:  - Assess for changes in respiratory status confusion SOB tachycardia  - Assess for changes in mentation and behavior  - Position to facilitate oxygenation and minimize respiratory effort  - Oxygen administered by appropriate delivery if ordered  - Initiate smoking cessation education as indicated  - Encourage broncho-pulmonary hygiene including cough, deep breathe, Incentive Spirometry  - Assess the need for suctioning and aspirate as needed  - Assess and instruct to report SOB or any respiratory difficulty  - Respiratory Therapy support as indicated  Outcome: Progressing

## 2023-11-29 NOTE — PROGRESS NOTES
[address] [facility]  Progress Note  Name: Mak Record  MRN: [id number]  Unit/Bed#: -68 I Date of Admission: 11/17/2023   Date of Service: 11/29/2023 I Hospital Day: 12    Assessment/Plan   Chronic renal impairment  Assessment & Plan  Lab Results   Component Value Date    EGFR 42 11/29/2023    EGFR 45 11/28/2023    EGFR 47 11/27/2023    CREATININE 1.75 (H) 11/29/2023    CREATININE 1.66 (H) 11/28/2023    CREATININE 1.59 (H) 11/27/2023     Appreciate nephrology recs    Acute blood loss anemia  Assessment & Plan  Secondary to recurrent epistaxis.     Appreciate ENT consult -conservative management  Patient is on aspirin and Lovenox, continue to monitor  Hemoglobin has been stable    Stage 3 chronic kidney disease [facility])  Assessment & Plan  Lab Results   Component Value Date    EGFR 42 11/29/2023    EGFR 45 11/28/2023    EGFR 47 11/27/2023    CREATININE 1.75 (H) 11/29/2023    CREATININE 1.66 (H) 11/28/2023    CREATININE 1.59 (H) 11/27/2023   Nephrology following  Previous baseline reported to be 1.3-1.4  Creatinine has been stable around 1.5-1.6  Resume spironolactone 11/25, will Lasix 11/21  May need to accept permissive hypercreatininemia with diuretics to manage volume status    No other household member able to render care  Assessment & Plan  Patient's main caregiver is his mother who was recently hospitalized and discharged to SNF for rehab  She assists him with his ADLs and assist x2  Pt/ot ordered  CM following for placement    Liver cirrhosis ([address] )  Assessment & Plan  Cirrhotic morphology of the liver chronic problem  Ammonia is normal there is no ascites  Continue lactulose daily -re-time for AM patient has been refusing at night as he does not want to have bowel movements during bedtime  Continue lasix 40 mg daily  Aldactone resume at lower dose, 50 mg daily- uptitrate as able    Acute on chronic heart failure with preserved ejection fraction [facility])  Assessment & Plan  Patient previously maintained on Lasix as an outpatient -during a nursing home admission it was decreased to 40 mg daily given no his kidney function  proBNP elevated  2D echo done 2022 with mild concentric hypertrophy and EF 60 to 65%  Updated echo this admission with mild concentric left ventricular hypertrophy, LVEF 63% with normal diastolic function -overall similar from 2022 without significant change  Is s/p IR guided thoracentesis due to loculated effusion  Nephrology following and aiding with diuretic use  Patient is currently resumed on home dose of Lasix (11/21) and resume spironolactone 50 mg (11/25)  Patient still hypervolemic, does have chronic lymphedema in his legs as well which contributes to exam  Currently maintaining on torsemide 20 mg daily    Dysphagia  Assessment & Plan  Continue mechanical soft diet from prior  Aspiration precautions obtain speech    Developmental delay  Assessment & Plan  Daily involvement with mother  Supportive care  PT/OT/ST  Pt AAOx1   At baseline AAOx3  Due to social situation, patient will need skilled nursing facility, case management on board (patient was living with his mother, mother recently hospitalized due to medical issue and get discharged to skilled nursing facility. No other family member is able to take care of the patient.)    Falls  Assessment & Plan  Frequent falls for his entire life because of unsteady gait according to the mother's medical history list  Fall precautions  Emergency room trauma eval no acute injury  Due to patient overall medical conditions, social issues, patient is in need of extensive care. Case management on board. Nonintractable generalized idiopathic epilepsy without status epilepticus [facility])  Assessment & Plan  Continue home seizure medications with seizure precautions  His last seizure was 2-3 days prior to admission  Lamictal, zonesamide and primidone levels ordered.    The last few times he was hospitalized for seizure the AEDs were not changed because the breakthrough seizure etiology were infections. * Pleural effusion on right  Assessment & Plan  Patient with worsening shortness of breath for the past week falling asleep more often  Previously utilize oxygen 2 L only at nighttime, family endorsing he has been utilizing throughout the day prior to admission as well  Imaging showing loculated right-sided moderate effusion with inability to rule out pneumonia on imaging  Labs without leukocytosis, Pro-Janusz was mildly elevated, finished a course of antibiotics for possible pneumonia x 7 days  Due to loculated effusion IR consulted for thoracentesis which was completed 11/20  There is no significant loculation noted on ultrasound necessitating a chest tube  860 mL dark isidro-colored right pleural fluid  Fluid studies no bacterial growth - likely transudative secondary to CHF/cirrhosis   CXR 11/28 shows increase in right pleural effusion compared to CXR after thoracentesis - trial diuretics and repeat CXR in the AM of 11/30               VTE Pharmacologic Prophylaxis: VTE Score: 5 High Risk (Score >/= 5) - Pharmacological DVT Prophylaxis Ordered: enoxaparin (Lovenox). Sequential Compression Devices Ordered. Mobility:   Basic Mobility Inpatient Raw Score: 7  -HLM Goal: 2: Bed activities/Dependent transfer  JH-HLM Achieved: 4: Move to chair/commode  HLM Goal achieved. Continue to encourage appropriate mobility. Patient Centered Rounds: I performed bedside rounds with nursing staff today. Discussions with Specialists or Other Care Team Provider: case management        Total Time Spent on Date of Encounter in care of patient: 28 mins.  This time was spent on one or more of the following: performing physical exam; counseling and coordination of care; obtaining or reviewing history; documenting in the medical record; reviewing/ordering tests, medications or procedures; communicating with other healthcare professionals and discussing with patient's family/caregivers. Current Length of Stay: 12 day(s)  Current Patient Status: Inpatient   Certification Statement: The patient will continue to require additional inpatient hospital stay due to monitoring  Discharge Plan:  medically clear, awaiting placement    Code Status: Level 1 - Full Code    Subjective:   Patient does not appear distressed, nursing has not noted anything either    Objective:     Vitals:   Temp (24hrs), Av.4 °F (36.3 °C), Min:97.3 °F (36.3 °C), Max:97.5 °F (36.4 °C)    Temp:  [97.3 °F (36.3 °C)-97.5 °F (36.4 °C)] 97.3 °F (36.3 °C)  HR:  [68-76] 70  Resp:  [18] 18  BP: (117-157)/(67-89) 140/89  SpO2:  [86 %-97 %] 86 %  Body mass index is 38.93 kg/m². Input and Output Summary (last 24 hours): Intake/Output Summary (Last 24 hours) at 2023 1656  Last data filed at 2023 2101  Gross per 24 hour   Intake 0 ml   Output 238 ml   Net -238 ml       Physical Exam:   Physical Exam  Vitals and nursing note reviewed. Constitutional:       Appearance: He is obese. He is ill-appearing. Cardiovascular:      Rate and Rhythm: Normal rate and regular rhythm. Pulses: Normal pulses. Heart sounds: Normal heart sounds. Pulmonary:      Effort: Pulmonary effort is normal.      Breath sounds: Normal breath sounds. Abdominal:      General: Bowel sounds are normal.      Palpations: Abdomen is soft. Neurological:      Mental Status: He is alert. Mental status is at baseline.    Psychiatric:         Mood and Affect: Mood normal.         Behavior: Behavior normal.          Additional Data:     Labs:  Results from last 7 days   Lab Units 23  0533   WBC Thousand/uL 5.21   HEMOGLOBIN g/dL 10.4*   HEMATOCRIT % 33.1*   PLATELETS Thousands/uL 161   NEUTROS PCT % 76*   LYMPHS PCT % 14   MONOS PCT % 9   EOS PCT % 0     Results from last 7 days   Lab Units 23  0533 23  0457 23  0556   SODIUM mmol/L 140   < > 141   POTASSIUM mmol/L 3.7   < > 3.9   CHLORIDE mmol/L 100   < > 102   CO2 mmol/L 34*   < > 31   BUN mg/dL 32*   < > 31*   CREATININE mg/dL 1.75*   < > 1.65*   ANION GAP mmol/L 6   < > 8   CALCIUM mg/dL 9.1   < > 8.9   ALBUMIN g/dL  --   --  3.2*   TOTAL BILIRUBIN mg/dL  --   --  1.12*   ALK PHOS U/L  --   --  169*   ALT U/L  --   --  14   AST U/L  --   --  21   GLUCOSE RANDOM mg/dL 104   < > 84    < > = values in this interval not displayed. Lines/Drains:  Invasive Devices       Peripheral Intravenous Line  Duration             Peripheral IV 11/27/23 Proximal;Right;Ventral (anterior) Forearm 2 days                          Imaging: No pertinent imaging reviewed.     Recent Cultures (last 7 days):         Last 24 Hours Medication List:   Current Facility-Administered Medications   Medication Dose Route Frequency Provider Last Rate    acetaminophen  650 mg Oral Q6H PRN Dana Demo, PA-C      ascorbic acid  500 mg Oral Daily Dana Demo, PA-C      aspirin  81 mg Oral Daily Danica Garcia MD      calcium carbonate-vitamin D  1 tablet Oral Daily With Breakfast Danica Garcia MD      dextromethorphan-guaiFENesin  10 mL Oral Q4H PRN Dana Demo, PA-JAMIE      enoxaparin  40 mg Subcutaneous Q24H [address] [facility] [place] MD Heather      lactulose  20 g Oral Daily With Breakfast REMBERTO Rice-JAMIE      lamoTRIgine  200 mg Oral Early Morning Dana Demo, PA-C      lamoTRIgine  250 mg Oral After Lunch Dana Demo, PA-C      lamoTRIgine  300 mg Oral HS Dana Demo, PA-C      levOCARNitine  330 mg Oral 4x Daily (with meals and at bedtime) Danica Garcia MD      LORazepam  2 mg Intravenous Q6H PRN Dana Demo, PA-JAMIE      metoprolol tartrate  25 mg Oral BID Dana Demo, PA-C      nystatin   Topical BID Dana Demo, PA-C      ondansetron  4 mg Intravenous Q6H PRN Dana Demo, PA-C      polyethylene glycol  17 g Oral Daily PRN Dana Demo, PA-C      primidone  100 mg Oral Daily With Lunch Dana Demo, PA-JAMIE      primidone  100 mg Oral After Breakfast Danica Garcia MD      primidone 150 mg Oral HS Dana Asif PA-C      senna-docusate sodium  1 tablet Oral BID Dana Asif PA-C      sodium chloride  1 Application Nasal TID Gladys Zayas MD      sodium chloride  2 spray Each Nare TID Gladys Zayas MD      spironolactone  50 mg Oral Daily Arlyn Flores MD      torsemide  20 mg Oral Daily Hua Horton MD      vitamin E (tocopherol)  400 Units Oral Daily Dana Asif PA-C      zonisamide  100 mg Oral HS Laura Ohara PA-C          Today, Patient Was Seen By: Nathan Farnsworth PA-C    **Please Note: This note may have been constructed using a voice recognition system. **

## 2023-11-29 NOTE — ASSESSMENT & PLAN NOTE
Lab Results   Component Value Date    EGFR 42 11/29/2023    EGFR 45 11/28/2023    EGFR 47 11/27/2023    CREATININE 1.75 (H) 11/29/2023    CREATININE 1.66 (H) 11/28/2023    CREATININE 1.59 (H) 11/27/2023     [facility] nephrology recs

## 2023-11-30 ENCOUNTER — APPOINTMENT (INPATIENT)
Dept: RADIOLOGY | Facility: HOSPITAL | Age: 58
End: 2023-11-30
Payer: MEDICARE

## 2023-11-30 PROBLEM — N18.9 ACUTE KIDNEY INJURY SUPERIMPOSED ON CHRONIC KIDNEY DISEASE: Status: ACTIVE | Noted: 2023-11-18

## 2023-11-30 PROBLEM — N17.9 ACUTE KIDNEY INJURY SUPERIMPOSED ON CHRONIC KIDNEY DISEASE: Status: ACTIVE | Noted: 2023-11-18

## 2023-11-30 LAB
ALBUMIN SERPL ELPH-MCNC: 2.96 G/DL (ref 3.2–5.1)
ALBUMIN SERPL ELPH-MCNC: 38 % (ref 48–70)
ALPHA1 GLOB SERPL ELPH-MCNC: 0.38 G/DL (ref 0.15–0.47)
ALPHA1 GLOB SERPL ELPH-MCNC: 4.9 % (ref 1.8–7)
ALPHA2 GLOB SERPL ELPH-MCNC: 0.79 G/DL (ref 0.42–1.04)
ALPHA2 GLOB SERPL ELPH-MCNC: 10.1 % (ref 5.9–14.9)
ANION GAP SERPL CALCULATED.3IONS-SCNC: 5 MMOL/L
BETA GLOB ABNORMAL SERPL ELPH-MCNC: 0.44 G/DL (ref 0.31–0.57)
BETA1 GLOB SERPL ELPH-MCNC: 5.6 % (ref 4.7–7.7)
BETA2 GLOB SERPL ELPH-MCNC: 7.2 % (ref 3.1–7.9)
BETA2+GAMMA GLOB SERPL ELPH-MCNC: 0.56 G/DL (ref 0.2–0.58)
BUN SERPL-MCNC: 31 MG/DL (ref 5–25)
CALCIUM SERPL-MCNC: 9 MG/DL (ref 8.4–10.2)
CHLORIDE SERPL-SCNC: 101 MMOL/L (ref 96–108)
CO2 SERPL-SCNC: 33 MMOL/L (ref 21–32)
CREAT SERPL-MCNC: 1.7 MG/DL (ref 0.6–1.3)
GAMMA GLOB ABNORMAL SERPL ELPH-MCNC: 2.67 G/DL (ref 0.4–1.66)
GAMMA GLOB SERPL ELPH-MCNC: 34.2 % (ref 6.9–22.3)
GFR SERPL CREATININE-BSD FRML MDRD: 44 ML/MIN/1.73SQ M
GLUCOSE SERPL-MCNC: 110 MG/DL (ref 65–140)
IGG/ALB SER: 0.61 {RATIO} (ref 1.1–1.8)
INTERPRETATION UR IFE-IMP: NORMAL
KAPPA LC FREE SER-MCNC: 131.9 MG/L (ref 3.3–19.4)
KAPPA LC FREE/LAMBDA FREE SER: 1.83 {RATIO} (ref 0.26–1.65)
LAMBDA LC FREE SERPL-MCNC: 72 MG/L (ref 5.7–26.3)
MAGNESIUM SERPL-MCNC: 2.1 MG/DL (ref 1.9–2.7)
POTASSIUM SERPL-SCNC: 3.9 MMOL/L (ref 3.5–5.3)
PROT PATTERN SERPL ELPH-IMP: ABNORMAL
PROT SERPL-MCNC: 7.8 G/DL (ref 6.4–8.2)
SODIUM SERPL-SCNC: 139 MMOL/L (ref 135–147)

## 2023-11-30 PROCEDURE — 80048 BASIC METABOLIC PNL TOTAL CA: CPT | Performed by: INTERNAL MEDICINE

## 2023-11-30 PROCEDURE — 86334 IMMUNOFIX E-PHORESIS SERUM: CPT | Performed by: PATHOLOGY

## 2023-11-30 PROCEDURE — 84165 PROTEIN E-PHORESIS SERUM: CPT | Performed by: PATHOLOGY

## 2023-11-30 PROCEDURE — 71045 X-RAY EXAM CHEST 1 VIEW: CPT

## 2023-11-30 PROCEDURE — 99233 SBSQ HOSP IP/OBS HIGH 50: CPT | Performed by: PHYSICIAN ASSISTANT

## 2023-11-30 PROCEDURE — 83735 ASSAY OF MAGNESIUM: CPT | Performed by: INTERNAL MEDICINE

## 2023-11-30 PROCEDURE — 99232 SBSQ HOSP IP/OBS MODERATE 35: CPT | Performed by: INTERNAL MEDICINE

## 2023-11-30 RX ORDER — TORSEMIDE 20 MG/1
20 TABLET ORAL 2 TIMES DAILY
Status: DISCONTINUED | OUTPATIENT
Start: 2023-11-30 | End: 2023-12-03

## 2023-11-30 RX ADMIN — ZONISAMIDE 100 MG: 100 CAPSULE ORAL at 23:18

## 2023-11-30 RX ADMIN — LEVOCARNITINE 330 MG: 1 SOLUTION ORAL at 21:20

## 2023-11-30 RX ADMIN — TORSEMIDE 20 MG: 20 TABLET ORAL at 09:17

## 2023-11-30 RX ADMIN — LEVOCARNITINE 330 MG: 1 SOLUTION ORAL at 13:00

## 2023-11-30 RX ADMIN — NYSTATIN: 100000 POWDER TOPICAL at 09:23

## 2023-11-30 RX ADMIN — PRIMIDONE 150 MG: 50 TABLET ORAL at 23:17

## 2023-11-30 RX ADMIN — LAMOTRIGINE 300 MG: 100 TABLET ORAL at 23:17

## 2023-11-30 RX ADMIN — ASPIRIN 81 MG: 81 TABLET, COATED ORAL at 09:17

## 2023-11-30 RX ADMIN — LAMOTRIGINE 200 MG: 100 TABLET ORAL at 09:20

## 2023-11-30 RX ADMIN — NYSTATIN: 100000 POWDER TOPICAL at 17:07

## 2023-11-30 RX ADMIN — PRIMIDONE 100 MG: 50 TABLET ORAL at 16:16

## 2023-11-30 RX ADMIN — SALINE NASAL SPRAY 2 SPRAY: 1.5 SOLUTION NASAL at 09:22

## 2023-11-30 RX ADMIN — SALINE NASAL SPRAY 2 SPRAY: 1.5 SOLUTION NASAL at 16:17

## 2023-11-30 RX ADMIN — Medication 1 TABLET: at 09:21

## 2023-11-30 RX ADMIN — OXYCODONE HYDROCHLORIDE AND ACETAMINOPHEN 500 MG: 500 TABLET ORAL at 09:17

## 2023-11-30 RX ADMIN — TORSEMIDE 20 MG: 20 TABLET ORAL at 21:19

## 2023-11-30 RX ADMIN — SPIRONOLACTONE 50 MG: 25 TABLET ORAL at 09:17

## 2023-11-30 RX ADMIN — Medication 1 APPLICATION: at 16:17

## 2023-11-30 RX ADMIN — METOPROLOL TARTRATE 25 MG: 25 TABLET, FILM COATED ORAL at 21:19

## 2023-11-30 RX ADMIN — PRIMIDONE 100 MG: 50 TABLET ORAL at 09:27

## 2023-11-30 RX ADMIN — METOPROLOL TARTRATE 25 MG: 25 TABLET, FILM COATED ORAL at 09:17

## 2023-11-30 RX ADMIN — Medication 400 UNITS: at 09:17

## 2023-11-30 RX ADMIN — LEVOCARNITINE 330 MG: 1 SOLUTION ORAL at 09:00

## 2023-11-30 RX ADMIN — ENOXAPARIN SODIUM 40 MG: 40 INJECTION SUBCUTANEOUS at 09:21

## 2023-11-30 RX ADMIN — LAMOTRIGINE 250 MG: 100 TABLET ORAL at 16:13

## 2023-11-30 RX ADMIN — Medication 1 APPLICATION: at 09:00

## 2023-11-30 RX ADMIN — DOCUSATE SODIUM AND SENNOSIDES 1 TABLET: 8.6; 5 TABLET, FILM COATED ORAL at 09:17

## 2023-11-30 RX ADMIN — LEVOCARNITINE 330 MG: 1 SOLUTION ORAL at 16:14

## 2023-11-30 RX ADMIN — LACTULOSE 20 G: 20 SOLUTION ORAL at 09:21

## 2023-11-30 NOTE — ASSESSMENT & PLAN NOTE
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 He uses 2 L O2 via nasal cannula nightly which is supporting his SPO2 adequately greater than 90% at this time  Titrate O2 as needed  If patient develops SIRS would obtain urine antigens and treat for pneumonia, at this time holding off on antibiotic treatment.   Of note he does have chronic MRSA nares colonization  Encourage out of bed with an assistant and incentive spirometer

## 2023-11-30 NOTE — ASSESSMENT & PLAN NOTE
Lab Results   Component Value Date    EGFR 44 11/30/2023    EGFR 42 11/29/2023    EGFR 45 11/28/2023    CREATININE 1.70 (H) 11/30/2023    CREATININE 1.75 (H) 11/29/2023    CREATININE 1.66 (H) 11/28/2023     [facility] nephrology recs

## 2023-11-30 NOTE — ASSESSMENT & PLAN NOTE
Wears 2L O2 QHS, continue  Likely has a chronic hypercapnia given the elevated bicarbonate.  Kyphosis causing chronic limited chest expansion  Did require 3 L overnight rather than 2L   Chest x-ray showing reaccumulation of pleural effusion on the right side -diuretics have been adjusted, see under pleural effusion

## 2023-11-30 NOTE — PROGRESS NOTES
[address] [facility]  Progress Note  Name: Elena Felipe  MRN: [id number]  Unit/Bed#: -78 I Date of Admission: 11/17/2023   Date of Service: 11/30/2023 I Hospital Day: 13    Assessment/Plan   Acute on chronic heart failure with preserved ejection fraction [facility])  Assessment & Plan  Patient previously maintained on Lasix as an outpatient -during a nursing home admission it was decreased to 40 mg daily given his kidney function  Now proBNP elevated  2D echo done 2022 with mild concentric hypertrophy and EF 60 to 65%  Updated echo this admission with mild concentric left ventricular hypertrophy, LVEF 63% with normal diastolic function -overall similar from 2022 without significant change  Is s/p IR guided thoracentesis 11/20 due to loculated effusion  Nephrology following and aiding with diuretic regimen  Patient still hypervolemic, does have chronic lymphedema in his legs as well which contributes to exam  Currently maintaining on torsemide 20 mg BID  & spironolactone 50 mg qd    Pleural effusion on right  Assessment & Plan  Patient with worsening shortness of breath for the past week falling asleep more often  Previously utilize oxygen 2 L only at nighttime, family endorsing he has been utilizing throughout the day prior to admission as well  Imaging showing loculated right-sided moderate effusion with inability to rule out pneumonia on imaging  Labs without leukocytosis, Pro-Janusz was mildly elevated, finished a course of antibiotics for pneumonia x 7 days  Due to loculated effusion IR consulted for thoracentesis which was completed 11/20  There is no significant loculation noted on ultrasound necessitating a chest tube  860 mL dark isidro-colored right pleural fluid  Fluid studies no bacterial growth - likely transudative secondary to CHF/cirrhosis   Serial CXR with diuretics    * Falls  Assessment & Plan  Frequent falls for his entire life because of unsteady gait according to the mother's medical history list  Fall precautions  Emergency room trauma eval no acute injury  Due to patient overall medical conditions, social issues, patient is in need of extensive care. Case management on board. Acute blood loss anemia  Assessment & Plan  Secondary to recurrent epistaxis. Appreciate ENT consult -conservative management  Patient is on aspirin and Lovenox, continue to monitor  Hemoglobin has been stable with ferrous sulfate    Acute kidney injury superimposed on chronic kidney disease 3  Assessment & Plan  Lab Results   Component Value Date    EGFR 44 11/30/2023    EGFR 42 11/29/2023    EGFR 45 11/28/2023    CREATININE 1.70 (H) 11/30/2023    CREATININE 1.75 (H) 11/29/2023    CREATININE 1.66 (H) 11/28/2023     Nephrology following  Previous baseline reported to be 1.3-1.4  Creatinine has been stable around 1.5-1.7 likely new baseline per Nephro    Chronic respiratory failure with hypercapnia (HCC)  Assessment & Plan  Wears 2L O2 QHS, continue  Likely has a chronic hypercapnia given the elevated bicarbonate.  Kyphosis causing chronic limited chest expansion  Did require 3 L overnight rather than 2L   Chest x-ray showing reaccumulation of pleural effusion on the right side -diuretics have been adjusted, see under pleural effusion    Liver cirrhosis (HCC)  Assessment & Plan  Cirrhotic morphology of the liver chronic problem  Ammonia is normal there is no ascites  Continue lactulose daily -re-time for AM patient has been refusing at night as he does not want to have bowel movements during bedtime  Aldactone resume at lower dose, 50 mg daily- uptitrate as able per nephro    Dysphagia  Assessment & Plan  Continue mechanical soft diet from prior  Aspiration precautions    Developmental delay  Assessment & Plan  Daily involvement with mother  Supportive care  PT/OT/ST  Pt AAOx1   At baseline AAOx3  Due to social situation, patient will need skilled nursing facility, case management on board (patient was living with his mother, mother recently hospitalized due to medical issue and got discharged to skilled nursing facility. No other family member is able to take care of the patient.)    Nonintractable generalized idiopathic epilepsy without status epilepticus [facility])  Assessment & Plan  Continue home seizure medications with seizure precautions  His last seizure was 2-3 days prior to admission  Lamictal and primidone levels are therapeutic. Zonesamide was subtherapeutic will recheck. The last few times he was hospitalized for seizure the AEDs were not changed because the breakthrough seizure etiology were infections. Epistaxis-resolved as of 11/26/2023  Assessment & Plan  Resolved. Patient's mother endorses that this is a recurrent issue for him  Appreciate ENT consult. 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 He uses 2 L O2 via nasal cannula nightly which is supporting his SPO2 adequately greater than 90% at this time  Titrate O2 as needed  If patient develops SIRS would obtain urine antigens and treat for pneumonia, at this time holding off on antibiotic treatment. Of note he does have chronic MRSA nares colonization  Encourage out of bed with an assistant and incentive spirometer           VTE Pharmacologic Prophylaxis: VTE Score: 5 High Risk (Score >/= 5) - Pharmacological DVT Prophylaxis Ordered: enoxaparin (Lovenox).  Sequential Compression Devices Ordered. Mobility:   Basic Mobility Inpatient Raw Score: 6  -HLM Goal: 2: Bed activities/Dependent transfer  JH-HLM Achieved: 4: Move to chair/commode  HLM Goal achieved. Continue to encourage appropriate mobility. Patient Centered Rounds: I performed bedside rounds with nursing staff today. Discussions with Specialists or Other Care Team Provider: none    Education and Discussions with Family / Patient: Updated  (mother) via phone. Total Time Spent on Date of Encounter in care of patient: 55 mins. This time was spent on one or more of the following: performing physical exam; counseling and coordination of care; obtaining or reviewing history; documenting in the medical record; reviewing/ordering tests, medications or procedures; communicating with other healthcare professionals and discussing with patient's family/caregivers. Current Length of Stay: 13 day(s)  Current Patient Status: Inpatient   Certification Statement: The patient will continue to require additional inpatient hospital stay due to trend creat with increasing the demadex, serial cxr?, dispo planning with county involvement per   Discharge Plan:  anticipate patient will be medically cleared for dc by the weekend but planning with the county will take longer than that     Code Status: Level 1 - Full Code    Subjective:   Unable to contribute to ROS besides saying he wants to go home    Denies pain    No changes overnight  per RN rounding    Objective:     Vitals:   Temp (24hrs), Av.6 °F (36.4 °C), Min:97.5 °F (36.4 °C), Max:97.7 °F (36.5 °C)    Temp:  [97.5 °F (36.4 °C)-97.7 °F (36.5 °C)] 97.5 °F (36.4 °C)  HR:  [73-87] 73  BP: (123-126)/() 126/84  SpO2:  [88 %-94 %] 91 %  Body mass index is 38.82 kg/m². Input and Output Summary (last 24 hours):      Intake/Output Summary (Last 24 hours) at 2023 1600  Last data filed at 2023 1300  Gross per 24 hour   Intake 490 ml   Output 781 ml   Net -291 ml Physical Exam:   Physical Exam  Vitals and nursing note reviewed. Constitutional:       General: He is not in acute distress. HENT:      Head: Normocephalic and atraumatic. Nose: No rhinorrhea. Mouth/Throat:      Mouth: Mucous membranes are dry. Eyes:      Comments: Right eye scleral injection no conjunctivitis   Cardiovascular:      Rate and Rhythm: Normal rate and regular rhythm. Pulmonary:      Effort: No respiratory distress. Comments: Shallow breathing extremely poor cough effort  Abdominal:      General: Abdomen is flat. Musculoskeletal:         General: Swelling present. Cervical back: Normal range of motion. Comments: Bl le lymphedema   Skin:     Findings: No rash. Neurological:      General: No focal deficit present. Comments: At baseline   Psychiatric:      Comments: Intellectual disability , cooperative          Additional Data:     Labs:  Results from last 7 days   Lab Units 11/29/23  0533   WBC Thousand/uL 5.21   HEMOGLOBIN g/dL 10.4*   HEMATOCRIT % 33.1*   PLATELETS Thousands/uL 161   NEUTROS PCT % 76*   LYMPHS PCT % 14   MONOS PCT % 9   EOS PCT % 0     Results from last 7 days   Lab Units 11/30/23  0447 11/25/23  0457 11/24/23  0556   SODIUM mmol/L 139   < > 141   POTASSIUM mmol/L 3.9   < > 3.9   CHLORIDE mmol/L 101   < > 102   CO2 mmol/L 33*   < > 31   BUN mg/dL 31*   < > 31*   CREATININE mg/dL 1.70*   < > 1.65*   ANION GAP mmol/L 5   < > 8   CALCIUM mg/dL 9.0   < > 8.9   ALBUMIN g/dL  --   --  3.2*   TOTAL BILIRUBIN mg/dL  --   --  1.12*   ALK PHOS U/L  --   --  169*   ALT U/L  --   --  14   AST U/L  --   --  21   GLUCOSE RANDOM mg/dL 110   < > 84    < > = values in this interval not displayed.                        Lines/Drains:  Invasive Devices       Peripheral Intravenous Line  Duration             Peripheral IV 11/27/23 Proximal;Right;Ventral (anterior) Forearm 3 days                          Imaging: Reviewed radiology reports from this admission including: chest xray today    Recent Cultures (last 7 days): none        Last 24 Hours Medication List:   Current Facility-Administered Medications   Medication Dose Route Frequency Provider Last Rate    acetaminophen  650 mg Oral Q6H PRN Dana Demo, PA-C      ascorbic acid  500 mg Oral Daily Dana Demo, PA-C      aspirin  81 mg Oral Daily Javier Mendosa MD      calcium carbonate-vitamin D  1 tablet Oral Daily With Breakfast Javier Mendosa MD      dextromethorphan-guaiFENesin  10 mL Oral Q4H PRN Dana Demo, WHITNEY      enoxaparin  40 mg Subcutaneous Q24H [address] Ashtyn Romero MD      lactulose  20 g Oral Daily With Breakfast Castillo Blake PA-C      lamoTRIgine  200 mg Oral Early Morning Dana Demo, PA-JAMIE      lamoTRIgine  250 mg Oral After Lunch Dana Demo, PA-JAMIE      lamoTRIgine  300 mg Oral HS Dana Demo, PA-C      levOCARNitine  330 mg Oral 4x Daily (with meals and at bedtime) Javier Mendosa MD      LORazepam  2 mg Intravenous Q6H PRN Dana Demo, PA-C      metoprolol tartrate  25 mg Oral BID Dana Demo, PA-C      nystatin   Topical BID Dana Demo, PA-C      ondansetron  4 mg Intravenous Q6H PRN Dana Demo, PA-C      polyethylene glycol  17 g Oral Daily PRN Dana Demo, PA-C      primidone  100 mg Oral Daily With Lunch Dana Demo, PA-C      primidone  100 mg Oral After Breakfast Ashtyngay Romero MD      primidone  150 mg Oral HS Dana Demo, WHITNEY      senna-docusate sodium  1 tablet Oral BID Dana Demo, PA-C      sodium chloride  1 Application Nasal TID Ramón Catherine MD      sodium chloride  2 spray Each Nare TID Ramón Catherine MD      spironolactone  50 mg Oral Daily Javier Mendosa MD      torsemide  20 mg Oral BID Nicki Monroe MD      vitamin E (tocopherol)  400 Units Oral Daily Dana Demo, WHITNEY      zonisamide  100 mg Oral HS Gregory Horan PA-C          Today, Patient Was Seen By: Gregory Horan PA-C    **Please Note: This note may have been constructed using a voice recognition system. **

## 2023-11-30 NOTE — ASSESSMENT & PLAN NOTE
Secondary to recurrent epistaxis.     Appreciate ENT consult -conservative management  Patient is on aspirin and Lovenox, continue to monitor  Hemoglobin has been stable with ferrous sulfate

## 2023-11-30 NOTE — ASSESSMENT & PLAN NOTE
Patient previously maintained on Lasix as an outpatient -during a nursing home admission it was decreased to 40 mg daily given his kidney function  Now proBNP elevated  2D echo done 2022 with mild concentric hypertrophy and EF 60 to 65%  Updated echo this admission with mild concentric left ventricular hypertrophy, LVEF 63% with normal diastolic function -overall similar from 2022 without significant change  Is s/p IR guided thoracentesis 11/20 due to loculated effusion  Nephrology following and aiding with diuretic regimen  Patient still hypervolemic, does have chronic lymphedema in his legs as well which contributes to exam  Currently maintaining on torsemide 20 mg BID  & spironolactone 50 mg qd

## 2023-11-30 NOTE — ASSESSMENT & PLAN NOTE
Resolved. Patient's mother endorses that this is a recurrent issue for him  Appreciate ENT consult.   Conservative therapy  Recommended change from nasal prongs oxygen to facemask or face tent with humidity -patient refuses this  [place] spray and salt water gel 3 times daily  -patient refusing  Reassess in 2 weeks as outpatient

## 2023-11-30 NOTE — PROGRESS NOTES
Progress Note - Nephrology   Sohanstephen Baldwin 66 y.o. male MRN: [id number]  Unit/Bed#: -Tad Encounter: [id number]    ASSESSMENT AND PLAN:  61-year-old male with a past medical history of epilepsy/restrictive pericarditis status post pericardial window/kyphosis/right-sided pleural effusion requiring chest tube/lymphedema/ambulatory dysfunction/developmental delay/chronic O2 use/CHF/cirrhosis/frequent falls who presents with increased lethargy shortness of breath. We are seeing him for acute on top of chronic kidney disease     1. EDWIGE/CKD: Acute component perhaps was related to prerenal azotemia  - Baseline creatinine reported as 1.3-1.4  - In hospital has been trending 1.5-1.6  - Peak creatinine 1.88  - UA: Negative proteinuria negative hematuria  - Renal ultrasound normal        Current creatinine: 1.70 slightly improved on torsemide probable new baseline as outlined     2. CKD 3: Reportedly 1.3-1.4 probably at new baseline please see above: Possibly 1.7-2 may be the new baseline     3. Volume/blood pressure: Chronic CHF with preserved ejection fraction in the setting of cirrhosis  - Blood pressure: Slightly labile continue metoprolol/spironolactone/torsemide  - Chronic lymphedema: Currently on diuretics continue for now: Spironolactone 50 mg daily. Repeat chest x-ray by my personal review demonstrates persistent right pleural effusion slight increased markings on left but unchanged compared to 11/28    As result/consequence I would recommend increasing torsemide to 20 mg twice     4. Electrolytes/acid-base: Mild increase CO2 will check VBG     5. MBD of CKD: Magnesium now improved to 2.1 monitor     6. Anemia: Hemoglobin 10.4 with mild thrombocytopenia most compatible with cirrhosis  - Iron studies are low I would give oral iron  - Folate and B12 are adequate  - SPEP UPEP and light chain ratio are PENDING  - Monitor hemoglobin     7.   Respiratory: On diuretics please see above may need thoracentesis in the future; may have a source from hepatic hydrothorax     8. Cirrhosis: Per primary service     9. Other medical problems: Epilepsy/chronic hypoxemia/developmental delay/cirrhosis/history of pericarditis with pericardial window and restrictive nature now        Subjective:   Patient more alert today  Denies any symptoms but limited communication    Objective:     Vitals: Blood pressure 123/86, pulse 75, temperature 97.5 °F (36.4 °C), resp. rate 18, height 5' 6" (1.676 m), weight 109 kg (240 lb 8.4 oz), SpO2 94 %. ,Body mass index is 38.82 kg/m².     Weight (last 2 days)       Date/Time Weight    11/30/23 0600 109 (240.52)    11/29/23 0538 109 (241.18)    11/28/23 0501 113 (250.22)              Intake/Output Summary (Last 24 hours) at 11/30/2023 0834  Last data filed at 11/30/2023 0019  Gross per 24 hour   Intake 250 ml   Output 781 ml   Net -531 ml            Physical Exam: General:  No acute distress  Skin:  No acute rash  Eyes:  No scleral icterus and noninjected  ENT:  Moist mucous membranes  Neck:  Supple, no jugular venous distention, trachea midline, overall appearance is normal  Chest:  Clear to auscultation, better effort  CVS:  Regular rate and rhythm, without a rub or gallops  Abdomen:  Normal bowel sounds, soft and nontender and nondistended  Extremities: Lower extremity lymphedema and same pretibial scaly changes, and no cyanosis, no significant arthritic changes  Neuro:  No gross focality  Psych:  Alert today                Medications:    Scheduled Meds:  Current Facility-Administered Medications   Medication Dose Route Frequency Provider Last Rate    acetaminophen  650 mg Oral Q6H PRN Dana Asif PA-C      ascorbic acid  500 mg Oral Daily Dana Asif PA-C      aspirin  81 mg Oral Daily Ashtyn Romero MD      calcium carbonate-vitamin D  1 tablet Oral Daily With Breakfast Ashtyn Romero MD      dextromethorphan-guaiFENesin  10 mL Oral Q4H PRN Dana Asif PA-C      enoxaparin  40 mg Subcutaneous Q24H [facility] Ashtyn Romero MD      lactulose  20 g Oral Daily With Breakfast Farzana Shin PA-C      lamoTRIgine  200 mg Oral Early Morning Dana Demo, PA-JAMIE      lamoTRIgine  250 mg Oral After Lunch Dana Demo, PA-C      lamoTRIgine  300 mg Oral HS Dana Demo, PA-C      levOCARNitine  330 mg Oral 4x Daily (with meals and at bedtime) Jai Ross MD      LORazepam  2 mg Intravenous Q6H PRN Dana Demo, PA-C      metoprolol tartrate  25 mg Oral BID Dana Demo, PA-C      nystatin   Topical BID Dana Demo, PA-C      ondansetron  4 mg Intravenous Q6H PRN Dana Demo, PA-C      polyethylene glycol  17 g Oral Daily PRN Dana Demo, PA-C      primidone  100 mg Oral Daily With Lunch Dana Demo, PA-C      primidone  100 mg Oral After Breakfast Ashtyn Romero MD      primidone  150 mg Oral HS Dana Demo, PA-C      senna-docusate sodium  1 tablet Oral BID Dnaa Demo, PA-C      sodium chloride  1 Application Nasal TID Kwasi Clemens MD      sodium chloride  2 spray Each Nare TID Kwasi Clemens MD      spironolactone  50 mg Oral Daily Jai Ross MD      torsemide  20 mg Oral Daily Sunshine Verduzco MD      vitamin E (tocopherol)  400 Units Oral Daily Dana Demo, PA-C      zonisamide  100 mg Oral HS Dana Demo, PA-C         PRN Meds:.  acetaminophen    dextromethorphan-guaiFENesin    LORazepam    ondansetron    polyethylene glycol    Continuous Infusions:     Lab, Imaging and other studies: I have personally reviewed pertinent labs.   Laboratory Results:  Results from last 7 days   Lab Units 11/30/23  0447 11/29/23  0533 11/28/23  0457 11/27/23  0448 11/26/23  1447 11/25/23  0457 11/24/23  0556   WBC Thousand/uL  --  5.21  --   --   --   --  6.17   HEMOGLOBIN g/dL  --  10.4*  --   --   --   --  10.1*   HEMATOCRIT %  --  33.1*  --   --   --   --  31.9*   PLATELETS Thousands/uL  --  161  --   --   --   --  113*   POTASSIUM mmol/L 3.9 3.7 3.9 4.7 4.7 4.5 3.9   CHLORIDE mmol/L 101 100 101 102 102 101 102   CO2 mmol/L 33* 34* 34* 32 27 30 31   BUN mg/dL 31* 32* 31* 32* 31* 32* 31*   CREATININE mg/dL 1.70* 1.75* 1.66* 1.59* 1.53* 1.69* 1.65*   CALCIUM mg/dL 9.0 9.1 8.9 8.9 9.2 9.0 8.9   MAGNESIUM mg/dL 2.1 1.9  --   --   --   --  1.8*     Urinalysis:   Lab Results   Component Value Date    COLORU Yellow 11/17/2023    CLARITYU Clear 11/17/2023    SPECGRAV 1.025 11/17/2023    PHUR 5.0 11/17/2023    LEUKOCYTESUR Negative 11/17/2023    NITRITE Negative 11/17/2023    GLUCOSEU Negative 11/17/2023    KETONESU Negative 11/17/2023    BILIRUBINUR Negative 11/17/2023    BLOODU Negative 11/17/2023     ABGs: No results found for: "PH"  Radiology review:     Portions of the record may have been created with voice recognition software. Occasional wrong word or "sound a like" substitutions may have occurred due to the inherent limitations of voice recognition software. Read the chart carefully and recognize, using context, where substitutions have occurred.

## 2023-11-30 NOTE — ASSESSMENT & PLAN NOTE
Continue home seizure medications with seizure precautions  His last seizure was 2-3 days prior to admission  Lamictal and primidone levels are therapeutic. Zonesamide was subtherapeutic will recheck. The last few times he was hospitalized for seizure the AEDs were not changed because the breakthrough seizure etiology were infections.

## 2023-11-30 NOTE — ASSESSMENT & PLAN NOTE
Patient with worsening shortness of breath for the past week falling asleep more often  Previously utilize oxygen 2 L only at nighttime, family endorsing he has been utilizing throughout the day prior to admission as well  Imaging showing loculated right-sided moderate effusion with inability to rule out pneumonia on imaging  Labs without leukocytosis, Pro-Janusz was mildly elevated, finished a course of antibiotics for pneumonia x 7 days  Due to loculated effusion IR consulted for thoracentesis which was completed 11/20  There is no significant loculation noted on ultrasound necessitating a chest tube  860 mL dark isidro-colored right pleural fluid  Fluid studies no bacterial growth - likely transudative secondary to CHF/cirrhosis   Serial CXR with diuretics

## 2023-11-30 NOTE — ASSESSMENT & PLAN NOTE
Daily involvement with mother  Supportive care  PT/OT/ST  Pt AAOx1   At baseline AAOx3  Due to social situation, patient will need skilled nursing facility, case management on board (patient was living with his mother, mother recently hospitalized due to medical issue and got discharged to skilled nursing facility.   No other family member is able to take care of the patient.)

## 2023-11-30 NOTE — ASSESSMENT & PLAN NOTE
Cirrhotic morphology of the liver chronic problem  Ammonia is normal there is no ascites  Continue lactulose daily -re-time for AM patient has been refusing at night as he does not want to have bowel movements during bedtime  Aldactone resume at lower dose, 50 mg daily- uptitrate as able per nephro

## 2023-11-30 NOTE — ASSESSMENT & PLAN NOTE
Lab Results   Component Value Date    EGFR 44 11/30/2023    EGFR 42 11/29/2023    EGFR 45 11/28/2023    CREATININE 1.70 (H) 11/30/2023    CREATININE 1.75 (H) 11/29/2023    CREATININE 1.66 (H) 11/28/2023     Nephrology following  Previous baseline reported to be 1.3-1.4  Creatinine has been stable around 1.5-1.7 likely new baseline per Nephro

## 2023-12-01 LAB
ANION GAP SERPL CALCULATED.3IONS-SCNC: 5 MMOL/L
BUN SERPL-MCNC: 31 MG/DL (ref 5–25)
CALCIUM SERPL-MCNC: 9.1 MG/DL (ref 8.4–10.2)
CHLORIDE SERPL-SCNC: 101 MMOL/L (ref 96–108)
CO2 SERPL-SCNC: 33 MMOL/L (ref 21–32)
CREAT SERPL-MCNC: 1.63 MG/DL (ref 0.6–1.3)
DATE SPECIMEN #1: NORMAL
GFR SERPL CREATININE-BSD FRML MDRD: 46 ML/MIN/1.73SQ M
GLUCOSE SERPL-MCNC: 87 MG/DL (ref 65–140)
HEMOCCULT SP1 STL QL: NEGATIVE
POTASSIUM SERPL-SCNC: 4.4 MMOL/L (ref 3.5–5.3)
SODIUM SERPL-SCNC: 139 MMOL/L (ref 135–147)

## 2023-12-01 PROCEDURE — 99232 SBSQ HOSP IP/OBS MODERATE 35: CPT | Performed by: INTERNAL MEDICINE

## 2023-12-01 PROCEDURE — 99232 SBSQ HOSP IP/OBS MODERATE 35: CPT | Performed by: NURSE PRACTITIONER

## 2023-12-01 PROCEDURE — 97530 THERAPEUTIC ACTIVITIES: CPT

## 2023-12-01 PROCEDURE — 80203 DRUG SCREEN QUANT ZONISAMIDE: CPT | Performed by: PHYSICIAN ASSISTANT

## 2023-12-01 PROCEDURE — 80048 BASIC METABOLIC PNL TOTAL CA: CPT | Performed by: FAMILY MEDICINE

## 2023-12-01 PROCEDURE — 84166 PROTEIN E-PHORESIS/URINE/CSF: CPT | Performed by: INTERNAL MEDICINE

## 2023-12-01 PROCEDURE — 97535 SELF CARE MNGMENT TRAINING: CPT

## 2023-12-01 RX ADMIN — LAMOTRIGINE 300 MG: 100 TABLET ORAL at 21:42

## 2023-12-01 RX ADMIN — LEVOCARNITINE 330 MG: 1 SOLUTION ORAL at 14:25

## 2023-12-01 RX ADMIN — NYSTATIN 1 APPLICATION: 100000 POWDER TOPICAL at 16:48

## 2023-12-01 RX ADMIN — Medication 1 APPLICATION: at 21:42

## 2023-12-01 RX ADMIN — LACTULOSE 20 G: 20 SOLUTION ORAL at 08:45

## 2023-12-01 RX ADMIN — TORSEMIDE 20 MG: 20 TABLET ORAL at 21:42

## 2023-12-01 RX ADMIN — LEVOCARNITINE 330 MG: 1 SOLUTION ORAL at 21:43

## 2023-12-01 RX ADMIN — DOCUSATE SODIUM AND SENNOSIDES 1 TABLET: 8.6; 5 TABLET, FILM COATED ORAL at 08:46

## 2023-12-01 RX ADMIN — Medication 1 TABLET: at 08:46

## 2023-12-01 RX ADMIN — METOPROLOL TARTRATE 25 MG: 25 TABLET, FILM COATED ORAL at 21:42

## 2023-12-01 RX ADMIN — PRIMIDONE 100 MG: 50 TABLET ORAL at 08:46

## 2023-12-01 RX ADMIN — LEVOCARNITINE 330 MG: 1 SOLUTION ORAL at 08:46

## 2023-12-01 RX ADMIN — OXYCODONE HYDROCHLORIDE AND ACETAMINOPHEN 500 MG: 500 TABLET ORAL at 08:46

## 2023-12-01 RX ADMIN — ZONISAMIDE 100 MG: 100 CAPSULE ORAL at 21:41

## 2023-12-01 RX ADMIN — ASPIRIN 81 MG: 81 TABLET, COATED ORAL at 08:46

## 2023-12-01 RX ADMIN — ENOXAPARIN SODIUM 40 MG: 40 INJECTION SUBCUTANEOUS at 08:46

## 2023-12-01 RX ADMIN — LEVOCARNITINE 330 MG: 1 SOLUTION ORAL at 16:48

## 2023-12-01 RX ADMIN — TORSEMIDE 20 MG: 20 TABLET ORAL at 08:45

## 2023-12-01 RX ADMIN — NYSTATIN 1 APPLICATION: 100000 POWDER TOPICAL at 08:47

## 2023-12-01 RX ADMIN — Medication 400 UNITS: at 08:46

## 2023-12-01 RX ADMIN — SALINE NASAL SPRAY 2 SPRAY: 1.5 SOLUTION NASAL at 21:42

## 2023-12-01 RX ADMIN — METOPROLOL TARTRATE 25 MG: 25 TABLET, FILM COATED ORAL at 08:46

## 2023-12-01 RX ADMIN — PRIMIDONE 150 MG: 50 TABLET ORAL at 21:41

## 2023-12-01 RX ADMIN — PRIMIDONE 100 MG: 50 TABLET ORAL at 16:48

## 2023-12-01 RX ADMIN — LAMOTRIGINE 200 MG: 100 TABLET ORAL at 06:49

## 2023-12-01 RX ADMIN — LAMOTRIGINE 250 MG: 100 TABLET ORAL at 16:47

## 2023-12-01 RX ADMIN — Medication 1 APPLICATION: at 08:47

## 2023-12-01 RX ADMIN — SPIRONOLACTONE 50 MG: 25 TABLET ORAL at 08:46

## 2023-12-01 RX ADMIN — SALINE NASAL SPRAY 2 SPRAY: 1.5 SOLUTION NASAL at 08:47

## 2023-12-01 NOTE — PROGRESS NOTES
[facility]  Progress Note  Name: Alicia Wilburn  MRN: [id number]  Unit/Bed#: -93 I Date of Admission: 11/17/2023   Date of Service: 12/1/2023 I Hospital Day: 14    Assessment/Plan   * Acute kidney injury superimposed on chronic kidney disease 3  Assessment & Plan  Lab Results   Component Value Date    EGFR 46 12/01/2023    EGFR 44 11/30/2023    EGFR 42 11/29/2023    CREATININE 1.63 (H) 12/01/2023    CREATININE 1.70 (H) 11/30/2023    CREATININE 1.75 (H) 11/29/2023     Nephrology following  Previous baseline reported to be 1.3-1.4  Creatinine has been stable around 1.5-1.7 likely new baseline per Nephrology    Falls  Assessment & Plan  Frequent falls for his entire life because of unsteady gait according to the mother's medical history list  Fall precautions  Emergency room trauma eval no acute injury  Due to patient overall medical conditions, social issues, patient is in need of extensive care. Case management on board for dispo planning    Acute blood loss anemia  Assessment & Plan  Secondary to recurrent epistaxis. ENT consulted -recommending conservative management  Patient is on aspirin and Lovenox, continue to monitor  Hemoglobin has been stable with ferrous sulfate    Chronic respiratory failure with hypercapnia (HCC)  Assessment & Plan  Wears 2L O2 QHS, continue  Likely has a chronic hypercapnia given the elevated bicarbonate.  Kyphosis causing chronic limited chest expansion  Did require 3 L overnight rather than 2L   Chest x-ray showing reaccumulation of pleural effusion on the right side -diuretics have been adjusted, see under pleural effusion    Liver cirrhosis ([address])  Assessment & Plan  Cirrhotic morphology of the liver chronic problem  Ammonia is normal there is no ascites  Continue lactulose daily -re-time for AM patient has been refusing at night as he does not want to have bowel movements during bedtime  Aldactone resume at lower dose, 50 mg daily- uptitrate as able per nephro    Acute on chronic heart failure with preserved ejection fraction [facility])  Assessment & Plan  Patient previously maintained on Lasix as an outpatient -during a nursing home admission it was decreased to 40 mg daily given his kidney function  Now proBNP elevated  2D echo done 2022 with mild concentric hypertrophy and EF 60 to 65%  Updated echo this admission with mild concentric left ventricular hypertrophy, LVEF 63% with normal diastolic function -overall similar from 2022 without significant change  Is s/p IR guided thoracentesis 11/20 due to loculated effusion  Nephrology following and aiding with diuretic regimen  Patient still hypervolemic, does have chronic lymphedema in his legs as well which contributes to exam  Currently maintaining on torsemide 20 mg BID  & spironolactone 50 mg qd    Pleural effusion on right  Assessment & Plan  Patient with worsening shortness of breath for the past week falling asleep more often  Previously utilize oxygen 2 L only at nighttime, family endorsing he has been utilizing throughout the day prior to admission as well  Imaging showing loculated right-sided moderate effusion with inability to rule out pneumonia on imaging  Labs without leukocytosis, Pro-Janusz was mildly elevated, finished a course of antibiotics for pneumonia x 7 days  Due to loculated effusion IR consulted for thoracentesis which was completed 11/20  There is no significant loculation noted on ultrasound necessitating a chest tube  860 mL dark isidro-colored right pleural fluid  Fluid studies no bacterial growth - likely transudative secondary to CHF/cirrhosis   Serial CXR with diuretics stable on repeat imaging 11/30    Dysphagia  Assessment & Plan  Continue mechanical soft diet from prior  Aspiration precautions    Developmental delay  Assessment & Plan  Daily involvement with mother  Supportive care  PT/OT/ST recommending STR   Due to social situation, patient will need skilled nursing facility, case management on board (patient was living with his mother, mother recently hospitalized due to medical issue and got discharged to skilled nursing facility. No other family member is able to take care of the patient.)    Nonintractable generalized idiopathic epilepsy without status epilepticus [facility])  Assessment & Plan  Continue home seizure medications with seizure precautions  His last seizure was 2-3 days prior to admission  Lamictal and primidone levels are therapeutic. Zonesamide was subtherapeutic repeat pending continue to trend   The last few times he was hospitalized for seizure the AEDs were not changed because the breakthrough seizure etiology were infections. VTE Pharmacologic Prophylaxis: VTE Score: 5 High Risk (Score >/= 5) - Pharmacological DVT Prophylaxis Ordered: enoxaparin (Lovenox). Sequential Compression Devices Ordered. Mobility:   Basic Mobility Inpatient Raw Score: 6  JH-HLM Goal: 2: Bed activities/Dependent transfer  JH-HLM Achieved: 2: Bed activities/Dependent transfer  HLM Goal achieved. Continue to encourage appropriate mobility. Patient Centered Rounds: I performed bedside rounds with nursing staff today. Discussions with Specialists or Other Care Team Provider: 12/1 nephrology note reviewed     Education and Discussions with Family / Patient: Updated  (brother) via phone. Total Time Spent on Date of Encounter in care of patient: 30 mins. This time was spent on one or more of the following: performing physical exam; counseling and coordination of care; obtaining or reviewing history; documenting in the medical record; reviewing/ordering tests, medications or procedures; communicating with other healthcare professionals and discussing with patient's family/caregivers.     Current Length of Stay: 14 day(s)  Current Patient Status: Inpatient   Certification Statement: The patient will continue to require additional inpatient hospital stay due to frequent falls with need for safe discharge plan   Discharge Plan:  medically cleared pending safe dispo plan per case management     Code Status: Level 1 - Full Code    Subjective:   Patient reports feeling tired denies SOB and has no other complaints at this time. Objective:     Vitals:   Temp (24hrs), Av.6 °F (36.4 °C), Min:97.5 °F (36.4 °C), Max:97.7 °F (36.5 °C)    Temp:  [97.5 °F (36.4 °C)-97.7 °F (36.5 °C)] 97.7 °F (36.5 °C)  HR:  [68-84] 68  Resp:  [18] 18  BP: (115-126)/(60-84) 115/68  SpO2:  [88 %-94 %] 94 %  Body mass index is 39.96 kg/m². Input and Output Summary (last 24 hours): Intake/Output Summary (Last 24 hours) at 2023 1234  Last data filed at 2023 1215  Gross per 24 hour   Intake 1433 ml   Output 200 ml   Net 1233 ml       Physical Exam:   Physical Exam  Vitals and nursing note reviewed. Constitutional:       General: He is not in acute distress. Appearance: He is well-developed. HENT:      Head: Normocephalic and atraumatic. Eyes:      Conjunctiva/sclera: Conjunctivae normal.   Cardiovascular:      Rate and Rhythm: Normal rate and regular rhythm. Heart sounds: No murmur heard. Pulmonary:      Effort: Pulmonary effort is normal. No respiratory distress. Breath sounds: Normal breath sounds. Abdominal:      Palpations: Abdomen is soft. Tenderness: There is no abdominal tenderness. Musculoskeletal:         General: No swelling. Cervical back: Neck supple. Right lower leg: Edema present. Left lower leg: Edema present. Skin:     General: Skin is warm and dry. Capillary Refill: Capillary refill takes less than 2 seconds. Neurological:      Mental Status: He is alert. Mental status is at baseline. Psychiatric:         Behavior: Behavior is cooperative.       Comments: Known intellectual delay            Additional Data:     Labs:  Results from last 7 days   Lab Units 23  0533   WBC Thousand/uL 5.21   HEMOGLOBIN g/dL 10.4*   HEMATOCRIT % 33.1*   PLATELETS Thousands/uL 161   NEUTROS PCT % 76*   LYMPHS PCT % 14   MONOS PCT % 9   EOS PCT % 0     Results from last 7 days   Lab Units 12/01/23  0344   SODIUM mmol/L 139   POTASSIUM mmol/L 4.4   CHLORIDE mmol/L 101   CO2 mmol/L 33*   BUN mg/dL 31*   CREATININE mg/dL 1.63*   ANION GAP mmol/L 5   CALCIUM mg/dL 9.1   GLUCOSE RANDOM mg/dL 87                       Lines/Drains:  Invasive Devices       Peripheral Intravenous Line  Duration             Peripheral IV 12/01/23 Dorsal (posterior); Left Hand <1 day                          Imaging: No pertinent imaging reviewed.     Recent Cultures (last 7 days):         Last 24 Hours Medication List:   Current Facility-Administered Medications   Medication Dose Route Frequency Provider Last Rate    acetaminophen  650 mg Oral Q6H PRN Dana Demo, PA-C      ascorbic acid  500 mg Oral Daily Dana Demo, PA-C      aspirin  81 mg Oral Daily Chavez Bolden MD      calcium carbonate-vitamin D  1 tablet Oral Daily With Breakfast Chavez Bolden MD      dextromethorphan-guaiFENesin  10 mL Oral Q4H PRN Dana Demo, PA-C      enoxaparin  40 mg Subcutaneous Q24H [facility] Ashtyn Romero MD      lactulose  20 g Oral Daily With Breakfast REMBERTO Dacosta-JAMIE      lamoTRIgine  200 mg Oral Early Morning Dana Demo, PA-C      lamoTRIgine  250 mg Oral After Lunch Dana Demo, PA-C      lamoTRIgine  300 mg Oral HS Dana Demo, PA-C      levOCARNitine  330 mg Oral 4x Daily (with meals and at bedtime) Chavez Bolden MD      LORazepam  2 mg Intravenous Q6H PRN Dana Demo, PA-C      metoprolol tartrate  25 mg Oral BID Dana Demo, PA-C      nystatin   Topical BID Dana Demo, PA-C      ondansetron  4 mg Intravenous Q6H PRN Dana Demo, PA-C      polyethylene glycol  17 g Oral Daily PRN Dana Demo, PA-C      primidone  100 mg Oral Daily With Lunch Dana Demo, PA-C      primidone  100 mg Oral After Breakfast Chavez Bolden MD      primidone  150 mg Oral HS Celso Sacha, WHITNEY senna-docusate sodium  1 tablet Oral BID Dana Asif PA-C      sodium chloride  1 Application Nasal TID Shira Quan MD      sodium chloride  2 spray Each Nare TID Shira Quan MD      spironolactone  50 mg Oral Daily Ragini Hull MD      torsemide  20 mg Oral BID Rojelio Banks MD      vitamin E (tocopherol)  400 Units Oral Daily Dana Asif PA-C      zonisamide  100 mg Oral HS Crista WHITNEY Lopez          Today, Patient Was Seen By: NADEGE Kemp    **Please Note: This note may have been constructed using a voice recognition system. **

## 2023-12-01 NOTE — ASSESSMENT & PLAN NOTE
Continue home seizure medications with seizure precautions  His last seizure was 2-3 days prior to admission  Lamictal and primidone levels are therapeutic. Zonesamide was subtherapeutic repeat pending continue to trend   The last few times he was hospitalized for seizure the AEDs were not changed because the breakthrough seizure etiology were infections.

## 2023-12-01 NOTE — ASSESSMENT & PLAN NOTE
Secondary to recurrent epistaxis.     ENT consulted -recommending conservative management  Patient is on aspirin and Lovenox, continue to monitor  Hemoglobin has been stable with ferrous sulfate

## 2023-12-01 NOTE — PLAN OF CARE
Problem: PHYSICAL THERAPY ADULT  Goal: Performs mobility at highest level of function for planned discharge setting. See evaluation for individualized goals. Description: Treatment/Interventions: Functional transfer training, LE strengthening/ROM, Therapeutic exercise, Endurance training, Patient/family training, Equipment eval/education, Bed mobility, Gait training, Compensatory technique education, Spoke to nursing, OT          See flowsheet documentation for full assessment, interventions and recommendations. Outcome: Not Progressing  Note: Prognosis: Guarded  Problem List: Decreased strength, Decreased range of motion, Decreased endurance, Impaired balance, Decreased mobility, Decreased cognition, Impaired judgement, Decreased safety awareness, Obesity, Decreased skin integrity  Assessment: Pt tolerated tx session poorly. Interventions consisting of bed mobility, standing tolerance, & transfer training. Pt requiring encouragement and reassurance to participate. Pt limited by poor activity tolerance, core strength deficits, body habits, B LE strength deficits. Pt on 4L O2 today. Trial of RA at rest with SpO2 decreasing quickly below 88%. Barriers to Discharge: Decreased caregiver support, Inaccessible home environment  Barriers to Discharge Comments: current assist level  Rehab Resource Intensity Level, PT: I (Maximum Resource Intensity)    See flowsheet documentation for full assessment.

## 2023-12-01 NOTE — PHYSICAL THERAPY NOTE
PHYSICAL THERAPY TREATMENT NOTE    NAME:  Robert Marques  DATE: 12/01/23    Length Of Stay: 14  Performed at least 2 patient identifiers during session: Name and Birthday    TREATMENT FLOW SHEET:       12/01/23 1237   PT Last Visit   PT Visit Date 12/01/23   Note Type   Note Type Treatment   Pain Assessment   Pain Assessment Tool 0-10   Pain Score No Pain   Restrictions/Precautions   Weight Bearing Precautions Per Order No   Other Precautions Cognitive; Chair Alarm; Bed Alarm; Fall Risk;O2  (4L O2 entering room with pt in bed)   General   Chart Reviewed Yes   Response to Previous Treatment Patient with no complaints from previous session. Family/Caregiver Present No   Cognition   Overall Cognitive Status Impaired   Arousal/Participation Alert   Attention Attends with cues to redirect   Memory Decreased short term memory;Decreased recall of recent events   Following Commands Follows one step commands with increased time or repetition   Bed Mobility   Supine to Sit 1  Dependent   Additional items Assist x 2; Increased time required;LE management   Additional Comments L lateral lean noted sitting EOB   Transfers   Sit to Stand 2  Maximal assistance   Additional items Assist x 2; Increased time required;Verbal cues   Stand to Sit 2  Maximal assistance   Additional items Assist x 2; Increased time required;Verbal cues   Stand pivot   (Using Quick Move)   Additional items   (cues for maintaining upright seated position within Quick Move)   Additional Comments Multiple sit to stands within the Quick Move with cues for using UEs to pull up on anterior rail, max A x 2. Pt with difficulty achieving trunk/hip/knee EXT. PT attempting to facilitate thoracic EXT while in stance position, however pt having difficulty keeping himself up with UEs. Standing tolerance limited to no > 20 seconds.    Ambulation/Elevation   Additional items   (non-ambulatory at baseline)   Balance   Static Sitting Poor +   Dynamic Sitting Poor   Static Standing Poor -   Endurance Deficit   Endurance Deficit Yes   Endurance Deficit Description 4L O2   Activity Tolerance   Activity Tolerance Patient limited by fatigue   Medical Staff Made Aware OT Sari   Assessment   Prognosis Guarded   Assessment Pt tolerated tx session poorly. Interventions consisting of bed mobility, standing tolerance, & transfer training. Pt requiring encouragement and reassurance to participate. Pt limited by poor activity tolerance, core strength deficits, body habits, B LE strength deficits. Pt on 4L O2 today. Trial of RA at rest with SpO2 decreasing quickly below 88%. Barriers to Discharge Decreased caregiver support; Inaccessible home environment   Barriers to Discharge Comments current assist level   Goals   STG Expiration Date 12/04/23   PT Treatment Day 4   Plan   Treatment/Interventions ADL retraining;Functional transfer training;LE strengthening/ROM; Therapeutic exercise; Endurance training;Cognitive reorientation;Patient/family training;Equipment eval/education; Bed mobility;Gait training; Compensatory technique education;OT   Progress No functional improvements   PT Frequency 3-5x/wk   Discharge Recommendation   Rehab Resource Intensity Level, PT I (Maximum Resource Intensity)   AM-PAC Basic Mobility Inpatient   Turning in Flat Bed Without Bedrails 1   Lying on Back to Sitting on Edge of Flat Bed Without Bedrails 1   Moving Bed to Chair 1   Standing Up From Chair Using Arms 1   Walk in Room 1   Climb 3-5 Stairs With Railing 1   Basic Mobility Inpatient Raw Score 6   Turning Head Towards Sound 3   Follow Simple Instructions 3   Low Function Basic Mobility Raw Score  12   Low Function Basic Mobility Standardized Score  18.33   Highest Level Of Mobility   JH-HLM Goal 2: Bed activities/Dependent transfer   JH-HLM Achieved 4: Move to chair/commode   Education   Education Provided Mobility training   Patient Reinforcement needed   End of Consult   Patient Position at End of Consult Bedside chair;Bed/Chair alarm activated; All needs within reach   End of Consult Comments Recommend stretcher board to get back into bed with nursing staff. 4L O2 with SpO2 >90%       The patient's AM-PAC Basic Mobility Inpatient Short Form Raw Score is 6. A Raw score of less than or equal to 16 suggests the patient may benefit from discharge to post-acute rehabilitation services. Please also refer to the recommendation of the Physical Therapist for safe discharge planning.        Mounika Byrd, PT,DPT

## 2023-12-01 NOTE — OCCUPATIONAL THERAPY NOTE
Occupational Therapy Progress Note     Patient Name: Romi Justice  DLWLI'B Date: 12/1/2023  Problem List  Principal Problem:    Acute kidney injury superimposed on chronic kidney disease 3  Active Problems:    Nonintractable generalized idiopathic epilepsy without status epilepticus ([address])    Falls    Developmental delay    Dysphagia    Pleural effusion on right    Acute on chronic heart failure with preserved ejection fraction (HCC)    Liver cirrhosis (HCC)    Chronic respiratory failure with hypercapnia (HCC)    Acute blood loss anemia       12/01/23 1238   OT Last Visit   OT Visit Date 12/01/23   Note Type   Note Type Treatment   Pain Assessment   Pain Assessment Tool 0-10   Pain Score No Pain   Restrictions/Precautions   Weight Bearing Precautions Per Order No   Other Precautions Cognitive; Chair Alarm; Bed Alarm;Multiple lines; Fall Risk;O2  (4L O2)   ADL   Where Assessed Edge of bed   UB Bathing Assistance 1  Total Assistance   UB Bathing Deficit Setup;Steadying;Verbal cueing;Supervision/safety; Increased time to complete; Abdomen;Perineal area; Buttocks  (back)   UB Bathing Comments cues for task initiation/sequencing with quick move   LB Bathing Assistance 1  Total Assistance   LB Bathing Deficit Setup;Verbal cueing;Supervision/safety; Increased time to complete; Buttocks;Right upper leg;Left upper leg   UB Dressing Assistance 3  Moderate Assistance   UB Dressing Deficit Setup;Verbal cueing; Increased time to complete; Thread RUE; Thread LUE;Pull around back   Bed Mobility   Supine to Sit 1  Dependent   Additional items Assist x 2; Increased time required;Verbal cues;LE management  (increased verbal cues for hand placement and safe technique)   Additional Comments   (increased L lean sitting EOB, dependent with supine to sit)   Transfers   Sit to Stand 2  Maximal assistance   Additional items Assist x 2; Increased time required;Verbal cues  (use of quick move to get from bed to chair)   Stand to Sit 2  Maximal assistance   Additional items Assist x 2; Increased time required;Verbal cues;Armrests  (use of quick move)   Additional Comments   (STS with quick move maxA x2, verbal cues for hand placement, grasp onto quick move. Increased verbal cues for safe technique and hand placement. Pt limited by fatigue and cognition.)   Functional Mobility   Functional Mobility   (unable to assess due to limitation with fatigue, use of quick move from bed to chair)   Cognition   Overall Cognitive Status Impaired   Arousal/Participation Alert   Attention Attends with cues to redirect   Orientation Level Oriented to person;Oriented to place; Disoriented to time;Disoriented to situation   Memory Decreased short term memory;Decreased recall of recent events;Decreased recall of precautions   Following Commands Follows one step commands with increased time or repetition   Activity Tolerance   Activity Tolerance Patient limited by fatigue   Medical Staff Made Aware   (PT Merlene Frazier)   Assessment   Assessment Pt seen for OT treatment session focusing on ADLs, functional transfers, bed mobility, and balance. Pt greeted in bed with HOB elevated at start of session. Pt alert and cooperative throughout session. Pt with poor - sitting balance and poor - standing balance. Use of quick move to help pt get washed up and to go from bed to chair. Pt limited by fatigue. Pt completed UB bathing, LB bathing, UB dressing, bed mobility (supine to sit), and transfers with use of quick move during treatment today. Pt requires maxA x2 for transfers. Pt dependent with bed mobility with increased left lean when sitting EOB. Pt requiring total assistance for UB and LB bathing and modA for UB dressing. Functional task performance is limited by decreased endurance, decreased strength, and cognitive deficits. Impaired standing balance/tolerance with use of quickmove. Pt reports no pain and no dizziness.      Pt ended session seated in bedside chair with chair alarm activated. Call bell and phone within reach. All needs met and pt reports no further questions at this time. Continue to recommend maximum resource intensity when medically cleared. OT will continue to follow pt on caseload. Plan   Treatment Interventions ADL retraining;Functional transfer training;UE strengthening/ROM; Cognitive reorientation;Patient/family training;Equipment evaluation/education; Compensatory technique education;Continued evaluation; Energy conservation; Activityengagement   Goal Expiration Date 12/04/23   OT Treatment Day 3   OT Frequency 1-2x/wk   Discharge Recommendation   Rehab Resource Intensity Level, OT I (Maximum Resource Intensity)   Additional Comments  The patient's raw score on the AM-PAC Daily Activity Inpatient Short Form is 8. A raw score of less than 19 suggests the patient may benefit from discharge to post-acute rehabilitation services. Please refer to the recommendation of the Occupational Therapist for safe discharge planning. -PAC Daily Activity Inpatient   Lower Body Dressing 1   Bathing 1   Toileting 1   Upper Body Dressing 1   Grooming 1   Eating 3   Daily Activity Raw Score 8   AM-PAC Applied Cognition Inpatient   Following a Speech/Presentation 1   Understanding Ordinary Conversation 2   Taking Medications 1   Remembering Where Things Are Placed or Put Away 1   Remembering List of 4-5 Errands 1   Taking Care of Complicated Tasks 1   Applied Cognition Raw Score 7   Applied Cognition Standardized Score 15.17   End of Consult   Education Provided Yes   Patient Position at End of Consult Bedside chair;Bed/Chair alarm activated; All needs within reach   Nurse Communication Nurse aware of consult     Kyle Iverson OT

## 2023-12-01 NOTE — PLAN OF CARE
Problem: PAIN - ADULT  Goal: Verbalizes/displays adequate comfort level or baseline comfort level  Description: Interventions:  - Encourage patient to monitor pain and request assistance  - Assess pain using appropriate pain scale0-10  - Administer analgesics based on type and severity of pain and evaluate response  - Implement non-pharmacological measures as appropriate and evaluate response  - Consider cultural and social influences on pain and pain management  - Notify physician/advanced practitioner if interventions unsuccessful or patient reports new pain  Outcome: Progressing     Problem: INFECTION - ADULT  Goal: Absence or prevention of progression during hospitalization  Description: INTERVENTIONS:  - Assess and monitor for signs and symptoms of infection  - Monitor lab/diagnostic results  - Monitor all insertion sites, i.e. indwelling lines, tubes, and drains  - Monitor endotracheal if appropriate and nasal secretions for changes in amount and color  - [place] appropriate cooling/warming therapies per order  - Administer medications as ordered  - Instruct and encourage patient and family to use good hand hygiene technique  - Identify and instruct in appropriate isolation precautions for identified infection/condition  Outcome: Progressing     Problem: SAFETY ADULT  Goal: Patient will remain free of falls  Description: INTERVENTIONS:  - Educate patient/family on patient safety including physical limitations  - Instruct patient to call for assistance with activity   - Consult OT/PT to assist with strengthening/mobility   - Keep Call bell within reach  - Keep bed low and locked with side rails adjusted as appropriate  - Keep care items and personal belongings within reach  - Initiate and maintain comfort rounds  - Make Fall Risk Sign visible to staff  - Offer Toileting every 2 Hours, in advance of need  - Initiate/Maintain bed/chair alarm  - Obtain necessary fall risk management equipment:   - Apply yellow socks and bracelet for high fall risk patients  - Consider moving patient to room near nurses station  Outcome: Progressing  Goal: Maintain or return to baseline ADL function  Description: INTERVENTIONS:  -  Assess patient's ability to carry out ADLs; assess patient's baseline for ADL function and identify physical deficits which impact ability to perform ADLs (bathing, care of mouth/teeth, toileting, grooming, dressing, etc.)  - Assess/evaluate cause of self-care deficits   - Assess range of motion  - Assess patient's mobility; develop plan if impaired  - Assess patient's need for assistive devices and provide as appropriate  - Encourage maximum independence but intervene and supervise when necessary  - Involve family in performance of ADLs  - Assess for home care needs following discharge   - Consider OT consult to assist with ADL evaluation and planning for discharge  - Provide patient education as appropriate  Outcome: Progressing  Goal: Maintains/Returns to pre admission functional level  Description: INTERVENTIONS:  - Perform AM-PAC 6 Click Basic Mobility/ Daily Activity assessment daily.  - Set and communicate daily mobility goal to care team and patient/family/caregiver. - Collaborate with rehabilitation services on mobility goals if consulted  - Perform Range of Motion 3 times a day. - Reposition patient every 2 hours.   - Dangle patient 3 times a day  - Stand patient 3 times a day  - Ambulate patient 3 times a day  - Out of bed to chair 3 times a day   - Out of bed for meals 3 times a day  - Out of bed for toileting  - Record patient progress and toleration of activity level   Outcome: Progressing     Problem: DISCHARGE PLANNING  Goal: Discharge to home or other facility with appropriate resources  Description: INTERVENTIONS:  - Identify barriers to discharge w/patient and caregiver  - Arrange for needed discharge resources and transportation as appropriate  - Identify discharge learning needs (meds, wound care, etc.)  - Arrange for interpretive services to assist at discharge as needed  - Refer to Case Management Department for coordinating discharge planning if the patient needs post-hospital services based on physician/advanced practitioner order or complex needs related to functional status, cognitive ability, or social support system  Outcome: Progressing     Problem: Knowledge Deficit  Goal: Patient/family/caregiver demonstrates understanding of disease process, treatment plan, medications, and discharge instructions  Description: Complete learning assessment and assess knowledge base.   Interventions:  - Provide teaching at level of understanding  - Provide teaching via preferred learning methods  Outcome: Progressing     Problem: Prexisting or High Potential for Compromised Skin Integrity  Goal: Skin integrity is maintained or improved  Description: INTERVENTIONS:  - Identify patients at risk for skin breakdown  - Assess and monitor skin integrity  - Assess and monitor nutrition and hydration status  - Monitor labs   - Assess for incontinence   - Turn and reposition patient  - Assist with mobility/ambulation  - Relieve pressure over bony prominences  - Avoid friction and shearing  - Provide appropriate hygiene as needed including keeping skin clean and dry  - Evaluate need for skin moisturizer/barrier cream  - Collaborate with interdisciplinary team   - Patient/family teaching  - Consider wound care consult   Outcome: Progressing     Problem: RESPIRATORY - ADULT  Goal: Achieves optimal ventilation and oxygenation  Description: INTERVENTIONS:  - Assess for changes in respiratory status confusion SOB tachycardia  - Assess for changes in mentation and behavior  - Position to facilitate oxygenation and minimize respiratory effort  - Oxygen administered by appropriate delivery if ordered  - Initiate smoking cessation education as indicated  - Encourage broncho-pulmonary hygiene including cough, deep breathe, Incentive Spirometry  - Assess the need for suctioning and aspirate as needed  - Assess and instruct to report SOB or any respiratory difficulty  - Respiratory Therapy support as indicated  Outcome: Progressing

## 2023-12-01 NOTE — PLAN OF CARE
Problem: OCCUPATIONAL THERAPY ADULT  Goal: Performs self-care activities at highest level of function for planned discharge setting. See evaluation for individualized goals. Description: Treatment Interventions: ADL retraining, Functional transfer training, UE strengthening/ROM, Endurance training, Cognitive reorientation, Patient/family training, Equipment evaluation/education, Compensatory technique education, Continued evaluation, Energy conservation, Activityengagement          See flowsheet documentation for full assessment, interventions and recommendations. Outcome: Not Progressing  Note: Limitation: Decreased ADL status, Decreased UE ROM, Decreased UE strength, Decreased Safe judgement during ADL, Decreased cognition, Decreased endurance, Decreased self-care trans, Decreased high-level ADLs     Assessment: Pt seen for OT treatment session focusing on ADLs, functional transfers, bed mobility, and balance. Pt greeted in bed with HOB elevated at start of session. Pt alert and cooperative throughout session. Pt with poor - sitting balance and poor - standing balance. Use of quick move to help pt get washed up and to go from bed to chair. Pt limited by fatigue. Pt completed UB bathing, LB bathing, UB dressing, bed mobility (supine to sit), and transfers with use of quick move during treatment today. Pt requires maxA x2 for transfers. Pt dependent with bed mobility with increased left lean when sitting EOB. Pt requiring total assistance for UB and LB bathing and modA for UB dressing. Functional task performance is limited by decreased endurance, decreased strength, and cognitive deficits. Impaired standing balance/tolerance with use of quickmove. Pt reports no pain and no dizziness. Pt ended session seated in bedside chair with chair alarm activated. Call bell and phone within reach. All needs met and pt reports no further questions at this time.  Continue to recommend maximum resource intensity when medically cleared. OT will continue to follow pt on caseload.      Rehab Resource Intensity Level, OT: I (Maximum Resource Intensity)

## 2023-12-01 NOTE — PROGRESS NOTES
Pt not yet incontinenet this pm ,checking him often has used urinal, will continue to monitor, repositioned for comfort

## 2023-12-01 NOTE — CASE MANAGEMENT
Case Management Discharge Planning Note    Patient name Cb Patterson  Location [address] [facility] Deanna [id number] MRN [id number]  : 1965 Date 2023       Current Admission Date: 2023  Current Admission Diagnosis:Acute kidney injury superimposed on chronic kidney disease 3   Patient Active Problem List    Diagnosis Date Noted    Acute blood loss anemia 2023    Chronic respiratory failure with hypercapnia ([address]) 2023    Acute kidney injury superimposed on chronic kidney disease 3 2023    Liver cirrhosis ([address]) 2021    Abnormal finding on CT scan 2021    EDWIGE (acute kidney injury) ([address]) 2021    Pleural effusion on right 2021    History of COVID-19 2021    S/P pericardial window creation 2021    Acute on chronic heart failure with preserved ejection fraction ([address]) 2021    Lower extremity pain, left 2021    MRSA nasal colonization 2020    Developmental delay 2020    Dysphagia 2020    Acute encephalopathy 2020    Pneumonia due to infectious organism 01/10/2020    Nonintractable generalized idiopathic epilepsy without status epilepticus ([address]) 01/10/2020    Falls 01/10/2020    T wave inversion in EKG 01/10/2020    Essential hypertension 01/10/2020    RSV (acute bronchiolitis due to respiratory syncytial virus) 01/10/2020    Acute respiratory failure with hypoxia ([address]) 01/10/2020    Polyp of colon 03/15/2019      LOS (days): 14  Geometric Mean LOS (GMLOS) (days): 3.90  Days to GMLOS:-9.8     OBJECTIVE:  Risk of Unplanned Readmission Score: 20.19         Current admission status: Inpatient   Preferred Pharmacy:   [address] [place] [address] Road  [address] [facility] [address]  Phone: [phone] Fax: [phone]    Primary Care Provider: Amalia Stone DO    Primary Insurance: MEDICARE  Secondary Insurance: [employer]    DISCHARGE DETAILS:     STR accepting facilities: [facility], [address] [place] [address]. CM waiting for Level of Care to be completed by [facility] then will explore bed availability and discuss options with MO. Also, MO hopeful [facility] will have beds available when [facility] paperwork complete.

## 2023-12-01 NOTE — ASSESSMENT & PLAN NOTE
Frequent falls for his entire life because of unsteady gait according to the mother's medical history list  Fall precautions  Emergency room trauma eval no acute injury  Due to patient overall medical conditions, social issues, patient is in need of extensive care.     Case management on board for dispo planning

## 2023-12-01 NOTE — ASSESSMENT & PLAN NOTE
Lab Results   Component Value Date    EGFR 46 12/01/2023    EGFR 44 11/30/2023    EGFR 42 11/29/2023    CREATININE 1.63 (H) 12/01/2023    CREATININE 1.70 (H) 11/30/2023    CREATININE 1.75 (H) 11/29/2023     Nephrology following  Previous baseline reported to be 1.3-1.4  Creatinine has been stable around 1.5-1.7 likely new baseline per Nephrology

## 2023-12-01 NOTE — PROGRESS NOTES
Progress Note - Nephrology   Dilia White 66 y.o. 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 We are seeing him for acute on top of chronic kidney disease     1. EDWIGE/CKD: Acute component perhaps was related to prerenal azotemia  - Baseline creatinine reported as 1.3-1.4  - In hospital has been trending 1.5-1.6  - Peak creatinine 1.88  - UA: Negative proteinuria negative hematuria  - Renal ultrasound normal        Current creatinine: 1.63 slightly improved on torsemide probable new baseline as outlined     2. CKD 3: Reportedly 1.3-1.4 probably at new baseline please see above: Possibly 1.7-2 may be the new baseline     3. Volume/blood pressure: Chronic CHF with preserved ejection fraction in the setting of cirrhosis  - Blood pressure: Slightly labile continue metoprolol/spironolactone/torsemide  - Chronic lymphedema: Currently on diuretics continue for now: Spironolactone 50 mg daily. Repeat chest x-ray by my personal review demonstrates persistent right pleural effusion slight increased markings on left but unchanged compared to 11/28     As result/consequence I would recommend increasing torsemide to 20 mg twice     4. Electrolytes/acid-base: Mild increase CO2: Stable: VBG prior demonstrates pCO2 retention with a pH of 7.35 therefore no intervention with Diamox     5. MBD of CKD: Magnesium now improved to 2.1 monitor     6.   Anemia: Hemoglobin 10.4 with mild thrombocytopenia most compatible with cirrhosis  - Iron studies are low I would give oral iron  - Folate and B12 are adequate  - Serum immunofixation light chain ratio both acceptable; check UPEP  - Monitor hemoglobin  - Stool for occult blood negative x 1     7. Respiratory: On diuretics please see above may need thoracentesis in the future; may have a source from hepatic hydrothorax     8. Cirrhosis: Per primary service     9. Other medical problems: Epilepsy/chronic hypoxemia/developmental delay/cirrhosis/history of pericarditis with pericardial window and restrictive nature now    Will see back 12/4 please call for any questions or concerns prior to the    Subjective:   Patient is unchanged denies all symptoms limited historian    Objective:     Vitals: Blood pressure 115/68, pulse 68, temperature 97.7 °F (36.5 °C), temperature source Temporal, resp. rate 18, height 5' 6" (1.676 m), weight 112 kg (247 lb 9.2 oz), SpO2 94 %. ,Body mass index is 39.96 kg/m².     Weight (last 2 days)       Date/Time Weight    12/01/23 0517 112 (247.58)     Weight: with SCD pumps on the bed at 12/01/23 0517    11/30/23 0600 109 (240.52)    11/29/23 0538 109 (241.18)              Intake/Output Summary (Last 24 hours) at 12/1/2023 1036  Last data filed at 12/1/2023 1427  Gross per 24 hour   Intake 1193 ml   Output 200 ml   Net 993 ml            Physical Exam: General:  No acute distress  Skin:  No acute rash  Eyes:  No scleral icterus and noninjected  ENT:  Moist mucous membranes  Neck:  Supple, no jugular venous distention, trachea midline, overall appearance is normal  Chest:  Clear to auscultation, but poor effort  CVS:  Regular rate and rhythm, without a rub or gallops  Abdomen:  Normal bowel sounds, soft and nontender and nondistended  Extremities:  No change in chronic lymphedema of lower extremities, and no cyanosis, no significant arthritic changes  Neuro:  No gross focality  Psych:  Alert and oriented and appropriate                Medications:    Scheduled Meds:  Current Facility-Administered Medications   Medication Dose Route Frequency Provider Last Rate    acetaminophen  650 mg Oral Q6H PRN Gwynedd Houston, WHITNEY      ascorbic acid  500 mg Oral Daily Dana Demo, PA-C      aspirin  81 mg Oral Daily Ashtyn Romero MD      calcium carbonate-vitamin D  1 tablet Oral Daily With Breakfast Mariela Gentile MD      dextromethorphan-guaiFENesin  10 mL Oral Q4H PRN Dana Demo, PA-C      enoxaparin  40 mg Subcutaneous Q24H [facility] Ashtyn Romero MD      lactulose  20 g Oral Daily With Breakfast René Geoffreys, PA-JAMIE      lamoTRIgine  200 mg Oral Early Morning Dana Demo, PA-C      lamoTRIgine  250 mg Oral After Lunch Dana Demo, PA-C      lamoTRIgine  300 mg Oral HS Dana Demo, PA-C      levOCARNitine  330 mg Oral 4x Daily (with meals and at bedtime) Mariela Gentile MD      LORazepam  2 mg Intravenous Q6H PRN Dana Demo, PA-C      metoprolol tartrate  25 mg Oral BID Dana Demo, PA-C      nystatin   Topical BID Dana Demo, PA-C      ondansetron  4 mg Intravenous Q6H PRN Dana Demo, PA-C      polyethylene glycol  17 g Oral Daily PRN Dana Demo, PA-C      primidone  100 mg Oral Daily With Lunch Dana Demo, PA-C      primidone  100 mg Oral After Breakfast Ashtyn Romero MD      primidone  150 mg Oral HS Dana Demo, PA-C      senna-docusate sodium  1 tablet Oral BID Dana Demo, PA-C      sodium chloride  1 Application Nasal TID Beatrice Laura MD      sodium chloride  2 spray Each Nare TID Beatrice Laura MD      spironolactone  50 mg Oral Daily Mariela Gentile MD      torsemide  20 mg Oral BID Maci Barnard MD      vitamin E (tocopherol)  400 Units Oral Daily Dana Demo, PA-C      zonisamide  100 mg Oral HS Dana Demo, PA-C         PRN Meds:.  acetaminophen    dextromethorphan-guaiFENesin    LORazepam    ondansetron    polyethylene glycol    Continuous Infusions:     Lab, Imaging and other studies: I have personally reviewed pertinent labs.   Laboratory Results:  Results from last 7 days   Lab Units 12/01/23  0344 11/30/23  0447 11/29/23  0533 11/28/23  0457 11/27/23  0448 11/26/23  1447 11/25/23  0457   WBC Thousand/uL  --   --  5.21 --   --   --   --    HEMOGLOBIN g/dL  --   --  10.4*  --   --   --   --    HEMATOCRIT %  --   --  33.1*  --   --   --   --    PLATELETS Thousands/uL  --   --  161  --   --   --   --    POTASSIUM mmol/L 4.4 3.9 3.7 3.9 4.7 4.7 4.5   CHLORIDE mmol/L 101 101 100 101 102 102 101   CO2 mmol/L 33* 33* 34* 34* 32 27 30   BUN mg/dL 31* 31* 32* 31* 32* 31* 32*   CREATININE mg/dL 1.63* 1.70* 1.75* 1.66* 1.59* 1.53* 1.69*   CALCIUM mg/dL 9.1 9.0 9.1 8.9 8.9 9.2 9.0   MAGNESIUM mg/dL  --  2.1 1.9  --   --   --   --      Urinalysis:   Lab Results   Component Value Date    COLORU Yellow 11/17/2023    CLARITYU Clear 11/17/2023    SPECGRAV 1.025 11/17/2023    PHUR 5.0 11/17/2023    LEUKOCYTESUR Negative 11/17/2023    NITRITE Negative 11/17/2023    GLUCOSEU Negative 11/17/2023    KETONESU Negative 11/17/2023    BILIRUBINUR Negative 11/17/2023    BLOODU Negative 11/17/2023     ABGs: No results found for: "PH"  Radiology review:     Portions of the record may have been created with voice recognition software. Occasional wrong word or "sound a like" substitutions may have occurred due to the inherent limitations of voice recognition software. Read the chart carefully and recognize, using context, where substitutions have occurred.

## 2023-12-01 NOTE — ASSESSMENT & PLAN NOTE
Patient with worsening shortness of breath for the past week falling asleep more often  Previously utilize oxygen 2 L only at nighttime, family endorsing he has been utilizing throughout the day prior to admission as well  Imaging showing loculated right-sided moderate effusion with inability to rule out pneumonia on imaging  Labs without leukocytosis, Pro-Janusz was mildly elevated, finished a course of antibiotics for pneumonia x 7 days  Due to loculated effusion IR consulted for thoracentesis which was completed 11/20  There is no significant loculation noted on ultrasound necessitating a chest tube  860 mL dark isidro-colored right pleural fluid  Fluid studies no bacterial growth - likely transudative secondary to CHF/cirrhosis   Serial CXR with diuretics stable on repeat imaging 11/30

## 2023-12-01 NOTE — ASSESSMENT & PLAN NOTE
Daily involvement with mother  Supportive care  PT/OT/ST recommending STR   Due to social situation, patient will need skilled nursing facility, case management on board (patient was living with his mother, mother recently hospitalized due to medical issue and got discharged to skilled nursing facility.   No other family member is able to take care of the patient.)

## 2023-12-01 NOTE — NURSING NOTE
Pt OOB to chair via PT/OT, rang for BM; attempted quick move w 2 assist, unable; moved back to bed with sit to stand device and BM successful on bedpan.

## 2023-12-02 PROCEDURE — 99232 SBSQ HOSP IP/OBS MODERATE 35: CPT | Performed by: INTERNAL MEDICINE

## 2023-12-02 PROCEDURE — 99233 SBSQ HOSP IP/OBS HIGH 50: CPT | Performed by: STUDENT IN AN ORGANIZED HEALTH CARE EDUCATION/TRAINING PROGRAM

## 2023-12-02 RX ORDER — OXYMETAZOLINE HYDROCHLORIDE 0.05 G/100ML
2 SPRAY NASAL EVERY 12 HOURS SCHEDULED
Status: COMPLETED | OUTPATIENT
Start: 2023-12-02 | End: 2023-12-04

## 2023-12-02 RX ADMIN — SALINE NASAL SPRAY 2 SPRAY: 1.5 SOLUTION NASAL at 09:15

## 2023-12-02 RX ADMIN — PRIMIDONE 100 MG: 50 TABLET ORAL at 16:02

## 2023-12-02 RX ADMIN — PRIMIDONE 150 MG: 50 TABLET ORAL at 23:08

## 2023-12-02 RX ADMIN — LEVOCARNITINE 330 MG: 1 SOLUTION ORAL at 21:27

## 2023-12-02 RX ADMIN — SALINE NASAL SPRAY 2 SPRAY: 1.5 SOLUTION NASAL at 21:30

## 2023-12-02 RX ADMIN — LAMOTRIGINE 250 MG: 100 TABLET ORAL at 16:02

## 2023-12-02 RX ADMIN — LAMOTRIGINE 300 MG: 100 TABLET ORAL at 23:08

## 2023-12-02 RX ADMIN — OXYMETAZOLINE HYDROCHLORIDE 2 SPRAY: 0.05 SPRAY NASAL at 21:28

## 2023-12-02 RX ADMIN — DOCUSATE SODIUM AND SENNOSIDES 1 TABLET: 8.6; 5 TABLET, FILM COATED ORAL at 16:46

## 2023-12-02 RX ADMIN — Medication 1 APPLICATION: at 21:28

## 2023-12-02 RX ADMIN — Medication 400 UNITS: at 09:15

## 2023-12-02 RX ADMIN — LEVOCARNITINE 330 MG: 1 SOLUTION ORAL at 16:02

## 2023-12-02 RX ADMIN — SPIRONOLACTONE 50 MG: 25 TABLET ORAL at 09:15

## 2023-12-02 RX ADMIN — DOCUSATE SODIUM AND SENNOSIDES 1 TABLET: 8.6; 5 TABLET, FILM COATED ORAL at 09:14

## 2023-12-02 RX ADMIN — OXYMETAZOLINE HYDROCHLORIDE 2 SPRAY: 0.05 SPRAY NASAL at 09:13

## 2023-12-02 RX ADMIN — ASPIRIN 81 MG: 81 TABLET, COATED ORAL at 09:13

## 2023-12-02 RX ADMIN — TORSEMIDE 20 MG: 20 TABLET ORAL at 21:27

## 2023-12-02 RX ADMIN — PRIMIDONE 100 MG: 50 TABLET ORAL at 09:29

## 2023-12-02 RX ADMIN — TORSEMIDE 20 MG: 20 TABLET ORAL at 09:13

## 2023-12-02 RX ADMIN — LEVOCARNITINE 330 MG: 1 SOLUTION ORAL at 09:13

## 2023-12-02 RX ADMIN — ZONISAMIDE 100 MG: 100 CAPSULE ORAL at 23:08

## 2023-12-02 RX ADMIN — NYSTATIN 1 APPLICATION: 100000 POWDER TOPICAL at 09:16

## 2023-12-02 RX ADMIN — LEVOCARNITINE 330 MG: 1 SOLUTION ORAL at 13:52

## 2023-12-02 RX ADMIN — ENOXAPARIN SODIUM 40 MG: 40 INJECTION SUBCUTANEOUS at 09:15

## 2023-12-02 RX ADMIN — LACTULOSE 20 G: 20 SOLUTION ORAL at 09:13

## 2023-12-02 RX ADMIN — METOPROLOL TARTRATE 25 MG: 25 TABLET, FILM COATED ORAL at 21:27

## 2023-12-02 RX ADMIN — Medication 1 APPLICATION: at 09:16

## 2023-12-02 RX ADMIN — METOPROLOL TARTRATE 25 MG: 25 TABLET, FILM COATED ORAL at 09:14

## 2023-12-02 RX ADMIN — LAMOTRIGINE 200 MG: 100 TABLET ORAL at 05:58

## 2023-12-02 RX ADMIN — NYSTATIN: 100000 POWDER TOPICAL at 17:22

## 2023-12-02 RX ADMIN — OXYCODONE HYDROCHLORIDE AND ACETAMINOPHEN 500 MG: 500 TABLET ORAL at 09:15

## 2023-12-02 NOTE — PLAN OF CARE
Problem: PAIN - ADULT  Goal: Verbalizes/displays adequate comfort level or baseline comfort level  Description: Interventions:  - Encourage patient to monitor pain and request assistance  - Assess pain using appropriate pain scale0-10  - Administer analgesics based on type and severity of pain and evaluate response  - Implement non-pharmacological measures as appropriate and evaluate response  - Consider cultural and social influences on pain and pain management  - Notify physician/advanced practitioner if interventions unsuccessful or patient reports new pain  Outcome: Progressing     Problem: INFECTION - ADULT  Goal: Absence or prevention of progression during hospitalization  Description: INTERVENTIONS:  - Assess and monitor for signs and symptoms of infection  - Monitor lab/diagnostic results  - Monitor all insertion sites, i.e. indwelling lines, tubes, and drains  - Monitor endotracheal if appropriate and nasal secretions for changes in amount and color  - [place] appropriate cooling/warming therapies per order  - Administer medications as ordered  - Instruct and encourage patient and family to use good hand hygiene technique  - Identify and instruct in appropriate isolation precautions for identified infection/condition  Outcome: Progressing     Problem: SAFETY ADULT  Goal: Patient will remain free of falls  Description: INTERVENTIONS:  - Educate patient/family on patient safety including physical limitations  - Instruct patient to call for assistance with activity   - Consult OT/PT to assist with strengthening/mobility   - Keep Call bell within reach  - Keep bed low and locked with side rails adjusted as appropriate  - Keep care items and personal belongings within reach  - Initiate and maintain comfort rounds  - Make Fall Risk Sign visible to staff  - Offer Toileting every 2 Hours, in advance of need  - Initiate/Maintain bed/chair alarm  - Obtain necessary fall risk management equipment:   - Apply yellow socks and bracelet for high fall risk patients  - Consider moving patient to room near nurses station  Outcome: Progressing  Goal: Maintain or return to baseline ADL function  Description: INTERVENTIONS:  -  Assess patient's ability to carry out ADLs; assess patient's baseline for ADL function and identify physical deficits which impact ability to perform ADLs (bathing, care of mouth/teeth, toileting, grooming, dressing, etc.)  - Assess/evaluate cause of self-care deficits   - Assess range of motion  - Assess patient's mobility; develop plan if impaired  - Assess patient's need for assistive devices and provide as appropriate  - Encourage maximum independence but intervene and supervise when necessary  - Involve family in performance of ADLs  - Assess for home care needs following discharge   - Consider OT consult to assist with ADL evaluation and planning for discharge  - Provide patient education as appropriate  Outcome: Progressing  Goal: Maintains/Returns to pre admission functional level  Description: INTERVENTIONS:  - Perform AM-PAC 6 Click Basic Mobility/ Daily Activity assessment daily.  - Set and communicate daily mobility goal to care team and patient/family/caregiver. - Collaborate with rehabilitation services on mobility goals if consulted  - Perform Range of Motion 3 times a day. - Reposition patient every 2 hours.   - Dangle patient 3 times a day  - Stand patient 3 times a day  - Ambulate patient 3 times a day  - Out of bed to chair 3 times a day   - Out of bed for meals 3 times a day  - Out of bed for toileting  - Record patient progress and toleration of activity level   Outcome: Progressing     Problem: DISCHARGE PLANNING  Goal: Discharge to home or other facility with appropriate resources  Description: INTERVENTIONS:  - Identify barriers to discharge w/patient and caregiver  - Arrange for needed discharge resources and transportation as appropriate  - Identify discharge learning needs (meds, wound care, etc.)  - Arrange for interpretive services to assist at discharge as needed  - Refer to Case Management Department for coordinating discharge planning if the patient needs post-hospital services based on physician/advanced practitioner order or complex needs related to functional status, cognitive ability, or social support system  Outcome: Progressing     Problem: Knowledge Deficit  Goal: Patient/family/caregiver demonstrates understanding of disease process, treatment plan, medications, and discharge instructions  Description: Complete learning assessment and assess knowledge base.   Interventions:  - Provide teaching at level of understanding  - Provide teaching via preferred learning methods  Outcome: Progressing     Problem: Prexisting or High Potential for Compromised Skin Integrity  Goal: Skin integrity is maintained or improved  Description: INTERVENTIONS:  - Identify patients at risk for skin breakdown  - Assess and monitor skin integrity  - Assess and monitor nutrition and hydration status  - Monitor labs   - Assess for incontinence   - Turn and reposition patient  - Assist with mobility/ambulation  - Relieve pressure over bony prominences  - Avoid friction and shearing  - Provide appropriate hygiene as needed including keeping skin clean and dry  - Evaluate need for skin moisturizer/barrier cream  - Collaborate with interdisciplinary team   - Patient/family teaching  - Consider wound care consult   Outcome: Progressing     Problem: RESPIRATORY - ADULT  Goal: Achieves optimal ventilation and oxygenation  Description: INTERVENTIONS:  - Assess for changes in respiratory status confusion SOB tachycardia  - Assess for changes in mentation and behavior  - Position to facilitate oxygenation and minimize respiratory effort  - Oxygen administered by appropriate delivery if ordered  - Initiate smoking cessation education as indicated  - Encourage broncho-pulmonary hygiene including cough, deep breathe, Incentive Spirometry  - Assess the need for suctioning and aspirate as needed  - Assess and instruct to report SOB or any respiratory difficulty  - Respiratory Therapy support as indicated  Outcome: Progressing

## 2023-12-02 NOTE — NURSING NOTE
Upon administration of morning medications noted nidia red blood on sheets and patients hands, noted to be coming from left nares. Administered saline nasal spray, packed left nares with 2x2 gauze and applied ice to bridge of nose. Notified [facility]. New orders received and noted for Afrin nasal spray if interventions ineffective.

## 2023-12-02 NOTE — PROGRESS NOTES
Progress Note - Nephrology   Parth Glass 66 y.o. male MRN: [id number]  Unit/Bed#: -01 Encounter: [id number]    A/P:  1. Acute kidney injury on top of chronic kidney disease   Creatinine slowly improving, but has been essentially between 1.5 - 1.6 mg/dL, this morning he is about 1.6 mg/dL. Continue to optimize care, patient may be a new baseline creatinine. 2.  Chronic kidney disease stage IIIa with previous baseline creatinine between 1.3-1.4 mg/dL   As noted above, the patient may be a new baseline creatinine, unclear, continue to optimize care. 3.  Volume overload state   Kidney function stable and otherwise hemodynamically stable on torsemide 20 mg twice daily, weight is unchanged, may consider being more aggressive depending on how progresses from a clinical standpoint. 4.  Chronic diastolic congestive heart failure   Patient appears to be compensated, unclear if additional volume removal may be of clinical benefit. May be more aggressive depending on the clinical circumstances going forward.   5.  Cirrhosis      Follow up reason for today's visit: Acute kidney injury/chronic kidney disease/volume management    Acute kidney injury superimposed on chronic kidney disease     Patient Active Problem List   Diagnosis    Polyp of colon    Pneumonia due to infectious organism    Nonintractable generalized idiopathic epilepsy without status epilepticus ([address])    Falls    T wave inversion in EKG    Essential hypertension    RSV (acute bronchiolitis due to respiratory syncytial virus)    Acute respiratory failure with hypoxia (HCC)    Developmental delay    Dysphagia    Acute encephalopathy    MRSA nasal colonization    EDWIGE (acute kidney injury) ([address])    Pleural effusion on right    History of COVID-19    S/P pericardial window creation    Acute on chronic heart failure with preserved ejection fraction (HCC)    Lower extremity pain, left    Abnormal finding on CT scan    Liver cirrhosis (HCC)    Chronic respiratory failure with hypercapnia (HCC)    Acute kidney injury superimposed on chronic kidney disease 3    Acute blood loss anemia         Subjective:   No acute events    Objective:     Vitals: Blood pressure 123/67, pulse 70, temperature 97.5 °F (36.4 °C), temperature source Temporal, resp. rate 20, height 5' 6" (1.676 m), weight 113 kg (248 lb 10.9 oz), SpO2 97 %. ,Body mass index is 40.14 kg/m². Weight (last 2 days)       Date/Time Weight    12/02/23 0545 113 (248.68)    12/02/23 0500 113 (248.68)    12/01/23 0517 112 (247.58)     Weight: with SCD pumps on the bed at 12/01/23 0517    11/30/23 0600 109 (240.52)              Intake/Output Summary (Last 24 hours) at 12/2/2023 1402  Last data filed at 12/2/2023 1238  Gross per 24 hour   Intake 240 ml   Output 150 ml   Net 90 ml     I/O last 3 completed shifts:   In: 1073 [P.O.:1073]  Out: 150 [Urine:150]         Physical Exam: /67   Pulse 70   Temp 97.5 °F (36.4 °C) (Temporal)   Resp 20   Ht 5' 6" (1.676 m)   Wt 113 kg (248 lb 10.9 oz)   SpO2 97%   BMI 40.14 kg/m²     General Appearance:    Alert, cooperative, no distress, appears stated age   Head:    Normocephalic, without obvious abnormality, atraumatic   Eyes:    Conjunctiva/corneas clear   Ears:    Normal external ears   Nose:   Nares normal, septum midline, mucosa normal, no drainage    or sinus tenderness   Throat:   Lips, mucosa, and tongue normal; teeth and gums normal   Neck:   Supple, symmetrical, trachea midline, no adenopathy;        thyroid:  No enlargement/tenderness/nodules; no carotid    bruit or JVD   Back:     Symmetric, no curvature, ROM normal, no CVA tenderness   Lungs:     Clear to auscultation bilaterally, respirations unlabored   Chest wall:    No tenderness or deformity   Heart:    Regular rate and rhythm, S1 and S2 normal, no murmur, rub   or gallop   Abdomen:     Soft, non-tender, bowel sounds active   Extremities:   Extremities normal, atraumatic, no cyanosis, +1 bilateral extremity edema up to the presacral region   Skin:   Skin color, texture, turgor normal, no rashes or lesions   Lymph nodes:   Cervical normal   Neurologic:   CNII-XII intact            Lab, Imaging and other studies: I have personally reviewed pertinent labs. CBC: No results found for: "WBC", "HGB", "HCT", "MCV", "PLT", "ADJUSTEDWBC", "RBC", "MCH", "MCHC", "RDW", "MPV", "NRBC"  CMP: No results found for: "NA", "K", "CL", "CO2", "ANIONGAP", "BUN", "CREATININE", "GLUCOSE", "CALCIUM", "AST", "ALT", "ALKPHOS", "PROT", "BILITOT", "EGFR"    . Results from last 7 days   Lab Units 12/01/23  0344 11/30/23  0447 11/29/23  0533   POTASSIUM mmol/L 4.4 3.9 3.7   CHLORIDE mmol/L 101 101 100   CO2 mmol/L 33* 33* 34*   BUN mg/dL 31* 31* 32*   CREATININE mg/dL 1.63* 1.70* 1.75*   CALCIUM mg/dL 9.1 9.0 9.1         Phosphorus: No results found for: "PHOS"  Magnesium: No results found for: "MG"  Urinalysis: No results found for: "COLORU", "CLARITYU", "SPECGRAV", "PHUR", "LEUKOCYTESUR", "NITRITE", "PROTEINUA", "GLUCOSEU", "[place]", "BILIRUBINUR", "BLOODU"  Ionized Calcium: No results found for: "CAION"  Coagulation: No results found for: "PT", "INR", "APTT"  Troponin: No results found for: "TROPONINI"  ABG: No results found for: "PHART", "NQQ4BNU", "PO2ART", "ULK1MIU", "J7OLOGRM", "BEART", "SOURCE"  Radiology review:     IMAGING  Procedure: XR chest portable    Result Date: 11/30/2023  Narrative: CHEST INDICATION:   right effusion and chf. COMPARISON: 11/28/2023; 11/17/2023 EXAM PERFORMED/VIEWS:  XR CHEST PORTABLE FINDINGS: The heart is borderline in size. Right hemithorax opacification is similar to the prior study probably from a mix of pleural effusion and consolidation. Vascular congestion is unchanged given differences in level of inspiration. No pneumothorax. The trachea projects over the left hemithorax due to rotation. Osseous structures appear within normal limits for patient age.      Impression: Pleural-parenchymal density on the right without significant change.  Workstation performed: [id number]       Current Facility-Administered Medications   Medication Dose Route Frequency    acetaminophen (TYLENOL) tablet 650 mg  650 mg Oral Q6H PRN    ascorbic acid (VITAMIN C) tablet 500 mg  500 mg Oral Daily    aspirin (ECOTRIN LOW STRENGTH) EC tablet 81 mg  81 mg Oral Daily    calcium carbonate-vitamin D 500 mg-5 mcg tablet 1 tablet  1 tablet Oral Daily With Breakfast    dextromethorphan-guaiFENesin (ROBITUSSIN DM) oral syrup 10 mL  10 mL Oral Q4H PRN    enoxaparin (LOVENOX) subcutaneous injection 40 mg  40 mg Subcutaneous Q24H SON    lactulose (CHRONULAC) oral solution 20 g  20 g Oral Daily With Breakfast    lamoTRIgine (LaMICtal) tablet 200 mg  200 mg Oral Early Morning    lamoTRIgine (LaMICtal) tablet 250 mg  250 mg Oral After Lunch    lamoTRIgine (LaMICtal) tablet 300 mg  300 mg Oral HS    levOCARNitine (CARNITOR) oral solution 330 mg  330 mg Oral 4x Daily (with meals and at bedtime)    LORazepam (ATIVAN) injection 2 mg  2 mg Intravenous Q6H PRN    metoprolol tartrate (LOPRESSOR) tablet 25 mg  25 mg Oral BID    nystatin (MYCOSTATIN) powder   Topical BID    ondansetron (ZOFRAN) injection 4 mg  4 mg Intravenous Q6H PRN    oxymetazoline (AFRIN) 0.05 % nasal spray 2 spray  2 spray Each Nare Q12H SON    polyethylene glycol (MIRALAX) packet 17 g  17 g Oral Daily PRN    primidone (MYSOLINE) tablet 100 mg  100 mg Oral Daily With Lunch    primidone (MYSOLINE) tablet 100 mg  100 mg Oral After Breakfast    primidone (MYSOLINE) tablet 150 mg  150 mg Oral HS    senna-docusate sodium (SENOKOT S) 8.6-50 mg per tablet 1 tablet  1 tablet Oral BID    sodium chloride (AYR SALINE NASAL) nasal gel 1 Application  1 Application Nasal TID    sodium chloride (OCEAN) 0.65 % nasal spray 2 spray  2 spray Each Nare TID    spironolactone (ALDACTONE) tablet 50 mg  50 mg Oral Daily    torsemide (DEMADEX) tablet 20 mg  20 mg Oral BID vitamin E (TOCOPHEROL) capsule 400 Units  400 Units Oral Daily    zonisamide (ZONEGRAN) capsule 100 mg  100 mg Oral HS     Medications Discontinued During This Encounter   Medication Reason    clonazePAM (KlonoPIN) 0.5 mg tablet     furosemide (LASIX) tablet 40 mg     furosemide (LASIX) injection 40 mg     spironolactone (ALDACTONE) tablet 100 mg     aspirin (ECOTRIN LOW STRENGTH) EC tablet 81 mg     enoxaparin (LOVENOX) subcutaneous injection 40 mg     oxymetazoline (AFRIN) 0.05 % nasal spray 2 spray Duplicate order    levOCARNitine (CARNITOR) oral solution 330 mg     calcium carbonate-vitamin D 500 mg-5 mcg tablet 1 tablet     primidone (MYSOLINE) tablet 100 mg     ampicillin-sulbactam (UNASYN) 3 g in sodium chloride 0.9 % 100 mL IVPB     lactulose (CHRONULAC) oral solution 20 g     furosemide (LASIX) tablet 40 mg     furosemide (LASIX) tablet 40 mg     torsemide (DEMADEX) tablet 20 mg        Chay Sebastian, DO      This progress note was produced in part using a dictation device which may document imprecise wording from author's original intent.

## 2023-12-02 NOTE — PLAN OF CARE
Problem: PAIN - ADULT  Goal: Verbalizes/displays adequate comfort level or baseline comfort level  Description: Interventions:  - Encourage patient to monitor pain and request assistance  - Assess pain using appropriate pain scale0-10  - Administer analgesics based on type and severity of pain and evaluate response  - Implement non-pharmacological measures as appropriate and evaluate response  - Consider cultural and social influences on pain and pain management  - Notify physician/advanced practitioner if interventions unsuccessful or patient reports new pain  Outcome: Progressing     Problem: INFECTION - ADULT  Goal: Absence or prevention of progression during hospitalization  Description: INTERVENTIONS:  - Assess and monitor for signs and symptoms of infection  - Monitor lab/diagnostic results  - Monitor all insertion sites, i.e. indwelling lines, tubes, and drains  - Monitor endotracheal if appropriate and nasal secretions for changes in amount and color  - [place] appropriate cooling/warming therapies per order  - Administer medications as ordered  - Instruct and encourage patient and family to use good hand hygiene technique  - Identify and instruct in appropriate isolation precautions for identified infection/condition  Outcome: Progressing     Problem: SAFETY ADULT  Goal: Patient will remain free of falls  Description: INTERVENTIONS:  - Educate patient/family on patient safety including physical limitations  - Instruct patient to call for assistance with activity   - Consult OT/PT to assist with strengthening/mobility   - Keep Call bell within reach  - Keep bed low and locked with side rails adjusted as appropriate  - Keep care items and personal belongings within reach  - Initiate and maintain comfort rounds  - Make Fall Risk Sign visible to staff  - Offer Toileting every 2 Hours, in advance of need  - Initiate/Maintain bed/chair alarm  - Obtain necessary fall risk management equipment:   - Apply yellow socks and bracelet for high fall risk patients  - Consider moving patient to room near nurses station  Outcome: Progressing  Goal: Maintain or return to baseline ADL function  Description: INTERVENTIONS:  -  Assess patient's ability to carry out ADLs; assess patient's baseline for ADL function and identify physical deficits which impact ability to perform ADLs (bathing, care of mouth/teeth, toileting, grooming, dressing, etc.)  - Assess/evaluate cause of self-care deficits   - Assess range of motion  - Assess patient's mobility; develop plan if impaired  - Assess patient's need for assistive devices and provide as appropriate  - Encourage maximum independence but intervene and supervise when necessary  - Involve family in performance of ADLs  - Assess for home care needs following discharge   - Consider OT consult to assist with ADL evaluation and planning for discharge  - Provide patient education as appropriate  Outcome: Progressing  Goal: Maintains/Returns to pre admission functional level  Description: INTERVENTIONS:  - Perform AM-PAC 6 Click Basic Mobility/ Daily Activity assessment daily.  - Set and communicate daily mobility goal to care team and patient/family/caregiver. - Collaborate with rehabilitation services on mobility goals if consulted  - Perform Range of Motion 3 times a day. - Reposition patient every 2 hours.   - Dangle patient 3 times a day  - Stand patient 3 times a day  - Ambulate patient 3 times a day  - Out of bed to chair 3 times a day   - Out of bed for meals 3 times a day  - Out of bed for toileting  - Record patient progress and toleration of activity level   Outcome: Progressing     Problem: DISCHARGE PLANNING  Goal: Discharge to home or other facility with appropriate resources  Description: INTERVENTIONS:  - Identify barriers to discharge w/patient and caregiver  - Arrange for needed discharge resources and transportation as appropriate  - Identify discharge learning needs (meds, wound care, etc.)  - Arrange for interpretive services to assist at discharge as needed  - Refer to Case Management Department for coordinating discharge planning if the patient needs post-hospital services based on physician/advanced practitioner order or complex needs related to functional status, cognitive ability, or social support system  Outcome: Progressing     Problem: Knowledge Deficit  Goal: Patient/family/caregiver demonstrates understanding of disease process, treatment plan, medications, and discharge instructions  Description: Complete learning assessment and assess knowledge base.   Interventions:  - Provide teaching at level of understanding  - Provide teaching via preferred learning methods  Outcome: Progressing     Problem: Prexisting or High Potential for Compromised Skin Integrity  Goal: Skin integrity is maintained or improved  Description: INTERVENTIONS:  - Identify patients at risk for skin breakdown  - Assess and monitor skin integrity  - Assess and monitor nutrition and hydration status  - Monitor labs   - Assess for incontinence   - Turn and reposition patient  - Assist with mobility/ambulation  - Relieve pressure over bony prominences  - Avoid friction and shearing  - Provide appropriate hygiene as needed including keeping skin clean and dry  - Evaluate need for skin moisturizer/barrier cream  - Collaborate with interdisciplinary team   - Patient/family teaching  - Consider wound care consult   Outcome: Progressing     Problem: RESPIRATORY - ADULT  Goal: Achieves optimal ventilation and oxygenation  Description: INTERVENTIONS:  - Assess for changes in respiratory status confusion SOB tachycardia  - Assess for changes in mentation and behavior  - Position to facilitate oxygenation and minimize respiratory effort  - Oxygen administered by appropriate delivery if ordered  - Initiate smoking cessation education as indicated  - Encourage broncho-pulmonary hygiene including cough, deep breathe, Incentive Spirometry  - Assess the need for suctioning and aspirate as needed  - Assess and instruct to report SOB or any respiratory difficulty  - Respiratory Therapy support as indicated  Outcome: Progressing

## 2023-12-03 ENCOUNTER — APPOINTMENT (INPATIENT)
Dept: RADIOLOGY | Facility: HOSPITAL | Age: 58
End: 2023-12-03
Payer: MEDICARE

## 2023-12-03 PROCEDURE — 99232 SBSQ HOSP IP/OBS MODERATE 35: CPT | Performed by: INTERNAL MEDICINE

## 2023-12-03 PROCEDURE — 71045 X-RAY EXAM CHEST 1 VIEW: CPT

## 2023-12-03 PROCEDURE — 80203 DRUG SCREEN QUANT ZONISAMIDE: CPT | Performed by: STUDENT IN AN ORGANIZED HEALTH CARE EDUCATION/TRAINING PROGRAM

## 2023-12-03 PROCEDURE — 99232 SBSQ HOSP IP/OBS MODERATE 35: CPT | Performed by: NURSE PRACTITIONER

## 2023-12-03 RX ORDER — TORSEMIDE 20 MG/1
40 TABLET ORAL DAILY
Status: DISCONTINUED | OUTPATIENT
Start: 2023-12-03 | End: 2023-12-08 | Stop reason: HOSPADM

## 2023-12-03 RX ADMIN — Medication 1 TABLET: at 09:19

## 2023-12-03 RX ADMIN — ENOXAPARIN SODIUM 40 MG: 40 INJECTION SUBCUTANEOUS at 09:18

## 2023-12-03 RX ADMIN — Medication 1 APPLICATION: at 22:29

## 2023-12-03 RX ADMIN — LEVOCARNITINE 330 MG: 1 SOLUTION ORAL at 09:19

## 2023-12-03 RX ADMIN — LAMOTRIGINE 250 MG: 100 TABLET ORAL at 16:29

## 2023-12-03 RX ADMIN — PRIMIDONE 100 MG: 50 TABLET ORAL at 09:18

## 2023-12-03 RX ADMIN — LAMOTRIGINE 200 MG: 100 TABLET ORAL at 05:41

## 2023-12-03 RX ADMIN — LAMOTRIGINE 300 MG: 100 TABLET ORAL at 22:20

## 2023-12-03 RX ADMIN — PRIMIDONE 150 MG: 50 TABLET ORAL at 22:19

## 2023-12-03 RX ADMIN — SALINE NASAL SPRAY 2 SPRAY: 1.5 SOLUTION NASAL at 09:20

## 2023-12-03 RX ADMIN — ZONISAMIDE 100 MG: 100 CAPSULE ORAL at 22:20

## 2023-12-03 RX ADMIN — LEVOCARNITINE 330 MG: 1 SOLUTION ORAL at 22:23

## 2023-12-03 RX ADMIN — NYSTATIN: 100000 POWDER TOPICAL at 18:38

## 2023-12-03 RX ADMIN — PRIMIDONE 100 MG: 50 TABLET ORAL at 16:29

## 2023-12-03 RX ADMIN — OXYCODONE HYDROCHLORIDE AND ACETAMINOPHEN 500 MG: 500 TABLET ORAL at 09:19

## 2023-12-03 RX ADMIN — TORSEMIDE 40 MG: 20 TABLET ORAL at 09:19

## 2023-12-03 RX ADMIN — LEVOCARNITINE 330 MG: 1 SOLUTION ORAL at 18:38

## 2023-12-03 RX ADMIN — LEVOCARNITINE 330 MG: 1 SOLUTION ORAL at 13:41

## 2023-12-03 RX ADMIN — Medication 400 UNITS: at 09:18

## 2023-12-03 RX ADMIN — SPIRONOLACTONE 50 MG: 25 TABLET ORAL at 09:19

## 2023-12-03 RX ADMIN — SALINE NASAL SPRAY 2 SPRAY: 1.5 SOLUTION NASAL at 22:26

## 2023-12-03 RX ADMIN — Medication 1 APPLICATION: at 09:20

## 2023-12-03 RX ADMIN — ASPIRIN 81 MG: 81 TABLET, COATED ORAL at 09:18

## 2023-12-03 RX ADMIN — DOCUSATE SODIUM AND SENNOSIDES 1 TABLET: 8.6; 5 TABLET, FILM COATED ORAL at 09:18

## 2023-12-03 RX ADMIN — METOPROLOL TARTRATE 25 MG: 25 TABLET, FILM COATED ORAL at 09:19

## 2023-12-03 RX ADMIN — METOPROLOL TARTRATE 25 MG: 25 TABLET, FILM COATED ORAL at 22:18

## 2023-12-03 RX ADMIN — OXYMETAZOLINE HYDROCHLORIDE 2 SPRAY: 0.05 SPRAY NASAL at 09:19

## 2023-12-03 RX ADMIN — OXYMETAZOLINE HYDROCHLORIDE 2 SPRAY: 0.05 SPRAY NASAL at 18:39

## 2023-12-03 RX ADMIN — Medication 1 APPLICATION: at 18:39

## 2023-12-03 RX ADMIN — NYSTATIN 1 APPLICATION: 100000 POWDER TOPICAL at 09:20

## 2023-12-03 RX ADMIN — LACTULOSE 20 G: 20 SOLUTION ORAL at 09:19

## 2023-12-03 NOTE — ASSESSMENT & PLAN NOTE
Secondary to recurrent epistaxis.     ENT consulted -recommending conservative management  Patient is on aspirin and Lovenox, continue to monitor  Hemoglobin has been stable with ferrous sulfate  Repeat cbc in am

## 2023-12-03 NOTE — ASSESSMENT & PLAN NOTE
Patient with worsening shortness of breath for the past week falling asleep more often  Previously utilize oxygen 2 L only at nighttime, family endorsing he has been utilizing throughout the day prior to admission as well  Imaging showing loculated right-sided moderate effusion with inability to rule out pneumonia on imaging  Labs without leukocytosis, Pro-Janusz was mildly elevated, finished a course of antibiotics for pneumonia x 7 days  Due to loculated effusion IR consulted for thoracentesis which was completed 11/20  There is no significant loculation noted on ultrasound necessitating a chest tube  860 mL dark isidro-colored right pleural fluid  Fluid studies no bacterial growth - likely transudative secondary to CHF/cirrhosis   Serial CXR with diuretics stable on repeat imaging 11/30    Revaluate with Xray chest PA& Lateral 12/03 ; and consider repeate imaging in 2-3 month after discharge , outpatient , to revaluate especially if symptomatic

## 2023-12-03 NOTE — ASSESSMENT & PLAN NOTE
Continue home seizure medications with seizure precautions  His last seizure was 2-3 days prior to admission  The last few times he was hospitalized for seizure the AEDs were not changed because the breakthrough seizure etiology were infections. Lamictal and primidone levels are therapeutic.  Zonesamide was subtherapeutic ; repeat 12/3 pending

## 2023-12-03 NOTE — PROGRESS NOTES
Progress Note - Nephrology   Cameron Hung 66 y.o. male MRN: [id number]  Unit/Bed#: -01 Encounter: [id number]    A/P:  1. Acute kidney injury on top of chronic kidney disease              Labs remain pending from this morning, will follow this up. Continue with supportive care in the meantime. Continue for potential nephrotoxins. 2.  Chronic kidney disease stage IIIa with previous baseline creatinine between 1.3-1.4 mg/dL               Patient may be at a new baseline creatinine, continue to monitor blood work, however during this admission the patient's creatinine has been between 1.5-1.6 mg/dL. 3.  Volume overload state              We will modify the patient's torsemide dosing from 20 mg twice daily to 40 mg once daily, patient may have better diuretic response with a specific regimen. But will continue to monitor at this time. 4.  Chronic diastolic congestive heart failure              Appears compensated, will change diuretic dosing as noted above as this may be to the patient's benefit, time will tell at this time and he will require close monitoring for now.   5.  Cirrhosis    Follow up reason for today's visit: Acute kidney injury/chronic kidney disease/volume management    Acute kidney injury superimposed on chronic kidney disease     Patient Active Problem List   Diagnosis    Polyp of colon    Pneumonia due to infectious organism    Nonintractable generalized idiopathic epilepsy without status epilepticus ([address])    Falls    T wave inversion in EKG    Essential hypertension    RSV (acute bronchiolitis due to respiratory syncytial virus)    Acute respiratory failure with hypoxia (HCC)    Developmental delay    Dysphagia    Acute encephalopathy    MRSA nasal colonization    EDWIGE (acute kidney injury) ([address])    Pleural effusion on right    History of COVID-19    S/P pericardial window creation    Acute on chronic heart failure with preserved ejection fraction (HCC)    Lower extremity pain, left Abnormal finding on CT scan    Liver cirrhosis (HCC)    Chronic respiratory failure with hypercapnia (HCC)    Acute kidney injury superimposed on chronic kidney disease 3    Acute blood loss anemia         Subjective:   Patient denies acute issues at this time including abdominal pain nausea and vomiting. Objective:     Vitals: Blood pressure 140/82, pulse 66, temperature 97.5 °F (36.4 °C), resp. rate 18, height 5' 6" (1.676 m), weight 112 kg (246 lb 14.6 oz), SpO2 96 %. ,Body mass index is 39.85 kg/m². Weight (last 2 days)       Date/Time Weight    12/03/23 0600 112 (246.92)    12/02/23 0545 113 (248.68)    12/02/23 0500 113 (248.68)    12/01/23 0517 112 (247.58)     Weight: with SCD pumps on the bed at 12/01/23 0517              Intake/Output Summary (Last 24 hours) at 12/3/2023 0944  Last data filed at 12/3/2023 0900  Gross per 24 hour   Intake 480 ml   Output --   Net 480 ml     I/O last 3 completed shifts:   In: 360 [P.O.:360]  Out: 150 [Urine:150]         Physical Exam: /82   Pulse 66   Temp 97.5 °F (36.4 °C)   Resp 18   Ht 5' 6" (1.676 m)   Wt 112 kg (246 lb 14.6 oz)   SpO2 96%   BMI 39.85 kg/m²     General Appearance:    Alert, cooperative, no distress, appears stated age   Head:    Normocephalic, without obvious abnormality, atraumatic   Eyes:    Conjunctiva/corneas clear   Ears:    Normal external ears   Nose:   Nares normal, septum midline, mucosa normal, no drainage    or sinus tenderness   Throat:   Lips, mucosa, and tongue normal; teeth and gums normal   Neck:   Supple   Back:     Symmetric, no curvature, ROM normal, no CVA tenderness   Lungs:     Clear to auscultation bilaterally, respirations unlabored   Chest wall:    No tenderness or deformity   Heart:    Regular rate and rhythm, S1 and S2 normal, no murmur, rub   or gallop   Abdomen:     Soft, non-tender, bowel sounds active   Extremities:   Extremities normal, atraumatic, no cyanosis, +2 bilateral lower extremity edema up to the presacral region   Skin:   Skin color, texture, turgor normal, no rashes or lesions   Lymph nodes:   Cervical normal   Neurologic:   CNII-XII intact            Lab, Imaging and other studies: I have personally reviewed pertinent labs. CBC: No results found for: "WBC", "HGB", "HCT", "MCV", "PLT", "ADJUSTEDWBC", "RBC", "MCH", "MCHC", "RDW", "MPV", "NRBC"  CMP: No results found for: "NA", "K", "CL", "CO2", "ANIONGAP", "BUN", "CREATININE", "GLUCOSE", "CALCIUM", "AST", "ALT", "ALKPHOS", "PROT", "BILITOT", "EGFR"    . Results from last 7 days   Lab Units 12/01/23  0344 11/30/23  0447 11/29/23  0533   POTASSIUM mmol/L 4.4 3.9 3.7   CHLORIDE mmol/L 101 101 100   CO2 mmol/L 33* 33* 34*   BUN mg/dL 31* 31* 32*   CREATININE mg/dL 1.63* 1.70* 1.75*   CALCIUM mg/dL 9.1 9.0 9.1         Phosphorus: No results found for: "PHOS"  Magnesium: No results found for: "MG"  Urinalysis: No results found for: "COLORU", "CLARITYU", "SPECGRAV", "PHUR", "LEUKOCYTESUR", "NITRITE", "PROTEINUA", "GLUCOSEU", "[place]", "BILIRUBINUR", "BLOODU"  Ionized Calcium: No results found for: "CAION"  Coagulation: No results found for: "PT", "INR", "APTT"  Troponin: No results found for: "TROPONINI"  ABG: No results found for: "PHART", "TQT3IEM", "PO2ART", "LVA6NUZ", "J8GWEUDG", "BEART", "SOURCE"  Radiology review:     IMAGING  No results found.     Current Facility-Administered Medications   Medication Dose Route Frequency    acetaminophen (TYLENOL) tablet 650 mg  650 mg Oral Q6H PRN    ascorbic acid (VITAMIN C) tablet 500 mg  500 mg Oral Daily    aspirin (ECOTRIN LOW STRENGTH) EC tablet 81 mg  81 mg Oral Daily    calcium carbonate-vitamin D 500 mg-5 mcg tablet 1 tablet  1 tablet Oral Daily With Breakfast    dextromethorphan-guaiFENesin (ROBITUSSIN DM) oral syrup 10 mL  10 mL Oral Q4H PRN    enoxaparin (LOVENOX) subcutaneous injection 40 mg  40 mg Subcutaneous Q24H SON    lactulose (CHRONULAC) oral solution 20 g  20 g Oral Daily With Breakfast lamoTRIgine (LaMICtal) tablet 200 mg  200 mg Oral Early Morning    lamoTRIgine (LaMICtal) tablet 250 mg  250 mg Oral After Lunch    lamoTRIgine (LaMICtal) tablet 300 mg  300 mg Oral HS    levOCARNitine (CARNITOR) oral solution 330 mg  330 mg Oral 4x Daily (with meals and at bedtime)    LORazepam (ATIVAN) injection 2 mg  2 mg Intravenous Q6H PRN    metoprolol tartrate (LOPRESSOR) tablet 25 mg  25 mg Oral BID    nystatin (MYCOSTATIN) powder   Topical BID    ondansetron (ZOFRAN) injection 4 mg  4 mg Intravenous Q6H PRN    oxymetazoline (AFRIN) 0.05 % nasal spray 2 spray  2 spray Each Nare Q12H [address]    polyethylene glycol (MIRALAX) packet 17 g  17 g Oral Daily PRN    primidone (MYSOLINE) tablet 100 mg  100 mg Oral Daily With Lunch    primidone (MYSOLINE) tablet 100 mg  100 mg Oral After Breakfast    primidone (MYSOLINE) tablet 150 mg  150 mg Oral HS    senna-docusate sodium (SENOKOT S) 8.6-50 mg per tablet 1 tablet  1 tablet Oral BID    sodium chloride (AYR SALINE NASAL) nasal gel 1 Application  1 Application Nasal TID    sodium chloride (OCEAN) 0.65 % nasal spray 2 spray  2 spray Each Nare TID    spironolactone (ALDACTONE) tablet 50 mg  50 mg Oral Daily    torsemide (DEMADEX) tablet 40 mg  40 mg Oral Daily    vitamin E (TOCOPHEROL) capsule 400 Units  400 Units Oral Daily    zonisamide (ZONEGRAN) capsule 100 mg  100 mg Oral HS     Medications Discontinued During This Encounter   Medication Reason    clonazePAM (KlonoPIN) 0.5 mg tablet     furosemide (LASIX) tablet 40 mg     furosemide (LASIX) injection 40 mg     spironolactone (ALDACTONE) tablet 100 mg     aspirin (ECOTRIN LOW STRENGTH) EC tablet 81 mg     enoxaparin (LOVENOX) subcutaneous injection 40 mg     oxymetazoline (AFRIN) 0.05 % nasal spray 2 spray Duplicate order    levOCARNitine (CARNITOR) oral solution 330 mg     calcium carbonate-vitamin D 500 mg-5 mcg tablet 1 tablet     primidone (MYSOLINE) tablet 100 mg     ampicillin-sulbactam (UNASYN) 3 g in sodium chloride 0.9 % 100 mL IVPB     lactulose (CHRONULAC) oral solution 20 g     furosemide (LASIX) tablet 40 mg     furosemide (LASIX) tablet 40 mg     torsemide (DEMADEX) tablet 20 mg     torsemide (DEMADEX) tablet 20 mg        Oscar Ser, DO      This progress note was produced in part using a dictation device which may document imprecise wording from author's original intent.

## 2023-12-03 NOTE — ASSESSMENT & PLAN NOTE
Patient with worsening shortness of breath for the past week falling asleep more often  Previously utilize oxygen 2 L only at nighttime, family endorsing he has been utilizing throughout the day prior to admission as well  Imaging showing loculated right-sided moderate effusion with inability to rule out pneumonia on imaging  Labs without leukocytosis, Pro-Janusz was mildly elevated, finished a course of antibiotics for pneumonia x 7 days  Due to loculated effusion IR consulted for thoracentesis which was completed 11/20  There is no significant loculation noted on ultrasound necessitating a chest tube  860 mL dark isidro-colored right pleural fluid  Fluid studies no bacterial growth - likely transudative secondary to CHF/cirrhosis   Serial CXR with diuretics stable on repeat imaging 11/30    Revaluate with Xray chest PA& Lateral 12/03 appears stable on review of imaging will trend for final reading; and consider repeate imaging in 2-3 month after discharge , outpatient , to revaluate especially if symptomatic

## 2023-12-03 NOTE — PROGRESS NOTES
[address] [facility]  Progress Note  Name: Cristy eLal  MRN: [id number]  Unit/Bed#: -29 I Date of Admission: 11/17/2023   Date of Service: 12/2/2023 I Hospital Day: 15    Assessment/Plan   * Acute kidney injury superimposed on chronic kidney disease 3  Assessment & Plan  Lab Results   Component Value Date    EGFR 46 12/01/2023    EGFR 44 11/30/2023    EGFR 42 11/29/2023    CREATININE 1.63 (H) 12/01/2023    CREATININE 1.70 (H) 11/30/2023    CREATININE 1.75 (H) 11/29/2023     Nephrology following  Previous baseline reported to be 1.3-1.4  Creatinine has been stable around 1.5-1.7 likely new baseline per Nephrology    Pleural effusion on right  Assessment & Plan  Patient with worsening shortness of breath for the past week falling asleep more often  Previously utilize oxygen 2 L only at nighttime, family endorsing he has been utilizing throughout the day prior to admission as well  Imaging showing loculated right-sided moderate effusion with inability to rule out pneumonia on imaging  Labs without leukocytosis, Pro-Janusz was mildly elevated, finished a course of antibiotics for pneumonia x 7 days  Due to loculated effusion IR consulted for thoracentesis which was completed 11/20  There is no significant loculation noted on ultrasound necessitating a chest tube  860 mL dark isidro-colored right pleural fluid  Fluid studies no bacterial growth - likely transudative secondary to CHF/cirrhosis   Serial CXR with diuretics stable on repeat imaging 11/30    Revaluate with Xray chest PA& Lateral 12/03 ; and consider repeate imaging in 2-3 month after discharge , outpatient , to revaluate especially if symptomatic     Acute blood loss anemia  Assessment & Plan  Secondary to recurrent epistaxis.     ENT consulted -recommending conservative management  Patient is on aspirin and Lovenox, continue to monitor  Hemoglobin has been stable with ferrous sulfate    Chronic respiratory failure with hypercapnia [facility])  Assessment & Plan  Wears 2L O2 QHS, continue  Likely has a chronic hypercapnia given the elevated bicarbonate. Kyphosis causing chronic limited chest expansion  Did require 3 L overnight rather than 2L   Chest x-ray showing reaccumulation of pleural effusion on the right side -diuretics have been adjusted, see under pleural effusion    Liver cirrhosis (HCC)  Assessment & Plan  Cirrhotic morphology of the liver chronic problem  Ammonia is normal there is no ascites  Continue lactulose daily -re-time for AM patient has been refusing at night as he does not want to have bowel movements during bedtime  Aldactone resume at lower dose, 50 mg daily- uptitrate as able per nephro    Acute on chronic heart failure with preserved ejection fraction [facility])  Assessment & Plan  Patient previously maintained on Lasix as an outpatient -during a nursing home admission it was decreased to 40 mg daily given his kidney function  Now proBNP elevated  2D echo done 2022 with mild concentric hypertrophy and EF 60 to 65%  Updated echo this admission with mild concentric left ventricular hypertrophy, LVEF 63% with normal diastolic function -overall similar from 2022 without significant change  Is s/p IR guided thoracentesis 11/20 due to loculated effusion  Nephrology following and aiding with diuretic regimen  Patient still hypervolemic, does have chronic lymphedema in his legs as well which contributes to exam  Currently maintaining on torsemide 20 mg BID  & spironolactone 50 mg qd    Dysphagia  Assessment & Plan  Continue mechanical soft diet from prior  Aspiration precautions    Developmental delay  Assessment & Plan  Daily involvement with mother  Supportive care  PT/OT/ST recommending STR   Due to social situation, patient will need skilled nursing facility, case management on board (patient was living with his mother, mother recently hospitalized due to medical issue and got discharged to skilled nursing facility.   No other family member is able to take care of the patient.)    Falls  Assessment & Plan  Frequent falls for his entire life because of unsteady gait according to the mother's medical history list  Fall precautions  Emergency room trauma eval no acute injury  Due to patient overall medical conditions, social issues, patient is in need of extensive care. Case management on board for dispo planning    Nonintractable generalized idiopathic epilepsy without status epilepticus [facility])  Assessment & Plan  Continue home seizure medications with seizure precautions  His last seizure was 2-3 days prior to admission  The last few times he was hospitalized for seizure the AEDs were not changed because the breakthrough seizure etiology were infections. Lamictal and primidone levels are therapeutic. Zonesamide was subtherapeutic ; will recheck 12/3 morning lab                 VTE Pharmacologic Prophylaxis: VTE Score: 5 Moderate Risk (Score 3-4) - Pharmacological DVT Prophylaxis Ordered: enoxaparin (Lovenox). Mobility:   Basic Mobility Inpatient Raw Score: 6  JH-HLM Goal: 2: Bed activities/Dependent transfer  JH-HLM Achieved: 2: Bed activities/Dependent transfer  HLM Goal achieved. Continue to encourage appropriate mobility. Patient Centered Rounds: I performed bedside rounds with nursing staff today. Discussions with Specialists or Other Care Team Provider: nephrology    Education and Discussions with Family / Patient: Attempted to update  (mother) via phone. Unable to contact. Total Time Spent on Date of Encounter in care of patient: 40 mins. This time was spent on one or more of the following: performing physical exam; counseling and coordination of care; obtaining or reviewing history; documenting in the medical record; reviewing/ordering tests, medications or procedures; communicating with other healthcare professionals and discussing with patient's family/caregivers.     Current Length of Stay: 15 day(s)  Current Patient Status: Inpatient   Certification Statement: The patient will continue to require additional inpatient hospital stay due to pending placement   Discharge Plan:  pending placement ,  following     Code Status: Level 1 - Full Code    Subjective:   Nakia Ward is seen and examined, RN at bedside,    ROS limited due to mental challenge /delay , slow talking , respond in 1-2 words answers to some simple questions but minimal   Denies any pain or problems . Attempted to call the patient's mother on provided # but unreachable   CM following on placement to [facility] facility     Objective:     Vitals:   Temp (24hrs), Av.7 °F (36.5 °C), Min:97.7 °F (36.5 °C), Max:97.7 °F (36.5 °C)    Temp:  [97.7 °F (36.5 °C)] 97.7 °F (36.5 °C)  HR:  [69-74] 74  Resp:  [17-20] 18  BP: (120-124)/(67-70) 120/70  SpO2:  [97 %] 97 %  Body mass index is 40.14 kg/m². Input and Output Summary (last 24 hours): Intake/Output Summary (Last 24 hours) at 2023 2210  Last data filed at 2023 1711  Gross per 24 hour   Intake 360 ml   Output 150 ml   Net 210 ml       Physical Exam:   Physical Exam   - GEN: Appears well, alert and oriented x 1 , minimal communication;  in no acute distress  - HEENT: Anicteric, mucous membranes moist, PERRL and EOMI   - NECK: No lymphadenopathy, JVD or carotid bruits   - HEART: RRR, normal S1 and S2, no murmurs, clicks, gallops or rubs   - LUNGS: Decreased RLE breathing sounds but otherwise clear to auscultation bilaterally; no wheezes, rales, or rhonchi  - ABDOMEN: Normal bowel sounds, soft, no tenderness, no distention, no organomegaly or masses felt on exam.   - EXTREMITIES: bilateral LE edema 2+ ; Peripheral pulses normal; no clubbing, cyanosis,   - NEURO: No focal findings, CN II-XII are grossly intact. - Musculoskeletal: 5/5 strength, normal ROM, no swollen or erythematous joints.    - SKIN: Normal without suspicious lesions on exposed skin      Additional Data: Labs:  Results from last 7 days   Lab Units 11/29/23  0533   WBC Thousand/uL 5.21   HEMOGLOBIN g/dL 10.4*   HEMATOCRIT % 33.1*   PLATELETS Thousands/uL 161   NEUTROS PCT % 76*   LYMPHS PCT % 14   MONOS PCT % 9   EOS PCT % 0     Results from last 7 days   Lab Units 12/01/23  0344   SODIUM mmol/L 139   POTASSIUM mmol/L 4.4   CHLORIDE mmol/L 101   CO2 mmol/L 33*   BUN mg/dL 31*   CREATININE mg/dL 1.63*   ANION GAP mmol/L 5   CALCIUM mg/dL 9.1   GLUCOSE RANDOM mg/dL 87                       Lines/Drains:  Invasive Devices       Peripheral Intravenous Line  Duration             Peripheral IV 12/01/23 Left Wrist 1 day                          Imaging: Reviewed radiology reports from this admission including: chest xray    Recent Cultures (last 7 days):         Last 24 Hours Medication List:   Current Facility-Administered Medications   Medication Dose Route Frequency Provider Last Rate    acetaminophen  650 mg Oral Q6H PRN Dana Demo, PA-C      ascorbic acid  500 mg Oral Daily Dana Demo, PA-C      aspirin  81 mg Oral Daily Pepe Sutton MD      calcium carbonate-vitamin D  1 tablet Oral Daily With Breakfast Pepe Sutton MD      dextromethorphan-guaiFENesin  10 mL Oral Q4H PRN Dana Demo, PA-C      enoxaparin  40 mg Subcutaneous Q24H [address] [facility] ALEKSANDRA Romero MD      lactulose  20 g Oral Daily With Breakfast Elisabet Domínguez PA-C      lamoTRIgine  200 mg Oral Early Morning Dana Demo, PA-JAMIE      lamoTRIgine  250 mg Oral After Lunch Dana Demo, WHITNEY      lamoTRIgine  300 mg Oral HS Dana Demo, PA-JAMIE      levOCARNitine  330 mg Oral 4x Daily (with meals and at bedtime) Pepe Sutton MD      LORazepam  2 mg Intravenous Q6H PRN Dana Demo, PA-JAMIE      metoprolol tartrate  25 mg Oral BID Dana Demo, PA-JAMIE      nystatin   Topical BID Dana Demo, PA-JAMIE      ondansetron  4 mg Intravenous Q6H PRN Dana Demo, PA-JAMIE      oxymetazoline  2 spray Each Nare Q12H [address] Dana Demo, WHITNEY      polyethylene glycol  17 g Oral Daily PRN Dulce Kessler PA-C primidone  100 mg Oral Daily With Lunch Dana Asif PA-C      primidone  100 mg Oral After Breakfast Jonatan Romero MD      primidone  150 mg Oral HS Dana Asif PA-C      senna-docusate sodium  1 tablet Oral BID Dana Asif PA-C      sodium chloride  1 Application Nasal TID Doyal Oppenheim, MD      sodium chloride  2 spray Each Nare TID Doyal Oppenheim, MD      spironolactone  50 mg Oral Daily Pia Gan MD      torsemide  20 mg Oral BID Ana Rajan MD      vitamin E (tocopherol)  400 Units Oral Daily Dana Asif PA-C      zonisamide  100 mg Oral HS Dominga Woodruff PA-C          Today, Patient Was Seen By: Carolyn Adkins DO    **Please Note: This note may have been constructed using a voice recognition system. **

## 2023-12-03 NOTE — ASSESSMENT & PLAN NOTE
Lab Results   Component Value Date    EGFR 46 12/01/2023    EGFR 44 11/30/2023    EGFR 42 11/29/2023    CREATININE 1.63 (H) 12/01/2023    CREATININE 1.70 (H) 11/30/2023    CREATININE 1.75 (H) 11/29/2023     Nephrology following  Previous baseline reported to be 1.3-1.4  Creatinine has been stable around 1.5-1.7 likely new baseline per Nephrology   Repeat bmp in am

## 2023-12-03 NOTE — ASSESSMENT & PLAN NOTE
Continue home seizure medications with seizure precautions  His last seizure was 2-3 days prior to admission  The last few times he was hospitalized for seizure the AEDs were not changed because the breakthrough seizure etiology were infections. Lamictal and primidone levels are therapeutic.  Zonesamide was subtherapeutic ; will recheck 12/3 morning lab

## 2023-12-03 NOTE — PROGRESS NOTES
[address] [facility]  Progress Note  Name: Justyn Hernandez  MRN: [id number]  Unit/Bed#: -16 I Date of Admission: 11/17/2023   Date of Service: 12/3/2023 I Hospital Day: 16    Assessment/Plan   * Acute kidney injury superimposed on chronic kidney disease 3  Assessment & Plan  Lab Results   Component Value Date    EGFR 46 12/01/2023    EGFR 44 11/30/2023    EGFR 42 11/29/2023    CREATININE 1.63 (H) 12/01/2023    CREATININE 1.70 (H) 11/30/2023    CREATININE 1.75 (H) 11/29/2023     Nephrology following  Previous baseline reported to be 1.3-1.4  Creatinine has been stable around 1.5-1.7 likely new baseline per Nephrology   Repeat bmp in am     Pleural effusion on right  Assessment & Plan  Patient with worsening shortness of breath for the past week falling asleep more often  Previously utilize oxygen 2 L only at nighttime, family endorsing he has been utilizing throughout the day prior to admission as well  Imaging showing loculated right-sided moderate effusion with inability to rule out pneumonia on imaging  Labs without leukocytosis, Pro-Janusz was mildly elevated, finished a course of antibiotics for pneumonia x 7 days  Due to loculated effusion IR consulted for thoracentesis which was completed 11/20  There is no significant loculation noted on ultrasound necessitating a chest tube  860 mL dark isidro-colored right pleural fluid  Fluid studies no bacterial growth - likely transudative secondary to CHF/cirrhosis   Serial CXR with diuretics stable on repeat imaging 11/30    Revaluate with Xray chest PA& Lateral 12/03 appears stable on review of imaging will trend for final reading; and consider repeate imaging in 2-3 month after discharge , outpatient , to revaluate especially if symptomatic     Falls  Assessment & Plan  Frequent falls for his entire life because of unsteady gait according to the mother's medical history list  Fall precautions  Emergency room trauma eval no acute injury  Due to patient overall medical conditions, social issues, patient is in need of extensive care. Case management on board for dispo planning    Nonintractable generalized idiopathic epilepsy without status epilepticus [facility])  Assessment & Plan  Continue home seizure medications with seizure precautions  His last seizure was 2-3 days prior to admission  The last few times he was hospitalized for seizure the AEDs were not changed because the breakthrough seizure etiology were infections. Lamictal and primidone levels are therapeutic. Zonesamide was subtherapeutic ; repeat 12/3 pending      Acute blood loss anemia  Assessment & Plan  Secondary to recurrent epistaxis. ENT consulted -recommending conservative management  Patient is on aspirin and Lovenox, continue to monitor  Hemoglobin has been stable with ferrous sulfate  Repeat cbc in am     Chronic respiratory failure with hypercapnia (HCC)  Assessment & Plan  Wears 2L O2 QHS, continue  Likely has a chronic hypercapnia given the elevated bicarbonate.  Kyphosis causing chronic limited chest expansion  Did require 3 L overnight rather than 2L   Chest x-ray showing reaccumulation of pleural effusion on the right side -diuretics have been adjusted, see under pleural effusion    Liver cirrhosis ([address])  Assessment & Plan  Cirrhotic morphology of the liver chronic problem  Ammonia is normal there is no ascites  Continue lactulose daily -re-time for AM patient has been refusing at night as he does not want to have bowel movements during bedtime  Aldactone resume at lower dose, 50 mg daily- uptitrate as able per nephro    Acute on chronic heart failure with preserved ejection fraction [facility])  Assessment & Plan  Patient previously maintained on Lasix as an outpatient -during a nursing home admission it was decreased to 40 mg daily given his kidney function  Now proBNP elevated  2D echo done 2022 with mild concentric hypertrophy and EF 60 to 65%  Updated echo this admission with mild concentric left ventricular hypertrophy, LVEF 63% with normal diastolic function -overall similar from 2022 without significant change  Is s/p IR guided thoracentesis 11/20 due to loculated effusion  Nephrology following and aiding with diuretic regimen  Patient still hypervolemic, does have chronic lymphedema in his legs as well which contributes to exam  Currently maintaining on torsemide 20 mg BID  & spironolactone 50 mg qd    Dysphagia  Assessment & Plan  Continue mechanical soft diet from prior  Aspiration precautions    Developmental delay  Assessment & Plan  Daily involvement with mother  Supportive care  PT/OT/ST recommending STR   Due to social situation, patient will need skilled nursing facility, case management on board (patient was living with his mother, mother recently hospitalized due to medical issue and got discharged to skilled nursing facility. No other family member is able to take care of the patient.)               VTE Pharmacologic Prophylaxis: VTE Score: 5 High Risk (Score >/= 5) - Pharmacological DVT Prophylaxis Ordered: enoxaparin (Lovenox). Sequential Compression Devices Ordered. Mobility:   Basic Mobility Inpatient Raw Score: 6  JH-HLM Goal: 2: Bed activities/Dependent transfer  JH-HLM Achieved: 2: Bed activities/Dependent transfer  HLM Goal achieved. Continue to encourage appropriate mobility. Patient Centered Rounds: I performed bedside rounds with nursing staff today. Discussions with Specialists or Other Care Team Provider: last nephrology note reviewed     Education and Discussions with Family / Patient: Updated  (mother) via phone. Total Time Spent on Date of Encounter in care of patient: 25 mins.  This time was spent on one or more of the following: performing physical exam; counseling and coordination of care; obtaining or reviewing history; documenting in the medical record; reviewing/ordering tests, medications or procedures; communicating with other healthcare professionals and discussing with patient's family/caregivers. Current Length of Stay: 16 day(s)  Current Patient Status: Inpatient   Certification Statement: The patient will continue to require additional inpatient hospital stay due to medically stable pending placement   Discharge Plan:  medically stable pending safe dispo plan per case management     Code Status: Level 1 - Full Code    Subjective:   Patient denies trouble breathing or abdominal pain. Patient reporting his feet feel itchy. Has no other complaints at this time. Objective:     Vitals:   Temp (24hrs), Av.6 °F (36.4 °C), Min:97.5 °F (36.4 °C), Max:97.7 °F (36.5 °C)    Temp:  [97.5 °F (36.4 °C)-97.7 °F (36.5 °C)] 97.5 °F (36.4 °C)  HR:  [66-74] 66  Resp:  [17-18] 18  BP: (120-140)/(70-82) 140/82  SpO2:  [96 %-97 %] 96 %  Body mass index is 39.85 kg/m². Input and Output Summary (last 24 hours): Intake/Output Summary (Last 24 hours) at 12/3/2023 0900  Last data filed at 12/3/2023 0900  Gross per 24 hour   Intake 480 ml   Output --   Net 480 ml       Physical Exam:   Physical Exam  Vitals and nursing note reviewed. Constitutional:       General: He is not in acute distress. Appearance: He is well-developed. He is obese. HENT:      Head: Normocephalic and atraumatic. Eyes:      Conjunctiva/sclera: Conjunctivae normal.   Cardiovascular:      Rate and Rhythm: Normal rate and regular rhythm. Heart sounds: No murmur heard. Pulmonary:      Effort: Pulmonary effort is normal. No tachypnea, accessory muscle usage or respiratory distress. Breath sounds: Examination of the right-middle field reveals decreased breath sounds. Examination of the right-lower field reveals decreased breath sounds. Decreased breath sounds present. Abdominal:      Palpations: Abdomen is soft. Tenderness: There is no abdominal tenderness. Musculoskeletal:         General: No swelling. Cervical back: Neck supple. Skin:     General: Skin is warm and dry. Capillary Refill: Capillary refill takes less than 2 seconds. Neurological:      Mental Status: He is alert. Mental status is at baseline.    Psychiatric:         Mood and Affect: Mood normal.           Additional Data:     Labs:  Results from last 7 days   Lab Units 11/29/23  0533   WBC Thousand/uL 5.21   HEMOGLOBIN g/dL 10.4*   HEMATOCRIT % 33.1*   PLATELETS Thousands/uL 161   NEUTROS PCT % 76*   LYMPHS PCT % 14   MONOS PCT % 9   EOS PCT % 0     Results from last 7 days   Lab Units 12/01/23  0344   SODIUM mmol/L 139   POTASSIUM mmol/L 4.4   CHLORIDE mmol/L 101   CO2 mmol/L 33*   BUN mg/dL 31*   CREATININE mg/dL 1.63*   ANION GAP mmol/L 5   CALCIUM mg/dL 9.1   GLUCOSE RANDOM mg/dL 87                       Lines/Drains:  Invasive Devices       Peripheral Intravenous Line  Duration             Peripheral IV 12/01/23 Left Wrist 2 days                          Imaging: Personally reviewed the following imaging: chest xray    Recent Cultures (last 7 days):         Last 24 Hours Medication List:   Current Facility-Administered Medications   Medication Dose Route Frequency Provider Last Rate    acetaminophen  650 mg Oral Q6H PRN Danatuan Stringero, PA-C      ascorbic acid  500 mg Oral Daily Dana Asif PA-C      aspirin  81 mg Oral Daily Ashtyn Romero MD      calcium carbonate-vitamin D  1 tablet Oral Daily With Breakfast Gunner Romero MD      dextromethorphan-guaiFENesin  10 mL Oral Q4H PRN Danatuan Stringero, PA-C      enoxaparin  40 mg Subcutaneous Q24H [address] Ashtyn Romero MD      lactulose  20 g Oral Daily With Breakfast Andrei Brown PA-C      lamoTRIgine  200 mg Oral Early Morning Danatuan Stringero, PA-JAMIE      lamoTRIgine  250 mg Oral After Lunch REMBERTO Joseph-JAMIE      lamoTRIgine  300 mg Oral HS Dana Asif PA-C      levOCARNitine  330 mg Oral 4x Daily (with meals and at bedtime) Marva Castleman, MD      LORazepam  2 mg Intravenous Q6H PRN Danatuan Asif, PA-C      metoprolol tartrate  25 mg Oral BID Dana Asif PA-C      nystatin   Topical BID Dana Asif PA-C      ondansetron  4 mg Intravenous Q6H PRN Dana Asif PA-C      oxymetazoline  2 spray Each Nare Q12H [address] Dana Asif PA-C      polyethylene glycol  17 g Oral Daily PRN Dana Asif PA-C      primidone  100 mg Oral Daily With Lunch Dana Asif PA-C      primidone  100 mg Oral After Breakfast Macie Angel Romero MD      primidone  150 mg Oral HS Dana Asif PA-C      senna-docusate sodium  1 tablet Oral BID Dana Asif PA-C      sodium chloride  1 Application Nasal TID Ed Single, MD      sodium chloride  2 spray Each Nare TID Ed MD Christopher      spironolactone  50 mg Oral Daily Emerald Alicia MD      torsemide  40 mg Oral Daily Riley Freeman DO      vitamin E (tocopherol)  400 Units Oral Daily Dana Asif PA-C      zonisamide  100 mg Oral HS Audrey Kelly PA-C          Today, Patient Was Seen By: NADEGE Beth    **Please Note: This note may have been constructed using a voice recognition system. **

## 2023-12-04 LAB
ALBUMIN UR ELPH-MCNC: 100 %
ALPHA1 GLOB MFR UR ELPH: 0 %
ALPHA2 GLOB MFR UR ELPH: 0 %
ANION GAP SERPL CALCULATED.3IONS-SCNC: 5 MMOL/L
B-GLOBULIN MFR UR ELPH: 0 %
BASOPHILS # BLD AUTO: 0.01 THOUSANDS/ÂΜL (ref 0–0.1)
BASOPHILS NFR BLD AUTO: 0 % (ref 0–1)
BUN SERPL-MCNC: 30 MG/DL (ref 5–25)
CALCIUM SERPL-MCNC: 9.3 MG/DL (ref 8.4–10.2)
CHLORIDE SERPL-SCNC: 100 MMOL/L (ref 96–108)
CO2 SERPL-SCNC: 37 MMOL/L (ref 21–32)
CREAT SERPL-MCNC: 1.76 MG/DL (ref 0.6–1.3)
EOSINOPHIL # BLD AUTO: 0 THOUSAND/ÂΜL (ref 0–0.61)
EOSINOPHIL NFR BLD AUTO: 0 % (ref 0–6)
ERYTHROCYTE [DISTWIDTH] IN BLOOD BY AUTOMATED COUNT: 15.4 % (ref 11.6–15.1)
GAMMA GLOB MFR UR ELPH: 0 %
GFR SERPL CREATININE-BSD FRML MDRD: 42 ML/MIN/1.73SQ M
GLUCOSE SERPL-MCNC: 91 MG/DL (ref 65–140)
HCT VFR BLD AUTO: 33.4 % (ref 36.5–49.3)
HGB BLD-MCNC: 10.3 G/DL (ref 12–17)
IMM GRANULOCYTES # BLD AUTO: 0.02 THOUSAND/UL (ref 0–0.2)
IMM GRANULOCYTES NFR BLD AUTO: 0 % (ref 0–2)
LYMPHOCYTES # BLD AUTO: 0.64 THOUSANDS/ÂΜL (ref 0.6–4.47)
LYMPHOCYTES NFR BLD AUTO: 12 % (ref 14–44)
MAGNESIUM SERPL-MCNC: 1.8 MG/DL (ref 1.9–2.7)
MCH RBC QN AUTO: 31.9 PG (ref 26.8–34.3)
MCHC RBC AUTO-ENTMCNC: 30.8 G/DL (ref 31.4–37.4)
MCV RBC AUTO: 103 FL (ref 82–98)
MONOCYTES # BLD AUTO: 0.42 THOUSAND/ÂΜL (ref 0.17–1.22)
MONOCYTES NFR BLD AUTO: 8 % (ref 4–12)
NEUTROPHILS # BLD AUTO: 4.16 THOUSANDS/ÂΜL (ref 1.85–7.62)
NEUTS SEG NFR BLD AUTO: 80 % (ref 43–75)
NRBC BLD AUTO-RTO: 0 /100 WBCS
PLATELET # BLD AUTO: 174 THOUSANDS/UL (ref 149–390)
PMV BLD AUTO: 10.7 FL (ref 8.9–12.7)
POTASSIUM SERPL-SCNC: 3.9 MMOL/L (ref 3.5–5.3)
PROT PATTERN UR ELPH-IMP: NORMAL
PROT UR-MCNC: 5.2 MG/DL
RBC # BLD AUTO: 3.23 MILLION/UL (ref 3.88–5.62)
SODIUM SERPL-SCNC: 142 MMOL/L (ref 135–147)
WBC # BLD AUTO: 5.25 THOUSAND/UL (ref 4.31–10.16)
ZONISAMIDE SERPL-MCNC: 6.2 UG/ML (ref 10–40)

## 2023-12-04 PROCEDURE — 85025 COMPLETE CBC W/AUTO DIFF WBC: CPT | Performed by: INTERNAL MEDICINE

## 2023-12-04 PROCEDURE — 84166 PROTEIN E-PHORESIS/URINE/CSF: CPT | Performed by: PATHOLOGY

## 2023-12-04 PROCEDURE — 99232 SBSQ HOSP IP/OBS MODERATE 35: CPT | Performed by: STUDENT IN AN ORGANIZED HEALTH CARE EDUCATION/TRAINING PROGRAM

## 2023-12-04 PROCEDURE — 99231 SBSQ HOSP IP/OBS SF/LOW 25: CPT

## 2023-12-04 PROCEDURE — 80048 BASIC METABOLIC PNL TOTAL CA: CPT | Performed by: INTERNAL MEDICINE

## 2023-12-04 PROCEDURE — 83735 ASSAY OF MAGNESIUM: CPT | Performed by: INTERNAL MEDICINE

## 2023-12-04 RX ADMIN — ENOXAPARIN SODIUM 40 MG: 40 INJECTION SUBCUTANEOUS at 09:54

## 2023-12-04 RX ADMIN — PRIMIDONE 150 MG: 50 TABLET ORAL at 23:02

## 2023-12-04 RX ADMIN — LAMOTRIGINE 250 MG: 100 TABLET ORAL at 15:22

## 2023-12-04 RX ADMIN — Medication 1 APPLICATION: at 09:55

## 2023-12-04 RX ADMIN — SPIRONOLACTONE 50 MG: 25 TABLET ORAL at 09:55

## 2023-12-04 RX ADMIN — OXYMETAZOLINE HYDROCHLORIDE 2 SPRAY: 0.05 SPRAY NASAL at 09:58

## 2023-12-04 RX ADMIN — METOPROLOL TARTRATE 25 MG: 25 TABLET, FILM COATED ORAL at 20:38

## 2023-12-04 RX ADMIN — Medication 400 UNITS: at 09:55

## 2023-12-04 RX ADMIN — Medication 1 APPLICATION: at 20:39

## 2023-12-04 RX ADMIN — METOPROLOL TARTRATE 25 MG: 25 TABLET, FILM COATED ORAL at 09:55

## 2023-12-04 RX ADMIN — LEVOCARNITINE 330 MG: 1 SOLUTION ORAL at 12:32

## 2023-12-04 RX ADMIN — LEVOCARNITINE 330 MG: 1 SOLUTION ORAL at 15:22

## 2023-12-04 RX ADMIN — ZONISAMIDE 100 MG: 100 CAPSULE ORAL at 23:02

## 2023-12-04 RX ADMIN — OXYMETAZOLINE HYDROCHLORIDE 2 SPRAY: 0.05 SPRAY NASAL at 20:39

## 2023-12-04 RX ADMIN — Medication 1 APPLICATION: at 15:23

## 2023-12-04 RX ADMIN — LEVOCARNITINE 330 MG: 1 SOLUTION ORAL at 09:54

## 2023-12-04 RX ADMIN — SALINE NASAL SPRAY 2 SPRAY: 1.5 SOLUTION NASAL at 20:39

## 2023-12-04 RX ADMIN — PRIMIDONE 100 MG: 50 TABLET ORAL at 15:22

## 2023-12-04 RX ADMIN — TORSEMIDE 40 MG: 20 TABLET ORAL at 09:55

## 2023-12-04 RX ADMIN — Medication 1 TABLET: at 09:55

## 2023-12-04 RX ADMIN — NYSTATIN: 100000 POWDER TOPICAL at 09:55

## 2023-12-04 RX ADMIN — NYSTATIN: 100000 POWDER TOPICAL at 15:23

## 2023-12-04 RX ADMIN — LAMOTRIGINE 300 MG: 100 TABLET ORAL at 23:02

## 2023-12-04 RX ADMIN — LAMOTRIGINE 200 MG: 100 TABLET ORAL at 06:13

## 2023-12-04 RX ADMIN — ASPIRIN 81 MG: 81 TABLET, COATED ORAL at 09:55

## 2023-12-04 RX ADMIN — DOCUSATE SODIUM AND SENNOSIDES 1 TABLET: 8.6; 5 TABLET, FILM COATED ORAL at 15:22

## 2023-12-04 RX ADMIN — OXYCODONE HYDROCHLORIDE AND ACETAMINOPHEN 500 MG: 500 TABLET ORAL at 09:55

## 2023-12-04 RX ADMIN — PRIMIDONE 100 MG: 50 TABLET ORAL at 09:54

## 2023-12-04 RX ADMIN — SALINE NASAL SPRAY 2 SPRAY: 1.5 SOLUTION NASAL at 09:55

## 2023-12-04 RX ADMIN — DOCUSATE SODIUM AND SENNOSIDES 1 TABLET: 8.6; 5 TABLET, FILM COATED ORAL at 09:55

## 2023-12-04 RX ADMIN — LACTULOSE 20 G: 20 SOLUTION ORAL at 09:55

## 2023-12-04 RX ADMIN — SALINE NASAL SPRAY 2 SPRAY: 1.5 SOLUTION NASAL at 15:23

## 2023-12-04 RX ADMIN — LEVOCARNITINE 330 MG: 1 SOLUTION ORAL at 20:40

## 2023-12-04 NOTE — CASE MANAGEMENT
Case Management Discharge Planning Note    Patient name Griselda Flattery  Location [address] [facility] Deanna [phone] MRN [id number]  : 1965 Date 2023       Current Admission Date: 2023  Current Admission Diagnosis:Acute kidney injury superimposed on chronic kidney disease 3   Patient Active Problem List    Diagnosis Date Noted    Acute blood loss anemia 2023    Chronic respiratory failure with hypercapnia ([address]) 2023    Acute kidney injury superimposed on chronic kidney disease 3 2023    Liver cirrhosis ([address]) 2021    Abnormal finding on CT scan 2021    EDWIGE (acute kidney injury) ([address]) 2021    Pleural effusion on right 2021    History of COVID-19 2021    S/P pericardial window creation 2021    Acute on chronic heart failure with preserved ejection fraction ([address]) 2021    Lower extremity pain, left 2021    MRSA nasal colonization 2020    Developmental delay 2020    Dysphagia 2020    Acute encephalopathy 2020    Pneumonia due to infectious organism 01/10/2020    Nonintractable generalized idiopathic epilepsy without status epilepticus ([address]) 01/10/2020    Falls 01/10/2020    T wave inversion in EKG 01/10/2020    Essential hypertension 01/10/2020    RSV (acute bronchiolitis due to respiratory syncytial virus) 01/10/2020    Acute respiratory failure with hypoxia ([address]) 01/10/2020    Polyp of colon 03/15/2019      LOS (days): 17  Geometric Mean LOS (GMLOS) (days): 3.90  Days to GMLOS:-12.9     OBJECTIVE:  Risk of Unplanned Readmission Score: 21.26         Current admission status: Inpatient   Preferred Pharmacy:   [address] [place] [address] Road  [address] [facility] [address]  Phone: [phone] Fax: [phone]    Primary Care Provider: Dylan Smith DO    Primary Insurance: MEDICARE  Secondary Insurance: [employer]    DISCHARGE DETAILS:     Tori Nogueira here from [facility] to complete LOC Assessment on patient to forward to [facility] to secure STR for patient. 1600 CM emailed DHARMESH and secured copy of patients ISP to send to [facility] office. Copy placed in patients chart.

## 2023-12-04 NOTE — PLAN OF CARE
Problem: PAIN - ADULT  Goal: Verbalizes/displays adequate comfort level or baseline comfort level  Description: Interventions:  - Encourage patient to monitor pain and request assistance  - Assess pain using appropriate pain scale0-10  - Administer analgesics based on type and severity of pain and evaluate response  - Implement non-pharmacological measures as appropriate and evaluate response  - Consider cultural and social influences on pain and pain management  - Notify physician/advanced practitioner if interventions unsuccessful or patient reports new pain  Outcome: Progressing     Problem: INFECTION - ADULT  Goal: Absence or prevention of progression during hospitalization  Description: INTERVENTIONS:  - Assess and monitor for signs and symptoms of infection  - Monitor lab/diagnostic results  - Monitor all insertion sites, i.e. indwelling lines, tubes, and drains  - Monitor endotracheal if appropriate and nasal secretions for changes in amount and color  - [place] appropriate cooling/warming therapies per order  - Administer medications as ordered  - Instruct and encourage patient and family to use good hand hygiene technique  - Identify and instruct in appropriate isolation precautions for identified infection/condition  Outcome: Progressing     Problem: SAFETY ADULT  Goal: Patient will remain free of falls  Description: INTERVENTIONS:  - Educate patient/family on patient safety including physical limitations  - Instruct patient to call for assistance with activity   - Consult OT/PT to assist with strengthening/mobility   - Keep Call bell within reach  - Keep bed low and locked with side rails adjusted as appropriate  - Keep care items and personal belongings within reach  - Initiate and maintain comfort rounds  - Make Fall Risk Sign visible to staff  - Offer Toileting every 2 Hours, in advance of need  - Initiate/Maintain bed/chair alarm  - Obtain necessary fall risk management equipment:   - Apply yellow socks and bracelet for high fall risk patients  - Consider moving patient to room near nurses station  Outcome: Progressing  Goal: Maintain or return to baseline ADL function  Description: INTERVENTIONS:  -  Assess patient's ability to carry out ADLs; assess patient's baseline for ADL function and identify physical deficits which impact ability to perform ADLs (bathing, care of mouth/teeth, toileting, grooming, dressing, etc.)  - Assess/evaluate cause of self-care deficits   - Assess range of motion  - Assess patient's mobility; develop plan if impaired  - Assess patient's need for assistive devices and provide as appropriate  - Encourage maximum independence but intervene and supervise when necessary  - Involve family in performance of ADLs  - Assess for home care needs following discharge   - Consider OT consult to assist with ADL evaluation and planning for discharge  - Provide patient education as appropriate  Outcome: Progressing  Goal: Maintains/Returns to pre admission functional level  Description: INTERVENTIONS:  - Perform AM-PAC 6 Click Basic Mobility/ Daily Activity assessment daily.  - Set and communicate daily mobility goal to care team and patient/family/caregiver. - Collaborate with rehabilitation services on mobility goals if consulted  - Perform Range of Motion 3 times a day. - Reposition patient every 2 hours.   - Dangle patient 3 times a day  - Stand patient 3 times a day  - Ambulate patient 3 times a day  - Out of bed to chair 3 times a day   - Out of bed for meals 3 times a day  - Out of bed for toileting  - Record patient progress and toleration of activity level   Outcome: Progressing     Problem: DISCHARGE PLANNING  Goal: Discharge to home or other facility with appropriate resources  Description: INTERVENTIONS:  - Identify barriers to discharge w/patient and caregiver  - Arrange for needed discharge resources and transportation as appropriate  - Identify discharge learning needs (meds, wound care, etc.)  - Arrange for interpretive services to assist at discharge as needed  - Refer to Case Management Department for coordinating discharge planning if the patient needs post-hospital services based on physician/advanced practitioner order or complex needs related to functional status, cognitive ability, or social support system  Outcome: Progressing     Problem: Knowledge Deficit  Goal: Patient/family/caregiver demonstrates understanding of disease process, treatment plan, medications, and discharge instructions  Description: Complete learning assessment and assess knowledge base.   Interventions:  - Provide teaching at level of understanding  - Provide teaching via preferred learning methods  Outcome: Progressing     Problem: Prexisting or High Potential for Compromised Skin Integrity  Goal: Skin integrity is maintained or improved  Description: INTERVENTIONS:  - Identify patients at risk for skin breakdown  - Assess and monitor skin integrity  - Assess and monitor nutrition and hydration status  - Monitor labs   - Assess for incontinence   - Turn and reposition patient  - Assist with mobility/ambulation  - Relieve pressure over bony prominences  - Avoid friction and shearing  - Provide appropriate hygiene as needed including keeping skin clean and dry  - Evaluate need for skin moisturizer/barrier cream  - Collaborate with interdisciplinary team   - Patient/family teaching  - Consider wound care consult   Outcome: Progressing     Problem: RESPIRATORY - ADULT  Goal: Achieves optimal ventilation and oxygenation  Description: INTERVENTIONS:  - Assess for changes in respiratory status confusion SOB tachycardia  - Assess for changes in mentation and behavior  - Position to facilitate oxygenation and minimize respiratory effort  - Oxygen administered by appropriate delivery if ordered  - Initiate smoking cessation education as indicated  - Encourage broncho-pulmonary hygiene including cough, deep breathe, Incentive Spirometry  - Assess the need for suctioning and aspirate as needed  - Assess and instruct to report SOB or any respiratory difficulty  - Respiratory Therapy support as indicated  Outcome: Progressing

## 2023-12-04 NOTE — PROGRESS NOTES
NEPHROLOGY PROGRESS NOTE   Elissa Lane 66 y.o. male MRN: [id number]  Unit/Bed#: -01 Encounter: [id number]  Reason for Consult: EDWIGE    ASSESSMENT AND PLAN:  65 yo man with PMH of CKD G3 (baseline creatinine 1.3 to 1.4 mg/dL ), CHF, cirrhosis, epilepsy's, history of pericardial window with subsequent restrictive pericarditis, history of right-sided pleural effusion requiring chest tube, recurrent pneumonia, lymphedema, developmental delay, CHF, p/w lethargy. Nephrology is consulted for management of EDWIGE      #Non-Oliguric KDIGO EDWIGE stage 1 on Ckd G3a  Etiology: Likely secondary to poor oral intake  Baseline creatinine 1.3 to 1.4 mg/dL  Current creatinine: Plateauing at 1.6 to 1.7 mg/dL  Peak creatinine: 1.8 mg per  UA: No hematuria, leukocyturia  Renal imaging : No hydronephrosis  Treatment:  No or indication of dialysis at this time  Maintain MAP:  Over 65 mmHg if possible/avoid hypoperfusion:  Hold parameters on blood pressure medications  Avoid nephrotoxic agents such as NSAIDs, and IV contrast if possible. Avoid opioids   Adjust medications to GFR    #CKD G3a  Baseline creatinine: 1.3 to 1.4 mg/dL  Etiology: Recurrent EDWIGE possible ATN      #Acid-base Disorder  serum HCO3 37 mmol/L  Metabolic alkalosis likely secondary to vascular contraction in the settings of diuretics     #Volume status/hypertension:  Volume: Hypervolemic with chronic lymphedema  Blood pressure: Normotensive, /62, goal less than 140/90  Recommend:  Low-sodium diet  Metoprolol 25 mg twice daily  Aldactone 50 mg  Torsemide 40      #Anemia:  Current hemoglobin: 10.3 mg/dL  Treatment:  Transfuse for hemoglobin less than 7.0 per primary service      # CHF  Diuretics as above      SUBJECTIVE:  Patient seen and examined at bedside.  No chest pain, shortness of breath, nausea, vomiting, abdominal pain or diarrhea    OBJECTIVE:  Current Weight: Weight - Scale: 110 kg (243 lb 9.7 oz)  Vitals:    12/04/23 0955   BP: 124/62   Pulse: 77 Resp:    Temp:    SpO2:        Intake/Output Summary (Last 24 hours) at 12/4/2023 1208  Last data filed at 12/4/2023 0949  Gross per 24 hour   Intake 180 ml   Output 862 ml   Net -682 ml     Wt Readings from Last 3 Encounters:   12/04/23 110 kg (243 lb 9.7 oz)   08/11/21 103 kg (226 lb 10.1 oz)   01/17/20 110 kg (242 lb 8.1 oz)     Temp Readings from Last 3 Encounters:   12/04/23 (!) 97.3 °F (36.3 °C)   08/11/21 98.5 °F (36.9 °C)   12/13/20 98.3 °F (36.8 °C) (Temporal)     BP Readings from Last 3 Encounters:   12/04/23 124/62   08/11/21 131/73   12/13/20 167/97     Pulse Readings from Last 3 Encounters:   12/04/23 77   08/11/21 73   12/13/20 72      General:  no acute distress at this time  Skin:  No acute rash  Eyes:  No scleral icterus and noninjected  ENT:  mucous membranes moist  Neck:  no carotid bruits  Chest:  Clear to auscultation percussion, good respiratory effort, no use of accessory respiratory muscles  CVS:  Regular rate and rhythm without rub   Abdomen:  soft and nontender   Extremities: lower extremity edema  Neuro:  No gross focality  Psych: Lethargic      Medications:    Current Facility-Administered Medications:     acetaminophen (TYLENOL) tablet 650 mg, 650 mg, Oral, Q6H PRN, Dana Asif PA-C, 650 mg at 11/28/23 2039    ascorbic acid (VITAMIN C) tablet 500 mg, 500 mg, Oral, Daily, Dana Asif PA-C, 500 mg at 12/04/23 3499    aspirin (ECOTRIN LOW STRENGTH) EC tablet 81 mg, 81 mg, Oral, Daily, Jessica Romero MD, 81 mg at 12/04/23 0955    calcium carbonate-vitamin D 500 mg-5 mcg tablet 1 tablet, 1 tablet, Oral, Daily With Breakfast, Jai Ross MD, 1 tablet at 12/04/23 0955    dextromethorphan-guaiFENesin (ROBITUSSIN DM) oral syrup 10 mL, 10 mL, Oral, Q4H PRN, Dana Asif PA-C    enoxaparin (LOVENOX) subcutaneous injection 40 mg, 40 mg, Subcutaneous, Q24H [facility], Ashtyngay Romero MD, 40 mg at 12/04/23 0954    lactulose (CHRONULAC) oral solution 20 g, 20 g, Oral, Daily With Breakfast, Bienvenido Huerta Steward Barthel, PA-C, 20 g at 12/04/23 2500    lamoTRIgine (LaMICtal) tablet 200 mg, 200 mg, Oral, Early Morning, Dana Asif PA-C, 200 mg at 12/04/23 5620    lamoTRIgine (LaMICtal) tablet 250 mg, 250 mg, Oral, After Lunch, Dana Asif PA-C, 250 mg at 12/03/23 1629    lamoTRIgine (LaMICtal) tablet 300 mg, 300 mg, Oral, HS, Dana Asif PA-C, 300 mg at 12/03/23 2220    levOCARNitine (CARNITOR) oral solution 330 mg, 330 mg, Oral, 4x Daily (with meals and at bedtime), Luke Dotson MD, 330 mg at 12/04/23 0954    LORazepam (ATIVAN) injection 2 mg, 2 mg, Intravenous, Q6H PRN, Dana Asif PA-C    metoprolol tartrate (LOPRESSOR) tablet 25 mg, 25 mg, Oral, BID, Dana Asif PA-C, 25 mg at 12/04/23 0955    nystatin (MYCOSTATIN) powder, , Topical, BID, Dana Asif PA-C, Given at 12/04/23 0955    ondansetron (ZOFRAN) injection 4 mg, 4 mg, Intravenous, Q6H PRN, Dana Asif PA-C    oxymetazoline (AFRIN) 0.05 % nasal spray 2 spray, 2 spray, Each Nare, Q12H [address], Dana Asif PA-C, 2 spray at 12/04/23 0958    polyethylene glycol (MIRALAX) packet 17 g, 17 g, Oral, Daily PRN, Dana Asif PA-C    primidone (MYSOLINE) tablet 100 mg, 100 mg, Oral, Daily With Lunch, Dana Asif PA-C, 100 mg at 12/03/23 1629    primidone (MYSOLINE) tablet 100 mg, 100 mg, Oral, After Breakfast, Luke Dotson MD, 100 mg at 12/04/23 0954    primidone (MYSOLINE) tablet 150 mg, 150 mg, Oral, HS, Dana Asif PA-C, 150 mg at 12/03/23 2219    senna-docusate sodium (SENOKOT S) 8.6-50 mg per tablet 1 tablet, 1 tablet, Oral, BID, Dana Asif PA-C, 1 tablet at 12/04/23 0955    sodium chloride (AYR SALINE NASAL) nasal gel 1 Application, 1 Application, Nasal, TID, Kelly Caldwell MD, 1 Application at 53/80/22 0955    sodium chloride (OCEAN) 0.65 % nasal spray 2 spray, 2 spray, Each Nare, TID, Kelly Caldwell MD, 2 spray at 12/04/23 0955    spironolactone (ALDACTONE) tablet 50 mg, 50 mg, Oral, Daily, [place] Charleen Romero MD, 50 mg at 12/04/23 0955    torsemide (DEMADEX) tablet 40 mg, 40 mg, Oral, Daily, Berneda Rodney, DO, 40 mg at 12/04/23 0955    vitamin E (TOCOPHEROL) capsule 400 Units, 400 Units, Oral, Daily, Dana WHITNEY Asif, 400 Units at 12/04/23 0955    zonisamide (ZONEGRAN) capsule 100 mg, 100 mg, Oral, HS, Dana StringerMARTINA diazC, 100 mg at 12/03/23 2220    Laboratory Results:  Results from last 7 days   Lab Units 12/04/23  0630 12/01/23  0344 11/30/23  0447 11/29/23  0533 11/28/23  0457   WBC Thousand/uL 5.25  --   --  5.21  --    HEMOGLOBIN g/dL 10.3*  --   --  10.4*  --    HEMATOCRIT % 33.4*  --   --  33.1*  --    PLATELETS Thousands/uL 174  --   --  161  --    SODIUM mmol/L 142 139 139 140 139   POTASSIUM mmol/L 3.9 4.4 3.9 3.7 3.9   CHLORIDE mmol/L 100 101 101 100 101   CO2 mmol/L 37* 33* 33* 34* 34*   BUN mg/dL 30* 31* 31* 32* 31*   CREATININE mg/dL 1.76* 1.63* 1.70* 1.75* 1.66*   CALCIUM mg/dL 9.3 9.1 9.0 9.1 8.9   MAGNESIUM mg/dL 1.8*  --  2.1 1.9  --        XR chest portable   Final Result by Oneyda Palmer MD (11/30 1521)      Pleural-parenchymal density on the right without significant change. Workstation performed: [id number]         XR chest portable   Final Result by Siria Andre MD (11/28 1127)      Large (enlarging) right pleural effusion                  Workstation performed: [id number]         IR IN-Patient Thoracentesis   Final Result by Brisa Fernandez MD (11/20 1522)   Impression:   1. Successful ultrasound-guided thoracentesis yielding 860 mL of dark isidro-colored right pleural fluid. No chest tube was placed at this time as no septations identified on ultrasound suggesting loculation . Workstation performed: [id number]         US kidney and bladder   Final Result by Shilpa De León MD (11/20 1141)      Unremarkable renal ultrasound. Resident: Samuel Rosa, the attending radiologist, have reviewed the images and agree with the final report above.       Workstation performed: [id number]         XR chest portable   Final Result by Lino Askew MD (11/18 1110)      Moderate right pleural effusion with associated compressive atelectasis. However underlying or superimposed pneumonia cannot be excluded in the appropriate clinical setting. Small left pleural effusion. Resident: Heidi Hernandez, the attending radiologist, have reviewed the images and agree with the final report above. Workstation performed: [id number]         CT head without contrast   Final Result by Abrahan Maki MD (11/17 2007)      No acute intracranial abnormality. Workstation performed: [id number]         CT cervical spine without contrast   Final Result by Abrahan Maki MD (11/17 2009)      No cervical spine fracture or traumatic malalignment. Workstation performed: [id number]         CT chest abdomen pelvis wo contrast   Final Result by Abrahan Maki MD (11/17 2020)      No evidence of acute traumatic injury throughout the chest, abdomen or pelvis. Moderate size loculated right pleural effusion. Right middle and lower lobe consolidation compatible with compressive atelectasis. Underlying pneumonia should be excluded clinically. Mild wall thickening and edema with surrounding fluid at the proximal duodenum. Correlate for signs and symptoms of peptic ulcer disease                  Workstation performed: [id number]         XR chest portable    (Results Pending)       Portions of the record may have been created with voice recognition software. Occasional wrong word or "sound a like" substitutions may have occurred due to the inherent limitations of voice recognition software. Read the chart carefully and recognize, using context, where substitutions have occurred.

## 2023-12-04 NOTE — ASSESSMENT & PLAN NOTE
Patient previously maintained on Lasix as an outpatient -during a nursing home admission it was decreased to 40 mg daily given his kidney function  Now proBNP elevated  2D echo done 2022 with mild concentric hypertrophy and EF 60 to 65%  Updated echo this admission with mild concentric left ventricular hypertrophy, LVEF 63% with normal diastolic function -overall similar from 2022 without significant change  Is s/p IR guided thoracentesis 11/20 due to loculated effusion  Nephrology following and aiding with diuretic regimen  Patient still hypervolemic, does have chronic lymphedema in his legs as well which contributes to exam  Currently maintaining on torsemide 40 mg daily & spironolactone 50 mg qd

## 2023-12-04 NOTE — PROGRESS NOTES
[address] [facility]  Progress Note  Name: Jason Lesches  MRN: [id number]  Unit/Bed#: -24 I Date of Admission: 11/17/2023   Date of Service: 12/4/2023 I Hospital Day: 17    Assessment/Plan   Pleural effusion on right  Assessment & Plan  Patient with worsening shortness of breath for the past week falling asleep more often  Previously utilize oxygen 2 L only at nighttime, family endorsing he has been utilizing throughout the day prior to admission as well  Imaging showing loculated right-sided moderate effusion with inability to rule out pneumonia on imaging  Labs without leukocytosis, Pro-Janusz was mildly elevated, finished a course of antibiotics for pneumonia x 7 days  Due to loculated effusion IR consulted for thoracentesis which was completed 11/20  There is no significant loculation noted on ultrasound necessitating a chest tube  860 mL dark isidro-colored right pleural fluid  Fluid studies no bacterial growth - likely transudative secondary to CHF/cirrhosis   Serial CXR with diuretics stable on repeat imaging 11/30    Revaluate with Xray chest PA& Lateral 12/03 appears stable on review of imaging will trend for final reading; and consider repeate imaging in 2-3 month after discharge , outpatient , to revaluate especially if symptomatic     Acute blood loss anemia  Assessment & Plan  Secondary to recurrent epistaxis. ENT consulted -recommending conservative management  Patient is on aspirin and Lovenox, continue to monitor  Hemoglobin has been stable with ferrous sulfate      Chronic respiratory failure with hypercapnia (HCC)  Assessment & Plan  Wears 2L O2 QHS, continue  Likely has a chronic hypercapnia given the elevated bicarbonate.  Kyphosis causing chronic limited chest expansion  Did require 3 L overnight rather than 2L   Chest x-ray showing reaccumulation of pleural effusion on the right side -diuretics have been adjusted, see under pleural effusion    Liver cirrhosis [facility])  Assessment & Plan  Cirrhotic morphology of the liver chronic problem  Ammonia is normal there is no ascites  Continue lactulose daily -re-time for AM patient has been refusing at night as he does not want to have bowel movements during bedtime  Aldactone resume at lower dose, 50 mg daily- uptitrate as able per nephro    Acute on chronic heart failure with preserved ejection fraction [facility])  Assessment & Plan  Patient previously maintained on Lasix as an outpatient -during a nursing home admission it was decreased to 40 mg daily given his kidney function  Now proBNP elevated  2D echo done 2022 with mild concentric hypertrophy and EF 60 to 65%  Updated echo this admission with mild concentric left ventricular hypertrophy, LVEF 63% with normal diastolic function -overall similar from 2022 without significant change  Is s/p IR guided thoracentesis 11/20 due to loculated effusion  Nephrology following and aiding with diuretic regimen  Patient still hypervolemic, does have chronic lymphedema in his legs as well which contributes to exam  Currently maintaining on torsemide 40 mg daily & spironolactone 50 mg qd    Dysphagia  Assessment & Plan  Continue mechanical soft diet from prior  Aspiration precautions    Developmental delay  Assessment & Plan  Daily involvement with mother  Supportive care  PT/OT/ST recommending STR   Due to social situation, patient will need skilled nursing facility, case management on board (patient was living with his mother, mother recently hospitalized due to medical issue and got discharged to skilled nursing facility. No other family member is able to take care of the patient.)    Falls  Assessment & Plan  Frequent falls for his entire life because of unsteady gait according to the mother's medical history list  Fall precautions  Emergency room trauma eval no acute injury  Due to patient overall medical conditions, social issues, patient is in need of extensive care.     Case management on board for dispo planning    Nonintractable generalized idiopathic epilepsy without status epilepticus ([address])  Assessment & Plan  Continue home seizure medications with seizure precautions  His last seizure was 2-3 days prior to admission  The last few times he was hospitalized for seizure the AEDs were not changed because the breakthrough seizure etiology were infections. Lamictal and primidone levels are therapeutic. Zonesamide was subtherapeutic ; repeat 12/3 pending      * Acute kidney injury superimposed on chronic kidney disease 3  Assessment & Plan  Lab Results   Component Value Date    EGFR 42 12/04/2023    EGFR 46 12/01/2023    EGFR 44 11/30/2023    CREATININE 1.76 (H) 12/04/2023    CREATININE 1.63 (H) 12/01/2023    CREATININE 1.70 (H) 11/30/2023     Nephrology following  Previous baseline reported to be 1.3-1.4  Creatinine has been stable around 1.5-1.7 likely new baseline per Nephrology   Repeat bmp in am                VTE Pharmacologic Prophylaxis: VTE Score: 5 High Risk (Score >/= 5) - Pharmacological DVT Prophylaxis Ordered: enoxaparin (Lovenox). Sequential Compression Devices Ordered. Mobility:   Basic Mobility Inpatient Raw Score: 8  -HLM Goal: 3: Sit at edge of bed  -HLM Achieved: 4: Move to chair/commode  HLM Goal achieved. Continue to encourage appropriate mobility. Patient Centered Rounds: I performed bedside rounds with nursing staff today. Discussions with Specialists or Other Care Team Provider: CM    Education and Discussions with Family / Patient:  Unable to contact today, no medical updates. Total Time Spent on Date of Encounter in care of patient:  mins.  This time was spent on one or more of the following: performing physical exam; counseling and coordination of care; obtaining or reviewing history; documenting in the medical record; reviewing/ordering tests, medications or procedures; communicating with other healthcare professionals and discussing with patient's family/caregivers. Current Length of Stay: 17 day(s)  Current Patient Status: Inpatient   Certification Statement: The patient will continue to require additional inpatient hospital stay due to ending Co. evaluation and stay evaluation for level of care  Discharge Plan:  Pending above assessments, medically stable for discharge to rehab    Code Status: Level 1 - Full Code    Subjective:   Seen and examined. No new concerns overall the same. Objective:     Vitals:   Temp (24hrs), Av.4 °F (36.3 °C), Min:97.3 °F (36.3 °C), Max:97.5 °F (36.4 °C)    Temp:  [97.3 °F (36.3 °C)-97.5 °F (36.4 °C)] 97.3 °F (36.3 °C)  HR:  [59-85] 59  Resp:  [18] 18  BP: (116-141)/(59-80) 116/59  SpO2:  [94 %-95 %] 95 %  Body mass index is 39.32 kg/m². Input and Output Summary (last 24 hours): Intake/Output Summary (Last 24 hours) at 2023 1724  Last data filed at 2023 1707  Gross per 24 hour   Intake 420 ml   Output 662 ml   Net -242 ml       Physical Exam:   Physical Exam  Vitals and nursing note reviewed. Constitutional:       General: He is not in acute distress. Appearance: He is well-developed. He is obese. HENT:      Head: Normocephalic and atraumatic. Eyes:      Conjunctiva/sclera: Conjunctivae normal.   Cardiovascular:      Rate and Rhythm: Normal rate and regular rhythm. Heart sounds: No murmur heard. Pulmonary:      Effort: Pulmonary effort is normal. No tachypnea, accessory muscle usage or respiratory distress. Breath sounds: Examination of the right-middle field reveals decreased breath sounds. Examination of the right-lower field reveals decreased breath sounds. Decreased breath sounds present. Abdominal:      Palpations: Abdomen is soft. Tenderness: There is no abdominal tenderness. Musculoskeletal:         General: No swelling (chronic lymphedema). Cervical back: Neck supple. Skin:     General: Skin is warm and dry.       Capillary Refill: Capillary refill takes less than 2 seconds. Neurological:      Mental Status: He is alert. Mental status is at baseline. Psychiatric:         Mood and Affect: Mood normal.          Additional Data:     Labs:  Results from last 7 days   Lab Units 12/04/23  0630   WBC Thousand/uL 5.25   HEMOGLOBIN g/dL 10.3*   HEMATOCRIT % 33.4*   PLATELETS Thousands/uL 174   NEUTROS PCT % 80*   LYMPHS PCT % 12*   MONOS PCT % 8   EOS PCT % 0     Results from last 7 days   Lab Units 12/04/23  0630   SODIUM mmol/L 142   POTASSIUM mmol/L 3.9   CHLORIDE mmol/L 100   CO2 mmol/L 37*   BUN mg/dL 30*   CREATININE mg/dL 1.76*   ANION GAP mmol/L 5   CALCIUM mg/dL 9.3   GLUCOSE RANDOM mg/dL 91                       Lines/Drains:  Invasive Devices       Peripheral Intravenous Line  Duration             Peripheral IV 12/01/23 Left Wrist 3 days                          Imaging: No pertinent imaging reviewed.     Recent Cultures (last 7 days):         Last 24 Hours Medication List:   Current Facility-Administered Medications   Medication Dose Route Frequency Provider Last Rate    acetaminophen  650 mg Oral Q6H PRN Dana Asif PA-C      ascorbic acid  500 mg Oral Daily Dana Asif PA-C      aspirin  81 mg Oral Daily Felipe Renteria MD      calcium carbonate-vitamin D  1 tablet Oral Daily With Breakfast Felipe Renteria MD      dextromethorphan-guaiFENesin  10 mL Oral Q4H PRN Dana Asif PA-C      enoxaparin  40 mg Subcutaneous Q24H [address] [facility] ALEKSANDRA Romero MD      lactulose  20 g Oral Daily With Breakfast Gregroy Azevedo PA-C      lamoTRIgine  200 mg Oral Early Morning Dana Asif PA-C      lamoTRIgine  250 mg Oral After Lunch Dana Asif PA-C      lamoTRIgine  300 mg Oral HS Dana Asif PA-C      levOCARNitine  330 mg Oral 4x Daily (with meals and at bedtime) Felipe Renteria MD      LORazepam  2 mg Intravenous Q6H PRN Dana Asif PA-C      metoprolol tartrate  25 mg Oral BID Dana REMBERTO Asif-JAMIE      nystatin   Topical BID REMBERTO Joseph-JAMIE      ondansetron  4 mg Intravenous Q6H PRN Donta Hobbs PA-C      oxymetazoline  2 spray Each Nare Q12H [facility] Dana Asif PA-C      polyethylene glycol  17 g Oral Daily PRN Dana Asif PA-C      primidone  100 mg Oral Daily With Lunch Dana Asif PA-C      primidone  100 mg Oral After Breakfast Barrett Romero MD      primidone  150 mg Oral HS Dana Asif PA-C      senna-docusate sodium  1 tablet Oral BID Dana Asif PA-C      sodium chloride  1 Application Nasal TID Manny Gutierrez MD      sodium chloride  2 spray Each Nare TID Manny Gutierrez MD      spironolactone  50 mg Oral Daily Narinder Bashir MD      torsemide  40 mg Oral Daily Lucila Bailey DO      vitamin E (tocopherol)  400 Units Oral Daily Dana Asif PA-C      zonisamide  100 mg Oral HS Donta Hobbs PA-C          Today, Patient Was Seen By: Felipa Goldmann, PA-C    **Please Note: This note may have been constructed using a voice recognition system. **

## 2023-12-04 NOTE — PLAN OF CARE
Problem: PAIN - ADULT  Goal: Verbalizes/displays adequate comfort level or baseline comfort level  Description: Interventions:  - Encourage patient to monitor pain and request assistance  - Assess pain using appropriate pain scale0-10  - Administer analgesics based on type and severity of pain and evaluate response  - Implement non-pharmacological measures as appropriate and evaluate response  - Consider cultural and social influences on pain and pain management  - Notify physician/advanced practitioner if interventions unsuccessful or patient reports new pain  Outcome: Progressing     Problem: INFECTION - ADULT  Goal: Absence or prevention of progression during hospitalization  Description: INTERVENTIONS:  - Assess and monitor for signs and symptoms of infection  - Monitor lab/diagnostic results  - Monitor all insertion sites, i.e. indwelling lines, tubes, and drains  - Monitor endotracheal if appropriate and nasal secretions for changes in amount and color  - [place] appropriate cooling/warming therapies per order  - Administer medications as ordered  - Instruct and encourage patient and family to use good hand hygiene technique  - Identify and instruct in appropriate isolation precautions for identified infection/condition  Outcome: Progressing     Problem: SAFETY ADULT  Goal: Patient will remain free of falls  Description: INTERVENTIONS:  - Educate patient/family on patient safety including physical limitations  - Instruct patient to call for assistance with activity   - Consult OT/PT to assist with strengthening/mobility   - Keep Call bell within reach  - Keep bed low and locked with side rails adjusted as appropriate  - Keep care items and personal belongings within reach  - Initiate and maintain comfort rounds  - Make Fall Risk Sign visible to staff  - Offer Toileting every 2 Hours, in advance of need  - Initiate/Maintain bed/chair alarm  - Obtain necessary fall risk management equipment:   - Apply yellow socks and bracelet for high fall risk patients  - Consider moving patient to room near nurses station  Outcome: Progressing  Goal: Maintain or return to baseline ADL function  Description: INTERVENTIONS:  -  Assess patient's ability to carry out ADLs; assess patient's baseline for ADL function and identify physical deficits which impact ability to perform ADLs (bathing, care of mouth/teeth, toileting, grooming, dressing, etc.)  - Assess/evaluate cause of self-care deficits   - Assess range of motion  - Assess patient's mobility; develop plan if impaired  - Assess patient's need for assistive devices and provide as appropriate  - Encourage maximum independence but intervene and supervise when necessary  - Involve family in performance of ADLs  - Assess for home care needs following discharge   - Consider OT consult to assist with ADL evaluation and planning for discharge  - Provide patient education as appropriate  Outcome: Progressing  Goal: Maintains/Returns to pre admission functional level  Description: INTERVENTIONS:  - Perform AM-PAC 6 Click Basic Mobility/ Daily Activity assessment daily.  - Set and communicate daily mobility goal to care team and patient/family/caregiver. - Collaborate with rehabilitation services on mobility goals if consulted  - Perform Range of Motion 3 times a day. - Reposition patient every 2 hours.   - Dangle patient 3 times a day  - Stand patient 3 times a day  - Ambulate patient 3 times a day  - Out of bed to chair 3 times a day   - Out of bed for meals 3 times a day  - Out of bed for toileting  - Record patient progress and toleration of activity level   Outcome: Progressing     Problem: DISCHARGE PLANNING  Goal: Discharge to home or other facility with appropriate resources  Description: INTERVENTIONS:  - Identify barriers to discharge w/patient and caregiver  - Arrange for needed discharge resources and transportation as appropriate  - Identify discharge learning needs (meds, wound care, etc.)  - Arrange for interpretive services to assist at discharge as needed  - Refer to Case Management Department for coordinating discharge planning if the patient needs post-hospital services based on physician/advanced practitioner order or complex needs related to functional status, cognitive ability, or social support system  Outcome: Progressing     Problem: Knowledge Deficit  Goal: Patient/family/caregiver demonstrates understanding of disease process, treatment plan, medications, and discharge instructions  Description: Complete learning assessment and assess knowledge base.   Interventions:  - Provide teaching at level of understanding  - Provide teaching via preferred learning methods  Outcome: Progressing     Problem: Prexisting or High Potential for Compromised Skin Integrity  Goal: Skin integrity is maintained or improved  Description: INTERVENTIONS:  - Identify patients at risk for skin breakdown  - Assess and monitor skin integrity  - Assess and monitor nutrition and hydration status  - Monitor labs   - Assess for incontinence   - Turn and reposition patient  - Assist with mobility/ambulation  - Relieve pressure over bony prominences  - Avoid friction and shearing  - Provide appropriate hygiene as needed including keeping skin clean and dry  - Evaluate need for skin moisturizer/barrier cream  - Collaborate with interdisciplinary team   - Patient/family teaching  - Consider wound care consult   Outcome: Progressing     Problem: RESPIRATORY - ADULT  Goal: Achieves optimal ventilation and oxygenation  Description: INTERVENTIONS:  - Assess for changes in respiratory status confusion SOB tachycardia  - Assess for changes in mentation and behavior  - Position to facilitate oxygenation and minimize respiratory effort  - Oxygen administered by appropriate delivery if ordered  - Initiate smoking cessation education as indicated  - Encourage broncho-pulmonary hygiene including cough, deep breathe, Incentive Spirometry  - Assess the need for suctioning and aspirate as needed  - Assess and instruct to report SOB or any respiratory difficulty  - Respiratory Therapy support as indicated  Outcome: Progressing

## 2023-12-04 NOTE — ASSESSMENT & PLAN NOTE
Lab Results   Component Value Date    EGFR 42 12/04/2023    EGFR 46 12/01/2023    EGFR 44 11/30/2023    CREATININE 1.76 (H) 12/04/2023    CREATININE 1.63 (H) 12/01/2023    CREATININE 1.70 (H) 11/30/2023     Nephrology following  Previous baseline reported to be 1.3-1.4  Creatinine has been stable around 1.5-1.7 likely new baseline per Nephrology   Repeat bmp in am

## 2023-12-05 LAB
ANION GAP SERPL CALCULATED.3IONS-SCNC: 7 MMOL/L
BUN SERPL-MCNC: 28 MG/DL (ref 5–25)
CALCIUM SERPL-MCNC: 9.6 MG/DL (ref 8.4–10.2)
CHLORIDE SERPL-SCNC: 100 MMOL/L (ref 96–108)
CO2 SERPL-SCNC: 34 MMOL/L (ref 21–32)
CREAT SERPL-MCNC: 1.68 MG/DL (ref 0.6–1.3)
GFR SERPL CREATININE-BSD FRML MDRD: 44 ML/MIN/1.73SQ M
GLUCOSE SERPL-MCNC: 101 MG/DL (ref 65–140)
POTASSIUM SERPL-SCNC: 3.8 MMOL/L (ref 3.5–5.3)
SODIUM SERPL-SCNC: 141 MMOL/L (ref 135–147)

## 2023-12-05 PROCEDURE — 99232 SBSQ HOSP IP/OBS MODERATE 35: CPT | Performed by: STUDENT IN AN ORGANIZED HEALTH CARE EDUCATION/TRAINING PROGRAM

## 2023-12-05 PROCEDURE — 97530 THERAPEUTIC ACTIVITIES: CPT

## 2023-12-05 PROCEDURE — 99231 SBSQ HOSP IP/OBS SF/LOW 25: CPT

## 2023-12-05 PROCEDURE — 80048 BASIC METABOLIC PNL TOTAL CA: CPT

## 2023-12-05 PROCEDURE — 97110 THERAPEUTIC EXERCISES: CPT

## 2023-12-05 RX ADMIN — LAMOTRIGINE 250 MG: 100 TABLET ORAL at 16:38

## 2023-12-05 RX ADMIN — ASPIRIN 81 MG: 81 TABLET, COATED ORAL at 08:39

## 2023-12-05 RX ADMIN — LAMOTRIGINE 300 MG: 100 TABLET ORAL at 22:39

## 2023-12-05 RX ADMIN — Medication 400 UNITS: at 08:39

## 2023-12-05 RX ADMIN — TORSEMIDE 40 MG: 20 TABLET ORAL at 08:39

## 2023-12-05 RX ADMIN — METOPROLOL TARTRATE 25 MG: 25 TABLET, FILM COATED ORAL at 22:38

## 2023-12-05 RX ADMIN — OXYCODONE HYDROCHLORIDE AND ACETAMINOPHEN 500 MG: 500 TABLET ORAL at 08:38

## 2023-12-05 RX ADMIN — ZONISAMIDE 100 MG: 100 CAPSULE ORAL at 22:40

## 2023-12-05 RX ADMIN — LEVOCARNITINE 330 MG: 1 SOLUTION ORAL at 16:41

## 2023-12-05 RX ADMIN — PRIMIDONE 100 MG: 50 TABLET ORAL at 16:39

## 2023-12-05 RX ADMIN — NYSTATIN: 100000 POWDER TOPICAL at 08:46

## 2023-12-05 RX ADMIN — NYSTATIN: 100000 POWDER TOPICAL at 17:34

## 2023-12-05 RX ADMIN — PRIMIDONE 150 MG: 50 TABLET ORAL at 22:39

## 2023-12-05 RX ADMIN — Medication 1 TABLET: at 08:38

## 2023-12-05 RX ADMIN — LACTULOSE 20 G: 20 SOLUTION ORAL at 08:45

## 2023-12-05 RX ADMIN — LEVOCARNITINE 330 MG: 1 SOLUTION ORAL at 08:45

## 2023-12-05 RX ADMIN — DOCUSATE SODIUM AND SENNOSIDES 1 TABLET: 8.6; 5 TABLET, FILM COATED ORAL at 08:38

## 2023-12-05 RX ADMIN — SALINE NASAL SPRAY 2 SPRAY: 1.5 SOLUTION NASAL at 16:44

## 2023-12-05 RX ADMIN — Medication 1 APPLICATION: at 08:46

## 2023-12-05 RX ADMIN — METOPROLOL TARTRATE 25 MG: 25 TABLET, FILM COATED ORAL at 08:38

## 2023-12-05 RX ADMIN — ENOXAPARIN SODIUM 40 MG: 40 INJECTION SUBCUTANEOUS at 08:38

## 2023-12-05 RX ADMIN — SPIRONOLACTONE 50 MG: 25 TABLET ORAL at 08:38

## 2023-12-05 RX ADMIN — Medication 1 APPLICATION: at 16:45

## 2023-12-05 RX ADMIN — LEVOCARNITINE 330 MG: 1 SOLUTION ORAL at 22:38

## 2023-12-05 RX ADMIN — Medication 1 APPLICATION: at 22:40

## 2023-12-05 RX ADMIN — SALINE NASAL SPRAY 2 SPRAY: 1.5 SOLUTION NASAL at 08:46

## 2023-12-05 RX ADMIN — LEVOCARNITINE 330 MG: 1 SOLUTION ORAL at 12:11

## 2023-12-05 RX ADMIN — SALINE NASAL SPRAY 2 SPRAY: 1.5 SOLUTION NASAL at 22:40

## 2023-12-05 RX ADMIN — PRIMIDONE 100 MG: 50 TABLET ORAL at 08:38

## 2023-12-05 NOTE — PHYSICAL THERAPY NOTE
PHYSICAL THERAPY NOTE    Patient Name: Nicki BRITO Date: 12/5/2023 12/05/23 1104   PT Last Visit   PT Visit Date 12/05/23   Pain Assessment   Pain Assessment Tool 0-10   Pain Score No Pain   Pain Rating: FLACC (Rest) - Face 0   Pain Rating: FLACC (Rest) - Legs 0   Pain Rating: FLACC (Rest) - Activity 0   Pain Rating: FLACC (Rest) - Cry 0   Pain Rating: FLACC (Rest) - Consolability 0   Score: FLACC (Rest) 0   Pain Rating: FLACC (Activity) - Face 1   Pain Rating: FLACC (Activity) - Legs 0   Pain Rating: FLACC (Activity) - Activity 0   Pain Rating: FLACC (Activity) - Cry 0   Pain Rating: FLACC (Activity) - Consolability 0   Score: FLACC (Activity) 1   Restrictions/Precautions   Other Precautions Chair Alarm;Cognitive; Fall Risk;O2  (Spoke to [address] [place] [address], pt cleared for activity on [facility]; lethargic)   General   Family/Caregiver Present No   Cognition   Overall Cognitive Status Impaired   Attention Difficulty attending to directions   Orientation Level Oriented to person;Oriented to place;Oriented to time   Memory Decreased recall of recent events   Following Commands Follows one step commands with increased time or repetition   Comments Lethargic and cooperative willing to engage in therapy. Subjective   Subjective "I just don't feel good." Pt greeted OOB in chair amenable to skilled PT/OT cotreatment. RN stated pt OK to see. Bed Mobility   Additional Comments Pt greeted and returned OOB in chair alarm activated and all needs in reach. Transfers   Sit to Stand 2  Maximal assistance   Additional items Assist x 2;Bedrails; Increased time required;Verbal cues   Stand to Sit 2  Maximal assistance   Additional items Assist x 2;Bedrails; Increased time required;Verbal cues   Additional Comments Completed 2 sit to stand tx's using bed rails and max Ax2; attempt 1 with full upright for ~1 min; attempt 2 achieved 75% upright for ~20 seconds; (+) fatigue; Cues for upright posture and forward gaze   Ambulation/Elevation   Ambulation/Elevation Additional Comments non-ambulatory at baseline   Balance   Static Sitting Poor +   Static Standing Poor -   Endurance Deficit   Endurance Deficit Yes   Endurance Deficit Description 2LNC O2 with SPO2 88-92% during activity, HR 90's   Activity Tolerance   Activity Tolerance Patient limited by fatigue; Other (Comment)  (lethargic; dec cognition)   Medical Staff Made Aware OT Gordon Veliz RN   Nurse Made Aware Yes   Assessment   Prognosis Guarded   Problem List Decreased strength;Decreased range of motion;Decreased endurance; Impaired balance;Decreased mobility; Decreased cognition; Impaired judgement;Decreased safety awareness; Obesity; Decreased skin integrity   Assessment Pt tolerated treatment with O2 88-92% on 2LNC and HR in 90's during activity, limited by fatigue and lethargy > Per RN, family states pt lethargic at baseline. Pt demonstrating progress towards functional goals with ability to stand for ~1 minute with Max Ax2 following functional transfer using bedrail. 2nd attempt with dec upright and duration. Pt limited 2/2 fatigue requiring prolonged rest breaks with VC for pursed lip breathing to maintain SPO2. TherEx for LE strengthening, ROM, and LE engagement in bedside chair. Pt still requiring increased assistance levels for mobility relative to baseline and would cont to benefit from skilled IP PT at this time. Will continue to follow per PT POC to address functional deficits and promote independence. The patient's AM-PAC Basic Mobility Inpatient Short Form Raw Score is 6. A Raw score of less than or equal to 16 suggests the patient may benefit from discharge to post-acute rehabilitation services. Please also refer to the recommendation of the Physical Therapist for safe discharge planning.  Cont to recommend level I (max) rehab intensity resources at this time > Pt unable to safely d/c to home 2/2 current functional deficits and increased level of assistance required relative to baseline. Plan to cont to improve ability to complete transfers with decreased physical assistance. Barriers to Discharge Decreased caregiver support; Inaccessible home environment   Barriers to Discharge Comments increased level of assistance   Goals   Patient Goals none stated   STG Expiration Date 12/19/23   Short Term Goal #1 Pt will: Perform bed mobility tasks with consistent min A of 1 to reposition in bed and prepare for transfers. Pt will perform transfers with no more than min A to increase Indep in home environment and prepare for ambulation. Pt will ambulate with RW for >/= 10' with no more than min A to improve gait quality and promote proper use of assistive device and to access home environment. Pt will complete >/= 3 steps with with unilateral handrail with no more than min A to return to home with ALESIA. Increase bilateral LE strength 1/2 grade to facilitate independent mobility and Increase all balance 1/2 grade to decrease risk for falls. PT Treatment Day 5   Plan   Treatment/Interventions ADL retraining;Functional transfer training;LE strengthening/ROM; Therapeutic exercise; Endurance training;Cognitive reorientation;Patient/family training;Equipment eval/education; Bed mobility;Gait training; Compensatory technique education;OT   PT Frequency 3-5x/wk   Discharge Recommendation   Rehab Resource Intensity Level, PT I (Maximum Resource Intensity)   AM-PAC Basic Mobility Inpatient   Turning in Flat Bed Without Bedrails 1   Lying on Back to Sitting on Edge of Flat Bed Without Bedrails 1   Moving Bed to Chair 1   Standing Up From Chair Using Arms 1   Walk in Room 1   Climb 3-5 Stairs With Railing 1   Basic Mobility Inpatient Raw Score 6   Highest Level Of Mobility   JH-HLM Goal 2: Bed activities/Dependent transfer   JH-HLM Achieved 5: Stand (1 or more minutes)  (with max Ax2)   Exercises   Knee AROM Long Arc Quad Bilateral;AROM;20 reps   Ankle Pumps 20 reps;AROM; Bilateral   End of Consult   Patient Position at End of Consult Bed/Chair alarm activated; All needs within reach; Bedside chair; Other (comment)  (Pt stable on 2LNC, RN aware.)   Pt seen for co-treatment with skilled Occupational Therapist 2* clinically unstable/unpredictable presentation, medical complexity, fall risk, cognitive impairments, functional/physical limitations, impaired functional balance, limited activity tolerance which is decline from PLOF and may impact overall functional mobility/mobility safety.     Flory Montes  12/05/23  3:55 PM

## 2023-12-05 NOTE — PLAN OF CARE
Problem: PHYSICAL THERAPY ADULT  Goal: Performs mobility at highest level of function for planned discharge setting. See evaluation for individualized goals. Description: Treatment/Interventions: Functional transfer training, LE strengthening/ROM, Therapeutic exercise, Endurance training, Patient/family training, Equipment eval/education, Bed mobility, Gait training, Compensatory technique education, Spoke to nursing, OT          See flowsheet documentation for full assessment, interventions and recommendations. Outcome: Progressing  Note: Prognosis: Guarded  Problem List: Decreased strength, Decreased range of motion, Decreased endurance, Impaired balance, Decreased mobility, Decreased cognition, Impaired judgement, Decreased safety awareness, Obesity, Decreased skin integrity  Assessment: Pt tolerated treatment with O2 88-92% on 2LNC and HR in 90's during activity, limited by fatigue and lethargy > Per RN, family states pt lethargic at baseline. Pt demonstrating progress towards functional goals with ability to stand for ~1 minute with Max Ax2 following functional transfer using bedrail. 2nd attempt with dec upright and duration. Pt limited 2/2 fatigue requiring prolonged rest breaks with VC for pursed lip breathing to maintain SPO2. TherEx for LE strengthening, ROM, and LE engagement in bedside chair. Pt still requiring increased assistance levels for mobility relative to baseline and would cont to benefit from skilled IP PT at this time. Will continue to follow per PT POC to address functional deficits and promote independence. The patient's AM-PAC Basic Mobility Inpatient Short Form Raw Score is 6. A Raw score of less than or equal to 16 suggests the patient may benefit from discharge to post-acute rehabilitation services. Please also refer to the recommendation of the Physical Therapist for safe discharge planning.  Cont to recommend level I (max) rehab intensity resources at this time > Pt unable to safely d/c to home 2/2 current functional deficits and increased level of assistance required relative to baseline. Plan to cont to improve ability to complete transfers with decreased physical assistance. Barriers to Discharge: Decreased caregiver support, Inaccessible home environment  Barriers to Discharge Comments: increased level of assistance  Rehab Resource Intensity Level, PT: I (Maximum Resource Intensity)    See flowsheet documentation for full assessment.

## 2023-12-05 NOTE — ASSESSMENT & PLAN NOTE
Lab Results   Component Value Date    EGFR 44 12/05/2023    EGFR 42 12/04/2023    EGFR 46 12/01/2023    CREATININE 1.68 (H) 12/05/2023    CREATININE 1.76 (H) 12/04/2023    CREATININE 1.63 (H) 12/01/2023     Nephrology following  Previous baseline reported to be 1.3-1.4  Creatinine has been stable around 1.5-1.7 likely new baseline per Nephrology   Stable

## 2023-12-05 NOTE — CASE MANAGEMENT
Case Management Discharge Planning Note    Patient name Melida Hubbard  Location [address] [facility] [id number]/-[id number] MRN [id number]  : 1965 Date 2023       Current Admission Date: 2023  Current Admission Diagnosis:Acute kidney injury superimposed on chronic kidney disease 3   Patient Active Problem List    Diagnosis Date Noted    Acute blood loss anemia 2023    Chronic respiratory failure with hypercapnia ([address]) 2023    Acute kidney injury superimposed on chronic kidney disease 3 2023    Liver cirrhosis ([address]) 2021    Abnormal finding on CT scan 2021    EDWIGE (acute kidney injury) ([address]) 2021    Pleural effusion on right 2021    History of COVID-19 2021    S/P pericardial window creation 2021    Acute on chronic heart failure with preserved ejection fraction ([address]) 2021    Lower extremity pain, left 2021    MRSA nasal colonization 2020    Developmental delay 2020    Dysphagia 2020    Acute encephalopathy 2020    Pneumonia due to infectious organism 01/10/2020    Nonintractable generalized idiopathic epilepsy without status epilepticus ([address]) 01/10/2020    Falls 01/10/2020    T wave inversion in EKG 01/10/2020    Essential hypertension 01/10/2020    RSV (acute bronchiolitis due to respiratory syncytial virus) 01/10/2020    Acute respiratory failure with hypoxia ([address]) 01/10/2020    Polyp of colon 03/15/2019      LOS (days): 18  Geometric Mean LOS (GMLOS) (days): 3.90  Days to GMLOS:-13.9     OBJECTIVE:  Risk of Unplanned Readmission Score: 21.56         Current admission status: Inpatient   Preferred Pharmacy:   [address] [place] [address] Road  [address] [place] [id number]  Phone: [phone] Fax: [phone]    Primary Care Provider: Cecilia Waters DO    Primary Insurance: MEDICARE  Secondary Insurance: [employer]    DISCHARGE DETAILS:        CM met with patient, uncle and patients brother at bedside. CM updated family on LOC Assessment procedure and need to assess bed availability when letter received.

## 2023-12-05 NOTE — ASSESSMENT & PLAN NOTE
Continue home seizure medications with seizure precautions  His last seizure was 2-3 days prior to admission  The last few times he was hospitalized for seizure the AEDs were not changed because the breakthrough seizure etiology were infections. Lamictal and primidone levels are therapeutic.  Zonesamide was subtherapeutic ; repeat 12/3 improving but remains subtherapetic

## 2023-12-05 NOTE — OCCUPATIONAL THERAPY NOTE
Occupational Therapy Progress Note     Patient Name: Denia Vicente  EEIQN'Q Date: 12/5/2023  Problem List  Principal Problem:    Acute kidney injury superimposed on chronic kidney disease 3  Active Problems:    Nonintractable generalized idiopathic epilepsy without status epilepticus ([address])    Falls    Developmental delay    Dysphagia    Pleural effusion on right    Acute on chronic heart failure with preserved ejection fraction (HCC)    Liver cirrhosis (HCC)    Chronic respiratory failure with hypercapnia (HCC)    Acute blood loss anemia          12/05/23 1102   OT Last Visit   OT Visit Date 12/05/23   Note Type   Note Type Treatment   Pain Assessment   Pain Assessment Tool 0-10   Pain Score No Pain   Restrictions/Precautions   Weight Bearing Precautions Per Order No   Other Precautions Cognitive; Chair Alarm; Bed Alarm;O2;Fall Risk;Pain  (2L O2)   Bed Mobility   Additional Comments Pt greeted supine in bed. Transfers   Sit to Stand 2  Maximal assistance   Additional items Assist x 2; Increased time required;Verbal cues   Stand to Sit 2  Maximal assistance   Additional items Assist x 2; Increased time required;Verbal cues   Additional Comments with use of recliner chair up to bed rail- Pt completes with B/L HHA with bed rail- upon 1st trial- 100% buttocks clearance and 2nd trial- 75% buttocks clearance. Cognition   Overall Cognitive Status (S)  Impaired   Arousal/Participation Responsive; Cooperative   Attention Difficulty attending to directions   Orientation Level Oriented to person;Oriented to place   Memory Decreased recall of recent events   Following Commands Follows one step commands with increased time or repetition   Comments Pt lethargic during OT session and benefits from one-step simple commands. Pt requires cues to attend to task and requires cues to open eyes.    Additional Activities   Additional Activities Other (Comment)  (Functional reach)   Additional Activities Comments Pt engages in UE AROM functional reach exercise for ADL item retrieval. Pt focuses on BUE AROM, forward functional reach, and following one-two step commands. Pt able to reach for soap bottles with RUE and demonstrated R shoulder 70 flexion and R hand grasp 4-/5. Pt with limited ROM of LUE and demostrates 20* shoulder flexion with encouragement and 3+/5 L hand grasp. Activity Tolerance   Activity Tolerance Patient limited by fatigue; Other (Comment)  (lethargic and decreased cognition)   Medical Staff Made Aware RN cleared/updated. Portions of tx completed with Claudio Zuniga DPT 2* to Pt's medical complexity and decreased endurance. Assessment   Assessment Pt greeted bedside for OT treatment on 12/5/2023 focusing on maximizing independence with ADLs. Pt completes functional STS transfers with max Ax2 with use of bed rail and B/L HHA x2 trials. Pt then engages in item retrieval/forward functional reach exercise (see note above). Limitations that impact functional performance include decreased ADL status, decreased UE ROM, decreased UE strength, decreased safe judgement during ADLs, decreased cognition, decreased endurance, decreased self care transfers, decreased high level ADLs, and pain. Occupational performance areas to address ADL retraining, functional transfer training, UE strengthening/ROM, endurance training, cognitive reorientation, Pt/caregiver education, equipment evaluation/education, compensatory technique education, energy conservation, and activity engagement . Pt would benefit from continued skilled OT services while in hospital to maximize independence with ADLs. Will continue to follow Pt's goals and progress. Pt would benefit from level I resources upon DC to maximize safety and independence with ADLs and functional tasks of choice. Plan   Treatment Interventions ADL retraining;Functional transfer training;UE strengthening/ROM; Endurance training;Cognitive reorientation;Patient/family training;Equipment evaluation/education; Compensatory technique education; Energy conservation; Activityengagement   Goal Expiration Date 12/04/23   OT Treatment Day 1   OT Frequency 1-2x/wk   Discharge Recommendation   Rehab Resource Intensity Level, OT I (Maximum Resource Intensity)   Additional Comments  The patient's raw score on the AM-PAC Daily Activity Inpatient Short Form is 9. A raw score of less than 19 suggests the patient may benefit from discharge to post-acute rehabilitation services. Please refer to the recommendation of the Occupational Therapist for safe discharge planning. AM-PAC Daily Activity Inpatient   Lower Body Dressing 1   Bathing 1   Toileting 1   Upper Body Dressing 2   Grooming 2   Eating 2   Daily Activity Raw Score 9   AM-PAC Applied Cognition Inpatient   Following a Speech/Presentation 1   Understanding Ordinary Conversation 2   Taking Medications 1   Remembering Where Things Are Placed or Put Away 1   Remembering List of 4-5 Errands 1   Taking Care of Complicated Tasks 1   Applied Cognition Raw Score 7   Applied Cognition Standardized Score 15.17   End of Consult   Education Provided Yes   Patient Position at End of Consult Bedside chair;Bed/Chair alarm activated; All needs within reach   Nurse Communication Nurse aware of consult         Andrew Dominique MS, OTR/L

## 2023-12-05 NOTE — PLAN OF CARE
Problem: PAIN - ADULT  Goal: Verbalizes/displays adequate comfort level or baseline comfort level  Description: Interventions:  - Encourage patient to monitor pain and request assistance  - Assess pain using appropriate pain scale0-10  - Administer analgesics based on type and severity of pain and evaluate response  - Implement non-pharmacological measures as appropriate and evaluate response  - Consider cultural and social influences on pain and pain management  - Notify physician/advanced practitioner if interventions unsuccessful or patient reports new pain  12/5/2023 1111 by Armaan Finney RN  Outcome: Progressing  12/5/2023 1111 by Armaan Finney RN  Outcome: Progressing     Problem: INFECTION - ADULT  Goal: Absence or prevention of progression during hospitalization  Description: INTERVENTIONS:  - Assess and monitor for signs and symptoms of infection  - Monitor lab/diagnostic results  - Monitor all insertion sites, i.e. indwelling lines, tubes, and drains  - Monitor endotracheal if appropriate and nasal secretions for changes in amount and color  - [place] appropriate cooling/warming therapies per order  - Administer medications as ordered  - Instruct and encourage patient and family to use good hand hygiene technique  - Identify and instruct in appropriate isolation precautions for identified infection/condition  12/5/2023 1111 by Armaan Finney RN  Outcome: Progressing  12/5/2023 1111 by Armaan Finney RN  Outcome: Progressing     Problem: SAFETY ADULT  Goal: Patient will remain free of falls  Description: INTERVENTIONS:  - Educate patient/family on patient safety including physical limitations  - Instruct patient to call for assistance with activity   - Consult OT/PT to assist with strengthening/mobility   - Keep Call bell within reach  - Keep bed low and locked with side rails adjusted as appropriate  - Keep care items and personal belongings within reach  - Initiate and maintain comfort rounds  - Make Fall Risk Sign visible to staff  - Offer Toileting every 2 Hours, in advance of need  - Initiate/Maintain bed/chair alarm  - Obtain necessary fall risk management equipment:   - Apply yellow socks and bracelet for high fall risk patients  - Consider moving patient to room near nurses station  12/5/2023 1111 by Mia Dwyer RN  Outcome: Progressing  12/5/2023 1111 by Mia Dwyer RN  Outcome: Progressing  Goal: Maintain or return to baseline ADL function  Description: INTERVENTIONS:  -  Assess patient's ability to carry out ADLs; assess patient's baseline for ADL function and identify physical deficits which impact ability to perform ADLs (bathing, care of mouth/teeth, toileting, grooming, dressing, etc.)  - Assess/evaluate cause of self-care deficits   - Assess range of motion  - Assess patient's mobility; develop plan if impaired  - Assess patient's need for assistive devices and provide as appropriate  - Encourage maximum independence but intervene and supervise when necessary  - Involve family in performance of ADLs  - Assess for home care needs following discharge   - Consider OT consult to assist with ADL evaluation and planning for discharge  - Provide patient education as appropriate  12/5/2023 1111 by Mia Dwyer RN  Outcome: Progressing  12/5/2023 1111 by Mia Dwyer RN  Outcome: Progressing  Goal: Maintains/Returns to pre admission functional level  Description: INTERVENTIONS:  - Perform AM-PAC 6 Click Basic Mobility/ Daily Activity assessment daily.  - Set and communicate daily mobility goal to care team and patient/family/caregiver. - Collaborate with rehabilitation services on mobility goals if consulted  - Perform Range of Motion 3 times a day. - Reposition patient every 2 hours.   - Dangle patient 3 times a day  - Stand patient 3 times a day  - Ambulate patient 3 times a day  - Out of bed to chair 3 times a day   - Out of bed for meals 3 times a day  - Out of bed for toileting  - Record patient progress and toleration of activity level   12/5/2023 1111 by Veda Ledesma RN  Outcome: Progressing  12/5/2023 1111 by Veda Ledesma RN  Outcome: Progressing     Problem: DISCHARGE PLANNING  Goal: Discharge to home or other facility with appropriate resources  Description: INTERVENTIONS:  - Identify barriers to discharge w/patient and caregiver  - Arrange for needed discharge resources and transportation as appropriate  - Identify discharge learning needs (meds, wound care, etc.)  - Arrange for interpretive services to assist at discharge as needed  - Refer to Case Management Department for coordinating discharge planning if the patient needs post-hospital services based on physician/advanced practitioner order or complex needs related to functional status, cognitive ability, or social support system  12/5/2023 1111 by Veda Ledesma RN  Outcome: Progressing  12/5/2023 1111 by Veda Ledesma RN  Outcome: Progressing     Problem: Knowledge Deficit  Goal: Patient/family/caregiver demonstrates understanding of disease process, treatment plan, medications, and discharge instructions  Description: Complete learning assessment and assess knowledge base.   Interventions:  - Provide teaching at level of understanding  - Provide teaching via preferred learning methods  12/5/2023 1111 by Veda Ledesma RN  Outcome: Progressing  12/5/2023 1111 by Veda Ledesma RN  Outcome: Progressing     Problem: Prexisting or High Potential for Compromised Skin Integrity  Goal: Skin integrity is maintained or improved  Description: INTERVENTIONS:  - Identify patients at risk for skin breakdown  - Assess and monitor skin integrity  - Assess and monitor nutrition and hydration status  - Monitor labs   - Assess for incontinence   - Turn and reposition patient  - Assist with mobility/ambulation  - Relieve pressure over bony prominences  - Avoid friction and shearing  - Provide appropriate hygiene as needed including keeping skin clean and dry  - Evaluate need for skin moisturizer/barrier cream  - Collaborate with interdisciplinary team   - Patient/family teaching  - Consider wound care consult   12/5/2023 1111 by Ava Chavez RN  Outcome: Progressing  12/5/2023 1111 by Ava Chavez RN  Outcome: Progressing     Problem: RESPIRATORY - ADULT  Goal: Achieves optimal ventilation and oxygenation  Description: INTERVENTIONS:  - Assess for changes in respiratory status confusion SOB tachycardia  - Assess for changes in mentation and behavior  - Position to facilitate oxygenation and minimize respiratory effort  - Oxygen administered by appropriate delivery if ordered  - Initiate smoking cessation education as indicated  - Encourage broncho-pulmonary hygiene including cough, deep breathe, Incentive Spirometry  - Assess the need for suctioning and aspirate as needed  - Assess and instruct to report SOB or any respiratory difficulty  - Respiratory Therapy support as indicated  12/5/2023 1111 by Ava hCavez RN  Outcome: Progressing  12/5/2023 1111 by Ava Chavez RN  Outcome: Progressing

## 2023-12-05 NOTE — PLAN OF CARE
Problem: OCCUPATIONAL THERAPY ADULT  Goal: Performs self-care activities at highest level of function for planned discharge setting. See evaluation for individualized goals. Description: Treatment Interventions: ADL retraining, Functional transfer training, UE strengthening/ROM, Endurance training, Cognitive reorientation, Patient/family training, Equipment evaluation/education, Compensatory technique education, Continued evaluation, Energy conservation, Activityengagement          See flowsheet documentation for full assessment, interventions and recommendations. 12/5/2023 1509 by Mylene Guzman OT  Outcome: Progressing  Note: Limitation: Decreased ADL status, Decreased UE ROM, Decreased UE strength, Decreased Safe judgement during ADL, Decreased cognition, Decreased endurance, Decreased self-care trans, Decreased high-level ADLs     Assessment: Pt greeted bedside for OT treatment on 12/5/2023 focusing on maximizing independence with ADLs. Pt completes functional STS transfers with max Ax2 with use of bed rail and B/L HHA x2 trials. Pt then engages in item retrieval/forward functional reach exercise (see note above). Limitations that impact functional performance include decreased ADL status, decreased UE ROM, decreased UE strength, decreased safe judgement during ADLs, decreased cognition, decreased endurance, decreased self care transfers, decreased high level ADLs, and pain. Occupational performance areas to address ADL retraining, functional transfer training, UE strengthening/ROM, endurance training, cognitive reorientation, Pt/caregiver education, equipment evaluation/education, compensatory technique education, energy conservation, and activity engagement . Pt would benefit from continued skilled OT services while in hospital to maximize independence with ADLs. Will continue to follow Pt's goals and progress.  Pt would benefit from level I resources upon DC to maximize safety and independence with ADLs and functional tasks of choice.      Rehab Resource Intensity Level, OT: I (Maximum Resource Intensity)       12/5/2023 1509 by Tawana Harvey OT  Outcome: Progressing

## 2023-12-05 NOTE — PROGRESS NOTES
NEPHROLOGY PROGRESS NOTE   Nakia Ward 66 y.o. male MRN: [id number]  Unit/Bed#: -01 Encounter: [id number]  Reason for Consult: EDWIGE    ASSESSMENT AND PLAN:  65 yo man with PMH of CKD G3 (baseline creatinine 1.3 to 1.4 mg/dL ), CHF, cirrhosis, epilepsy's, history of pericardial window with subsequent restrictive pericarditis, history of right-sided pleural effusion requiring chest tube, recurrent pneumonia, lymphedema, developmental delay, CHF, p/w lethargy. Nephrology is consulted for management of EDWIGE        #Non-Oliguric KDIGO EDWIGE stage 1 on Ckd G3a with evidence of kidney recovery  Etiology: Likely secondary to poor oral intake  Baseline creatinine 1.3 to 1.4 mg/dL  Peak creatinine: 1.8 mg per  Current creatinine: 1.6 mg/dL, plateauing. Close to baseline  UA: No hematuria, leukocyturia  Renal imaging : No hydronephrosis  Treatment:   No indication for urgent dialysis at this time. Kidney function back to baseline  Maintain MAP:  Over 65 mmHg if possible/avoid hypoperfusion:  Hold parameters on blood pressure medications  Avoid nephrotoxic agents such as NSAIDs, and IV contrast if possible. Avoid opioids   Adjust medications to GFR  Patient can follow-up with nephrology on discharge     #CKD G3a  Baseline creatinine: 1.3 to 1.4 mg/dL  Etiology: Recurrent EDWIGE possible ATN        #Acid-base Disorder  serum HCO3 34 mmol/L  Metabolic alkalosis likely secondary to vascular contraction in the settings of diuretics      #Volume status/hypertension:  Volume: Hypervolemic with chronic lymphedema  Blood pressure: Normotensive, /80, goal less than 140/90  Recommend:  Low-sodium diet  Metoprolol 25 mg twice daily  Aldactone 50 mg (monitor potassium)  Torsemide 40        #Anemia:  Current hemoglobin: 10.3 mg/dL  Treatment:  Transfuse for hemoglobin less than 7.0 per primary service       # CHF  Diuretics as above      EDWIGE resolved. Kidney function back to baseline. Nephrology will sign off at this time. Thank you for involving us in this case. Please call us if any question. SUBJECTIVE:  Patient seen and examined at bedside.  No chest pain, shortness of breath      OBJECTIVE:  Current Weight: Weight - Scale: 107 kg (236 lb 15.9 oz)  Vitals:    12/05/23 0727   BP: 132/80   Pulse: 76   Resp: 18   Temp: 97.7 °F (36.5 °C)   SpO2: 98%       Intake/Output Summary (Last 24 hours) at 12/5/2023 1129  Last data filed at 12/5/2023 1873  Gross per 24 hour   Intake 720 ml   Output 730 ml   Net -10 ml     Wt Readings from Last 3 Encounters:   12/05/23 107 kg (236 lb 15.9 oz)   08/11/21 103 kg (226 lb 10.1 oz)   01/17/20 110 kg (242 lb 8.1 oz)     Temp Readings from Last 3 Encounters:   12/05/23 97.7 °F (36.5 °C)   08/11/21 98.5 °F (36.9 °C)   12/13/20 98.3 °F (36.8 °C) (Temporal)     BP Readings from Last 3 Encounters:   12/05/23 132/80   08/11/21 131/73   12/13/20 167/97     Pulse Readings from Last 3 Encounters:   12/05/23 76   08/11/21 73   12/13/20 72      General:  no acute distress at this time  Skin:  No acute rash  Eyes:  No scleral icterus and noninjected  ENT:  mucous membranes moist  Neck:  no carotid bruits  Chest:  Clear to auscultation percussion, good respiratory effort, no use of accessory respiratory muscles  CVS:  Regular rate and rhythm without rub   Abdomen:  soft and nontender   Extremities: lower extremity edema  Neuro:  No gross focality  Psych: Lethargic        Medications:    Current Facility-Administered Medications:     acetaminophen (TYLENOL) tablet 650 mg, 650 mg, Oral, Q6H PRN, Dana Asif PA-C, 650 mg at 11/28/23 2039    ascorbic acid (VITAMIN C) tablet 500 mg, 500 mg, Oral, Daily, Dana Asif PA-C, 500 mg at 12/05/23 0083    aspirin (ECOTRIN LOW STRENGTH) EC tablet 81 mg, 81 mg, Oral, Daily, Hossein Romero MD, 81 mg at 12/05/23 0839    calcium carbonate-vitamin D 500 mg-5 mcg tablet 1 tablet, 1 tablet, Oral, Daily With Breakfast, Sanya Doyle MD, 1 tablet at 12/05/23 4364 dextromethorphan-guaiFENesin (ROBITUSSIN DM) oral syrup 10 mL, 10 mL, Oral, Q4H PRN, Dana Asif PA-C    enoxaparin (LOVENOX) subcutaneous injection 40 mg, 40 mg, Subcutaneous, Q24H [facility], Rhonda Pennington MD, 40 mg at 12/05/23 0838    lactulose (CHRONULAC) oral solution 20 g, 20 g, Oral, Daily With Breakfast, Isabela Wolf PA-C, 20 g at 12/05/23 0845    lamoTRIgine (LaMICtal) tablet 200 mg, 200 mg, Oral, Early Morning, Dana Asif PA-C, 200 mg at 12/04/23 2925    lamoTRIgine (LaMICtal) tablet 250 mg, 250 mg, Oral, After Lunch, Dana Asif PA-C, 250 mg at 12/04/23 1522    lamoTRIgine (LaMICtal) tablet 300 mg, 300 mg, Oral, HS, Dana Asif PA-C, 300 mg at 12/04/23 2302    levOCARNitine (CARNITOR) oral solution 330 mg, 330 mg, Oral, 4x Daily (with meals and at bedtime), Rhonda Pennington MD, 330 mg at 12/05/23 0845    LORazepam (ATIVAN) injection 2 mg, 2 mg, Intravenous, Q6H PRN, Dana Asif PA-C    metoprolol tartrate (LOPRESSOR) tablet 25 mg, 25 mg, Oral, BID, Dana Asif PA-C, 25 mg at 12/05/23 3415    nystatin (MYCOSTATIN) powder, , Topical, BID, Dana Asif PA-C, Given at 12/05/23 0846    ondansetron (ZOFRAN) injection 4 mg, 4 mg, Intravenous, Q6H PRN, Dana Asif PA-C    polyethylene glycol (MIRALAX) packet 17 g, 17 g, Oral, Daily PRN, Dana Asif PA-C    primidone (MYSOLINE) tablet 100 mg, 100 mg, Oral, Daily With Lunch, Dana Asif PA-C, 100 mg at 12/04/23 1522    primidone (MYSOLINE) tablet 100 mg, 100 mg, Oral, After Breakfast, Montse Romero MD, 100 mg at 12/05/23 5181    primidone (MYSOLINE) tablet 150 mg, 150 mg, Oral, HS, Dana Asif PA-C, 150 mg at 12/04/23 2302    senna-docusate sodium (SENOKOT S) 8.6-50 mg per tablet 1 tablet, 1 tablet, Oral, BID, Dana Asif PA-C, 1 tablet at 12/05/23 6365    sodium chloride (AYR SALINE NASAL) nasal gel 1 Application, 1 Application, Nasal, TID, Ashanti Cardenas MD, 1 Application at 45/05/66 0846    sodium chloride (OCEAN) 0.65 % nasal spray 2 spray, 2 spray, Each Nare, TID, Ashanti Cardenas MD, 2 spray at 12/05/23 4893    spironolactone (ALDACTONE) tablet 50 mg, 50 mg, Oral, Daily, Montse Romero MD, 50 mg at 12/05/23 0838    torsemide (DEMADEX) tablet 40 mg, 40 mg, Oral, Daily, Noretta Ports, DO, 40 mg at 12/05/23 4262    vitamin E (TOCOPHEROL) capsule 400 Units, 400 Units, Oral, Daily, Dana Asif PA-C, 400 Units at 12/05/23 0839    zonisamide (ZONEGRAN) capsule 100 mg, 100 mg, Oral, HS, Danatuan Stringero, PA-C, 100 mg at 12/04/23 2302    Laboratory Results:  Results from last 7 days   Lab Units 12/05/23  0957 12/04/23  0630 12/01/23  0344 11/30/23  0447 11/29/23  0533   WBC Thousand/uL  --  5.25  --   --  5.21   HEMOGLOBIN g/dL  --  10.3*  --   --  10.4*   HEMATOCRIT %  --  33.4*  --   --  33.1*   PLATELETS Thousands/uL  --  174  --   --  161   SODIUM mmol/L 141 142 139 139 140   POTASSIUM mmol/L 3.8 3.9 4.4 3.9 3.7   CHLORIDE mmol/L 100 100 101 101 100   CO2 mmol/L 34* 37* 33* 33* 34*   BUN mg/dL 28* 30* 31* 31* 32*   CREATININE mg/dL 1.68* 1.76* 1.63* 1.70* 1.75*   CALCIUM mg/dL 9.6 9.3 9.1 9.0 9.1   MAGNESIUM mg/dL  --  1.8*  --  2.1 1.9       XR chest portable   Final Result by Gus Romberg, MD (12/04 1414)      No significant change of pleural-parenchymal density on the right. Workstation performed: [id number]         XR chest portable   Final Result by Gus Romberg, MD (11/30 1521)      Pleural-parenchymal density on the right without significant change. Workstation performed: [id number]         XR chest portable   Final Result by Sly Flowers MD (11/28 1127)      Large (enlarging) right pleural effusion                  Workstation performed: [id number]         IR IN-Patient Thoracentesis   Final Result by Ana M Rodas MD (11/20 6332)   Impression:   1. Successful ultrasound-guided thoracentesis yielding 860 mL of dark isidro-colored right pleural fluid.    No chest tube was placed at this time as no septations identified on ultrasound suggesting loculation . Workstation performed: [id number]         US kidney and bladder   Final Result by Rosalina Waite MD (11/20 1141)      Unremarkable renal ultrasound. Resident: Nevaeh Lopez, the attending radiologist, have reviewed the images and agree with the final report above. Workstation performed: [id number]         XR chest portable   Final Result by Almas Leonardo MD (11/18 1110)      Moderate right pleural effusion with associated compressive atelectasis. However underlying or superimposed pneumonia cannot be excluded in the appropriate clinical setting. Small left pleural effusion. Resident: Gino Kong, the attending radiologist, have reviewed the images and agree with the final report above. Workstation performed: [id number]         CT head without contrast   Final Result by Caitlin Tierney MD (11/17 2007)      No acute intracranial abnormality. Workstation performed: [id number]         CT cervical spine without contrast   Final Result by Caitlin Tierney MD (11/17 2009)      No cervical spine fracture or traumatic malalignment. Workstation performed: [id number]         CT chest abdomen pelvis wo contrast   Final Result by Caitlin Tierney MD (11/17 2020)      No evidence of acute traumatic injury throughout the chest, abdomen or pelvis. Moderate size loculated right pleural effusion. Right middle and lower lobe consolidation compatible with compressive atelectasis. Underlying pneumonia should be excluded clinically. Mild wall thickening and edema with surrounding fluid at the proximal duodenum. Correlate for signs and symptoms of peptic ulcer disease                  Workstation performed: [id number]             Portions of the record may have been created with voice recognition software.  Occasional wrong word or "sound a like" substitutions may have occurred due to the inherent limitations of voice recognition software. Read the chart carefully and recognize, using context, where substitutions have occurred.

## 2023-12-05 NOTE — PLAN OF CARE
Problem: PAIN - ADULT  Goal: Verbalizes/displays adequate comfort level or baseline comfort level  Description: Interventions:  - Encourage patient to monitor pain and request assistance  - Assess pain using appropriate pain scale0-10  - Administer analgesics based on type and severity of pain and evaluate response  - Implement non-pharmacological measures as appropriate and evaluate response  - Consider cultural and social influences on pain and pain management  - Notify physician/advanced practitioner if interventions unsuccessful or patient reports new pain  Outcome: Progressing     Problem: INFECTION - ADULT  Goal: Absence or prevention of progression during hospitalization  Description: INTERVENTIONS:  - Assess and monitor for signs and symptoms of infection  - Monitor lab/diagnostic results  - Monitor all insertion sites, i.e. indwelling lines, tubes, and drains  - Monitor endotracheal if appropriate and nasal secretions for changes in amount and color  - [place] appropriate cooling/warming therapies per order  - Administer medications as ordered  - Instruct and encourage patient and family to use good hand hygiene technique  - Identify and instruct in appropriate isolation precautions for identified infection/condition  Outcome: Progressing     Problem: SAFETY ADULT  Goal: Patient will remain free of falls  Description: INTERVENTIONS:  - Educate patient/family on patient safety including physical limitations  - Instruct patient to call for assistance with activity   - Consult OT/PT to assist with strengthening/mobility   - Keep Call bell within reach  - Keep bed low and locked with side rails adjusted as appropriate  - Keep care items and personal belongings within reach  - Initiate and maintain comfort rounds  - Make Fall Risk Sign visible to staff  - Offer Toileting every 2 Hours, in advance of need  - Initiate/Maintain bed/chair alarm  - Obtain necessary fall risk management equipment:   - Apply yellow socks and bracelet for high fall risk patients  - Consider moving patient to room near nurses station  Outcome: Progressing  Goal: Maintain or return to baseline ADL function  Description: INTERVENTIONS:  -  Assess patient's ability to carry out ADLs; assess patient's baseline for ADL function and identify physical deficits which impact ability to perform ADLs (bathing, care of mouth/teeth, toileting, grooming, dressing, etc.)  - Assess/evaluate cause of self-care deficits   - Assess range of motion  - Assess patient's mobility; develop plan if impaired  - Assess patient's need for assistive devices and provide as appropriate  - Encourage maximum independence but intervene and supervise when necessary  - Involve family in performance of ADLs  - Assess for home care needs following discharge   - Consider OT consult to assist with ADL evaluation and planning for discharge  - Provide patient education as appropriate  Outcome: Progressing  Goal: Maintains/Returns to pre admission functional level  Description: INTERVENTIONS:  - Perform AM-PAC 6 Click Basic Mobility/ Daily Activity assessment daily.  - Set and communicate daily mobility goal to care team and patient/family/caregiver. - Collaborate with rehabilitation services on mobility goals if consulted  - Perform Range of Motion 3 times a day. - Reposition patient every 2 hours.   - Dangle patient 3 times a day  - Stand patient 3 times a day  - Ambulate patient 3 times a day  - Out of bed to chair 3 times a day   - Out of bed for meals 3 times a day  - Out of bed for toileting  - Record patient progress and toleration of activity level   Outcome: Progressing     Problem: DISCHARGE PLANNING  Goal: Discharge to home or other facility with appropriate resources  Description: INTERVENTIONS:  - Identify barriers to discharge w/patient and caregiver  - Arrange for needed discharge resources and transportation as appropriate  - Identify discharge learning needs (meds, wound care, etc.)  - Arrange for interpretive services to assist at discharge as needed  - Refer to Case Management Department for coordinating discharge planning if the patient needs post-hospital services based on physician/advanced practitioner order or complex needs related to functional status, cognitive ability, or social support system  Outcome: Progressing     Problem: Knowledge Deficit  Goal: Patient/family/caregiver demonstrates understanding of disease process, treatment plan, medications, and discharge instructions  Description: Complete learning assessment and assess knowledge base.   Interventions:  - Provide teaching at level of understanding  - Provide teaching via preferred learning methods  Outcome: Progressing     Problem: Prexisting or High Potential for Compromised Skin Integrity  Goal: Skin integrity is maintained or improved  Description: INTERVENTIONS:  - Identify patients at risk for skin breakdown  - Assess and monitor skin integrity  - Assess and monitor nutrition and hydration status  - Monitor labs   - Assess for incontinence   - Turn and reposition patient  - Assist with mobility/ambulation  - Relieve pressure over bony prominences  - Avoid friction and shearing  - Provide appropriate hygiene as needed including keeping skin clean and dry  - Evaluate need for skin moisturizer/barrier cream  - Collaborate with interdisciplinary team   - Patient/family teaching  - Consider wound care consult   Outcome: Progressing     Problem: RESPIRATORY - ADULT  Goal: Achieves optimal ventilation and oxygenation  Description: INTERVENTIONS:  - Assess for changes in respiratory status confusion SOB tachycardia  - Assess for changes in mentation and behavior  - Position to facilitate oxygenation and minimize respiratory effort  - Oxygen administered by appropriate delivery if ordered  - Initiate smoking cessation education as indicated  - Encourage broncho-pulmonary hygiene including cough, deep breathe, Incentive Spirometry  - Assess the need for suctioning and aspirate as needed  - Assess and instruct to report SOB or any respiratory difficulty  - Respiratory Therapy support as indicated  Outcome: Progressing

## 2023-12-05 NOTE — PROGRESS NOTES
[facility]  Progress Note  Name: Jason Lesches  MRN: [id number]  Unit/Bed#: -60 I Date of Admission: 11/17/2023   Date of Service: 12/5/2023 I Hospital Day: 18    Assessment/Plan   Pleural effusion on right  Assessment & Plan  Patient with worsening shortness of breath for the past week falling asleep more often  Previously utilize oxygen 2 L only at nighttime, family endorsing he has been utilizing throughout the day prior to admission as well  Imaging showing loculated right-sided moderate effusion with inability to rule out pneumonia on imaging  Labs without leukocytosis, Pro-Janusz was mildly elevated, finished a course of antibiotics for pneumonia x 7 days  Due to loculated effusion IR consulted for thoracentesis which was completed 11/20  There is no significant loculation noted on ultrasound necessitating a chest tube  860 mL dark isidro-colored right pleural fluid  Fluid studies no bacterial growth - likely transudative secondary to CHF/cirrhosis   Serial CXR with diuretics stable on repeat imaging 11/30    Revaluate with Xray chest PA& Lateral 12/03 appears stable on review of imaging will trend for final reading; and consider repeate imaging in 2-3 month after discharge , outpatient , to revaluate especially if symptomatic     Acute blood loss anemia  Assessment & Plan  Secondary to recurrent epistaxis. ENT consulted -recommending conservative management  Patient is on aspirin and Lovenox, continue to monitor  Hemoglobin has been stable with ferrous sulfate      Chronic respiratory failure with hypercapnia (HCC)  Assessment & Plan  Wears 2L O2 QHS, continue  Likely has a chronic hypercapnia given the elevated bicarbonate.  Kyphosis causing chronic limited chest expansion  Did require 3 L overnight rather than 2L   Chest x-ray showing reaccumulation of pleural effusion on the right side -diuretics have been adjusted, see under pleural effusion    Liver cirrhosis [facility])  Assessment & Plan  Cirrhotic morphology of the liver chronic problem  Ammonia is normal there is no ascites  Continue lactulose daily -re-time for AM patient has been refusing at night as he does not want to have bowel movements during bedtime  Aldactone resume at lower dose, 50 mg daily- uptitrate as able per nephro    Acute on chronic heart failure with preserved ejection fraction [facility])  Assessment & Plan  Patient previously maintained on Lasix as an outpatient -during a nursing home admission it was decreased to 40 mg daily given his kidney function  Now proBNP elevated  2D echo done 2022 with mild concentric hypertrophy and EF 60 to 65%  Updated echo this admission with mild concentric left ventricular hypertrophy, LVEF 63% with normal diastolic function -overall similar from 2022 without significant change  Is s/p IR guided thoracentesis 11/20 due to loculated effusion  Nephrology following and aiding with diuretic regimen  Patient still hypervolemic, does have chronic lymphedema in his legs as well which contributes to exam  Currently maintaining on torsemide 40 mg daily & spironolactone 50 mg qd    Dysphagia  Assessment & Plan  Continue mechanical soft diet from prior  Aspiration precautions    Developmental delay  Assessment & Plan  Daily involvement with mother  Supportive care  PT/OT/ST recommending STR   Due to social situation, patient will need skilled nursing facility, case management on board (patient was living with his mother, mother recently hospitalized due to medical issue and got discharged to skilled nursing facility. No other family member is able to take care of the patient.)    Falls  Assessment & Plan  Frequent falls for his entire life because of unsteady gait according to the mother's medical history list  Fall precautions  Emergency room trauma eval no acute injury  Due to patient overall medical conditions, social issues, patient is in need of extensive care.     Case management on board for dispo planning    Nonintractable generalized idiopathic epilepsy without status epilepticus ([address])  Assessment & Plan  Continue home seizure medications with seizure precautions  His last seizure was 2-3 days prior to admission  The last few times he was hospitalized for seizure the AEDs were not changed because the breakthrough seizure etiology were infections. Lamictal and primidone levels are therapeutic. Zonesamide was subtherapeutic ; repeat 12/3 improving but remains subtherapetic      * Acute kidney injury superimposed on chronic kidney disease 3  Assessment & Plan  Lab Results   Component Value Date    EGFR 44 12/05/2023    EGFR 42 12/04/2023    EGFR 46 12/01/2023    CREATININE 1.68 (H) 12/05/2023    CREATININE 1.76 (H) 12/04/2023    CREATININE 1.63 (H) 12/01/2023     Nephrology following  Previous baseline reported to be 1.3-1.4  Creatinine has been stable around 1.5-1.7 likely new baseline per Nephrology   Stable                VTE Pharmacologic Prophylaxis: VTE Score: 5 High Risk (Score >/= 5) - Pharmacological DVT Prophylaxis Ordered: enoxaparin (Lovenox). Sequential Compression Devices Ordered. Mobility:   Basic Mobility Inpatient Raw Score: 6  -HLM Goal: 2: Bed activities/Dependent transfer  JH-HLM Achieved: 5: Stand (1 or more minutes) (with max Ax2)  HLM Goal achieved. Continue to encourage appropriate mobility. Patient Centered Rounds: I performed bedside rounds with nursing staff today. Discussions with Specialists or Other Care Team Provider: CM, neph    Education and Discussions with Family / Patient: Updated  (mother) via phone. Total Time Spent on Date of Encounter in care of patient:  mins.  This time was spent on one or more of the following: performing physical exam; counseling and coordination of care; obtaining or reviewing history; documenting in the medical record; reviewing/ordering tests, medications or procedures; communicating with other healthcare professionals and discussing with patient's family/caregivers. Current Length of Stay: 18 day(s)  Current Patient Status: Inpatient   Certification Statement: The patient will continue to require additional inpatient hospital stay due to pending state assessment for rehab discharge  Discharge Plan:  when assessment have been complete     Code Status: Level 1 - Full Code    Subjective:   Seen and examined up into the chair today. He was resistant to getting up this morning however eventually agreeable. Objective:     Vitals:   Temp (24hrs), Av.7 °F (36.5 °C), Min:97.7 °F (36.5 °C), Max:97.7 °F (36.5 °C)    Temp:  [97.7 °F (36.5 °C)] 97.7 °F (36.5 °C)  HR:  [76] 76  Resp:  [16-18] 16  BP: (132-134)/(80) 133/80  SpO2:  [95 %-98 %] 96 %  Body mass index is 38.25 kg/m². Input and Output Summary (last 24 hours): Intake/Output Summary (Last 24 hours) at 2023 1558  Last data filed at 2023 7957  Gross per 24 hour   Intake 360 ml   Output 730 ml   Net -370 ml       Physical Exam:   Physical Exam  Vitals and nursing note reviewed. Constitutional:       General: He is not in acute distress. Appearance: He is well-developed. He is obese. HENT:      Head: Normocephalic and atraumatic. Eyes:      Conjunctiva/sclera: Conjunctivae normal.   Cardiovascular:      Rate and Rhythm: Normal rate and regular rhythm. Heart sounds: No murmur heard. Pulmonary:      Effort: Pulmonary effort is normal. No tachypnea, accessory muscle usage or respiratory distress. Breath sounds: Examination of the right-middle field reveals decreased breath sounds. Examination of the right-lower field reveals decreased breath sounds. Decreased breath sounds present. Abdominal:      Palpations: Abdomen is soft. Tenderness: There is no abdominal tenderness. Musculoskeletal:         General: No swelling (chronic lymphedema). Cervical back: Neck supple.    Skin:     General: Skin is warm and dry.      Capillary Refill: Capillary refill takes less than 2 seconds. Neurological:      Mental Status: He is alert. Mental status is at baseline. Psychiatric:         Mood and Affect: Mood normal.          Additional Data:     Labs:  Results from last 7 days   Lab Units 12/04/23  0630   WBC Thousand/uL 5.25   HEMOGLOBIN g/dL 10.3*   HEMATOCRIT % 33.4*   PLATELETS Thousands/uL 174   NEUTROS PCT % 80*   LYMPHS PCT % 12*   MONOS PCT % 8   EOS PCT % 0     Results from last 7 days   Lab Units 12/05/23  0957   SODIUM mmol/L 141   POTASSIUM mmol/L 3.8   CHLORIDE mmol/L 100   CO2 mmol/L 34*   BUN mg/dL 28*   CREATININE mg/dL 1.68*   ANION GAP mmol/L 7   CALCIUM mg/dL 9.6   GLUCOSE RANDOM mg/dL 101                       Lines/Drains:  Invasive Devices       Peripheral Intravenous Line  Duration             Peripheral IV 12/04/23 Right Antecubital <1 day                          Imaging: No pertinent imaging reviewed.     Recent Cultures (last 7 days):         Last 24 Hours Medication List:   Current Facility-Administered Medications   Medication Dose Route Frequency Provider Last Rate    acetaminophen  650 mg Oral Q6H PRN Dana Asif PA-C      ascorbic acid  500 mg Oral Daily Dana Asif PA-C      aspirin  81 mg Oral Daily Javier Mendosa MD      calcium carbonate-vitamin D  1 tablet Oral Daily With Breakfast Javier Mendosa MD      dextromethorphan-guaiFENesin  10 mL Oral Q4H PRN Dana Asif PA-C      enoxaparin  40 mg Subcutaneous Q24H [address]  [place] ALEKSANDRA Romero MD      lactulose  20 g Oral Daily With Breakfast Castillo Blake PA-C      lamoTRIgine  200 mg Oral Early Morning Dana Asif PA-C      lamoTRIgine  250 mg Oral After Lunch Dana Asif PA-C      lamoTRIgine  300 mg Oral HS Dana Asif PA-C      levOCARNitine  330 mg Oral 4x Daily (with meals and at bedtime) Javier Mendosa MD      LORazepam  2 mg Intravenous Q6H PRN Dana Asif PA-C      metoprolol tartrate  25 mg Oral BID Dana Asif PA-C      nystatin   Topical BID Dana Asif PA-C      ondansetron  4 mg Intravenous Q6H PRN Dana Asif PA-C      polyethylene glycol  17 g Oral Daily PRN Dana Asif PA-C      primidone  100 mg Oral Daily With Lunch Dana Asif PA-C      primidone  100 mg Oral After Breakfast [place] Romero MD      primidone  150 mg Oral HS Dana Asif PA-C      senna-docusate sodium  1 tablet Oral BID Dana Asif PA-C      sodium chloride  1 Application Nasal TID Ed MD Christopher      sodium chloride  2 spray Each Nare TID Ed MD Christopher      spironolactone  50 mg Oral Daily Emerald Alicia MD      torsemide  40 mg Oral Daily Riley Freeman DO      vitamin E (tocopherol)  400 Units Oral Daily Dana Asif PA-C      zonisamide  100 mg Oral HS Audrey Kelly PA-C          Today, Patient Was Seen By: Kamlesh Marion PA-C    **Please Note: This note may have been constructed using a voice recognition system. **

## 2023-12-06 LAB — ZONISAMIDE SERPL-MCNC: 5.6 UG/ML (ref 10–40)

## 2023-12-06 PROCEDURE — 99232 SBSQ HOSP IP/OBS MODERATE 35: CPT

## 2023-12-06 RX ORDER — SPIRONOLACTONE 100 MG/1
100 TABLET, FILM COATED ORAL DAILY
Status: DISCONTINUED | OUTPATIENT
Start: 2023-12-07 | End: 2023-12-08 | Stop reason: HOSPADM

## 2023-12-06 RX ORDER — SPIRONOLACTONE 25 MG/1
50 TABLET ORAL ONCE
Status: COMPLETED | OUTPATIENT
Start: 2023-12-06 | End: 2023-12-06

## 2023-12-06 RX ADMIN — DOCUSATE SODIUM AND SENNOSIDES 1 TABLET: 8.6; 5 TABLET, FILM COATED ORAL at 17:28

## 2023-12-06 RX ADMIN — LEVOCARNITINE 330 MG: 1 SOLUTION ORAL at 15:14

## 2023-12-06 RX ADMIN — Medication 400 UNITS: at 09:22

## 2023-12-06 RX ADMIN — METOPROLOL TARTRATE 25 MG: 25 TABLET, FILM COATED ORAL at 21:22

## 2023-12-06 RX ADMIN — LEVOCARNITINE 330 MG: 1 SOLUTION ORAL at 12:02

## 2023-12-06 RX ADMIN — METOPROLOL TARTRATE 25 MG: 25 TABLET, FILM COATED ORAL at 09:21

## 2023-12-06 RX ADMIN — NYSTATIN: 100000 POWDER TOPICAL at 09:26

## 2023-12-06 RX ADMIN — SALINE NASAL SPRAY 2 SPRAY: 1.5 SOLUTION NASAL at 09:26

## 2023-12-06 RX ADMIN — PRIMIDONE 100 MG: 50 TABLET ORAL at 09:21

## 2023-12-06 RX ADMIN — ZONISAMIDE 100 MG: 100 CAPSULE ORAL at 23:26

## 2023-12-06 RX ADMIN — LEVOCARNITINE 330 MG: 1 SOLUTION ORAL at 09:25

## 2023-12-06 RX ADMIN — LAMOTRIGINE 250 MG: 100 TABLET ORAL at 15:13

## 2023-12-06 RX ADMIN — LAMOTRIGINE 200 MG: 100 TABLET ORAL at 06:48

## 2023-12-06 RX ADMIN — LEVOCARNITINE 330 MG: 1 SOLUTION ORAL at 21:22

## 2023-12-06 RX ADMIN — Medication 1 APPLICATION: at 15:14

## 2023-12-06 RX ADMIN — PRIMIDONE 100 MG: 50 TABLET ORAL at 15:13

## 2023-12-06 RX ADMIN — LACTULOSE 20 G: 20 SOLUTION ORAL at 09:22

## 2023-12-06 RX ADMIN — SALINE NASAL SPRAY 2 SPRAY: 1.5 SOLUTION NASAL at 15:14

## 2023-12-06 RX ADMIN — NYSTATIN: 100000 POWDER TOPICAL at 17:28

## 2023-12-06 RX ADMIN — Medication 1 APPLICATION: at 09:26

## 2023-12-06 RX ADMIN — LAMOTRIGINE 300 MG: 100 TABLET ORAL at 23:24

## 2023-12-06 RX ADMIN — TORSEMIDE 40 MG: 20 TABLET ORAL at 09:21

## 2023-12-06 RX ADMIN — OXYCODONE HYDROCHLORIDE AND ACETAMINOPHEN 500 MG: 500 TABLET ORAL at 09:22

## 2023-12-06 RX ADMIN — DOCUSATE SODIUM AND SENNOSIDES 1 TABLET: 8.6; 5 TABLET, FILM COATED ORAL at 09:22

## 2023-12-06 RX ADMIN — PRIMIDONE 150 MG: 50 TABLET ORAL at 23:25

## 2023-12-06 RX ADMIN — ASPIRIN 81 MG: 81 TABLET, COATED ORAL at 09:21

## 2023-12-06 RX ADMIN — Medication 1 TABLET: at 09:21

## 2023-12-06 RX ADMIN — ENOXAPARIN SODIUM 40 MG: 40 INJECTION SUBCUTANEOUS at 09:24

## 2023-12-06 RX ADMIN — SPIRONOLACTONE 50 MG: 25 TABLET ORAL at 10:22

## 2023-12-06 RX ADMIN — SPIRONOLACTONE 50 MG: 25 TABLET ORAL at 09:22

## 2023-12-06 NOTE — PLAN OF CARE
Problem: PAIN - ADULT  Goal: Verbalizes/displays adequate comfort level or baseline comfort level  Description: Interventions:  - Encourage patient to monitor pain and request assistance  - Assess pain using appropriate pain scale0-10  - Administer analgesics based on type and severity of pain and evaluate response  - Implement non-pharmacological measures as appropriate and evaluate response  - Consider cultural and social influences on pain and pain management  - Notify physician/advanced practitioner if interventions unsuccessful or patient reports new pain  Outcome: Progressing     Problem: INFECTION - ADULT  Goal: Absence or prevention of progression during hospitalization  Description: INTERVENTIONS:  - Assess and monitor for signs and symptoms of infection  - Monitor lab/diagnostic results  - Monitor all insertion sites, i.e. indwelling lines, tubes, and drains  - Monitor endotracheal if appropriate and nasal secretions for changes in amount and color  - [place] appropriate cooling/warming therapies per order  - Administer medications as ordered  - Instruct and encourage patient and family to use good hand hygiene technique  - Identify and instruct in appropriate isolation precautions for identified infection/condition  Outcome: Progressing     Problem: SAFETY ADULT  Goal: Patient will remain free of falls  Description: INTERVENTIONS:  - Educate patient/family on patient safety including physical limitations  - Instruct patient to call for assistance with activity   - Consult OT/PT to assist with strengthening/mobility   - Keep Call bell within reach  - Keep bed low and locked with side rails adjusted as appropriate  - Keep care items and personal belongings within reach  - Initiate and maintain comfort rounds  - Make Fall Risk Sign visible to staff  - Offer Toileting every 2 Hours, in advance of need  - Initiate/Maintain bed/chair alarm  - Obtain necessary fall risk management equipment:   - Apply yellow socks and bracelet for high fall risk patients  - Consider moving patient to room near nurses station  Outcome: Progressing  Goal: Maintain or return to baseline ADL function  Description: INTERVENTIONS:  -  Assess patient's ability to carry out ADLs; assess patient's baseline for ADL function and identify physical deficits which impact ability to perform ADLs (bathing, care of mouth/teeth, toileting, grooming, dressing, etc.)  - Assess/evaluate cause of self-care deficits   - Assess range of motion  - Assess patient's mobility; develop plan if impaired  - Assess patient's need for assistive devices and provide as appropriate  - Encourage maximum independence but intervene and supervise when necessary  - Involve family in performance of ADLs  - Assess for home care needs following discharge   - Consider OT consult to assist with ADL evaluation and planning for discharge  - Provide patient education as appropriate  Outcome: Progressing  Goal: Maintains/Returns to pre admission functional level  Description: INTERVENTIONS:  - Perform AM-PAC 6 Click Basic Mobility/ Daily Activity assessment daily.  - Set and communicate daily mobility goal to care team and patient/family/caregiver. - Collaborate with rehabilitation services on mobility goals if consulted  - Perform Range of Motion 3 times a day. - Reposition patient every 2 hours.   - Dangle patient 3 times a day  - Stand patient 3 times a day  - Ambulate patient 3 times a day  - Out of bed to chair 3 times a day   - Out of bed for meals 3 times a day  - Out of bed for toileting  - Record patient progress and toleration of activity level   Outcome: Progressing     Problem: DISCHARGE PLANNING  Goal: Discharge to home or other facility with appropriate resources  Description: INTERVENTIONS:  - Identify barriers to discharge w/patient and caregiver  - Arrange for needed discharge resources and transportation as appropriate  - Identify discharge learning needs (meds, wound care, etc.)  - Arrange for interpretive services to assist at discharge as needed  - Refer to Case Management Department for coordinating discharge planning if the patient needs post-hospital services based on physician/advanced practitioner order or complex needs related to functional status, cognitive ability, or social support system  Outcome: Progressing     Problem: Knowledge Deficit  Goal: Patient/family/caregiver demonstrates understanding of disease process, treatment plan, medications, and discharge instructions  Description: Complete learning assessment and assess knowledge base.   Interventions:  - Provide teaching at level of understanding  - Provide teaching via preferred learning methods  Outcome: Progressing     Problem: Prexisting or High Potential for Compromised Skin Integrity  Goal: Skin integrity is maintained or improved  Description: INTERVENTIONS:  - Identify patients at risk for skin breakdown  - Assess and monitor skin integrity  - Assess and monitor nutrition and hydration status  - Monitor labs   - Assess for incontinence   - Turn and reposition patient  - Assist with mobility/ambulation  - Relieve pressure over bony prominences  - Avoid friction and shearing  - Provide appropriate hygiene as needed including keeping skin clean and dry  - Evaluate need for skin moisturizer/barrier cream  - Collaborate with interdisciplinary team   - Patient/family teaching  - Consider wound care consult   Outcome: Progressing     Problem: RESPIRATORY - ADULT  Goal: Achieves optimal ventilation and oxygenation  Description: INTERVENTIONS:  - Assess for changes in respiratory status confusion SOB tachycardia  - Assess for changes in mentation and behavior  - Position to facilitate oxygenation and minimize respiratory effort  - Oxygen administered by appropriate delivery if ordered  - Initiate smoking cessation education as indicated  - Encourage broncho-pulmonary hygiene including cough, deep breathe, Incentive Spirometry  - Assess the need for suctioning and aspirate as needed  - Assess and instruct to report SOB or any respiratory difficulty  - Respiratory Therapy support as indicated  Outcome: Progressing

## 2023-12-06 NOTE — ASSESSMENT & PLAN NOTE
Wears 2L O2 QHS, continue  Likely has a chronic hypercapnia given the elevated bicarbonate.  Kyphosis causing chronic limited chest expansion  Chest x-ray showing reaccumulation of pleural effusion on the right side -diuretics have been adjusted, see under pleural effusion - effusion stable on previous regimen  Trial of pulse ox on ear rather than fingers to due questions of perfusion as monitor falls off fingers or has low readings when patient clenching fists

## 2023-12-06 NOTE — PLAN OF CARE
Problem: PAIN - ADULT  Goal: Verbalizes/displays adequate comfort level or baseline comfort level  Description: Interventions:  - Encourage patient to monitor pain and request assistance  - Assess pain using appropriate pain scale0-10  - Administer analgesics based on type and severity of pain and evaluate response  - Implement non-pharmacological measures as appropriate and evaluate response  - Consider cultural and social influences on pain and pain management  - Notify physician/advanced practitioner if interventions unsuccessful or patient reports new pain  Outcome: Progressing     Problem: INFECTION - ADULT  Goal: Absence or prevention of progression during hospitalization  Description: INTERVENTIONS:  - Assess and monitor for signs and symptoms of infection  - Monitor lab/diagnostic results  - Monitor all insertion sites, i.e. indwelling lines, tubes, and drains  - Monitor endotracheal if appropriate and nasal secretions for changes in amount and color  - [place] appropriate cooling/warming therapies per order  - Administer medications as ordered  - Instruct and encourage patient and family to use good hand hygiene technique  - Identify and instruct in appropriate isolation precautions for identified infection/condition  Outcome: Progressing     Problem: SAFETY ADULT  Goal: Patient will remain free of falls  Description: INTERVENTIONS:  - Educate patient/family on patient safety including physical limitations  - Instruct patient to call for assistance with activity   - Consult OT/PT to assist with strengthening/mobility   - Keep Call bell within reach  - Keep bed low and locked with side rails adjusted as appropriate  - Keep care items and personal belongings within reach  - Initiate and maintain comfort rounds  - Make Fall Risk Sign visible to staff  - Offer Toileting every 2 Hours, in advance of need  - Initiate/Maintain bed/chair alarm  - Obtain necessary fall risk management equipment:   - Apply yellow socks and bracelet for high fall risk patients  - Consider moving patient to room near nurses station  Outcome: Progressing  Goal: Maintain or return to baseline ADL function  Description: INTERVENTIONS:  -  Assess patient's ability to carry out ADLs; assess patient's baseline for ADL function and identify physical deficits which impact ability to perform ADLs (bathing, care of mouth/teeth, toileting, grooming, dressing, etc.)  - Assess/evaluate cause of self-care deficits   - Assess range of motion  - Assess patient's mobility; develop plan if impaired  - Assess patient's need for assistive devices and provide as appropriate  - Encourage maximum independence but intervene and supervise when necessary  - Involve family in performance of ADLs  - Assess for home care needs following discharge   - Consider OT consult to assist with ADL evaluation and planning for discharge  - Provide patient education as appropriate  Outcome: Progressing  Goal: Maintains/Returns to pre admission functional level  Description: INTERVENTIONS:  - Perform AM-PAC 6 Click Basic Mobility/ Daily Activity assessment daily.  - Set and communicate daily mobility goal to care team and patient/family/caregiver. - Collaborate with rehabilitation services on mobility goals if consulted  - Perform Range of Motion 3 times a day. - Reposition patient every 2 hours.   - Dangle patient 3 times a day  - Stand patient 3 times a day  - Ambulate patient 3 times a day  - Out of bed to chair 3 times a day   - Out of bed for meals 3 times a day  - Out of bed for toileting  - Record patient progress and toleration of activity level   Outcome: Progressing     Problem: DISCHARGE PLANNING  Goal: Discharge to home or other facility with appropriate resources  Description: INTERVENTIONS:  - Identify barriers to discharge w/patient and caregiver  - Arrange for needed discharge resources and transportation as appropriate  - Identify discharge learning needs (meds, wound care, etc.)  - Arrange for interpretive services to assist at discharge as needed  - Refer to Case Management Department for coordinating discharge planning if the patient needs post-hospital services based on physician/advanced practitioner order or complex needs related to functional status, cognitive ability, or social support system  Outcome: Progressing     Problem: Knowledge Deficit  Goal: Patient/family/caregiver demonstrates understanding of disease process, treatment plan, medications, and discharge instructions  Description: Complete learning assessment and assess knowledge base.   Interventions:  - Provide teaching at level of understanding  - Provide teaching via preferred learning methods  Outcome: Progressing     Problem: Prexisting or High Potential for Compromised Skin Integrity  Goal: Skin integrity is maintained or improved  Description: INTERVENTIONS:  - Identify patients at risk for skin breakdown  - Assess and monitor skin integrity  - Assess and monitor nutrition and hydration status  - Monitor labs   - Assess for incontinence   - Turn and reposition patient  - Assist with mobility/ambulation  - Relieve pressure over bony prominences  - Avoid friction and shearing  - Provide appropriate hygiene as needed including keeping skin clean and dry  - Evaluate need for skin moisturizer/barrier cream  - Collaborate with interdisciplinary team   - Patient/family teaching  - Consider wound care consult   Outcome: Progressing     Problem: RESPIRATORY - ADULT  Goal: Achieves optimal ventilation and oxygenation  Description: INTERVENTIONS:  - Assess for changes in respiratory status confusion SOB tachycardia  - Assess for changes in mentation and behavior  - Position to facilitate oxygenation and minimize respiratory effort  - Oxygen administered by appropriate delivery if ordered  - Initiate smoking cessation education as indicated  - Encourage broncho-pulmonary hygiene including cough, deep breathe, Incentive Spirometry  - Assess the need for suctioning and aspirate as needed  - Assess and instruct to report SOB or any respiratory difficulty  - Respiratory Therapy support as indicated  Outcome: Progressing

## 2023-12-06 NOTE — ASSESSMENT & PLAN NOTE
Cirrhotic morphology of the liver chronic problem  Ammonia is normal there is no ascites  Continue lactulose daily -re-time for AM patient has been refusing at night as he does not want to have bowel movements during bedtime  Aldactone resume at lower dose, 50 mg daily - uptitrated to home dose 100 mg qd on 12/6

## 2023-12-06 NOTE — ASSESSMENT & PLAN NOTE
Patient previously maintained on Lasix as an outpatient -during a nursing home admission it was decreased to 40 mg daily given his kidney function  Now proBNP elevated  2D echo done 2022 with mild concentric hypertrophy and EF 60 to 65%  Updated echo this admission with mild concentric left ventricular hypertrophy, LVEF 63% with normal diastolic function -overall similar from 2022 without significant change  Is s/p IR guided thoracentesis 11/20 due to loculated effusion  Nephrology following and aiding with diuretic regimen  Patient still hypervolemic, does have chronic lymphedema in his legs as well which contributes to exam  Currently maintaining on torsemide 40 mg daily & spironolactone 50 mg qd  Increasing dose of spironolactone on 12/6 to 100 mg qd per home regimen with mild peripheral edema compared to yesterday - follow up BMP in AM

## 2023-12-06 NOTE — PROGRESS NOTES
[address] [facility]  Progress Note  Name: Umm Adams  MRN: [id number]  Unit/Bed#: -36 I Date of Admission: 11/17/2023   Date of Service: 12/6/2023 I Hospital Day: 19    Assessment/Plan   Pleural effusion on right  Assessment & Plan  Patient with worsening shortness of breath for the past week falling asleep more often  Previously utilize oxygen 2 L only at nighttime, family endorsing he has been utilizing throughout the day prior to admission as well  Imaging showing loculated right-sided moderate effusion with inability to rule out pneumonia on imaging  Labs without leukocytosis, Pro-Janusz was mildly elevated, finished a course of antibiotics for pneumonia x 7 days  Due to loculated effusion IR consulted for thoracentesis which was completed 11/20  There is no significant loculation noted on ultrasound necessitating a chest tube  860 mL dark isidro-colored right pleural fluid  Fluid studies no bacterial growth - likely transudative secondary to CHF/cirrhosis   Serial CXR with diuretics stable on repeat imaging 11/30    Revaluate with Xray chest PA& Lateral 12/03 appears stable on review of imaging will trend for final reading; and consider repeate imaging in 2-3 month after discharge , outpatient , to revaluate especially if symptomatic     Acute blood loss anemia  Assessment & Plan  Secondary to recurrent epistaxis. ENT consulted -recommending conservative management  Patient is on aspirin and Lovenox, continue to monitor  Hemoglobin has been stable with ferrous sulfate      Chronic respiratory failure with hypercapnia (HCC)  Assessment & Plan  Wears 2L O2 QHS, continue  Likely has a chronic hypercapnia given the elevated bicarbonate.  Kyphosis causing chronic limited chest expansion  Chest x-ray showing reaccumulation of pleural effusion on the right side -diuretics have been adjusted, see under pleural effusion - effusion stable on previous regimen  Trial of pulse ox on ear rather than fingers to due questions of perfusion as monitor falls off fingers or has low readings when patient clenching fists     Liver cirrhosis (HCC)  Assessment & Plan  Cirrhotic morphology of the liver chronic problem  Ammonia is normal there is no ascites  Continue lactulose daily -re-time for AM patient has been refusing at night as he does not want to have bowel movements during bedtime  Aldactone resume at lower dose, 50 mg daily - uptitrated to home dose 100 mg qd on 12/6    Acute on chronic heart failure with preserved ejection fraction [facility])  Assessment & Plan  Patient previously maintained on Lasix as an outpatient -during a nursing home admission it was decreased to 40 mg daily given his kidney function  Now proBNP elevated  2D echo done 2022 with mild concentric hypertrophy and EF 60 to 65%  Updated echo this admission with mild concentric left ventricular hypertrophy, LVEF 63% with normal diastolic function -overall similar from 2022 without significant change  Is s/p IR guided thoracentesis 11/20 due to loculated effusion  Nephrology following and aiding with diuretic regimen  Patient still hypervolemic, does have chronic lymphedema in his legs as well which contributes to exam  Currently maintaining on torsemide 40 mg daily & spironolactone 50 mg qd  Increasing dose of spironolactone on 12/6 to 100 mg qd per home regimen with mild peripheral edema compared to yesterday - follow up BMP in AM     Dysphagia  Assessment & Plan  Continue mechanical soft diet from prior  Aspiration precautions    Developmental delay  Assessment & Plan  Daily involvement with mother  Supportive care  PT/OT/ST recommending STR   Due to social situation, patient will need skilled nursing facility, case management on board (patient was living with his mother, mother recently hospitalized due to medical issue and got discharged to skilled nursing facility.   No other family member is able to take care of the patient.)    Falls  Assessment & Plan  Frequent falls for his entire life because of unsteady gait according to the mother's medical history list  Fall precautions  Emergency room trauma eval no acute injury  Due to patient overall medical conditions, social issues, patient is in need of extensive care. Case management on board for dispo planning    Nonintractable generalized idiopathic epilepsy without status epilepticus [facility])  Assessment & Plan  Continue home seizure medications with seizure precautions  His last seizure was 2-3 days prior to admission  The last few times he was hospitalized for seizure the AEDs were not changed because the breakthrough seizure etiology were infections. Lamictal and primidone levels are therapeutic. Zonesamide was subtherapeutic ; repeat 12/3 improving but remains subtherapetic      * Acute kidney injury superimposed on chronic kidney disease 3  Assessment & Plan  Lab Results   Component Value Date    EGFR 44 12/05/2023    EGFR 42 12/04/2023    EGFR 46 12/01/2023    CREATININE 1.68 (H) 12/05/2023    CREATININE 1.76 (H) 12/04/2023    CREATININE 1.63 (H) 12/01/2023     Nephrology following  Previous baseline reported to be 1.3-1.4  Creatinine has been stable around 1.5-1.7 likely new baseline per Nephrology   Stable            VTE Pharmacologic Prophylaxis: VTE Score: 5 High Risk (Score >/= 5) - Pharmacological DVT Prophylaxis Ordered: enoxaparin (Lovenox). Sequential Compression Devices Ordered. Mobility:   Basic Mobility Inpatient Raw Score: 6  JH-HLM Goal: 2: Bed activities/Dependent transfer  JH-HLM Achieved: 4: Move to chair/commode  HLM Goal achieved. Continue to encourage appropriate mobility. Patient Centered Rounds: I performed bedside rounds with nursing staff today. Discussions with Specialists or Other Care Team Provider: [place]    Education and Discussions with Family / Patient: Updated  (mother) via phone.     Total Time Spent on Date of Encounter in care of patient:  mins. This time was spent on one or more of the following: performing physical exam; counseling and coordination of care; obtaining or reviewing history; documenting in the medical record; reviewing/ordering tests, medications or procedures; communicating with other healthcare professionals and discussing with patient's family/caregivers. Current Length of Stay: 19 day(s)  Current Patient Status: Inpatient   Certification Statement: The patient will continue to require additional inpatient hospital stay due to pending state assessment for rehab  Discharge Plan:  medical stable for dc when assessments completed by state    Code Status: Level 1 - Full Code    Subjective:   Seen and examined. No new concerns. Nursing reporting he slept through the night     Objective:     Vitals:   Temp (24hrs), Av.4 °F (36.3 °C), Min:97.2 °F (36.2 °C), Max:97.7 °F (36.5 °C)    Temp:  [97.2 °F (36.2 °C)-97.7 °F (36.5 °C)] 97.2 °F (36.2 °C)  HR:  [68-76] 68  Resp:  [16] 16  BP: (111-133)/(72-80) 111/72  SpO2:  [89 %-96 %] 96 %  Body mass index is 38.15 kg/m². Input and Output Summary (last 24 hours): Intake/Output Summary (Last 24 hours) at 2023 1331  Last data filed at 2023 0901  Gross per 24 hour   Intake 240 ml   Output 850 ml   Net -610 ml       Physical Exam:   Physical Exam  Vitals and nursing note reviewed. Constitutional:       General: He is not in acute distress. Appearance: He is well-developed. He is obese. HENT:      Head: Normocephalic and atraumatic. Eyes:      Conjunctiva/sclera: Conjunctivae normal.   Cardiovascular:      Rate and Rhythm: Normal rate and regular rhythm. Heart sounds: No murmur heard. Pulmonary:      Effort: Pulmonary effort is normal. No tachypnea, accessory muscle usage or respiratory distress. Breath sounds: Examination of the right-middle field reveals decreased breath sounds.  Examination of the right-lower field reveals decreased breath sounds. Decreased breath sounds present. Abdominal:      Palpations: Abdomen is soft. Tenderness: There is no abdominal tenderness. Musculoskeletal:         General: No swelling (chronic lymphedema). Cervical back: Neck supple. Right lower leg: Edema present. Left lower leg: Edema present. Skin:     General: Skin is warm and dry. Capillary Refill: Capillary refill takes less than 2 seconds. Neurological:      Mental Status: He is alert. Mental status is at baseline. Psychiatric:         Mood and Affect: Mood normal.          Additional Data:     Labs:  Results from last 7 days   Lab Units 12/04/23  0630   WBC Thousand/uL 5.25   HEMOGLOBIN g/dL 10.3*   HEMATOCRIT % 33.4*   PLATELETS Thousands/uL 174   NEUTROS PCT % 80*   LYMPHS PCT % 12*   MONOS PCT % 8   EOS PCT % 0     Results from last 7 days   Lab Units 12/05/23  0957   SODIUM mmol/L 141   POTASSIUM mmol/L 3.8   CHLORIDE mmol/L 100   CO2 mmol/L 34*   BUN mg/dL 28*   CREATININE mg/dL 1.68*   ANION GAP mmol/L 7   CALCIUM mg/dL 9.6   GLUCOSE RANDOM mg/dL 101                       Lines/Drains:  Invasive Devices       Peripheral Intravenous Line  Duration             Peripheral IV 12/04/23 Right Antecubital 1 day                          Imaging: No pertinent imaging reviewed.     Recent Cultures (last 7 days):         Last 24 Hours Medication List:   Current Facility-Administered Medications   Medication Dose Route Frequency Provider Last Rate    acetaminophen  650 mg Oral Q6H PRN Dana sAif PA-C      ascorbic acid  500 mg Oral Daily Dana Asif PA-C      aspirin  81 mg Oral Daily Mariela Gentile MD      calcium carbonate-vitamin D  1 tablet Oral Daily With Breakfast Bartolo Romero MD      dextromethorphan-guaiFENesin  10 mL Oral Q4H PRN Dana Asif PA-C      enoxaparin  40 mg Subcutaneous Q24H [address] [facility] ALEKSANDRA Romero MD      lactulose  20 g Oral Daily With Breakfast René Church PA-C      lamoTRIgine  200 mg Oral Early Morning Dana Yefri, WHITNEY      lamoTRIgine  250 mg Oral After Lunch Dana Demo, WHITNEY      lamoTRIgine  300 mg Oral HS Dana Demo, WHITNEY      levOCARNitine  330 mg Oral 4x Daily (with meals and at bedtime) Narinder Bashir MD      LORazepam  2 mg Intravenous Q6H PRN Dana Demo, WHITNEY      metoprolol tartrate  25 mg Oral BID Dana Demo, WHITNEY      nystatin   Topical BID Dana Demo, WHITNEY      ondansetron  4 mg Intravenous Q6H PRN Dana Demo, WHITNEY      polyethylene glycol  17 g Oral Daily PRN Dana Demo, WHITNEY      primidone  100 mg Oral Daily With Lunch Dana DemoWHITNEY      primidone  100 mg Oral After Breakfast Barrett Romero MD      primidone  150 mg Oral HS Dana Mykeo, WHITNEY      senna-docusate sodium  1 tablet Oral BID Dana MykeoWHITNEY      sodium chloride  1 Application Nasal TID Manny Gutierrez MD      sodium chloride  2 spray Each Nare TID Manny Gutierrez MD      [START ON 12/7/2023] spironolactone  100 mg Oral Daily Felipa Goldmann, PA-C      torsemide  40 mg Oral Daily Lucila Bailey DO      vitamin E (tocopherol)  400 Units Oral Daily Dana MykeoWHITNEY      zonisamide  100 mg Oral HS Donta Hobbs PA-C          Today, Patient Was Seen By: Felipa Goldmann, PA-C    **Please Note: This note may have been constructed using a voice recognition system. **

## 2023-12-07 LAB
ANION GAP SERPL CALCULATED.3IONS-SCNC: 8 MMOL/L
BUN SERPL-MCNC: 27 MG/DL (ref 5–25)
CALCIUM SERPL-MCNC: 9.6 MG/DL (ref 8.4–10.2)
CHLORIDE SERPL-SCNC: 99 MMOL/L (ref 96–108)
CO2 SERPL-SCNC: 34 MMOL/L (ref 21–32)
CREAT SERPL-MCNC: 1.65 MG/DL (ref 0.6–1.3)
GFR SERPL CREATININE-BSD FRML MDRD: 45 ML/MIN/1.73SQ M
GLUCOSE SERPL-MCNC: 104 MG/DL (ref 65–140)
POTASSIUM SERPL-SCNC: 3.9 MMOL/L (ref 3.5–5.3)
SODIUM SERPL-SCNC: 141 MMOL/L (ref 135–147)

## 2023-12-07 PROCEDURE — 80048 BASIC METABOLIC PNL TOTAL CA: CPT

## 2023-12-07 PROCEDURE — 99232 SBSQ HOSP IP/OBS MODERATE 35: CPT | Performed by: FAMILY MEDICINE

## 2023-12-07 RX ADMIN — ENOXAPARIN SODIUM 40 MG: 40 INJECTION SUBCUTANEOUS at 08:33

## 2023-12-07 RX ADMIN — OXYCODONE HYDROCHLORIDE AND ACETAMINOPHEN 500 MG: 500 TABLET ORAL at 08:33

## 2023-12-07 RX ADMIN — SPIRONOLACTONE 100 MG: 100 TABLET, FILM COATED ORAL at 08:33

## 2023-12-07 RX ADMIN — ASPIRIN 81 MG: 81 TABLET, COATED ORAL at 08:33

## 2023-12-07 RX ADMIN — PRIMIDONE 100 MG: 50 TABLET ORAL at 14:34

## 2023-12-07 RX ADMIN — SALINE NASAL SPRAY 2 SPRAY: 1.5 SOLUTION NASAL at 17:26

## 2023-12-07 RX ADMIN — PRIMIDONE 150 MG: 50 TABLET ORAL at 22:42

## 2023-12-07 RX ADMIN — PRIMIDONE 100 MG: 50 TABLET ORAL at 08:33

## 2023-12-07 RX ADMIN — NYSTATIN: 100000 POWDER TOPICAL at 17:26

## 2023-12-07 RX ADMIN — TORSEMIDE 40 MG: 20 TABLET ORAL at 08:33

## 2023-12-07 RX ADMIN — ZONISAMIDE 100 MG: 100 CAPSULE ORAL at 22:41

## 2023-12-07 RX ADMIN — Medication 400 UNITS: at 08:33

## 2023-12-07 RX ADMIN — LEVOCARNITINE 330 MG: 1 SOLUTION ORAL at 21:45

## 2023-12-07 RX ADMIN — METOPROLOL TARTRATE 25 MG: 25 TABLET, FILM COATED ORAL at 21:45

## 2023-12-07 RX ADMIN — LAMOTRIGINE 250 MG: 100 TABLET ORAL at 14:34

## 2023-12-07 RX ADMIN — LEVOCARNITINE 330 MG: 1 SOLUTION ORAL at 17:26

## 2023-12-07 RX ADMIN — Medication 1 TABLET: at 08:33

## 2023-12-07 RX ADMIN — DOCUSATE SODIUM AND SENNOSIDES 1 TABLET: 8.6; 5 TABLET, FILM COATED ORAL at 17:26

## 2023-12-07 RX ADMIN — LEVOCARNITINE 330 MG: 1 SOLUTION ORAL at 12:38

## 2023-12-07 RX ADMIN — NYSTATIN: 100000 POWDER TOPICAL at 08:34

## 2023-12-07 RX ADMIN — DOCUSATE SODIUM AND SENNOSIDES 1 TABLET: 8.6; 5 TABLET, FILM COATED ORAL at 08:33

## 2023-12-07 RX ADMIN — LAMOTRIGINE 200 MG: 100 TABLET ORAL at 06:45

## 2023-12-07 RX ADMIN — METOPROLOL TARTRATE 25 MG: 25 TABLET, FILM COATED ORAL at 08:33

## 2023-12-07 RX ADMIN — LEVOCARNITINE 330 MG: 1 SOLUTION ORAL at 08:35

## 2023-12-07 RX ADMIN — LAMOTRIGINE 300 MG: 100 TABLET ORAL at 22:41

## 2023-12-07 RX ADMIN — LACTULOSE 20 G: 20 SOLUTION ORAL at 08:33

## 2023-12-07 NOTE — PROGRESS NOTES
[address] [facility]  Progress Note  Name: Lara Gomez  MRN: [id number]  Unit/Bed#: -72 I Date of Admission: 11/17/2023   Date of Service: 12/7/2023 I Hospital Day: 20    Assessment/Plan   Pleural effusion on right  Assessment & Plan  Patient with worsening shortness of breath for the past week falling asleep more often  Previously utilize oxygen 2 L only at nighttime, family endorsing he has been utilizing throughout the day prior to admission as well  Imaging showing loculated right-sided moderate effusion with inability to rule out pneumonia on imaging  Labs without leukocytosis, Pro-Janusz was mildly elevated, finished a course of antibiotics for pneumonia x 7 days  Due to loculated effusion IR consulted for thoracentesis which was completed 11/20  There is no significant loculation noted on ultrasound necessitating a chest tube  860 mL dark isidro-colored right pleural fluid  Fluid studies no bacterial growth - likely transudative secondary to CHF/cirrhosis   Serial CXR with diuretics stable on repeat imaging 11/30    Revaluate with Xray chest PA& Lateral 12/03 appears stable on review of imaging will trend for final reading; and consider repeate imaging in 2-3 month after discharge , outpatient , to revaluate especially if symptomatic     Acute blood loss anemia  Assessment & Plan  Secondary to recurrent epistaxis. ENT consulted -recommending conservative management  Patient is on aspirin and Lovenox, continue to monitor  Hemoglobin has been stable with ferrous sulfate      Chronic respiratory failure with hypercapnia (HCC)  Assessment & Plan  Wears 2L O2 QHS, continue  Likely has a chronic hypercapnia given the elevated bicarbonate.  Kyphosis causing chronic limited chest expansion  Chest x-ray showing reaccumulation of pleural effusion on the right side -diuretics have been adjusted, see under pleural effusion - effusion stable on previous regimen  Trial of pulse ox on ear rather than fingers to due questions of perfusion as monitor falls off fingers or has low readings when patient clenching fists     Liver cirrhosis (HCC)  Assessment & Plan  Cirrhotic morphology of the liver chronic problem  Ammonia is normal there is no ascites  Continue lactulose daily -re-time for AM patient has been refusing at night as he does not want to have bowel movements during bedtime  Aldactone resume at lower dose, 50 mg daily - uptitrated to home dose 100 mg qd on 12/6    Acute on chronic heart failure with preserved ejection fraction [facility])  Assessment & Plan  Patient previously maintained on Lasix as an outpatient -during a nursing home admission it was decreased to 40 mg daily given his kidney function  Now proBNP elevated  2D echo done 2022 with mild concentric hypertrophy and EF 60 to 65%  Updated echo this admission with mild concentric left ventricular hypertrophy, LVEF 63% with normal diastolic function -overall similar from 2022 without significant change  Is s/p IR guided thoracentesis 11/20 due to loculated effusion  Nephrology following and aiding with diuretic regimen  Patient still hypervolemic, does have chronic lymphedema in his legs as well which contributes to exam  Currently maintaining on torsemide 40 mg daily & spironolactone 50 mg qd  Increasing dose of spironolactone on 12/6 to 100 mg qd per home regimen with mild peripheral edema compared to yesterday - follow up BMP in AM     Dysphagia  Assessment & Plan  Continue mechanical soft diet from prior  Aspiration precautions    Developmental delay  Assessment & Plan  Daily involvement with mother  Supportive care  PT/OT/ST recommending STR   Due to social situation, patient will need skilled nursing facility, case management on board (patient was living with his mother, mother recently hospitalized due to medical issue and got discharged to skilled nursing facility.   No other family member is able to take care of the patient.)    Falls  Assessment & Plan  Frequent falls for his entire life because of unsteady gait according to the mother's medical history list  Fall precautions  Emergency room trauma eval no acute injury  Due to patient overall medical conditions, social issues, patient is in need of extensive care. Case management on board for dispo planning    Nonintractable generalized idiopathic epilepsy without status epilepticus [facility])  Assessment & Plan  Continue home seizure medications with seizure precautions  His last seizure was 2-3 days prior to admission  The last few times he was hospitalized for seizure the AEDs were not changed because the breakthrough seizure etiology were infections. Lamictal and primidone levels are therapeutic. Zonesamide was subtherapeutic ; repeat 12/3 improving but remains subtherapetic      * Acute kidney injury superimposed on chronic kidney disease 3  Assessment & Plan  Lab Results   Component Value Date    EGFR 45 12/07/2023    EGFR 44 12/05/2023    EGFR 42 12/04/2023    CREATININE 1.65 (H) 12/07/2023    CREATININE 1.68 (H) 12/05/2023    CREATININE 1.76 (H) 12/04/2023     Nephrology following  Previous baseline reported to be 1.3-1.4  Creatinine has been stable around 1.5-1.7 likely new baseline per Nephrology   Stable                VTE Pharmacologic Prophylaxis: VTE Score: 5 High Risk (Score >/= 5) - Pharmacological DVT Prophylaxis Ordered: enoxaparin (Lovenox). Sequential Compression Devices Ordered. Mobility:   Basic Mobility Inpatient Raw Score: 6  JH-HLM Goal: 2: Bed activities/Dependent transfer  JH-HLM Achieved: 2: Bed activities/Dependent transfer  HLM Goal achieved. Continue to encourage appropriate mobility. Patient Centered Rounds: I performed bedside rounds with nursing staff today.    Discussions with Specialists or Other Care Team Provider: None    Education and Discussions with Family / Patient:       Current Length of Stay: 20 day(s)  Current Patient Status: Inpatient   Certification Statement: The patient will continue to require additional inpatient hospital stay due to to monitor above conditions  Discharge Plan: To be due to mind    Code Status: Level 1 - Full Code    Subjective:   Seen and evaluated and examined. Resting comfortably. Denies any significant complaint. Objective:     Vitals:   Temp (24hrs), Av.4 °F (36.3 °C), Min:97.3 °F (36.3 °C), Max:97.5 °F (36.4 °C)    Temp:  [97.3 °F (36.3 °C)-97.5 °F (36.4 °C)] 97.3 °F (36.3 °C)  HR:  [66-73] 66  Resp:  [18] 18  BP: (125-126)/(75-80) 126/75  SpO2:  [93 %-94 %] 94 %  Body mass index is 38.54 kg/m². Input and Output Summary (last 24 hours): Intake/Output Summary (Last 24 hours) at 2023 1733  Last data filed at 2023 1155  Gross per 24 hour   Intake --   Output 1232 ml   Net -1232 ml       Physical Exam:   Physical Exam  Vitals and nursing note reviewed. Constitutional:       Appearance: Normal appearance. He is not ill-appearing or diaphoretic. HENT:      Mouth/Throat:      Mouth: Mucous membranes are moist.      Pharynx: Oropharynx is clear. No oropharyngeal exudate. Eyes:      General: No scleral icterus. Left eye: No discharge. Extraocular Movements: Extraocular movements intact. Pupils: Pupils are equal, round, and reactive to light. Cardiovascular:      Rate and Rhythm: Normal rate. Heart sounds: Normal heart sounds. No murmur heard. No friction rub. No gallop. Pulmonary:      Effort: Pulmonary effort is normal. No respiratory distress. Breath sounds: No stridor. No wheezing or rhonchi. Abdominal:      General: Abdomen is flat. Bowel sounds are normal. There is no distension. Palpations: There is no mass. Tenderness: There is no abdominal tenderness. Hernia: No hernia is present. Musculoskeletal:      Right lower leg: No edema. Left lower leg: No edema. Neurological:      General: No focal deficit present. Mental Status: He is alert and oriented to person, place, and time. Additional Data:     Labs:  Results from last 7 days   Lab Units 12/04/23  0630   WBC Thousand/uL 5.25   HEMOGLOBIN g/dL 10.3*   HEMATOCRIT % 33.4*   PLATELETS Thousands/uL 174   NEUTROS PCT % 80*   LYMPHS PCT % 12*   MONOS PCT % 8   EOS PCT % 0     Results from last 7 days   Lab Units 12/07/23  0823   SODIUM mmol/L 141   POTASSIUM mmol/L 3.9   CHLORIDE mmol/L 99   CO2 mmol/L 34*   BUN mg/dL 27*   CREATININE mg/dL 1.65*   ANION GAP mmol/L 8   CALCIUM mg/dL 9.6   GLUCOSE RANDOM mg/dL 104                       Lines/Drains:  Invasive Devices       Peripheral Intravenous Line  Duration             Peripheral IV 12/04/23 Right Antecubital 2 days                          Imaging: No pertinent imaging reviewed.     Recent Cultures (last 7 days):         Last 24 Hours Medication List:   Current Facility-Administered Medications   Medication Dose Route Frequency Provider Last Rate    acetaminophen  650 mg Oral Q6H PRN Dana Asif PA-C      ascorbic acid  500 mg Oral Daily Dana Asif PA-C      aspirin  81 mg Oral Daily Harry Bonds MD      calcium carbonate-vitamin D  1 tablet Oral Daily With Breakfast Harry Bonds MD      dextromethorphan-guaiFENesin  10 mL Oral Q4H PRN Dana Asif PA-C      enoxaparin  40 mg Subcutaneous Q24H [address] [facility] ALEKSANDRA Romero MD      lactulose  20 g Oral Daily With Breakfast Diego Hoffman PA-C      lamoTRIgine  200 mg Oral Early Morning Dana Asif PA-C      lamoTRIgine  250 mg Oral After Lunch Dana Asif PA-C      lamoTRIgine  300 mg Oral HS REMBERTO Joseph-JAMIE      levOCARNitine  330 mg Oral 4x Daily (with meals and at bedtime) Harry Bonds MD      LORazepam  2 mg Intravenous Q6H PRN Dana Asif PA-C      metoprolol tartrate  25 mg Oral BID Dana Asif PA-C      nystatin   Topical BID Dana Asif PA-C      ondansetron  4 mg Intravenous Q6H PRN Dana Asif PA-C      polyethylene glycol  17 g Oral Daily PRN Karina Dunn WHITNEY      primidone  100 mg Oral Daily With Lunch Dana Asif PA-C      primidone  100 mg Oral After Breakfast Gabby Romero MD      primidone  150 mg Oral HS Dana Asif PA-C      senna-docusate sodium  1 tablet Oral BID Dana Asif PA-C      sodium chloride  1 Application Nasal TID Jayro Massey MD      sodium chloride  2 spray Each Nare TID Jayro Massey MD      spironolactone  100 mg Oral Daily yledillan Pulido PA-C      torsemide  40 mg Oral Daily Laureen Ulloa DO      vitamin E (tocopherol)  400 Units Oral Daily Dana Asif PA-C      zonisamide  100 mg Oral HS Mishel Jaquez PA-C          Today, Patient Was Seen By: Yuri Quiros MD    **Please Note: This note may have been constructed using a voice recognition system. **

## 2023-12-07 NOTE — ASSESSMENT & PLAN NOTE
Frequent falls for his entire life because of unsteady gait according to the mother's medical history list  Fall precautions  Emergency room trauma eval no acute injury  Due to patient overall medical conditions, social issues, patient is in need of extensive care.     Case management on board for dispo planning No

## 2023-12-07 NOTE — PLAN OF CARE
Problem: RESPIRATORY - ADULT  Goal: Achieves optimal ventilation and oxygenation  Description: INTERVENTIONS:  - Assess for changes in respiratory status confusion SOB tachycardia  - Assess for changes in mentation and behavior  - Position to facilitate oxygenation and minimize respiratory effort  - Oxygen administered by appropriate delivery if ordered  - Initiate smoking cessation education as indicated  - Encourage broncho-pulmonary hygiene including cough, deep breathe, Incentive Spirometry  - Assess the need for suctioning and aspirate as needed  - Assess and instruct to report SOB or any respiratory difficulty  - Respiratory Therapy support as indicated  Outcome: Progressing     Problem: INFECTION - ADULT  Goal: Absence or prevention of progression during hospitalization  Description: INTERVENTIONS:  - Assess and monitor for signs and symptoms of infection  - Monitor lab/diagnostic results  - Monitor all insertion sites, i.e. indwelling lines, tubes, and drains  - Monitor endotracheal if appropriate and nasal secretions for changes in amount and color  - [place] appropriate cooling/warming therapies per order  - Administer medications as ordered  - Instruct and encourage patient and family to use good hand hygiene technique  - Identify and instruct in appropriate isolation precautions for identified infection/condition  Outcome: Progressing

## 2023-12-07 NOTE — CASE MANAGEMENT
Case Management Discharge Planning Note    Patient name Yung Thorne  Location [address] [facility] Deanna 337/-[id number] MRN [id number]  : 1965 Date 2023       Current Admission Date: 2023  Current Admission Diagnosis:Acute kidney injury superimposed on chronic kidney disease 3   Patient Active Problem List    Diagnosis Date Noted    Acute blood loss anemia 2023    Chronic respiratory failure with hypercapnia ([address]) 2023    Acute kidney injury superimposed on chronic kidney disease 3 2023    Liver cirrhosis ([address]) 2021    Abnormal finding on CT scan 2021    EDWIGE (acute kidney injury) ([address]) 2021    Pleural effusion on right 2021    History of COVID-19 2021    S/P pericardial window creation 2021    Acute on chronic heart failure with preserved ejection fraction ([address]) 2021    Lower extremity pain, left 2021    MRSA nasal colonization 2020    Developmental delay 2020    Dysphagia 2020    Acute encephalopathy 2020    Pneumonia due to infectious organism 01/10/2020    Nonintractable generalized idiopathic epilepsy without status epilepticus ([address]) 01/10/2020    Falls 01/10/2020    T wave inversion in EKG 01/10/2020    Essential hypertension 01/10/2020    RSV (acute bronchiolitis due to respiratory syncytial virus) 01/10/2020    Acute respiratory failure with hypoxia ([address]) 01/10/2020    Polyp of colon 03/15/2019      LOS (days): 20  Geometric Mean LOS (GMLOS) (days): 3.90  Days to GMLOS:-15.8     OBJECTIVE:  Risk of Unplanned Readmission Score: 20.13         Current admission status: Inpatient   Preferred Pharmacy:   [address] [place] [address] Road  [address] [place] [id number]  Phone: [phone] Fax: [phone]    Primary Care Provider: Jeff Lam DO    Primary Insurance: MEDICARE  Secondary Insurance: [employer]    DISCHARGE DETAILS:     CM sent messages to [facility] facilities in [address] to explore bed availability as CM was told [facility] received Program Office Letter for patient and will fax to [facility] by end of day.     CM to explore bed availability with mother for tomorrow or this weekend when beds known

## 2023-12-07 NOTE — PHYSICIAN ADVISOR
Current patient class: Inpatient  The patient is currently on Hospital Day: 21      The patient was admitted to the hospital at  8:46 PM on 11/17/23 for the following diagnosis:  Edema [R60.9]  Bilateral lower extremity edema [R60.0]  Ambulatory dysfunction [R26.2]     CMS OUTLIER STAY REVIEW    After review of the relevant documentation, labs, vital signs and test results, the patient is appropriate for CONTINUED INPATIENT ADMISSION. The patient continues to remain hospitalized receiving acute medical care. The patient has surpassed the expected duration of stay, however given the clinical condition, need for further acute care management, the patient is appropriate to remain in an inpatient status. Rationale is as follows: The patient is a 66 yrs old Male who presented to the ED at 11/17/2023  5:20 PM with a chief complaint of Edema (Pt having increased SOB and b/l lower extremity edema)  Patient found to have chf and later loculated pleural effusion requiring chest tube. Patient also treated for pneumonia. Discharge planning is now pending. The patient’s vitals on arrival were   ED Triage Vitals   Temperature Pulse Respirations Blood Pressure SpO2   11/17/23 2240 11/17/23 1729 11/17/23 1729 11/17/23 1729 11/17/23 1729   97.7 °F (36.5 °C) 64 20 135/74 92 %      Temp Source Heart Rate Source Patient Position - Orthostatic VS BP Location FiO2 (%)   11/17/23 2240 11/17/23 1729 11/17/23 1729 11/17/23 1729 --   Temporal Monitor Sitting Left arm       Pain Score       11/17/23 2238       No Pain           Past Medical History:   Diagnosis Date    Hypertension     Mentally challenged     Pericardial effusion     Pneumonia     Seizure [facility])      Past Surgical History:   Procedure Laterality Date    FRACTURE SURGERY      clavicle, left humerus, radial, and ulna.   Right radius    HIP ARTHROSCOPY Right     IR THORACENTESIS  11/20/2023    NC COLONOSCOPY FLX DX W/COLLJ SPEC WHEN PFRMD N/A 4/1/2019 Procedure: COLONOSCOPY;  Surgeon: Gurjit Chapa MD;  Location: [facility];   Service: Colorectal           Consults have been placed to:   IP CONSULT TO CASE MANAGEMENT  IP CONSULT TO NEPHROLOGY  IP CONSULT TO ENT  IP CONSULT TO PULMONOLOGY    Vitals:    12/06/23 2142 12/06/23 2300 12/07/23 0600 12/07/23 0732   BP: 125/79   126/79   Pulse: 73   72   Resp: 18   18   Temp: 97.5 °F (36.4 °C)   97.5 °F (36.4 °C)   TempSrc:       SpO2: 93% 93%  93%   Weight:   108 kg (238 lb 12.1 oz)    Height:           Most recent labs:    Recent Labs     12/05/23  0957   K 3.8   CALCIUM 9.6   BUN 28*   CREATININE 1.68*       Scheduled Meds:  Current Facility-Administered Medications   Medication Dose Route Frequency Provider Last Rate    acetaminophen  650 mg Oral Q6H PRN Dana Demo, PA-C      ascorbic acid  500 mg Oral Daily Dana Demo, PA-C      aspirin  81 mg Oral Daily Ashtyn Romero MD      calcium carbonate-vitamin D  1 tablet Oral Daily With Breakfast Harry Bonds MD      dextromethorphan-guaiFENesin  10 mL Oral Q4H PRN Dana Demo, PA-C      enoxaparin  40 mg Subcutaneous Q24H [address] Ashtyn Romero MD      lactulose  20 g Oral Daily With Breakfast Sherl Orris, PA-JAMIE      lamoTRIgine  200 mg Oral Early Morning Dana Demo, PA-C      lamoTRIgine  250 mg Oral After Lunch Dana Demo, PA-C      lamoTRIgine  300 mg Oral HS Dana Demo, PA-C      levOCARNitine  330 mg Oral 4x Daily (with meals and at bedtime) Harry Bonds MD      LORazepam  2 mg Intravenous Q6H PRN Dana Demo, PA-C      metoprolol tartrate  25 mg Oral BID Dana Demo, PA-C      nystatin   Topical BID Dana Demo, PA-C      ondansetron  4 mg Intravenous Q6H PRN Dana Demo, PA-C      polyethylene glycol  17 g Oral Daily PRN Dana Demo, PA-C      primidone  100 mg Oral Daily With Lunch Dana Demo, PA-C      primidone  100 mg Oral After Breakfast Ashtyn Romero MD      primidone  150 mg Oral HS Dana Asif PA-C      senna-docusate sodium  1 tablet Oral BID Jorge Bronson WHITNEY Asif      sodium chloride  1 Application Nasal TID Ashanti Cardenas MD      sodium chloride  2 spray Each Nare TID Ashanti Cardenas MD      spironolactone  100 mg Oral Daily Isabela Wolf PA-C      torsemide  40 mg Oral Daily Noretta DO Yue      vitamin E (tocopherol)  400 Units Oral Daily Dana Asif PA-C      zonisamide  100 mg Oral HS Dana Asif PA-C       Continuous Infusions:   PRN Meds:.  acetaminophen    dextromethorphan-guaiFENesin    LORazepam    ondansetron    polyethylene glycol    Surgical procedures (if appropriate):

## 2023-12-07 NOTE — PLAN OF CARE
Problem: PAIN - ADULT  Goal: Verbalizes/displays adequate comfort level or baseline comfort level  Description: Interventions:  - Encourage patient to monitor pain and request assistance  - Assess pain using appropriate pain scale0-10  - Administer analgesics based on type and severity of pain and evaluate response  - Implement non-pharmacological measures as appropriate and evaluate response  - Consider cultural and social influences on pain and pain management  - Notify physician/advanced practitioner if interventions unsuccessful or patient reports new pain  Outcome: Progressing     Problem: INFECTION - ADULT  Goal: Absence or prevention of progression during hospitalization  Description: INTERVENTIONS:  - Assess and monitor for signs and symptoms of infection  - Monitor lab/diagnostic results  - Monitor all insertion sites, i.e. indwelling lines, tubes, and drains  - Monitor endotracheal if appropriate and nasal secretions for changes in amount and color  - [place] appropriate cooling/warming therapies per order  - Administer medications as ordered  - Instruct and encourage patient and family to use good hand hygiene technique  - Identify and instruct in appropriate isolation precautions for identified infection/condition  Outcome: Progressing     Problem: SAFETY ADULT  Goal: Patient will remain free of falls  Description: INTERVENTIONS:  - Educate patient/family on patient safety including physical limitations  - Instruct patient to call for assistance with activity   - Consult OT/PT to assist with strengthening/mobility   - Keep Call bell within reach  - Keep bed low and locked with side rails adjusted as appropriate  - Keep care items and personal belongings within reach  - Initiate and maintain comfort rounds  - Make Fall Risk Sign visible to staff  - Offer Toileting every 2 Hours, in advance of need  - Initiate/Maintain bed/chair alarm  - Obtain necessary fall risk management equipment:   - Apply yellow socks and bracelet for high fall risk patients  - Consider moving patient to room near nurses station  Outcome: Progressing  Goal: Maintain or return to baseline ADL function  Description: INTERVENTIONS:  -  Assess patient's ability to carry out ADLs; assess patient's baseline for ADL function and identify physical deficits which impact ability to perform ADLs (bathing, care of mouth/teeth, toileting, grooming, dressing, etc.)  - Assess/evaluate cause of self-care deficits   - Assess range of motion  - Assess patient's mobility; develop plan if impaired  - Assess patient's need for assistive devices and provide as appropriate  - Encourage maximum independence but intervene and supervise when necessary  - Involve family in performance of ADLs  - Assess for home care needs following discharge   - Consider OT consult to assist with ADL evaluation and planning for discharge  - Provide patient education as appropriate  Outcome: Progressing  Goal: Maintains/Returns to pre admission functional level  Description: INTERVENTIONS:  - Perform AM-PAC 6 Click Basic Mobility/ Daily Activity assessment daily.  - Set and communicate daily mobility goal to care team and patient/family/caregiver. - Collaborate with rehabilitation services on mobility goals if consulted  - Perform Range of Motion 3 times a day. - Reposition patient every 2 hours.   - Dangle patient 3 times a day  - Stand patient 3 times a day  - Ambulate patient 3 times a day  - Out of bed to chair 3 times a day   - Out of bed for meals 3 times a day  - Out of bed for toileting  - Record patient progress and toleration of activity level   Outcome: Progressing     Problem: DISCHARGE PLANNING  Goal: Discharge to home or other facility with appropriate resources  Description: INTERVENTIONS:  - Identify barriers to discharge w/patient and caregiver  - Arrange for needed discharge resources and transportation as appropriate  - Identify discharge learning needs (meds, wound care, etc.)  - Arrange for interpretive services to assist at discharge as needed  - Refer to Case Management Department for coordinating discharge planning if the patient needs post-hospital services based on physician/advanced practitioner order or complex needs related to functional status, cognitive ability, or social support system  Outcome: Progressing     Problem: Knowledge Deficit  Goal: Patient/family/caregiver demonstrates understanding of disease process, treatment plan, medications, and discharge instructions  Description: Complete learning assessment and assess knowledge base.   Interventions:  - Provide teaching at level of understanding  - Provide teaching via preferred learning methods  Outcome: Progressing     Problem: Prexisting or High Potential for Compromised Skin Integrity  Goal: Skin integrity is maintained or improved  Description: INTERVENTIONS:  - Identify patients at risk for skin breakdown  - Assess and monitor skin integrity  - Assess and monitor nutrition and hydration status  - Monitor labs   - Assess for incontinence   - Turn and reposition patient  - Assist with mobility/ambulation  - Relieve pressure over bony prominences  - Avoid friction and shearing  - Provide appropriate hygiene as needed including keeping skin clean and dry  - Evaluate need for skin moisturizer/barrier cream  - Collaborate with interdisciplinary team   - Patient/family teaching  - Consider wound care consult   Outcome: Progressing     Problem: RESPIRATORY - ADULT  Goal: Achieves optimal ventilation and oxygenation  Description: INTERVENTIONS:  - Assess for changes in respiratory status confusion SOB tachycardia  - Assess for changes in mentation and behavior  - Position to facilitate oxygenation and minimize respiratory effort  - Oxygen administered by appropriate delivery if ordered  - Initiate smoking cessation education as indicated  - Encourage broncho-pulmonary hygiene including cough, deep breathe, Incentive Spirometry  - Assess the need for suctioning and aspirate as needed  - Assess and instruct to report SOB or any respiratory difficulty  - Respiratory Therapy support as indicated  Outcome: Progressing

## 2023-12-07 NOTE — ASSESSMENT & PLAN NOTE
Lab Results   Component Value Date    EGFR 45 12/07/2023    EGFR 44 12/05/2023    EGFR 42 12/04/2023    CREATININE 1.65 (H) 12/07/2023    CREATININE 1.68 (H) 12/05/2023    CREATININE 1.76 (H) 12/04/2023     Nephrology following  Previous baseline reported to be 1.3-1.4  Creatinine has been stable around 1.5-1.7 likely new baseline per Nephrology   Stable

## 2023-12-08 VITALS
HEART RATE: 71 BPM | HEIGHT: 66 IN | SYSTOLIC BLOOD PRESSURE: 130 MMHG | BODY MASS INDEX: 37.7 KG/M2 | RESPIRATION RATE: 18 BRPM | DIASTOLIC BLOOD PRESSURE: 82 MMHG | OXYGEN SATURATION: 90 % | TEMPERATURE: 97.2 F | WEIGHT: 234.57 LBS

## 2023-12-08 PROCEDURE — 97530 THERAPEUTIC ACTIVITIES: CPT

## 2023-12-08 PROCEDURE — 99239 HOSP IP/OBS DSCHRG MGMT >30: CPT | Performed by: FAMILY MEDICINE

## 2023-12-08 PROCEDURE — 97535 SELF CARE MNGMENT TRAINING: CPT

## 2023-12-08 RX ORDER — NYSTATIN 100000 [USP'U]/G
POWDER TOPICAL 2 TIMES DAILY
Qty: 60 G | Refills: 0
Start: 2023-12-08 | End: 2024-01-07

## 2023-12-08 RX ORDER — TORSEMIDE 20 MG/1
40 TABLET ORAL DAILY
Qty: 60 TABLET | Refills: 0
Start: 2023-12-09 | End: 2024-01-08

## 2023-12-08 RX ADMIN — OXYCODONE HYDROCHLORIDE AND ACETAMINOPHEN 500 MG: 500 TABLET ORAL at 08:22

## 2023-12-08 RX ADMIN — Medication 400 UNITS: at 08:22

## 2023-12-08 RX ADMIN — Medication 1 APPLICATION: at 08:23

## 2023-12-08 RX ADMIN — SPIRONOLACTONE 100 MG: 100 TABLET, FILM COATED ORAL at 08:23

## 2023-12-08 RX ADMIN — LACTULOSE 20 G: 20 SOLUTION ORAL at 08:22

## 2023-12-08 RX ADMIN — TORSEMIDE 40 MG: 20 TABLET ORAL at 08:22

## 2023-12-08 RX ADMIN — PRIMIDONE 100 MG: 50 TABLET ORAL at 08:23

## 2023-12-08 RX ADMIN — ASPIRIN 81 MG: 81 TABLET, COATED ORAL at 08:23

## 2023-12-08 RX ADMIN — NYSTATIN 1 APPLICATION: 100000 POWDER TOPICAL at 08:23

## 2023-12-08 RX ADMIN — METOPROLOL TARTRATE 25 MG: 25 TABLET, FILM COATED ORAL at 08:22

## 2023-12-08 RX ADMIN — ENOXAPARIN SODIUM 40 MG: 40 INJECTION SUBCUTANEOUS at 08:22

## 2023-12-08 RX ADMIN — LAMOTRIGINE 200 MG: 100 TABLET ORAL at 06:28

## 2023-12-08 RX ADMIN — Medication 1 TABLET: at 08:22

## 2023-12-08 RX ADMIN — LEVOCARNITINE 330 MG: 1 SOLUTION ORAL at 08:21

## 2023-12-08 RX ADMIN — SALINE NASAL SPRAY 2 SPRAY: 1.5 SOLUTION NASAL at 08:24

## 2023-12-08 NOTE — ASSESSMENT & PLAN NOTE
Daily involvement with mother  Supportive care  PT recommendation short-term rehab, patient is getting discharged to [facility].

## 2023-12-08 NOTE — OCCUPATIONAL THERAPY NOTE
Occupational Therapy Progress Note     Patient Name: Khoa Sarabia  KZQKG'O Date: 12/8/2023  Problem List  Principal Problem:    Acute kidney injury superimposed on chronic kidney disease 3  Active Problems:    Nonintractable generalized idiopathic epilepsy without status epilepticus ([address])    Falls    Developmental delay    Dysphagia    Pleural effusion on right    Acute on chronic heart failure with preserved ejection fraction (HCC)    Liver cirrhosis (HCC)    Chronic respiratory failure with hypercapnia (HCC)    Acute blood loss anemia     12/08/23 0900   Note Type   Note Type Treatment   Pain Assessment   Pain Assessment Tool 0-10   Pain Score No Pain   Restrictions/Precautions   Other Precautions Chair Alarm; Bed Alarm; Fall Risk;Pain;O2  (2lpm)   ADL   Where Assessed Edge of bed   UB Bathing Assistance 3  Moderate Assistance   UB Bathing Deficit Setup;Verbal cueing; Increased time to complete; Chest;Right arm;Left arm; Abdomen   UB Bathing Comments Pt with decreased participation throughout ADLs. Able to wash BUE with cues for task initation and thoroughness   LB Bathing Assistance 2  Maximal Assistance   LB Bathing Deficit Setup;Verbal cueing; Increased time to complete; Buttocks; Perineal area;Right lower leg including foot; Left lower leg including foot   UB Dressing Assistance 2  Maximal Assistance   UB Dressing Deficit Setup;Verbal cueing; Increased time to complete; Thread RUE; Thread LUE;Pull over head;Pull around back;Pull down in back   LB Dressing Assistance 1  Total Assistance   LB Dressing Deficit Requires assistive device for steadying;Setup;Verbal cueing; Increased time to complete   Bed Mobility   Supine to Sit 3  Moderate assistance   Additional items Assist x 1; Increased time required;Verbal cues;HOB elevated  (trunk management)   Additional Comments Pt supine in bed at beginning of session on 2lpm. Supine to sit @ Mod A. Required SBA to maintain seated at EOB with cues to redirect to midline d/t L lean. Trialed on RA. Transfers   Sit to Stand 2  Maximal assistance   Additional items Assist x 2; Increased time required;Verbal cues   Stand to Sit 2  Maximal assistance   Additional items Assist x 2; Increased time required;Verbal cues   Stand pivot 2  Maximal assistance   Additional items Assist x 2; Increased time required;Verbal cues   Additional Comments Two STS from EOB to RW @ Max A x2. SPT to recliner chair @ Max A x2. Two additional STS from chair with BUE support on bedrails @ Max A x2. Difficult to achieve full stand d/t BLE knee flexion. O2 decreased to 86% with activity on RA, placed back on 2lpm. Pt seated OOB in chair at end of session with chair alarm intact, call bell withi nreach and all needs met. Subjective   Subjective "Do it for me"   Cognition   Overall Cognitive Status Impaired   Arousal/Participation Alert; Responsive; Cooperative   Attention Attends with cues to redirect   Orientation Level Oriented to person   Memory Decreased recall of recent events   Following Commands Follows one step commands with increased time or repetition   Comments Encouragement for participation   Activity Tolerance   Activity Tolerance Patient limited by fatigue   Medical Staff Made Aware Spoke with PT Galilea Bernal and NELL Keith   Assessment   Assessment Pt completed OT tx session #4 focused on ADL performance and functional mobility. Pt alert and agreeable to participate. PT/OT co-treat completed d/t significant mobility deficits and safety concerns. Pt demonstrated improvements in seated balance and UB ADL performance but continues to required encouragement and education for participation. Pt currently completing UB ADLs @ Mod-Max A, LB ADLs @ Max-Total A, and functional mobility with RW @ Max A x2. Pt maintained Good O2 sats on 2lpm throughout, decreased on RA with activity.  Pt demonstrating [place] participation and Fair potential to achieve goals but is currently demonstrating deficits in decrease activity tolerance, decrease standing balance, decrease sitting balance, decrease performance during ADL tasks, decrease cognition, decrease BUE ROM, decrease UB MS, decrease generalized strength, decrease activity engagement, and decrease performance during functional transfers. Goal date , goals reassessed and are still applicable. Continue to recommend Level I: Maximum Resource Intensity Therapy upon discharge to increase safety and independence in ADL tasks and functional mobility. Plan   Treatment Interventions ADL retraining;Functional transfer training   Goal Expiration Date 23   OT Treatment Day 4   OT Frequency 1-2x/wk   Discharge Recommendation   Rehab Resource Intensity Level, OT I (Maximum Resource Intensity)   AM-PAC Daily Activity Inpatient   Lower Body Dressing 1   Bathing 2   Toileting 1   Upper Body Dressing 2   Grooming 2   Eating 2   Daily Activity Raw Score 10   Turning Head Towards Sound 3   Follow Simple Instructions 2   Low Function Daily Activity Raw Score 15   Low Function Daily Activity Standardized Score  26.28   AM-PAC Applied Cognition Inpatient   Following a Speech/Presentation 1   Understanding Ordinary Conversation 2   Taking Medications 1   Remembering Where Things Are Placed or Put Away 1   Remembering List of 4-5 Errands 1   Taking Care of Complicated Tasks 1   Applied Cognition Raw Score 7   Applied Cognition Standardized Score 15.17     The patient's raw score on the AM-PAC Daily Activity Inpatient Short Form is 10. A raw score of less than 19 suggests the patient may benefit from discharge to post-acute rehabilitation services. Please refer to the recommendation of the Occupational Therapist for safe discharge planning. Pt goals to be met by 2023     Pt will demonstrate ability to complete grooming/hygiene tasks @ mod assist after set-up. Pt will demonstrate ability to complete supine<>sit @ mod assist in order to increase safety and independence during ADL tasks.   Pt will demonstrate ability to complete UB ADLs including washing/dressing @ mod assist in order to increase performance and participation during meaningful tasks  Pt will demonstrate ability to complete LB dressing @ max assist in order to increase safety and independence during meaningful tasks. Pt will demonstrate ability to suzy/doff socks/shoes while sitting EOB/chair @ max assist in order to increase safety and independence during meaningful tasks. Pt will demonstrate ability to complete toileting tasks including CM and pericare @ max assist in order to increase safety and independence during meaningful tasks. Pt will demonstrate ability to complete EOB, chair, toilet/commode transfers @ mod assist in order to increase performance and participation during functional tasks. Pt will demonstrate ability to stand for 2 minutes while maintaining F+ balance with use of RW for UB support PRN. Pt will demonstrate ability to tolerate 10 minute OT session with no vc'ing for deep breathing or use of energy conservation techniques in order to increase activity tolerance during functional tasks. Pt will demonstrate Good carryover of use of energy conservation/compensatory strategies during ADLs and functional tasks in order to increase safety and reduce risk for falls. Pt will follow 100% simple 2-step commands and be at least A&O x3 consistently with environmental cues to increase participation in functional activities. Pt will identify 3 areas of interest/hobbies and 1 intervention on how to incorporate into daily life in order to increase interaction with environment and peers as well as increase participation in meaningful tasks. Pt will demonstrate 100% carryover of BUE HEP in order to increase BUE MS and increase performance during functional tasks upon d/c home.     Zaida Saint, OTR/L

## 2023-12-08 NOTE — ASSESSMENT & PLAN NOTE
Secondary to recurrent epistaxis.     ENT consulted -recommending conservative management  Hemoglobin has been stable with ferrous sulfate

## 2023-12-08 NOTE — ASSESSMENT & PLAN NOTE
Patient previously maintained on Lasix as an outpatient -during a nursing home admission it was decreased to 40 mg daily given his kidney function  Now proBNP elevated  2D echo done 2022 with mild concentric hypertrophy and EF 60 to 65%  Updated echo this admission with mild concentric left ventricular hypertrophy, LVEF 63% with normal diastolic function -overall similar from 2022 without significant change  Is s/p IR guided thoracentesis 11/20 due to loculated effusion  Nephrology following and aiding with diuretic regimen  Patient still hypervolemic, does have chronic lymphedema in his legs as well which contributes to exam  Currently maintaining on torsemide 40 mg daily & spironolactone 100 mg qd per home regimen

## 2023-12-08 NOTE — ASSESSMENT & PLAN NOTE
Continue home seizure medications with seizure precautions  His last seizure was 2-3 days prior to admission  The last few times he was hospitalized for seizure the AEDs were not changed because the breakthrough seizure etiology were infections. Lamictal and primidone levels are therapeutic.

## 2023-12-08 NOTE — CASE MANAGEMENT
Case Management Discharge Planning Note    Patient name Melida Hubbard  [facility] [address] [facility] 337/-54 MRN [id number]  : 1965 Date 2023       Current Admission Date: 2023  Current Admission Diagnosis:Acute kidney injury superimposed on chronic kidney disease 3   Patient Active Problem List    Diagnosis Date Noted    Acute blood loss anemia 2023    Chronic respiratory failure with hypercapnia ([address]) 2023    Acute kidney injury superimposed on chronic kidney disease 3 2023    Liver cirrhosis ([address]) 2021    Abnormal finding on CT scan 2021    EDWIGE (acute kidney injury) ([address]) 2021    Pleural effusion on right 2021    History of COVID-19 2021    S/P pericardial window creation 2021    Acute on chronic heart failure with preserved ejection fraction ([address]) 2021    Lower extremity pain, left 2021    MRSA nasal colonization 2020    Developmental delay 2020    Dysphagia 2020    Acute encephalopathy 2020    Pneumonia due to infectious organism 01/10/2020    Nonintractable generalized idiopathic epilepsy without status epilepticus ([address]) 01/10/2020    Falls 01/10/2020    T wave inversion in EKG 01/10/2020    Essential hypertension 01/10/2020    RSV (acute bronchiolitis due to respiratory syncytial virus) 01/10/2020    Acute respiratory failure with hypoxia ([address]) 01/10/2020    Polyp of colon 03/15/2019      LOS (days): 21  Geometric Mean LOS (GMLOS) (days): 3.90  Days to GMLOS:-16.7     OBJECTIVE:  Risk of Unplanned Readmission Score: 20.37         Current admission status: Inpatient   Preferred Pharmacy:   [address] [place] [address] Road  [address] [facility] [address]  Phone: [phone] Fax: [phone]    Primary Care Provider: Cecilia Waters DO    Primary Insurance: MEDICARE  Secondary Insurance: [employer]    DISCHARGE DETAILS:    Discharge planning discussed with[de-identified] mother, [place] of Choice: Yes  Comments - Freedom of Choice: blanket [facility] referral  CM contacted family/caregiver?: Yes  Were Treatment Team discharge recommendations reviewed with patient/caregiver?: Yes  Did patient/caregiver verbalize understanding of patient care needs?: Yes  Were patient/caregiver advised of the risks associated with not following Treatment Team discharge recommendations?: Yes    Contacts  Patient Contacts: Pato Urena, mother  Relationship to Patient[de-identified] Family  Contact Method: Phone  Phone Number: [phone]  Reason/Outcome: Discharge [address]         Is the patient interested in [address] at discharge?: No    DME Referral Provided  Referral made for DME?: No    Other Referral/Resources/Interventions Provided:  Interventions: Short Term Rehab  Referral Comments: [facility]         Treatment Team Recommendation: Short Term Rehab  Discharge Destination Plan[de-identified] Short Term Rehab  Transport at Discharge : BLS Ambulance                             IMM Given (Date):: 12/08/23  IMM Given to[de-identified] Family  Family notified[de-identified] appeal rights reviewed verbally with mother       Accepting Facility Name, [address] [place] [address] : [facility]  Receiving Facility/Agency Phone Number: [phone]  Facility/Agency Fax Number: [phone]       CM contacted patients mother to let her know LOC paperwork received from [facility] for patient to transition to [facility]. CM reviewed STR options for patient with mother: [facility] and [facility]. Mother chose [facility]. CM made [facility] providers aware of familys choice. CM contacted patients uncle to let him know mother chose [facility] for patient and patient will be transported later today via ambulance. CM submitted Roundtrip referral for BLS transport to [facility].

## 2023-12-08 NOTE — PROGRESS NOTES
Patient:    MRN:  [id number]    Ramandeep Request ID:  [id number]    Level of care reserved:  [address]    Partner Reserved:  [facility], [place], [address] [place] [address] [phone]    Clinical needs requested:    Geography searched:  10 miles around [phone]    Start of Service:    Request sent:  1:30pm EST on 11/21/2023 by Kobe Werner    Partner reserved:  9:51am EST on 12/8/2023 by Kobe Werner    Choice list shared:  9:51am EST on 12/8/2023 by Kobe Werner

## 2023-12-08 NOTE — ASSESSMENT & PLAN NOTE
Patient with worsening shortness of breath for the past week falling asleep more often  Previously utilize oxygen 2 L only at nighttime, family endorsing he has been utilizing throughout the day prior to admission as well  Completed 7 days of IV antibiotic.   Due to loculated effusion IR consulted for thoracentesis which was completed 11/20  There is no significant loculation noted on ultrasound necessitating a chest tube  860 mL dark isidro-colored right pleural fluid  Fluid studies no bacterial growth - likely transudative secondary to CHF/cirrhosis   Serial CXR with diuretics stable on repeat imaging 11/30    Revaluate with Xray chest PA& Lateral 12/03 appears stable on review of imaging will trend for final reading; and consider repeate imaging in 2-3 month after discharge , outpatient , to revaluate especially if symptomatic

## 2023-12-08 NOTE — CASE MANAGEMENT
Case Management Discharge Planning Note    Patient name Romi Justice  Location [address] [facility] [place] 337/-79 MRN [id number]  : 1965 Date 2023       Current Admission Date: 2023  Current Admission Diagnosis:Acute kidney injury superimposed on chronic kidney disease 3   Patient Active Problem List    Diagnosis Date Noted    Acute blood loss anemia 2023    Chronic respiratory failure with hypercapnia ([address]) 2023    Acute kidney injury superimposed on chronic kidney disease 3 2023    Liver cirrhosis ([address]) 2021    Abnormal finding on CT scan 2021    EDWIGE (acute kidney injury) ([address]) 2021    Pleural effusion on right 2021    History of COVID-19 2021    S/P pericardial window creation 2021    Acute on chronic heart failure with preserved ejection fraction ([address]) 2021    Lower extremity pain, left 2021    MRSA nasal colonization 2020    Developmental delay 2020    Dysphagia 2020    Acute encephalopathy 2020    Pneumonia due to infectious organism 01/10/2020    Nonintractable generalized idiopathic epilepsy without status epilepticus ([address]) 01/10/2020    Falls 01/10/2020    T wave inversion in EKG 01/10/2020    Essential hypertension 01/10/2020    RSV (acute bronchiolitis due to respiratory syncytial virus) 01/10/2020    Acute respiratory failure with hypoxia ([address]) 01/10/2020    Polyp of colon 03/15/2019      LOS (days): 21  Geometric Mean LOS (GMLOS) (days): 3.90  Days to GMLOS:-16.8     OBJECTIVE:  Risk of Unplanned Readmission Score: 20.42         Current admission status: Inpatient   Preferred Pharmacy:   [address] [place] [address] Road  [address] [facility] [address]  Phone: [phone] Fax: [phone]    Primary Care Provider: Michael Johnson DO    Primary Insurance: MEDICARE  Secondary Insurance: [employer]    DISCHARGE DETAILS:        CM met with patient to let him know he will be going to [facility] today. CM contacted uncle and let him know  time. He is on his way to [facility].

## 2023-12-08 NOTE — DISCHARGE SUMMARY
[address] [facility]  Discharge- Lan Montesinos 1965, 66 y.o. male MRN: [id number]  Unit/Bed#: -Tad Encounter: [id number]  Primary Care Provider: Jose Enrique Jackson DO   Date and time admitted to hospital: 11/17/2023  5:20 PM    Pleural effusion on right  Assessment & Plan  Patient with worsening shortness of breath for the past week falling asleep more often  Previously utilize oxygen 2 L only at nighttime, family endorsing he has been utilizing throughout the day prior to admission as well  Completed 7 days of IV antibiotic. Due to loculated effusion IR consulted for thoracentesis which was completed 11/20  There is no significant loculation noted on ultrasound necessitating a chest tube  860 mL dark isidro-colored right pleural fluid  Fluid studies no bacterial growth - likely transudative secondary to CHF/cirrhosis   Serial CXR with diuretics stable on repeat imaging 11/30    Revaluate with Xray chest PA& Lateral 12/03 appears stable on review of imaging will trend for final reading; and consider repeate imaging in 2-3 month after discharge , outpatient , to revaluate especially if symptomatic     Acute blood loss anemia  Assessment & Plan  Secondary to recurrent epistaxis.     ENT consulted -recommending conservative management  Hemoglobin has been stable with ferrous sulfate      Chronic respiratory failure with hypercapnia (HCC)  Assessment & Plan  Wears 2L O2 QHS, continue      Liver cirrhosis (HCC)  Assessment & Plan  Cirrhotic morphology of the liver chronic problem  Ammonia is normal there is no ascites  Continue lactulose daily   Aldactone resume at lower dose, 50 mg daily - uptitrated to home dose 100 mg qd    Acute on chronic heart failure with preserved ejection fraction [facility])  Assessment & Plan  Patient previously maintained on Lasix as an outpatient -during a nursing home admission it was decreased to 40 mg daily given his kidney function  Now proBNP elevated  2D echo done 2022 with mild concentric hypertrophy and EF 60 to 65%  Updated echo this admission with mild concentric left ventricular hypertrophy, LVEF 63% with normal diastolic function -overall similar from 2022 without significant change  Is s/p IR guided thoracentesis 11/20 due to loculated effusion  Nephrology following and aiding with diuretic regimen  Patient still hypervolemic, does have chronic lymphedema in his legs as well which contributes to exam  Currently maintaining on torsemide 40 mg daily & spironolactone 100 mg qd per home regimen     Dysphagia  Assessment & Plan  Continue mechanical soft diet from prior  Aspiration precautions    Developmental delay  Assessment & Plan  Daily involvement with mother  Supportive care  PT recommendation short-term rehab, patient is getting discharged to [facility]. [place]  Assessment & Plan  Frequent falls for his entire life because of unsteady gait according to the mother's medical history list  Fall precautions  Emergency room trauma eval no acute injury  Due to patient overall medical conditions, social issues, patient is in need of extensive care. Case management on board for dispo planning    Nonintractable generalized idiopathic epilepsy without status epilepticus [facility])  Assessment & Plan  Continue home seizure medications with seizure precautions  His last seizure was 2-3 days prior to admission  The last few times he was hospitalized for seizure the AEDs were not changed because the breakthrough seizure etiology were infections. Lamictal and primidone levels are therapeutic.     * Acute kidney injury superimposed on chronic kidney disease 3  Assessment & Plan  Lab Results   Component Value Date    EGFR 45 12/07/2023    EGFR 44 12/05/2023    EGFR 42 12/04/2023    CREATININE 1.65 (H) 12/07/2023    CREATININE 1.68 (H) 12/05/2023    CREATININE 1.76 (H) 12/04/2023     Nephrology following  Previous baseline reported to be 1.3-1.4  Creatinine has been stable around 1. 5-1.7 likely new baseline per Nephrology   Stable         Medical Problems       Resolved Problems  Date Reviewed: 12/1/2023            Resolved    MCKEON (dyspnea on exertion) 11/18/2023     Resolved by  Aspen Rangel MD    Epistaxis 11/26/2023     Resolved by  Isabela Wolf PA-C        Discharging Physician / Practitioner: Rhonda Pennington MD  PCP: Rosa Ellis DO  Admission Date:   Admission Orders (From admission, onward)       Ordered        11/17/23 2046  INPATIENT ADMISSION  Once                          Discharge Date: 12/08/23    Consultations During Hospital Stay:  Nephrology, pulmonary, cardiology, ENT, PT/OT. Procedures Performed:   XR chest portable   Final Result by Gus Romberg, MD (12/04 1414)      No significant change of pleural-parenchymal density on the right. Workstation performed: [id number]         XR chest portable   Final Result by Gus Romberg, MD (11/30 1521)      Pleural-parenchymal density on the right without significant change. Workstation performed: [id number]         XR chest portable   Final Result by Sly Flowers MD (11/28 1127)      Large (enlarging) right pleural effusion                  Workstation performed: [id number]         IR IN-Patient Thoracentesis   Final Result by Ana M Rodas MD (11/20 1522)   Impression:   1. Successful ultrasound-guided thoracentesis yielding 860 mL of dark isidro-colored right pleural fluid. No chest tube was placed at this time as no septations identified on ultrasound suggesting loculation . Workstation performed: [id number]         US kidney and bladder   Final Result by Rosi Ma MD (11/20 1141)      Unremarkable renal ultrasound. Resident: Kingston Looney, the attending radiologist, have reviewed the images and agree with the final report above.       Workstation performed: [id number]         XR chest portable   Final Result by Haroon Blakely MD (11/18 1110)      Moderate right pleural effusion with associated compressive atelectasis. However underlying or superimposed pneumonia cannot be excluded in the appropriate clinical setting. Small left pleural effusion. Resident: Juan Flynn, the attending radiologist, have reviewed the images and agree with the final report above. Workstation performed: [id number]         CT head without contrast   Final Result by Lai Archuleta MD (11/17 2007)      No acute intracranial abnormality. Workstation performed: [id number]         CT cervical spine without contrast   Final Result by Lai Archuleta MD (11/17 2009)      No cervical spine fracture or traumatic malalignment. Workstation performed: [id number]         CT chest abdomen pelvis wo contrast   Final Result by Lai Archuleta MD (11/17 2020)      No evidence of acute traumatic injury throughout the chest, abdomen or pelvis. Moderate size loculated right pleural effusion. Right middle and lower lobe consolidation compatible with compressive atelectasis. Underlying pneumonia should be excluded clinically. Mild wall thickening and edema with surrounding fluid at the proximal duodenum. Correlate for signs and symptoms of peptic ulcer disease                  Workstation performed: [id number]           Echo complete w/ contrast :  Technically difficult transthoracic echocardiogram study. Definity contrast was used for endocardial border enhancement. Mild concentric left ventricular hypertrophy, normal left ventricular systolic function. Left ventricular ejection fraction is estimated at around 63%. Normal diastolic function. Normal right ventricle size and systolic function. Normal left and right atrial cavity size. Mild aortic valve leaflet sclerosis, no aortic valve stenosis or regurgitation. Mild mitral valvular sclerosis.   No mitral valve stenosis or regurgitation. Trace tricuspid valve and possible trace pulmonary valve irritation. No obvious pulmonary hypertension. No pericardial effusion. Compared to report of previous echocardiogram from 12/10/2022 at [facility], previously noted right ventricular systolic dysfunction is not currently appreciable. Overall there is no significant change.     Significant Findings / Test Results:   Lab Results   Component Value Date    WBC 5.25 12/04/2023    HGB 10.3 (L) 12/04/2023    HCT 33.4 (L) 12/04/2023     (H) 12/04/2023     12/04/2023     Lab Results   Component Value Date    SODIUM 141 12/07/2023    K 3.9 12/07/2023    CL 99 12/07/2023    CO2 34 (H) 12/07/2023    AGAP 8 12/07/2023    BUN 27 (H) 12/07/2023    CREATININE 1.65 (H) 12/07/2023    GLUC 104 12/07/2023    CALCIUM 9.6 12/07/2023    AST 21 11/24/2023    ALT 14 11/24/2023    ALKPHOS 169 (H) 11/24/2023    TP 7.8 11/29/2023    TBILI 1.12 (H) 11/24/2023    EGFR 45 12/07/2023     Collected Updated Procedure Result Status Patient Facility Result Comment    11/29/2023 1410 12/01/2023 2119 Occult blood x 3, stool [[id number]]   Stool    Final result [address]  Component Value   Occult Blood, Stool #1 Negative   Occult Blood, Stool #2 --   Occult Blood, Stool #3 --   DATE SPECIMEN #1 11/29/2023   DATE SPECIMEN #2 --   DATE SPECIMEN #3 --          11/20/2023 1335 11/23/2023 0730 Body fluid culture (Pleural Fluid Culture) and Gram stain [[id number]]   Body Fluid from Pleural, Right    Final result [address]  Component Value   Body Fluid Culture, Sterile No growth   Gram Stain Result 2+ Polys    No organisms seen             11/20/2023 1335 11/20/2023 1742 LD (LDH), Body Fluid [[id number]]   Body Fluid from Other    Final result [address]  Component Value Units   LD, Fluid 84  U/L          11/20/2023 1335 11/20/2023 1742 Total Protein, body fluid [[id number]]   Body Fluid from Other    Final result [address]  Component Value Units   Protein, Fluid <3.0  g/dL          11/18/2023 1536 11/19/2023 2205 MRSA culture [[id number]]   (Abnormal)   Nares from Nose    Final result [address]  Component Value   MRSA Culture Only Methicillin Resistant Staphylococcus aureus isolated Abnormal     This patient requires contact isolation precautions per [facility] law. Contact precautions are not required in [place] for nasal surveillance cultures. 11/17/2023 1802 11/22/2023 2201 Blood culture #2 [[id number]]   Blood from Arm, Right    Final result [address]  Component Value   Blood Culture No Growth After 5 Days. 11/17/2023 1800 11/22/2023 2201 Blood culture #1 [[id number]]   Blood from Arm, Left    Final result [address]  Component Value   Blood Culture No Growth After 5 Days. 11/17/2023 1800 11/17/2023 1911 FLU/RSV/COVID - if FLU/RSV clinically relevant [[id number]]    Nares from Nose    Final result [address] - copy/paste COVID Guidelines URL to browser: [email or web address]   SARS-CoV-2 assay is a Nucleic Acid Amplification assay intended for the   qualitative detection of nucleic acid from SARS-CoV-2 in nasopharyngeal   swabs. Results are for the presumptive identification of SARS-CoV-2 RNA. Positive results are indicative of infection with SARS-CoV-2, the virus   causing COVID-19, but do not rule out bacterial infection or co-infection   with other viruses. Laboratories within the [facility] and its   territories are required to report all positive results to the appropriate   public health authorities.  Negative results do not preclude SARS-CoV-2   infection and should not be used as the sole basis for treatment or other   patient management decisions. Negative results must be combined with   clinical observations, patient history, and epidemiological information. This test has not been FDA cleared or approved. This test has been authorized by FDA under an Emergency Use Authorization   (EUA). This test is only authorized for the duration of time the   declaration that circumstances exist justifying the authorization of the   emergency use of an in vitro diagnostic tests for detection of SARS-CoV-2   virus and/or diagnosis of COVID-19 infection under section 564(b)(1) of   the Act, 21 U. S.C. 295RUP-8(R)(9), unless the authorization is terminated   or revoked sooner. The test has been validated but independent review by FDA   and CLIA is pending. Test performed using [employer]: This RT-PCR assay targets N2,   a region unique to SARS-CoV-2. A conserved region in the E-gene was chosen   for pan-Sarbecovirus detection which includes SARS-CoV-2. According to CMS-2020-01-R, this platform meets the definition of high-throughput technology. Component Value   SARS-CoV-2 Negative   INFLUENZA A PCR Negative   INFLUENZA B PCR Negative   RSV PCR Negative          Incidental Findings:   As mentioned above  I reviewed the above mentioned incidental findings with the patient and/or family and they expressed understanding. Test Results Pending at Discharge (will require follow up): None     Outpatient Tests Requested:  Repeat chest x-ray in 4 to 6 weeks    Complications: None    Reason for Admission: Frequent falls, lethargy, dyspnea on exertion    Hospital Course:   Nan Mojica is a 66 y.o. male patient who originally presented to the hospital on 11/17/2023 due to fall, lethargy, dyspnea on exertion. During the hospitalization, patient had received treatment for CHF as well as pleural effusion on right side, status post IR guided fluid removal of 860 cc, per fluid studies, likely transudate 20 secondary to CHF/cirrhosis.   Patient received diuretics, antibiotic, evaluated by pulmonary team.  Due to acute kidney injury, nephrology evaluated the patient's. All patient also evaluated by ENT due to epistasis, secondary to acute blood loss anemia, hemoglobin remained stable. Patient does use chronically 2 L of oxygen at nighttime, continue. Patient is currently taking torsemide 40 mg and aspirin lactone 100 mg.  PT OT evaluated patient, recommendation short-term rehab, patient is getting discharged to [facility]. Patient will need a repeat chest x-ray in 4 to 6 weeks. Or earlier if symptoms get worse. Lab results, imaging findings and management plan and option discussed in details with patient's mom over the phone. Verbalizes to understand and agrees. Patient also has chronic lymphedema, continue treatment. Please see above list of diagnoses and related plan for additional information. Condition at Discharge: stable    Discharge Day Visit / Exam:   Subjective: Seen and evaluated during the run in bed resting comfortably. Denies any significant complaint. Vitals: Blood Pressure: 130/82 (12/08/23 0627)  Pulse: 71 (12/08/23 0627)  Temperature: (!) 97.2 °F (36.2 °C) (12/08/23 0627)  Temp Source: Temporal (12/01/23 2006)  Respirations: 18 (12/07/23 2139)  Height: 5' 6" (167.6 cm) (11/21/23 0704)  Weight - Scale: 106 kg (234 lb 9.1 oz) (12/08/23 0647)  SpO2: 90 % (12/08/23 5960)  Exam:   Physical Exam  Vitals and nursing note reviewed. Constitutional:       Appearance: Normal appearance. He is not ill-appearing or diaphoretic. HENT:      Mouth/Throat:      Mouth: Mucous membranes are moist.      Pharynx: Oropharynx is clear. No oropharyngeal exudate. Eyes:      General: No scleral icterus. Left eye: No discharge. Extraocular Movements: Extraocular movements intact. Conjunctiva/sclera: Conjunctivae normal.      Pupils: Pupils are equal, round, and reactive to light.    Cardiovascular:      Rate and Rhythm: Normal rate. Heart sounds: No murmur heard. No friction rub. No gallop. Pulmonary:      Effort: Pulmonary effort is normal. No respiratory distress. Breath sounds: No stridor. No wheezing. Abdominal:      General: Abdomen is flat. Bowel sounds are normal. There is no distension. Palpations: There is no mass. Tenderness: There is no abdominal tenderness. Hernia: No hernia is present. Musculoskeletal:      Comments: Chronic lymphedema. Neurological:      Mental Status: He is alert. Mental status is at baseline. Discussion with Family: Updated  (mother) via phone. Discharge instructions/Information to patient and family:   See after visit summary for information provided to patient and family. Provisions for Follow-Up Care:  See after visit summary for information related to follow-up care and any pertinent home health orders. Mobility at time of Discharge:   Basic Mobility Inpatient Raw Score: 8  -HLM Goal: 3: Sit at edge of bed  JH-HLM Achieved: 4: Move to chair/commode  HLM Goal achieved. Continue to encourage appropriate mobility. Disposition:   Other [facility] Readmission: If condition get worse. Discharge Statement:   Greater than 50% of the total time was spent examining patient, answering all patient questions, arranging and discussing plan of care with patient as well as directly providing post-discharge instructions. Additional time then spent on discharge activities. Discharge Medications:  See after visit summary for reconciled discharge medications provided to patient and/or family.       **Please Note: This note may have been constructed using a voice recognition system**

## 2023-12-08 NOTE — PHYSICAL THERAPY NOTE
PHYSICAL THERAPY TREATMENT NOTE  NAME:  Jaquelin Gusman  DATE: 12/08/23    Length Of Stay: 21  Performed at least 2 patient identifiers during session: Name and ID bracelet    TREATMENT FLOW SHEET:    12/08/23 0902   PT Last Visit   PT Visit Date 12/08/23   Note Type   Note Type Treatment   Pain Assessment   Pain Assessment Tool FLACC   Pain Score   (no pain at rest)   Pain Rating: FLACC (Rest) - Face 0   Pain Rating: FLACC (Rest) - Legs 0   Pain Rating: FLACC (Rest) - Activity 0   Pain Rating: FLACC (Rest) - Cry 0   Pain Rating: FLACC (Rest) - Consolability 0   Score: FLACC (Rest) 0   Pain Rating: FLACC (Activity) - Face 1   Pain Rating: FLACC (Activity) - Legs 0   Pain Rating: FLACC (Activity) - Activity 0   Pain Rating: FLACC (Activity) - Cry 1   Pain Rating: FLACC (Activity) - Consolability 0   Score: FLACC (Activity) 2   Restrictions/Precautions   Other Precautions Chair Alarm; Bed Alarm; Fall Risk;Pain;O2  (2 lpm NC)   General   Chart Reviewed Yes   Additional Pertinent History increased B LE edema   Response to Previous Treatment Patient reporting fatigue but able to participate. Cognition   Orientation Level Oriented to person   Following Commands Follows one step commands with increased time or repetition   Comments repeated cues for task completion, safety   Subjective   Subjective "I want my TV on"   Bed Mobility   Supine to Sit 3  Moderate assistance   Additional items Assist x 1; Increased time required;Verbal cues;LE management;HOB elevated  (trunk management)   Additional Comments HOB elevated > 30 degrees. use of bedrail prn. required modAx1 to achieve sitting at EOB. pt with inc anterior lateral lean to left with close supervision for sitting balancce with verabl cues for pt to corrct. Transfers   Sit to Stand 2  Maximal assistance   Additional items Assist x 2; Increased time required;Verbal cues   Stand to Sit 2  Maximal assistance   Additional items Assist x 2; Increased time required;Verbal cues   Stand pivot 2  Maximal assistance   Additional items Assist x 2; Increased time required;Verbal cues   Additional Comments use of RW. sit<>stand wiht RW 2x with maxAx2 with manual cues to ahcieve standing and verbal cues for uprihgt posture. returned to sitting after 1st trial due to increasing knee flexion. 2nd trial right knee blocked with therapist placing leg in RW for stability. completed spt wiht RW wiht maxAx2 with right knee blocked throughout and manual cues for wt shifting and RW management. ten facing bedrail, completed sit<>stand 2x with bedrail and maxAx2 to as high as 1/2 to 3/4 standing with manual and verbal cues for uprihgt posture. returned to sitting for safety. Balance   Static Sitting Fair -   Dynamic Sitting Poor   Static Standing Poor -   Dynamic Standing Poor -   Endurance Deficit   Endurance Deficit Yes   Endurance Deficit Description SpO2 on 2 lpm 94-95%. trialed on room air. SpO2 desaturated as low as 87% briefly sitting on EOB, then increased to 89-90%. SpO2 on room air after spt 90%. pt on 2 lpm at end of session at baseline. Activity Tolerance   Activity Tolerance Patient limited by fatigue   Medical Staff Made Aware Kimberly Small   Nurse Made Aware Nicki CAMEJO Citizen   Assessment   Prognosis Guarded   Problem List Decreased strength;Decreased endurance; Impaired balance;Decreased mobility; Decreased cognition; Impaired judgement;Decreased safety awareness; Obesity   Barriers to Discharge Decreased caregiver support; Inaccessible home environment   Barriers to Discharge Comments requires assistance to complete mobility   Goals   Patient Goals "have my TV on"   PT Treatment Day 1   Plan   Treatment/Interventions ADL retraining;Functional transfer training;LE strengthening/ROM; Elevations; Therapeutic exercise; Endurance training;Patient/family training;Equipment eval/education; Bed mobility;Gait training; Compensatory technique education;Spoke to nursing;Spoke to case management;OT   Progress Slow progress, decreased activity tolerance   PT Frequency 1-2x/wk   Discharge Recommendation   Rehab Resource Intensity Level, PT I (Maximum Resource Intensity)   AM-PAC Basic Mobility Inpatient   Turning in Flat Bed Without Bedrails 2   Lying on Back to Sitting on Edge of Flat Bed Without Bedrails 2   Moving Bed to Chair 1   Standing Up From Chair Using Arms 1   Walk in Room 1   Climb 3-5 Stairs With Railing 1   Basic Mobility Inpatient Raw Score 8   Turning Head Towards Sound 3   Follow Simple Instructions 3   Low Function Basic Mobility Raw Score  14   Low Function Basic Mobility Standardized Score  22.01   Highest Level Of Mobility   -HL Goal 3: Sit at edge of bed   JH-HLM Achieved 4: Move to chair/commode   Education   Education Provided Mobility training;Assistive device   Patient Reinforcement needed   End of Consult   Patient Position at End of Consult Bedside chair;Bed/Chair alarm activated; All needs within reach       The patient's AM-PAC Basic Mobility Inpatient Short Form Raw Score is 8. A Raw score of less than or equal to 16 suggests the patient may benefit from discharge to post-acute rehabilitation services. Please also refer to the recommendation of the Physical Therapist for safe discharge planning. Pt tolerated session fairly. He continues to require increased assistance to complete mobility with transfers with knees blocked for safe completion. He was able to complete bed mobility and sit EOB with decreased assistance this date. He requires increased time and effort to complete mobility. He is limited by decreased strength, balance and endurance. Continue PT services to increase mobility as able.      Michele Pineda, PT,DPT

## 2023-12-08 NOTE — PLAN OF CARE
Problem: PHYSICAL THERAPY ADULT  Goal: Performs mobility at highest level of function for planned discharge setting. See evaluation for individualized goals. Description: Treatment/Interventions: Functional transfer training, LE strengthening/ROM, Therapeutic exercise, Endurance training, Patient/family training, Equipment eval/education, Bed mobility, Gait training, Compensatory technique education, Spoke to nursing, OT          See flowsheet documentation for full assessment, interventions and recommendations. Note: Prognosis: Guarded  Problem List: Decreased strength, Decreased endurance, Impaired balance, Decreased mobility, Decreased cognition, Impaired judgement, Decreased safety awareness, Obesity  Assessment: Pt tolerated session fairly. He continues to require increased assistance to complete mobility with transfers with knees blocked for safe completion. He was able to complete bed mobility and sit EOB with decreased assistance this date. He requires increased time and effort to complete mobility. He is limited by decreased strength, balance and endurance. Continue PT services to increase mobility as able. Barriers to Discharge: Decreased caregiver support, Inaccessible home environment  Barriers to Discharge Comments: requires assistance to complete mobility  Rehab Resource Intensity Level, PT: I (Maximum Resource Intensity)    See flowsheet documentation for full assessment.

## 2023-12-08 NOTE — ASSESSMENT & PLAN NOTE
Cirrhotic morphology of the liver chronic problem  Ammonia is normal there is no ascites  Continue lactulose daily   Aldactone resume at lower dose, 50 mg daily - uptitrated to home dose 100 mg qd

## 2023-12-08 NOTE — PLAN OF CARE
Problem: OCCUPATIONAL THERAPY ADULT  Goal: Performs self-care activities at highest level of function for planned discharge setting. See evaluation for individualized goals. Description: Treatment Interventions: ADL retraining, Functional transfer training, UE strengthening/ROM, Endurance training, Cognitive reorientation, Patient/family training, Equipment evaluation/education, Compensatory technique education, Continued evaluation, Energy conservation, Activityengagement          See flowsheet documentation for full assessment, interventions and recommendations. Note: Limitation: Decreased ADL status, Decreased UE ROM, Decreased UE strength, Decreased Safe judgement during ADL, Decreased cognition, Decreased endurance, Decreased self-care trans, Decreased high-level ADLs     Assessment: Pt completed OT tx session #4 focused on ADL performance and functional mobility. Pt alert and agreeable to participate. PT/OT co-treat completed d/t significant mobility deficits and safety concerns. Pt demonstrated improvements in seated balance and UB ADL performance but continues to required encouragement and education for participation. Pt currently completing UB ADLs @ Mod-Max A, LB ADLs @ Max-Total A, and functional mobility with RW @ Max A x2. Pt maintained Good O2 sats on 2lpm throughout, decreased on RA with activity. Pt demonstrating [place] participation and Fair potential to achieve goals but is currently demonstrating deficits in decrease activity tolerance, decrease standing balance, decrease sitting balance, decrease performance during ADL tasks, decrease cognition, decrease BUE ROM, decrease UB MS, decrease generalized strength, decrease activity engagement, and decrease performance during functional transfers. Goal date , goals reassessed and are still applicable.  Continue to recommend Level I: Maximum Resource Intensity Therapy upon discharge to increase safety and independence in ADL tasks and functional mobility.      Rehab Resource Intensity Level, OT: I (Maximum Resource Intensity)

## 2024-01-27 ENCOUNTER — APPOINTMENT (INPATIENT)
Dept: CT IMAGING | Facility: HOSPITAL | Age: 59
DRG: 100 | End: 2024-01-27
Payer: MEDICARE

## 2024-01-27 ENCOUNTER — APPOINTMENT (EMERGENCY)
Dept: RADIOLOGY | Facility: HOSPITAL | Age: 59
DRG: 100 | End: 2024-01-27
Payer: MEDICARE

## 2024-01-27 ENCOUNTER — HOSPITAL ENCOUNTER (INPATIENT)
Facility: HOSPITAL | Age: 59
LOS: 3 days | Discharge: HOME HEALTH | DRG: 100 | End: 2024-01-30
Attending: EMERGENCY MEDICINE | Admitting: FAMILY MEDICINE
Payer: MEDICARE

## 2024-01-27 DIAGNOSIS — R79.89 ELEVATED TROPONIN: ICD-10-CM

## 2024-01-27 DIAGNOSIS — R33.9 URINARY RETENTION: ICD-10-CM

## 2024-01-27 DIAGNOSIS — K74.3 HEPATIC CIRRHOSIS DUE TO PRIMARY BILIARY CHOLANGITIS (HCC): ICD-10-CM

## 2024-01-27 DIAGNOSIS — I50.33 ACUTE ON CHRONIC HEART FAILURE WITH PRESERVED EJECTION FRACTION (HCC): ICD-10-CM

## 2024-01-27 DIAGNOSIS — R60.0 LOWER EXTREMITY EDEMA: ICD-10-CM

## 2024-01-27 DIAGNOSIS — R62.50 DEVELOPMENTAL DELAY: ICD-10-CM

## 2024-01-27 DIAGNOSIS — I10 ESSENTIAL HYPERTENSION: ICD-10-CM

## 2024-01-27 DIAGNOSIS — J90 PLEURAL EFFUSION: ICD-10-CM

## 2024-01-27 DIAGNOSIS — I50.9 CHF (CONGESTIVE HEART FAILURE) (HCC): Primary | ICD-10-CM

## 2024-01-27 DIAGNOSIS — G40.309 NONINTRACTABLE GENERALIZED IDIOPATHIC EPILEPSY WITHOUT STATUS EPILEPTICUS (HCC): ICD-10-CM

## 2024-01-27 PROBLEM — N18.32 STAGE 3B CHRONIC KIDNEY DISEASE (HCC): Status: ACTIVE | Noted: 2024-01-27

## 2024-01-27 PROBLEM — I50.32 CHRONIC DIASTOLIC HF (HEART FAILURE) (HCC): Chronic | Status: ACTIVE | Noted: 2021-11-28

## 2024-01-27 LAB
2HR DELTA HS TROPONIN: 32 NG/L
4HR DELTA HS TROPONIN: 10 NG/L
ALBUMIN SERPL BCP-MCNC: 3.6 G/DL (ref 3.5–5)
ALP SERPL-CCNC: 202 U/L (ref 34–104)
ALT SERPL W P-5'-P-CCNC: 16 U/L (ref 7–52)
ANION GAP SERPL CALCULATED.3IONS-SCNC: 8 MMOL/L
ARTERIAL PATENCY WRIST A: YES
AST SERPL W P-5'-P-CCNC: 24 U/L (ref 13–39)
BASE EXCESS BLDA CALC-SCNC: 3.3 MMOL/L
BASOPHILS # BLD AUTO: 0.01 THOUSANDS/ÂΜL (ref 0–0.1)
BASOPHILS NFR BLD AUTO: 0 % (ref 0–1)
BILIRUB SERPL-MCNC: 0.91 MG/DL (ref 0.2–1)
BILIRUB UR QL STRIP: NEGATIVE
BNP SERPL-MCNC: 365 PG/ML (ref 0–100)
BUN SERPL-MCNC: 42 MG/DL (ref 5–25)
CALCIUM SERPL-MCNC: 9 MG/DL (ref 8.4–10.2)
CARDIAC TROPONIN I PNL SERPL HS: 114 NG/L
CARDIAC TROPONIN I PNL SERPL HS: 82 NG/L
CARDIAC TROPONIN I PNL SERPL HS: 92 NG/L
CHLORIDE SERPL-SCNC: 107 MMOL/L (ref 96–108)
CLARITY UR: CLEAR
CO2 SERPL-SCNC: 26 MMOL/L (ref 21–32)
COLOR UR: YELLOW
CREAT SERPL-MCNC: 1.86 MG/DL (ref 0.6–1.3)
EOSINOPHIL # BLD AUTO: 0.38 THOUSAND/ÂΜL (ref 0–0.61)
EOSINOPHIL NFR BLD AUTO: 4 % (ref 0–6)
ERYTHROCYTE [DISTWIDTH] IN BLOOD BY AUTOMATED COUNT: 15 % (ref 11.6–15.1)
FLUAV RNA RESP QL NAA+PROBE: NEGATIVE
FLUBV RNA RESP QL NAA+PROBE: NEGATIVE
GFR SERPL CREATININE-BSD FRML MDRD: 39 ML/MIN/1.73SQ M
GLUCOSE SERPL-MCNC: 116 MG/DL (ref 65–140)
GLUCOSE UR STRIP-MCNC: NEGATIVE MG/DL
HCO3 BLDA-SCNC: 25.8 MMOL/L (ref 22–28)
HCT VFR BLD AUTO: 41.2 % (ref 36.5–49.3)
HGB BLD-MCNC: 13.5 G/DL (ref 12–17)
HGB UR QL STRIP.AUTO: NEGATIVE
IMM GRANULOCYTES # BLD AUTO: 0.03 THOUSAND/UL (ref 0–0.2)
IMM GRANULOCYTES NFR BLD AUTO: 0 % (ref 0–2)
KETONES UR STRIP-MCNC: NEGATIVE MG/DL
LEUKOCYTE ESTERASE UR QL STRIP: NEGATIVE
LYMPHOCYTES # BLD AUTO: 0.91 THOUSANDS/ÂΜL (ref 0.6–4.47)
LYMPHOCYTES NFR BLD AUTO: 9 % (ref 14–44)
MAGNESIUM SERPL-MCNC: 2.2 MG/DL (ref 1.9–2.7)
MCH RBC QN AUTO: 32.1 PG (ref 26.8–34.3)
MCHC RBC AUTO-ENTMCNC: 32.8 G/DL (ref 31.4–37.4)
MCV RBC AUTO: 98 FL (ref 82–98)
MONOCYTES # BLD AUTO: 0.63 THOUSAND/ÂΜL (ref 0.17–1.22)
MONOCYTES NFR BLD AUTO: 6 % (ref 4–12)
NASAL CANNULA: 2
NEUTROPHILS # BLD AUTO: 8.73 THOUSANDS/ÂΜL (ref 1.85–7.62)
NEUTS SEG NFR BLD AUTO: 81 % (ref 43–75)
NITRITE UR QL STRIP: NEGATIVE
NRBC BLD AUTO-RTO: 0 /100 WBCS
O2 CT BLDA-SCNC: 18.1 ML/DL (ref 16–23)
OXYHGB MFR BLDA: 96.3 % (ref 94–97)
PCO2 BLDA: 32.6 MM HG (ref 36–44)
PH BLDA: 7.52 [PH] (ref 7.35–7.45)
PH UR STRIP.AUTO: 7.5 [PH]
PLATELET # BLD AUTO: 188 THOUSANDS/UL (ref 149–390)
PMV BLD AUTO: 11.4 FL (ref 8.9–12.7)
PO2 BLDA: 95.6 MM HG (ref 75–129)
POTASSIUM SERPL-SCNC: 4.8 MMOL/L (ref 3.5–5.3)
PROT SERPL-MCNC: 9.1 G/DL (ref 6.4–8.4)
PROT UR STRIP-MCNC: NEGATIVE MG/DL
RBC # BLD AUTO: 4.2 MILLION/UL (ref 3.88–5.62)
RSV RNA RESP QL NAA+PROBE: NEGATIVE
SARS-COV-2 RNA RESP QL NAA+PROBE: NEGATIVE
SODIUM SERPL-SCNC: 141 MMOL/L (ref 135–147)
SP GR UR STRIP.AUTO: 1.01 (ref 1–1.03)
SPECIMEN SOURCE: ABNORMAL
UROBILINOGEN UR QL STRIP.AUTO: 1 E.U./DL
WBC # BLD AUTO: 10.69 THOUSAND/UL (ref 4.31–10.16)

## 2024-01-27 PROCEDURE — 81003 URINALYSIS AUTO W/O SCOPE: CPT | Performed by: EMERGENCY MEDICINE

## 2024-01-27 PROCEDURE — 85025 COMPLETE CBC W/AUTO DIFF WBC: CPT | Performed by: EMERGENCY MEDICINE

## 2024-01-27 PROCEDURE — 83735 ASSAY OF MAGNESIUM: CPT | Performed by: EMERGENCY MEDICINE

## 2024-01-27 PROCEDURE — 70450 CT HEAD/BRAIN W/O DYE: CPT

## 2024-01-27 PROCEDURE — 36415 COLL VENOUS BLD VENIPUNCTURE: CPT | Performed by: EMERGENCY MEDICINE

## 2024-01-27 PROCEDURE — 87040 BLOOD CULTURE FOR BACTERIA: CPT | Performed by: EMERGENCY MEDICINE

## 2024-01-27 PROCEDURE — 80053 COMPREHEN METABOLIC PANEL: CPT | Performed by: EMERGENCY MEDICINE

## 2024-01-27 PROCEDURE — 99285 EMERGENCY DEPT VISIT HI MDM: CPT

## 2024-01-27 PROCEDURE — 0T9B70Z DRAINAGE OF BLADDER WITH DRAINAGE DEVICE, VIA NATURAL OR ARTIFICIAL OPENING: ICD-10-PCS | Performed by: EMERGENCY MEDICINE

## 2024-01-27 PROCEDURE — 0241U HB NFCT DS VIR RESP RNA 4 TRGT: CPT | Performed by: EMERGENCY MEDICINE

## 2024-01-27 PROCEDURE — 99223 1ST HOSP IP/OBS HIGH 75: CPT | Performed by: FAMILY MEDICINE

## 2024-01-27 PROCEDURE — 96360 HYDRATION IV INFUSION INIT: CPT

## 2024-01-27 PROCEDURE — 83880 ASSAY OF NATRIURETIC PEPTIDE: CPT | Performed by: EMERGENCY MEDICINE

## 2024-01-27 PROCEDURE — 71045 X-RAY EXAM CHEST 1 VIEW: CPT

## 2024-01-27 PROCEDURE — 87147 CULTURE TYPE IMMUNOLOGIC: CPT | Performed by: FAMILY MEDICINE

## 2024-01-27 PROCEDURE — 84484 ASSAY OF TROPONIN QUANT: CPT | Performed by: EMERGENCY MEDICINE

## 2024-01-27 PROCEDURE — 99285 EMERGENCY DEPT VISIT HI MDM: CPT | Performed by: EMERGENCY MEDICINE

## 2024-01-27 PROCEDURE — 87081 CULTURE SCREEN ONLY: CPT | Performed by: FAMILY MEDICINE

## 2024-01-27 PROCEDURE — 82805 BLOOD GASES W/O2 SATURATION: CPT | Performed by: FAMILY MEDICINE

## 2024-01-27 PROCEDURE — G1004 CDSM NDSC: HCPCS

## 2024-01-27 PROCEDURE — 93005 ELECTROCARDIOGRAM TRACING: CPT

## 2024-01-27 RX ORDER — HEPARIN SODIUM 5000 [USP'U]/ML
5000 INJECTION, SOLUTION INTRAVENOUS; SUBCUTANEOUS EVERY 8 HOURS SCHEDULED
Status: DISCONTINUED | OUTPATIENT
Start: 2024-01-27 | End: 2024-01-30 | Stop reason: HOSPADM

## 2024-01-27 RX ORDER — LEVETIRACETAM 500 MG/5ML
1000 INJECTION, SOLUTION, CONCENTRATE INTRAVENOUS EVERY 12 HOURS SCHEDULED
Status: DISCONTINUED | OUTPATIENT
Start: 2024-01-27 | End: 2024-01-28

## 2024-01-27 RX ORDER — CEFTRIAXONE 1 G/50ML
1000 INJECTION, SOLUTION INTRAVENOUS EVERY 24 HOURS
Status: DISCONTINUED | OUTPATIENT
Start: 2024-01-27 | End: 2024-01-29

## 2024-01-27 RX ORDER — FUROSEMIDE 10 MG/ML
40 INJECTION INTRAMUSCULAR; INTRAVENOUS 2 TIMES DAILY
Status: DISCONTINUED | OUTPATIENT
Start: 2024-01-27 | End: 2024-01-28

## 2024-01-27 RX ADMIN — SODIUM CHLORIDE 1000 ML: 0.9 INJECTION, SOLUTION INTRAVENOUS at 17:07

## 2024-01-27 RX ADMIN — LEVETIRACETAM 1000 MG: 100 INJECTION, SOLUTION INTRAVENOUS at 21:13

## 2024-01-27 RX ADMIN — CEFTRIAXONE 1000 MG: 1 INJECTION, SOLUTION INTRAVENOUS at 21:13

## 2024-01-27 RX ADMIN — HEPARIN SODIUM 5000 UNITS: 5000 INJECTION, SOLUTION INTRAVENOUS; SUBCUTANEOUS at 21:13

## 2024-01-27 RX ADMIN — FUROSEMIDE 40 MG: 10 INJECTION, SOLUTION INTRAMUSCULAR; INTRAVENOUS at 21:12

## 2024-01-27 NOTE — ED NOTES
Pt arrived saturated in urine. Pt cleaned and new brief placed. Pt repositioned with call bell in reach.      April Moreno RN  01/27/24 [id number]

## 2024-01-27 NOTE — ED PROVIDER NOTES
History  Chief Complaint   Patient presents with    Seizure - Prior Hx Of     Per ems, pt was found by family sitting on the edge of his bed, appearing to be at his baseline mental state. Family told nursing facility staff that they suspected he had a seizure and wanted him brought in for an eval. Ems states pt was supposed to return home today from the nursing facility.     Patient with history of seizure disorder, on Lamictal and lamotrigine, sent from nursing home for evaluation of possible seizure today.  No one witnessed any seizure however, when the family came to visit they felt that the patient seemed different and thought he may have had a seizure.  The patient is at his baseline mental status which is normally nonverbal.  He cannot provide any further history.  EMS states that the patient was scheduled to be discharged from the nursing facility to home today and the family did not want the patient to come home today.  Further history is unavailable      History provided by:  EMS personnel   used: No    Medical Problem  Quality:  As noted above, possible seizure  Severity:  Mild  Onset quality:  Unable to specify  Timing:  Unable to specify  Progression:  Resolved  Chronicity:  New  Relieved by:  Nothing  Worsened by:  Nothing      Prior to Admission Medications   Prescriptions Last Dose Informant Patient Reported? Taking?   acetaminophen (TYLENOL) 325 mg tablet   No No   Sig: Take 2 tablets (650 mg total) by mouth every 6 (six) hours as needed for mild pain, headaches or fever   ascorbic acid (VITAMIN C) 500 mg tablet   Yes No   Sig: Take 500 mg by mouth daily   aspirin 81 MG tablet   Yes No   Sig: Take 81 mg by mouth daily   calcium-vitamin D 250-100 MG-UNIT per tablet   Yes No   Sig: Take 1 tablet by mouth daily    lactulose 20 g/30 mL   Yes No   Sig: Take 20 g by mouth daily Daily after supper in 4oz of juice   lamoTRIgine (LaMICtal) 100 mg tablet  Mother Yes No   Sig: Take 250 mg by  mouth daily after lunch   lamoTRIgine (LaMICtal) 150 MG tablet  Mother Yes No   Sig: Take 300 mg by mouth daily at bedtime   lamoTRIgine (LaMICtal) 200 MG tablet   Yes No   Sig: Take 200 mg by mouth daily in the early morning    levOCARNitine (CARNITOR) 330 MG tablet   Yes No   Sig: Take 330 mg by mouth 4 (four) times daily (after meals and at bedtime)    metoprolol tartrate (LOPRESSOR) 25 mg tablet   Yes No   Sig: Take 25 mg by mouth 2 (two) times a day   nystatin (MYCOSTATIN) powder   No No   Sig: Apply topically 2 (two) times a day   polyethylene glycol (MIRALAX) 17 g packet   No No   Sig: Take 17 g by mouth daily as needed (constipation)   primidone (MYSOLINE) 50 mg tablet   Yes No   Sig: Take 100 mg by mouth daily after breakfast   primidone (MYSOLINE) 50 mg tablet   Yes No   Sig: Take 100 mg by mouth daily with lunch   primidone (MYSOLINE) 50 mg tablet   No No   Sig: Take 3 tablets (150 mg total) by mouth daily at bedtime   senna-docusate sodium (SENOKOT S) 8.6-50 mg per tablet   No No   Sig: Take 1 tablet by mouth 2 (two) times a day   spironolactone (ALDACTONE) 100 mg tablet   Yes No   Sig: Take 100 mg by mouth daily   thiamine 50 MG tablet   Yes No   Sig: Take by mouth daily Unsure dose   torsemide (DEMADEX) 20 mg tablet   No No   Sig: Take 2 tablets (40 mg total) by mouth daily Do not start before December 9, 2023.   vitamin E, tocopherol, 400 units capsule   Yes No   Sig: Take 400 Units by mouth daily   zinc gluconate 50 mg tablet   Yes No   Sig: Take 50 mg by mouth daily   zonisamide (ZONEGRAN) 100 mg capsule   No No   Sig: Take 1 capsule (100 mg total) by mouth daily at bedtime      Facility-Administered Medications: None       Past Medical History:   Diagnosis Date    Hypertension     Mentally challenged     Pericardial effusion     Pneumonia     Seizure (HCC)        Past Surgical History:   Procedure Laterality Date    FRACTURE SURGERY      clavicle, left humerus, radial, and ulna.  Right radius     HIP ARTHROSCOPY Right     IR THORACENTESIS  11/20/2023    OK COLONOSCOPY FLX DX W/COLLJ SPEC WHEN PFRMD N/A 4/1/2019    Procedure: COLONOSCOPY;  Surgeon: Avi Guzman MD;  Location: [facility];  Service: Colorectal       Family History   Adopted: Yes     I have reviewed and agree with the history as documented.    E-Cigarette/Vaping    E-Cigarette Use Never User      E-Cigarette/Vaping Substances    Nicotine No     THC No     CBD No     Flavoring No     Other No     Unknown No      Social History     Tobacco Use    Smoking status: Never    Smokeless tobacco: Never   Vaping Use    Vaping status: Never Used   Substance Use Topics    Alcohol use: Never    Drug use: Never       Review of Systems   Unable to perform ROS: Patient nonverbal       Physical Exam  Physical Exam  Vitals and nursing note reviewed.   Constitutional:       General: He is not in acute distress.     Appearance: He is well-developed.   HENT:      Head: Normocephalic and atraumatic.      Right Ear: External ear normal.      Left Ear: External ear normal.   Eyes:      General: No scleral icterus.        Right eye: No discharge.         Left eye: No discharge.      Extraocular Movements: Extraocular movements intact.      Conjunctiva/sclera: Conjunctivae normal.   Cardiovascular:      Rate and Rhythm: Normal rate and regular rhythm.      Heart sounds: Normal heart sounds. No murmur heard.  Pulmonary:      Effort: Pulmonary effort is normal.      Breath sounds: Normal breath sounds. No wheezing or rales.   Abdominal:      General: Bowel sounds are normal. There is no distension.      Palpations: Abdomen is soft.      Tenderness: There is no abdominal tenderness. There is no guarding or rebound.   Genitourinary:     Comments: Constant dribble of urine noted  Musculoskeletal:         General: No deformity. Normal range of motion.      Cervical back: Normal range of motion and neck supple.   Skin:     General: Skin is warm and dry.      Findings:  No rash.   Neurological:      General: No focal deficit present.      Mental Status: He is alert.      Cranial Nerves: No cranial nerve deficit.      Comments: Nonverbal   Psychiatric:         Mood and Affect: Mood normal.         Behavior: Behavior normal.         Thought Content: Thought content normal.         Judgment: Judgment normal.         Vital Signs  ED Triage Vitals [01/27/24 1623]   Temperature Pulse Respirations Blood Pressure SpO2   97.7 °F (36.5 °C) 87 20 141/78 92 %      Temp Source Heart Rate Source Patient Position - Orthostatic VS BP Location FiO2 (%)   Temporal Monitor Lying Right arm --      Pain Score       --           Vitals:    01/27/24 1623 01/27/24 1730 01/27/24 1830   BP: 141/78 109/56 107/57   Pulse: 87 79 82   Patient Position - Orthostatic VS: Lying Lying Lying         Visual Acuity      ED Medications  Medications   sodium chloride 0.9 % bolus 1,000 mL (1,000 mL Intravenous New Bag 1/27/24 1707)       Diagnostic Studies  Results Reviewed       Procedure Component Value Units Date/Time    HS Troponin I 2hr [[id number]]  (Abnormal) Collected: 01/27/24 1823    Lab Status: Final result Specimen: Blood from Arm, Left Updated: 01/27/24 1852     hs TnI 2hr 114 ng/L      Delta 2hr hsTnI 32 ng/L     HS Troponin I 4hr [[id number]]     Lab Status: No result Specimen: Blood     [facility], [facility]/B, [facility] [[id number]]  (Normal) Collected: 01/27/24 1631    Lab Status: Final result Specimen: Nares from Nose Updated: 01/27/24 1715     SARS-CoV-2 Negative     INFLUENZA A PCR Negative     INFLUENZA B PCR Negative     RSV PCR Negative    Narrative:      FOR PEDIATRIC PATIENTS - copy/paste COVID Guidelines URL to browser: [email or web address]    SARS-CoV-2 assay is a Nucleic Acid Amplification assay intended for the  qualitative detection of nucleic acid from SARS-CoV-2 in nasopharyngeal  swabs. Results are for the presumptive identification  of SARS-CoV-2 RNA.    Positive results are indicative of infection with SARS-CoV-2, the virus  causing COVID-19, but do not rule out bacterial infection or co-infection  with other viruses. Laboratories within the [place] and its  territories are required to report all positive results to the appropriate  public health authorities. Negative results do not preclude SARS-CoV-2  infection and should not be used as the sole basis for treatment or other  patient management decisions. Negative results must be combined with  clinical observations, patient history, and epidemiological information.  This test has not been FDA cleared or approved.    This test has been authorized by FDA under an Emergency Use Authorization  (EUA). This test is only authorized for the duration of time the  declaration that circumstances exist justifying the authorization of the  emergency use of an in vitro diagnostic tests for detection of SARS-CoV-2  virus and/or diagnosis of COVID-19 infection under section 564(b)(1) of  the Act, 21 U.S.C. 360bbb-3(b)(1), unless the authorization is terminated  or revoked sooner. The test has been validated but independent review by FDA  and CLIA is pending.    Test performed using [employer] Gene[employer]pert: This RT-PCR assay targets N2,  a region unique to SARS-CoV-2. A conserved region in the E-gene was chosen  for pan-Sarbecovirus detection which includes SARS-CoV-2.    According to CMS-2020-01-R, this platform meets the definition of high-throughput technology.    B-Type Natriuretic Peptide(BNP) [[id number]]  (Abnormal) Collected: 01/27/24 1631    Lab Status: Final result Specimen: Blood from Arm, Left Updated: 01/27/24 1703      pg/mL     HS Troponin 0hr (reflex protocol) [[id number]]  (Abnormal) Collected: 01/27/24 1631    Lab Status: Final result Specimen: Blood from Arm, Left Updated: 01/27/24 1701     hs TnI 0hr 82 ng/L     UA w Reflex to Microscopic w Reflex to Culture [[id number]] Collected:  01/27/24 1654    Lab Status: Final result Specimen: Urine, Clean Catch Updated: 01/27/24 1700     Color, UA Yellow     Clarity, UA Clear     Specific Gravity, UA 1.010     pH, UA 7.5     Leukocytes, UA Negative     Nitrite, UA Negative     Protein, UA Negative mg/dl      Glucose, UA Negative mg/dl      Ketones, UA Negative mg/dl      Urobilinogen, UA 1.0 E.U./dl      Bilirubin, UA Negative     Occult Blood, UA Negative    Comprehensive metabolic panel [[id number]]  (Abnormal) Collected: 01/27/24 1631    Lab Status: Final result Specimen: Blood from Arm, Left Updated: 01/27/24 1657     Sodium 141 mmol/L      Potassium 4.8 mmol/L      Chloride 107 mmol/L      CO2 26 mmol/L      ANION GAP 8 mmol/L      BUN 42 mg/dL      Creatinine 1.86 mg/dL      Glucose 116 mg/dL      Calcium 9.0 mg/dL      AST 24 U/L      ALT 16 U/L      Alkaline Phosphatase 202 U/L      Total Protein 9.1 g/dL      Albumin 3.6 g/dL      Total Bilirubin 0.91 mg/dL      eGFR 39 ml/min/1.73sq m     Narrative:      National Kidney Disease Foundation guidelines for Chronic Kidney Disease (CKD):     Stage 1 with normal or high GFR (GFR > 90 mL/min/1.73 square meters)    Stage 2 Mild CKD (GFR = 60-89 mL/min/1.73 square meters)    Stage 3A Moderate CKD (GFR = 45-59 mL/min/1.73 square meters)    Stage 3B Moderate CKD (GFR = 30-44 mL/min/1.73 square meters)    Stage 4 Severe CKD (GFR = 15-29 mL/min/1.73 square meters)    Stage 5 End Stage CKD (GFR <15 mL/min/1.73 square meters)  Note: GFR calculation is accurate only with a steady state creatinine    Magnesium [[id number]]  (Normal) Collected: 01/27/24 1631    Lab Status: Final result Specimen: Blood from Arm, Left Updated: 01/27/24 1657     Magnesium 2.2 mg/dL     Blood culture #2 [[id number]] Collected: 01/27/24 1631    Lab Status: In process Specimen: Blood from Hand, Left Updated: 01/27/24 1639    Blood culture #1 [[id number]] Collected: 01/27/24 1631    Lab Status: In process Specimen: Blood from Hand, Right  Updated: 01/27/24 1639    CBC and differential [[id number]]  (Abnormal) Collected: 01/27/24 1631    Lab Status: Final result Specimen: Blood from Arm, Left Updated: 01/27/24 1638     WBC 10.69 Thousand/uL      RBC 4.20 Million/uL      Hemoglobin 13.5 g/dL      Hematocrit 41.2 %      MCV 98 fL      MCH 32.1 pg      MCHC 32.8 g/dL      RDW 15.0 %      MPV 11.4 fL      Platelets 188 Thousands/uL      nRBC 0 /100 WBCs      Neutrophils Relative 81 %      Immat GRANS % 0 %      Lymphocytes Relative 9 %      Monocytes Relative 6 %      Eosinophils Relative 4 %      Basophils Relative 0 %      Neutrophils Absolute 8.73 Thousands/µL      Immature Grans Absolute 0.03 Thousand/uL      Lymphocytes Absolute 0.91 Thousands/µL      Monocytes Absolute 0.63 Thousand/µL      Eosinophils Absolute 0.38 Thousand/µL      Basophils Absolute 0.01 Thousands/µL                    XR chest portable   ED Interpretation by Raleigh Price MD (01/27 1912)   Pulmonary vascular congestion                 Procedures  ECG 12 Lead Documentation Only    Date/Time: 1/27/2024 4:26 PM    Performed by: Raleigh Price MD  Authorized by: Raleigh Price MD    ECG reviewed by me, the ED Provider: yes    Patient location:  ED  Previous ECG:     Previous ECG:  Unavailable  Interpretation:     Interpretation: normal    Rate:     ECG rate:  80    ECG rate assessment: normal    Rhythm:     Rhythm: sinus rhythm    Ectopy:     Ectopy: none    QRS:     QRS axis:  Normal    QRS intervals:  Normal  Conduction:     Conduction: normal    ST segments:     ST segments:  Normal  T waves:     T waves: normal    Comments:      Repeat EKG performed at 1920 unchanged           ED Course                                             Medical Decision Making  Patient presents to the emergency department with evaluation for possible seizure.      Based on the MSE and the history provided, diagnostic considerations include but are not limited to chronic kidney disease, acute  kidney insufficiency, urinary retention, seizure, urinary tract infection, STEMI, NSTEMI, ACS    Based on the work-up performed in the emergency department which includes physical examination, laboratory testing, imaging which may include advanced imaging as necessary such as CT scan or ultrasound, it is deemed that the patient will require admission to the hospital for treatment of abnormal troponins, questionable urinary retention.      Patient nonverbal, unable to provide history.  On examination noted to be dribbling urine, urinary retention noted after patient voided 200 cc spontaneously and straight cathed for 700 more.  Patient has not voided spontaneously since that time.  Also has initial troponin 80s and repeat troponin 110s.  EKG negative.  Chest x-ray consistent with pulmonary vascular congestion    Amount and/or Complexity of Data Reviewed  Labs: ordered. Decision-making details documented in ED Course.     Details: Elevated troponins  Radiology: ordered and independent interpretation performed. Decision-making details documented in ED Course.     Details: Chest x-ray with pulmonary vascular congestion  ECG/medicine tests: ordered and independent interpretation performed. Decision-making details documented in ED Course.     Details: Normal sinus rhythm rate 80    Risk  Decision regarding hospitalization.             Disposition  Final diagnoses:   CHF (congestive heart failure) (HCC)   Urinary retention   Elevated troponin     Time reflects when diagnosis was documented in both MDM as applicable and the Disposition within this note       Time User Action Codes Description Comment    1/27/2024  7:17 PM Raleigh Price [I50.9] CHF (congestive heart failure) (HCC)     1/27/2024  7:17 PM Raleigh Price [R33.9] Urinary retention     1/27/2024  7:17 PM Raleigh Price [R79.89] Elevated troponin           ED Disposition       ED Disposition   Admit    Condition   Stable    Date/Time   Sat  Jan 27, 2024  7:17 PM    Comment                  Follow-up Information    None         Patient's Medications   Discharge Prescriptions    No medications on file       No discharge procedures on file.    PDMP Review         Value Time User    PDMP Reviewed  Yes 12/8/2023 12:24 PM Ashtyn Romero MD            ED Provider  Electronically Signed by             Raleigh Price MD  01/27/24 9126

## 2024-01-28 LAB
AMMONIA PLAS-SCNC: 95 UMOL/L (ref 18–72)
ANION GAP SERPL CALCULATED.3IONS-SCNC: 9 MMOL/L
BUN SERPL-MCNC: 40 MG/DL (ref 5–25)
CALCIUM SERPL-MCNC: 8.5 MG/DL (ref 8.4–10.2)
CHLORIDE SERPL-SCNC: 111 MMOL/L (ref 96–108)
CO2 SERPL-SCNC: 26 MMOL/L (ref 21–32)
CREAT SERPL-MCNC: 1.84 MG/DL (ref 0.6–1.3)
ERYTHROCYTE [DISTWIDTH] IN BLOOD BY AUTOMATED COUNT: 15.2 % (ref 11.6–15.1)
GFR SERPL CREATININE-BSD FRML MDRD: 40 ML/MIN/1.73SQ M
GLUCOSE SERPL-MCNC: 91 MG/DL (ref 65–140)
HCT VFR BLD AUTO: 37.1 % (ref 36.5–49.3)
HGB BLD-MCNC: 12.3 G/DL (ref 12–17)
MAGNESIUM SERPL-MCNC: 1.9 MG/DL (ref 1.9–2.7)
MCH RBC QN AUTO: 33.2 PG (ref 26.8–34.3)
MCHC RBC AUTO-ENTMCNC: 33.2 G/DL (ref 31.4–37.4)
MCV RBC AUTO: 100 FL (ref 82–98)
PLATELET # BLD AUTO: 146 THOUSANDS/UL (ref 149–390)
PMV BLD AUTO: 11.1 FL (ref 8.9–12.7)
POTASSIUM SERPL-SCNC: 4.2 MMOL/L (ref 3.5–5.3)
RBC # BLD AUTO: 3.7 MILLION/UL (ref 3.88–5.62)
SODIUM SERPL-SCNC: 146 MMOL/L (ref 135–147)
WBC # BLD AUTO: 9.26 THOUSAND/UL (ref 4.31–10.16)

## 2024-01-28 PROCEDURE — G0426 INPT/ED TELECONSULT50: HCPCS | Performed by: PSYCHIATRY & NEUROLOGY

## 2024-01-28 PROCEDURE — 99232 SBSQ HOSP IP/OBS MODERATE 35: CPT

## 2024-01-28 PROCEDURE — 80048 BASIC METABOLIC PNL TOTAL CA: CPT | Performed by: FAMILY MEDICINE

## 2024-01-28 PROCEDURE — 92610 EVALUATE SWALLOWING FUNCTION: CPT

## 2024-01-28 PROCEDURE — 83735 ASSAY OF MAGNESIUM: CPT | Performed by: FAMILY MEDICINE

## 2024-01-28 PROCEDURE — 85027 COMPLETE CBC AUTOMATED: CPT | Performed by: FAMILY MEDICINE

## 2024-01-28 PROCEDURE — 82140 ASSAY OF AMMONIA: CPT

## 2024-01-28 RX ORDER — PRIMIDONE 50 MG/1
100 TABLET ORAL DAILY
Status: DISCONTINUED | OUTPATIENT
Start: 2024-01-28 | End: 2024-01-30 | Stop reason: HOSPADM

## 2024-01-28 RX ORDER — LACTULOSE 10 G/15ML
20 SOLUTION ORAL 2 TIMES DAILY
Status: DISCONTINUED | OUTPATIENT
Start: 2024-01-28 | End: 2024-01-28

## 2024-01-28 RX ORDER — LACTULOSE 10 G/15ML
20 SOLUTION ORAL 2 TIMES DAILY
Status: DISCONTINUED | OUTPATIENT
Start: 2024-01-28 | End: 2024-01-29

## 2024-01-28 RX ORDER — LACTULOSE 10 G/15ML
20 SOLUTION ORAL DAILY
Status: DISCONTINUED | OUTPATIENT
Start: 2024-01-28 | End: 2024-01-28

## 2024-01-28 RX ORDER — CLONAZEPAM 0.5 MG/1
0.5 TABLET ORAL
Status: DISCONTINUED | OUTPATIENT
Start: 2024-01-28 | End: 2024-01-30 | Stop reason: HOSPADM

## 2024-01-28 RX ORDER — PRIMIDONE 50 MG/1
150 TABLET ORAL EVERY 12 HOURS SCHEDULED
Status: DISCONTINUED | OUTPATIENT
Start: 2024-01-28 | End: 2024-01-30 | Stop reason: HOSPADM

## 2024-01-28 RX ORDER — TORSEMIDE 20 MG/1
40 TABLET ORAL DAILY
Status: DISCONTINUED | OUTPATIENT
Start: 2024-01-28 | End: 2024-01-30 | Stop reason: HOSPADM

## 2024-01-28 RX ORDER — LAMOTRIGINE 100 MG/1
200 TABLET ORAL DAILY
Status: DISCONTINUED | OUTPATIENT
Start: 2024-01-28 | End: 2024-01-30 | Stop reason: HOSPADM

## 2024-01-28 RX ORDER — ASPIRIN 81 MG/1
81 TABLET, CHEWABLE ORAL DAILY
Status: DISCONTINUED | OUTPATIENT
Start: 2024-01-28 | End: 2024-01-30 | Stop reason: HOSPADM

## 2024-01-28 RX ORDER — LAMOTRIGINE 100 MG/1
300 TABLET ORAL
Status: DISCONTINUED | OUTPATIENT
Start: 2024-01-28 | End: 2024-01-30 | Stop reason: HOSPADM

## 2024-01-28 RX ORDER — ZONISAMIDE 100 MG/1
100 CAPSULE ORAL
Status: DISCONTINUED | OUTPATIENT
Start: 2024-01-28 | End: 2024-01-30 | Stop reason: HOSPADM

## 2024-01-28 RX ORDER — SPIRONOLACTONE 100 MG/1
100 TABLET, FILM COATED ORAL DAILY
Status: DISCONTINUED | OUTPATIENT
Start: 2024-01-28 | End: 2024-01-30 | Stop reason: HOSPADM

## 2024-01-28 RX ORDER — LEVOCARNITINE 1 G/10ML
330 SOLUTION ORAL
Status: DISCONTINUED | OUTPATIENT
Start: 2024-01-28 | End: 2024-01-30 | Stop reason: HOSPADM

## 2024-01-28 RX ADMIN — ASPIRIN 81 MG CHEWABLE TABLET 81 MG: 81 TABLET CHEWABLE at 12:00

## 2024-01-28 RX ADMIN — TORSEMIDE 40 MG: 20 TABLET ORAL at 16:59

## 2024-01-28 RX ADMIN — LEVOCARNITINE 330 MG: 1 SOLUTION ORAL at 12:15

## 2024-01-28 RX ADMIN — LACTULOSE 20 G: 20 SOLUTION ORAL at 12:15

## 2024-01-28 RX ADMIN — LAMOTRIGINE 200 MG: 100 TABLET ORAL at 09:13

## 2024-01-28 RX ADMIN — CLONAZEPAM 0.5 MG: 0.5 TABLET ORAL at 21:34

## 2024-01-28 RX ADMIN — LACTULOSE 20 G: 20 SOLUTION ORAL at 16:59

## 2024-01-28 RX ADMIN — ZONISAMIDE 100 MG: 100 CAPSULE ORAL at 21:33

## 2024-01-28 RX ADMIN — HEPARIN SODIUM 5000 UNITS: 5000 INJECTION, SOLUTION INTRAVENOUS; SUBCUTANEOUS at 05:30

## 2024-01-28 RX ADMIN — METOPROLOL TARTRATE 25 MG: 25 TABLET, FILM COATED ORAL at 12:00

## 2024-01-28 RX ADMIN — LEVOCARNITINE 330 MG: 1 SOLUTION ORAL at 21:35

## 2024-01-28 RX ADMIN — LAMOTRIGINE 300 MG: 100 TABLET ORAL at 21:33

## 2024-01-28 RX ADMIN — FUROSEMIDE 40 MG: 10 INJECTION, SOLUTION INTRAMUSCULAR; INTRAVENOUS at 09:17

## 2024-01-28 RX ADMIN — HEPARIN SODIUM 5000 UNITS: 5000 INJECTION, SOLUTION INTRAVENOUS; SUBCUTANEOUS at 14:34

## 2024-01-28 RX ADMIN — LEVOCARNITINE 330 MG: 1 SOLUTION ORAL at 17:01

## 2024-01-28 RX ADMIN — PRIMIDONE 100 MG: 50 TABLET ORAL at 14:34

## 2024-01-28 RX ADMIN — PRIMIDONE 150 MG: 50 TABLET ORAL at 21:33

## 2024-01-28 RX ADMIN — PRIMIDONE 150 MG: 50 TABLET ORAL at 09:13

## 2024-01-28 RX ADMIN — HEPARIN SODIUM 5000 UNITS: 5000 INJECTION, SOLUTION INTRAVENOUS; SUBCUTANEOUS at 21:32

## 2024-01-28 RX ADMIN — CEFTRIAXONE 1000 MG: 1 INJECTION, SOLUTION INTRAVENOUS at 19:48

## 2024-01-28 RX ADMIN — METOPROLOL TARTRATE 25 MG: 25 TABLET, FILM COATED ORAL at 16:59

## 2024-01-28 RX ADMIN — LAMOTRIGINE 250 MG: 100 TABLET ORAL at 14:34

## 2024-01-28 RX ADMIN — SPIRONOLACTONE 100 MG: 100 TABLET, FILM COATED ORAL at 12:00

## 2024-01-28 NOTE — ASSESSMENT & PLAN NOTE
And talk to his mother at bedside who gives all the history the patient was at [facility] getting rehab.    After multiple admissions to the hospital, and since having COVID in the past his legs have been weaker.    He is now walking with a walker 50 feet and 1 of 3 steps.    At baseline he is able to converse minimally    His nearing his baseline, more alert today

## 2024-01-28 NOTE — H&P
[facility]  H&P  Name: Ash Loaiza 58 y.o. male I MRN: [id number]  Unit/Bed#: MS [facility] I Date of Admission: 1/27/2024   Date of Service: 1/27/2024 I Hospital Day: 0      Assessment/Plan   Acute encephalopathy  Assessment & Plan  Etiology uncertain, at baseline the patient is able to communicate.  Questionable of having a seizure, versus hypercapnia versus infectious process.  Patient having urinary retention although urinalysis is negative will start Rocephin 1 g IV daily.  Azul catheter placed in the emergency room  Obtain CT of the head  Obtain ABG  Start Keppra 1 g IV daily, patient cannot take oral medications at this time      Urinary retention  Assessment & Plan  Patient had over 900 cc of urine, Azul catheter placed by emergency room.    Nonintractable generalized idiopathic epilepsy without status epilepticus (HCC)  Assessment & Plan  Patient follows with [facility] neurology.  He is currently on lamotrigine and Zonegran.  Patient cannot take oral medication at this time due to his encephalopathy will start Keppra IV 1 g every 12 hours.  Will consult neurology.  I looked through his records there is no previous MRIs, I do not believe the patient have an MRI due to his developmental delay and mental status.  Will order a CT of the head    Dysphagia  Assessment & Plan  Currently NPO.  Will have speech therapy see him.  On previous admission he is on a mechanical soft diet    Developmental delay  Assessment & Plan  And talk to his mother at bedside who gives all the history the patient was at [facility] getting rehab.  After multiple admissions to the hospital, and since having COVID in the past his legs have been weaker.  He is now walking with a walker 50 feet and 1 of 3 steps.  At baseline he is able to converse and usually about TV.    Chronic diastolic HF (heart failure) (HCC)  Assessment & Plan  He usually is on torsemide and Aldactone.  Patient is currently NPO.  Will  do Lasix 40 mg IV twice daily.  BNP is slightly elevated at 325, although looking at his previous chest x-rays, is much improved from previous.  Patient is able to lay flat.      Wt Readings from Last 3 Encounters:   12/08/23 106 kg (234 lb 9.1 oz)   08/11/21 103 kg (226 lb 10.1 oz)   01/17/20 110 kg (242 lb 8.1 oz)               Stage 3b chronic kidney disease (HCC)  Assessment & Plan  Current creatinine at baseline    Lab Results   Component Value Date    EGFR 39 01/27/2024    EGFR 45 12/07/2023    EGFR 44 12/05/2023    CREATININE 1.86 (H) 01/27/2024    CREATININE 1.65 (H) 12/07/2023    CREATININE 1.68 (H) 12/05/2023       Elevated troponin  Assessment & Plan  Mild elevation in troponin which I think is secondary to his chronic heart failure and also CKD.    Chronic respiratory failure with hypercapnia (HCC)  Assessment & Plan  Wears oxygen at nighttime.    Liver cirrhosis (HCC)  Assessment & Plan  Currently lactulose on hold.    Essential hypertension  Assessment & Plan  Medications on hold until he can be seen by speech therapy and be able to take medications.       Disposition   #1 obtain head CT  #2 obtain ABG  #3 Rocephin 1 g IV daily  #4 n.p.o.  #5 Lasix 40 mg IV twice daily  #6 Keppra 1 g IV every 12 hours  #7 neurology consultation  #8 PT/OT/speech/case management consultation          VTE Prophylaxis: Heparin  / sequential compression device   Code Status: Level 1 - Full Code       Anticipated Length of Stay:  Patient will be admitted on an Inpatient basis with an anticipated length of stay of greater than 2 midnights.   Justification for Hospital Stay: Please see detailed plans noted above.    Chief Complaint:     Altered mental status  History of Present Illness:  Ash Loaiza is a 58 y.o. male who has past medical history significant for seizure disorder, dysphagia, developmental delay, chronic heart failure, hypertension, chronic respiratory failure with oxygen at nighttime, liver cirrhosis presents  to the emergency room from [facility] for altered mental status.  His mother is the primary historian as the patient cannot give any history.  Patient was there for rehab, and since yesterday has not been himself.  At baseline the patient is able to converse and talk about TV and able to feed himself.  He is not able to feed himself or converse at any point.  He only moans and groans.  Patient follows with [facility] neurology.  Mother believes that perhaps he had a seizure, uncertain when possible yesterday when he had an abrupt change.  Patient had epilepsy for over 50 years, and his seizures do not present as a used to.  He has been getting weaker and weaker since having COVID and was a seen home manner getting rehab.  He now walks with a walker about 50 feet and can go up 3 steps      Review of Systems: Unable to obtain due to acute encephalopathy        Past Medical and Surgical History:   Past Medical History:   Diagnosis Date    Hypertension     Mentally challenged     Pericardial effusion     Pneumonia     Seizure (HCC)      Past Surgical History:   Procedure Laterality Date    FRACTURE SURGERY      clavicle, left humerus, radial, and ulna.  Right radius    HIP ARTHROSCOPY Right     IR THORACENTESIS  11/20/2023    IL COLONOSCOPY FLX DX W/COLLJ SPEC WHEN PFRMD N/A 4/1/2019    Procedure: COLONOSCOPY;  Surgeon: Avi Guzman MD;  Location: [facility];  Service: Colorectal       Meds/Allergies:  Medications Prior to Admission   Medication Sig Dispense Refill Last Dose    acetaminophen (TYLENOL) 325 mg tablet Take 2 tablets (650 mg total) by mouth every 6 (six) hours as needed for mild pain, headaches or fever  0     ascorbic acid (VITAMIN C) 500 mg tablet Take 500 mg by mouth daily       aspirin 81 MG tablet Take 81 mg by mouth daily       calcium-vitamin D 250-100 MG-UNIT per tablet Take 1 tablet by mouth daily        lactulose 20 g/30 mL Take 20 g by mouth daily Daily after supper in 4oz of juice        lamoTRIgine (LaMICtal) 100 mg tablet Take 250 mg by mouth daily after lunch       lamoTRIgine (LaMICtal) 150 MG tablet Take 300 mg by mouth daily at bedtime       lamoTRIgine (LaMICtal) 200 MG tablet Take 200 mg by mouth daily in the early morning        levOCARNitine (CARNITOR) 330 MG tablet Take 330 mg by mouth 4 (four) times daily (after meals and at bedtime)        metoprolol tartrate (LOPRESSOR) 25 mg tablet Take 25 mg by mouth 2 (two) times a day       nystatin (MYCOSTATIN) powder Apply topically 2 (two) times a day 60 g 0     polyethylene glycol (MIRALAX) 17 g packet Take 17 g by mouth daily as needed (constipation) 10 each 0     primidone (MYSOLINE) 50 mg tablet Take 100 mg by mouth daily after breakfast       primidone (MYSOLINE) 50 mg tablet Take 100 mg by mouth daily with lunch       primidone (MYSOLINE) 50 mg tablet Take 3 tablets (150 mg total) by mouth daily at bedtime  0     senna-docusate sodium (SENOKOT S) 8.6-50 mg per tablet Take 1 tablet by mouth 2 (two) times a day 60 tablet 0     spironolactone (ALDACTONE) 100 mg tablet Take 100 mg by mouth daily       thiamine 50 MG tablet Take by mouth daily Unsure dose       torsemide (DEMADEX) 20 mg tablet Take 2 tablets (40 mg total) by mouth daily Do not start before December 9, 2023. 60 tablet 0     vitamin E, tocopherol, 400 units capsule Take 400 Units by mouth daily       zinc gluconate 50 mg tablet Take 50 mg by mouth daily       zonisamide (ZONEGRAN) 100 mg capsule Take 1 capsule (100 mg total) by mouth daily at bedtime  0        Allergies:   Allergies   Allergen Reactions    Budesonide Seizures     Other reaction(s): Seizure    Dilantin [Phenytoin]      Pericardial effusion    Flurazepam Other (See Comments)     dalmane    Other reaction(s): MADW HIM RAMMEY   dalmane    Ipratropium-Albuterol Seizures     Other reaction(s): Seizures    Phenobarbital Hyperactivity    Vyvanse [Lisdexamfetamine Dimesylate] Other (See Comments)     Unknown         History:  Marital Status: Single     Substance Use History:   Social History     Substance and Sexual Activity   Alcohol Use Never     Social History     Tobacco Use   Smoking Status Never   Smokeless Tobacco Never     Social History     Substance and Sexual Activity   Drug Use Never       Family History:  Family History   Adopted: Yes       Physical Exam:     Vitals:   Blood Pressure: 107/57 (01/27/24 1830)  Pulse: 82 (01/27/24 1830)  Temperature: 97.7 °F (36.5 °C) (01/27/24 1623)  Temp Source: Temporal (01/27/24 1623)  Respirations: 16 (01/27/24 1830)  SpO2: 99 % (01/27/24 1830)    Constitutional: In some distress  Eyes:  EOMI, sclera is erythematous  HENT:   oropharynx moist, external ears normal, external nose normal   Respiratory: Decreased breath sounds  Cardiovascular:  Normal rate, no murmurs   GI:  Soft, nondistended, no guarding   : Azul catheter placed  Musculoskeletal:  no tenderness, no deformities.   Integument:  no jaundice, no rash   Neurologic: Altered, unable to assess  Psychiatric: Not able to assess      Lab Results: I have personally reviewed pertinent reports.   and I have personally reviewed pertinent films in PACS    Results from last 7 days   Lab Units 01/27/24  1631   WBC Thousand/uL 10.69*   HEMOGLOBIN g/dL 13.5   HEMATOCRIT % 41.2   PLATELETS Thousands/uL 188   NEUTROS PCT % 81*   LYMPHS PCT % 9*   MONOS PCT % 6   EOS PCT % 4     Results from last 7 days   Lab Units 01/27/24  1631   POTASSIUM mmol/L 4.8   CHLORIDE mmol/L 107   CO2 mmol/L 26   BUN mg/dL 42*   CREATININE mg/dL 1.86*   CALCIUM mg/dL 9.0   ALK PHOS U/L 202*   ALT U/L 16   AST U/L 24             Imaging: I have personally reviewed pertinent reports.   and I have personally reviewed pertinent films in PACS  My finding of chest x-ray is pulm vascular congestion      MDM: High  Patient requires hospitalization for acute encephalopathy of unclear origin  Obtain all the history from his mother at bedside  Reviewed CBC BMP  and troponin levels  Reviewed external notes including his previous admission  Ordered CT of the head  Ordered ABG  Ordered antiseizure meds or Keppra  Ordered labs CBC BMP in the morning      Epic Records Reviewed as well as Records in Care Everywhere    ** Please Note: Dragon 360 Dictation voice to text software was used in the creation of this document. **

## 2024-01-28 NOTE — ASSESSMENT & PLAN NOTE
Etiology uncertain, at baseline the patient is able to communicate.  Questionable of having a seizure, versus hypercapnia versus infectious process.  Patient having urinary retention although urinalysis is negative will start Rocephin 1 g IV daily.  Azul catheter placed in the emergency room  Obtain CT of the head  Obtain ABG  Start Keppra 1 g IV daily, patient cannot take oral medications at this time

## 2024-01-28 NOTE — ASSESSMENT & PLAN NOTE
Currently NPO.  Will have speech therapy see him.  On previous admission he is on a mechanical soft diet

## 2024-01-28 NOTE — ASSESSMENT & PLAN NOTE
He usually is on torsemide and Aldactone.    Chest x-ray is improved  Weight is improved from previous  Continue home regimen -torsemide and Aldactone      Wt Readings from Last 3 Encounters:   01/28/24 79.5 kg (175 lb 4.3 oz)   12/08/23 106 kg (234 lb 9.1 oz)   08/11/21 103 kg (226 lb 10.1 oz)

## 2024-01-28 NOTE — ASSESSMENT & PLAN NOTE
Follows with [facility]  Continue home antilipid epileptics now that he is tolerating diet  Neurology consulted -do not believe encephalopathy is seizure related  CT head negative for acute pathologies

## 2024-01-28 NOTE — ASSESSMENT & PLAN NOTE
Previous admission was on mechanical soft diet    Speech therapy evaluated recommended purée and thin liquid diet for now until further mentation improvement

## 2024-01-28 NOTE — NURSING NOTE
Patricio patient's cousins visiting , patient was able to hold conversion, feed himself-with supervision, follow commands alert and & oriented x 3

## 2024-01-28 NOTE — PLAN OF CARE
Problem: Knowledge Deficit  Goal: Patient/family/caregiver demonstrates understanding of disease process, treatment plan, medications, and discharge instructions  Description: Complete learning assessment and assess knowledge base.  Interventions:  - Provide teaching at level of understanding  - Provide teaching via preferred learning methods  Outcome: Progressing     Problem: NEUROSENSORY - ADULT  Goal: Achieves stable or improved neurological status  Description: INTERVENTIONS  - Monitor and report changes in neurological status  - Monitor vital signs such as temperature, blood pressure, glucose, and any other labs ordered   - Initiate measures to prevent increased intracranial pressure  - Monitor for seizure activity and implement precautions if appropriate      Outcome: Progressing  Goal: Remains free of injury related to seizures activity  Description: INTERVENTIONS  - Maintain airway, patient safety  and administer oxygen as ordered  - Monitor patient for seizure activity, document and report duration and description of seizure to physician/advanced practitioner  - If seizure occurs,  ensure patient safety during seizure  - Reorient patient post seizure  - Seizure pads on all 4 side rails  - Instruct patient/family to notify RN of any seizure activity including if an aura is experienced  - Instruct patient/family to call for assistance with activity based on nursing assessment  - Administer anti-seizure medications if ordered    Outcome: Progressing  Goal: Achieves maximal functionality and self care  Description: INTERVENTIONS  - Monitor swallowing and airway patency with patient fatigue and changes in neurological status  - Encourage and assist patient to increase activity and self care.   - Encourage visually impaired, hearing impaired and aphasic patients to use assistive/communication devices  Outcome: Progressing     Problem: CARDIOVASCULAR - ADULT  Goal: Maintains optimal cardiac output and  hemodynamic stability  Description: INTERVENTIONS:  - Monitor I/O, vital signs and rhythm  - Monitor for S/S and trends of decreased cardiac output  - Administer and titrate ordered vasoactive medications to optimize hemodynamic stability  - Assess quality of pulses, skin color and temperature  - Assess for signs of decreased coronary artery perfusion  - Instruct patient to report change in severity of symptoms  Outcome: Progressing  Goal: Absence of cardiac dysrhythmias or at baseline rhythm  Description: INTERVENTIONS:  - Continuous cardiac monitoring, vital signs, obtain 12 lead EKG if ordered  - Administer antiarrhythmic and heart rate control medications as ordered  - Monitor electrolytes and administer replacement therapy as ordered  Outcome: Progressing     Problem: GENITOURINARY - ADULT  Goal: Maintains or returns to baseline urinary function  Description: INTERVENTIONS:  - Assess urinary function  - Encourage oral fluids to ensure adequate hydration if ordered  - Administer IV fluids as ordered to ensure adequate hydration  - Administer ordered medications as needed  - Offer frequent toileting  - Follow urinary retention protocol if ordered  Outcome: Progressing  Goal: Absence of urinary retention  Description: INTERVENTIONS:  - Assess patient’s ability to void and empty bladder  - Monitor I/O  - Bladder scan as needed  - Discuss with physician/AP medications to alleviate retention as needed  - Discuss catheterization for long term situations as appropriate  Outcome: Progressing  Goal: Urinary catheter remains patent  Description: INTERVENTIONS:  - Assess patency of urinary catheter  - If patient has a chronic sr, consider changing catheter if non-functioning  - Follow guidelines for intermittent irrigation of non-functioning urinary catheter  Outcome: Progressing

## 2024-01-28 NOTE — RESPIRATORY THERAPY NOTE
RT Protocol Note  Ash Loaiza 58 y.o. male MRN: [id number]  Unit/Bed#: -01 Encounter: [id number]    Assessment    Active Problems:    Nonintractable generalized idiopathic epilepsy without status epilepticus (HCC)    Essential hypertension    Developmental delay    Dysphagia    Acute encephalopathy    Liver cirrhosis (HCC)    Chronic respiratory failure with hypercapnia (HCC)    Urinary retention    Chronic diastolic HF (heart failure) (HCC)    Elevated troponin    Stage 3b chronic kidney disease (HCC)      Home Pulmonary Medications:    Home Devices/Therapy: Home O2    Past Medical History:   Diagnosis Date    Hypertension     Mentally challenged     Pericardial effusion     Pneumonia     Seizure (HCC)      Social History     Socioeconomic History    Marital status: Single     Spouse name: None    Number of children: None    Years of education: None    Highest education level: None   Occupational History    None   Tobacco Use    Smoking status: Never    Smokeless tobacco: Never   Vaping Use    Vaping status: Never Used   Substance and Sexual Activity    Alcohol use: Never    Drug use: Never    Sexual activity: None   Other Topics Concern    None   Social History Narrative    None     Social Determinants of Health     Financial Resource Strain: Low Risk  (6/16/2023)    Received from [facility]    Overall Financial Resource Strain (CARDIA)     Difficulty of Paying Living Expenses: Not very hard   Food Insecurity: No Food Insecurity (11/18/2023)    Hunger Vital Sign     Worried About Running Out of Food in the Last Year: Never true     Ran Out of Food in the Last Year: Never true   Transportation Needs: No Transportation Needs (11/18/2023)    PRAPARE - Transportation     Lack of Transportation (Medical): No     Lack of Transportation (Non-Medical): No   Physical Activity: Not on file   Stress: Not on file   Social Connections: Not on file   Intimate Partner Violence: Not At Risk (6/16/2023)     Received from [facility]    Humiliation, Afraid, Rape, and Kick questionnaire     Fear of Current or Ex-Partner: No     Emotionally Abused: No     Physically Abused: No     Sexually Abused: No   Housing Stability: Low Risk  (11/18/2023)    Housing Stability Vital Sign     Unable to Pay for Housing in the Last Year: No     Number of Places Lived in the Last Year: 2     Unstable Housing in the Last Year: No       Subjective         Objective    Physical Exam:   Assessment Type: Assess only  General Appearance: Awake  Respiratory Pattern: Symmetrical  Chest Assessment: Chest expansion symmetrical  O2 Device: 2lpm NC    Vitals:  Blood pressure 107/57, pulse 98, temperature 97.7 °F (36.5 °C), temperature source Temporal, resp. rate 16, SpO2 99%.    Results from last 7 days   Lab Units 01/27/24 2038   PH ART  7.516*   PCO2 ART mm Hg 32.6*   PO2 ART mm Hg 95.6   HCO3 ART mmol/L 25.8   BASE EXC ART mmol/L 3.3   O2 CONTENT ART mL/dL 18.1   O2 HGB, ARTERIAL % 96.3   ABG SOURCE  Radial, Left   SHAN TEST  Yes       Imaging and other studies:     O2 Device: 2lpm NC     Plan    Respiratory Plan: Discontinue Protocol        Resp Comments: 58 yr old pt, awake and non virbal from provider's note assessed on 2lpm NC with Sp02 98%, respiratory pattern appears unlabored,  abg completed, pt unable to provide any pulmonary hx. No respiratory interventions required at this time.

## 2024-01-28 NOTE — ASSESSMENT & PLAN NOTE
59 y/o M with cerebral palsy, HTN, CHF, CKD, liver cirrhosis, and refractory generalized epilepsy on 4 AEDs and multiple prior medication trials that presents with AMS and concern for possible post-ictal state after a seizure. No seizure activity was actually witnessed. At baseline pt has at least one breakthrough seizure a month per records. Unclear whether seizure may have contributed however pt noted to be nonverbal on admission and now he is minimally verbal without any changes overnight, which may suggest gradual improvement in his mental status though at baseline he does have cognitive dysfunction. Unclear if TME can be contributing such as from urinary retention and/or EDWIGE. Given his refractory epilepsy, he requires his AED regimen to prevent complications such as recurrent seizure and status epilepticus.    -continue neurochecks; notify with changes  -seizure precautions  -HCT reviewed - unremarkable for acute changes  -no need for MRI or EEG at this time as it would be unlikely to ; that said, if there is clinical concern for ongoing seizure activity, will need to consider continuous monitoring  -continue his home AED regimen as follows, either with sips of water or use of NGT (if latter, will need to confirm with pharmacy if meds can be crushed or alternative formulations):   Zonisamide 100mg qHS   Lamotrigine 200 in am, 250 in afternoon, 300 at night   Primidone 150 in am, 100 in afternoon, 150 at night   Clonazepam 0.5mg qHS  -once back on his AEDs, can d/c IV Keppra  -should he have recurrent events, can consider increasing zonisamide dose  -will follow along; call with questions  -has appt with [facility] Neurology on 2/1 later this coming week

## 2024-01-28 NOTE — ASSESSMENT & PLAN NOTE
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, minimally communicative but does communicate verbally at times  Differential included seizure versus hypercapnia, hepatic source, infectious source    No specific signs for infection, did have urinary retention and started on prophylactic antibiotics , discontinue early if infection ruled out   CT head negative for acute pathologies  ABG without signs for hypercapnia  Ammonia level elevated to 97 -lactulose that is typically dosed once daily increased to twice daily and monitor for improvement  Patient's mother states he had not been getting the lactulose every day at SNF  Resumed patient's antiepileptics   Neurology consult appreciated -do not believe this is ongoing seizure activity    Patient has already improved and is more alert, able to swallow pills and approved for purée diet and thin liquids

## 2024-01-28 NOTE — SPEECH THERAPY NOTE
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)    Essential hypertension    Developmental delay    Dysphagia    Acute encephalopathy    Liver cirrhosis (HCC)    Chronic respiratory failure with hypercapnia (HCC)    Urinary retention    Chronic diastolic HF (heart failure) (HCC)    Elevated troponin    Stage 3b chronic kidney disease (HCC)      Past Medical History  Past Medical History:   Diagnosis Date    Hypertension     Mentally challenged     Pericardial effusion     Pneumonia     Seizure (HCC)        Past Surgical History  Past Surgical History:   Procedure Laterality Date    FRACTURE SURGERY      clavicle, left humerus, radial, and ulna.  Right radius    HIP ARTHROSCOPY Right     IR THORACENTESIS  11/20/2023    ID COLONOSCOPY FLX DX W/COLLJ SPEC WHEN PFRMD N/A 4/1/2019    Procedure: COLONOSCOPY;  Surgeon: Avi Guzman MD;  Location: [facility];  Service: Colorectal       Summary   Bedside dysphagia order received d/t hx of dysphagia. Pt familiar to department with last treatment session on 11/22/2023. Diet recommendation of L2 mech soft/thin. Diet @ [facility] soft/bite size and thin liquids- modification of solids secondary to impulsivity with consumption of solids.   Pt awake and alert. Nonverbal, which is not typical to pt. Dependent feed. Pt presented with moderate oral dysphagia characterized by reduced oral agility. Pharyngeal swallow appeared functional.   Trials of puree were grossly tolerated. Mech soft trials resulted in bolus sitting and need of verbal prompts for breakdown. Mastication ineffective requiring verbal prompts for mashing coupled with puree trials. Transfer delayed. Consistency not appropriate @ this time for a diet. Potential upgrade in future once mentation/participation improves. Thin liquids consumed  via straw, required verbal prompts to extract. No overt s/s of aspiration.   Recommend diet initiation of puree and thin liquids. Reviewed recommendations with RN, PCA, and MD via [place] [employer]. Pt to remain active on  caseload.     Risk/s for Aspiration: low     Recommended Diet: puree/level 1 diet and thin liquids   Recommended Form of Meds:  as best tolerated    Aspiration precautions and swallowing strategies: upright posture, only feed when fully alert, slow rate of feeding, small bites/sips, and alternating bites and sips  Other Recommendations: Continue frequent oral care.      Current Medical Status  Ash Loaiza is a 58 y.o. male with an unspecified cerebral palsy, HTN, CHF, CKD, liver cirrhosis, and refractory generalized epilepsy who presents with concern for seizure and AMS. Pt presented to the ED from rehab due to altered mental status. At baseline pt is conversant and able to communicate though has baseline cognitive dysfunction. More recently he has been having difficulty feeding himself and speaking, now more lethargic than usual. Though no seizure activity was noted, there was concern that he may have had a seizure with a resultant post-ictal state. He had COVID last year and since then has been getting generally weaker and more deconditioned. He now requires a walker to ambulate. He was found to have urinary retention and was started on CTX for possible UTI. Due to his mental status change, he was made NPO and was given Keppra IV in lieu of his typical medication regimen. There is no report of recurrent seizure activity overnight.     Of note, pt follows with [facility] for his epilepsy. He was last seen in April 2023 and has an appt on 2/1 coming up. At baseline has has at least one seizure a month, which seems to consist moreso of drop attacks and abnormal behaviors with a prolonged post-ictal state lasting an entire day or so. His prescribed meds for seizure are zonisamide 100mg qhs,  lamotrigine 200/250/300, primidone 150/100/150, and clonazepam 0.5mg qhs. In the past he has failed multiple AEDs including Keppra, phenytoin, Depakote, and Onfi. He received a video EEG in 2020 that confirmed generalized spike-wave discharges consistent with a generalized epilepsy. Also had an MRI at that time that was unremarkable.  Current Precautions:  Fall  Aspiration  Contact  AIrborn  C diff   Seizure  Delirium    Allergies:  No known food allergies    Past medical history:  Please see H&P for details    Special Studies:  1/27/2024- No acute intracranial abnormality.     Social/Education/Vocational Hx:  Pt lives in [facility]. Was in facility for rehabilitation. Prior home with mother. Mother recently in rehabilitation. Has been discharged but wheelchair level.     Swallow Information   Current Risks for Dysphagia & Aspiration: known history of dysphagia, AMS, and decreased alertness  Current Symptoms/Concerns:  Hx of dysphagia. Change in mental status (pt not verbalizing).   Current Diet: NPO until seen by speech tx.   Baseline Diet: soft/level 3 diet and thin liquids @ [facility]. Pt familiar to department with last treatment provided on 11/22/23. Diet recommendation of L2 mech soft and thin.       Baseline Assessment   Behavior/Cognition: alert  Speech/Language Status: no verbal output noted. Baseline pt is able to communicate wants/needs effectively.  Patient Positioning: upright in bed  Pain Status/Interventions/Response to Interventions:  No report of or nonverbal indications of pain.       Swallow Mechanism Exam  Facial: symmetrical  Labial: WFL  Lingual: WFL  Velum: symmetrical  Mandible:  decreased ROM-reduced oral opening-suspect behavioral  Dentition: adequate  Vocal quality: nonverbal    Volitional Cough: unable to initiate volitional cough   Respiratory Status: on RA     Consistencies Assessed and Performance   Consistencies Administered: thin liquids, puree, and mechanical soft  solids  Materials administered included soda via straw, applesauce, and 2 types of crackers    Oral Stage: moderate  Functional oral tolerance with puree. Mech soft trials resulted in bolus sitting requiring multiple verbal prompts for initiation of phrase. Breakdown minimal and extended. Required x2 presentation of puree consistently in order to complete breakdown and transfer of mech soft. Not functional for diet establishment @ current time. Do anticipate potential in future. Suspect behavioral/participation component. Hx of same, especially if mother is not present.    Pharyngeal Stage: WFL  Swallow Mechanics:  Swallowing initiation appeared prompt.  Laryngeal rise was palpated and judged to be within functional limits.  No coughing, throat clearing, change in vocal quality or respiratory status noted today.     Esophageal Concerns: none reported    Strategies and Efficacy: verbal prompts to suck from straw, cyclic ingestion, slow rate of feeding    Summary and Recommendations (see above)    Results Reviewed with: patient, RN, and PCA      Treatment Recommended: dysphagia tx      Frequency of treatment: 1-5x week    Patient Stated Goal: N/A    Dysphagia LTG  -Patient will demonstrate safe and effective oral intake (without overt s/s significant oral/pharyngeal dysphagia including s/s penetration or aspiration) for the highest appropriate diet level.     Short Term Goals:  -Pt will tolerate Dysphagia 1/pureed diet and thin liquid with no significant s/s oral or pharyngeal dysphagia across 1-3 diagnostic session/s    -Patient will tolerate trials of upgraded food and/or liquid texture with no significant s/s of oral or pharyngeal dysphagia including aspiration across 1-3 diagnostic sessions     Speech Therapy Prognosis   Prognosis: good    Prognosis Considerations: age, medical status, prior medical history, and cognitive status

## 2024-01-28 NOTE — ASSESSMENT & PLAN NOTE
Current creatinine at baseline    Lab Results   Component Value Date    EGFR 40 01/28/2024    EGFR 39 01/27/2024    EGFR 45 12/07/2023    CREATININE 1.84 (H) 01/28/2024    CREATININE 1.86 (H) 01/27/2024    CREATININE 1.65 (H) 12/07/2023

## 2024-01-28 NOTE — PROGRESS NOTES
[facility]  Progress Note  Name: Ash Loaiza I  MRN: [id number]  Unit/Bed#: -Tad I Date of Admission: 1/27/2024   Date of Service: 1/28/2024 I Hospital Day: 1    Assessment/Plan   * Acute encephalopathy  Assessment & Plan  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, minimally communicative but does communicate verbally at times  Differential included seizure versus hypercapnia, hepatic source, infectious source    No specific signs for infection, did have urinary retention and started on prophylactic antibiotics , discontinue early if infection ruled out   CT head negative for acute pathologies  ABG without signs for hypercapnia  Ammonia level elevated to 97 -lactulose that is typically dosed once daily increased to twice daily and monitor for improvement  Patient's mother states he had not been getting the lactulose every day at [facility]  Resumed patient's antiepileptics   Neurology consult appreciated -do not believe this is ongoing seizure activity    Patient has already improved and is more alert, able to swallow pills and approved for purée diet and thin liquids    Stage 3b chronic kidney disease (HCC)  Assessment & Plan  Current creatinine at baseline    Lab Results   Component Value Date    EGFR 40 01/28/2024    EGFR 39 01/27/2024    EGFR 45 12/07/2023    CREATININE 1.84 (H) 01/28/2024    CREATININE 1.86 (H) 01/27/2024    CREATININE 1.65 (H) 12/07/2023       Elevated troponin  Assessment & Plan  Mild elevation in troponin which I think is secondary to his chronic heart failure and also CKD.  Troponins remain flat/decreased without specific EKG changes    Chronic diastolic HF (heart failure) (HCC)  Assessment & Plan  He usually is on torsemide and Aldactone.    Chest x-ray is improved  Weight is improved from previous  Continue home regimen -torsemide and Aldactone      Wt Readings from Last 3 Encounters:   01/28/24 79.5 kg  (175 lb 4.3 oz)   12/08/23 106 kg (234 lb 9.1 oz)   08/11/21 103 kg (226 lb 10.1 oz)               Urinary retention  Assessment & Plan  Patient had over 900 cc of urine, Azul catheter placed by emergency room.  Continue Azul, possible voiding trial and mentation improved    Chronic respiratory failure with hypercapnia (HCC)  Assessment & Plan  Wears oxygen at nighttime.    Liver cirrhosis (HCC)  Assessment & Plan  Patient admitted with concerns for acute encephalopathy  Ammonia level checked this morning with elevation to 95  Typically on lactulose 20 g daily -patient's mother states he was noted was getting this at [facility] every day    Start lactulose 20 g twice daily for now and monitor ammonia level in the morning      Dysphagia  Assessment & Plan  Previous admission was on mechanical soft diet    Speech therapy evaluated recommended purée and thin liquid diet for now until further mentation improvement    Developmental delay  Assessment & Plan  And talk to his mother at bedside who gives all the history the patient was at [facility] getting rehab.    After multiple admissions to the hospital, and since having COVID in the past his legs have been weaker.    He is now walking with a walker 50 feet and 1 of 3 steps.    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    Mobility:   Basic Mobility Inpatient Raw Score: 6  JH-M  Goal: 2: Bed activities/Dependent transfer  JH-HLM Achieved: 4: Move to chair/commode  HLM Goal achieved. Continue to encourage appropriate mobility.    Patient Centered Rounds: I performed bedside rounds with nursing staff today.   Discussions with Specialists or Other Care Team Provider: CM, neurology    Education and Discussions with Family / Patient: Updated  (mother) via phone.    Total Time Spent on Date of Encounter in care of patient:  mins. This time was spent on one or more of the following: performing physical exam; counseling and coordination of care; obtaining or reviewing history; documenting in the medical record; reviewing/ordering tests, medications or procedures; communicating with other healthcare professionals and discussing with patient's family/caregivers.    Current Length of Stay: 1 day(s)  Current Patient Status: Inpatient   Certification Statement: The patient will continue to require additional inpatient hospital stay due to acute encephalopathy   Discharge Plan: Anticipate discharge in 24-48 hrs to discharge location to be determined pending rehab evaluations.    Code Status: Level 1 - Full Code    Subjective:   Seen and examined.  Patient is alert to verbal stimuli, does not respond verbally to me this morning.     Objective:     Vitals:   Temp (24hrs), Av.6 °F (36.4 °C), Min:97.2 °F (36.2 °C), Max:97.7 °F (36.5 °C)    Temp:  [97.2 °F (36.2 °C)-97.7 °F (36.5 °C)] 97.2 °F (36.2 °C)  HR:  [79-98] 89  Resp:  [16-20] 16  BP: (107-141)/() 110/70  SpO2:  [92 %-100 %] 95 %  Body mass index is 28.29 kg/m².     Input and Output Summary (last 24 hours):     Intake/Output Summary (Last 24 hours) at 2024 1410  Last data filed at 2024 1201  Gross per 24 hour   Intake 1000 ml   Output 2750 ml   Net -1750 ml       Physical Exam:   Physical Exam  Vitals and nursing note reviewed.   Constitutional:       General: He is not in acute distress.     Appearance: Normal  appearance. He is ill-appearing.   HENT:      Head: Normocephalic and atraumatic.      Nose: No congestion.      Mouth/Throat:      Mouth: Mucous membranes are dry.   Eyes:      Conjunctiva/sclera: Conjunctivae normal.   Cardiovascular:      Rate and Rhythm: Normal rate and regular rhythm.      Pulses: Normal pulses.      Heart sounds: Normal heart sounds. No murmur heard.  Pulmonary:      Effort: Pulmonary effort is normal. No respiratory distress.      Breath sounds: Normal breath sounds.   Abdominal:      General: Bowel sounds are normal.      Palpations: Abdomen is soft.      Tenderness: There is no abdominal tenderness.   Genitourinary:     Comments: Azul present  Musculoskeletal:         General: Normal range of motion.      Right lower leg: Edema present.      Left lower leg: Edema present.   Skin:     General: Skin is warm and dry.   Neurological:      Mental Status: He is alert. He is disoriented.      Comments: Slowed responses          Additional Data:     Labs:  Results from last 7 days   Lab Units 01/28/24  0551 01/27/24  1631   WBC Thousand/uL 9.26 10.69*   HEMOGLOBIN g/dL 12.3 13.5   HEMATOCRIT % 37.1 41.2   PLATELETS Thousands/uL 146* 188   NEUTROS PCT %  --  81*   LYMPHS PCT %  --  9*   MONOS PCT %  --  6   EOS PCT %  --  4     Results from last 7 days   Lab Units 01/28/24  0551 01/27/24  1631   SODIUM mmol/L 146 141   POTASSIUM mmol/L 4.2 4.8   CHLORIDE mmol/L 111* 107   CO2 mmol/L 26 26   BUN mg/dL 40* 42*   CREATININE mg/dL 1.84* 1.86*   ANION GAP mmol/L 9 8   CALCIUM mg/dL 8.5 9.0   ALBUMIN g/dL  --  3.6   TOTAL BILIRUBIN mg/dL  --  0.91   ALK PHOS U/L  --  202*   ALT U/L  --  16   AST U/L  --  24   GLUCOSE RANDOM mg/dL 91 116                       Lines/Drains:  Invasive Devices       Peripheral Intravenous Line  Duration             Peripheral IV 01/27/24 Left Antecubital <1 day              Drain  Duration             Urethral Catheter Latex 18 Fr. <1 day                  Urinary  Catheter:  Goal for removal: Voiding trial when ambulation improves                    Imaging: Reviewed radiology reports from this admission including: CT head    Recent Cultures (last 7 days):   Results from last 7 days   Lab Units 01/27/24  1631   BLOOD CULTURE  Received in Microbiology Lab. Culture in Progress.  Received in Microbiology Lab. Culture in Progress.       Last 24 Hours Medication List:   Current Facility-Administered Medications   Medication Dose Route Frequency Provider Last Rate    aspirin  81 mg Oral Daily April Harris PA-C      cefTRIAXone  1,000 mg Intravenous Q24H Fede Mcintyre MD 1,000 mg (01/27/24 2113)    clonazePAM  0.5 mg Oral HS April Harris PA-C      heparin (porcine)  5,000 Units Subcutaneous Q8H [facility] Fede Mcintyre MD      lactulose  20 g Oral BID April Harris PA-C      lamoTRIgine  200 mg Oral Daily April Harris PA-C      And    lamoTRIgine  250 mg Oral Daily April Harris PA-C      And    lamoTRIgine  300 mg Oral HS April Harris PA-C      levOCARNitine  330 mg Oral 4x Daily (with meals and at bedtime) April Harris PA-C      metoprolol tartrate  25 mg Oral BID April Harris PA-C      primidone  150 mg Oral Q12H SON April Harris PA-C      And    primidone  100 mg Oral Daily April Harris PA-C      spironolactone  100 mg Oral Daily April Harris PA-C      torsemide  40 mg Oral Daily April Harris PA-C      zonisamide  100 mg Oral HS April Harris PA-C          Today, Patient Was Seen By: April Harris PA-C    **Please Note: This note may have been constructed using a voice recognition system.**

## 2024-01-28 NOTE — ASSESSMENT & PLAN NOTE
Patient admitted with concerns for acute encephalopathy  Ammonia level checked this morning with elevation to 95  Typically on lactulose 20 g daily -patient's mother states he was noted was getting this at SNF every day    Start lactulose 20 g twice daily for now and monitor ammonia level in the morning

## 2024-01-28 NOTE — ASSESSMENT & PLAN NOTE
He usually is on torsemide and Aldactone.  Patient is currently NPO.  Will do Lasix 40 mg IV twice daily.  BNP is slightly elevated at 325, although looking at his previous chest x-rays, is much improved from previous.  Patient is able to lay flat.      Wt Readings from Last 3 Encounters:   12/08/23 106 kg (234 lb 9.1 oz)   08/11/21 103 kg (226 lb 10.1 oz)   01/17/20 110 kg (242 lb 8.1 oz)

## 2024-01-28 NOTE — ASSESSMENT & PLAN NOTE
And talk to his mother at bedside who gives all the history the patient was at [facility] getting rehab.  After multiple admissions to the hospital, and since having COVID in the past his legs have been weaker.  He is now walking with a walker 50 feet and 1 of 3 steps.  At baseline he is able to converse and usually about TV.

## 2024-01-28 NOTE — ASSESSMENT & PLAN NOTE
Patient follows with [facility] neurology.  He is currently on lamotrigine and Zonegran.  Patient cannot take oral medication at this time due to his encephalopathy will start Keppra IV 1 g every 12 hours.  Will consult neurology.  I looked through his records there is no previous MRIs, I do not believe the patient have an MRI due to his developmental delay and mental status.  Will order a CT of the head

## 2024-01-28 NOTE — ASSESSMENT & PLAN NOTE
Patient had over 900 cc of urine, Azul catheter placed by emergency room.  Continue Azul, possible voiding trial and mentation improved

## 2024-01-28 NOTE — PLAN OF CARE
Problem: NEUROSENSORY - ADULT  Goal: Achieves stable or improved neurological status  Description: INTERVENTIONS  - Monitor and report changes in neurological status seizure activity, lethargy  - Monitor vital signs such as temperature, blood pressure, glucose, and any other labs ordered   - Initiate measures to prevent increased intracranial pressure  - Monitor for seizure activity and implement precautions if appropriate      Outcome: Not Progressing

## 2024-01-28 NOTE — ASSESSMENT & PLAN NOTE
Mild elevation in troponin which I think is secondary to his chronic heart failure and also CKD.  Troponins remain flat/decreased without specific EKG changes

## 2024-01-28 NOTE — ASSESSMENT & PLAN NOTE
Current creatinine at baseline    Lab Results   Component Value Date    EGFR 39 01/27/2024    EGFR 45 12/07/2023    EGFR 44 12/05/2023    CREATININE 1.86 (H) 01/27/2024    CREATININE 1.65 (H) 12/07/2023    CREATININE 1.68 (H) 12/05/2023

## 2024-01-28 NOTE — TELEMEDICINE
TeleConsultation - Neurology   Ash Loaiza 58 y.o. male MRN: [id number]  Unit/Bed#: -01 Encounter: [id number]    REQUIRED DOCUMENTATION:     1. This service was provided via Telemedicine.  2. Provider located at [facility].  3. TeleMed provider: Sunny Webster DO.  4. Identify all parties in room with patient during tele consult:  Evelin Benedict RN  5.Patient was then informed that this was a Telemedicine visit and that the exam was being conducted confidentially over secure lines. My office door was closed. No one else was in the room.  Patient acknowledged consent and understanding of privacy and security of the Telemedicine visit, and gave us permission to have the assistant stay in the room in order to assist with the history and to conduct the exam.  I informed the patient that I have reviewed their record in Epic and presented the opportunity for them to ask any questions regarding the visit today.  The patient agreed to participate.     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 No seizure activity was actually witnessed. At baseline pt has at least one breakthrough seizure a month per records. Unclear whether seizure may have contributed however pt noted to be nonverbal on admission and now he is minimally verbal without any changes overnight, which may suggest gradual improvement in his mental status though at baseline he does have cognitive dysfunction. Unclear if TME can be contributing such as from urinary retention and/or EDWIGE. Given his refractory epilepsy, he requires his AED regimen to prevent complications such as recurrent seizure and status epilepticus.    -continue neurochecks;  notify with changes  -seizure precautions  -HCT reviewed - unremarkable for acute changes  -no need for MRI or EEG at this time as it would be unlikely to ; that said, if there is clinical concern for ongoing seizure activity, will need to consider continuous monitoring  -continue his home AED regimen as follows, either with sips of water or use of NGT (if latter, will need to confirm with pharmacy if meds can be crushed or alternative formulations):   Zonisamide 100mg qHS   Lamotrigine 200 in am, 250 in afternoon, 300 at night   Primidone 150 in am, 100 in afternoon, 150 at night   Clonazepam 0.5mg qHS  -once back on his AEDs, can d/c IV Keppra  -should he have recurrent events, can consider increasing zonisamide dose  -will follow along; call with questions  -[facility] later this coming week      Ash Loaiza will need follow up in at the next regular appointment with epilepsy attending or advance practitioner. [facility]      Reason for Consult / Principal Problem: concern for seizure  Hx and PE limited by: mental status    HPI: Ash Loaiza is a 58 y.o. male with an unspecified cerebral palsy, HTN, CHF, CKD, liver cirrhosis, and refractory generalized epilepsy who presents with concern for seizure and AMS. Pt presented to the ED from rehab due to altered mental status. At baseline pt is conversant and able to communicate though has baseline cognitive dysfunction. More recently he has been having difficulty feeding himself and speaking, now more lethargic than usual. Though no seizure activity was noted, there was concern that he may have had a seizure with a resultant post-ictal state. He had COVID last year and since then has been getting generally weaker and more deconditioned. He now requires a walker to ambulate. He was found to have urinary retention and was started on CTX for possible UTI. Due to his mental status change, he was  made NPO and was given Keppra IV in lieu of his typical medication regimen. There is no report of recurrent seizure activity overnight.    Of note, pt follows with [facility] for his epilepsy. He was last seen in April 2023 and has an appt on 2/1 coming up. At baseline has has at least one seizure a month, which seems to consist moreso of drop attacks and abnormal behaviors with a prolonged post-ictal state lasting an entire day or so. His prescribed meds for seizure are zonisamide 100mg qhs, lamotrigine 200/250/300, primidone 150/100/150, and clonazepam 0.5mg qhs. In the past he has failed multiple AEDs including Keppra, phenytoin, Depakote, and Onfi. He received a video EEG in 2020 that confirmed generalized spike-wave discharges consistent with a generalized epilepsy. Also had an MRI at that time that was unremarkable.    Inpatient consult to Neurology  Consult performed by: Sunny Webster DO  Consult ordered by: Fede Mcintyre MD         Review of Systems   Unable to perform ROS: Mental status change       Historical Information   Past Medical History:   Diagnosis Date    Hypertension     Mentally challenged     Pericardial effusion     Pneumonia     Seizure (HCC)      Past Surgical History:   Procedure Laterality Date    FRACTURE SURGERY      clavicle, left humerus, radial, and ulna.  Right radius    HIP ARTHROSCOPY Right     IR THORACENTESIS  11/20/2023    NH COLONOSCOPY FLX DX W/COLLJ SPEC WHEN PFRMD N/A 4/1/2019    Procedure: COLONOSCOPY;  Surgeon: Avi Guzman MD;  Location: [facility];  Service: Colorectal     Social History   Social History     Substance and Sexual Activity   Alcohol Use Never     Social History     Substance and Sexual Activity   Drug Use Never     E-Cigarette/Vaping    E-Cigarette Use Never User      E-Cigarette/Vaping Substances    Nicotine No     THC No     CBD No     Flavoring No     Other No     Unknown No      Social History     Tobacco Use   Smoking Status  Never   Smokeless Tobacco Never     Family History:   Family History   Adopted: Yes       Review of previous medical records was completed.    Meds/Allergies   all current active meds have been reviewed, current meds:   Current Facility-Administered Medications   Medication Dose Route Frequency    cefTRIAXone (ROCEPHIN) IVPB (premix in dextrose) 1,000 mg 50 mL  1,000 mg Intravenous Q24H    clonazePAM (KlonoPIN) tablet 0.5 mg  0.5 mg Oral HS    furosemide (LASIX) injection 40 mg  40 mg Intravenous BID    heparin (porcine) subcutaneous injection 5,000 Units  5,000 Units Subcutaneous Q8H SON    lamoTRIgine (LaMICtal) tablet 200 mg  200 mg Oral Daily    And    lamoTRIgine (LaMICtal) tablet 250 mg  250 mg Oral Daily    And    lamoTRIgine (LaMICtal) tablet 300 mg  300 mg Oral HS    primidone (MYSOLINE) tablet 150 mg  150 mg Oral Q12H SON    And    primidone (MYSOLINE) tablet 100 mg  100 mg Oral Daily    zonisamide (ZONEGRAN) capsule 100 mg  100 mg Oral HS   , and PTA meds:   Prior to Admission Medications   Prescriptions Last Dose Informant Patient Reported? Taking?   acetaminophen (TYLENOL) 325 mg tablet Unknown  No No   Sig: Take 2 tablets (650 mg total) by mouth every 6 (six) hours as needed for mild pain, headaches or fever   ascorbic acid (VITAMIN C) 500 mg tablet 1/27/2024  Yes Yes   Sig: Take 500 mg by mouth daily   aspirin 81 MG tablet 1/27/2024  Yes Yes   Sig: Take 81 mg by mouth daily   calcium-vitamin D 250-100 MG-UNIT per tablet 1/27/2024  Yes Yes   Sig: Take 1 tablet by mouth daily    lactulose 20 g/30 mL 1/27/2024  Yes Yes   Sig: Take 20 g by mouth daily Daily after supper in 4oz of juice   lamoTRIgine (LaMICtal) 100 mg tablet 1/27/2024 Mother Yes Yes   Sig: Take 250 mg by mouth daily after lunch   lamoTRIgine (LaMICtal) 150 MG tablet 1/27/2024 Mother Yes Yes   Sig: Take 300 mg by mouth daily at bedtime   lamoTRIgine (LaMICtal) 200 MG tablet 1/27/2024  Yes Yes   Sig: Take 200 mg by mouth daily in the early  morning    levOCARNitine (CARNITOR) 330 MG tablet 1/27/2024  Yes Yes   Sig: Take 330 mg by mouth 4 (four) times daily (after meals and at bedtime)    metoprolol tartrate (LOPRESSOR) 25 mg tablet Unknown  Yes No   Sig: Take 25 mg by mouth 2 (two) times a day   nystatin (MYCOSTATIN) powder   No No   Sig: Apply topically 2 (two) times a day   polyethylene glycol (MIRALAX) 17 g packet   No No   Sig: Take 17 g by mouth daily as needed (constipation)   primidone (MYSOLINE) 50 mg tablet 1/27/2024  Yes Yes   Sig: Take 100 mg by mouth daily after breakfast   primidone (MYSOLINE) 50 mg tablet 1/27/2024  Yes Yes   Sig: Take 100 mg by mouth daily with lunch   primidone (MYSOLINE) 50 mg tablet 1/27/2024  No Yes   Sig: Take 3 tablets (150 mg total) by mouth daily at bedtime   senna-docusate sodium (SENOKOT S) 8.6-50 mg per tablet   No No   Sig: Take 1 tablet by mouth 2 (two) times a day   spironolactone (ALDACTONE) 100 mg tablet 1/27/2024  Yes Yes   Sig: Take 100 mg by mouth daily   thiamine 50 MG tablet 1/27/2024  Yes Yes   Sig: Take by mouth daily Unsure dose   torsemide (DEMADEX) 20 mg tablet   No No   Sig: Take 2 tablets (40 mg total) by mouth daily Do not start before December 9, 2023.   vitamin E, tocopherol, 400 units capsule Unknown  Yes No   Sig: Take 400 Units by mouth daily   zinc gluconate 50 mg tablet 1/27/2024  Yes Yes   Sig: Take 50 mg by mouth daily   zonisamide (ZONEGRAN) 100 mg capsule Unknown  No No   Sig: Take 1 capsule (100 mg total) by mouth daily at bedtime   Patient not taking: Reported on 1/28/2024      Facility-Administered Medications: None       Allergies   Allergen Reactions    Budesonide Seizures     Other reaction(s): Seizure    Dilantin [Phenytoin]      Pericardial effusion    Flurazepam Other (See Comments)     dalmane    Other reaction(s): MADW HIM RAMMEY   dalmane    Ipratropium-Albuterol Seizures     Other reaction(s): Seizures    Phenobarbital Hyperactivity    Vyvanse [Lisdexamfetamine  "Dimesylate] Other (See Comments)     Unknown        Objective   Vitals:Blood pressure 109/69, pulse 91, temperature (!) 97.2 °F (36.2 °C), resp. rate 16, height 5' 6\" (1.676 m), weight 79.5 kg (175 lb 4.3 oz), SpO2 94%.,Body mass index is 28.29 kg/m².    Intake/Output Summary (Last 24 hours) at 1/28/2024 0915  Last data filed at 1/28/2024 0455  Gross per 24 hour   Intake 1000 ml   Output 1950 ml   Net -950 ml       Invasive Devices:   Invasive Devices       Peripheral Intravenous Line  Duration             Peripheral IV 01/27/24 Left Antecubital <1 day              Drain  Duration             Urethral Catheter Latex 18 Fr. <1 day                    Physical Exam  Constitutional:       Appearance: He is well-developed.   HENT:      Head: Normocephalic and atraumatic.   Eyes:      Conjunctiva/sclera: Conjunctivae normal.   Pulmonary:      Effort: No respiratory distress.   Abdominal:      General: There is no distension.   Musculoskeletal:         General: No swelling.      Cervical back: No rigidity.   Skin:     Coloration: Skin is not jaundiced.       Neurologic Exam     Mental Status   Lethargic but easily arousable  Attends to examiner intermittently and remains relatively alert once aroused  Able to state name quickly but will not respond to other questions including orientation  Can follow a few simple commands (show tongue, wiggle toes) but not others  Minimally verbal     Cranial Nerves     CN XII   Tongue deviation: none  Baseline exotropia; no clear gaze preference  No clear facial asymmetry noted     Motor Exam At least 4/5 in both uppers, limited by cooperation/effort; no asymmetry  Minimal antigravity in both legs, limited by cooperation/effort; no asymmetry noted     Sensory Exam   Will not participate in sensory testing     Gait, Coordination, and Reflexes Unable to perform coordination testing due to mental status       Lab Results: I have personally reviewed pertinent reports.  , CBC:   Results from " last 7 days   Lab Units 01/28/24  0551 01/27/24  1631   WBC Thousand/uL 9.26 10.69*   RBC Million/uL 3.70* 4.20   HEMOGLOBIN g/dL 12.3 13.5   HEMATOCRIT % 37.1 41.2   MCV fL 100* 98   PLATELETS Thousands/uL 146* 188   , BMP/CMP:   Results from last 7 days   Lab Units 01/28/24  0551 01/27/24  1631   SODIUM mmol/L 146 141   POTASSIUM mmol/L 4.2 4.8   CHLORIDE mmol/L 111* 107   CO2 mmol/L 26 26   BUN mg/dL 40* 42*   CREATININE mg/dL 1.84* 1.86*   CALCIUM mg/dL 8.5 9.0   AST U/L  --  24   ALT U/L  --  16   ALK PHOS U/L  --  202*   EGFR ml/min/1.73sq m 40 39   , Ammonia:   , Urinalysis:   Results from last 7 days   Lab Units 01/27/24  1654   COLOR UA  Yellow   CLARITY UA  Clear   SPEC GRAV UA  1.010   PH UA  7.5   LEUKOCYTES UA  Negative   NITRITE UA  Negative   GLUCOSE UA mg/dl Negative   KETONES UA mg/dl Negative   BILIRUBIN UA  Negative   BLOOD UA  Negative     Imaging Studies: I have personally reviewed pertinent films in PACS  EKG, Pathology, and Other Studies: I have personally reviewed pertinent reports.    VTE Prophylaxis: Heparin    Code Status: Level 1 - Full Code

## 2024-01-29 PROBLEM — G92.9 TOXIC ENCEPHALOPATHY: Status: ACTIVE | Noted: 2020-01-17

## 2024-01-29 LAB
ALBUMIN SERPL BCP-MCNC: 3.4 G/DL (ref 3.5–5)
ALP SERPL-CCNC: 189 U/L (ref 34–104)
ALT SERPL W P-5'-P-CCNC: 14 U/L (ref 7–52)
AMMONIA PLAS-SCNC: 70 UMOL/L (ref 18–72)
ANION GAP SERPL CALCULATED.3IONS-SCNC: 10 MMOL/L
AST SERPL W P-5'-P-CCNC: 18 U/L (ref 13–39)
ATRIAL RATE: 80 BPM
ATRIAL RATE: 88 BPM
BASOPHILS # BLD AUTO: 0.01 THOUSANDS/ÂΜL (ref 0–0.1)
BASOPHILS NFR BLD AUTO: 0 % (ref 0–1)
BILIRUB SERPL-MCNC: 0.75 MG/DL (ref 0.2–1)
BUN SERPL-MCNC: 37 MG/DL (ref 5–25)
CALCIUM ALBUM COR SERPL-MCNC: 9.2 MG/DL (ref 8.3–10.1)
CALCIUM SERPL-MCNC: 8.7 MG/DL (ref 8.4–10.2)
CHLORIDE SERPL-SCNC: 108 MMOL/L (ref 96–108)
CO2 SERPL-SCNC: 25 MMOL/L (ref 21–32)
CREAT SERPL-MCNC: 1.82 MG/DL (ref 0.6–1.3)
EOSINOPHIL # BLD AUTO: 0.36 THOUSAND/ÂΜL (ref 0–0.61)
EOSINOPHIL NFR BLD AUTO: 4 % (ref 0–6)
ERYTHROCYTE [DISTWIDTH] IN BLOOD BY AUTOMATED COUNT: 15.2 % (ref 11.6–15.1)
GFR SERPL CREATININE-BSD FRML MDRD: 40 ML/MIN/1.73SQ M
GLUCOSE SERPL-MCNC: 105 MG/DL (ref 65–140)
HCT VFR BLD AUTO: 37.4 % (ref 36.5–49.3)
HGB BLD-MCNC: 12.1 G/DL (ref 12–17)
IMM GRANULOCYTES # BLD AUTO: 0.03 THOUSAND/UL (ref 0–0.2)
IMM GRANULOCYTES NFR BLD AUTO: 0 % (ref 0–2)
LYMPHOCYTES # BLD AUTO: 1.19 THOUSANDS/ÂΜL (ref 0.6–4.47)
LYMPHOCYTES NFR BLD AUTO: 13 % (ref 14–44)
MAGNESIUM SERPL-MCNC: 1.9 MG/DL (ref 1.9–2.7)
MCH RBC QN AUTO: 32.6 PG (ref 26.8–34.3)
MCHC RBC AUTO-ENTMCNC: 32.4 G/DL (ref 31.4–37.4)
MCV RBC AUTO: 101 FL (ref 82–98)
MONOCYTES # BLD AUTO: 0.79 THOUSAND/ÂΜL (ref 0.17–1.22)
MONOCYTES NFR BLD AUTO: 9 % (ref 4–12)
MRSA NOSE QL CULT: ABNORMAL
MRSA NOSE QL CULT: ABNORMAL
NEUTROPHILS # BLD AUTO: 6.66 THOUSANDS/ÂΜL (ref 1.85–7.62)
NEUTS SEG NFR BLD AUTO: 74 % (ref 43–75)
NRBC BLD AUTO-RTO: 0 /100 WBCS
P AXIS: 62 DEGREES
P AXIS: 68 DEGREES
PLATELET # BLD AUTO: 142 THOUSANDS/UL (ref 149–390)
PMV BLD AUTO: 11.3 FL (ref 8.9–12.7)
POTASSIUM SERPL-SCNC: 3.5 MMOL/L (ref 3.5–5.3)
PR INTERVAL: 168 MS
PR INTERVAL: 170 MS
PROT SERPL-MCNC: 8.8 G/DL (ref 6.4–8.4)
QRS AXIS: 68 DEGREES
QRS AXIS: 70 DEGREES
QRSD INTERVAL: 88 MS
QRSD INTERVAL: 94 MS
QT INTERVAL: 392 MS
QT INTERVAL: 400 MS
QTC INTERVAL: 461 MS
QTC INTERVAL: 474 MS
RBC # BLD AUTO: 3.71 MILLION/UL (ref 3.88–5.62)
SODIUM SERPL-SCNC: 143 MMOL/L (ref 135–147)
T WAVE AXIS: 196 DEGREES
T WAVE AXIS: 220 DEGREES
VENTRICULAR RATE: 80 BPM
VENTRICULAR RATE: 88 BPM
WBC # BLD AUTO: 9.04 THOUSAND/UL (ref 4.31–10.16)

## 2024-01-29 PROCEDURE — 82140 ASSAY OF AMMONIA: CPT

## 2024-01-29 PROCEDURE — 92526 ORAL FUNCTION THERAPY: CPT

## 2024-01-29 PROCEDURE — 83735 ASSAY OF MAGNESIUM: CPT

## 2024-01-29 PROCEDURE — 85025 COMPLETE CBC W/AUTO DIFF WBC: CPT

## 2024-01-29 PROCEDURE — 93010 ELECTROCARDIOGRAM REPORT: CPT | Performed by: INTERNAL MEDICINE

## 2024-01-29 PROCEDURE — 99232 SBSQ HOSP IP/OBS MODERATE 35: CPT

## 2024-01-29 PROCEDURE — 80053 COMPREHEN METABOLIC PANEL: CPT

## 2024-01-29 PROCEDURE — 97163 PT EVAL HIGH COMPLEX 45 MIN: CPT

## 2024-01-29 PROCEDURE — 97167 OT EVAL HIGH COMPLEX 60 MIN: CPT

## 2024-01-29 RX ORDER — LACTULOSE 10 G/15ML
20 SOLUTION ORAL DAILY
Status: DISCONTINUED | OUTPATIENT
Start: 2024-01-30 | End: 2024-01-30 | Stop reason: HOSPADM

## 2024-01-29 RX ADMIN — ASPIRIN 81 MG CHEWABLE TABLET 81 MG: 81 TABLET CHEWABLE at 09:25

## 2024-01-29 RX ADMIN — LEVOCARNITINE 330 MG: 1 SOLUTION ORAL at 07:42

## 2024-01-29 RX ADMIN — LEVOCARNITINE 330 MG: 1 SOLUTION ORAL at 12:11

## 2024-01-29 RX ADMIN — HEPARIN SODIUM 5000 UNITS: 5000 INJECTION, SOLUTION INTRAVENOUS; SUBCUTANEOUS at 13:55

## 2024-01-29 RX ADMIN — LAMOTRIGINE 200 MG: 100 TABLET ORAL at 09:25

## 2024-01-29 RX ADMIN — LACTULOSE 20 G: 20 SOLUTION ORAL at 09:24

## 2024-01-29 RX ADMIN — LEVOCARNITINE 330 MG: 1 SOLUTION ORAL at 21:16

## 2024-01-29 RX ADMIN — LEVOCARNITINE 330 MG: 1 SOLUTION ORAL at 16:45

## 2024-01-29 RX ADMIN — HEPARIN SODIUM 5000 UNITS: 5000 INJECTION, SOLUTION INTRAVENOUS; SUBCUTANEOUS at 05:33

## 2024-01-29 RX ADMIN — SPIRONOLACTONE 100 MG: 100 TABLET, FILM COATED ORAL at 09:25

## 2024-01-29 RX ADMIN — METOPROLOL TARTRATE 25 MG: 25 TABLET, FILM COATED ORAL at 09:25

## 2024-01-29 RX ADMIN — CLONAZEPAM 0.5 MG: 0.5 TABLET ORAL at 21:11

## 2024-01-29 RX ADMIN — HEPARIN SODIUM 5000 UNITS: 5000 INJECTION, SOLUTION INTRAVENOUS; SUBCUTANEOUS at 21:10

## 2024-01-29 RX ADMIN — PRIMIDONE 150 MG: 50 TABLET ORAL at 21:11

## 2024-01-29 RX ADMIN — TORSEMIDE 40 MG: 20 TABLET ORAL at 09:25

## 2024-01-29 RX ADMIN — LAMOTRIGINE 300 MG: 100 TABLET ORAL at 21:11

## 2024-01-29 RX ADMIN — ZONISAMIDE 100 MG: 100 CAPSULE ORAL at 21:10

## 2024-01-29 RX ADMIN — METOPROLOL TARTRATE 25 MG: 25 TABLET, FILM COATED ORAL at 17:56

## 2024-01-29 RX ADMIN — LAMOTRIGINE 250 MG: 100 TABLET ORAL at 13:55

## 2024-01-29 RX ADMIN — PRIMIDONE 100 MG: 50 TABLET ORAL at 13:58

## 2024-01-29 RX ADMIN — PRIMIDONE 150 MG: 50 TABLET ORAL at 09:25

## 2024-01-29 NOTE — PLAN OF CARE
Problem: OCCUPATIONAL THERAPY ADULT  Goal: Performs self-care activities at highest level of function for planned discharge setting.  See evaluation for individualized goals.  Description: Treatment Interventions: ADL retraining, Visual perceptual retraining, Functional transfer training, UE strengthening/ROM, Endurance training, Patient/family training, Cognitive reorientation, Equipment evaluation/education, Neuromuscular reeducation, Fine motor coordination activities, UE splinting, Compensatory technique education, Continued evaluation, Cardiac education, Energy conservation, Activityengagement          See flowsheet documentation for full assessment, interventions and recommendations.   Note: Limitation: Decreased ADL status, Decreased Safe judgement during ADL, Decreased cognition, Decreased endurance, Decreased high-level ADLs, Decreased self-care trans  Prognosis: Good  Assessment: Pt is a 58 y.o. male, admitted to [facility] 1/27/2024 d/t experiencing altered mental status. Dx: acute encephalopathy. Pt with PMHx impacting their performance during ADL tasks, including: hypertension, developmental delay, seizure disorder, dysphagia, chronic heart failure, liver cirrhosis. Prior to admission to the hospital Pt was performing ADLs with physical assistance. IADLs with physical assistance. Functional transfers/ambulation with physical assistance. Cognitive status was PTA was Impaired. OT order placed to assess Pt's ADLs, cognitive status, and performance during functional tasks in order to maximize safety and independence while making most appropriate d/c recommendations. PT/OT co-evaluation completed at this time d/t significant mobility deficits and safety concerns. Pt's clinical presentation is currently unstable/unpredictable given new onset deficits that effect Pt's occupational performance and ability to safely return to PLOF including decrease activity tolerance, decrease standing balance, decrease sitting  balance, decrease performance during ADL tasks, decrease cognition, decrease safety awareness , decrease generalized strength, decrease activity engagement, and decrease performance during functional transfers combined with medical complications of abnormal renal lab values, abnormal CBC, and need for input for mobility technique/safety. Personal factors affecting Pt at time of initial evaluation include: step(s) to enter environment, multi-level environment, inability to ambulate household distances, inability to navigate community distances, limited insight into impairments, decreased initiation and engagement, and recent fall(s)/fall history. Pt will benefit from continued skilled OT services to address deficits as defined above and to maximize level independence/participation during ADLs and functional tasks to facilitate return toward PLOF and improved quality of life. From an occupational therapy standpoint, recommendation at time of d/c would be Level II: Moderate Resource Intensity Therapy.     Rehab Resource Intensity Level, OT: II (Moderate Resource Intensity)

## 2024-01-29 NOTE — PLAN OF CARE
Problem: PAIN - ADULT  Goal: Verbalizes/displays adequate comfort level or baseline comfort level  Description: Interventions:  - Encourage patient to monitor pain and request assistance  - Assess pain using appropriate pain scale  - Administer analgesics based on type and severity of pain and evaluate response  - Implement non-pharmacological measures as appropriate and evaluate response  - Consider cultural and social influences on pain and pain management  - Notify physician/advanced practitioner if interventions unsuccessful or patient reports new pain  Outcome: Progressing     Problem: INFECTION - ADULT  Goal: Absence or prevention of progression during hospitalization  Description: INTERVENTIONS:  - Assess and monitor for signs and symptoms of infection  - Monitor lab/diagnostic results  - Monitor all insertion sites, i.e. indwelling lines, tubes, and drains  - Monitor endotracheal if appropriate and nasal secretions for changes in amount and color  - Dow appropriate cooling/warming therapies per order  - Administer medications as ordered  - Instruct and encourage patient and family to use good hand hygiene technique  - Identify and instruct in appropriate isolation precautions for identified infection/condition  Outcome: Progressing  Goal: Absence of fever/infection during neutropenic period  Description: INTERVENTIONS:  - Monitor WBC    Outcome: Progressing     Problem: SAFETY ADULT  Goal: Patient will remain free of falls  Description: INTERVENTIONS:  - Educate patient/family on patient safety including physical limitations  - Instruct patient to call for assistance with activity   - Consult OT/PT to assist with strengthening/mobility   - Keep Call bell within reach  - Keep bed low and locked with side rails adjusted as appropriate  - Keep care items and personal belongings within reach  - Initiate and maintain comfort rounds  - Make Fall Risk Sign visible to staff  - Offer Toileting every 2 Hours,  in advance of need  - Initiate/Maintain bed chair alarm  - Obtain necessary fall risk management equipment:   - Apply yellow socks and bracelet for high fall risk patients  - Consider moving patient to room near nurses station  Outcome: Progressing  Goal: Maintain or return to baseline ADL function  Description: INTERVENTIONS:  -  Assess patient's ability to carry out ADLs; assess patient's baseline for ADL function and identify physical deficits which impact ability to perform ADLs (bathing, care of mouth/teeth, toileting, grooming, dressing, etc.)  - Assess/evaluate cause of self-care deficits   - Assess range of motion  - Assess patient's mobility; develop plan if impaired  - Assess patient's need for assistive devices and provide as appropriate  - Encourage maximum independence but intervene and supervise when necessary  - Involve family in performance of ADLs  - Assess for home care needs following discharge   - Consider OT consult to assist with ADL evaluation and planning for discharge  - Provide patient education as appropriate  Outcome: Progressing  Goal: Maintains/Returns to pre admission functional level  Description: INTERVENTIONS:  - Perform AM-PAC 6 Click Basic Mobility/ Daily Activity assessment daily.  - Set and communicate daily mobility goal to care team and patient/family/caregiver.   - Collaborate with rehabilitation services on mobility goals if consulted  - Perform Range of Motion 3 times a day.  - Reposition patient every 2 hours.  - Dangle patient 3 times a day  - Stand patient 3 times a day  - Ambulate patient 3 times a day  - Out of bed to chair 3 times a day   - Out of bed for meals 3 times a day  - Out of bed for toileting  - Record patient progress and toleration of activity level   Outcome: Progressing     Problem: DISCHARGE PLANNING  Goal: Discharge to home or other facility with appropriate resources  Description: INTERVENTIONS:  - Identify barriers to discharge w/patient and  caregiver  - Arrange for needed discharge resources and transportation as appropriate  - Identify discharge learning needs (meds, wound care, etc.)  - Arrange for interpretive services to assist at discharge as needed  - Refer to Case Management Department for coordinating discharge planning if the patient needs post-hospital services based on physician/advanced practitioner order or complex needs related to functional status, cognitive ability, or social support system  Outcome: Progressing     Problem: Knowledge Deficit  Goal: Patient/family/caregiver demonstrates understanding of disease process, treatment plan, medications, and discharge instructions  Description: Complete learning assessment and assess knowledge base.  Interventions:  - Provide teaching at level of understanding  - Provide teaching via preferred learning methods  Outcome: Progressing     Problem: NEUROSENSORY - ADULT  Goal: Achieves stable or improved neurological status  Description: INTERVENTIONS  - Monitor and report changes in neurological status seizure activity, lethargy  - Monitor vital signs such as temperature, blood pressure, glucose, and any other labs ordered   - Initiate measures to prevent increased intracranial pressure  - Monitor for seizure activity and implement precautions if appropriate      Outcome: Progressing  Goal: Remains free of injury related to seizures activity  Description: INTERVENTIONS  - Maintain airway, patient safety  and administer oxygen as ordered  - Monitor patient for seizure activity, document and report duration and description of seizure to physician/advanced practitioner  - If seizure occurs,  ensure patient safety during seizure  - Reorient patient post seizure  - Seizure pads on all 4 side rails  - Instruct patient/family to notify RN of any seizure activity including if an aura is experienced  - Instruct patient/family to call for assistance with activity based on nursing assessment  - Administer  anti-seizure medications if ordered    Outcome: Progressing  Goal: Achieves maximal functionality and self care  Description: INTERVENTIONS  - Monitor swallowing and airway patency with patient fatigue and changes in neurological status  - Encourage and assist patient to increase activity and self care.   - Encourage visually impaired, hearing impaired and aphasic patients to use assistive/communication devices  Outcome: Progressing     Problem: CARDIOVASCULAR - ADULT  Goal: Maintains optimal cardiac output and hemodynamic stability  Description: INTERVENTIONS:  - Monitor I/O, vital signs and rhythm  - Monitor for S/S and trends of decreased cardiac output  - Administer and titrate ordered vasoactive medications to optimize hemodynamic stability  - Assess quality of pulses, skin color and temperature  - Assess for signs of decreased coronary artery perfusion  - Instruct patient to report change in severity of symptoms  Outcome: Progressing  Goal: Absence of cardiac dysrhythmias or at baseline rhythm  Description: INTERVENTIONS:  - Continuous cardiac monitoring, vital signs, obtain 12 lead EKG if ordered  - Administer antiarrhythmic and heart rate control medications as ordered  - Monitor electrolytes and administer replacement therapy as ordered  Outcome: Progressing     Problem: GENITOURINARY - ADULT  Goal: Maintains or returns to baseline urinary function  Description: INTERVENTIONS:  - Assess urinary function  - Encourage oral fluids to ensure adequate hydration if ordered  - Administer IV fluids as ordered to ensure adequate hydration  - Administer ordered medications as needed  - Offer frequent toileting  - Follow urinary retention protocol if ordered  Outcome: Progressing  Goal: Absence of urinary retention  Description: INTERVENTIONS:  - Assess patient’s ability to void and empty bladder  - Monitor I/O  - Bladder scan as needed  - Discuss with physician/AP medications to alleviate retention as needed  -  Discuss catheterization for long term situations as appropriate  Outcome: Progressing  Goal: Urinary catheter remains patent  Description: INTERVENTIONS:  - Assess patency of urinary catheter  - If patient has a chronic sr, consider changing catheter if non-functioning  - Follow guidelines for intermittent irrigation of non-functioning urinary catheter  Outcome: Progressing     Problem: Prexisting or High Potential for Compromised Skin Integrity  Goal: Skin integrity is maintained or improved  Description: INTERVENTIONS:  - Identify patients at risk for skin breakdown  - Assess and monitor skin integrity  - Assess and monitor nutrition and hydration status  - Monitor labs   - Assess for incontinence   - Turn and reposition patient  - Assist with mobility/ambulation  - Relieve pressure over bony prominences  - Avoid friction and shearing  - Provide appropriate hygiene as needed including keeping skin clean and dry  - Evaluate need for skin moisturizer/barrier cream  - Collaborate with interdisciplinary team   - Patient/family teaching  - Consider wound care consult   Outcome: Progressing

## 2024-01-29 NOTE — SPEECH THERAPY NOTE
Speech Language/Pathology    Speech/Language Pathology Progress Note    Patient Name: Ash Loaiza  Today's Date: 1/29/2024       Assessment:  Pt seen for dysphagia therapy w/ breakfast meal. Currently on puree diet w/ thin liquids, baseline is level 2 [facility] soft/ thins. Set up assistance provided, some difficulty self feeding d/t coordination however this is baseline from previous admission.  Decreased oral manipulation and prolonged AP movement. Swallow appears delayed but no overt s/s aspiration. Upgrade trials deferred at this time secondary to lethargy.     Plan/Recommendations:  Recommend to continue puree and thin liquids  Meds crushed in puree    Lauren Mota MS CCC-SLP  1/29/2024

## 2024-01-29 NOTE — PLAN OF CARE
Problem: GENITOURINARY - ADULT  Goal: Maintains or returns to baseline urinary function  Description: INTERVENTIONS:  - Assess urinary function  - Encourage oral fluids to ensure adequate hydration if ordered  - Administer IV fluids as ordered to ensure adequate hydration  - Administer ordered medications as needed  - Offer frequent toileting  - Follow urinary retention protocol if ordered  Outcome: Progressing  Goal: Absence of urinary retention  Description: INTERVENTIONS:  - Assess patient’s ability to void and empty bladder  - Monitor I/O  - Bladder scan as needed  - Discuss with physician/AP medications to alleviate retention as needed  - Discuss catheterization for long term situations as appropriate  Outcome: Progressing  Goal: Urinary catheter remains patent  Description: INTERVENTIONS:  - Assess patency of urinary catheter  - If patient has a chronic sr, consider changing catheter if non-functioning  - Follow guidelines for intermittent irrigation of non-functioning urinary catheter  Outcome: Progressing     Problem: NEUROSENSORY - ADULT  Goal: Achieves stable or improved neurological status  Description: INTERVENTIONS  - Monitor and report changes in neurological status seizure activity, lethargy  - Monitor vital signs such as temperature, blood pressure, glucose, and any other labs ordered   - Initiate measures to prevent increased intracranial pressure  - Monitor for seizure activity and implement precautions if appropriate      Outcome: Progressing  Goal: Remains free of injury related to seizures activity  Description: INTERVENTIONS  - Maintain airway, patient safety  and administer oxygen as ordered  - Monitor patient for seizure activity, document and report duration and description of seizure to physician/advanced practitioner  - If seizure occurs,  ensure patient safety during seizure  - Reorient patient post seizure  - Seizure pads on all 4 side rails  - Instruct patient/family to notify RN  of any seizure activity including if an aura is experienced  - Instruct patient/family to call for assistance with activity based on nursing assessment  - Administer anti-seizure medications if ordered    Outcome: Progressing  Goal: Achieves maximal functionality and self care  Description: INTERVENTIONS  - Monitor swallowing and airway patency with patient fatigue and changes in neurological status  - Encourage and assist patient to increase activity and self care.   - Encourage visually impaired, hearing impaired and aphasic patients to use assistive/communication devices  Outcome: Progressing

## 2024-01-29 NOTE — PHYSICAL THERAPY NOTE
PHYSICAL THERAPY EVALUATION  NAME:  Ash Loaiza  DATE: 01/29/24    AGE:   58 y.o.  Mrn:   [id number]  ADMIT DX:  Urinary retention [R33.9]  CHF (congestive heart failure) (HCC) [I50.9]  Seizure (HCC) [R56.9]  Elevated troponin [R79.89]  Nonintractable generalized idiopathic epilepsy without status epilepticus (HCC) [G40.309]    Past Medical History:   Diagnosis Date    Hypertension     Mentally challenged     Pericardial effusion     Pneumonia     Seizure (HCC)      Length Of Stay: 2  Performed at least 2 patient identifiers during session: Name and Birthday  PHYSICAL THERAPY EVALUATION :    01/29/24 0954   PT Last Visit   PT Visit Date 01/29/24   Note Type   Note type Evaluation   Pain Assessment   Pain Assessment Tool FLACC   Pain Rating: FLACC (Rest) - Face 0   Pain Rating: FLACC (Rest) - Legs 0   Pain Rating: FLACC (Rest) - Activity 0   Pain Rating: FLACC (Rest) - Cry 0   Pain Rating: FLACC (Rest) - Consolability 0   Score: FLACC (Rest) 0   Pain Rating: FLACC (Activity) - Face 1   Pain Rating: FLACC (Activity) - Legs 0   Pain Rating: FLACC (Activity) - Activity 0   Pain Rating: FLACC (Activity) - Cry 1   Pain Rating: FLACC (Activity) - Consolability 0   Score: FLACC (Activity) 2   Restrictions/Precautions   Other Precautions Cognitive;Chair Alarm;Bed Alarm;Fall Risk;Seizure   Home Living   Type of Home House  (3 [place])   Home Layout Two level;Bed/bath upstairs   Bathroom Equipment Commode   Home Equipment Walker;Wheelchair-manual   Additional Comments Pt poor historian. Per EMR, lives in a 2Sh with his mother who is his caretaker and mainly completes spt to and from wheelchair at baseline with assistance from his mother. Pt needs to complete stairs to 2nd floor bedroom.   Prior Function   Level of [place] Needs assistance with ADLs;Needs assistance with functional mobility;Needs assistance with IADLS   Lives With Family   Receives Help From Family   IADLs Family/Friend/Other provides  "transportation;Family/Friend/Other provides meals;Family/Friend/Other provides medication management   Comments Requires assistance with all mobility, ADLs and IADLs. Per CM, patient was ambulating 50' at [facility] and completing 1 step.   General   Additional Pertinent History increased bilateral LE edema. Pt admitted 11/17/23 to 12/8/23. discharged to post acute rehab.   Cognition   Orientation Level Oriented to person;Oriented to place;Oriented to time;Disoriented to situation   Following Commands Follows one step commands with increased time or repetition   Subjective   Subjective \"Willy\" (in referrence to movie, Karate Kid)   RLE Assessment   RLE Assessment   (ankle DF WFL, knee ext minimal, hip flexion < 90 degrees, 2-/5 at hip and knee. right knee PROM ~-15 degrees. inc c/o pain)   LLE Assessment   LLE Assessment   (AROM WFL strength 3-/5)   Bed Mobility   Supine to Sit 2  Maximal assistance   Additional items Assist x 2;Increased time required;Verbal cues;LE management  (trunk management)   Additional Comments HOB elevated > 30 degrees. required increased time and effort to complete with maxAx2 for trunk and LE management.   Transfers   Sit to Stand 3  Moderate assistance   Additional items Assist x 2;Increased time required;Verbal cues   Stand to Sit 3  Moderate assistance   Additional items Assist x 1;Increased time required;Verbal cues   Stand pivot 3  Moderate assistance   Additional items Assist x 1;Increased time required;Verbal cues   Additional Comments use of RW. min cues for hand placement. required modAx2 to achieve standing with bed slightly elevated. spt with RW with modAx1 with manual cues for wt shifting and RW management and verbal cues for turnin completely prior to sitting.   Ambulation/Elevation   Gait pattern Wide ADRIEL;Improper Weight shift;Decreased foot clearance;Short stride;Excessively slow;Knees flexed   Gait Assistance 3  Moderate assist   Additional items Assist x 1;Verbal cues " "  Assistive Device Rolling walker   Distance ambulated 3'x1 with RW with modAx1 with chair follow and manual cues for wt shifting and verbal cues for inc step length and foot clearance.   Balance   Static Sitting Fair   Dynamic Sitting Fair -   Static Standing Poor +   Dynamic Standing Poor   Ambulatory Poor   Activity Tolerance   Activity Tolerance Patient limited by fatigue   Medical Staff Made Aware Sherita SOLOMON   Nurse Made Aware Nohemy CAMEJO   Assessment   Prognosis Good   Problem List Decreased strength;Decreased endurance;Impaired balance;Decreased mobility;Decreased range of motion;Decreased cognition;Impaired judgement;Decreased safety awareness;Obesity;Decreased skin integrity   Barriers to Discharge Decreased caregiver support;Inaccessible home environment   Barriers to Discharge Comments requires increased assistance to complete mobility.   Goals   Patient Goals \"Go home today\"   STG Expiration Date 02/12/24   PT Treatment Day 0   Plan   Treatment/Interventions ADL retraining;Functional transfer training;LE strengthening/ROM;Elevations;Therapeutic exercise;Endurance training;Patient/family training;Equipment eval/education;Bed mobility;Gait training;Compensatory technique education;Spoke to nursing;Spoke to case management;OT   PT Frequency 3-5x/wk   Discharge Recommendation   Rehab Resource Intensity Level, PT II (Moderate Resource Intensity)   AM-PAC Basic Mobility Inpatient   Turning in Flat Bed Without Bedrails 1   Lying on Back to Sitting on Edge of Flat Bed Without Bedrails 1   Moving Bed to Chair 2   Standing Up From Chair Using Arms 1   Walk in Room 2   Climb 3-5 Stairs With Railing 1   Basic Mobility Inpatient Raw Score 8   Turning Head Towards Sound 4   Follow Simple Instructions 3   Low Function Basic Mobility Raw Score  15   Low Function Basic Mobility Standardized Score  23.9   Highest Level Of Mobility   JH-HLM Goal 3: Sit at edge of bed   JH-HLM Achieved 4: Move to chair/commode   End of " Consult   Patient Position at End of Consult Bedside chair;Bed/Chair alarm activated;All needs within reach       Pt requires PT/OT co-eval due to medical complexity, safety concerns, fall risk, significant assistance with mobility and/or cognitive-behavioral impairments.    (Please find full objective findings from PT assessment regarding body systems outlined above).     Assessment: Pt is a 58 y.o. male seen for PT evaluation s/p admission [facility]/27/2024 with Acute encephalopathy.  Order placed for PT services.  Upon evaluation: Pt is presenting with impaired functional mobility due to pain, decreased strength, decreased ROM, decreased endurance, impaired balance, gait deviations, impaired cognition, decreased safety awareness, impaired judgment, fall risk, LE edema, and impaired skin integrity requiring  maximal of 2 assistance for bed mobility, moderate of 2 to moderate of 1 assistance for transfers, and moderate of 1 assistance for ambulation with RW . Pt's clinical presentation is currently unpredictable given the functional mobility deficits above, especially weakness, decreased ROM, edema of extremities, decreased skin integrity, decreased endurance, impaired balance, gait deviations, decreased activity tolerance, decreased functional mobility tolerance, decreased safety awareness, impaired judgement, and decreased cognition, coupled with fall risks as indicated by AM-PAC 6-Clicks: 8/24 as well as impaired balance, polypharmacy, impaired judgement, decreased safety awareness, and obesity and combined with medical complications of abnormal renal lab values, multiple readmissions, need for input for mobility technique/safety, and abnormal BNP, ammonia, acute encephalopathy, elevated troponin due to CHF and CKD, urinary retention requiring sr catheter .  Pt's PMHx and comorbidities that may affect physical performance and progress include: HTN and epilepsy, developmental delay,  dysphagia, liver cirrhosis, chronic respiratory failure with O2 at night, CHF, CKD, pericardial effusion . Personal factors affecting pt at time of IE include: inaccessible home environment, multi-level environment, limited home support, inability to perform IADLs, inability to perform ADLs, inability to navigate level surfaces without external assistance, and inability to ambulate household distances. Pt will benefit from continued skilled PT services to address deficits as defined above and to maximize level of functional mobility to facilitate return toward PLOF and improved QOL. From PT/mobility standpoint, recommendation at time of d/c would be Level II (Moderate Resource Intensity in order to reduce fall risk and maximize pt's functional independence and consistency with mobility. Recommend trial with walker next 1-2 sessions and ther ex next 1-2 sessions.       The patient's AM-PAC Basic Mobility Inpatient Short Form Raw Score is 8. A Raw score of less than or equal to 16 suggests the patient may benefit from discharge to post-acute rehabilitation services. Please also refer to the recommendation of the Physical Therapist for safe discharge planning.       Goals: Pt will: Perform bed mobility tasks with consistent modA of 1 to reposition in bed and prepare for transfers. Pt will perform transfers with consistent min A of 1 to decrease burden of care, decrease risk for falls, and improve activity tolerance and prepare for ambulation. Pt will ambulate with RW for >/= 50' with  steadying assistance  to decrease burden of care, decrease risk for falls, improve activity tolerance, and improve gait quality and to access home environment. Pt will complete 1 step with LRAD and >/= 4 steps with bilateral handrails with consistent modA of 1 to decrease burden of care, decrease risk for falls, improve activity tolerance, and improve gait quality. Pt will participate in objective balance assessment to determine baseline  fall risk. Pt will participate in SSWS assessment to determine level of mobility. Pt will increase B LE strength >/= 1/2 MMT grade to facilitate functional mobility.      Julienne Rooney, PT,DPT

## 2024-01-29 NOTE — ASSESSMENT & PLAN NOTE
Patient had over 900 cc of urine, Azul catheter placed by emergency room.  Continue Azul, possible voiding trial and mentation/ambulation improved - 1 week as OP

## 2024-01-29 NOTE — PROGRESS NOTES
Patient:    MRN:  [id number]    Ramandeep Request ID:  [id number]    Level of care reserved:  Home Health Agency    Partner Reserved:  [facility] [place] [address] [phone]    Clinical needs requested:    Geography searched:  [phone]    Start of Service:    Request sent:  1:59pm EST on 1/29/2024 by Delia Rodriguez    Partner reserved:  2:29pm EST on 1/29/2024 by Delia Rodriguez    Choice list shared:  2:29pm EST on 1/29/2024 by Delia Rodriguez

## 2024-01-29 NOTE — ASSESSMENT & PLAN NOTE
He usually is on torsemide and Aldactone.    Chest x-ray is improved  Weight is improved from previous  Continue home regimen -torsemide and Aldactone      Wt Readings from Last 3 Encounters:   01/29/24 80.7 kg (177 lb 14.6 oz)   12/08/23 106 kg (234 lb 9.1 oz)   08/11/21 103 kg (226 lb 10.1 oz)

## 2024-01-29 NOTE — CASE MANAGEMENT
Case Management Discharge Planning Note    Patient name Ash Loaiza  Location /-01 MRN [id number]  : 1965 Date 2024       Current Admission Date: 2024  Current Admission Diagnosis:Acute encephalopathy   Patient Active Problem List    Diagnosis Date Noted    Urinary retention 2024    Elevated troponin 2024    Stage 3b chronic kidney disease (HCC) 2024    Acute blood loss anemia 2023    Chronic respiratory failure with hypercapnia (HCC) 2023    Acute kidney injury superimposed on chronic kidney disease 3 2023    Chronic diastolic HF (heart failure) (HCC) 2021    Liver cirrhosis (HCC) 2021    Abnormal finding on CT scan 2021    EDWIGE (acute kidney injury) ([facility]) 2021    Pleural effusion on right 2021    History of COVID-19 2021    S/P pericardial window creation 2021    Acute on chronic heart failure with preserved ejection fraction (HCC) 2021    Lower extremity pain, left 2021    MRSA nasal colonization 2020    Developmental delay 2020    Dysphagia 2020    Acute encephalopathy 2020    Pneumonia due to infectious organism 01/10/2020    Nonintractable generalized idiopathic epilepsy without status epilepticus (HCC) 01/10/2020    Falls 01/10/2020    T wave inversion in EKG 01/10/2020    Essential hypertension 01/10/2020    RSV (acute bronchiolitis due to respiratory syncytial virus) 01/10/2020    Acute respiratory failure with hypoxia (HCC) 01/10/2020    Polyp of colon 03/15/2019      LOS (days): 2  Geometric Mean LOS (GMLOS) (days): 2.7  Days to GMLOS:1     OBJECTIVE:  Risk of Unplanned Readmission Score: 18.39         Current admission status: Inpatient   Preferred Pharmacy:   [facility] [address] [place] [address] - [address] [place] [address]  [address] [facility] [address]  Phone: [phone] Fax: [phone]    Primary Care Provider: Jose G Holloway  DO    Primary Insurance: MEDICARE  Secondary Insurance: [employer]    DISCHARGE DETAILS:                                Requested Home Health Care         Is the patient interested in HHC at discharge?: Yes  Home Health Discipline requested:: Nursing, Occupational Therapy, Physical Therapy  Home Health Agency Name:: [facility]  HHA External Referral Reason (only applicable if external HHA name selected): Services not provided in network or near patient location  Home Health Follow-Up Provider:: PCP  Home Health Services Needed:: Evaluate Functional Status and Safety, Gait/ADL Training, Strengthening/Theraputic Exercises to Improve Function, Other (comment) (Medication Management)  Homebound Criteria Met:: Requires the Assistance of Another Person for Safe Ambulation or to Leave the Home, Uses an Assist Device (i.e. cane, walker, etc)  Supporting Clincal Findings:: Limited Endurance, Fatigues Easliy in Short Distances    DME Referral Provided  Referral made for DME?: No    Other Referral/Resources/Interventions Provided:  Interventions: HHC  Referral Comments: [facility]         Treatment Team Recommendation: Home with Home Health Care  Discharge Destination Plan:: Home with Home Health Care                     CM met with patient, mother and brother Kamari at bedside.  CM discussed with mother patients status at [facility]. Mother reported Medicare tried to discharge patient after one week as patient made no progress in therapy and family appealed.  Mother reported this happened a second time also.  Mother reported she had a discussion with patient about his need to progress with therapy if he wanted to be discharged from [facility]. Shortly thereafter patient did make progress with therapy and walked 50 feet and did 3 steps.  Mother reported her brother, however, feels patient needs to remain in a Nursing Home and mother is not fit to care for patient; mother wants patient home. Mother reported she  was going to discharge patient from [facility] prior to his admission to [facility].    CM discussed with mother therapy recommendation for rehab vs HHC. Mother reported she does not believe insurance will pay for anymore rehab for patient and mother and brother both reported they believe patient will be better taken care of at home rather than in rehab therefore mother and brother request patient return home. CM informed mother CM learned from [facility] patient referred to them for discharge planning from [facility] rather than [facility]. CM inquired if Mo wanted [facility] or [facility]. Mother reported she feels she better go with [facility] to protect herself if that was the recommendation from Dusty Hernandez as her brother is making it hard for patient and mother.    CM discussed with mother transport of patient home from [facility]. Mother reported patient has stairchair to get from first floor to second floor of home but patient would need to do 3 steps to get patient into home if she transported home or WC transported home.     CM made AIDIN referral to [facility] for patient.     CM submitted Roundtrip referral for  transport of patient home tomorrow.

## 2024-01-29 NOTE — OCCUPATIONAL THERAPY NOTE
Occupational Therapy Evaluation     Patient Name: Ash Loaiza  Today's Date: 1/29/2024  Problem List  Principal Problem:    Acute encephalopathy  Active Problems:    Nonintractable generalized idiopathic epilepsy without status epilepticus (HCC)    Essential hypertension    Developmental delay    Dysphagia    Liver cirrhosis (HCC)    Chronic respiratory failure with hypercapnia (HCC)    Urinary retention    Chronic diastolic HF (heart failure) (HCC)    Elevated troponin    Stage 3b chronic kidney disease (HCC)    Past Medical History  Past Medical History:   Diagnosis Date    Hypertension     Mentally challenged     Pericardial effusion     Pneumonia     Seizure (HCC)      Past Surgical History  Past Surgical History:   Procedure Laterality Date    FRACTURE SURGERY      clavicle, left humerus, radial, and ulna.  Right radius    HIP ARTHROSCOPY Right     IR THORACENTESIS  11/20/2023    MN COLONOSCOPY FLX DX W/COLLJ SPEC WHEN PFRMD N/A 4/1/2019    Procedure: COLONOSCOPY;  Surgeon: Avi Guzman MD;  Location:  GI LAB;  Service: Colorectal      01/29/24 0953   Note Type   Note type Evaluation   Pain Assessment   Pain Assessment Tool FLACC   Pain Rating: FLACC (Rest) - Face 0   Pain Rating: FLACC (Rest) - Legs 0   Pain Rating: FLACC (Rest) - Activity 0   Pain Rating: FLACC (Rest) - Cry 0   Pain Rating: FLACC (Rest) - Consolability 0   Score: FLACC (Rest) 0   Pain Rating: FLACC (Activity) - Face 0   Pain Rating: FLACC (Activity) - Legs 0   Pain Rating: FLACC (Activity) - Activity 0   Pain Rating: FLACC (Activity) - Cry 0   Pain Rating: FLACC (Activity) - Consolability 0   Score: FLACC (Activity) 0   Restrictions/Precautions   Other Precautions Cognitive;Chair Alarm;Bed Alarm;Fall Risk;Seizure   Home Living   Type of Home House  (3 [place])   Home Layout Two level;Bed/bath upstairs   Bathroom Equipment Commode   Home Equipment Walker;Wheelchair-manual   Additional Comments Pt poor historian with cognitive  deficits. Per EMR, pt lives in a 2SH with his mother who is his caretaker. Pt completes SPT mainly at baseline but does need to complete stairs to get to 2nd floor bedroom.   Prior Function   Level of Show Low Needs assistance with ADLs;Needs assistance with functional mobility;Needs assistance with IADLS   Lives With Family   Receives Help From Family   IADLs Family/Friend/Other provides transportation;Family/Friend/Other provides meals;Family/Friend/Other provides medication management   ADL   UB Dressing Assistance 2  Maximal Assistance   UB Dressing Deficit Setup;Verbal cueing;Increased time to complete;Thread RUE;Thread LUE;Pull over head;Pull around back;Pull down in back   LB Dressing Assistance 1  Total Assistance   LB Dressing Deficit Setup;Requires assistive device for steadying;Verbal cueing;Supervision/safety;Increased time to complete;Don/doff R sock;Don/doff L sock   Additional Comments UB ADLs @ Max A d/t decreased participation and BUE ROM. Pt able to wash face when given wipe with increased cues. LB ADLs @ Total A. Pt required assist to don socks while seated at EOB.   Bed Mobility   Supine to Sit 2  Maximal assistance   Additional items Assist x 2;Increased time required;Verbal cues;LE management  (trunk management)   Additional Comments Pt supine in bed at beginning of session. Supine to sit @ Max A x2.   Transfers   Sit to Stand 3  Moderate assistance   Additional items Assist x 2;Increased time required;Verbal cues   Stand to Sit 3  Moderate assistance   Additional items Assist x 1;Increased time required;Verbal cues   Stand pivot 3  Moderate assistance   Additional items Assist x 1;Increased time required;Verbal cues   Additional Comments STS from EOB to RW @ Mod A x2. Pt able to complete SPT and few feet mobility with RW @ Mod A and chair follow for safety. Pt seated OOB in chair at end of session with chair alarm intact, call bell within reach and all needs met.   Balance   Static Sitting  Fair   Dynamic Sitting Fair -   Static Standing Poor +   Dynamic Standing Poor   Activity Tolerance   Activity Tolerance Patient limited by fatigue   Medical Staff Made Aware Spoke with PT Julienne   Nurse Made Aware Spoke with RN Nohemy JENSEN Assessment   RUE Assessment WFL   LUE Assessment   LUE Assessment WFL   Hand Function   Gross Motor Coordination Functional   Fine Motor Coordination Functional   Hand Function Comments Difficult to assess formally d/t cognitive deficits but able to use throughout functionally without difficulty   Cognition   Overall Cognitive Status Impaired   Arousal/Participation Alert;Responsive;Cooperative   Attention Attends with cues to redirect   Orientation Level Oriented to person;Oriented to place;Oriented to time;Disoriented to situation   Following Commands Follows one step commands with increased time or repetition   Assessment   Limitation Decreased ADL status;Decreased Safe judgement during ADL;Decreased cognition;Decreased endurance;Decreased high-level ADLs;Decreased self-care trans   Prognosis Good   Assessment Pt is a 58 y.o. male, admitted to [facility] 1/27/2024 d/t experiencing altered mental status. Dx: acute encephalopathy. Pt with PMHx impacting their performance during ADL tasks, including: hypertension, developmental delay, seizure disorder, dysphagia, chronic heart failure, liver cirrhosis. Prior to admission to the hospital Pt was performing ADLs with physical assistance. IADLs with physical assistance. Functional transfers/ambulation with physical assistance. Cognitive status was PTA was Impaired. OT order placed to assess Pt's ADLs, cognitive status, and performance during functional tasks in order to maximize safety and independence while making most appropriate d/c recommendations. PT/OT co-evaluation completed at this time d/t significant mobility deficits and safety concerns. Pt's clinical presentation is currently unstable/unpredictable given new onset deficits that  effect Pt's occupational performance and ability to safely return to PLOF including decrease activity tolerance, decrease standing balance, decrease sitting balance, decrease performance during ADL tasks, decrease cognition, decrease safety awareness , decrease generalized strength, decrease activity engagement, and decrease performance during functional transfers combined with medical complications of abnormal renal lab values, abnormal CBC, and need for input for mobility technique/safety. Personal factors affecting Pt at time of initial evaluation include: step(s) to enter environment, multi-level environment, inability to ambulate household distances, inability to navigate community distances, limited insight into impairments, decreased initiation and engagement, and recent fall(s)/fall history. Pt will benefit from continued skilled OT services to address deficits as defined above and to maximize level independence/participation during ADLs and functional tasks to facilitate return toward PLOF and improved quality of life. From an occupational therapy standpoint, recommendation at time of d/c would be Level II: Moderate Resource Intensity Therapy.   Plan   Treatment Interventions ADL retraining;Visual perceptual retraining;Functional transfer training;UE strengthening/ROM;Endurance training;Patient/family training;Cognitive reorientation;Equipment evaluation/education;Neuromuscular reeducation;Fine motor coordination activities;UE splinting;Compensatory technique education;Continued evaluation;Cardiac education;Energy conservation;Activityengagement   Goal Expiration Date 02/12/24   OT Frequency 3-5x/wk   Discharge Recommendation   Rehab Resource Intensity Level, OT II (Moderate Resource Intensity)   AM-PAC Daily Activity Inpatient   Lower Body Dressing 1   Bathing 2   Toileting 1   Upper Body Dressing 2   Grooming 3   Eating 3   Daily Activity Raw Score 12   Daily Activity Standardized Score (Calc for Raw  Score >=11) 30.6   -PAC Applied Cognition Inpatient   Following a Speech/Presentation 2   Understanding Ordinary Conversation 2   Taking Medications 1   Remembering Where Things Are Placed or Put Away 2   Remembering List of 4-5 Errands 1   Taking Care of Complicated Tasks 1   Applied Cognition Raw Score 9   Applied Cognition Standardized Score 22.48     The patient's raw score on the AM-PAC Daily Activity Inpatient Short Form is 12. A raw score of less than 19 suggests the patient may benefit from discharge to post-acute rehabilitation services. Please refer to the recommendation of the Occupational Therapist for safe discharge planning.    Pt goals to be met by 2/12/2024    Pt will demonstrate ability to complete grooming/hygiene tasks @ Min assist after set-up.  Pt will demonstrate ability to complete supine<>sit @ Mod assist in order to increase safety and independence during ADL tasks.  Pt will demonstrate ability to complete UB ADLs including washing/dressing @ Min assist in order to increase performance and participation during meaningful tasks  Pt will demonstrate ability to complete LB dressing @ Max assist in order to increase safety and independence during meaningful tasks.   Pt will demonstrate ability to complete toileting tasks including CM and pericare @ max assist in order to increase safety and independence during meaningful tasks.  Pt will demonstrate ability to complete EOB, chair, toilet/commode transfers @ mod assist in order to increase performance and participation during functional tasks.  Pt will demonstrate ability to stand for 2 minutes while maintaining F+ balance with use of RW for UB support PRN.  Pt will demonstrate ability to tolerate 10 minute OT session with no vc'ing for deep breathing or use of energy conservation techniques in order to increase activity tolerance during functional tasks.   Pt will demonstrate Good carryover of use of energy conservation/compensatory strategies  during ADLs and functional tasks in order to increase safety and reduce risk for falls.   Pt will follow 100% simple 2-step commands and be at least A&O x3 consistently with environmental cues to increase participation in functional activities.   Pt will identify 3 areas of interest/hobbies and 1 intervention on how to incorporate into daily life in order to increase interaction with environment and peers as well as increase participation in meaningful tasks.   Pt will demonstrate 100% carryover of BUE HEP in order to increase BUE MS and increase performance during functional tasks upon d/c home.    Sherita Solorzano, OTR/L

## 2024-01-29 NOTE — ASSESSMENT & PLAN NOTE
Patient admitted with concerns for acute encephalopathy  Ammonia level checked this morning with elevation to 95  Typically on lactulose 20 g daily -patient's mother states he was not getting this at SNF every day    Lactulose twice daily improved ammonia levels to normal  Having multiple bowel movements, decrease lactulose back to daily

## 2024-01-29 NOTE — ASSESSMENT & PLAN NOTE
And talk to his mother at bedside who gives all the history the patient was at [facility] getting rehab.   After multiple admissions to the hospital, and since having COVID in the past his legs have been weaker.    He is now walking with a walker 50 feet and 1 of 3 steps per SNF reports    Mentation back to baseline  PT/OT continue to recommend rehab given patient's physical decline with encephalopathy  Reevaluate ambulation tomorrow for discharge planning

## 2024-01-29 NOTE — PROGRESS NOTES
[facility]  Progress Note  Name: Ash Loaiza I  MRN: [id number]  Unit/Bed#: -01 I Date of Admission: 1/27/2024   Date of Service: 1/29/2024 I Hospital Day: 2    Assessment/Plan   * Toxic encephalopathy  Assessment & Plan  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, minimally communicative but does communicate verbally at times  Differential included seizure versus hypercapnia, hepatic source, infectious source    No specific signs for infection, did have urinary retention and started on prophylactic antibiotics , discontinue early if infection ruled out   CT head negative for acute pathologies  ABG without signs for hypercapnia  Ammonia level elevated to 97 -lactulose that is typically dosed once daily increased to twice daily and monitor for improvement  Patient's mother states he had not been getting the lactulose every day at [facility]  Ammonia level now normal  Resumed patient's antiepileptics   As mother added history today that Friday overnight staff at [facility] stated he had episode of emesis post his seizure medications, possibly had a breakthrough seizure because of this with postictal period?  Neurology consult appreciated -recommending continued follow-up outpatient and continuing antiepileptics per previous regimen    Etiology pointing to possible breakthrough seizure due to emesis after his antiepileptics Friday night versus hepatic encephalopathy secondary to elevated ammonia levels   Patient significantly improved 1/28 evening was alert oriented x 3, conversive  Physically declined since previous SNF assessment -requiring a two-person assist      Elevated troponin  Assessment & Plan  Mild elevation in troponin which I think is secondary to his chronic heart failure and also CKD.  Troponins remain flat/decreased without specific EKG changes    Chronic diastolic HF (heart failure) (HCC)  Assessment & Plan  He usually is on  torsemide and Aldactone.    Chest x-ray is improved  Weight is improved from previous  Continue home regimen -torsemide and Aldactone      Wt Readings from Last 3 Encounters:   01/29/24 80.7 kg (177 lb 14.6 oz)   12/08/23 106 kg (234 lb 9.1 oz)   08/11/21 103 kg (226 lb 10.1 oz)               Urinary retention  Assessment & Plan  Patient had over 900 cc of urine, Azul catheter placed by emergency room.  Continue Azul, possible voiding trial and mentation/ambulation improved - 1 week as OP    Chronic respiratory failure with hypercapnia (HCC)  Assessment & Plan  Wears oxygen at nighttime.    Liver cirrhosis (HCC)  Assessment & Plan  Patient admitted with concerns for acute encephalopathy  Ammonia level checked this morning with elevation to 95  Typically on lactulose 20 g daily -patient's mother states he was not getting this at SNF every day    Lactulose twice daily improved ammonia levels to normal  Having multiple bowel movements, decrease lactulose back to daily      Dysphagia  Assessment & Plan  Previous admission was on mechanical soft diet    Speech therapy evaluated recommended purée and thin liquid diet for now until further mentation improvement    Developmental delay  Assessment & Plan  And talk to his mother at bedside who gives all the history the patient was at [facility] getting rehab.   After multiple admissions to the hospital, and since having COVID in the past his legs have been weaker.    He is now walking with a walker 50 feet and 1 of 3 steps per SNF reports    Mentation back to baseline  PT/OT continue to recommend rehab given patient's physical decline with encephalopathy  Reevaluate ambulation tomorrow for discharge planning        Essential hypertension  Assessment & Plan  Can continue home medications-Lopressor, torsemide and spironolactone               VTE Pharmacologic Prophylaxis:   Moderate Risk (Score 3-4) - Pharmacological DVT Prophylaxis Ordered: heparin.    Mobility:   Basic  Mobility Inpatient Raw Score: 8  -HLM Goal: 3: Sit at edge of bed  JH-HLM Achieved: 4: Move to chair/commode  HLM Goal achieved. Continue to encourage appropriate mobility.    Patient Centered Rounds: I performed bedside rounds with nursing staff today.   Discussions with Specialists or Other Care Team Provider: ETHAN    Education and Discussions with Family / Patient: Updated  (mother and brother) at bedside.    Total Time Spent on Date of Encounter in care of patient:  mins. This time was spent on one or more of the following: performing physical exam; counseling and coordination of care; obtaining or reviewing history; documenting in the medical record; reviewing/ordering tests, medications or procedures; communicating with other healthcare professionals and discussing with patient's family/caregivers.    Current Length of Stay: 2 day(s)  Current Patient Status: Inpatient   Certification Statement: The patient will continue to require additional inpatient hospital stay due to monitoring encephalopathy, ambulatory dysfunction  Discharge Plan: Anticipate discharge in 24-48 hrs to home.    Code Status: Level 1 - Full Code    Subjective:   Seen and examined.  Patient's mentation greatly improved from exam yesterday.  Asks me if he can get out of bed to go to chair, alert and oriented x 3.     Objective:     Vitals:   Temp (24hrs), Av.4 °F (36.3 °C), Min:97.3 °F (36.3 °C), Max:97.5 °F (36.4 °C)    Temp:  [97.3 °F (36.3 °C)-97.5 °F (36.4 °C)] 97.3 °F (36.3 °C)  HR:  [86-92] 92  Resp:  [18] 18  BP: (114-118)/(68-69) 114/68  SpO2:  [95 %-97 %] 95 %  Body mass index is 28.72 kg/m².     Input and Output Summary (last 24 hours):     Intake/Output Summary (Last 24 hours) at 2024 1801  Last data filed at 2024 1713  Gross per 24 hour   Intake 1750 ml   Output 300 ml   Net 1450 ml       Physical Exam:   Physical Exam  Vitals and nursing note reviewed.   Constitutional:       General: He is not in  acute distress.     Appearance: Normal appearance.   HENT:      Head: Normocephalic and atraumatic.      Nose: No congestion.      Mouth/Throat:      Mouth: Mucous membranes are moist.   Eyes:      Conjunctiva/sclera: Conjunctivae normal.   Cardiovascular:      Rate and Rhythm: Normal rate and regular rhythm.      Pulses: Normal pulses.      Heart sounds: Normal heart sounds. No murmur heard.  Pulmonary:      Effort: Pulmonary effort is normal. No respiratory distress.      Breath sounds: Normal breath sounds.   Abdominal:      General: Bowel sounds are normal.      Palpations: Abdomen is soft.      Tenderness: There is no abdominal tenderness.   Genitourinary:     Comments: Azul present  Musculoskeletal:         General: Normal range of motion.      Right lower leg: No edema.      Left lower leg: No edema.   Skin:     General: Skin is warm and dry.   Neurological:      Mental Status: He is alert and oriented to person, place, and time.      Comments: Slowed responses          Additional Data:     Labs:  Results from last 7 days   Lab Units 01/29/24  0500   WBC Thousand/uL 9.04   HEMOGLOBIN g/dL 12.1   HEMATOCRIT % 37.4   PLATELETS Thousands/uL 142*   NEUTROS PCT % 74   LYMPHS PCT % 13*   MONOS PCT % 9   EOS PCT % 4     Results from last 7 days   Lab Units 01/29/24  0500   SODIUM mmol/L 143   POTASSIUM mmol/L 3.5   CHLORIDE mmol/L 108   CO2 mmol/L 25   BUN mg/dL 37*   CREATININE mg/dL 1.82*   ANION GAP mmol/L 10   CALCIUM mg/dL 8.7   ALBUMIN g/dL 3.4*   TOTAL BILIRUBIN mg/dL 0.75   ALK PHOS U/L 189*   ALT U/L 14   AST U/L 18   GLUCOSE RANDOM mg/dL 105                       Lines/Drains:  Invasive Devices       Peripheral Intravenous Line  Duration             Peripheral IV 01/27/24 Left Antecubital 2 days              Drain  Duration             Urethral Catheter Latex 18 Fr. 1 day                  Urinary Catheter:  Goal for removal: N/A- Discharging with Azul               Imaging: No pertinent imaging  reviewed.    Recent Cultures (last 7 days):   Results from last 7 days   Lab Units 01/27/24  1631   BLOOD CULTURE  No Growth at 24 hrs.  No Growth at 24 hrs.       Last 24 Hours Medication List:   Current Facility-Administered Medications   Medication Dose Route Frequency Provider Last Rate    aspirin  81 mg Oral Daily April Harris PA-C      clonazePAM  0.5 mg Oral HS April Harris PA-C      heparin (porcine)  5,000 Units Subcutaneous Q8H [facility] Fede Mcintyre MD      [START ON 1/30/2024] lactulose  20 g Oral Daily April Harris PA-C      lamoTRIgine  200 mg Oral Daily April Harris PA-C      And    lamoTRIgine  250 mg Oral Daily April Harris PA-C      And    lamoTRIgine  300 mg Oral HS April Harris PA-C      levOCARNitine  330 mg Oral 4x Daily (with meals and at bedtime) April Harris PA-C      metoprolol tartrate  25 mg Oral BID April Harris PA-C      primidone  150 mg Oral Q12H [facility] April Harris PA-C      And    primidone  100 mg Oral Daily April Harris PA-C      spironolactone  100 mg Oral Daily April Harris PA-C      torsemide  40 mg Oral Daily April Harris PA-C      zonisamide  100 mg Oral HS April Harris PA-C          Today, Patient Was Seen By: April Harris PA-C    **Please Note: This note may have been constructed using a voice recognition system.**

## 2024-01-29 NOTE — CASE MANAGEMENT
Case Management Assessment & Discharge Planning Note    Patient name Ash Loaiza  Location /-01 MRN [id number]  : 1965 Date 2024       Current Admission Date: 2024  Current Admission Diagnosis:Acute encephalopathy   Patient Active Problem List    Diagnosis Date Noted    Urinary retention 2024    Elevated troponin 2024    Stage 3b chronic kidney disease (HCC) 2024    Acute blood loss anemia 2023    Chronic respiratory failure with hypercapnia (HCC) 2023    Acute kidney injury superimposed on chronic kidney disease 3 2023    Chronic diastolic HF (heart failure) (HCC) 2021    Liver cirrhosis ([facility]) 2021    Abnormal finding on CT scan 2021    EDWIGE (acute kidney injury) ([facility]) 2021    Pleural effusion on right 2021    History of COVID-19 2021    S/P pericardial window creation 2021    Acute on chronic heart failure with preserved ejection fraction (HCC) 2021    Lower extremity pain, left 2021    MRSA nasal colonization 2020    Developmental delay 2020    Dysphagia 2020    Acute encephalopathy 2020    Pneumonia due to infectious organism 01/10/2020    Nonintractable generalized idiopathic epilepsy without status epilepticus (HCC) 01/10/2020    Falls 01/10/2020    T wave inversion in EKG 01/10/2020    Essential hypertension 01/10/2020    RSV (acute bronchiolitis due to respiratory syncytial virus) 01/10/2020    Acute respiratory failure with hypoxia (HCC) 01/10/2020    Polyp of colon 03/15/2019      LOS (days): 2  Geometric Mean LOS (GMLOS) (days):   Days to GMLOS:     OBJECTIVE:    Risk of Unplanned Readmission Score: 18.35         Current admission status: Inpatient  Referral Reason:  (Post Acute Placement (specify)  Post Acute Home Needs (VNA/DME/Infusion))    Preferred Pharmacy:   [id number] - [place], [place] - [address]  [address]  [place]  [facility] [address]  Phone: [phone] Fax: [phone]    Primary Care Provider: Jose G Holloway DO    Primary Insurance: MEDICARE  Secondary Insurance: [employer]    ASSESSMENT:  Active Health Care Proxies       Kylie Loaiza Health Care Representative - Mother   Primary Phone: [phone] (Home)                 Advance Directives  Does patient have a Health Care POA?: Yes  Does patient have Advance Directives?: Yes  Advance Directives: Living will, Power of  for health care  Primary Contact: mother Mendez         Readmission Root Cause  30 Day Readmission: No    Patient Information  Admitted from:: Facility  Mental Status: Alert  During Assessment patient was accompanied by: Not accompanied during assessment  Assessment information provided by:: Parent  Primary Caregiver: Parent  Caregiver's Name:: mother Mendez  Caregiver's Relationship to Patient:: Family Member  Caregiver's Telephone Number:: [phone]  Support Systems: Parent, Family members  County [place] Residence: [place]  What [facility] do you live in?: [place]  Home entry access options. Select all that apply.: Stairs  Number of steps to enter home.: 3  Do the steps have railings?: Yes  Type of Current Residence: 2 story home  Upon entering residence, is there a bedroom on the main floor (no further steps)?: No  A bedroom is located on the following floor levels of residence (select all that apply):: 2nd Floor  Upon entering residence, is there a bathroom on the main floor (no further steps)?: No  Indicate which floors of current residence have a bathroom (select all the apply):: 2nd Floor  Number of steps to 2nd floor from main floor: One Flight  Living Arrangements: Lives w/ Parent(s)  Is patient a ?: No    Activities of Daily Living Prior to Admission  Functional Status: Assistance  Completes ADLs independently?: No  Level of ADL dependence: Assistance  Ambulates independently?: No  Level of ambulatory dependence:  Assistance  Does patient use assisted devices?: Yes  Assisted Devices (DME) used: Bedside Commode, Wheelchair, Walker, Home Oxygen concentrator, Portable Oxygen tanks  DME Company Name (respiratory supplies): [employer]  O2 Rate(s): 2L  Does patient currently own DME?: Yes  What DME does the patient currently own?: Walker, Wheelchair, Bedside Commode, Home Oxygen concentrator, Portable Oxygen tanks  Does patient have a history of Outpatient Therapy (PT/OT)?: Yes ([facility])  Does the patient have a history of Short-Term Rehab?: Yes ([facility], [facility], [facility])  Does patient have a history of HHC?: Yes (numerous providers)  Does patient currently have HHC?: No         Patient Information Continued  Income Source: SSI/SSD  Does patient have prescription coverage?: Yes  Does patient receive dialysis treatments?: No  Does patient have a history of substance abuse?: No  Does patient have a history of Mental Health Diagnosis?: No         Means of Transportation  Means of Transport to App:: Family transport      Housing Stability: Low Risk  (1/29/2024)    Housing Stability Vital Sign     Unable to Pay for Housing in the Last Year: No     Number of Places Lived in the Last Year: 2     Unstable Housing in the Last Year: No   Food Insecurity: No Food Insecurity (1/29/2024)    Hunger Vital Sign     Worried About Running Out of Food in the Last Year: Never true     Ran Out of Food in the Last Year: Never true   Transportation Needs: No Transportation Needs (1/29/2024)    PRAPARE - Transportation     Lack of Transportation (Medical): No     Lack of Transportation (Non-Medical): No   Utilities: Not At Risk (1/29/2024)    C Utilities     Threatened with loss of utilities: No       DISCHARGE DETAILS:    Discharge planning discussed with:: patient and motherKylie  Freedom of Choice: Yes  Comments - Freedom of Choice: Outpatient PT with [facility]  CM contacted family/caregiver?: Yes  Were Treatment Team  discharge recommendations reviewed with patient/caregiver?: Yes  Did patient/caregiver verbalize understanding of patient care needs?: Yes  Were patient/caregiver advised of the risks associated with not following Treatment Team discharge recommendations?: Yes    Contacts  Patient Contacts: mother Espinal  Relationship to Patient:: Family  Contact Method: Phone  Phone Number: [phone] (H)  Reason/Outcome: Continuity of Care, Discharge Planning    Requested Home Health Care         Is the patient interested in HHC at discharge?: No    DME Referral Provided  Referral made for DME?: No    Other Referral/Resources/Interventions Provided:  Interventions: Outpatient PT  Referral Comments: [facility]    Would you like to participate in our [facility] service program?  : No - Declined    Treatment Team Recommendation: Home  Discharge Destination Plan:: Home              CM met with patient at the bedside and CM contacted mother, Kylie, baseline information was obtained. CM discussed the role of CM in helping the patient develop a discharge plan and assist the patient in carry out their plan.      Patient lives with mother in [address] home; patient primarily stays on second floor of home. Patient was recently at [facility] 12/8/23 - 1/27/24. Patient was to be discharged from [facility] 1/27/24 but was admitted to [facility].  As per mother, patient was referred for outpatient therapy in the home through [facility] upon discharge from [facility].  CM will make referral accordingly for patient upon discharge from [facility].    CM to follow for medical stability for discharge.

## 2024-01-29 NOTE — ASSESSMENT & PLAN NOTE
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, minimally communicative but does communicate verbally at times  Differential included seizure versus hypercapnia, hepatic source, infectious source    No specific signs for infection, did have urinary retention and started on prophylactic antibiotics , discontinue early if infection ruled out   CT head negative for acute pathologies  ABG without signs for hypercapnia  Ammonia level elevated to 97 -lactulose that is typically dosed once daily increased to twice daily and monitor for improvement  Patient's mother states he had not been getting the lactulose every day at [facility]  Ammonia level now normal  Resumed patient's antiepileptics   As mother added history today that Friday overnight staff at [facility] stated he had episode of emesis post his seizure medications, possibly had a breakthrough seizure because of this with postictal period?  Neurology consult appreciated -recommending continued follow-up outpatient and continuing antiepileptics per previous regimen    Etiology pointing to possible breakthrough seizure due to emesis after his antiepileptics Friday night versus hepatic encephalopathy secondary to elevated ammonia levels   Patient significantly improved 1/28 evening was alert oriented x 3, conversive  Physically declined since previous SNF assessment -requiring a two-person assist

## 2024-01-29 NOTE — PLAN OF CARE
Problem: PHYSICAL THERAPY ADULT  Goal: Performs mobility at highest level of function for planned discharge setting.  See evaluation for individualized goals.  Description: Treatment/Interventions: ADL retraining, Functional transfer training, LE strengthening/ROM, Elevations, Therapeutic exercise, Endurance training, Patient/family training, Equipment eval/education, Bed mobility, Gait training, Compensatory technique education, Spoke to nursing, Spoke to case management, OT          See flowsheet documentation for full assessment, interventions and recommendations.  Note: Prognosis: Good  Problem List: Decreased strength, Decreased endurance, Impaired balance, Decreased mobility, Decreased range of motion, Decreased cognition, Impaired judgement, Decreased safety awareness, Obesity, Decreased skin integrity  Assessment: Pt is a 58 y.o. male seen for PT evaluation s/p admission to [facility] on 1/27/2024 with Acute encephalopathy.  Order placed for PT services.  Upon evaluation: Pt is presenting with impaired functional mobility due to pain, decreased strength, decreased ROM, decreased endurance, impaired balance, gait deviations, impaired cognition, decreased safety awareness, impaired judgment, fall risk, LE edema, and impaired skin integrity requiring  maximal of 2 assistance for bed mobility, moderate of 2 to moderate of 1 assistance for transfers, and moderate of 1 assistance for ambulation with RW . Pt's clinical presentation is currently unpredictable given the functional mobility deficits above, especially weakness, decreased ROM, edema of extremities, decreased skin integrity, decreased endurance, impaired balance, gait deviations, decreased activity tolerance, decreased functional mobility tolerance, decreased safety awareness, impaired judgement, and decreased cognition, coupled with fall risks as indicated by AM-PAC 6-Clicks: 8/24 as well as impaired balance, polypharmacy, impaired  judgement, decreased safety awareness, and obesity and combined with medical complications of abnormal renal lab values, multiple readmissions, need for input for mobility technique/safety, and abnormal BNP, ammonia, acute encephalopathy, elevated troponin due to CHF and CKD, urinary retention requiring sr catheter .  Pt's PMHx and comorbidities that may affect physical performance and progress include: HTN and epilepsy, developmental delay, dysphagia, liver cirrhosis, chronic respiratory failure with O2 at night, CHF, CKD, pericardial effusion . Personal factors affecting pt at time of IE include: inaccessible home environment, multi-level environment, limited home support, inability to perform IADLs, inability to perform ADLs, inability to navigate level surfaces without external assistance, and inability to ambulate household distances. Pt will benefit from continued skilled PT services to address deficits as defined above and to maximize level of functional mobility to facilitate return toward PLOF and improved QOL. From PT/mobility standpoint, recommendation at time of d/c would be Level II (Moderate Resource Intensity in order to reduce fall risk and maximize pt's functional independence and consistency with mobility. Recommend trial with walker next 1-2 sessions and ther ex next 1-2 sessions.  Barriers to Discharge: Decreased caregiver support, Inaccessible home environment  Barriers to Discharge Comments: requires increased assistance to complete mobility.  Rehab Resource Intensity Level, PT: II (Moderate Resource Intensity)    See flowsheet documentation for full assessment.

## 2024-01-30 VITALS
TEMPERATURE: 97 F | HEART RATE: 87 BPM | OXYGEN SATURATION: 95 % | BODY MASS INDEX: 28.1 KG/M2 | DIASTOLIC BLOOD PRESSURE: 66 MMHG | RESPIRATION RATE: 16 BRPM | SYSTOLIC BLOOD PRESSURE: 106 MMHG | WEIGHT: 174.82 LBS | HEIGHT: 66 IN

## 2024-01-30 LAB
AMMONIA PLAS-SCNC: 70 UMOL/L (ref 18–72)
ANION GAP SERPL CALCULATED.3IONS-SCNC: 7 MMOL/L
BUN SERPL-MCNC: 30 MG/DL (ref 5–25)
CALCIUM SERPL-MCNC: 8.6 MG/DL (ref 8.4–10.2)
CHLORIDE SERPL-SCNC: 105 MMOL/L (ref 96–108)
CO2 SERPL-SCNC: 27 MMOL/L (ref 21–32)
CREAT SERPL-MCNC: 1.74 MG/DL (ref 0.6–1.3)
ERYTHROCYTE [DISTWIDTH] IN BLOOD BY AUTOMATED COUNT: 14.9 % (ref 11.6–15.1)
GFR SERPL CREATININE-BSD FRML MDRD: 42 ML/MIN/1.73SQ M
GLUCOSE SERPL-MCNC: 90 MG/DL (ref 65–140)
HCT VFR BLD AUTO: 38.1 % (ref 36.5–49.3)
HGB BLD-MCNC: 12.2 G/DL (ref 12–17)
MCH RBC QN AUTO: 32.4 PG (ref 26.8–34.3)
MCHC RBC AUTO-ENTMCNC: 32 G/DL (ref 31.4–37.4)
MCV RBC AUTO: 101 FL (ref 82–98)
PLATELET # BLD AUTO: 145 THOUSANDS/UL (ref 149–390)
PMV BLD AUTO: 11.4 FL (ref 8.9–12.7)
POTASSIUM SERPL-SCNC: 4.9 MMOL/L (ref 3.5–5.3)
RBC # BLD AUTO: 3.76 MILLION/UL (ref 3.88–5.62)
SODIUM SERPL-SCNC: 139 MMOL/L (ref 135–147)
WBC # BLD AUTO: 8.46 THOUSAND/UL (ref 4.31–10.16)

## 2024-01-30 PROCEDURE — 92526 ORAL FUNCTION THERAPY: CPT

## 2024-01-30 PROCEDURE — 85027 COMPLETE CBC AUTOMATED: CPT

## 2024-01-30 PROCEDURE — 80048 BASIC METABOLIC PNL TOTAL CA: CPT

## 2024-01-30 PROCEDURE — 82140 ASSAY OF AMMONIA: CPT

## 2024-01-30 PROCEDURE — 99239 HOSP IP/OBS DSCHRG MGMT >30: CPT

## 2024-01-30 RX ORDER — LEVOCARNITINE 330 MG/1
330 TABLET ORAL
Qty: 120 TABLET | Refills: 0 | Status: SHIPPED | OUTPATIENT
Start: 2024-01-30 | End: 2024-02-29

## 2024-01-30 RX ORDER — LAMOTRIGINE 150 MG/1
300 TABLET ORAL
Qty: 60 TABLET | Refills: 0 | Status: SHIPPED | OUTPATIENT
Start: 2024-01-30

## 2024-01-30 RX ORDER — LAMOTRIGINE 100 MG/1
250 TABLET ORAL
Qty: 75 TABLET | Refills: 0 | Status: SHIPPED | OUTPATIENT
Start: 2024-01-30

## 2024-01-30 RX ORDER — LACTULOSE 20 G/30ML
20 SOLUTION ORAL DAILY
Qty: 900 ML | Refills: 0 | Status: SHIPPED | OUTPATIENT
Start: 2024-01-30 | End: 2024-02-29

## 2024-01-30 RX ORDER — TAMSULOSIN HYDROCHLORIDE 0.4 MG/1
0.4 CAPSULE ORAL
Qty: 14 CAPSULE | Refills: 0 | Status: SHIPPED | OUTPATIENT
Start: 2024-01-30

## 2024-01-30 RX ORDER — TAMSULOSIN HYDROCHLORIDE 0.4 MG/1
0.4 CAPSULE ORAL
Status: DISCONTINUED | OUTPATIENT
Start: 2024-01-30 | End: 2024-01-30 | Stop reason: HOSPADM

## 2024-01-30 RX ORDER — CLONAZEPAM 0.5 MG/1
0.5 TABLET ORAL
Qty: 30 TABLET | Refills: 0 | Status: SHIPPED | OUTPATIENT
Start: 2024-01-30 | End: 2024-02-29

## 2024-01-30 RX ORDER — ZONISAMIDE 100 MG/1
100 CAPSULE ORAL
Qty: 30 CAPSULE | Refills: 0 | Status: SHIPPED | OUTPATIENT
Start: 2024-01-30

## 2024-01-30 RX ORDER — LAMOTRIGINE 100 MG/1
TABLET ORAL
Qty: 225 TABLET | Refills: 0 | Status: SHIPPED | OUTPATIENT
Start: 2024-01-30 | End: 2024-02-29

## 2024-01-30 RX ORDER — TORSEMIDE 20 MG/1
40 TABLET ORAL DAILY
Qty: 60 TABLET | Refills: 0 | Status: SHIPPED | OUTPATIENT
Start: 2024-01-30 | End: 2024-02-29

## 2024-01-30 RX ORDER — SPIRONOLACTONE 100 MG/1
100 TABLET, FILM COATED ORAL DAILY
Qty: 30 TABLET | Refills: 0 | Status: SHIPPED | OUTPATIENT
Start: 2024-01-30

## 2024-01-30 RX ORDER — PRIMIDONE 50 MG/1
TABLET ORAL
Qty: 210 TABLET | Refills: 0 | Status: SHIPPED | OUTPATIENT
Start: 2024-01-30 | End: 2024-02-29

## 2024-01-30 RX ADMIN — LEVOCARNITINE 330 MG: 1 SOLUTION ORAL at 09:49

## 2024-01-30 RX ADMIN — ASPIRIN 81 MG CHEWABLE TABLET 81 MG: 81 TABLET CHEWABLE at 09:48

## 2024-01-30 RX ADMIN — LAMOTRIGINE 250 MG: 100 TABLET ORAL at 14:18

## 2024-01-30 RX ADMIN — HEPARIN SODIUM 5000 UNITS: 5000 INJECTION, SOLUTION INTRAVENOUS; SUBCUTANEOUS at 05:16

## 2024-01-30 RX ADMIN — LEVOCARNITINE 330 MG: 1 SOLUTION ORAL at 12:37

## 2024-01-30 RX ADMIN — LACTULOSE 20 G: 20 SOLUTION ORAL at 09:48

## 2024-01-30 RX ADMIN — LAMOTRIGINE 200 MG: 100 TABLET ORAL at 09:49

## 2024-01-30 RX ADMIN — PRIMIDONE 150 MG: 50 TABLET ORAL at 09:49

## 2024-01-30 RX ADMIN — HEPARIN SODIUM 5000 UNITS: 5000 INJECTION, SOLUTION INTRAVENOUS; SUBCUTANEOUS at 13:57

## 2024-01-30 RX ADMIN — PRIMIDONE 100 MG: 50 TABLET ORAL at 14:18

## 2024-01-30 NOTE — NURSING NOTE
Spoke with mother Kylie Loaiza about discharge instructions. Went over medication administrations and referrals in packet sent with patient on discharge.

## 2024-01-30 NOTE — ASSESSMENT & PLAN NOTE
Previous admission was on mechanical soft diet    Speech therapy evaluated and upgraded from puréed and thin liquids to mechanical soft

## 2024-01-30 NOTE — ASSESSMENT & PLAN NOTE
Danielle Perez  6/21/1927  [id number]  DOS: 6/16/2017      PREOPERATIVE DIAGNOSIS     Bladder mass       POSTOPERATIVE DIAGNOSIS    Papillary bladder tumor left bladder base    PROCEDURE PERFORMED    Procedure(s) with comments:  CYSTOSCOPY - CYSTOSCOPY     SURGEON    Roderick Duke III, MD    ANESTHESIA    none    INDICATIONS FOR PROCEDURE     This is an 89-year-old woman who had a bladder mass identified in 2010. He was apparently about 2 cm at that time. She never had this treated. Recent CT shows a 2.5 cm bladder mass at the left posterior wall. She is brought for cystoscopy.    PROCEDURE description     The patient was brought to the operating room and in the supine position the perineum was prepped and draped in a sterile fashion. Flexible cystoscopy was performed. Within the bladder there is a frondular papillary bladder tumor just on the lateral edge of the left trigone.  No other lesions are identified. I was not able to visualize a left ureteral orifice. She tolerated the procedure well. I discussed the findings with the patient and her daughter and they wish to have no treatment.       Roderick Duke MD     Patient had over 900 cc of urine, Azul catheter placed by emergency room.  Continue Azul, possible voiding trial and mentation/ambulation improved - 1 week as OP  Start Flomax and can do voiding trial on Friday home nurses  Referral to urology made in case patient has issues with voiding trial

## 2024-01-30 NOTE — SPEECH THERAPY NOTE
Speech Language/Pathology    Speech/Language Pathology Progress Note    Patient Name: Ash Loaiza  Today's Date: 1/30/2024       Assessment:  Pt seen for dysphagia therapy for diet upgrade. Pt currently on puree/thin liquids; baseline is mechanical soft. Trial completed w/ soft cookie. Pt w/ slightly prolonged mastication but adequate bolus clearance. No overt s/s aspiration.   Of note pt was found w/ goldfish in bed.    Plan/Recommendations:  Recommend upgrade to level 2 mechanical soft/ thin liquids  Meds as tolerated    Lauren Mota MS CCC-SLP  1/30/2024

## 2024-01-30 NOTE — PLAN OF CARE
Problem: PAIN - ADULT  Goal: Verbalizes/displays adequate comfort level or baseline comfort level  Description: Interventions:  - Encourage patient to monitor pain and request assistance  - Assess pain using appropriate pain scale  - Administer analgesics based on type and severity of pain and evaluate response  - Implement non-pharmacological measures as appropriate and evaluate response  - Consider cultural and social influences on pain and pain management  - Notify physician/advanced practitioner if interventions unsuccessful or patient reports new pain  1/30/2024 1215 by Stacie Ball RN  Outcome: Adequate for Discharge  1/30/2024 1031 by Stacie Ball RN  Outcome: Progressing     Problem: INFECTION - ADULT  Goal: Absence or prevention of progression during hospitalization  Description: INTERVENTIONS:  - Assess and monitor for signs and symptoms of infection  - Monitor lab/diagnostic results  - Monitor all insertion sites, i.e. indwelling lines, tubes, and drains  - Monitor endotracheal if appropriate and nasal secretions for changes in amount and color  - [place] appropriate cooling/warming therapies per order  - Administer medications as ordered  - Instruct and encourage patient and family to use good hand hygiene technique  - Identify and instruct in appropriate isolation precautions for identified infection/condition  1/30/2024 1215 by Stacie Ball RN  Outcome: Adequate for Discharge  1/30/2024 1031 by Stacie Ball RN  Outcome: Progressing  Goal: Absence of fever/infection during neutropenic period  Description: INTERVENTIONS:  - Monitor WBC    1/30/2024 1215 by Stacie Ball RN  Outcome: Adequate for Discharge  1/30/2024 1031 by Stacie Ball RN  Outcome: Progressing     Problem: SAFETY ADULT  Goal: Patient will remain free of falls  Description: INTERVENTIONS:  - Educate patient/family on patient safety including physical limitations  - Instruct patient to call for assistance with activity   -  Consult OT/PT to assist with strengthening/mobility   - Keep Call bell within reach  - Keep bed low and locked with side rails adjusted as appropriate  - Keep care items and personal belongings within reach  - Initiate and maintain comfort rounds  - Make Fall Risk Sign visible to staff  - Offer Toileting every *** Hours, in advance of need  - Initiate/Maintain ***alarm  - Obtain necessary fall risk management equipment: ***  - Apply yellow socks and bracelet for high fall risk patients  - Consider moving patient to room near nurses station  1/30/2024 1215 by Stacie Ball RN  Outcome: Adequate for Discharge  1/30/2024 1031 by Stacie Ball RN  Outcome: Progressing  Goal: Maintain or return to baseline ADL function  Description: INTERVENTIONS:  -  Assess patient's ability to carry out ADLs; assess patient's baseline for ADL function and identify physical deficits which impact ability to perform ADLs (bathing, care of mouth/teeth, toileting, grooming, dressing, etc.)  - Assess/evaluate cause of self-care deficits   - Assess range of motion  - Assess patient's mobility; develop plan if impaired  - Assess patient's need for assistive devices and provide as appropriate  - Encourage maximum independence but intervene and supervise when necessary  - Involve family in performance of ADLs  - Assess for home care needs following discharge   - Consider OT consult to assist with ADL evaluation and planning for discharge  - Provide patient education as appropriate  1/30/2024 1215 by Stacie Ball RN  Outcome: Adequate for Discharge  1/30/2024 1031 by Stacie Ball RN  Outcome: Progressing  Goal: Maintains/Returns to pre admission functional level  Description: INTERVENTIONS:  - Perform AM-PAC 6 Click Basic Mobility/ Daily Activity assessment daily.  - Set and communicate daily mobility goal to care team and patient/family/caregiver.   - Collaborate with rehabilitation services on mobility goals if consulted  - Perform Range  of Motion *** times a day.  - Reposition patient every *** hours.  - Dangle patient *** times a day  - Stand patient *** times a day  - Ambulate patient *** times a day  - Out of bed to chair *** times a day   - Out of bed for meals *** times a day  - Out of bed for toileting  - Record patient progress and toleration of activity level   1/30/2024 1215 by Stacie Ball RN  Outcome: Adequate for Discharge  1/30/2024 1031 by Stacie Ball RN  Outcome: Progressing     Problem: Knowledge Deficit  Goal: Patient/family/caregiver demonstrates understanding of disease process, treatment plan, medications, and discharge instructions  Description: Complete learning assessment and assess knowledge base.  Interventions:  - Provide teaching at level of understanding  - Provide teaching via preferred learning methods  1/30/2024 1215 by Stacie Ball RN  Outcome: Adequate for Discharge  1/30/2024 1031 by Stacie Ball RN  Outcome: Progressing

## 2024-01-30 NOTE — ASSESSMENT & PLAN NOTE
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

## 2024-01-30 NOTE — ASSESSMENT & PLAN NOTE
He usually is on torsemide and Aldactone.    Chest x-ray is improved  Weight is improved from previous  Continue home regimen -torsemide and Aldactone      Wt Readings from Last 3 Encounters:   01/30/24 79.3 kg (174 lb 13.2 oz)   12/08/23 106 kg (234 lb 9.1 oz)   08/11/21 103 kg (226 lb 10.1 oz)

## 2024-01-30 NOTE — ASSESSMENT & PLAN NOTE
Current creatinine at baseline    Lab Results   Component Value Date    EGFR 42 01/30/2024    EGFR 40 01/29/2024    EGFR 40 01/28/2024    CREATININE 1.74 (H) 01/30/2024    CREATININE 1.82 (H) 01/29/2024    CREATININE 1.84 (H) 01/28/2024

## 2024-01-30 NOTE — PLAN OF CARE
Problem: PAIN - ADULT  Goal: Verbalizes/displays adequate comfort level or baseline comfort level  Description: Interventions:  - Encourage patient to monitor pain and request assistance  - Assess pain using appropriate pain scale  - Administer analgesics based on type and severity of pain and evaluate response  - Implement non-pharmacological measures as appropriate and evaluate response  - Consider cultural and social influences on pain and pain management  - Notify physician/advanced practitioner if interventions unsuccessful or patient reports new pain  Outcome: Progressing     Problem: INFECTION - ADULT  Goal: Absence or prevention of progression during hospitalization  Description: INTERVENTIONS:  - Assess and monitor for signs and symptoms of infection  - Monitor lab/diagnostic results  - Monitor all insertion sites, i.e. indwelling lines, tubes, and drains  - Monitor endotracheal if appropriate and nasal secretions for changes in amount and color  - [place] appropriate cooling/warming therapies per order  - Administer medications as ordered  - Instruct and encourage patient and family to use good hand hygiene technique  - Identify and instruct in appropriate isolation precautions for identified infection/condition  Outcome: Progressing  Goal: Absence of fever/infection during neutropenic period  Description: INTERVENTIONS:  - Monitor WBC    Outcome: Progressing     Problem: SAFETY ADULT  Goal: Patient will remain free of falls  Description: INTERVENTIONS:  - Educate patient/family on patient safety including physical limitations  - Instruct patient to call for assistance with activity   - Consult OT/PT to assist with strengthening/mobility   - Keep Call bell within reach  - Keep bed low and locked with side rails adjusted as appropriate  - Keep care items and personal belongings within reach  - Initiate and maintain comfort rounds  - Make Fall Risk Sign visible to staff  - Offer Toileting every 2 Hours,  in advance of need  - Initiate/Maintain bed and chair alarm  - Obtain necessary fall risk management equipment:   - Apply yellow socks and bracelet for high fall risk patients  - Consider moving patient to room near nurses station  Outcome: Progressing  Goal: Maintain or return to baseline ADL function  Description: INTERVENTIONS:  -  Assess patient's ability to carry out ADLs; assess patient's baseline for ADL function and identify physical deficits which impact ability to perform ADLs (bathing, care of mouth/teeth, toileting, grooming, dressing, etc.)  - Assess/evaluate cause of self-care deficits   - Assess range of motion  - Assess patient's mobility; develop plan if impaired  - Assess patient's need for assistive devices and provide as appropriate  - Encourage maximum independence but intervene and supervise when necessary  - Involve family in performance of ADLs  - Assess for home care needs following discharge   - Consider OT consult to assist with ADL evaluation and planning for discharge  - Provide patient education as appropriate  Outcome: Progressing  Goal: Maintains/Returns to pre admission functional level  Description: INTERVENTIONS:  - Perform AM-PAC 6 Click Basic Mobility/ Daily Activity assessment daily.  - Set and communicate daily mobility goal to care team and patient/family/caregiver.   - Collaborate with rehabilitation services on mobility goals if consulted  - Perform Range of Motion 3 times a day.  - Reposition patient every  2 hours.  - Dangle patient 3 times a day  - Stand patient 3 times a day  - Ambulate patient 3 times a day  - Out of bed to chair 3 times a day   - Out of bed for meals 3 times a day  - Out of bed for toileting  - Record patient progress and toleration of activity level   Outcome: Progressing     Problem: DISCHARGE PLANNING  Goal: Discharge to home or other facility with appropriate resources  Description: INTERVENTIONS:  - Identify barriers to discharge w/patient and  caregiver  - Arrange for needed discharge resources and transportation as appropriate  - Identify discharge learning needs (meds, wound care, etc.)  - Arrange for interpretive services to assist at discharge as needed  - Refer to Case Management Department for coordinating discharge planning if the patient needs post-hospital services based on physician/advanced practitioner order or complex needs related to functional status, cognitive ability, or social support system  Outcome: Progressing     Problem: Knowledge Deficit  Goal: Patient/family/caregiver demonstrates understanding of disease process, treatment plan, medications, and discharge instructions  Description: Complete learning assessment and assess knowledge base.  Interventions:  - Provide teaching at level of understanding  - Provide teaching via preferred learning methods  Outcome: Progressing     Problem: NEUROSENSORY - ADULT  Goal: Achieves stable or improved neurological status  Description: INTERVENTIONS  - Monitor and report changes in neurological status seizure activity, lethargy  - Monitor vital signs such as temperature, blood pressure, glucose, and any other labs ordered   - Initiate measures to prevent increased intracranial pressure  - Monitor for seizure activity and implement precautions if appropriate      Outcome: Progressing  Goal: Remains free of injury related to seizures activity  Description: INTERVENTIONS  - Maintain airway, patient safety  and administer oxygen as ordered  - Monitor patient for seizure activity, document and report duration and description of seizure to physician/advanced practitioner  - If seizure occurs,  ensure patient safety during seizure  - Reorient patient post seizure  - Seizure pads on all 4 side rails  - Instruct patient/family to notify RN of any seizure activity including if an aura is experienced  - Instruct patient/family to call for assistance with activity based on nursing assessment  - Administer  anti-seizure medications if ordered    Outcome: Progressing  Goal: Achieves maximal functionality and self care  Description: INTERVENTIONS  - Monitor swallowing and airway patency with patient fatigue and changes in neurological status  - Encourage and assist patient to increase activity and self care.   - Encourage visually impaired, hearing impaired and aphasic patients to use assistive/communication devices  Outcome: Progressing

## 2024-01-30 NOTE — PLAN OF CARE
Problem: PAIN - ADULT  Goal: Verbalizes/displays adequate comfort level or baseline comfort level  Description: Interventions:  - Encourage patient to monitor pain and request assistance  - Assess pain using appropriate pain scale  - Administer analgesics based on type and severity of pain and evaluate response  - Implement non-pharmacological measures as appropriate and evaluate response  - Consider cultural and social influences on pain and pain management  - Notify physician/advanced practitioner if interventions unsuccessful or patient reports new pain  Outcome: Progressing     Problem: INFECTION - ADULT  Goal: Absence or prevention of progression during hospitalization  Description: INTERVENTIONS:  - Assess and monitor for signs and symptoms of infection  - Monitor lab/diagnostic results  - Monitor all insertion sites, i.e. indwelling lines, tubes, and drains  - Monitor endotracheal if appropriate and nasal secretions for changes in amount and color  - [place] appropriate cooling/warming therapies per order  - Administer medications as ordered  - Instruct and encourage patient and family to use good hand hygiene technique  - Identify and instruct in appropriate isolation precautions for identified infection/condition  Outcome: Progressing  Goal: Absence of fever/infection during neutropenic period  Description: INTERVENTIONS:  - Monitor WBC    Outcome: Progressing     Problem: SAFETY ADULT  Goal: Patient will remain free of falls  Description: INTERVENTIONS:  - Educate patient/family on patient safety including physical limitations  - Instruct patient to call for assistance with activity   - Consult OT/PT to assist with strengthening/mobility   - Keep Call bell within reach  - Keep bed low and locked with side rails adjusted as appropriate  - Keep care items and personal belongings within reach  - Initiate and maintain comfort rounds  - Make Fall Risk Sign visible to staff  - Offer Toileting every 2 Hours,  in advance of need  - Initiate/Maintain bed alarm  - Obtain necessary fall risk management equipment  - Apply yellow socks and bracelet for high fall risk patients  - Consider moving patient to room near nurses station  Outcome: Progressing  Goal: Maintain or return to baseline ADL function  Description: INTERVENTIONS:  -  Assess patient's ability to carry out ADLs; assess patient's baseline for ADL function and identify physical deficits which impact ability to perform ADLs (bathing, care of mouth/teeth, toileting, grooming, dressing, etc.)  - Assess/evaluate cause of self-care deficits   - Assess range of motion  - Assess patient's mobility; develop plan if impaired  - Assess patient's need for assistive devices and provide as appropriate  - Encourage maximum independence but intervene and supervise when necessary  - Involve family in performance of ADLs  - Assess for home care needs following discharge   - Consider OT consult to assist with ADL evaluation and planning for discharge  - Provide patient education as appropriate  Outcome: Progressing  Goal: Maintains/Returns to pre admission functional level  Description: INTERVENTIONS:  - Perform AM-PAC 6 Click Basic Mobility/ Daily Activity assessment daily.  - Set and communicate daily mobility goal to care team and patient/family/caregiver.   - Collaborate with rehabilitation services on mobility goals if consulted  - Record patient progress and toleration of activity level   Outcome: Progressing     Problem: DISCHARGE PLANNING  Goal: Discharge to home or other facility with appropriate resources  Description: INTERVENTIONS:  - Identify barriers to discharge w/patient and caregiver  - Arrange for needed discharge resources and transportation as appropriate  - Identify discharge learning needs (meds, wound care, etc.)  - Arrange for interpretive services to assist at discharge as needed  - Refer to Case Management Department for coordinating discharge planning if  the patient needs post-hospital services based on physician/advanced practitioner order or complex needs related to functional status, cognitive ability, or social support system  Outcome: Progressing     Problem: Knowledge Deficit  Goal: Patient/family/caregiver demonstrates understanding of disease process, treatment plan, medications, and discharge instructions  Description: Complete learning assessment and assess knowledge base.  Interventions:  - Provide teaching at level of understanding  - Provide teaching via preferred learning methods  Outcome: Progressing     Problem: NEUROSENSORY - ADULT  Goal: Achieves stable or improved neurological status  Description: INTERVENTIONS  - Monitor and report changes in neurological status seizure activity, lethargy  - Monitor vital signs such as temperature, blood pressure, glucose, and any other labs ordered   - Initiate measures to prevent increased intracranial pressure  - Monitor for seizure activity and implement precautions if appropriate      Outcome: Progressing  Goal: Remains free of injury related to seizures activity  Description: INTERVENTIONS  - Maintain airway, patient safety  and administer oxygen as ordered  - Monitor patient for seizure activity, document and report duration and description of seizure to physician/advanced practitioner  - If seizure occurs,  ensure patient safety during seizure  - Reorient patient post seizure  - Seizure pads on all 4 side rails  - Instruct patient/family to notify RN of any seizure activity including if an aura is experienced  - Instruct patient/family to call for assistance with activity based on nursing assessment  - Administer anti-seizure medications if ordered    Outcome: Progressing  Goal: Achieves maximal functionality and self care  Description: INTERVENTIONS  - Monitor swallowing and airway patency with patient fatigue and changes in neurological status  - Encourage and assist patient to increase activity and  self care.   - Encourage visually impaired, hearing impaired and aphasic patients to use assistive/communication devices  Outcome: Progressing     Problem: CARDIOVASCULAR - ADULT  Goal: Maintains optimal cardiac output and hemodynamic stability  Description: INTERVENTIONS:  - Monitor I/O, vital signs and rhythm  - Monitor for S/S and trends of decreased cardiac output  - Administer and titrate ordered vasoactive medications to optimize hemodynamic stability  - Assess quality of pulses, skin color and temperature  - Assess for signs of decreased coronary artery perfusion  - Instruct patient to report change in severity of symptoms  Outcome: Progressing  Goal: Absence of cardiac dysrhythmias or at baseline rhythm  Description: INTERVENTIONS:  - Continuous cardiac monitoring, vital signs, obtain 12 lead EKG if ordered  - Administer antiarrhythmic and heart rate control medications as ordered  - Monitor electrolytes and administer replacement therapy as ordered  Outcome: Progressing     Problem: GENITOURINARY - ADULT  Goal: Maintains or returns to baseline urinary function  Description: INTERVENTIONS:  - Assess urinary function  - Encourage oral fluids to ensure adequate hydration if ordered  - Administer IV fluids as ordered to ensure adequate hydration  - Administer ordered medications as needed  - Offer frequent toileting  - Follow urinary retention protocol if ordered  Outcome: Progressing  Goal: Absence of urinary retention  Description: INTERVENTIONS:  - Assess patient’s ability to void and empty bladder  - Monitor I/O  - Bladder scan as needed  - Discuss with physician/AP medications to alleviate retention as needed  - Discuss catheterization for long term situations as appropriate  Outcome: Progressing  Goal: Urinary catheter remains patent  Description: INTERVENTIONS:  - Assess patency of urinary catheter  - If patient has a chronic sr, consider changing catheter if non-functioning  - Follow guidelines for  intermittent irrigation of non-functioning urinary catheter  Outcome: Progressing     Problem: Prexisting or High Potential for Compromised Skin Integrity  Goal: Skin integrity is maintained or improved  Description: INTERVENTIONS:  - Identify patients at risk for skin breakdown  - Assess and monitor skin integrity  - Assess and monitor nutrition and hydration status  - Monitor labs   - Assess for incontinence   - Turn and reposition patient  - Assist with mobility/ambulation  - Relieve pressure over bony prominences  - Avoid friction and shearing  - Provide appropriate hygiene as needed including keeping skin clean and dry  - Evaluate need for skin moisturizer/barrier cream  - Collaborate with interdisciplinary team   - Patient/family teaching  - Consider wound care consult   Outcome: Progressing

## 2024-01-30 NOTE — ASSESSMENT & PLAN NOTE
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, minimally communicative but does communicate verbally at times  Differential included seizure versus hypercapnia, hepatic source, infectious source    No specific signs for infection, did have urinary retention and started on prophylactic antibiotics , discontinue early if infection ruled out   CT head negative for acute pathologies  ABG without signs for hypercapnia  Ammonia level elevated to 97 -lactulose that is typically dosed once daily increased to twice daily and monitor for improvement  Patient's mother states he had not been getting the lactulose every day at [facility]  Ammonia level now normal  Resumed patient's antiepileptics   As mother added history today that Friday overnight staff at [facility] stated he had episode of emesis post his seizure medications, possibly had a breakthrough seizure because of this with postictal period?  Neurology consult appreciated -recommending continued follow-up outpatient and continuing antiepileptics per previous regimen    Etiology pointing to possible breakthrough seizure due to emesis after his antiepileptics Friday night versus hepatic encephalopathy secondary to elevated ammonia levels   Patient significantly improved 1/28 evening was alert oriented x 3, conversive  Patient continues to be oriented x 3, will be discharged home with home health care as encephalopathy has resolved

## 2024-01-30 NOTE — CASE MANAGEMENT
Case Management Discharge Planning Note    Patient name Ash Loaiza  Location /-01 MRN [id number]  : 1965 Date 2024       Current Admission Date: 2024  Current Admission Diagnosis:Toxic encephalopathy   Patient Active Problem List    Diagnosis Date Noted    Urinary retention 2024    Elevated troponin 2024    Stage 3b chronic kidney disease (HCC) 2024    Acute blood loss anemia 2023    Chronic respiratory failure with hypercapnia (HCC) 2023    Acute kidney injury superimposed on chronic kidney disease 3 2023    Chronic diastolic HF (heart failure) (HCC) 2021    Liver cirrhosis (HCC) 2021    Abnormal finding on CT scan 2021    ED[facility]E (acute [facility] ([facility]) 2021    Pleural effusion on right 2021    History of COVID-19 2021    S/P pericardial window creation 2021    Acute on chronic heart failure with preserved ejection fraction (HCC) 2021    Lower extremity pain, left 2021    MRSA nasal colonization 2020    Developmental delay 2020    Dysphagia 2020    Toxic encephalopathy 2020    Pneumonia due to infectious organism 01/10/2020    Nonintractable generalized idiopathic epilepsy without status epilepticus (HCC) 01/10/2020    Falls 01/10/2020    T wave inversion in EKG 01/10/2020    Essential hypertension 01/10/2020    RSV (acute bronchiolitis due to respiratory syncytial virus) 01/10/2020    Acute respiratory failure with hypoxia (HCC) 01/10/2020    Polyp of colon 03/15/2019      LOS (days): 3  Geometric Mean LOS (GMLOS) (days): 4.5  Days to GMLOS:1.9     OBJECTIVE:  Risk of Unplanned Readmission Score: 18.42         Current admission status: Inpatient   Preferred Pharmacy:   [facility] [address] [place] [address] - [address] [address]  Phone: [phone] Fax: [phone]    Primary Care Provider: Jose G Holloway  DO    Primary Insurance: MEDICARE  Secondary Insurance: [employer]    DISCHARGE DETAILS:        CM contacted mother to discuss discharge planning today with [facility] and options for transport home.     CM offered stretcher van for patient as mode of transport as patient would be wheeled into home, MO declined indicating patient can typically do steps with assistance.  CM discussed WC transport of patient. Discussed with mother that transportation via WC is not covered by insurance and patient will be billed for this service. Verbalized understanding.    CM made MO aware [facility] referral made late yesterday afternoon for WC transport and 3:00PM transport arranged for patient today.  Mother indicated she will have her older son available to assist patient to get into home.                                                       Transport at Discharge : Wheelchair van        Transported by (Company and Unit #): [employer]  ETA of Transport (Date): 01/30/24  ETA of Transport (Time): 1500

## 2024-01-30 NOTE — ASSESSMENT & PLAN NOTE
And talk to his mother at bedside who gives all the history the patient was at [facility] getting rehab.   After multiple admissions to the hospital, and since having COVID in the past his legs have been weaker.    He is now walking with a walker 50 feet and 1 of 3 steps per SNF reports    Mentation back to baseline  PT/OT continue to recommend rehab given patient's physical decline with encephalopathy  Patient's concerns will not pay for further rehab per patient's father given just had stay -home health care set up for him at discharge

## 2024-01-30 NOTE — ASSESSMENT & PLAN NOTE
Patient admitted with concerns for acute encephalopathy  Ammonia level checked this morning with elevation to 95  Typically on lactulose 20 g daily -patient's mother states he was not getting this at SNF every day    Continue lactulose daily on discharge with normal ammonia level  Recommend adjusting dose to account for 2-3 bowel movements a day, gave instructions to call PCP if not meeting this goal

## 2024-01-30 NOTE — DISCHARGE INSTR - AVS FIRST PAGE
Recommend continuing Azul until 2/2 -continue to take Flomax for at least a week afterwards  Home nursing will be able to assist with voiding trial on Friday  If any difficulties with voiding trial or Azul needing to be replaced afterwards -a referral has been sent to [facility] urology for follow-up if needed      Recommend continued follow-up with [facility]-epilepsy department  Antiseizure medications per previous prescriptions    Continue to take lactulose once daily, goal is 2-3 bowel movements a day, call PCP if not reaching this goal either too many or too little bowel movements    All prescription refills sent to pharmacy

## 2024-01-30 NOTE — DISCHARGE SUMMARY
[facility]  Discharge- Ash Loaiza 1965, 58 y.o. male MRN: [id number]  Unit/Bed#: -Tad Encounter: [id number]  Primary Care Provider: Jose G Holloway DO   Date and time admitted to hospital: 1/27/2024  4:19 PM    * Toxic encephalopathy  Assessment & Plan  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, minimally communicative but does communicate verbally at times  Differential included seizure versus hypercapnia, hepatic source, infectious source    No specific signs for infection, did have urinary retention and started on prophylactic antibiotics , discontinue early if infection ruled out   CT head negative for acute pathologies  ABG without signs for hypercapnia  Ammonia level elevated to 97 -lactulose that is typically dosed once daily increased to twice daily and monitor for improvement  Patient's mother states he had not been getting the lactulose every day at [facility]  Ammonia level now normal  Resumed patient's antiepileptics   As mother added history today that Friday overnight staff at [facility] stated he had episode of emesis post his seizure medications, possibly had a breakthrough seizure because of this with postictal period?  Neurology consult appreciated -recommending continued follow-up outpatient and continuing antiepileptics per previous regimen    Etiology pointing to possible breakthrough seizure due to emesis after his antiepileptics Friday night versus hepatic encephalopathy secondary to elevated ammonia levels   Patient significantly improved 1/28 evening was alert oriented x 3, conversive  Patient continues to be oriented x 3, will be discharged home with home health care as encephalopathy has resolved      Stage 3b chronic kidney disease (HCC)  Assessment & Plan  Current creatinine at baseline    Lab Results   Component Value Date    EGFR 42 01/30/2024    EGFR 40 01/29/2024    EGFR 40 01/28/2024    CREATININE  1.74 (H) 01/30/2024    CREATININE 1.82 (H) 01/29/2024    CREATININE 1.84 (H) 01/28/2024       Elevated troponin  Assessment & Plan  Mild elevation in troponin which I think is secondary to his chronic heart failure and also CKD.  Troponins remain flat/decreased without specific EKG changes    Chronic diastolic HF (heart failure) (HCC)  Assessment & Plan  He usually is on torsemide and Aldactone.    Chest x-ray is improved  Weight is improved from previous  Continue home regimen -torsemide and Aldactone      Wt Readings from Last 3 Encounters:   01/30/24 79.3 kg (174 lb 13.2 oz)   12/08/23 106 kg (234 lb 9.1 oz)   08/11/21 103 kg (226 lb 10.1 oz)               Urinary retention  Assessment & Plan  Patient had over 900 cc of urine, Azul catheter placed by emergency room.  Continue Azul, possible voiding trial and mentation/ambulation improved - 1 week as OP  Start Flomax and can do voiding trial on Friday home nurses  Referral to urology made in case patient has issues with voiding trial    Chronic respiratory failure with hypercapnia (HCC)  Assessment & Plan  Wears oxygen at nighttime.    Liver cirrhosis (HCC)  Assessment & Plan  Patient admitted with concerns for acute encephalopathy  Ammonia level checked this morning with elevation to 95  Typically on lactulose 20 g daily -patient's mother states he was not getting this at SNF every day    Continue lactulose daily on discharge with normal ammonia level  Recommend adjusting dose to account for 2-3 bowel movements a day, gave instructions to call PCP if not meeting this goal      Dysphagia  Assessment & Plan  Previous admission was on mechanical soft diet    Speech therapy evaluated and upgraded from puréed and thin liquids to mechanical soft    Developmental delay  Assessment & Plan  And talk to his mother at bedside who gives all the history the patient was at [facility] getting rehab.   After multiple admissions to the hospital, and since having COVID in the  past his legs have been weaker.    He is now walking with a walker 50 feet and 1 of 3 steps per SNF reports    Mentation back to baseline  PT/OT continue to recommend rehab given patient's physical decline with encephalopathy  Patient's concerns will not pay for further rehab per patient's father given just had stay -home health care set up for him at discharge          Essential hypertension  Assessment & Plan  Can continue home medications-Lopressor, torsemide and spironolactone    Nonintractable generalized idiopathic epilepsy without status epilepticus (HCC)  Assessment & Plan  Follows with [facility] neurology  Continue home antilipid epileptics now that he is tolerating diet  Neurology consulted   Patient's mother notes that he did have an episode of emesis in the evening prior to encephalopathy started, potentially expel his antiepileptics that he takes at night and have breakthrough seizure  CT head negative for acute pathologies      Medical Problems       Resolved Problems  Date Reviewed: 1/27/2024   None       Discharging Physician / Practitioner: April Harris PA-C  PCP: Jose G Holloway DO  Admission Date:   Admission Orders (From admission, onward)       Ordered        01/27/24 2013  Inpatient Admission  Once            01/27/24 1933  Place in Observation  Once,   Status:  Canceled                          Discharge Date: 01/30/24    Consultations During Hospital Stay:  Neurology     Procedures Performed:   None    Significant Findings / Test Results:   XR chest portable - Result Date: 1/28/2024  Impression: Mild pulmonary venous congestion. Pericardial calcification.     CT head wo contrast - Result Date: 1/27/2024  Impression: No acute intracranial abnormality.     Lab Results   Component Value Date    AMMONIA 70 01/30/2024    AMMONIA 70 01/29/2024    AMMONIA 95 (H) 01/28/2024           Incidental Findings:   None       Test Results Pending at Discharge (will require follow up):   None     Outpatient  Tests Requested:  None    Complications:  None    Reason for Admission: Toxic encephalopathy    Hospital Course:   Ash Loaiza is a 58 y.o. male patient with past medical history of seizure disorder, dysphagia, developmental delay, chronic heart failure, hypertension, chronic respiratory failure on oxygen at nighttime, liver cirrhosis who originally presented to the hospital on 1/27/2024 due to altered mental status.  Patient was minimally responsive with very little verbal response.  No noted seizure activity at nursing home but did have episode of emesis prior to change in mental state in the evening.  CT head was negative.  ABG unremarkable.  Due to not being able to swallow pills we will start Keppra 1 g IV twice daily.  Had urinary retention on admission requiring Azul catheter .  Neurology consulted in the morning.  Patient was much more alert and was able to tolerate pills by the morning.  Restarted on his antiepileptics and is medications for volume control in setting of CHF and cirrhosis.  An ammonia level was checked which was elevated and his lactulose was increased.  Speech therapy consulted and recommended a puréed diet initially however on day of discharge was upgraded to mechanical soft.  Patient continued to improve and became alert and oriented.  His ammonia level resolved.  No seizure activity noted.  Will be discharged home with home health per urology recommendation voiding trial at the end of the week.     Please see above list of diagnoses and related plan for additional information.     Condition at Discharge: fair    Discharge Day Visit / Exam:   Subjective: Seen and examined.  Asking for Azul to be removed, explained to him trial at the end of the week.  No events overnight noted.  Vitals: Blood Pressure: 106/66 (01/30/24 0947)  Pulse: 87 (01/30/24 0947)  Temperature: (!) 97 °F (36.1 °C) (01/30/24 0947)  Temp Source: Temporal (01/28/24 0043)  Respirations: 16 (01/30/24 0727)  Height: 5'  "6\" (167.6 cm) (01/28/24 0043)  Weight - Scale: 79.3 kg (174 lb 13.2 oz) (01/30/24 0600)  SpO2: 95 % (01/30/24 0947)  Exam:   Physical Exam  Vitals and nursing note reviewed.   Constitutional:       General: He is not in acute distress.     Appearance: Normal appearance.   HENT:      Head: Normocephalic and atraumatic.      Nose: No congestion.      Mouth/Throat:      Mouth: Mucous membranes are moist.   Eyes:      Conjunctiva/sclera: Conjunctivae normal.   Cardiovascular:      Rate and Rhythm: Normal rate and regular rhythm.      Pulses: Normal pulses.      Heart sounds: Normal heart sounds. No murmur heard.  Pulmonary:      Effort: Pulmonary effort is normal. No respiratory distress.      Breath sounds: Normal breath sounds.   Abdominal:      General: Bowel sounds are normal.      Palpations: Abdomen is soft.      Tenderness: There is no abdominal tenderness.   Genitourinary:     Comments: Azul present  Musculoskeletal:         General: Normal range of motion.      Right lower leg: No edema.      Left lower leg: No edema.   Skin:     General: Skin is warm and dry.   Neurological:      Mental Status: He is alert and oriented to person, place, and time. Mental status is at baseline.      Comments: Slowed responses          Discussion with Family: Updated  (mother) via phone.    Discharge instructions/Information to patient and family:   See after visit summary for information provided to patient and family.      Provisions for Follow-Up Care:  See after visit summary for information related to follow-up care and any pertinent home health orders.      Mobility at time of Discharge:   Basic Mobility Inpatient Raw Score: 15  JH-HLM Goal: 4: Move to chair/commode  JH-HLM Achieved: 4: Move to chair/commode  HLM Goal achieved. Continue to encourage appropriate mobility.     Disposition:   Home with VNA Services (Reminder: Complete face to face encounter)    Planned Readmission: None     Discharge " Statement:  I spent  minutes discharging the patient. This time was spent on the day of discharge. I had direct contact with the patient on the day of discharge. Greater than 50% of the total time was spent examining patient, answering all patient questions, arranging and discussing plan of care with patient as well as directly providing post-discharge instructions.  Additional time then spent on discharge activities.    Discharge Medications:  See after visit summary for reconciled discharge medications provided to patient and/or family.      **Please Note: This note may have been constructed using a voice recognition system**

## 2024-01-31 NOTE — CASE MANAGEMENT
Case Management Discharge Planning Note    Patient name Ash Loaiza  Location /-01 MRN [id number]  : 1965 Date 2024       Current Admission Date: 2024  Current Admission Diagnosis:Toxic encephalopathy   Patient Active Problem List    Diagnosis Date Noted    Urinary retention 2024    Elevated troponin 2024    Stage 3b chronic kidney disease (HCC) 2024    Acute blood loss anemia 2023    Chronic respiratory failure with hypercapnia (HCC) 2023    Acute kidney injury superimposed on chronic kidney disease 3 2023    Chronic diastolic HF (heart failure) (HCC) 2021    Liver cirrhosis (HCC) 2021    Abnormal finding on CT scan 2021    EDWIGE (acute kidney injury) (HCC) 2021    Pleural effusion on right 2021    History of COVID-19 2021    S/P pericardial window creation 2021    Acute on chronic heart failure with preserved ejection fraction (HCC) 2021    Lower extremity pain, left 2021    MRSA nasal colonization 2020    Developmental delay 2020    Dysphagia 2020    Toxic encephalopathy 2020    Pneumonia due to infectious organism 01/10/2020    Nonintractable generalized idiopathic epilepsy without status epilepticus (HCC) 01/10/2020    Falls 01/10/2020    T wave inversion in EKG 01/10/2020    Essential hypertension 01/10/2020    RSV (acute bronchiolitis due to respiratory syncytial virus) 01/10/2020    Acute respiratory failure with hypoxia (HCC) 01/10/2020    Polyp of colon 03/15/2019      LOS (days): 3  Geometric Mean LOS (GMLOS) (days): 4.5  Days to GMLOS:1.6     OBJECTIVE:  Risk of Unplanned Readmission Score: 19.48         Current admission status: Inpatient   Preferred Pharmacy:   [facility] [address] [place] [address] - [address] [place] [id number]  Phone: [phone] Fax: [phone]    Primary Care Provider: Jose G Holloway  DO    Primary Insurance: MEDICARE  Secondary Insurance: [employer]    DISCHARGE DETAILS:     AVS faxed to [facility] order attached in AIDIN

## 2024-02-01 LAB
BACTERIA BLD CULT: NORMAL
BACTERIA BLD CULT: NORMAL

## 2024-02-21 PROBLEM — J21.0 RSV (ACUTE BRONCHIOLITIS DUE TO RESPIRATORY SYNCYTIAL VIRUS): Status: RESOLVED | Noted: 2020-01-10 | Resolved: 2024-02-21

## 2024-02-21 PROBLEM — J18.9 PNEUMONIA DUE TO INFECTIOUS ORGANISM: Status: RESOLVED | Noted: 2020-01-10 | Resolved: 2024-02-21

## 2024-03-24 PROBLEM — Z97.8 FOLEY CATHETER IN PLACE: Status: ACTIVE | Noted: 2024-03-24

## 2024-03-25 ENCOUNTER — TELEPHONE (OUTPATIENT)
Dept: UROLOGY | Facility: CLINIC | Age: 59
End: 2024-03-25

## 2024-03-25 NOTE — TELEPHONE ENCOUNTER
----- Message from Jermaine Franco MD sent at 3/24/2024  7:54 AM EDT -----  We are seeing April 3.  From my precharting patient had a Azul placed at [facility] on January 30, 2024 did we know if this been replaced since then were exchanged?  Thanks

## 2024-06-02 ENCOUNTER — APPOINTMENT (EMERGENCY)
Dept: CT IMAGING | Facility: HOSPITAL | Age: 59
DRG: 177 | End: 2024-06-02
Payer: MEDICARE

## 2024-06-02 ENCOUNTER — HOSPITAL ENCOUNTER (INPATIENT)
Facility: HOSPITAL | Age: 59
LOS: 12 days | Discharge: NURSING, REHAB OR LONG-TERM CARE | DRG: 177 | End: 2024-06-14
Attending: STUDENT IN AN ORGANIZED HEALTH CARE EDUCATION/TRAINING PROGRAM | Admitting: FAMILY MEDICINE
Payer: MEDICARE

## 2024-06-02 DIAGNOSIS — R60.0 BILATERAL LOWER EXTREMITY EDEMA: ICD-10-CM

## 2024-06-02 DIAGNOSIS — R60.0 LOWER EXTREMITY EDEMA: ICD-10-CM

## 2024-06-02 DIAGNOSIS — N18.9 CHRONIC KIDNEY DISEASE, UNSPECIFIED CKD STAGE: ICD-10-CM

## 2024-06-02 DIAGNOSIS — R04.0 EPISTAXIS: ICD-10-CM

## 2024-06-02 DIAGNOSIS — R60.1 ANASARCA: ICD-10-CM

## 2024-06-02 DIAGNOSIS — K74.3 HEPATIC CIRRHOSIS DUE TO PRIMARY BILIARY CHOLANGITIS (HCC): ICD-10-CM

## 2024-06-02 DIAGNOSIS — J96.01 ACUTE RESPIRATORY FAILURE WITH HYPOXIA (HCC): Primary | ICD-10-CM

## 2024-06-02 DIAGNOSIS — J90 PLEURAL EFFUSION: ICD-10-CM

## 2024-06-02 DIAGNOSIS — G40.309 NONINTRACTABLE GENERALIZED IDIOPATHIC EPILEPSY WITHOUT STATUS EPILEPTICUS (HCC): ICD-10-CM

## 2024-06-02 DIAGNOSIS — I50.33 ACUTE ON CHRONIC DIASTOLIC HEART FAILURE (HCC): ICD-10-CM

## 2024-06-02 DIAGNOSIS — I50.33 ACUTE ON CHRONIC HEART FAILURE WITH PRESERVED EJECTION FRACTION (HCC): ICD-10-CM

## 2024-06-02 DIAGNOSIS — I50.9 ACUTE DECOMPENSATED HEART FAILURE (HCC): ICD-10-CM

## 2024-06-02 DIAGNOSIS — M79.605 LOWER EXTREMITY PAIN, LEFT: ICD-10-CM

## 2024-06-02 DIAGNOSIS — G93.40 ENCEPHALOPATHY: ICD-10-CM

## 2024-06-02 DIAGNOSIS — R04.0 BLEEDING NOSE: ICD-10-CM

## 2024-06-02 PROBLEM — Z78.9 POOR PROGNOSIS: Status: ACTIVE | Noted: 2024-06-02

## 2024-06-02 PROBLEM — G92.9 TOXIC ENCEPHALOPATHY: Status: RESOLVED | Noted: 2020-01-17 | Resolved: 2024-06-02

## 2024-06-02 LAB
ALBUMIN SERPL BCP-MCNC: 3.8 G/DL (ref 3.5–5)
ALP SERPL-CCNC: 189 U/L (ref 34–104)
ALT SERPL W P-5'-P-CCNC: 13 U/L (ref 7–52)
AMMONIA PLAS-SCNC: 58 UMOL/L (ref 18–72)
ANION GAP SERPL CALCULATED.3IONS-SCNC: 7 MMOL/L (ref 4–13)
APTT PPP: 40 SECONDS (ref 23–37)
AST SERPL W P-5'-P-CCNC: 19 U/L (ref 13–39)
ATRIAL RATE: 72 BPM
BASE EX.OXY STD BLDV CALC-SCNC: 68.3 % (ref 60–80)
BASE EXCESS BLDV CALC-SCNC: 5.9 MMOL/L
BASOPHILS # BLD AUTO: 0.02 THOUSANDS/ÂΜL (ref 0–0.1)
BASOPHILS NFR BLD AUTO: 0 % (ref 0–1)
BILIRUB SERPL-MCNC: 0.97 MG/DL (ref 0.2–1)
BILIRUB UR QL STRIP: NEGATIVE
BUN SERPL-MCNC: 24 MG/DL (ref 5–25)
CALCIUM SERPL-MCNC: 9.1 MG/DL (ref 8.4–10.2)
CARDIAC TROPONIN I PNL SERPL HS: 11 NG/L (ref 8–18)
CHLORIDE SERPL-SCNC: 99 MMOL/L (ref 96–108)
CLARITY UR: CLEAR
CO2 SERPL-SCNC: 32 MMOL/L (ref 21–32)
COLOR UR: YELLOW
CREAT SERPL-MCNC: 1.98 MG/DL (ref 0.6–1.3)
D DIMER PPP FEU-MCNC: 2.36 UG/ML FEU
EOSINOPHIL # BLD AUTO: 0.01 THOUSAND/ÂΜL (ref 0–0.61)
EOSINOPHIL NFR BLD AUTO: 0 % (ref 0–6)
ERYTHROCYTE [DISTWIDTH] IN BLOOD BY AUTOMATED COUNT: 15.1 % (ref 11.6–15.1)
GFR SERPL CREATININE-BSD FRML MDRD: 36 ML/MIN/1.73SQ M
GLUCOSE SERPL-MCNC: 98 MG/DL (ref 65–140)
GLUCOSE UR STRIP-MCNC: NEGATIVE MG/DL
HCO3 BLDV-SCNC: 31.9 MMOL/L (ref 24–30)
HCT VFR BLD AUTO: 33.1 % (ref 36.5–49.3)
HGB BLD-MCNC: 10.6 G/DL (ref 12–17)
HGB UR QL STRIP.AUTO: NEGATIVE
IMM GRANULOCYTES # BLD AUTO: 0.01 THOUSAND/UL (ref 0–0.2)
IMM GRANULOCYTES NFR BLD AUTO: 0 % (ref 0–2)
INR PPP: 1.23 (ref 0.84–1.19)
KETONES UR STRIP-MCNC: NEGATIVE MG/DL
LACTATE SERPL-SCNC: 1.4 MMOL/L (ref 0.5–2)
LEUKOCYTE ESTERASE UR QL STRIP: NEGATIVE
LIPASE SERPL-CCNC: 41 U/L (ref 11–82)
LYMPHOCYTES # BLD AUTO: 0.61 THOUSANDS/ÂΜL (ref 0.6–4.47)
LYMPHOCYTES NFR BLD AUTO: 13 % (ref 14–44)
MAGNESIUM SERPL-MCNC: 1.9 MG/DL (ref 1.9–2.7)
MCH RBC QN AUTO: 32.3 PG (ref 26.8–34.3)
MCHC RBC AUTO-ENTMCNC: 32 G/DL (ref 31.4–37.4)
MCV RBC AUTO: 101 FL (ref 82–98)
MONOCYTES # BLD AUTO: 0.5 THOUSAND/ÂΜL (ref 0.17–1.22)
MONOCYTES NFR BLD AUTO: 10 % (ref 4–12)
NEUTROPHILS # BLD AUTO: 3.66 THOUSANDS/ÂΜL (ref 1.85–7.62)
NEUTS SEG NFR BLD AUTO: 77 % (ref 43–75)
NITRITE UR QL STRIP: NEGATIVE
NRBC BLD AUTO-RTO: 0 /100 WBCS
O2 CT BLDV-SCNC: 11.6 ML/DL
P AXIS: 36 DEGREES
PCO2 BLDV: 52.6 MM HG (ref 42–50)
PH BLDV: 7.4 [PH] (ref 7.3–7.4)
PH UR STRIP.AUTO: 6 [PH]
PLATELET # BLD AUTO: 148 THOUSANDS/UL (ref 149–390)
PMV BLD AUTO: 11.4 FL (ref 8.9–12.7)
PO2 BLDV: 37.1 MM HG (ref 35–45)
POTASSIUM SERPL-SCNC: 4.1 MMOL/L (ref 3.5–5.3)
PR INTERVAL: 188 MS
PROCALCITONIN SERPL-MCNC: 0.3 NG/ML
PROT SERPL-MCNC: 9.2 G/DL (ref 6.4–8.4)
PROT UR STRIP-MCNC: NEGATIVE MG/DL
PROTHROMBIN TIME: 15.8 SECONDS (ref 11.6–14.5)
QRS AXIS: 82 DEGREES
QRSD INTERVAL: 102 MS
QT INTERVAL: 384 MS
QTC INTERVAL: 420 MS
RBC # BLD AUTO: 3.28 MILLION/UL (ref 3.88–5.62)
SODIUM SERPL-SCNC: 138 MMOL/L (ref 135–147)
SP GR UR STRIP.AUTO: 1.01 (ref 1–1.03)
T WAVE AXIS: 211 DEGREES
UROBILINOGEN UR QL STRIP.AUTO: 0.2 E.U./DL
VENTRICULAR RATE: 72 BPM
WBC # BLD AUTO: 4.81 THOUSAND/UL (ref 4.31–10.16)

## 2024-06-02 PROCEDURE — 93010 ELECTROCARDIOGRAM REPORT: CPT | Performed by: INTERNAL MEDICINE

## 2024-06-02 PROCEDURE — 87040 BLOOD CULTURE FOR BACTERIA: CPT | Performed by: STUDENT IN AN ORGANIZED HEALTH CARE EDUCATION/TRAINING PROGRAM

## 2024-06-02 PROCEDURE — 87081 CULTURE SCREEN ONLY: CPT | Performed by: FAMILY MEDICINE

## 2024-06-02 PROCEDURE — 84484 ASSAY OF TROPONIN QUANT: CPT | Performed by: STUDENT IN AN ORGANIZED HEALTH CARE EDUCATION/TRAINING PROGRAM

## 2024-06-02 PROCEDURE — 84145 PROCALCITONIN (PCT): CPT | Performed by: STUDENT IN AN ORGANIZED HEALTH CARE EDUCATION/TRAINING PROGRAM

## 2024-06-02 PROCEDURE — 85025 COMPLETE CBC W/AUTO DIFF WBC: CPT | Performed by: STUDENT IN AN ORGANIZED HEALTH CARE EDUCATION/TRAINING PROGRAM

## 2024-06-02 PROCEDURE — 70450 CT HEAD/BRAIN W/O DYE: CPT

## 2024-06-02 PROCEDURE — 82140 ASSAY OF AMMONIA: CPT | Performed by: STUDENT IN AN ORGANIZED HEALTH CARE EDUCATION/TRAINING PROGRAM

## 2024-06-02 PROCEDURE — 83690 ASSAY OF LIPASE: CPT | Performed by: STUDENT IN AN ORGANIZED HEALTH CARE EDUCATION/TRAINING PROGRAM

## 2024-06-02 PROCEDURE — 99285 EMERGENCY DEPT VISIT HI MDM: CPT | Performed by: STUDENT IN AN ORGANIZED HEALTH CARE EDUCATION/TRAINING PROGRAM

## 2024-06-02 PROCEDURE — 83735 ASSAY OF MAGNESIUM: CPT | Performed by: STUDENT IN AN ORGANIZED HEALTH CARE EDUCATION/TRAINING PROGRAM

## 2024-06-02 PROCEDURE — 81003 URINALYSIS AUTO W/O SCOPE: CPT | Performed by: STUDENT IN AN ORGANIZED HEALTH CARE EDUCATION/TRAINING PROGRAM

## 2024-06-02 PROCEDURE — 87147 CULTURE TYPE IMMUNOLOGIC: CPT | Performed by: FAMILY MEDICINE

## 2024-06-02 PROCEDURE — 85610 PROTHROMBIN TIME: CPT | Performed by: STUDENT IN AN ORGANIZED HEALTH CARE EDUCATION/TRAINING PROGRAM

## 2024-06-02 PROCEDURE — 85379 FIBRIN DEGRADATION QUANT: CPT | Performed by: STUDENT IN AN ORGANIZED HEALTH CARE EDUCATION/TRAINING PROGRAM

## 2024-06-02 PROCEDURE — 80053 COMPREHEN METABOLIC PANEL: CPT | Performed by: STUDENT IN AN ORGANIZED HEALTH CARE EDUCATION/TRAINING PROGRAM

## 2024-06-02 PROCEDURE — 99285 EMERGENCY DEPT VISIT HI MDM: CPT

## 2024-06-02 PROCEDURE — 83605 ASSAY OF LACTIC ACID: CPT | Performed by: STUDENT IN AN ORGANIZED HEALTH CARE EDUCATION/TRAINING PROGRAM

## 2024-06-02 PROCEDURE — 99223 1ST HOSP IP/OBS HIGH 75: CPT | Performed by: FAMILY MEDICINE

## 2024-06-02 PROCEDURE — 85730 THROMBOPLASTIN TIME PARTIAL: CPT | Performed by: STUDENT IN AN ORGANIZED HEALTH CARE EDUCATION/TRAINING PROGRAM

## 2024-06-02 PROCEDURE — 71275 CT ANGIOGRAPHY CHEST: CPT

## 2024-06-02 PROCEDURE — 82805 BLOOD GASES W/O2 SATURATION: CPT | Performed by: STUDENT IN AN ORGANIZED HEALTH CARE EDUCATION/TRAINING PROGRAM

## 2024-06-02 PROCEDURE — 96365 THER/PROPH/DIAG IV INF INIT: CPT

## 2024-06-02 PROCEDURE — 96375 TX/PRO/DX INJ NEW DRUG ADDON: CPT

## 2024-06-02 PROCEDURE — 36415 COLL VENOUS BLD VENIPUNCTURE: CPT | Performed by: STUDENT IN AN ORGANIZED HEALTH CARE EDUCATION/TRAINING PROGRAM

## 2024-06-02 PROCEDURE — 93005 ELECTROCARDIOGRAM TRACING: CPT

## 2024-06-02 RX ORDER — HEPARIN SODIUM 5000 [USP'U]/ML
5000 INJECTION, SOLUTION INTRAVENOUS; SUBCUTANEOUS EVERY 8 HOURS SCHEDULED
Status: DISCONTINUED | OUTPATIENT
Start: 2024-06-02 | End: 2024-06-03

## 2024-06-02 RX ORDER — FUROSEMIDE 10 MG/ML
40 INJECTION INTRAMUSCULAR; INTRAVENOUS 2 TIMES DAILY
Status: DISCONTINUED | OUTPATIENT
Start: 2024-06-03 | End: 2024-06-03

## 2024-06-02 RX ORDER — FUROSEMIDE 10 MG/ML
40 INJECTION INTRAMUSCULAR; INTRAVENOUS 2 TIMES DAILY
Status: DISCONTINUED | OUTPATIENT
Start: 2024-06-02 | End: 2024-06-02

## 2024-06-02 RX ORDER — SODIUM CHLORIDE, SODIUM GLUCONATE, SODIUM ACETATE, POTASSIUM CHLORIDE, MAGNESIUM CHLORIDE, SODIUM PHOSPHATE, DIBASIC, AND POTASSIUM PHOSPHATE .53; .5; .37; .037; .03; .012; .00082 G/100ML; G/100ML; G/100ML; G/100ML; G/100ML; G/100ML; G/100ML
1000 INJECTION, SOLUTION INTRAVENOUS ONCE
Status: COMPLETED | OUTPATIENT
Start: 2024-06-02 | End: 2024-06-02

## 2024-06-02 RX ORDER — FENTANYL CITRATE 50 UG/ML
50 INJECTION, SOLUTION INTRAMUSCULAR; INTRAVENOUS ONCE
Status: COMPLETED | OUTPATIENT
Start: 2024-06-02 | End: 2024-06-02

## 2024-06-02 RX ORDER — LEVETIRACETAM 500 MG/5ML
500 INJECTION, SOLUTION, CONCENTRATE INTRAVENOUS EVERY 12 HOURS SCHEDULED
Status: DISCONTINUED | OUTPATIENT
Start: 2024-06-02 | End: 2024-06-03

## 2024-06-02 RX ORDER — FUROSEMIDE 10 MG/ML
60 INJECTION INTRAMUSCULAR; INTRAVENOUS ONCE
Status: DISCONTINUED | OUTPATIENT
Start: 2024-06-02 | End: 2024-06-02

## 2024-06-02 RX ORDER — FUROSEMIDE 10 MG/ML
80 INJECTION INTRAMUSCULAR; INTRAVENOUS ONCE
Status: COMPLETED | OUTPATIENT
Start: 2024-06-02 | End: 2024-06-02

## 2024-06-02 RX ADMIN — PIPERACILLIN AND TAZOBACTAM 4.5 G: 36; 4.5 INJECTION, POWDER, FOR SOLUTION INTRAVENOUS at 20:52

## 2024-06-02 RX ADMIN — FENTANYL CITRATE 50 MCG: 50 INJECTION INTRAMUSCULAR; INTRAVENOUS at 17:17

## 2024-06-02 RX ADMIN — FUROSEMIDE 80 MG: 10 INJECTION, SOLUTION INTRAMUSCULAR; INTRAVENOUS at 18:33

## 2024-06-02 RX ADMIN — HEPARIN SODIUM 5000 UNITS: 5000 INJECTION, SOLUTION INTRAVENOUS; SUBCUTANEOUS at 21:01

## 2024-06-02 RX ADMIN — LEVETIRACETAM 500 MG: 100 INJECTION, SOLUTION INTRAVENOUS at 20:44

## 2024-06-02 RX ADMIN — SODIUM CHLORIDE, SODIUM GLUCONATE, SODIUM ACETATE, POTASSIUM CHLORIDE, MAGNESIUM CHLORIDE, SODIUM PHOSPHATE, DIBASIC, AND POTASSIUM PHOSPHATE 1000 ML: .53; .5; .37; .037; .03; .012; .00082 INJECTION, SOLUTION INTRAVENOUS at 14:51

## 2024-06-02 RX ADMIN — IOHEXOL 100 ML: 350 INJECTION, SOLUTION INTRAVENOUS at 16:54

## 2024-06-02 NOTE — ED NOTES
Pt O2 sats 88% on RA.  Pt placed on 2L NC and o2 sats 96%.       Delisa iNelsen RN  06/02/24 [id number]

## 2024-06-02 NOTE — ASSESSMENT & PLAN NOTE
Patient with developmental delay, but this is most likely secondary to his acute illness including pleural effusions, recurrent aspiration pneumonia.

## 2024-06-02 NOTE — ASSESSMENT & PLAN NOTE
Patient with bilateral pleural effusions, received 80 mg of Lasix IV in the emergency room.  At home he only takes Aldactone.  Will start with Lasix 40 mg IV twice daily.      Wt Readings from Last 3 Encounters:   06/02/24 94 kg (207 lb 3.7 oz)   01/30/24 79.3 kg (174 lb 13.2 oz)   12/08/23 106 kg (234 lb 9.1 oz)

## 2024-06-02 NOTE — ASSESSMENT & PLAN NOTE
Recurrent aspiration pneumonia.  He had a hospitalization in April 2024 at [facility].  Will place him on Zosyn therapy.  Mother is the main historian states he is already on thickened liquids at home.  She still noticed that he chokes.  Will also consult speech therapy

## 2024-06-02 NOTE — ED PROVIDER NOTES
History  Chief Complaint   Patient presents with    Medical Problem     Pt coming from home - mother was giving him a shower this morning when his nose started to bleed (bleeding controlled at this time).  Shortly after mother started noticing gurgling sounds and wanted him sent in to be checked for aspiration.         History provided by:  Patient, parent and medical records  History limited by: Patient history of developmental delay.    58-year-old male. Hx of HFpEF on spironolactone/torsemide, CKD, cirrhosis, developmental delay, epilepsy.  Presents with generalized weakness, epistaxis/hemoptysis. Mom provides the history given the patient's history of developmental delay.  She states that the patient has been having progressively worsening generalized weakness over the last 2 months.  She noticed an episode of epistaxis versus hemoptysis earlier this morning with possible aspiration which prompted the ED visit.  Upon evaluation in the ED, the patient's SpO2 is 88% on room air.  Requires supplemental oxygen QHS.    Prior to Admission Medications   Prescriptions Last Dose Informant Patient Reported? Taking?   ASPIRIN EC LOW STRENGTH PO   Yes No   Sig: Take by mouth   Apple Cider Vinegar 300 MG TABS   Yes No   Sig: Take by mouth   B Complex Vitamins (Vitamin-B Complex) TABS   Yes No   Sig: Take by mouth   Cephalexin 500 MG tablet   Yes No   Sig: Take by mouth   Cholecalciferol 25 MCG (1000 UT) capsule   Yes No   Sig: Take by mouth   ELDERBERRY PO   Yes No   Sig: Take 1,000 mg by mouth   LORazepam (ATIVAN) 0.5 mg tablet   Yes No   Sig: Take 0.5 mg by mouth every 8 (eight) hours as needed   Omega-3 Fatty Acids (fish oil) 1,000 mg   Yes No   Sig: Take by mouth   acetaminophen (TYLENOL) 325 mg tablet   No No   Sig: Take 2 tablets (650 mg total) by mouth every 6 (six) hours as needed for mild pain, headaches or fever   ascorbic acid (VITAMIN C) 500 mg tablet   Yes No   Sig: Take 500 mg by mouth daily   aspirin 81 MG  tablet   Yes No   Sig: Take 81 mg by mouth daily   calcium-vitamin D 250-100 MG-UNIT per tablet   Yes No   Sig: Take 1 tablet by mouth daily    clonazePAM (KlonoPIN) 0.5 mg tablet   No No   Sig: Take 1 tablet (0.5 mg total) by mouth daily at bedtime   lactulose 20 g/30 mL   No No   Sig: Take 30 mL (20 g total) by mouth daily Daily after supper in 4oz of juice   lamoTRIgine (LaMICtal) 100 mg tablet   No No   Sig: Take 2.5 tablets (250 mg total) by mouth daily after lunch   lamoTRIgine (LaMICtal) 100 mg tablet   No No   Sig: Take 2 tablets (200 mg total) by mouth daily in the early morning AND 2.5 tablets (250 mg total) daily after lunch AND 3 tablets (300 mg total) daily at bedtime.   lamoTRIgine (LaMICtal) 100 mg tablet   Yes No   Sig: Take by mouth   lamoTRIgine (LaMICtal) 150 MG tablet   No No   Sig: Take 2 tablets (300 mg total) by mouth daily at bedtime   lamoTRIgine (LaMICtal) 25 mg tablet   Yes No   levOCARNitine (CARNITOR) 330 MG tablet   No No   Sig: Take 1 tablet (330 mg total) by mouth 4 (four) times daily (after meals and at bedtime)   metoprolol tartrate (LOPRESSOR) 25 mg tablet   No No   Sig: Take 1 tablet (25 mg total) by mouth 2 (two) times a day   primidone (MYSOLINE) 50 mg tablet   No No   Sig: Take 2 tablets (100 mg total) by mouth daily after breakfast AND 2 tablets (100 mg total) daily after lunch AND 3 tablets (150 mg total) daily at bedtime.   pyridoxine (VITAMIN B6) 100 mg tablet   Yes No   Sig: Take 100 mg by mouth daily   spironolactone (ALDACTONE) 100 mg tablet   No No   Sig: Take 1 tablet (100 mg total) by mouth daily   tamsulosin (FLOMAX) 0.4 mg   No No   Sig: Take 1 capsule (0.4 mg total) by mouth daily with dinner   torsemide (DEMADEX) 20 mg tablet   No No   Sig: Take 2 tablets (40 mg total) by mouth daily   vitamin E, tocopherol, 400 units capsule   Yes No   zonisamide (ZONEGRAN) 100 mg capsule   No No   Sig: Take 1 capsule (100 mg total) by mouth daily at bedtime       Facility-Administered Medications: None       Past Medical History:   Diagnosis Date    Hypertension     Mentally challenged     Pericardial effusion     Pneumonia     Seizure (HCC)        Past Surgical History:   Procedure Laterality Date    FRACTURE SURGERY      clavicle, left humerus, radial, and ulna.  Right radius    HIP ARTHROSCOPY Right     IR THORACENTESIS  11/20/2023    OH COLONOSCOPY FLX DX W/COLLJ SPEC WHEN PFRMD N/A 4/1/2019    Procedure: COLONOSCOPY;  Surgeon: Avi Guzman MD;  Location: [facility];  Service: Colorectal       Family History   Adopted: Yes     I have reviewed and agree with the history as documented.    E-Cigarette/Vaping    E-Cigarette Use Never User      E-Cigarette/Vaping Substances    Nicotine No     THC No     CBD No     Flavoring No     Other No     Unknown No      Social History     Tobacco Use    Smoking status: Never    Smokeless tobacco: Never   Vaping Use    Vaping status: Never Used   Substance Use Topics    Alcohol use: Never    Drug use: Never     Review of Systems   Unable to perform ROS: Other (Developmental delay)   Constitutional:  Positive for activity change, appetite change and fatigue. Negative for fever.   HENT:  Positive for nosebleeds.    Respiratory:  Positive for cough and shortness of breath. Negative for chest tightness.    Skin:  Positive for color change and wound.   All other systems reviewed and are negative.    Physical Exam  Physical Exam  Vitals and nursing note reviewed. Exam conducted with a chaperone present.   Constitutional:       Appearance: He is ill-appearing. He is not toxic-appearing.   HENT:      Head: Normocephalic.      Comments: Ecchymosis noted along the left upper eyelid     Right Ear: External ear normal.      Left Ear: External ear normal.      Nose: No congestion or rhinorrhea.      Mouth/Throat:      Mouth: Mucous membranes are moist.   Eyes:      Extraocular Movements: Extraocular movements intact.   Cardiovascular:       Rate and Rhythm: Normal rate and regular rhythm.      Pulses: Normal pulses.      Heart sounds: Normal heart sounds. No murmur heard.  Pulmonary:      Breath sounds: Rales present. No wheezing or rhonchi.      Comments: Decreased breath sounds bilaterally  Chest:      Chest wall: No tenderness.   Abdominal:      General: Bowel sounds are normal.      Palpations: Abdomen is soft.      Tenderness: There is no abdominal tenderness. There is no guarding.   Musculoskeletal:         General: Signs of injury present. No tenderness.      Right lower leg: Edema present.      Left lower leg: Edema present.   Skin:     General: Skin is warm and dry.      Findings: Bruising present.      Comments: Chronic skin changes along the lower extremities  Multiple areas of bruising at different stages of healing.   Neurological:      Mental Status: He is alert and oriented to person, place, and time. Mental status is at baseline.       Vital Signs  ED Triage Vitals   Temperature Pulse Respirations Blood Pressure SpO2   06/02/24 1412 06/02/24 1412 06/02/24 1412 06/02/24 1412 06/02/24 1412   (!) 97.2 °F (36.2 °C) 76 18 123/63 (!) 88 %      Temp Source Heart Rate Source Patient Position - Orthostatic VS BP Location FiO2 (%)   06/02/24 1412 06/02/24 1430 06/02/24 1412 06/02/24 1412 --   Temporal Monitor Sitting Left arm       Pain Score       06/02/24 1412       No Pain           Vitals:    06/02/24 1630 06/02/24 1730 06/02/24 1809 06/02/24 1830   BP: 101/56 128/75 90/50 122/72   Pulse: 63 69 67 72   Patient Position - Orthostatic VS: Lying Lying Sitting Lying     ED Medications  Medications   multi-electrolyte (ISOLYTE-S PH 7.4) bolus 1,000 mL (0 mL Intravenous Stopped 6/2/24 1551)   iohexol (OMNIPAQUE) 350 MG/ML injection (MULTI-DOSE) 100 mL (100 mL Intravenous Given 6/2/24 1654)   fentaNYL injection 50 mcg (50 mcg Intravenous Given 6/2/24 1717)   furosemide (LASIX) injection 80 mg (80 mg Intravenous Given 6/2/24 1833)       Diagnostic  Studies  Results Reviewed       Procedure Component Value Units Date/Time    High Sensitivity Troponin I Random [[id number]]  (Normal) Collected: 06/02/24 1617    Lab Status: Final result Specimen: Blood from Arm, Left Updated: 06/02/24 1646     HS TnI random 11 ng/L     D-dimer, quantitative [[id number]]  (Abnormal) Collected: 06/02/24 1435    Lab Status: Final result Specimen: Blood from Arm, Right Updated: 06/02/24 1635     D-Dimer, Quant 2.36 ug/ml FEU     Narrative:      In the evaluation for possible pulmonary embolism, in the appropriate (Well's Score of 4 or less) patient, the age adjusted d-dimer cutoff for this patient can be calculated as:    Age x 0.01 (in ug/mL) for Age-adjusted D-dimer exclusion threshold for a patient over 50 years.    Procalcitonin [[id number]]  (Abnormal) Collected: 06/02/24 1435    Lab Status: Final result Specimen: Blood from Arm, Right Updated: 06/02/24 1548     Procalcitonin 0.30 ng/ml     Comprehensive metabolic panel [[id number]]  (Abnormal) Collected: 06/02/24 1435    Lab Status: Final result Specimen: Blood from Arm, Right Updated: 06/02/24 1548     Sodium 138 mmol/L      Potassium 4.1 mmol/L      Chloride 99 mmol/L      CO2 32 mmol/L      ANION GAP 7 mmol/L      BUN 24 mg/dL      Creatinine 1.98 mg/dL      Glucose 98 mg/dL      Calcium 9.1 mg/dL      AST 19 U/L      ALT 13 U/L      Alkaline Phosphatase 189 U/L      Total Protein 9.2 g/dL      Albumin 3.8 g/dL      Total Bilirubin 0.97 mg/dL      eGFR 36 ml/min/1.73sq m     Narrative:      [employer] guidelines for Chronic Kidney Disease (CKD):     Stage 1 with normal or high GFR (GFR > 90 mL/min/1.73 square meters)    Stage 2 Mild CKD (GFR = 60-89 mL/min/1.73 square meters)    Stage 3A Moderate CKD (GFR = 45-59 mL/min/1.73 square meters)    Stage 3B Moderate CKD (GFR = 30-44 mL/min/1.73 square meters)    Stage 4 Severe CKD (GFR = 15-29 mL/min/1.73 square meters)    Stage 5 End Stage CKD (GFR <15  mL/min/1.73 square meters)  Note: GFR calculation is accurate only with a steady state creatinine    Magnesium [[id number]]  (Normal) Collected: 06/02/24 1435    Lab Status: Final result Specimen: Blood from Arm, Right Updated: 06/02/24 1526     Magnesium 1.9 mg/dL     Lipase [[id number]]  (Normal) Collected: 06/02/24 1435    Lab Status: Final result Specimen: Blood from Arm, Right Updated: 06/02/24 1526     Lipase 41 u/L     Ammonia [[id number]]  (Normal) Collected: 06/02/24 1435    Lab Status: Final result Specimen: Blood from Arm, Right Updated: 06/02/24 1523     Ammonia 58 umol/L     Lactic acid, plasma (w/reflex if result > 2.0) [[id number]]  (Normal) Collected: 06/02/24 1435    Lab Status: Final result Specimen: Blood from Arm, Right Updated: 06/02/24 1522     LACTIC ACID 1.4 mmol/L     Narrative:      Result may be elevated if tourniquet was used during collection.    APTT [[id number]]  (Abnormal) Collected: 06/02/24 1435    Lab Status: Final result Specimen: Blood from Arm, Right Updated: 06/02/24 1519     PTT 40 seconds     Protime-INR [[id number]]  (Abnormal) Collected: 06/02/24 1435    Lab Status: Final result Specimen: Blood from Arm, Right Updated: 06/02/24 1519     Protime 15.8 seconds      INR 1.23    UA w Reflex to Microscopic w Reflex to Culture [[id number]] Collected: 06/02/24 1449    Lab Status: Final result Specimen: Urine, Clean Catch Updated: 06/02/24 1508     Color, UA Yellow     Clarity, UA Clear     Specific Gravity, UA 1.015     pH, UA 6.0     Leukocytes, UA Negative     Nitrite, UA Negative     Protein, UA Negative mg/dl      Glucose, UA Negative mg/dl      Ketones, UA Negative mg/dl      Urobilinogen, UA 0.2 E.U./dl      Bilirubin, UA Negative     Occult Blood, UA Negative    Blood gas, venous [[id number]]  (Abnormal) Collected: 06/02/24 1435    Lab Status: Final result Specimen: Blood from Arm, Right Updated: 06/02/24 1506     pH, Pk 7.401     pCO2, Pk 52.6 mm Hg      pO2, Pk 37.1 mm Hg       HCO3, Pk 31.9 mmol/L      Base Excess, Pk 5.9 mmol/L      O2 Content, Pk 11.6 ml/dL      O2 HGB, VENOUS 68.3 %     CBC and differential [[id number]]  (Abnormal) Collected: 06/02/24 1435    Lab Status: Final result Specimen: Blood from Arm, Right Updated: 06/02/24 1506     WBC 4.81 Thousand/uL      RBC 3.28 Million/uL      Hemoglobin 10.6 g/dL      Hematocrit 33.1 %       fL      MCH 32.3 pg      MCHC 32.0 g/dL      RDW 15.1 %      MPV 11.4 fL      Platelets 148 Thousands/uL      nRBC 0 /100 WBCs      Segmented % 77 %      Immature Grans % 0 %      Lymphocytes % 13 %      Monocytes % 10 %      Eosinophils Relative 0 %      Basophils Relative 0 %      Absolute Neutrophils 3.66 Thousands/µL      Absolute Immature Grans 0.01 Thousand/uL      Absolute Lymphocytes 0.61 Thousands/µL      Absolute Monocytes 0.50 Thousand/µL      Eosinophils Absolute 0.01 Thousand/µL      Basophils Absolute 0.02 Thousands/µL     Blood culture #1 [[id number]] Collected: 06/02/24 1435    Lab Status: In process Specimen: Blood from Arm, Right Updated: 06/02/24 1504    Blood culture #2 [[id number]] Collected: 06/02/24 1452    Lab Status: In process Specimen: Blood from Arm, Left Updated: 06/02/24 1504               CTA ED chest PE Study   Final Result by Maco Murillo MD (06/02 1748)      1. Mild motion limited examination demonstrating no findings suspicious for pulmonary embolism   2. Moderately large right and moderate-sized left pleural effusion, each mildly increased in size from earlier with adjacent compressive atelectasis mild body wall edema   4. Diffuse pericardial calcification.         Workstation performed: [id number]         CT head without contrast   Final Result by Pallav N Shah, MD (06/02 1734)      No acute intracranial abnormality.      Age advanced parenchymal volume loss.                  Workstation performed: [id number]                Procedures  Procedures     ED Course  ED Course as of 06/02/24 1837   Sun Jun 02,  2024   1415 ECG interpreted by me.  Normal sinus rhythm.  Heart rate 72 bpm.  ST abnormalities noted along the precordial leads with T wave inversions noted in the inferior/precordial/lateral leads.  Normal axis.  Largely unchanged compared to ECG obtained in January 2024.   1448 Vital signs reviewed.  SpO2 88% on room air upon arrival.  Mom states that he typically requires low-flow nasal cannula at night.  Mom states that patient has been ill for approximately 2 months.  Had an episode of epistaxis versus hemoptysis this morning which prompted the 911 call. Hx of aspiration PNA. Will obtain infectious work up, CT imaging.    1624 Hemoglobin 10.6.  Per chart review, the patient had a CBC obtained on 5/7/2024.  Hemoglobin at that time was 11.3.  Chronic thrombocytopenia.  May be contributing to the diffuse ecchymosis.   1636 Elevated D-dimer in the setting of possible hemoptysis.  Will proceed with CT PE, CT head.   1703 CT head and CT PE interpreted by me. No signs of acute intracranial injury. No signs of acute PE. Bilateral pleural effusions noted. Per chart review, the patient has a hx of thoracentesis.    1710 Patient expressing chronic bilateral knee pain. Will administer a dose of IV fentanyl.    1752 CT imaging + Moderately large right and moderate-sized left pleural effusion, each mildly increased in size from earlier with adjacent compressive atelectasis mild body wall edema. Will administer a dose of IV Lasix 80 mg. [facility] contacted for admission.    1758 [facility] medicine accepts admission.   1808 Upon re-evaluation, the patient is mildly hypotensive. 90/50. Likely 2/2 the IV fentanyl that was administered for the patient's worsening extremity pain. Holding off on Lasix until BP improves. The patient continues to saturate well on the LFNC.     Given the patient has been unable to ambulate since his most recent admission, will place sr catheter to obtain accurate I's and O's during diuresis.    1824  Patient more awake at this time. Repeat /72. SLIM provider updated. Will also administer the dose of IV furosemide.      Medical Decision Making  This patient presents with respiratory failure.   Diagnostic considerations include pulmonary embolism, pneumonia, aspiration pneumonitis, hemoptysis, alveolar hemorrhage, pneumothorax, CHF exacerbation. See ED Course.         Problems Addressed:  Acute decompensated heart failure (HCC): acute illness or injury with systemic symptoms  Acute on chronic diastolic heart failure (HCC): chronic illness or injury with exacerbation, progression, or side effects of treatment that poses a threat to life or bodily functions  Acute respiratory failure with hypoxia (HCC): acute illness or injury with systemic symptoms  Anasarca: acute illness or injury  Bilateral lower extremity edema: chronic illness or injury with exacerbation, progression, or side effects of treatment  Chronic kidney disease, unspecified CKD stage: chronic illness or injury    Amount and/or Complexity of Data Reviewed  External Data Reviewed: notes.     Details: Documentation from the patient's most recent admission to [facility] and [facility] were reviewed.   Labs: ordered. Decision-making details documented in ED Course.  Radiology: ordered and independent interpretation performed. Decision-making details documented in ED Course.  ECG/medicine tests: ordered and independent interpretation performed. Decision-making details documented in ED Course.    Risk  Prescription drug management.  Decision regarding hospitalization.      Disposition  Final diagnoses:   Acute respiratory failure with hypoxia (HCC)   Acute decompensated heart failure (HCC)   Acute on chronic diastolic heart failure (HCC)   Bilateral lower extremity edema   Anasarca   Chronic kidney disease, unspecified CKD stage     Time reflects when diagnosis was documented in both MDM as applicable and the Disposition within this note        Time User Action Codes Description Comment    6/2/2024  5:59 PM Kobe Klein [J96.01] Acute respiratory failure with hypoxia (HCC)     6/2/2024  6:00 PM Kobe Klein [I50.9] Acute decompensated heart failure (HCC)     6/2/2024  6:00 PM Kobe Klein [I50.33] Acute on chronic diastolic heart failure (HCC)     6/2/2024  6:00 PM Kobe Klein [R60.0] Bilateral lower extremity edema     6/2/2024  6:00 PM Kobe Klein [R60.1] Anasarca     6/2/2024  6:00 PM Kobe Klein [N18.9] Chronic kidney disease, unspecified CKD stage           ED Disposition       ED Disposition   Admit    Condition   Stable    Date/Time   Sun Jun 2, 2024  5:59 PM    Comment   Case was discussed with Dr. Jenkins and the patient's admission status was agreed to be Admission Status: inpatient status to the service of Dr. Jenkins .               Follow-up Information    None         Patient's Medications   Discharge Prescriptions    No medications on file       No discharge procedures on file.    PDMP Review         Value Time User    PDMP Reviewed  Yes 12/8/2023 12:24 PM Ashtyn Romero MD            ED Provider  Electronically Signed by             Kobe Klein DO  06/02/24 9416

## 2024-06-02 NOTE — ASSESSMENT & PLAN NOTE
Moderately large right and moderate size left pleural effusion.  Will need to consult IR for thoracentesis  On previous admission in April 2024, he had a right-sided thoracentesis which was transudative.

## 2024-06-02 NOTE — ASSESSMENT & PLAN NOTE
Multifactorial secondary to heart failure, pleural effusions recurrent aspiration pneumonia.  Continue with oxygen via nasal cannula.

## 2024-06-03 ENCOUNTER — APPOINTMENT (INPATIENT)
Dept: INTERVENTIONAL RADIOLOGY/VASCULAR | Facility: HOSPITAL | Age: 59
DRG: 177 | End: 2024-06-03
Attending: FAMILY MEDICINE
Payer: MEDICARE

## 2024-06-03 LAB
ALBUMIN SERPL BCP-MCNC: 3.5 G/DL (ref 3.5–5)
ALP SERPL-CCNC: 161 U/L (ref 34–104)
ALT SERPL W P-5'-P-CCNC: 8 U/L (ref 7–52)
ANION GAP SERPL CALCULATED.3IONS-SCNC: 8 MMOL/L (ref 4–13)
AST SERPL W P-5'-P-CCNC: 18 U/L (ref 13–39)
BASOPHILS # BLD AUTO: 0.01 THOUSANDS/ÂΜL (ref 0–0.1)
BASOPHILS NFR BLD AUTO: 0 % (ref 0–1)
BILIRUB SERPL-MCNC: 1.23 MG/DL (ref 0.2–1)
BUN SERPL-MCNC: 25 MG/DL (ref 5–25)
CALCIUM SERPL-MCNC: 8.8 MG/DL (ref 8.4–10.2)
CHLORIDE SERPL-SCNC: 100 MMOL/L (ref 96–108)
CO2 SERPL-SCNC: 30 MMOL/L (ref 21–32)
CREAT SERPL-MCNC: 1.96 MG/DL (ref 0.6–1.3)
EOSINOPHIL # BLD AUTO: 0 THOUSAND/ÂΜL (ref 0–0.61)
EOSINOPHIL NFR BLD AUTO: 0 % (ref 0–6)
ERYTHROCYTE [DISTWIDTH] IN BLOOD BY AUTOMATED COUNT: 15.1 % (ref 11.6–15.1)
GFR SERPL CREATININE-BSD FRML MDRD: 37 ML/MIN/1.73SQ M
GLUCOSE SERPL-MCNC: 73 MG/DL (ref 65–140)
HCT VFR BLD AUTO: 32.9 % (ref 36.5–49.3)
HGB BLD-MCNC: 10.2 G/DL (ref 12–17)
IMM GRANULOCYTES # BLD AUTO: 0.02 THOUSAND/UL (ref 0–0.2)
IMM GRANULOCYTES NFR BLD AUTO: 1 % (ref 0–2)
LYMPHOCYTES # BLD AUTO: 0.53 THOUSANDS/ÂΜL (ref 0.6–4.47)
LYMPHOCYTES NFR BLD AUTO: 12 % (ref 14–44)
MAGNESIUM SERPL-MCNC: 1.9 MG/DL (ref 1.9–2.7)
MCH RBC QN AUTO: 31.8 PG (ref 26.8–34.3)
MCHC RBC AUTO-ENTMCNC: 31 G/DL (ref 31.4–37.4)
MCV RBC AUTO: 103 FL (ref 82–98)
MONOCYTES # BLD AUTO: 0.65 THOUSAND/ÂΜL (ref 0.17–1.22)
MONOCYTES NFR BLD AUTO: 15 % (ref 4–12)
NEUTROPHILS # BLD AUTO: 3.21 THOUSANDS/ÂΜL (ref 1.85–7.62)
NEUTS SEG NFR BLD AUTO: 72 % (ref 43–75)
NRBC BLD AUTO-RTO: 0 /100 WBCS
PHOSPHATE SERPL-MCNC: 3.4 MG/DL (ref 2.7–4.5)
PLATELET # BLD AUTO: 123 THOUSANDS/UL (ref 149–390)
PMV BLD AUTO: 11 FL (ref 8.9–12.7)
POTASSIUM SERPL-SCNC: 4.2 MMOL/L (ref 3.5–5.3)
PROT SERPL-MCNC: 8.1 G/DL (ref 6.4–8.4)
RBC # BLD AUTO: 3.21 MILLION/UL (ref 3.88–5.62)
SODIUM SERPL-SCNC: 138 MMOL/L (ref 135–147)
WBC # BLD AUTO: 4.42 THOUSAND/UL (ref 4.31–10.16)

## 2024-06-03 PROCEDURE — 85025 COMPLETE CBC W/AUTO DIFF WBC: CPT | Performed by: FAMILY MEDICINE

## 2024-06-03 PROCEDURE — 92610 EVALUATE SWALLOWING FUNCTION: CPT

## 2024-06-03 PROCEDURE — 32555 ASPIRATE PLEURA W/ IMAGING: CPT

## 2024-06-03 PROCEDURE — 80053 COMPREHEN METABOLIC PANEL: CPT | Performed by: FAMILY MEDICINE

## 2024-06-03 PROCEDURE — 83735 ASSAY OF MAGNESIUM: CPT | Performed by: FAMILY MEDICINE

## 2024-06-03 PROCEDURE — 0W993ZZ DRAINAGE OF RIGHT PLEURAL CAVITY, PERCUTANEOUS APPROACH: ICD-10-PCS | Performed by: RADIOLOGY

## 2024-06-03 PROCEDURE — C1729 CATH, DRAINAGE: HCPCS

## 2024-06-03 PROCEDURE — 97530 THERAPEUTIC ACTIVITIES: CPT

## 2024-06-03 PROCEDURE — 84100 ASSAY OF PHOSPHORUS: CPT | Performed by: FAMILY MEDICINE

## 2024-06-03 PROCEDURE — 97163 PT EVAL HIGH COMPLEX 45 MIN: CPT

## 2024-06-03 PROCEDURE — 0W9B3ZZ DRAINAGE OF LEFT PLEURAL CAVITY, PERCUTANEOUS APPROACH: ICD-10-PCS | Performed by: RADIOLOGY

## 2024-06-03 PROCEDURE — 99233 SBSQ HOSP IP/OBS HIGH 50: CPT | Performed by: FAMILY MEDICINE

## 2024-06-03 RX ORDER — ACETAMINOPHEN 325 MG/1
650 TABLET ORAL EVERY 6 HOURS PRN
Status: DISCONTINUED | OUTPATIENT
Start: 2024-06-03 | End: 2024-06-14 | Stop reason: HOSPADM

## 2024-06-03 RX ORDER — PYRIDOXINE HCL (VITAMIN B6) 50 MG
100 TABLET ORAL DAILY
Status: DISCONTINUED | OUTPATIENT
Start: 2024-06-03 | End: 2024-06-14 | Stop reason: HOSPADM

## 2024-06-03 RX ORDER — FUROSEMIDE 10 MG/ML
40 INJECTION INTRAMUSCULAR; INTRAVENOUS
Status: DISCONTINUED | OUTPATIENT
Start: 2024-06-03 | End: 2024-06-04

## 2024-06-03 RX ORDER — PRIMIDONE 50 MG/1
150 TABLET ORAL
Status: DISCONTINUED | OUTPATIENT
Start: 2024-06-03 | End: 2024-06-14 | Stop reason: HOSPADM

## 2024-06-03 RX ORDER — LEVOCARNITINE 1 G/10ML
1000 SOLUTION ORAL
Status: DISCONTINUED | OUTPATIENT
Start: 2024-06-03 | End: 2024-06-14 | Stop reason: HOSPADM

## 2024-06-03 RX ORDER — TORSEMIDE 20 MG/1
40 TABLET ORAL DAILY
Status: DISCONTINUED | OUTPATIENT
Start: 2024-06-03 | End: 2024-06-03

## 2024-06-03 RX ORDER — ZONISAMIDE 100 MG/1
100 CAPSULE ORAL
Status: DISCONTINUED | OUTPATIENT
Start: 2024-06-03 | End: 2024-06-14 | Stop reason: HOSPADM

## 2024-06-03 RX ORDER — LAMOTRIGINE 100 MG/1
300 TABLET ORAL
Status: DISCONTINUED | OUTPATIENT
Start: 2024-06-03 | End: 2024-06-03

## 2024-06-03 RX ORDER — LACTULOSE 10 G/15ML
20 SOLUTION ORAL DAILY
Status: DISCONTINUED | OUTPATIENT
Start: 2024-06-03 | End: 2024-06-13

## 2024-06-03 RX ORDER — PRIMIDONE 50 MG/1
100 TABLET ORAL EVERY MORNING
Status: DISCONTINUED | OUTPATIENT
Start: 2024-06-03 | End: 2024-06-14 | Stop reason: HOSPADM

## 2024-06-03 RX ORDER — SPIRONOLACTONE 100 MG/1
100 TABLET, FILM COATED ORAL DAILY
Status: DISCONTINUED | OUTPATIENT
Start: 2024-06-03 | End: 2024-06-03

## 2024-06-03 RX ORDER — ALBUMIN, HUMAN INJ 5% 5 %
25 SOLUTION INTRAVENOUS ONCE
Status: COMPLETED | OUTPATIENT
Start: 2024-06-03 | End: 2024-06-03

## 2024-06-03 RX ORDER — LAMOTRIGINE 100 MG/1
300 TABLET ORAL
Status: DISCONTINUED | OUTPATIENT
Start: 2024-06-03 | End: 2024-06-14 | Stop reason: HOSPADM

## 2024-06-03 RX ORDER — LAMOTRIGINE 100 MG/1
200 TABLET ORAL DAILY
Status: DISCONTINUED | OUTPATIENT
Start: 2024-06-03 | End: 2024-06-14 | Stop reason: HOSPADM

## 2024-06-03 RX ORDER — CLONAZEPAM 0.5 MG/1
0.5 TABLET ORAL
Status: DISCONTINUED | OUTPATIENT
Start: 2024-06-03 | End: 2024-06-14 | Stop reason: HOSPADM

## 2024-06-03 RX ORDER — LIDOCAINE WITH 8.4% SOD BICARB 0.9%(10ML)
SYRINGE (ML) INJECTION AS NEEDED
Status: COMPLETED | OUTPATIENT
Start: 2024-06-03 | End: 2024-06-03

## 2024-06-03 RX ORDER — SPIRONOLACTONE 100 MG/1
100 TABLET, FILM COATED ORAL DAILY
Status: DISCONTINUED | OUTPATIENT
Start: 2024-06-04 | End: 2024-06-04

## 2024-06-03 RX ORDER — PRIMIDONE 50 MG/1
100 TABLET ORAL
Status: DISCONTINUED | OUTPATIENT
Start: 2024-06-03 | End: 2024-06-14 | Stop reason: HOSPADM

## 2024-06-03 RX ADMIN — LAMOTRIGINE 300 MG: 100 TABLET ORAL at 22:15

## 2024-06-03 RX ADMIN — PRIMIDONE 150 MG: 50 TABLET ORAL at 22:15

## 2024-06-03 RX ADMIN — PYRIDOXINE HCL TAB 50 MG 100 MG: 50 TAB at 15:07

## 2024-06-03 RX ADMIN — PIPERACILLIN AND TAZOBACTAM 4.5 G: 36; 4.5 INJECTION, POWDER, FOR SOLUTION INTRAVENOUS at 00:47

## 2024-06-03 RX ADMIN — LACTULOSE 20 G: 20 SOLUTION ORAL at 15:06

## 2024-06-03 RX ADMIN — Medication 2 G: at 22:26

## 2024-06-03 RX ADMIN — LAMOTRIGINE 200 MG: 100 TABLET ORAL at 15:06

## 2024-06-03 RX ADMIN — LEVOCARNITINE 250 MG: 1 SOLUTION ORAL at 18:08

## 2024-06-03 RX ADMIN — CLONAZEPAM 0.5 MG: 0.5 TABLET ORAL at 22:15

## 2024-06-03 RX ADMIN — ACETAMINOPHEN 650 MG: 325 TABLET, FILM COATED ORAL at 15:09

## 2024-06-03 RX ADMIN — Medication 2 G: at 18:07

## 2024-06-03 RX ADMIN — LEVETIRACETAM 500 MG: 100 INJECTION, SOLUTION INTRAVENOUS at 08:30

## 2024-06-03 RX ADMIN — PIPERACILLIN AND TAZOBACTAM 4.5 G: 36; 4.5 INJECTION, POWDER, FOR SOLUTION INTRAVENOUS at 17:00

## 2024-06-03 RX ADMIN — LEVOCARNITINE 250 MG: 1 SOLUTION ORAL at 15:08

## 2024-06-03 RX ADMIN — ALBUMIN (HUMAN) 25 G: 12.5 INJECTION, SOLUTION INTRAVENOUS at 18:35

## 2024-06-03 RX ADMIN — Medication 5 ML: at 14:08

## 2024-06-03 RX ADMIN — PRIMIDONE 100 MG: 50 TABLET ORAL at 15:07

## 2024-06-03 RX ADMIN — ZONISAMIDE 100 MG: 100 CAPSULE ORAL at 22:18

## 2024-06-03 RX ADMIN — LEVOCARNITINE 250 MG: 1 SOLUTION ORAL at 22:25

## 2024-06-03 RX ADMIN — Medication 2 G: at 15:09

## 2024-06-03 RX ADMIN — PIPERACILLIN AND TAZOBACTAM 4.5 G: 36; 4.5 INJECTION, POWDER, FOR SOLUTION INTRAVENOUS at 08:32

## 2024-06-03 RX ADMIN — Medication 5 ML: at 13:59

## 2024-06-03 RX ADMIN — FUROSEMIDE 40 MG: 10 INJECTION, SOLUTION INTRAMUSCULAR; INTRAVENOUS at 08:31

## 2024-06-03 NOTE — PLAN OF CARE
Problem: PHYSICAL THERAPY ADULT  Goal: Performs mobility at highest level of function for planned discharge setting.  See evaluation for individualized goals.  Description: Treatment/Interventions: ADL retraining, Functional transfer training, LE strengthening/ROM, Elevations, Therapeutic exercise, Endurance training, Patient/family training, Cognitive reorientation, Equipment eval/education, Bed mobility, Gait training, Compensatory technique education, Spoke to nursing, Spoke to case management, OT  Equipment Recommended:  (should patient return to home instead of post acute rehab, recommend hospital bed, lizz lift and caregivers.)       See flowsheet documentation for full assessment, interventions and recommendations.  6/3/2024 1729 by Julienne Rooney, PT  Note: Prognosis: Good  Problem List: Decreased strength, Decreased endurance, Impaired balance, Decreased mobility, Decreased range of motion, Decreased cognition, Impaired judgement, Decreased safety awareness, Obesity, Decreased skin integrity  Pt tolerated session fairly. He fatigues easily, but appears appreciative to sit in recliner chair. he was more alert in uprihgt position. He followed directions for compleiton of LE TE. He is limited byd ecreased strength, balance, endurance and safety. He will continue to benefit from PT services to maximize LOF.        Barriers to Discharge Comments: pt requires significant assistance to complete mobility, increased fall risk. has support from mother, but would beenfit from icnreased support from daily aides.  Rehab Resource Intensity Level, PT: II (Moderate Resource Intensity)    See flowsheet documentation for full assessment.

## 2024-06-03 NOTE — ASSESSMENT & PLAN NOTE
Current ammonia level is normal.  Unable to give him lactulose as he will aspirate.  Patient takes Aldactone milligrams daily which has been discontinued and he will be on IV Lasix

## 2024-06-03 NOTE — ASSESSMENT & PLAN NOTE
Patient's creatinine is at baseline.  Will monitor as he will be on IV Lasix    Lab Results   Component Value Date    EGFR 37 06/03/2024    EGFR 36 06/02/2024    EGFR 33 (L) 05/07/2024    CREATININE 1.96 (H) 06/03/2024    CREATININE 1.98 (H) 06/02/2024    CREATININE 2.27 (H) 05/07/2024

## 2024-06-03 NOTE — DISCHARGE INSTR - OTHER ORDERS
Skin Care orders:  1- Cleanse sacral buttocks with soap and water apply silicone foam abraham with a P and date check skin integrity every shift change every 3 days or if soiled   2-EHOB  cushion when out of bed in chair.  3-Moisturize skin daily with skin nourishing cream  4-Elevate heels to offload pressure.  5-Turn/reposition q2h for pressure re-distribution on skin  6. Silicone foam to bilateral heels abraham with a P and date peel and check skin integrity every shift change every 3 days   7. Right lower leg - small partial thickness wound cleanse with soap and water then apply small silicone foam change every 3 days   8. Bilateral prevalon boots

## 2024-06-03 NOTE — WOUND OSTOMY CARE
Consult Note - Wound   Ash Loaiza 58 y.o. male MRN: [id number]  Unit/Bed#: -01 Encounter: [id number]        History and Present Illness: Patient is seen for wound care consult today . He is a 58 year old male admitted with pneumonia of both lungs , encephalopathy , stage 3 b chronic kidney disease , urinary retention with a sr , liver cirrhosis , acute on chronic heart failure , dysphagia , developmental delay , acute respiratory failure and nonintractable generalized idiopathic epilepsy without status epilepticus . He has a sr in place and is continent of bowel and the time of the assessment . He is a Mod - Max A to roll in the chair for the assessment. He is very stiff and rigid to move . Mom at the bedside to review his situation at home . Provided with a seating cushion , prevalon boots and wedges to be taken home for his care . She reports that there is no seating cushion at home . Discussed with the  for supplies and equipment .         Assessment Findings:   Left anterior lower leg intact dry scabbed area   Right lower leg - small partial thickness wound 100%  pink and noted scattered scabbed areas   Bilateral heels dry and intact   Sacral buttocks dry and intact all healed around the rectal area     Skin Care orders:  1- Cleanse sacral buttocks with soap and water apply silicone foam abraham with a P and date check skin integrity every shift change every 3 days or if soiled   2-EHOB  cushion when out of bed in chair.  3-Moisturize skin daily with skin nourishing cream  4-Elevate heels to offload pressure.  5-Turn/reposition q2h for pressure re-distribution on skin  6. Silicone foam to bilateral heels abraham with a P and date peel and check skin integrity every shift change every 3 days   7. Right lower leg - small partial thickness wound cleanse with soap and water then apply small silicone foam change every 3 days   8. Bilateral prevalon boots       Refer to the assessment below      Wounds:  Wound 08/07/21 Other (comment) Pretibial Distal;Left (Active)   Wound Image   06/03/24 1159   Wound Description Clean;Dry;Intact;Brown 06/03/24 1159   Jyotsna-wound Assessment Clean;Dry;Intact 06/03/24 1159   Wound Length (cm) 0 cm 06/03/24 1159   Wound Width (cm) 0 cm 06/03/24 1159   Wound Depth (cm) 0 cm 06/03/24 1159   Wound Surface Area (cm^2) 0 cm^2 06/03/24 1159   Wound Volume (cm^3) 0 cm^3 06/03/24 1159   Calculated Wound Volume (cm^3) 0 cm^3 06/03/24 1159   Drainage Amount None 06/03/24 1159   Dressing Open to air 06/03/24 1159   Patient Tolerance Tolerated well 06/03/24 1159       Wound 08/11/21 Pretibial Right (Active)   Wound Image   06/03/24 1156   Wound Description Clean;Fragile;[place] 06/03/24 1156   Jyotsna-wound Assessment Clean;Dry;Intact 06/03/24 1156   Wound Length (cm) 1 cm 06/03/24 1156   Wound Width (cm) 0.5 cm 06/03/24 1156   Wound Depth (cm) 0.1 cm 06/03/24 1156   Wound Surface Area (cm^2) 0.5 cm^2 06/03/24 1156   Wound Volume (cm^3) 0.05 cm^3 06/03/24 1156   Calculated Wound Volume (cm^3) 0.05 cm^3 06/03/24 1156   Drainage Amount Scant 06/03/24 1156   Drainage Description Serosanguineous 06/03/24 1156   Non-staged Wound Description Partial thickness 06/03/24 1156   Treatments Cleansed;Site care 06/03/24 1156   Dressing Foam, Silicon (eg. Allevyn, etc) 06/03/24 1156   Dressing Changed Changed 06/03/24 1156   Patient Tolerance Tolerated well 06/03/24 1156       Wound 06/02/24 Pressure Injury Sacrum (Active)   Wound Image   06/03/24 1155   Wound Description Clean;Dry;Intact 06/03/24 1155   Jyotsna-wound Assessment Clean;Dry;Intact 06/03/24 1155   Wound Length (cm) 0 cm 06/03/24 1155   Wound Width (cm) 0 cm 06/03/24 1155   Wound Depth (cm) 0 cm 06/03/24 1155   Wound Surface Area (cm^2) 0 cm^2 06/03/24 1155   Wound Volume (cm^3) 0 cm^3 06/03/24 1155   Calculated Wound Volume (cm^3) 0 cm^3 06/03/24 1155   Drainage Amount None 06/03/24 1155   Treatments Cleansed;Site care 06/03/24 1155   Dressing  Foam, Silicon (eg. Allevyn, etc) 06/03/24 1155   Dressing Changed Reinforced 06/03/24 1155   Patient Tolerance Tolerated well 06/03/24 1155   Dressing Status Clean;Dry;Intact 06/03/24 0900     Wound care will follow weekly call or tiger text with questions or concerns     Rubi Zimmerman RN BSN CWOCN

## 2024-06-03 NOTE — ASSESSMENT & PLAN NOTE
At home he takes Lamictal and Zonegran unfortunately patient cannot take oral medications.  He will be placed on IV Keppra.  I reached out to pharmacy for equivalent dosing

## 2024-06-03 NOTE — PROGRESS NOTES
[facility]  Progress Note  Name: Ash Loaiza I  MRN: [id number]  Unit/Bed#: -01 I Date of Admission: 6/2/2024   Date of Service: 6/3/2024 I Hospital Day: 1    Assessment & Plan   * Pneumonia of both lungs due to infectious organism  Assessment & Plan  Recurrent aspiration pneumonia.  He had a hospitalization in April 2024 at [facility].  Continue IV Zosyn.  Speech therapy evaluated patient and recommended Pureed with nectar thick liquid with crushed meds.     Encephalopathy  Assessment & Plan  Patient with developmental delay, but this is most likely secondary to his acute illness including pleural effusions, recurrent aspiration pneumonia.    Stage 3b chronic kidney disease (HCC)  Assessment & Plan  Patient's creatinine is at baseline.  Will monitor as he will be on IV Lasix    Lab Results   Component Value Date    EGFR 37 06/03/2024    EGFR 36 06/02/2024    EGFR 33 (L) 05/07/2024    CREATININE 1.96 (H) 06/03/2024    CREATININE 1.98 (H) 06/02/2024    CREATININE 2.27 (H) 05/07/2024       Urinary retention  Assessment & Plan  Azul catheter has been placed for strict I's and O's    Liver cirrhosis (HCC)  Assessment & Plan  Current ammonia level is normal.    Resume home Lactulose  Will resume home aldactone    Acute on chronic heart failure with preserved ejection fraction (HCC)  Assessment & Plan  Patient with bilateral pleural effusions, received 80 mg of Lasix IV in the emergency room.  At home he takes torsemide and Aldactone.  Lasix 40 mg IV twice daily.      Wt Readings from Last 3 Encounters:   06/02/24 93.9 kg (207 lb)   01/30/24 79.3 kg (174 lb 13.2 oz)   12/08/23 106 kg (234 lb 9.1 oz)         Bilateral pleural effusion  Assessment & Plan  Moderately large right and moderate size left pleural effusion.  On previous admission in April 2024, he had a right-sided thoracentesis which was transudative.  Plan for IR Thoracentesis today.      Dysphagia  Assessment & Plan  At baseline on pureed diet.   Speech evaluated patient and recs pureed with nectar thick, crushed meds    Developmental delay  Assessment & Plan  Since birth, patient is adopted.  It is unknown why he has the mental delay if it is due to anoxic brain injury or cerebral palsy.    Acute respiratory failure with hypoxia (HCC)  Assessment & Plan  Multifactorial secondary to heart failure, pleural effusions recurrent aspiration pneumonia.  Continue with oxygen via nasal cannula.  Currently on 2L which appears to be baseline    Nonintractable generalized idiopathic epilepsy without status epilepticus (HCC)  Assessment & Plan  At home he takes Lamictal and Zonegran.  Patient able to resume PO intake, will resume home meds.              VTE Pharmacologic Prophylaxis:   Pharmacologic: Pharmacologic VTE Prophylaxis contraindicated due to holding for planned thoracentesis  Mechanical VTE Prophylaxis in Place: Yes    Patient Centered Rounds: I have performed bedside rounds with nursing staff today.    Discussions with Specialists or Other Care Team Provider:    Education and Discussions with Family / Patient:  discussed with mother at bedside       Current Length of Stay: 1 day(s)    Current Patient Status: Inpatient   Certification Statement: The patient will continue to require additional inpatient hospital stay due to pending progress    Discharge Plan: atleast 48 hours    Code Status: Level 3 - DNAR and DNI      Subjective:     Patient examined at bedside.  Currently only on 0.5 to 2 L oxygen via NC.  Does not appear to be in respiratory distress.  Was sleeping, wakes up with name calling.  Awaiting thoracentesis this afternoon.  Speech therapy evaluated patient, cleared patient for puréed with nectar thick liquid    Objective:     Vitals:   Temp (24hrs), Av.8 °F (36.6 °C), Min:97.2 °F (36.2 °C), Max:98.6 °F (37 °C)    Temp:  [97.2 °F (36.2 °C)-98.6 °F (37 °C)] 98.6 °F (37 °C)  HR:   [63-82] 82  Resp:  [15-22] 18  BP: ()/(50-84) 109/72  SpO2:  [88 %-99 %] 93 %  Body mass index is 33.41 kg/m².     Input and Output Summary (last 24 hours):       Intake/Output Summary (Last 24 hours) at 6/3/2024 0994  Last data filed at 6/3/2024 0457  Gross per 24 hour   Intake --   Output 2275 ml   Net -2275 ml       Physical Exam:     Physical Exam  Vitals and nursing note reviewed.   Constitutional:       General: He is not in acute distress.     Appearance: Normal appearance. He is ill-appearing.   HENT:      Head: Normocephalic and atraumatic.      Right Ear: External ear normal.      Left Ear: External ear normal.      Nose: Nose normal.   Pulmonary:      Effort: No respiratory distress.      Breath sounds: Rhonchi present.   Neurological:      Mental Status: He is alert. Mental status is at baseline.           Additional Data:     Labs:    Results from last 7 days   Lab Units 06/03/24  0506   WBC Thousand/uL 4.42   HEMOGLOBIN g/dL 10.2*   HEMATOCRIT % 32.9*   PLATELETS Thousands/uL 123*   SEGS PCT % 72   LYMPHO PCT % 12*   MONO PCT % 15*   EOS PCT % 0     Results from last 7 days   Lab Units 06/03/24  0506   SODIUM mmol/L 138   POTASSIUM mmol/L 4.2   CHLORIDE mmol/L 100   CO2 mmol/L 30   BUN mg/dL 25   CREATININE mg/dL 1.96*   ANION GAP mmol/L 8   CALCIUM mg/dL 8.8   ALBUMIN g/dL 3.5   TOTAL BILIRUBIN mg/dL 1.23*   ALK PHOS U/L 161*   ALT U/L 8   AST U/L 18   GLUCOSE RANDOM mg/dL 73     Results from last 7 days   Lab Units 06/02/24  1435   INR  1.23*             Results from last 7 days   Lab Units 06/02/24  1435   LACTIC ACID mmol/L 1.4   PROCALCITONIN ng/ml 0.30*                Recent Cultures (last 7 days):     Results from last 7 days   Lab Units 06/02/24  1452 06/02/24  1435   BLOOD CULTURE  Received in Microbiology Lab. Culture in Progress. Received in Microbiology Lab. Culture in Progress.       Last 24 Hours Medication List:   Current Facility-Administered Medications   Medication Dose Route  Frequency Provider Last Rate    clonazePAM  0.5 mg Oral HS Iris Rivas MD      furosemide  40 mg Intravenous BID (diuretic) Iris Rivas MD      lactulose  20 g Oral Daily Iris Rivas MD      lamoTRIgine  200 mg Oral Daily Iris Rivas MD      And    lamoTRIgine  250 mg Oral After Lunch Iris Rivas MD      And    lamoTRIgine  300 mg Oral HS Iris Rivas MD      levOCARNitine  1,000 mg/day Oral 4x Daily (with meals and at bedtime) Iris iRvas MD      metoprolol tartrate  25 mg Oral BID Iris Rivas MD      piperacillin-tazobactam  4.5 g Intravenous Q8H Fede Han Mcintyre MD 4.5 g (06/03/24 0832)    primidone  100 mg Oral QAM Iris Rivas MD      And    primidone  100 mg Oral After Lunch Iris Rivas MD      And    primidone  150 mg Oral HS Iris Rivas MD      pyridoxine  100 mg Oral Daily Iris Rivas MD      [START ON 6/4/2024] spironolactone  100 mg Oral Daily Iris Rivas MD      zonisamide  100 mg Oral HS Iris Rivas MD          Today, Patient Was Seen By: Iris Rivas MD    ** Please Note: Dictation voice to text software may have been used in the creation of this document. **

## 2024-06-03 NOTE — ASSESSMENT & PLAN NOTE
Moderately large right and moderate size left pleural effusion.  On previous admission in April 2024, he had a right-sided thoracentesis which was transudative.  Plan for IR Thoracentesis today.

## 2024-06-03 NOTE — ASSESSMENT & PLAN NOTE
Since birth, patient is adopted.  It is unknown why he has the mental delay if it is due to anoxic brain injury or cerebral palsy.

## 2024-06-03 NOTE — ASSESSMENT & PLAN NOTE
Had a detailed discussion with mom (Kylie) and we discussed that the patient is not doing well.  He has not been doing well since the beginning of this year and has declined since admission to the hospital in April 2024 at [facility] for aspiration pneumonia.  Patient continues to choke on thickened liquids at home.  He is now bedbound.  He continues to decline.  During his hospital course I discussed with that he has recurrent aspiration pneumonia, encephalopathic, bilateral pleural effusions and he seems to be getting worse.  She she states that he continues to decline and not do well.  She states that she has a hard time taking care of him.  Patient has been at 5 nursing homes and she states that they do not give good care to him.    We discussed CODE STATUS: Discussed that the likelihood of him recovering from a cardiac arrest is very poor.  Kylie was a nurse, and states that she did many codes.  She would like to make him a DNR noting that he would not do well from CPR, chest compressions or intubation.    We discussed that if the patient continues to have chronic aspiration pneumonia, and does not pass a swallow screen which he want a feeding tube.  She currently does not know.  We discussed about wishes and about quality measures.  I did briefly touched upon hospice and just making the patient comfortable if she did not want to take aggressive measures.  She does want to give him a chance to turn around but she knows that he may not do well.    Her son Patricio later joined her.  I answered more of his questions including about pleural effusion, aspiration pneumonia.  There is a bit of a poor understanding.  He did ask me that for the calcification of the pericardium if they would remove that.  With the patient's current medical condition I stated that most likely that would not happen and that is a question for CT surgery.    I did charge emphasized that the patient is not doing well and that he can take a turn for the  worse tonight.  There is a very high chance that the patient could pass away tonight.

## 2024-06-03 NOTE — PHYSICAL THERAPY NOTE
PHYSICAL THERAPY EVALUATION  NAME:  Ash Loaiza  DATE: 06/03/24    AGE:   58 y.o.  Mrn:   [id number]  ADMIT DX:  Acute on chronic diastolic heart failure (HCC) [I50.33]  Anasarca [R60.1]  Acute respiratory failure with hypoxia (HCC) [J96.01]  Bilateral lower extremity edema [R60.0]  Acute decompensated heart failure (HCC) [I50.9]  Known medical problems [Z78.9]  Chronic kidney disease, unspecified CKD stage [N18.9]    Past Medical History:   Diagnosis Date    Hypertension     Mentally challenged     Pericardial effusion     Pneumonia     Seizure (HCC)      Length Of Stay: 1  Performed at least 2 patient identifiers during session: Name and ID bracelet  PHYSICAL THERAPY EVALUATION :    06/03/24 1040   PT Last Visit   PT Visit Date 06/03/24   Note Type   Note type Evaluation   Pain Assessment   Pain Assessment Tool FLACC   Pain Score No Pain   Restrictions/Precautions   Other Precautions Cognitive;Chair Alarm;Bed Alarm;Fall Risk;Multiple lines   Home Living   Type of Home House  (3 ALESIA with rail)   Home Layout Two level;Bed/bath upstairs  (FF to 2nd floor bedroom and bathroom)   Home Equipment Walker;Wheelchair-manual   Additional Comments Pt's mother and uncle present. Pt's mother reports prior to hospitalization at outside facility and rehab stay during month of May, patient was able to transfer and ambulate with RW short distances with assistance of herself and complete stairs t access home envrionment. Since rehab stay, upon discharge to home 5/21/24, patent was recommended lizz lift for home. [facility] mother requesting therapy in the home to progress mobility to complete transfers.   Prior Function   Level of [place] Needs assistance with ADLs;Needs assistance with functional mobility;Needs assistance with IADLS   Lives With Other (Comment)  (mother)   Receives Help From Family   IADLs Family/Friend/Other provides transportation;Family/Friend/Other  "provides meals;Family/Friend/Other provides medication management   Falls in the last 6 months 1 to 4  (per patient's mother, rolled out of bed at SNF. pt noted ot have ecchymotic area near bilateral eyes)   Comments Pt has not been out of bed since returning home on 5/21/24 from post acute rehab.   General   Additional Pertinent History patient enjoys watching action movies, Star Trek, Batman and [employer].    Pt wiht impaired skin integrity. inc B LE edema. CTA chest for PE showing: Moderately large right and moderate-sized left pleural effusion, each mildly increased in size from earlier with adjacent compressive atelectasis mild body wall edema. Plan for IR thoracentesis this date.   PT admitted to outside hospital 4/5/24 to 4/15/24 and discharged to SNF for post acute rehab. Pt recently discharged home on 5/21/24.   Cognition   Orientation Level Oriented to person;Disoriented to time;Disoriented to situation  (place wiht cues, initially stated nursing home, inc time to report hospital)   Following Commands Follows one step commands with increased time or repetition   Comments repeated cues, increased time to respond.   Subjective   Subjective \"Who ya gonna call?\"   RLE Assessment   RLE Assessment   (minimal AROM right knee extension and hip flexion sitting EOB, strength 2/5. pt wiht inc B LE edema)   LLE Assessment   LLE Assessment   (hip flexion to ~ 90 degrees, knee ext < 0 degrees, ankle DF to neutral strength grossly 2+/5; pt with inc B LE edema)   Bed Mobility   Supine to Sit 2  Maximal assistance   Additional items Assist x 1;Increased time required;Verbal cues;LE management  (trunk management)   Additional Comments HOB elevated > 30 degrees. maxAx1 to ahcieve sitting EOB wiht manual cues for trunk and LE management. required moderate ot minimal assistance to maintain sitting on EOB.   Transfers   Sit to Stand 2  Maximal assistance   Additional items Assist x 2;Increased time required;Verbal cues   Stand " to Sit 2  Maximal assistance   Additional items Assist x 2;Increased time required;Verbal cues   Stand pivot Unable to assess   Additional Comments deferred RW for safety. B knees and feet blocked. sit<>stand with maxAx2 wiht manual cues to ahcieve standing. pt with NBOS, inc trunk flexion. cues for upright posture. returned to sitting for safety.   Ambulation/Elevation   Distance pt unable   Balance   Static Sitting   (poor + to poor)   Dynamic Sitting Poor   Static Standing Poor -   Endurance Deficit   Endurance Deficit Yes   Endurance Deficit Description pt on .5 lpm NC. SpO2 at rest 93-95%. 90% sitting EOB.   Activity Tolerance   Activity Tolerance Patient limited by fatigue   Nurse Made Aware Kanchan CAMEJO   Assessment   Prognosis Good   Problem List Decreased strength;Decreased endurance;Impaired balance;Decreased mobility;Decreased range of motion;Decreased cognition;Impaired judgement;Decreased safety awareness;Obesity;Decreased skin integrity   Barriers to Discharge Comments pt requires significant assistance to complete mobility, increased fall risk. has support from mother, but would beenfit from icnreased support from daily aides.   Goals   Patient Goals none stated   STG Expiration Date 06/17/24   PT Treatment Day 1   Plan   Treatment/Interventions ADL retraining;Functional transfer training;LE strengthening/ROM;Elevations;Therapeutic exercise;Endurance training;Patient/family training;Cognitive reorientation;Equipment eval/education;Bed mobility;Gait training;Compensatory technique education;Spoke to nursing;Spoke to case management;OT   PT Frequency 3-5x/wk   Discharge Recommendation   Rehab Resource Intensity Level, PT II (Moderate Resource Intensity)   Equipment Recommended   (should patient return to home instead of post acute rehab, recommend hospital bed, lizz lift and caregivers.)   AM-PAC Basic Mobility Inpatient   Turning in Flat Bed Without Bedrails 2   Lying on Back to Sitting on Edge of Flat  Bed Without Bedrails 2   Moving Bed to Chair 1   Standing Up From Chair Using Arms 1   Walk in Room 1   Climb 3-5 Stairs With Railing 1   Basic Mobility Inpatient Raw Score 8   Turning Head Towards Sound 2   Follow Simple Instructions 2   Low Function Basic Mobility Raw Score  12   Low Function Basic Mobility Standardized Score  18.33   [facility] Highest Level Of Mobility   [facility] Goal 3: Sit at edge of bed   -HL Achieved 4: Move to chair/commode   Additional Treatment Session   Start Time 1102   End Time 1115   Treatment Assessment Pt tolerated session fairly. He fatigues easily, but appears appreciative to sit in recliner chair. he was more alert in uprihgt position. He followed directions for compleiton of LE TE. He is limited byd ecreased strength, balance, endurance and safety. He will continue to benefit from PT services to maximize LOF.   Equipment Use B knees and feet blocked. inc [employer] maual cues. sit<>stand with maxAx2. spt without AD with maxAx2 with manual cues for wt shfiting and verbal cues for turni. pt with minimal advancement of LEs. sittign in recliner, completed 1x10 each ankle DF/PF, LAQ and hip flexion. AAROM on right. cues for proper completion. Discussed recommendation for rehab-pt's mother agreeable. Should patient not return to post acute rehab, would recommend [facility] as well as hospital bed, lizz lift and daily caregivers aside from his mother.     End of Consult   Patient Position at End of Consult Bedside chair;Bed/Chair alarm activated;All needs within reach       (Please find full objective findings from PT assessment regarding body systems outlined above).     Assessment: Pt is a 58 y.o. male seen for PT evaluation s/p admission to [facility] on 6/2/2024 with Pneumonia of both lungs due to infectious organism.  Order placed for PT services.  Upon evaluation: Pt is presenting with impaired functional mobility due to decreased strength, decreased ROM, decreased  endurance, impaired balance, impaired cognition, decreased safety awareness, impaired judgment, fall risk, LE edema, and impaired skin integrity requiring  maximal assistance for bed mobility and maximal of 2 assistance for transfers. Pt's clinical presentation is currently unpredictable given the functional mobility deficits above, especially weakness, decreased ROM, edema of extremities, decreased skin integrity, decreased endurance, impaired balance, decreased activity tolerance, decreased functional mobility tolerance, decreased safety awareness, impaired judgement, decreased cognition, and SOB upon exertion, coupled with fall risks as indicated by AM-PAC 6-Clicks: 8/24 as well as hx of falls, impaired balance, polypharmacy, impaired judgement, decreased safety awareness, decreased cognition, and obesity and combined with medical complications of abnormal renal lab values, abnormal H&H, increased O2 needs from baseline as patient only used O2 at night, multiple readmissions, need for input for mobility technique/safety, and Moderately large right and moderate-sized left pleural effusion, recurrent aspiration PNA bilateral lungs, encephalopathy, urinary retention, acute CHF, acute respiratory failure with hypoxia.  Pt's PMHx and comorbidities that may affect physical performance and progress include: orthopedic surgery and epilepsy, developmental delay, dysphagia, CHF, liver cirrhosis, CKD, HTN, pericardial effusion  . Personal factors affecting pt at time of IE include: inaccessible home environment, step(s) to enter environment, multi-level environment, limited home support, inability to perform IADLs, inability to perform ADLs, inability to navigate level surfaces without external assistance, inability to ambulate household distances, limited insight into impairments, and recent fall(s)/fall history. Pt will benefit from continued skilled PT services to address deficits as defined above and to maximize level  of functional mobility to facilitate return toward PLOF and improved QOL. From PT/mobility standpoint, recommendation at time of d/c would be Level II (Moderate Resource Intensity in order to reduce fall risk and maximize pt's functional independence and consistency with mobility. Recommend trial with walker next 1-2 sessions and ther ex next 1-2 sessions.       The patient's AM-PAC Basic Mobility Inpatient Short Form Raw Score is 8. A Raw score of less than or equal to 16 suggests the patient may benefit from discharge to post-acute rehabilitation services. Please also refer to the recommendation of the Physical Therapist for safe discharge planning.       Goals: Pt will: Perform bed mobility tasks with consistent modA of 1 to reposition in bed and prepare for transfers. Pt will sit EOB with Supervision >/= 5' to prepare for transfers. Pt will perform transfers with consistent modA of 1 to 2 to decrease burden of care, decrease risk for falls, and improve activity tolerance and prepare for ambulation. Pt will ambulate with RW for >/= 5' with   modAx2   to decrease burden of care, decrease risk for falls, improve ease of transfers, and improve activity tolerance and to access home environment. Trial stairs as appropriate. Pt will participate in objective balance assessment to determine baseline fall risk. Pt will increase B LE strength >/= 1/2 MMT grade to facilitate functional mobility.      Julienne Rooney, PT,DPT

## 2024-06-03 NOTE — ASSESSMENT & PLAN NOTE
Patient with bilateral pleural effusions, received 80 mg of Lasix IV in the emergency room.  At home he takes torsemide and Aldactone.  Lasix 40 mg IV twice daily.      Wt Readings from Last 3 Encounters:   06/02/24 93.9 kg (207 lb)   01/30/24 79.3 kg (174 lb 13.2 oz)   12/08/23 106 kg (234 lb 9.1 oz)

## 2024-06-03 NOTE — ASSESSMENT & PLAN NOTE
At baseline on pureed diet.   Speech evaluated patient and recs pureed with nectar thick, crushed meds

## 2024-06-03 NOTE — H&P
[facility]  H&P  Name: Ash Loaiza 58 y.o. male I MRN: [id number]  Unit/Bed#: [facility]-01 I Date of Admission: 6/2/2024   Date of Service: 6/2/2024 I Hospital Day: 0      Assessment & Plan   * Pneumonia of both lungs due to infectious organism  Assessment & Plan  Recurrent aspiration pneumonia.  He had a hospitalization in April 2024 at [facility].  Will place him on Zosyn therapy.  Mother is the main historian states he is already on thickened liquids at home.  She still noticed that he chokes.  Will also consult speech therapy    Acute on chronic heart failure with preserved ejection fraction (HCC)  Assessment & Plan  Patient with bilateral pleural effusions, received 80 mg of Lasix IV in the emergency room.  At home he only takes Aldactone.  Will start with Lasix 40 mg IV twice daily.      Wt Readings from Last 3 Encounters:   06/02/24 94 kg (207 lb 3.7 oz)   01/30/24 79.3 kg (174 lb 13.2 oz)   12/08/23 106 kg (234 lb 9.1 oz)         Bilateral pleural effusion  Assessment & Plan  Moderately large right and moderate size left pleural effusion.  Will need to consult IR for thoracentesis  On previous admission in April 2024, he had a right-sided thoracentesis which was transudative.    Acute respiratory failure with hypoxia (HCC)  Assessment & Plan  Multifactorial secondary to heart failure, pleural effusions recurrent aspiration pneumonia.  Continue with oxygen via nasal cannula.    Encephalopathy  Assessment & Plan  Patient with developmental delay, but this is most likely secondary to his acute illness including pleural effusions, recurrent aspiration pneumonia.    Nonintractable generalized idiopathic epilepsy without status epilepticus (HCC)  Assessment & Plan  At home he takes Lamictal and Zonegran unfortunately patient cannot take oral medications.  He will be placed on IV Keppra.  I reached out to pharmacy for equivalent dosing    Urinary retention  Assessment  & Plan  Azul catheter has been placed for strict I's and O's    Dysphagia  Assessment & Plan  Patient is already on a modified diet with thickened liquids.  Will have speech therapy see him.  Anticipate his dysphagia is getting worse.    Developmental delay  Assessment & Plan  Since birth, patient is adopted.  It is unknown why he has the mental delay if it is due to anoxic brain injury or cerebral palsy.    Stage 3b chronic kidney disease (HCC)  Assessment & Plan  Patient's creatinine is at baseline.  Will monitor as he will be on IV Lasix    Lab Results   Component Value Date    EGFR 36 06/02/2024    EGFR 33 (L) 05/07/2024    EGFR 45 (L) 04/15/2024    CREATININE 1.98 (H) 06/02/2024    CREATININE 2.27 (H) 05/07/2024    CREATININE 1.74 (H) 04/15/2024       Liver cirrhosis (HCC)  Assessment & Plan  Current ammonia level is normal.  Unable to give him lactulose as he will aspirate.  Patient takes Aldactone milligrams daily which has been discontinued and he will be on IV Lasix    Poor prognosis  Assessment & Plan  Had a detailed discussion with mom (Kylie) and we discussed that the patient is not doing well.  He has not been doing well since the beginning of this year and has declined since admission to the hospital in April 2024 at [facility] for aspiration pneumonia.  Patient continues to choke on thickened liquids at home.  He is now bedbound.  He continues to decline.  During his hospital course I discussed with that he has recurrent aspiration pneumonia, encephalopathic, bilateral pleural effusions and he seems to be getting worse.  She she states that he continues to decline and not do well.  She states that she has a hard time taking care of him.  Patient has been at 5 nursing homes and she states that they do not give good care to him.    We discussed CODE STATUS: Discussed that the likelihood of him recovering from a cardiac arrest is very poor.  Kylie was a nurse, and states that she did many codes.  She would like  to make him a DNR noting that he would not do well from CPR, chest compressions or intubation.    We discussed that if the patient continues to have chronic aspiration pneumonia, and does not pass a swallow screen which he want a feeding tube.  She currently does not know.  We discussed about wishes and about quality measures.  I did briefly touched upon hospice and just making the patient comfortable if she did not want to take aggressive measures.  She does want to give him a chance to turn around but she knows that he may not do well.    Her son Patricio later joined her.  I answered more of his questions including about pleural effusion, aspiration pneumonia.  There is a bit of a poor understanding.  He did ask me that for the calcification of the pericardium if they would remove that.  With the patient's current medical condition I stated that most likely that would not happen and that is a question for CT surgery.    I did charge emphasized that the patient is not doing well and that he can take a turn for the worse tonight.  There is a very high chance that the patient could pass away tonight.           Disposition  #1  IV Zosyn  #2  IV Lasix  #3  CBC, CMP, magnesium and phosphorus in the morning  #4  Consult IR for thoracentesis  #5 prognosis poor: Patient may not do well overnight, discussed in length with his mom Kylie, and brother Patricio.  Please see above for detailed discussion.  The likelihood of the patient passing overnight is very high.        VTE Prophylaxis: Heparin  / sequential compression device   Code Status: Level 3 - DNAR and DNI       Anticipated Length of Stay:  Patient will be admitted on an Inpatient basis with an anticipated length of stay of greater than 2 midnights.   Justification for Hospital Stay: Please see detailed plans noted above.      Mom is main historian    Chief Complaint:     Aspiration  History of Present Illness:  Ash Loaiza is a 58 y.o. male who has past medical history  significant for developmental delay,'s chronic heart failure, cirrhosis of the liver, epilepsy who presents from home.  Mother was giving a shower and is no started to bleed.  Shortly after she noticed some gurgling sounds.  Kylie, his mother states that the patient has been getting weaker over the last 2 months.  He was recently hospitalized at [facility] for aspiration pneumonia and spent 10 days there.  Patient's oxygen saturation in the emergency room was 88% on room air.  He does require some oxygen at nighttime.  Workup in the emergency room shows bilateral pleural effusions and most likely aspiration pneumonia.  Patient is having hard time protecting his airway and most likely has secretions.  Noticed that has been having some apneic breathing.    Had a detailed conversation with the mom about his medical condition including aspiration pneumonia which is most likely chronic as the patient continues to choke on thickened liquids at home.  Bilateral pleural effusions, and his encephalopathy.      Review of Systems: Unable to obtain secondary to the mental delay and encephalopathy      Past Medical and Surgical History:   Past Medical History:   Diagnosis Date    Hypertension     Mentally challenged     Pericardial effusion     Pneumonia     Seizure (HCC)      Past Surgical History:   Procedure Laterality Date    FRACTURE SURGERY      clavicle, left humerus, radial, and ulna.  Right radius    HIP ARTHROSCOPY Right     IR THORACENTESIS  11/20/2023    IL COLONOSCOPY FLX DX W/COLLJ SPEC WHEN PFRMD N/A 4/1/2019    Procedure: COLONOSCOPY;  Surgeon: Avi Guzman MD;  Location: [facility];  Service: Colorectal       Meds/Allergies:  Medications Prior to Admission   Medication Sig Dispense Refill Last Dose    acetaminophen (TYLENOL) 325 mg tablet Take 2 tablets (650 mg total) by mouth every 6 (six) hours as needed for mild pain, headaches or fever  0     Apple Cider Vinegar 300 MG TABS Take by mouth       ascorbic acid  (VITAMIN C) 500 mg tablet Take 500 mg by mouth daily       aspirin 81 MG tablet Take 81 mg by mouth daily       ASPIRIN EC LOW STRENGTH PO Take by mouth       B Complex Vitamins (Vitamin-B Complex) TABS Take by mouth       calcium-vitamin D 250-100 MG-UNIT per tablet Take 1 tablet by mouth daily        Cephalexin 500 MG tablet Take by mouth       Cholecalciferol 25 MCG (1000 UT) capsule Take by mouth       clonazePAM (KlonoPIN) 0.5 mg tablet Take 1 tablet (0.5 mg total) by mouth daily at bedtime 30 tablet 0     ELDERBERRY PO Take 1,000 mg by mouth       lactulose 20 g/30 mL Take 30 mL (20 g total) by mouth daily Daily after supper in 4oz of juice 900 mL 0     lamoTRIgine (LaMICtal) 100 mg tablet Take 2.5 tablets (250 mg total) by mouth daily after lunch 75 tablet 0     lamoTRIgine (LaMICtal) 100 mg tablet Take 2 tablets (200 mg total) by mouth daily in the early morning AND 2.5 tablets (250 mg total) daily after lunch AND 3 tablets (300 mg total) daily at bedtime. 225 tablet 0     lamoTRIgine (LaMICtal) 100 mg tablet Take by mouth       lamoTRIgine (LaMICtal) 150 MG tablet Take 2 tablets (300 mg total) by mouth daily at bedtime 60 tablet 0     lamoTRIgine (LaMICtal) 25 mg tablet        levOCARNitine (CARNITOR) 330 MG tablet Take 1 tablet (330 mg total) by mouth 4 (four) times daily (after meals and at bedtime) 120 tablet 0     LORazepam (ATIVAN) 0.5 mg tablet Take 0.5 mg by mouth every 8 (eight) hours as needed       metoprolol tartrate (LOPRESSOR) 25 mg tablet Take 1 tablet (25 mg total) by mouth 2 (two) times a day 60 tablet 0     Omega-3 Fatty Acids (fish oil) 1,000 mg Take by mouth       primidone (MYSOLINE) 50 mg tablet Take 2 tablets (100 mg total) by mouth daily after breakfast AND 2 tablets (100 mg total) daily after lunch AND 3 tablets (150 mg total) daily at bedtime. 210 tablet 0     pyridoxine (VITAMIN B6) 100 mg tablet Take 100 mg by mouth daily       spironolactone (ALDACTONE) 100 mg tablet Take 1  tablet (100 mg total) by mouth daily 30 tablet 0     tamsulosin (FLOMAX) 0.4 mg Take 1 capsule (0.4 mg total) by mouth daily with dinner 14 capsule 0     torsemide (DEMADEX) 20 mg tablet Take 2 tablets (40 mg total) by mouth daily 60 tablet 0     vitamin E, tocopherol, 400 units capsule        zonisamide (ZONEGRAN) 100 mg capsule Take 1 capsule (100 mg total) by mouth daily at bedtime 30 capsule 0        Allergies:   Allergies   Allergen Reactions    Budesonide Seizures     Other reaction(s): Seizure    Dilantin [Phenytoin]      Pericardial effusion    Flurazepam Other (See Comments)     dalmane    Other reaction(s): MADW HIM RAMMEY   dalmane    Ipratropium-Albuterol Seizures     Other reaction(s): Seizures    Phenobarbital Hyperactivity    Vyvanse [Lisdexamfetamine Dimesylate] Other (See Comments)     Unknown        History:  Marital Status: Single     Substance Use History:   Social History     Substance and Sexual Activity   Alcohol Use Never     Social History     Tobacco Use   Smoking Status Never   Smokeless Tobacco Never     Social History     Substance and Sexual Activity   Drug Use Never       Family History:  Family History   Adopted: Yes       Physical Exam:     Vitals:   Blood Pressure: 122/84 (06/02/24 1832)  Pulse: 70 (06/02/24 1832)  Temperature: 97.9 °F (36.6 °C) (06/02/24 1832)  Temp Source: Temporal (06/02/24 1832)  Respirations: 16 (06/02/24 1832)  Weight - Scale: 94 kg (207 lb 3.7 oz) (06/02/24 1412)  SpO2: 95 % (06/02/24 1832)    Constitutional: Really ill-appearing  Eyes: Sunken  HENT:   Dry mucous membranes  Respiratory: Decreased breath sounds, gurgling sounds  Cardiovascular:  Normal rate, no murmurs   GI:  Soft, nondistended, no guarding   : Azul catheter present  Musculoskeletal:  no tenderness, no deformities.   Integument: Bilateral venous stasis lower extremities  Neurologic: Unable to assess  Psychiatric: Unable to assess      Lab Results: I have personally reviewed pertinent  reports.   and I have personally reviewed pertinent films in PACS    Results from last 7 days   Lab Units 06/02/24  1435   WBC Thousand/uL 4.81   HEMOGLOBIN g/dL 10.6*   HEMATOCRIT % 33.1*   PLATELETS Thousands/uL 148*   SEGS PCT % 77*   LYMPHO PCT % 13*   MONO PCT % 10   EOS PCT % 0     Results from last 7 days   Lab Units 06/02/24  1435   POTASSIUM mmol/L 4.1   CHLORIDE mmol/L 99   CO2 mmol/L 32   BUN mg/dL 24   CREATININE mg/dL 1.98*   CALCIUM mg/dL 9.1   ALK PHOS U/L 189*   ALT U/L 13   AST U/L 19     Results from last 7 days   Lab Units 06/02/24  1435   INR  1.23*     @LABRCNTI(mg)@      Imaging: I have personally reviewed pertinent reports.   and I have personally reviewed pertinent films in PACS    CTA ED chest PE Study    Result Date: 6/2/2024  Narrative: CTA - CHEST WITH IV CONTRAST - PULMONARY ANGIOGRAM INDICATION: Elevated D-dimer, hemoptysis. COMPARISON: 11/17/2023 TECHNIQUE: CTA examination of the chest was performed using angiographic technique according to a protocol specifically tailored to evaluate for pulmonary embolism. Multiplanar 2D reformatted images were created from the source data. In addition, coronal  3D MIP postprocessing was performed on the acquisition scanner. Radiation dose length product (DLP) for this visit: 540 mGy-cm . This examination, like all CT scans performed in the [facility], was performed utilizing techniques to minimize radiation dose exposure, including the use of iterative reconstruction and automated exposure control. IV Contrast: 100 mL of iohexol (OMNIPAQUE) Mild motion artifact exists FINDINGS: PULMONARY ARTERIAL TREE: No pulmonary embolus is seen. LUNGS: Bilateral compressive atelectasis. Mild motion artifact limiting assessment for subtle pulmonary opacities. PLEURA: Moderately large right effusion and moderate size left effusion, each increased in size since prior. HEART/GREAT VESSELS: Diffuse pericardial calcification. No pericardial effusion.  Coronary artery calcification.. No thoracic aortic aneurysm. MEDIASTINUM AND MIKEY: Unremarkable. CHEST WALL AND LOWER NECK: Mild body wall edema VISUALIZED STRUCTURES IN THE UPPER ABDOMEN: Unremarkable. OSSEOUS STRUCTURES: No acute fracture or destructive osseous lesion.     Impression: 1. Mild motion limited examination demonstrating no findings suspicious for pulmonary embolism 2. Moderately large right and moderate-sized left pleural effusion, each mildly increased in size from earlier with adjacent compressive atelectasis mild body wall edema 4. Diffuse pericardial calcification. Workstation performed: [id number]     CT head without contrast    Result Date: 6/2/2024  Narrative: CT BRAIN - WITHOUT CONTRAST INDICATION:   ecchymosis along left eyelid. COMPARISON: Head CT from January 27, 2024 TECHNIQUE:  CT examination of the brain was performed.  Multiplanar 2D reformatted images were created from the source data. Radiation dose length product (DLP) for this visit:  1055.87 mGy-cm .  This examination, like all CT scans performed in the [facility], was performed utilizing techniques to minimize radiation dose exposure, including the use of iterative reconstruction and automated exposure control. IMAGE QUALITY:  Diagnostic. FINDINGS: PARENCHYMA:  No intracranial mass, mass effect or midline shift. No CT signs of acute infarction.  No acute parenchymal hemorrhage. VENTRICLES AND EXTRA-AXIAL SPACES:  Enlargement of ventricles and extra-axial CSF spaces, out of proportion to the patient's age most consistent with cerebral and cerebellar atrophy. VISUALIZED ORBITS: Prior bilateral lens surgery. PARANASAL SINUSES: Polypoid inflammatory disease/retention cysts within the maxillary sinuses. No air-fluid levels. CALVARIUM AND EXTRACRANIAL SOFT TISSUES:  Normal.     Impression: No acute intracranial abnormality. Age advanced parenchymal volume loss. Workstation performed: [id number]       Medical decision  making high  Time spent seeing the patient, discussing with Kylie (mom) and brother Patricio about his medical diagnosis and guarded prognosis was 90 minutes                 Epic Records Reviewed as well as Records in Care Everywhere    ** Please Note: Dragon 360 Dictation voice to text software was used in the creation of this document. **

## 2024-06-03 NOTE — BRIEF OP NOTE (RAD/CATH)
INTERVENTIONAL RADIOLOGY PROCEDURE NOTE    Date: 6/3/2024    Procedure: Bilateral thoracentesis  Procedure Summary       Date:  Room / Location:     Anesthesia Start:  Anesthesia Stop:     Procedure:  Diagnosis:     Scheduled Providers:  Responsible Provider:     Anesthesia Type: Not recorded ASA Status: Not recorded            Preoperative diagnosis:   1. Acute respiratory failure with hypoxia (HCC)    2. Acute decompensated heart failure (HCC)    3. Acute on chronic diastolic heart failure (HCC)    4. Bilateral lower extremity edema    5. Anasarca    6. Chronic kidney disease, unspecified CKD stage         Postoperative diagnosis: Same.    Surgeon: Ayden Cuevas MD     Assistant: None. No qualified resident was available.    Blood loss: None    Specimens: None     Findings: Bedside bilateral thoracentesis  performed.    Complications: None immediate.    Anesthesia: local

## 2024-06-03 NOTE — PLAN OF CARE
Problem: Potential for Falls  Goal: Patient will remain free of falls  Description: INTERVENTIONS:  - Educate patient/family on patient safety including physical limitations  - Instruct patient to call for assistance with activity   - Consult OT/PT to assist with strengthening/mobility   - Keep Call bell within reach  - Keep bed low and locked with side rails adjusted as appropriate  - Keep care items and personal belongings within reach  - Initiate and maintain comfort rounds  - Make Fall Risk Sign visible to staff  - Offer Toileting every    Hours, in advance of need  - Initiate/Maintain   alarm  - Obtain necessary fall risk management equipment:     - Apply yellow socks and bracelet for high fall risk patients  - Consider moving patient to room near nurses station  Outcome: Progressing     Problem: PAIN - ADULT  Goal: Verbalizes/displays adequate comfort level or baseline comfort level  Description: Interventions:  - Encourage patient to monitor pain and request assistance  - Assess pain using appropriate pain scale  - Administer analgesics based on type and severity of pain and evaluate response  - Implement non-pharmacological measures as appropriate and evaluate response  - Consider cultural and social influences on pain and pain management  - Notify physician/advanced practitioner if interventions unsuccessful or patient reports new pain  Outcome: Progressing     Problem: INFECTION - ADULT  Goal: Absence or prevention of progression during hospitalization  Description: INTERVENTIONS:  - Assess and monitor for signs and symptoms of infection  - Monitor lab/diagnostic results  - Monitor all insertion sites, i.e. indwelling lines, tubes, and drains  - Monitor endotracheal if appropriate and nasal secretions for changes in amount and color  - Cecil appropriate cooling/warming therapies per order  - Administer medications as ordered  - Instruct and encourage patient and family to use good hand hygiene  technique  - Identify and instruct in appropriate isolation precautions for identified infection/condition  Outcome: Progressing  Goal: Absence of fever/infection during neutropenic period  Description: INTERVENTIONS:  - Monitor WBC    Outcome: Progressing     Problem: SAFETY ADULT  Goal: Patient will remain free of falls  Description: INTERVENTIONS:  - Educate patient/family on patient safety including physical limitations  - Instruct patient to call for assistance with activity   - Consult OT/PT to assist with strengthening/mobility   - Keep Call bell within reach  - Keep bed low and locked with side rails adjusted as appropriate  - Keep care items and personal belongings within reach  - Initiate and maintain comfort rounds  - Make Fall Risk Sign visible to staff  - Offer Toileting every    Hours, in advance of need  - Initiate/Maintain   alarm  - Obtain necessary fall risk management equipment:     - Apply yellow socks and bracelet for high fall risk patients  - Consider moving patient to room near nurses station  Outcome: Progressing  Goal: Maintain or return to baseline ADL function  Description: INTERVENTIONS:  -  Assess patient's ability to carry out ADLs; assess patient's baseline for ADL function and identify physical deficits which impact ability to perform ADLs (bathing, care of mouth/teeth, toileting, grooming, dressing, etc.)  - Assess/evaluate cause of self-care deficits   - Assess range of motion  - Assess patient's mobility; develop plan if impaired  - Assess patient's need for assistive devices and provide as appropriate  - Encourage maximum independence but intervene and supervise when necessary  - Involve family in performance of ADLs  - Assess for home care needs following discharge   - Consider OT consult to assist with ADL evaluation and planning for discharge  - Provide patient education as appropriate  Outcome: Progressing  Goal: Maintains/Returns to pre admission functional  level  Description: INTERVENTIONS:  - Perform AM-PAC 6 Click Basic Mobility/ Daily Activity assessment daily.  - Set and communicate daily mobility goal to care team and patient/family/caregiver.   - Collaborate with rehabilitation services on mobility goals if consulted  - Perform Range of Motion    times a day.  - Reposition patient every    hours.  - Dangle patient    times a day  - Stand patient    times a day  - Ambulate patient    times a day  - Out of bed to chair    times a day   - Out of bed for meals            times a day  - Out of bed for toileting  - Record patient progress and toleration of activity level   Outcome: Progressing     Problem: DISCHARGE PLANNING  Goal: Discharge to home or other facility with appropriate resources  Description: INTERVENTIONS:  - Identify barriers to discharge w/patient and caregiver  - Arrange for needed discharge resources and transportation as appropriate  - Identify discharge learning needs (meds, wound care, etc.)  - Arrange for interpretive services to assist at discharge as needed  - Refer to Case Management Department for coordinating discharge planning if the patient needs post-hospital services based on physician/advanced practitioner order or complex needs related to functional status, cognitive ability, or social support system  Outcome: Progressing     Problem: Knowledge Deficit  Goal: Patient/family/caregiver demonstrates understanding of disease process, treatment plan, medications, and discharge instructions  Description: Complete learning assessment and assess knowledge base.  Interventions:  - Provide teaching at level of understanding  - Provide teaching via preferred learning methods  Outcome: Progressing     Problem: Prexisting or High Potential for Compromised Skin Integrity  Goal: Skin integrity is maintained or improved  Description: INTERVENTIONS:  - Identify patients at risk for skin breakdown  - Assess and monitor skin integrity  - Assess and  monitor nutrition and hydration status  - Monitor labs   - Assess for incontinence   - Turn and reposition patient  - Assist with mobility/ambulation  - Relieve pressure over bony prominences  - Avoid friction and shearing  - Provide appropriate hygiene as needed including keeping skin clean and dry  - Evaluate need for skin moisturizer/barrier cream  - Collaborate with interdisciplinary team   - Patient/family teaching  - Consider wound care consult   Outcome: Progressing

## 2024-06-03 NOTE — PLAN OF CARE
Problem: PHYSICAL THERAPY ADULT  Goal: Performs mobility at highest level of function for planned discharge setting.  See evaluation for individualized goals.  Description: Treatment/Interventions: ADL retraining, Functional transfer training, LE strengthening/ROM, Elevations, Therapeutic exercise, Endurance training, Patient/family training, Cognitive reorientation, Equipment eval/education, Bed mobility, Gait training, Compensatory technique education, Spoke to nursing, Spoke to case management, OT  Equipment Recommended:  (should patient return to home instead of post acute rehab, recommend hospital bed, lizz lift and caregivers.)       See flowsheet documentation for full assessment, interventions and recommendations.  Note: Prognosis: Good  Problem List: Decreased strength, Decreased endurance, Impaired balance, Decreased mobility, Decreased range of motion, Decreased cognition, Impaired judgement, Decreased safety awareness, Obesity, Decreased skin integrity  Assessment: Pt is a 58 y.o. male seen for PT evaluation s/p admission to [facility] on 6/2/2024 with Pneumonia of both lungs due to infectious organism.  Order placed for PT services.  Upon evaluation: Pt is presenting with impaired functional mobility due to decreased strength, decreased ROM, decreased endurance, impaired balance, impaired cognition, decreased safety awareness, impaired judgment, fall risk, LE edema, and impaired skin integrity requiring  maximal assistance for bed mobility and maximal of 2 assistance for transfers. Pt's clinical presentation is currently unpredictable given the functional mobility deficits above, especially weakness, decreased ROM, edema of extremities, decreased skin integrity, decreased endurance, impaired balance, decreased activity tolerance, decreased functional mobility tolerance, decreased safety awareness, impaired judgement, decreased cognition, and SOB upon exertion, coupled with fall  risks as indicated by AM-PAC 6-Clicks: 8/24 as well as hx of falls, impaired balance, polypharmacy, impaired judgement, decreased safety awareness, decreased cognition, and obesity and combined with medical complications of abnormal renal lab values, abnormal H&H, increased O2 needs from baseline as patient only used O2 at night, multiple readmissions, need for input for mobility technique/safety, and Moderately large right and moderate-sized left pleural effusion, recurrent aspiration PNA bilateral lungs, encephalopathy, urinary retention, acute CHF, acute respiratory failure with hypoxia.  Pt's PMHx and comorbidities that may affect physical performance and progress include: orthopedic surgery and epilepsy, developmental delay, dysphagia, CHF, liver cirrhosis, CKD, HTN, pericardial effusion  . Personal factors affecting pt at time of IE include: inaccessible home environment, step(s) to enter environment, multi-level environment, limited home support, inability to perform IADLs, inability to perform ADLs, inability to navigate level surfaces without external assistance, inability to ambulate household distances, limited insight into impairments, and recent fall(s)/fall history. Pt will benefit from continued skilled PT services to address deficits as defined above and to maximize level of functional mobility to facilitate return toward PLOF and improved QOL. From PT/mobility standpoint, recommendation at time of d/c would be Level II (Moderate Resource Intensity in order to reduce fall risk and maximize pt's     Barriers to Discharge Comments: pt requires significant assistance to complete mobility, increased fall risk. has support from mother, but would beenfit from icnreased support from daily aides.  Rehab Resource Intensity Level, PT: II (Moderate Resource Intensity)    See flowsheet documentation for full assessment.

## 2024-06-03 NOTE — ASSESSMENT & PLAN NOTE
Multifactorial secondary to heart failure, pleural effusions recurrent aspiration pneumonia.  Continue with oxygen via nasal cannula.  Currently on 2L which appears to be baseline

## 2024-06-03 NOTE — ASSESSMENT & PLAN NOTE
At home he takes Lamictal and Zonegran.  Patient able to resume PO intake, will resume home meds.

## 2024-06-03 NOTE — ASSESSMENT & PLAN NOTE
Patient's creatinine is at baseline.  Will monitor as he will be on IV Lasix    Lab Results   Component Value Date    EGFR 36 06/02/2024    EGFR 33 (L) 05/07/2024    EGFR 45 (L) 04/15/2024    CREATININE 1.98 (H) 06/02/2024    CREATININE 2.27 (H) 05/07/2024    CREATININE 1.74 (H) 04/15/2024

## 2024-06-03 NOTE — CASE MANAGEMENT
Case Management Assessment & Discharge Planning Note    Patient name Ash Loaiza  Location /-01 MRN [id number]  : 1965 Date 6/3/2024       Current Admission Date: 2024  Current Admission Diagnosis:Pneumonia of both lungs due to infectious organism   Patient Active Problem List    Diagnosis Date Noted Date Diagnosed    Encephalopathy 2024     Poor prognosis 2024     Azul catheter in place 2024     Urinary retention 2024     Elevated troponin 2024     Stage 3b chronic kidney disease (HCC) 2024     Acute blood loss anemia 2023     Chronic respiratory failure with hypercapnia (HCC) 2023     Acute kidney injury superimposed on chronic kidney disease 3 2023     Chronic diastolic HF (heart failure) ([facility]) 2021     Liver cirrhosis ([facility]) 2021     Abnormal finding on CT scan 2021     EDWIGE (acute kidney injury) ([facility]) 2021     Bilateral pleural effusion 2021     History of COVID-19 2021     S/P pericardial window creation 2021     Acute on chronic heart failure with preserved ejection fraction (HCC) 2021     Lower extremity pain, left 2021     MRSA nasal colonization 2020     Developmental delay 2020     Dysphagia 2020     Pneumonia of both lungs due to infectious organism 01/10/2020     Nonintractable generalized idiopathic epilepsy without status epilepticus (HCC) 01/10/2020     Falls 01/10/2020     T wave inversion in EKG 01/10/2020     Essential hypertension 01/10/2020     Acute respiratory failure with hypoxia (HCC) 01/10/2020     Polyp of colon 03/15/2019       LOS (days): 1  Geometric Mean LOS (GMLOS) (days): 4.1  Days to GMLOS:3.4     OBJECTIVE:    Risk of Unplanned Readmission Score: 20.73         Current admission status: Inpatient  Referral Reason: Other (Post Acute Home Needs (VNA/DME/Infusion))    Preferred Pharmacy:   [id number] - [place], [place] - [address]  [facility]  [address] [address]  Phone: [phone] Fax: [phone]    Primary Care Provider: Jose G Holloway DO    Primary Insurance: MEDICARE  Secondary Insurance:     ASSESSMENT:  Active Health Care Proxies       Kylie Loaiza Health Care Representative - Mother   Primary Phone: [phone] (Mobile)  Home Phone: [phone]                 Advance Directives  Does patient have a Health Care POA?: Yes (Mother- Kylie Loaiza)  Primary Contact: MotherAnders Loaiza         Readmission Root Cause  30 Day Readmission: No    Patient Information  Admitted from:: Home  Mental Status: Other (Comment) (non- verbal at this time)  During Assessment patient was accompanied by: Parent, Other-Comment (Mother- Kylie Loaiza and Uncle Hank Saez)  Assessment information provided by:: Parent (MotherAnders Loaiza)  Primary Caregiver: Parent  Caregiver's Name:: Luis Loaiza  Caregiver's Relationship to Patient:: Family Member  Caregiver's Telephone Number:: [phone]  Support Systems: Parent, Family members (Mother- Kylie Loazia and Uncle Hank Saez)  County of Residence: [facility]  What [facility] do you live in?: [place]  Home entry access options. Select all that apply.: Stairs  Number of steps to enter home.: 3  Do the steps have railings?: Yes  Type of Current Residence: [address] [place] [address] home  Upon entering residence, is there a bedroom on the main floor (no further steps)?: No  A bedroom is located on the following floor levels of residence (select all that apply):: 2nd Floor  Upon entering residence, is there a bathroom on the main floor (no further steps)?: No  Indicate which floors of current residence have a bathroom (select all the apply):: 2nd Floor  Number of steps to 2nd floor from main floor: One Flight  Living Arrangements: Lives w/ Parent(s)  Is patient a ?: No    Activities of Daily Living Prior to Admission  Functional Status: Total dependent  Completes ADLs independently?:  No  Level of ADL dependence: Total Dependent  Ambulates independently?: No  Level of ambulatory dependence: Total Dependent  Does patient use assisted devices?: Yes  Assisted Devices (DME) used: Home Oxygen concentrator, Wheelchair  DME Company Name (respiratory supplies): [employer]  O2 Rate(s): 2L/m via N/c  Does patient currently own DME?: Yes  What DME does the patient currently own?: Home Oxygen concentrator, Walker, Wheelchair  Does patient have a history of Outpatient Therapy (PT/OT)?: Yes ([facility] was scheduled 6/03/24 to complete PT/OT evals at the home)  Does the patient have a history of Short-Term Rehab?: Yes (Was discharged from [facility] on 4/21/24 to home and prior was at [facility] in 1/2023)  Does patient have a history of HHC?: No  Does patient currently have HHC?: No         Patient Information Continued  Income Source: SSI/SSD  Does patient have prescription coverage?: Yes  Does patient receive dialysis treatments?: No  Does patient have a history of substance abuse?: No         Means of Transportation  Means of Transport to Appts:: None      Social Determinants of Health (SDOH)      Flowsheet Row Most Recent Value   Housing Stability    In the last 12 months, was there a time when you were not able to pay the mortgage or rent on time? N   At any time in the past 12 months, were you homeless or living in a shelter (including now)? N   Transportation Needs    In the past 12 months, has lack of transportation kept you from medical appointments or from getting medications? no   In the past 12 months, has lack of transportation kept you from meetings, work, or from getting things needed for daily living? No   Food Insecurity    Within the past 12 months, you worried that your food would run out before you got the money to buy more. Never true   Within the past 12 months, the food you bought just didn't last and you didn't have money to get more. Never true   Utilities    In  the past 12 months has the electric, gas, oil, or water company threatened to shut off services in your home? No            DISCHARGE DETAILS:    Discharge planning discussed with:: MotherAnders Loaiza        CM contacted family/caregiver?: Yes (MotherAnders Loaiza at bedside)             Contacts  Patient Contacts: MotherAnders Loaiza  Relationship to Patient:: Family  Contact Method: In Person  Reason/Outcome: Discharge Planning           CM met with patient/ mother- Kylie Loaiza and uncle Hank Saez at bedside during consult to review role of CM and discuss any supports needed upon discharge. Order placed for CM related to Post Acute Home Needs (VNA/DME/Infusion). Mother wants to take patient home to care for him, she is his primary caregiver. Currently [facility] therapy PT/OT was coming to eval patient today at home for therapy needs. He required a Abelardo lift that was ordered by [facility] prior to discharge on 4/21/24 to home,  due to abelardo lift not being able to fit in bedroom per mother Kylie Loaiza abelardo lift order is currently on hold. ( Unsure of abelardo lift company supplier information at her house). She wants to see how he progresses after therapy/ medically before making any discharge decisions. CM to follow and address discharge planning needs.

## 2024-06-03 NOTE — DISCHARGE INSTRUCTIONS
Thoracentesis   WHAT YOU NEED TO KNOW:   A thoracentesis is a procedure to remove extra fluid or air from between your lungs and your inner chest wall. Air or fluid buildup may make it hard for you to breathe. A thoracentesis allows your lungs to expand fully so you can breathe more easily.  DISCHARGE INSTRUCTIONS:   Medicines:   Pain medicine:  You may be given a prescription medicine to decrease pain. Do not wait until the pain is severe before you take your medicine.    Antibiotics:  This medicine helps fight or prevent an infection.    Take your medicine as directed.  Call your healthcare provider if you think your medicine is not helping or if you have side effects. Tell him if you are allergic to any medicine. Keep a list of the medicines, vitamins, and herbs you take. Include the amounts, and when and why you take them. Bring the list or the pill bottles to follow-up visits. Carry your medicine list with you in case of an emergency.  Follow up with your healthcare provider as directed:  Write down your questions so you remember to ask them during your visits.   Rest:  Rest when you feel it is needed. Slowly start to do more each day. Return to your daily activities as directed.   Do not smoke:  If you smoke, it is never too late to quit. Ask for information about how to stop smoking if you need help.  Contact your healthcare provider if:   You have a fever.    Your puncture site is red, warm, swollen, or draining pus.    You have questions or concerns about your procedure, medicine, or care.    If you have any questions regarding, call the IR department @ [phone].     Seek care immediately or call 911 if:   Blood soaks through your bandage.    There is blood in your spit.

## 2024-06-03 NOTE — SPEECH THERAPY NOTE
Speech Language/Pathology  Speech-Language Pathology Bedside Swallow Evaluation      Patient Name: Ash Loaiza    Today's Date: 6/3/2024    Summary   Consult received for bedside swallow assessment. Pt admitted w/ bilateral PNA, CHF, and bilateral pleural effusions. PMHx includes developmental delay, epilepsy, dysphagia, CKD, liver cirrhosis. Currently NPO, previous baseline on soft/bite sized and nectar thick per VBS completed at [facility] last month (see below). Pt's mother and uncle present for assessment. Pt's mother reports pt frequently coughing on nectar thick after 3-4 sips. Does not feed self at home. Pt reporting he doesn't care about tests, he wants to eat and drink.   Oral care completed, pts dentition is limited and in poor condition. PO trials completed w/ puree and nectar thick via spoon, cup and straw. Pt demonstrates adequate bolus retrieval and decreased oral agility for overall mild-moderate oral dysphagia. Suspected pharyngeal dysphagia characterized by suspected delayed swallow. Hyolaryngeal elevation is decreased upon palpation. No overt coughing during intake.  Suspect solid food retention playing a roll in decreased tolerance at home. Per VBS pt w/ aspiration of nectar thick d/t increased pharyngeal residuals w/ regular textures.     Risk/s for Aspiration: Moderate-high     Recommended Diet: puree/level 1 diet and nectar thick liquids   Recommended Form of Meds: crushed with puree   Aspiration precautions and swallowing strategies: upright posture and only feed when fully alert  Other Recommendations: Continue frequent oral care        Current Medical Status  Ash Loaiza is a 58 y.o. male who has past medical history significant for developmental delay,'s chronic heart failure, cirrhosis of the liver, epilepsy who presents from home.  Mother was giving a shower and is no started to bleed.  Shortly after she noticed some gurgling sounds.  Kylie, his mother states that the patient has been  getting weaker over the last 2 months.  He was recently hospitalized at [facility] for aspiration pneumonia and spent 10 days there.  Patient's oxygen saturation in the emergency room was 88% on room air.  He does require some oxygen at nighttime.  Workup in the emergency room shows bilateral pleural effusions and most likely aspiration pneumonia.  Patient is having hard time protecting his airway and most likely has secretions.  Noticed that has been having some apneic breathing.     Had a detailed conversation with the mom about his medical condition including aspiration pneumonia which is most likely chronic as the patient continues to choke on thickened liquids at home.  Bilateral pleural effusions, and his encephalopathy.    Current Precautions:  Fall  Aspiration      Allergies:  No known food allergies    Past medical history:  Please see H&P for details    Special Studies:  CT Head:  No acute intracranial abnormality.  Age advanced parenchymal volume loss.    CT Chest:  1. Mild motion limited examination demonstrating no findings suspicious for pulmonary embolism  2. Moderately large right and moderate-sized left pleural effusion, each mildly increased in size from earlier with adjacent compressive atelectasis mild body wall edema  4. Diffuse pericardial calcification.    VBS 4/2024 at [facility]:  Pt presented with mild oral and mild-moderate pharyngeal dysphagia characterized by prolonged formation/mashing pattern of mastication, delayed swallow initiation, and reduced movement in the following areas: laryngeal elevation, anterior hyoid motion, laryngeal closure, pharyngeal stripping wave, and BOT retraction to PPW. Due to reduced BOT retraction to PPW and pharyngeal stripping wave, pt demonstrated trace to mild pharyngeal residuals. Most amount of residuals occurred w/ regular solids trial w/ remaining residuals falling down to PS and likely contributing to aspiration episode during NTL wash. Residuals typically  reduced w/ spontaneous or cued double swallows. Due to delayed swallow initiation and/or reduced hyolaryngeal excursion, pt demonstrated penetration prior to the swallow w/ subsequent silent aspiration during the swallow on 1st trial of thin liquids, silent aspiration during the swallow on the 2nd of thin liquids (PAS = 8), penetration prior to the swallow on 2/2 NTL spoon sips (PAS = 4), and penetration during the swallow on 1/4 NTL straw sips (PAS = 4). One instance of posterior silent aspiration (PAS = 8) during completion of NTL wash after regular solid trial -- suspect presence of residuals in vallecula and PS resulted in easier spillover of NTL into airway. For all silent aspiration episodes, pt unable to complete cued cough response.     Recommend L6 Soft and Bite-Sized Solids and L2 Mildly (Nectar) Thick Liquids. Meds either crushed or whole in puree if medically cleared to do so. Use strategies of upright positioning during meals, slow rate, double swallows (try to complete prior to liquid wash), and oral prep set if able. ST services warranted targeting use of compensatory strategies, upgraded trials, and pt/caregiver education. Could consider implementation of Free Water Protocol. Informed RN and physician of results. Spoke w/ pt.'s caregiver, his mother, over the phone and discussed results and recommendations. Will plan to place starter kits in pt.'s hospital room to d/c home w/ him. Pt.'s mother familiar w/ thickened liquids as pt has been recommended this diet level in the past. Pt.'s mother verbalized understanding and appreciation for update.     Level 4: Pureed- adequate extraction via spoon, prolonged bolus formation, initiation @ vallecula, trace-mild pharyngeal residuals reduced w/ additional swallow during next trial, no pen/asp (PAS = 1)    Level 6: Soft & Bite Sized (Dysphagia Advanced)- adequate bolus retrieval, prolonged mashing pattern of breakdown, adequate breakdown achieved w/ cohesive  bolus formed, initiation @ vallecula, trace pharyngeal residuals, no pen/asp (PAS = 1)    Regular solid- adequate bolus retrieval, prolonged mashing pattern of breakdown (increased compared to L6), adequate breakdown achieved w/ cohesive bolus formed, initiation @ vallecula, mild vallecular residuals reduced slightly w/ cued DS and remaining residuals falling down to PS, no pen/asp (PAS = 1) however presence of residuals in PS likely contributed to aspiration episode during NTL wash (see NTL section)    Level 0: Thin liquids- adequate bolus extraction via spoon, good bolus hold when instructed, initiation @ PS, no significant oropharyngeal residuals, penetration prior to the swallow w/ subsequent silent aspiration during the swallow on 1st trial and silent aspiration during the swallow on the 2nd trial (PAS = 8), pt unable to complete cued cough response    Level 2: Mildly Thick (NTL) liquids- adequate bolus extraction via cup/spoon/straw, good bolus hold, initiation @ posterior laryngeal surface of epiglottis, intermittent trace to trace-mild oropharyngeal residuals reduced w/ either spontaneous or cued DS, penetration prior to the swallow on 2/2 spoon sips (PAS = 4) and penetration during the swallow on 1/4 straw sips (PAS = 4), one instance of posterior silent aspiration (PAS = 8) during completion of liquid wash after regular solids trial -- suspect presence of residuals in vallecula and PS resulted in easier spillover of NTL into airway, pt unable to complete cued cough response       Social/Education/Vocational Hx:  Pt lives with family    Swallow Information   Current Risks for Dysphagia & Aspiration: known history of dysphagia  Current Symptoms/Concerns: coughing with po  Current Diet: NPO   Baseline Diet: soft/level 3 diet and nectar thick liquids      Baseline Assessment   Behavior/Cognition: alert  Speech/Language Status: able to follow commands inconsistently  Patient Positioning: upright in bed  Pain  Status/Interventions/Response to Interventions:   No report of or nonverbal indications of pain.       Swallow Mechanism Exam  Facial: symmetrical  Labial: decreased coordination  Lingual: decreased coordination  Velum: symmetrical  Mandible: adequate ROM  Dentition: limited dentition and poor oral hygiene  Vocal quality:clear/adequate   Volitional Cough: strong/productive   Respiratory Status: on .5L       Consistencies Assessed and Performance   Consistencies Administered: nectar thick and puree    Oral Stage: mild-moderate  Manipulation was prolonged.  Bolus formation and transfer were impaired with mild diffuse oral residual. No overt s/s reduced oral control.    Pharyngeal Stage: suspected  Swallow Mechanics:  Swallowing initiation appeared delayed. Laryngeal rise was palpated and judged to be decreased. No coughing, throat clearing, change in vocal quality or respiratory status noted today with textures trialed    Esophageal Concerns: none reported      Summary and Recommendations (see above)    Results Reviewed with: patient, RN, MD, and family     Treatment Recommended: Dysphagia therapy for diet tolerance     Frequency of treatment: 3-5x/week    Dysphagia LTG  -Patient will demonstrate safe and effective oral intake (without overt s/s significant oral/pharyngeal dysphagia including s/s penetration or aspiration) for the highest appropriate diet level.     Short Term Goals:  -Pt will tolerate Dysphagia 1/pureed diet and nectar thick with no significant s/s oral or pharyngeal dysphagia across 1-3 diagnostic session/s    -Patient will tolerate trials of upgraded food and/or liquid texture with no significant s/s of oral or pharyngeal dysphagia including aspiration across 1-3 diagnostic sessions     Re: Mastication  -Patient will demonstrate adequate mastication to safely consume the  least restrictive diet with no verbal, visual or tactile cues needed.    Re: Compensatory Strategies    - Patient’s caregiver  will demonstrate adherence to recommended diet, as well as application of aspiration precautions and compensatory strategies.            Speech Therapy Prognosis   Prognosis: fair    Prognosis Considerations: medical status and cognitive status    Lauren Mota MS CCC-SLP  6/3/2024

## 2024-06-03 NOTE — ASSESSMENT & PLAN NOTE
Patient is already on a modified diet with thickened liquids.  Will have speech therapy see him.  Anticipate his dysphagia is getting worse.

## 2024-06-03 NOTE — ASSESSMENT & PLAN NOTE
Recurrent aspiration pneumonia.  He had a hospitalization in April 2024 at [facility].  Continue IV Zosyn.  Speech therapy evaluated patient and recommended Pureed with nectar thick liquid with crushed meds.

## 2024-06-04 LAB
ANION GAP SERPL CALCULATED.3IONS-SCNC: 5 MMOL/L (ref 4–13)
BUN SERPL-MCNC: 25 MG/DL (ref 5–25)
CALCIUM SERPL-MCNC: 8.5 MG/DL (ref 8.4–10.2)
CHLORIDE SERPL-SCNC: 101 MMOL/L (ref 96–108)
CO2 SERPL-SCNC: 34 MMOL/L (ref 21–32)
CREAT SERPL-MCNC: 2.32 MG/DL (ref 0.6–1.3)
ERYTHROCYTE [DISTWIDTH] IN BLOOD BY AUTOMATED COUNT: 15.1 % (ref 11.6–15.1)
GFR SERPL CREATININE-BSD FRML MDRD: 30 ML/MIN/1.73SQ M
GLUCOSE SERPL-MCNC: 97 MG/DL (ref 65–140)
HCT VFR BLD AUTO: 28.2 % (ref 36.5–49.3)
HGB BLD-MCNC: 9 G/DL (ref 12–17)
MCH RBC QN AUTO: 32.3 PG (ref 26.8–34.3)
MCHC RBC AUTO-ENTMCNC: 31.9 G/DL (ref 31.4–37.4)
MCV RBC AUTO: 101 FL (ref 82–98)
MRSA NOSE QL CULT: ABNORMAL
MRSA NOSE QL CULT: ABNORMAL
PLATELET # BLD AUTO: 106 THOUSANDS/UL (ref 149–390)
PMV BLD AUTO: 10.7 FL (ref 8.9–12.7)
POTASSIUM SERPL-SCNC: 3.6 MMOL/L (ref 3.5–5.3)
RBC # BLD AUTO: 2.79 MILLION/UL (ref 3.88–5.62)
SODIUM SERPL-SCNC: 140 MMOL/L (ref 135–147)
WBC # BLD AUTO: 4.27 THOUSAND/UL (ref 4.31–10.16)

## 2024-06-04 PROCEDURE — 85027 COMPLETE CBC AUTOMATED: CPT | Performed by: FAMILY MEDICINE

## 2024-06-04 PROCEDURE — 97535 SELF CARE MNGMENT TRAINING: CPT

## 2024-06-04 PROCEDURE — 99232 SBSQ HOSP IP/OBS MODERATE 35: CPT | Performed by: INTERNAL MEDICINE

## 2024-06-04 PROCEDURE — 80048 BASIC METABOLIC PNL TOTAL CA: CPT | Performed by: FAMILY MEDICINE

## 2024-06-04 PROCEDURE — 97167 OT EVAL HIGH COMPLEX 60 MIN: CPT

## 2024-06-04 PROCEDURE — 92526 ORAL FUNCTION THERAPY: CPT

## 2024-06-04 PROCEDURE — 97530 THERAPEUTIC ACTIVITIES: CPT

## 2024-06-04 PROCEDURE — 94762 N-INVAS EAR/PLS OXIMTRY CONT: CPT

## 2024-06-04 RX ORDER — HEPARIN SODIUM 5000 [USP'U]/ML
5000 INJECTION, SOLUTION INTRAVENOUS; SUBCUTANEOUS EVERY 8 HOURS SCHEDULED
Status: DISCONTINUED | OUTPATIENT
Start: 2024-06-04 | End: 2024-06-05

## 2024-06-04 RX ADMIN — PYRIDOXINE HCL TAB 50 MG 100 MG: 50 TAB at 09:21

## 2024-06-04 RX ADMIN — LEVOCARNITINE 250 MG: 1 SOLUTION ORAL at 12:49

## 2024-06-04 RX ADMIN — PRIMIDONE 150 MG: 50 TABLET ORAL at 21:00

## 2024-06-04 RX ADMIN — PRIMIDONE 100 MG: 50 TABLET ORAL at 09:21

## 2024-06-04 RX ADMIN — ZONISAMIDE 100 MG: 100 CAPSULE ORAL at 21:00

## 2024-06-04 RX ADMIN — PIPERACILLIN AND TAZOBACTAM 4.5 G: 36; 4.5 INJECTION, POWDER, FOR SOLUTION INTRAVENOUS at 09:26

## 2024-06-04 RX ADMIN — LEVOCARNITINE 250 MG: 1 SOLUTION ORAL at 09:17

## 2024-06-04 RX ADMIN — PIPERACILLIN AND TAZOBACTAM 4.5 G: 36; 4.5 INJECTION, POWDER, FOR SOLUTION INTRAVENOUS at 01:13

## 2024-06-04 RX ADMIN — HEPARIN SODIUM 5000 UNITS: 5000 INJECTION, SOLUTION INTRAVENOUS; SUBCUTANEOUS at 21:08

## 2024-06-04 RX ADMIN — LACTULOSE 20 G: 20 SOLUTION ORAL at 09:19

## 2024-06-04 RX ADMIN — LEVOCARNITINE 250 MG: 1 SOLUTION ORAL at 17:41

## 2024-06-04 RX ADMIN — Medication 2 G: at 17:35

## 2024-06-04 RX ADMIN — PIPERACILLIN AND TAZOBACTAM 4.5 G: 36; 4.5 INJECTION, POWDER, FOR SOLUTION INTRAVENOUS at 17:40

## 2024-06-04 RX ADMIN — LEVOCARNITINE 250 MG: 1 SOLUTION ORAL at 21:00

## 2024-06-04 RX ADMIN — LAMOTRIGINE 200 MG: 100 TABLET ORAL at 09:21

## 2024-06-04 RX ADMIN — Medication 2 G: at 09:26

## 2024-06-04 RX ADMIN — PRIMIDONE 100 MG: 50 TABLET ORAL at 15:00

## 2024-06-04 RX ADMIN — CLONAZEPAM 0.5 MG: 0.5 TABLET ORAL at 21:00

## 2024-06-04 RX ADMIN — Medication 2 G: at 21:00

## 2024-06-04 RX ADMIN — LAMOTRIGINE 250 MG: 100 TABLET ORAL at 15:00

## 2024-06-04 RX ADMIN — LAMOTRIGINE 300 MG: 100 TABLET ORAL at 21:00

## 2024-06-04 RX ADMIN — ACETAMINOPHEN 650 MG: 325 TABLET, FILM COATED ORAL at 17:35

## 2024-06-04 NOTE — ASSESSMENT & PLAN NOTE
Current ammonia level is normal.    Resume home Lactulose  Will resume home aldactone  Elevated creatinine noted today.  Follow-up renal functions in AM.

## 2024-06-04 NOTE — NURSING NOTE
Upon entering patient room to give medications, patient mothers holding pressure to patients nose, stating patient has a nosebleed; no active bleeding noted at this time, heparin dose held; Dr. Romero notified.

## 2024-06-04 NOTE — RESPIRATORY THERAPY NOTE
RT Protocol Note  Ash Loaiza 58 y.o. male MRN: [id number]  Unit/Bed#: -01 Encounter: [id number]    Assessment    Principal Problem:    Pneumonia of both lungs due to infectious organism  Active Problems:    Nonintractable generalized idiopathic epilepsy without status epilepticus (HCC)    Acute respiratory failure with hypoxia (HCC)    Developmental delay    Dysphagia    Bilateral pleural effusion    Acute on chronic heart failure with preserved ejection fraction (HCC)    Liver cirrhosis (HCC)    Urinary retention    Stage 3b chronic kidney disease (HCC)    Encephalopathy    Poor prognosis      Home Pulmonary Medications:         Past Medical History:   Diagnosis Date    Hypertension     Mentally challenged     Pericardial effusion     Pneumonia     Seizure (HCC)      Social History     Socioeconomic History    Marital status: Single     Spouse name: None    Number of children: None    Years of education: None    Highest education level: None   Occupational History    None   Tobacco Use    Smoking status: Never    Smokeless tobacco: Never   Vaping Use    Vaping status: Never Used   Substance and Sexual Activity    Alcohol use: Never    Drug use: Never    Sexual activity: None   Other Topics Concern    None   Social History Narrative    None     Social Determinants of Health     Financial Resource Strain: Low Risk  (4/6/2024)    [facility]    Overall Financial Resource Strain (CARDIA)     Difficulty of Paying Living Expenses: Not very hard   Food Insecurity: No Food Insecurity (6/3/2024)    Hunger Vital Sign     Worried About Running Out of Food in the Last Year: Never true     Ran Out of Food in the Last Year: Never true   Transportation Needs: No Transportation Needs (6/3/2024)    PRAPARE - Transportation     Lack of Transportation (Medical): No     Lack of Transportation (Non-Medical): No   Physical Activity: Not on file   Stress: Not on file  "  Social Connections: Not on file   Intimate Partner Violence: Not At Risk (4/6/2024)    [facility]    Humiliation, Afraid, Rape, and Kick questionnaire     Fear of Current or Ex-Partner: No     Emotionally Abused: No     Physically Abused: No     Sexually Abused: No   Housing Stability: Unknown (6/3/2024)    Housing Stability Vital Sign     Unable to Pay for Housing in the Last Year: No     Number of Times Moved in the Last Year: Not on file     Homeless in the Last Year: No       Subjective         Objective    Physical Exam:        Vitals:  Blood pressure 99/60, pulse 82, temperature 98.1 °F (36.7 °C), resp. rate 18, height 5' 6\" (1.676 m), weight 93.9 kg (207 lb), SpO2 (P) 95%.          Imaging and other studies: I have personally reviewed pertinent reports.      O2 Device: 2L NC     Plan    Respiratory Plan: No distress/Pulmonary history        Resp Comments: (P) STable on RA no distress   "

## 2024-06-04 NOTE — PROGRESS NOTES
[facility]  Progress Note  Name: Ahs Loaiza I  MRN: [id number]  Unit/Bed#: -01 I Date of Admission: 6/2/2024   Date of Service: 6/4/2024 I Hospital Day: 2    Assessment & Plan   * Pneumonia of both lungs due to infectious organism  Assessment & Plan  Recurrent aspiration pneumonia.  He had a hospitalization in April 2024 at [facility].  Continue IV Zosyn.  Complete total 7 days of treatment.  Speech therapy evaluated patient and recommended Pureed with nectar thick liquid with crushed meds.     Poor prognosis  Assessment & Plan  Had a detailed discussion with mom (Kylie) and we discussed that the patient is not doing well.  He has not been doing well since the beginning of this year and has declined since admission to the hospital in April 2024 at [facility] for aspiration pneumonia.  Patient continues to choke on thickened liquids at home.  He is now bedbound.  He continues to decline.  During his hospital course I discussed with that he has recurrent aspiration pneumonia, encephalopathic, bilateral pleural effusions and he seems to be getting worse.  She she states that he continues to decline and not do well.  She states that she has a hard time taking care of him.  Patient has been at 5 nursing homes and she states that they do not give good care to him.    We discussed CODE STATUS: Discussed that the likelihood of him recovering from a cardiac arrest is very poor.  Kylie was a nurse, and states that she did many codes.  She would like to make him a DNR noting that he would not do well from CPR, chest compressions or intubation.    We discussed that if the patient continues to have chronic aspiration pneumonia, and does not pass a swallow screen which he want a feeding tube.  She currently does not know.  We discussed about wishes and about quality measures.  I did briefly touched upon hospice and just making the patient comfortable if she did not want to take  aggressive measures.  She does want to give him a chance to turn around but she knows that he may not do well.    Her son Patricio later joined her.  I answered more of his questions including about pleural effusion, aspiration pneumonia.  There is a bit of a poor understanding.  He did ask me that for the calcification of the pericardium if they would remove that.  With the patient's current medical condition I stated that most likely that would not happen and that is a question for CT surgery.  Continue with current level of care.  Continue to monitor symptoms closely.    Encephalopathy  Assessment & Plan  Patient with developmental delay, but this is most likely secondary to his acute illness including pleural effusions, recurrent aspiration pneumonia.  Mentation appears to be at baseline currently.    Stage 3b chronic kidney disease (HCC)  Assessment & Plan  Patient's creatinine slightly elevated than baseline.  Closely.  Hold diuretics.  Hold further dose of diuretic r and repeat BMP in AM.    Lab Results   Component Value Date    EGFR 30 06/04/2024    EGFR 37 06/03/2024    EGFR 36 06/02/2024    CREATININE 2.32 (H) 06/04/2024    CREATININE 1.96 (H) 06/03/2024    CREATININE 1.98 (H) 06/02/2024       Urinary retention  Assessment & Plan  Azul catheter has been placed for strict I's and O's    Liver cirrhosis (HCC)  Assessment & Plan  Current ammonia level is normal.    Resume home Lactulose  Will resume home aldactone  Elevated creatinine noted today.  Follow-up renal functions in AM.    Acute on chronic heart failure with preserved ejection fraction (HCC)  Assessment & Plan  Patient with bilateral pleural effusions, received 80 mg of Lasix IV in the emergency room.  At home he takes torsemide and Aldactone.  Lasix 40 mg IV twice daily.  Has had good urine output with -2.4 L in the last 24 hours.  Had episode of hypotension overnight with elevation in creatinine noted today.  Will hold further doses of diuretics and  continue to monitor renal functions and electrolytes closely in AM.      Wt Readings from Last 3 Encounters:   06/02/24 93.9 kg (207 lb)   01/30/24 79.3 kg (174 lb 13.2 oz)   12/08/23 106 kg (234 lb 9.1 oz)         Bilateral pleural effusion  Assessment & Plan  Moderately large right and moderate size left pleural effusion.  On previous admission in April 2024, he had a right-sided thoracentesis which was transudative.  S/p bilateral thoracentesis yesterday.    Dysphagia  Assessment & Plan  At baseline on pureed diet.   Speech evaluated patient and recs pureed with nectar thick, crushed meds  Continue with ongoing speech follow-up and maintain strict aspiration precautions.    Developmental delay  Assessment & Plan  Since birth, patient is adopted.  It is unknown why he has the mental delay if it is due to anoxic brain injury or cerebral palsy.    Acute respiratory failure with hypoxia (HCC)  Assessment & Plan  Multifactorial secondary to heart failure, pleural effusions recurrent aspiration pneumonia.  Continue with oxygen via nasal cannula.  Currently on room air.    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  Patient able to resume PO intake, will resume home meds.                  VTE Pharmacologic Prophylaxis: VTE Score: 6 High Risk (Score >/= 5) - Pharmacological DVT Prophylaxis Ordered: heparin. Sequential Compression Devices Ordered.    Mobility:   Basic Mobility Inpatient Raw Score: 8  JH-HLM Goal: 3: Sit at edge of bed  JH-HLM Achieved: 4: Move to chair/commode  JH-HLM Goal achieved. Continue to encourage appropriate mobility.    Patient Centered Rounds: I performed bedside rounds with nursing staff today.   Discussions with Specialists or Other Care Team Provider: Discussed with nursing    Education and Discussions with Family / Patient: Updated  (mother) via phone.    Total Time Spent on Date of  Encounter in care of patient: 20 mins. This time was spent on one or more of the following: performing physical exam; counseling and coordination of care; obtaining or reviewing history; documenting in the medical record; reviewing/ordering tests, medications or procedures; communicating with other healthcare professionals and discussing with patient's family/caregivers.    Current Length of Stay: 2 day(s)  Current Patient Status: Inpatient   Certification Statement: The patient will continue to require additional inpatient hospital stay due to close monitoring of renal function on diuretics  Discharge Plan: Anticipate discharge in 48-72 hrs to rehab facility.    Code Status: Level 3 - DNAR and DNI    Subjective:   Seen and examined at bedside.  Alert but does not offer any complaints.  Noted to have some epistaxis from the right nostril and patient admitted to having picked his nose.  Currently remains off supplemental oxygen.  Breathing seems to have improved after thoracentesis.  Nursing reports that his oral intake is fair and there were no acute events overnight.  Blood pressure currently is stable compared to yesterday.    Objective:     Vitals:   Temp (24hrs), Av.2 °F (36.8 °C), Min:97 °F (36.1 °C), Max:98.6 °F (37 °C)    Temp:  [97 °F (36.1 °C)-98.6 °F (37 °C)] 98.5 °F (36.9 °C)  HR:  [78-83] 83  Resp:  [16-18] 18  BP: ()/(47-65) 109/65  SpO2:  [95 %-96 %] 95 %  Body mass index is 33.41 kg/m².     Input and Output Summary (last 24 hours):     Intake/Output Summary (Last 24 hours) at 2024 1534  Last data filed at 2024 1245  Gross per 24 hour   Intake 240 ml   Output 1325 ml   Net -1085 ml       Physical Exam:   Physical Exam  Constitutional:       Appearance: He is obese. He is ill-appearing.   HENT:      Head: Normocephalic.      Nose: Nose normal.      Mouth/Throat:      Mouth: Mucous membranes are moist.   Eyes:      Extraocular Movements: Extraocular movements intact.      Pupils: Pupils  are equal, round, and reactive to light.   Cardiovascular:      Rate and Rhythm: Normal rate and regular rhythm.   Pulmonary:      Comments: Improved breath sounds bilateral lung fields.  Abdominal:      General: Abdomen is flat. Bowel sounds are normal.      Palpations: Abdomen is soft.   Musculoskeletal:      Cervical back: Neck supple.      Right lower leg: Edema present.      Left lower leg: Edema present.   Neurological:      General: No focal deficit present.      Mental Status: He is alert. Mental status is at baseline.          Additional Data:     Labs:  Results from last 7 days   Lab Units 06/04/24  0435 06/03/24  0506   WBC Thousand/uL 4.27* 4.42   HEMOGLOBIN g/dL 9.0* 10.2*   HEMATOCRIT % 28.2* 32.9*   PLATELETS Thousands/uL 106* 123*   SEGS PCT %  --  72   LYMPHO PCT %  --  12*   MONO PCT %  --  15*   EOS PCT %  --  0     Results from last 7 days   Lab Units 06/04/24  0435 06/03/24  0506   SODIUM mmol/L 140 138   POTASSIUM mmol/L 3.6 4.2   CHLORIDE mmol/L 101 100   CO2 mmol/L 34* 30   BUN mg/dL 25 25   CREATININE mg/dL 2.32* 1.96*   ANION GAP mmol/L 5 8   CALCIUM mg/dL 8.5 8.8   ALBUMIN g/dL  --  3.5   TOTAL BILIRUBIN mg/dL  --  1.23*   ALK PHOS U/L  --  161*   ALT U/L  --  8   AST U/L  --  18   GLUCOSE RANDOM mg/dL 97 73     Results from last 7 days   Lab Units 06/02/24  1435   INR  1.23*             Results from last 7 days   Lab Units 06/02/24  1435   LACTIC ACID mmol/L 1.4   PROCALCITONIN ng/ml 0.30*       Lines/Drains:  Invasive Devices       Peripheral Intravenous Line  Duration             Peripheral IV 06/02/24 Right Antecubital 2 days              Drain  Duration             Urethral Catheter Straight-tip 16 Fr. 1 day                  Urinary Catheter:  Goal for removal: Voiding trial when ambulation improves           Telemetry:  Telemetry Orders (From admission, onward)               24 Hour Telemetry Monitoring  Continuous x 24 Hours (Telem)        Question:  Reason for 24 Hour Telemetry   Answer:  Decompensated CHF- and any one of the following: continuous diuretic infusion or total diuretic dose >200 mg daily, associated electrolyte derangement (I.e. K < 3.0), ionotropic drip (continuous infusion), hx of ventricular arrhythmia, or new EF < 35%  Comment:  patient failed tele d/c critera (SBP of 80)                     Telemetry Reviewed: Normal Sinus Rhythm  Indication for Continued Telemetry Use: No indication for continued use. Will discontinue.              Imaging: No pertinent imaging reviewed.    Recent Cultures (last 7 days):   Results from last 7 days   Lab Units 06/02/24  1452 06/02/24  1435   BLOOD CULTURE  No Growth at 24 hrs. No Growth at 24 hrs.       Last 24 Hours Medication List:   Current Facility-Administered Medications   Medication Dose Route Frequency Provider Last Rate    acetaminophen  650 mg Oral Q6H PRN Iris Rivas MD      clonazePAM  0.5 mg Oral HS Iris Rivas MD      Diclofenac Sodium  2 g Topical 4x Daily Iris Rivas MD      lactulose  20 g Oral Daily Iris Rivas MD      lamoTRIgine  200 mg Oral Daily Iris Rivas MD      And    lamoTRIgine  250 mg Oral After Lunch Iris Rivas MD      And    lamoTRIgine  300 mg Oral HS Iris Rivas MD      levOCARNitine  1,000 mg/day Oral 4x Daily (with meals and at bedtime) Iris iRvas MD      metoprolol tartrate  25 mg Oral BID Iris Rivas MD      piperacillin-tazobactam  4.5 g Intravenous Q8H Fede Han Mcintyre MD 4.5 g (06/04/24 0926)    primidone  100 mg Oral QAM Iris Rivas MD      And    primidone  100 mg Oral After Lunch Iris Rivas MD      And    primidone  150 mg Oral HS Iris Rivas MD      pyridoxine  100 mg Oral Daily Iris Rivas MD      spironolactone  100 mg Oral Daily Iris Rivas MD      zonisamide  100 mg Oral HS Iris Rivas MD          Today, Patient Was Seen By: Karla Jaimes MD    **Please Note: This note may have been constructed using a voice recognition system.**

## 2024-06-04 NOTE — PLAN OF CARE
Problem: OCCUPATIONAL THERAPY ADULT  Goal: Performs self-care activities at highest level of function for planned discharge setting.  See evaluation for individualized goals.  Description: Treatment Interventions: ADL retraining, Visual perceptual retraining, Functional transfer training, UE strengthening/ROM, Endurance training, Cognitive reorientation, Equipment evaluation/education, Patient/family training, Compensatory technique education, Neuromuscular reeducation, Fine motor coordination activities, UE splinting, Continued evaluation, Energy conservation, Cardiac education, Activityengagement          See flowsheet documentation for full assessment, interventions and recommendations.   Note: Limitation: Decreased ADL status, Decreased cognition, Decreased Safe judgement during ADL, Decreased endurance, Decreased self-care trans, Decreased high-level ADLs  Prognosis: Fair  Assessment: Pt is a 58 y.o. male, admitted to [facility] 6/2/2024 d/t experiencing increased weakness and epistaxis. Dx: pneumonia of both lungs d/t infectious organism. Pt with PMHx impacting their performance during ADL tasks, including: hypertension, mentally challenged, pericardial effusion, pneumonia, seizure. Prior to admission to the hospital Pt was performing ADLs with physical assistance. IADLs with physical assistance. Functional transfers/ambulation with physical assistance. Cognitive status was PTA was Impaired. OT order placed to assess Pt's ADLs, cognitive status, and performance during functional tasks in order to maximize safety and independence while making most appropriate d/c recommendations. PT/OT co-treatment completed at this time d/t significant mobility deficits and safety concerns. Pt's clinical presentation is currently unstable/unpredictable given new onset deficits that effect Pt's occupational performance and ability to safely return to OF including decrease activity tolerance, decrease standing balance, decrease  sitting balance, decrease performance during ADL tasks, decrease cognition, decrease safety awareness , decrease BUE ROM, decrease UB MS, decrease generalized strength, decrease activity engagement, and decrease performance during functional transfers combined with medical complications of hypotension, abnormal H&H, abnormal CBC, abnormal CO2 values, incontinence, and need for input for mobility technique/safety. Personal factors affecting Pt at time of initial evaluation include: step(s) to enter environment, multi-level environment, inability to perform IADLs, inability to perform ADLs, inability to ambulate household distances, inability to navigate community distances, limited insight into impairments, decreased initiation and engagement, and recent fall(s)/fall history. Pt will benefit from continued skilled OT services to address deficits as defined above and to maximize level independence/participation during ADLs and functional tasks to facilitate return toward PLOF and improved quality of life. From an occupational therapy standpoint, recommendation at time of d/c would be Level II: Moderate Resource Intensity Therapy.     Rehab Resource Intensity Level, OT: II (Moderate Resource Intensity)

## 2024-06-04 NOTE — PHYSICAL THERAPY NOTE
"   PHYSICAL THERAPY TREATMENT NOTE  NAME:  Ash Loaiza  DATE: 06/04/24    Length Of Stay: 2  Performed at least 2 patient identifiers during session: Name and Birthday    TREATMENT FLOW SHEET:    06/04/24 1144   PT Last Visit   PT Visit Date 06/04/24   Note Type   Note Type Treatment   Pain Assessment   Pain Assessment Tool FLACC   Pain Score No Pain   Pain Rating: FLACC (Rest) - Face 0   Pain Rating: FLACC (Rest) - Legs 0   Pain Rating: FLACC (Rest) - Activity 0   Pain Rating: FLACC (Rest) - Cry 0   Pain Rating: FLACC (Rest) - Consolability 0   Score: FLACC (Rest) 0   Pain Rating: FLACC (Activity) - Face 1   Pain Rating: FLACC (Activity) - Legs 0   Pain Rating: FLACC (Activity) - Activity 0   Pain Rating: FLACC (Activity) - Cry 1   Pain Rating: FLACC (Activity) - Consolability 0   Score: FLACC (Activity) 2   Restrictions/Precautions   Other Precautions Cognitive;Chair Alarm;Bed Alarm;Fall Risk;Multiple lines   General   Chart Reviewed Yes   Additional Pertinent History pt s/p bilateral IR thoracentesis on 6/3/24   Response to Previous Treatment Patient reporting fatigue but able to participate.   Cognition   Orientation Level Oriented to person;Oriented to place;Disoriented to time;Disoriented to situation  (reports January, then able to report June with cues.)   Following Commands Follows one step commands with increased time or repetition   Comments increased time to respond   Subjective   Subjective \"Who is the villian?\"   Bed Mobility   Supine to Sit 2  Maximal assistance   Additional items Assist x 1;Increased time required;Verbal cues;LE management  (trunk management)   Additional Comments HOB elevated > 30 degrees. maxAx1 to achieve sitting EOB with manual cues for trunk and LE management. static sitting balance EOB maxAx1 due to posterior lean then to minAx1 wiht improved uprihgt posture.   Transfers   Sit to Stand 2  Maximal assistance   Additional items Assist x 2;Increased time required;Verbal cues "   Stand to Sit 2  Maximal assistance   Additional items Assist x 2;Increased time required;Verbal cues   Stand pivot 2  Maximal assistance   Additional items Assist x 2;Increased time required;Verbal cues   Additional Comments attempted wiht use of RW. unable to clear. then with therapist anterior to patient, completed sit<>stand with maxAx1 to 1/2 standing with cues for uprihgt posture and anteiror wt shifting. then with B knees and feet blocked, able to achieve standing with maxAx2 with manual cues for uprihgt posture. spt with B knees and feet blocked with maxAx2 with manual cues for wt shifting. facing bedrail, manual cues for hand placement on bedrail. B knees blocked. maxAx2 to ahcieve standing, then maxAx1 to maintain standing 30 seconds. returned to sitting with maxAx1. min MCKEON. cues for breathing as pt was holding his breath standing.   Ambulation/Elevation   Distance pt unable   Balance   Static Sitting   (poor - to poor +)   Dynamic Sitting Poor -   Static Standing Poor -   Endurance Deficit   Endurance Deficit Yes   Endurance Deficit Description min MCKEON. Pt on room air wiht Spo2 >/= 90% throughout.   Activity Tolerance   Activity Tolerance Patient limited by fatigue   Assessment   Prognosis Good   Problem List Decreased strength;Decreased endurance;Impaired balance;Decreased mobility;Decreased range of motion;Decreased cognition;Impaired judgement;Decreased safety awareness;Obesity;Decreased skin integrity   Barriers to Discharge Comments pt requires significant assistance to complete mobility, increased fall risk. has support from mother, but would beenfit from icnreased support from daily aides.   Goals   PT Treatment Day 2   Plan   Treatment/Interventions ADL retraining;Functional transfer training;LE strengthening/ROM;Therapeutic exercise;Endurance training;Elevations;Patient/family training;Equipment eval/education;Bed mobility;Gait training;Cognitive reorientation;Compensatory technique  education;Spoke to nursing;Spoke to case management;OT   Progress Slow progress, decreased activity tolerance   PT Frequency 3-5x/wk   Discharge Recommendation   Rehab Resource Intensity Level, PT II (Moderate Resource Intensity)   Equipment Recommended   (should patient return to home instead of post acute rehab, recommend hospital bed, lizz lift and caregivers.)   AM-PAC Basic Mobility Inpatient   Turning in Flat Bed Without Bedrails 2   Lying on Back to Sitting on Edge of Flat Bed Without Bedrails 2   Moving Bed to Chair 1   Standing Up From Chair Using Arms 1   Walk in Room 1   Climb 3-5 Stairs With Railing 1   Basic Mobility Inpatient Raw Score 8   Turning Head Towards Sound 2   Follow Simple Instructions 2   Low Function Basic Mobility Raw Score  12   Low Function Basic Mobility Standardized Score  18.33   [facility] Highest Level Of Mobility   -HL Goal 3: Sit at edge of bed   -[facility] Achieved 4: Move to chair/commode   Education   Education Provided Mobility training;Assistive device   Patient Demonstrates acceptance/verbal understanding   End of Consult   Patient Position at End of Consult Bedside chair;Bed/Chair alarm activated;All needs within reach     Pt requires PT /OT co-treat due to signficant assistance with mobility, medical complexity and/or cognitive-behavioral impairments.     The patient's AM-[facility] Basic Mobility Inpatient Short Form Raw Score is 8. A Raw score of less than or equal to 16 suggests the patient may benefit from discharge to post-acute rehabilitation services. Please also refer to the recommendation of the Physical Therapist for safe discharge planning.     Pt tolerated session poorly. He requires increased time, cues and assistance for improved sitting balance sitting EOB. He continues to require increased assistance with bed mobility and to achieve standing and pivot. He was able to maintain standing with increased assistance for pericare. He is limited by decreased strength,  endurance, balance. He will continue to benefit from PT services to maximize LOF.     Julienne Rooney, PT,DPT

## 2024-06-04 NOTE — SPEECH THERAPY NOTE
"Speech Language/Pathology    Speech/Language Pathology Progress Note    Patient Name: Ash Loaiza  Today's Date: 6/4/2024      Subjective:  \"This isn't xmen\"    Objective:  Assess diet tolerance. Pt currently on puree and nectar thick liquids    Assessment:  Pt seen upright in bed w/ snack of pudding and nectar thick soda via straw. Noted to have a thin water bottle and unopened soda can on bedside table. Pt reported they were his mothers. SLP moved items out of patients reach to window sill.  Pt demonstrates impaired oral agility w/ pudding resulting in slowed oral clearance and moderate oral residual on lingual surface, suspect d/t piecemeal deglutition. With verbal cue pt able to clear residue. No overt s/s aspiration w/ puree or nectar thick via straw.    Plan/Recommendations:  Continue current diet, will trial mech softs next session.  Suspect combination of rapid rate of feeding vs inadequate thickening of liquids causing reported coughing episodes at home    Lauren Mota MS CCC-SLP  6/4/2024       "

## 2024-06-04 NOTE — ASSESSMENT & PLAN NOTE
At baseline on pureed diet.   Speech evaluated patient and recs pureed with nectar thick, crushed meds  Continue with ongoing speech follow-up and maintain strict aspiration precautions.   : Yes

## 2024-06-04 NOTE — PLAN OF CARE
Problem: Potential for Falls  Goal: Patient will remain free of falls  Description: INTERVENTIONS:  - Educate patient/family on patient safety including physical limitations  - Instruct patient to call for assistance with activity   - Consult OT/PT to assist with strengthening/mobility   - Keep Call bell within reach  - Keep bed low and locked with side rails adjusted as appropriate  - Keep care items and personal belongings within reach  - Initiate and maintain comfort rounds  - Make Fall Risk Sign visible to staff  - Apply yellow socks and bracelet for high fall risk patients  - Consider moving patient to room near nurses station  Outcome: Progressing     Problem: PAIN - ADULT  Goal: Verbalizes/displays adequate comfort level or baseline comfort level  Description: Interventions:  - Encourage patient to monitor pain and request assistance  - Assess pain using appropriate pain scale  - Administer analgesics based on type and severity of pain and evaluate response  - Implement non-pharmacological measures as appropriate and evaluate response  - Consider cultural and social influences on pain and pain management  - Notify physician/advanced practitioner if interventions unsuccessful or patient reports new pain  Outcome: Progressing     Problem: INFECTION - ADULT  Goal: Absence or prevention of progression during hospitalization  Description: INTERVENTIONS:  - Assess and monitor for signs and symptoms of infection  - Monitor lab/diagnostic results  - Monitor all insertion sites, i.e. indwelling lines, tubes, and drains  - Monitor endotracheal if appropriate and nasal secretions for changes in amount and color  - [place] appropriate cooling/warming therapies per order  - Administer medications as ordered  - Instruct and encourage patient and family to use good hand hygiene technique  - Identify and instruct in appropriate isolation precautions for identified infection/condition  Outcome: Progressing  Goal: Absence  of fever/infection during neutropenic period  Description: INTERVENTIONS:  - Monitor WBC    Outcome: Progressing     Problem: SAFETY ADULT  Goal: Patient will remain free of falls  Description: INTERVENTIONS:  - Educate patient/family on patient safety including physical limitations  - Instruct patient to call for assistance with activity   - Consult OT/PT to assist with strengthening/mobility   - Keep Call bell within reach  - Keep bed low and locked with side rails adjusted as appropriate  - Keep care items and personal belongings within reach  - Initiate and maintain comfort rounds  - Make Fall Risk Sign visible to staff  - Offer Toileting every 2 Hours, in advance of need  - Initiate/Maintain bed alarm  - Obtain necessary fall risk management equipment  - Apply yellow socks and bracelet for high fall risk patients  - Consider moving patient to room near nurses station  Outcome: Progressing  Goal: Maintain or return to baseline ADL function  Description: INTERVENTIONS:  -  Assess patient's ability to carry out ADLs; assess patient's baseline for ADL function and identify physical deficits which impact ability to perform ADLs (bathing, care of mouth/teeth, toileting, grooming, dressing, etc.)  - Assess/evaluate cause of self-care deficits   - Assess range of motion  - Assess patient's mobility; develop plan if impaired  - Assess patient's need for assistive devices and provide as appropriate  - Encourage maximum independence but intervene and supervise when necessary  - Involve family in performance of ADLs  - Assess for home care needs following discharge   - Consider OT consult to assist with ADL evaluation and planning for discharge  - Provide patient education as appropriate  Outcome: Progressing  Goal: Maintains/Returns to pre admission functional level  Description: INTERVENTIONS:  - Perform AM-PAC 6 Click Basic Mobility/ Daily Activity assessment daily.  - Set and communicate daily mobility goal to care  team and patient/family/caregiver.   - Collaborate with rehabilitation services on mobility goals if consulted  - Dangle patient 2 times a day  - Out of bed for toileting  - Record patient progress and toleration of activity level   Outcome: Progressing     Problem: DISCHARGE PLANNING  Goal: Discharge to home or other facility with appropriate resources  Description: INTERVENTIONS:  - Identify barriers to discharge w/patient and caregiver  - Arrange for needed discharge resources and transportation as appropriate  - Identify discharge learning needs (meds, wound care, etc.)  - Arrange for interpretive services to assist at discharge as needed  - Refer to Case Management Department for coordinating discharge planning if the patient needs post-hospital services based on physician/advanced practitioner order or complex needs related to functional status, cognitive ability, or social support system  Outcome: Progressing     Problem: Knowledge Deficit  Goal: Patient/family/caregiver demonstrates understanding of disease process, treatment plan, medications, and discharge instructions  Description: Complete learning assessment and assess knowledge base.  Interventions:  - Provide teaching at level of understanding  - Provide teaching via preferred learning methods  Outcome: Progressing     Problem: Prexisting or High Potential for Compromised Skin Integrity  Goal: Skin integrity is maintained or improved  Description: INTERVENTIONS:  - Identify patients at risk for skin breakdown  - Assess and monitor skin integrity  - Assess and monitor nutrition and hydration status  - Monitor labs   - Assess for incontinence   - Turn and reposition patient  - Assist with mobility/ambulation  - Relieve pressure over bony prominences  - Avoid friction and shearing  - Provide appropriate hygiene as needed including keeping skin clean and dry  - Evaluate need for skin moisturizer/barrier cream  - Collaborate with interdisciplinary team   -  Patient/family teaching  - Consider wound care consult   Outcome: Progressing

## 2024-06-04 NOTE — ASSESSMENT & PLAN NOTE
Patient with developmental delay, but this is most likely secondary to his acute illness including pleural effusions, recurrent aspiration pneumonia.  Mentation appears to be at baseline currently.

## 2024-06-04 NOTE — PLAN OF CARE
Problem: PHYSICAL THERAPY ADULT  Goal: Performs mobility at highest level of function for planned discharge setting.  See evaluation for individualized goals.  Description: Treatment/Interventions: ADL retraining, Functional transfer training, LE strengthening/ROM, Elevations, Therapeutic exercise, Endurance training, Patient/family training, Cognitive reorientation, Equipment eval/education, Bed mobility, Gait training, Compensatory technique education, Spoke to nursing, Spoke to case management, OT  Equipment Recommended:  (should patient return to home instead of post acute rehab, recommend hospital bed, lizz lift and caregivers.)       See flowsheet documentation for full assessment, interventions and recommendations.  Outcome: Progressing  Note: Prognosis: Good  Problem List: Decreased strength, Decreased endurance, Impaired balance, Decreased mobility, Decreased range of motion, Decreased cognition, Impaired judgement, Decreased safety awareness, Obesity, Decreased skin integrity  Assessment: Pt tolerated session poorly. He requires increased time, cues and assistance for improved sitting balance sitting EOB. He continues to require increased assistance with bed mobility and to achieve standing and pivot. He was able to maintain standing with increased assistance for pericare. He is limited by decreased strength, endurance, balance. He will continue to benefit from PT services to maximize LOF.     Barriers to Discharge Comments: pt requires significant assistance to complete mobility, increased fall risk. has support from mother, but would beenfit from icnreased support from daily aides.  Rehab Resource Intensity Level, PT: II (Moderate Resource Intensity)    See flowsheet documentation for full assessment.

## 2024-06-04 NOTE — ASSESSMENT & PLAN NOTE
Had a detailed discussion with mom (Kylie) and we discussed that the patient is not doing well.  He has not been doing well since the beginning of this year and has declined since admission to the hospital in April 2024 at [facility] for aspiration pneumonia.  Patient continues to choke on thickened liquids at home.  He is now bedbound.  He continues to decline.  During his hospital course I discussed with that he has recurrent aspiration pneumonia, encephalopathic, bilateral pleural effusions and he seems to be getting worse.  She she states that he continues to decline and not do well.  She states that she has a hard time taking care of him.  Patient has been at 5 nursing homes and she states that they do not give good care to him.    We discussed CODE STATUS: Discussed that the likelihood of him recovering from a cardiac arrest is very poor.  Kylie was a nurse, and states that she did many codes.  She would like to make him a DNR noting that he would not do well from CPR, chest compressions or intubation.    We discussed that if the patient continues to have chronic aspiration pneumonia, and does not pass a swallow screen which he want a feeding tube.  She currently does not know.  We discussed about wishes and about quality measures.  I did briefly touched upon hospice and just making the patient comfortable if she did not want to take aggressive measures.  She does want to give him a chance to turn around but she knows that he may not do well.    Her son Patricio later joined her.  I answered more of his questions including about pleural effusion, aspiration pneumonia.  There is a bit of a poor understanding.  He did ask me that for the calcification of the pericardium if they would remove that.  With the patient's current medical condition I stated that most likely that would not happen and that is a question for CT surgery.  Continue with current level of care.  Continue to monitor symptoms closely.

## 2024-06-04 NOTE — OCCUPATIONAL THERAPY NOTE
Occupational Therapy Evaluation     Patient Name: Ash Loaiza  Today's Date: 6/4/2024  Problem List  Principal Problem:    Pneumonia of both lungs due to infectious organism  Active Problems:    Nonintractable generalized idiopathic epilepsy without status epilepticus (HCC)    Acute respiratory failure with hypoxia (HCC)    Developmental delay    Dysphagia    Bilateral pleural effusion    Acute on chronic heart failure with preserved ejection fraction (HCC)    Liver cirrhosis (HCC)    Urinary retention    Stage 3b chronic kidney disease (HCC)    Encephalopathy    Poor prognosis    Past Medical History  Past Medical History:   Diagnosis Date    Hypertension     Mentally challenged     Pericardial effusion     Pneumonia     Seizure (HCC)      Past Surgical History  Past Surgical History:   Procedure Laterality Date    FRACTURE SURGERY      clavicle, left humerus, radial, and ulna.  Right radius    HIP ARTHROSCOPY Right     IR THORACENTESIS  11/20/2023    IR THORACENTESIS  6/3/2024    UT COLONOSCOPY FLX DX W/COLLJ SPEC WHEN PFRMD N/A 4/1/2019    Procedure: COLONOSCOPY;  Surgeon: Avi Guzman MD;  Location:  GI LAB;  Service: Colorectal      06/04/24 1145   Note Type   Note type Evaluation   Pain Assessment   Pain Assessment Tool 0-10   Pain Score No Pain   Restrictions/Precautions   Other Precautions Cognitive;Chair Alarm;Bed Alarm;Fall Risk;Multiple lines   Home Living   Type of Home House  (3 [place] with rail)   Home Layout Two level;Bed/bath upstairs  (FF to 2nd floor bed/bath)   Home Equipment Walker;Wheelchair-manual   Additional Comments Pt with cognitive deficits, unable to give PLOF or home living. Per EMR, pt lives in a 2SH with mother. RW use for transfers and short distance mobility and baseline.   Prior Function   Level of [place] Needs assistance with ADLs;Needs assistance with functional mobility;Needs assistance with IADLS   Lives With Other (Comment)  (mother)   Receives Help From  Family   IADLs Family/Friend/Other provides transportation;Family/Friend/Other provides medication management;Family/Friend/Other provides meals   Falls in the last 6 months 1 to 4  (rolled out of bed)   ADL   UB Dressing Assistance 2  Maximal Assistance   UB Dressing Deficit Setup;Verbal cueing;Increased time to complete   LB Dressing Assistance 1  Total Assistance   LB Dressing Deficit Setup;Requires assistive device for steadying;Verbal cueing;Increased time to complete   Toileting Assistance  1  Total Assistance   Toileting Deficit Setup;Verbal cueing;Increased time to complete;Perineal hygiene   Additional Comments UB ADLs @ Max A d/t decreased seated balance and cognitive deficits. LB ADLs @ Total A. Pt required assist to don socks while seated. Total A for posterior pericare while standing with Max A x1. See tx assessment for greater detail regarding ADL performance.   Bed Mobility   Supine to Sit 2  Maximal assistance   Additional items Assist x 1;Increased time required;Verbal cues;LE management  (trunk management)   Additional Comments Pt supine in bed at beginning of session. Supine to sit @ Max A. Pt required Min A to maintain seated at EOB d/t posterior lean.   Transfers   Sit to Stand 2  Maximal assistance   Additional items Assist x 2;Increased time required;Verbal cues   Stand to Sit 2  Maximal assistance   Additional items Assist x 2;Increased time required;Verbal cues   Stand pivot 2  Maximal assistance   Additional items Assist x 2;Increased time required;Verbal cues   Additional Comments Initial attempt of STS with RW failed. Able to achieve 1/2 stand with Max A x1 anteriorly for posterior pericare. Squat pivot from EOB to recliner chair @ Max Ax2. See tx assessment for greater detail regarding functional mobility.\   Balance   Static Sitting Poor +   Dynamic Sitting Poor   Static Standing Poor -   Dynamic Standing Poor -   Activity Tolerance   Activity Tolerance Patient limited by fatigue    Medical Staff Made Aware Spoke with PT Julienne   Nurse Made Aware Spoke with RN Cori JENSEN Assessment   RUE Assessment WFL   LUE Assessment   LUE Assessment WFL   Hand Function   Gross Motor Coordination Functional   Fine Motor Coordination Functional   Cognition   Overall Cognitive Status Impaired   Arousal/Participation Alert;Responsive;Cooperative   Attention Difficulty attending to directions   Orientation Level Oriented to person;Oriented to place;Disoriented to time;Disoriented to situation   Memory Decreased long term memory;Decreased recall of biographical information;Decreased short term memory;Decreased recall of recent events;Decreased recall of precautions   Following Commands Follows one step commands with increased time or repetition   Assessment   Limitation Decreased ADL status;Decreased cognition;Decreased Safe judgement during ADL;Decreased endurance;Decreased self-care trans;Decreased high-level ADLs   Prognosis Fair   Assessment Pt is a 58 y.o. male, admitted to [facility] 6/2/2024 d/t experiencing increased weakness and epistaxis. Dx: pneumonia of both lungs d/t infectious organism. Pt with PMHx impacting their performance during ADL tasks, including: hypertension, mentally challenged, pericardial effusion, pneumonia, seizure. Prior to admission to the hospital Pt was performing ADLs with physical assistance. IADLs with physical assistance. Functional transfers/ambulation with physical assistance. Cognitive status was PTA was Impaired. OT order placed to assess Pt's ADLs, cognitive status, and performance during functional tasks in order to maximize safety and independence while making most appropriate d/c recommendations. PT/OT co-treatment completed at this time d/t significant mobility deficits and safety concerns. Pt's clinical presentation is currently unstable/unpredictable given new onset deficits that effect Pt's occupational performance and ability to safely return to OF including decrease  activity tolerance, decrease standing balance, decrease sitting balance, decrease performance during ADL tasks, decrease cognition, decrease safety awareness , decrease BUE ROM, decrease UB MS, decrease generalized strength, decrease activity engagement, and decrease performance during functional transfers combined with medical complications of hypotension, abnormal H&H, abnormal CBC, abnormal CO2 values, incontinence, and need for input for mobility technique/safety. Personal factors affecting Pt at time of initial evaluation include: step(s) to enter environment, multi-level environment, inability to perform IADLs, inability to perform ADLs, inability to ambulate household distances, inability to navigate community distances, limited insight into impairments, decreased initiation and engagement, and recent fall(s)/fall history. Pt will benefit from continued skilled OT services to address deficits as defined above and to maximize level independence/participation during ADLs and functional tasks to facilitate return toward PLOF and improved quality of life. From an occupational therapy standpoint, recommendation at time of d/c would be Level II: Moderate Resource Intensity Therapy.   Plan   Treatment Interventions ADL retraining;Visual perceptual retraining;Functional transfer training;UE strengthening/ROM;Endurance training;Cognitive reorientation;Equipment evaluation/education;Patient/family training;Compensatory technique education;Neuromuscular reeducation;Fine motor coordination activities;UE splinting;Continued evaluation;Energy conservation;Cardiac education;Activityengagement   Goal Expiration Date 06/18/24   OT Treatment Day 1   OT Frequency 2-3x/wk   Discharge Recommendation   Rehab Resource Intensity Level, OT II (Moderate Resource Intensity)   AM-PAC Daily Activity Inpatient   Lower Body Dressing 1   Bathing 1   Toileting 1   Upper Body Dressing 2   Grooming 2   Eating 2   Daily Activity Raw Score 9    Turning Head Towards Sound 2   Follow Simple Instructions 2   Low Function Daily Activity Raw Score 13   Low Function Daily Activity Standardized Score  23.16   AM-PAC Applied Cognition Inpatient   Following a Speech/Presentation 1   Understanding Ordinary Conversation 2   Taking Medications 1   Remembering Where Things Are Placed or Put Away 1   Remembering List of 4-5 Errands 1   Taking Care of Complicated Tasks 1   Applied Cognition Raw Score 7   Applied Cognition Standardized Score 15.17   Additional Treatment Session   Start Time 1200   End Time 1208   Treatment Assessment Pt completed OT tx session #1 focused on ADL performance and functional mobility. Pt seated on recliner chair at beginning of session incontinent of BM. STS from chair with BUE support on bedrail at Max A x2. Pt able to maintain standing for approximately 30 seconds with Max A x1 during posterior pericare.   End of Consult   Patient Position at End of Consult Bedside chair;Bed/Chair alarm activated;All needs within reach     The patient's raw score on the AM-PAC Daily Activity Inpatient Short Form is 9. A raw score of less than 19 suggests the patient may benefit from discharge to post-acute rehabilitation services. Please refer to the recommendation of the Occupational Therapist for safe discharge planning.    Pt goals to be met by 6/18/2024     Pt will demonstrate ability to complete grooming/hygiene tasks @ Min assist after set-up.  Pt will demonstrate ability to complete supine<>sit @ Mod assist in order to increase safety and independence during ADL tasks.  Pt will demonstrate ability to complete UB ADLs including washing/dressing @ Min assist in order to increase performance and participation during meaningful tasks  Pt will demonstrate ability to complete LB dressing @ Max assist in order to increase safety and independence during meaningful tasks.   Pt will demonstrate ability to complete toileting tasks including CM and pericare @  max assist in order to increase safety and independence during meaningful tasks.  Pt will demonstrate ability to complete EOB, chair, toilet/commode transfers @ mod assist in order to increase performance and participation during functional tasks.  Pt will demonstrate ability to stand for 2 minutes while maintaining F+ balance with use of RW for UB support PRN.  Pt will demonstrate ability to tolerate 10 minute OT session with no vc'ing for deep breathing or use of energy conservation techniques in order to increase activity tolerance during functional tasks.   Pt will demonstrate Good carryover of use of energy conservation/compensatory strategies during ADLs and functional tasks in order to increase safety and reduce risk for falls.   Pt will follow 100% simple 2-step commands and be at least A&O x3 consistently with environmental cues to increase participation in functional activities.   Pt will identify 3 areas of interest/hobbies and 1 intervention on how to incorporate into daily life in order to increase interaction with environment and peers as well as increase participation in meaningful tasks.   Pt will demonstrate 100% carryover of BUE HEP in order to increase BUE MS and increase performance during functional tasks upon d/c home.      Sherita Solorzano, OTR/L

## 2024-06-04 NOTE — ASSESSMENT & PLAN NOTE
Patient with bilateral pleural effusions, received 80 mg of Lasix IV in the emergency room.  At home he takes torsemide and Aldactone.  Lasix 40 mg IV twice daily.  Has had good urine output with -2.4 L in the last 24 hours.  Had episode of hypotension overnight with elevation in creatinine noted today.  Will hold further doses of diuretics and continue to monitor renal functions and electrolytes closely in AM.      Wt Readings from Last 3 Encounters:   06/02/24 93.9 kg (207 lb)   01/30/24 79.3 kg (174 lb 13.2 oz)   12/08/23 106 kg (234 lb 9.1 oz)

## 2024-06-04 NOTE — ASSESSMENT & PLAN NOTE
Moderately large right and moderate size left pleural effusion.  On previous admission in April 2024, he had a right-sided thoracentesis which was transudative.  S/p bilateral thoracentesis yesterday.

## 2024-06-04 NOTE — CASE MANAGEMENT
Case Management Discharge Planning Note    Patient name Ash Loaiza  Location /-01 MRN [id number]  : 1965 Date 2024       Current Admission Date: 2024  Current Admission Diagnosis:Pneumonia of both lungs due to infectious organism   Patient Active Problem List    Diagnosis Date Noted Date Diagnosed    Encephalopathy 2024     Poor prognosis 2024     Azul catheter in place 2024     Urinary retention 2024     Elevated troponin 2024     Stage 3b chronic kidney disease (HCC) 2024     Acute blood loss anemia 2023     Chronic respiratory failure with hypercapnia ([facility]) 2023     Acute kidney injury superimposed on chronic kidney disease 3 2023     Chronic diastolic HF (heart failure) ([facility]) 2021     Liver cirrhosis ([facility]) 2021     Abnormal finding on CT scan 2021     EDWIGE (acute kidney injury) ([facility]) 2021     Bilateral pleural effusion 2021     History of COVID-19 2021     S/P pericardial window creation 2021     Acute on chronic heart failure with preserved ejection fraction (HCC) 2021     Lower extremity pain, left 2021     MRSA nasal colonization 2020     Developmental delay 2020     Dysphagia 2020     Pneumonia of both lungs due to infectious organism 01/10/2020     Nonintractable generalized idiopathic epilepsy without status epilepticus (HCC) 01/10/2020     Falls 01/10/2020     T wave inversion in EKG 01/10/2020     Essential hypertension 01/10/2020     Acute respiratory failure with hypoxia (HCC) 01/10/2020     Polyp of colon 03/15/2019       LOS (days): 2  Geometric Mean LOS (GMLOS) (days): 4.1  Days to GMLOS:2.5     OBJECTIVE:  Risk of Unplanned Readmission Score: 23.8         Current admission status: Inpatient   Preferred Pharmacy:   [id number] - [place], [place] - [address]  [address] [address]  Phone:  [phone] Fax: [phone]    Primary Care Provider: Jose G Holloway DO    Primary Insurance: MEDICARE  Secondary Insurance: [place] MEDICAL ASSISTANCE    DISCHARGE DETAILS:    Discharge planning discussed with:: Mother- Kylie Loaiza  Freedom of Choice: Yes  Comments - Freedom of Choice: Care team recommends STR- discussed with mother Kylie Loaiza. [place] referral made in [place] for STR  CM contacted family/caregiver?: Yes  Were Treatment Team discharge recommendations reviewed with patient/caregiver?: Yes (Mother Kylie Loaiza)  Did patient/caregiver verbalize understanding of patient care needs?: Yes        CM discussed care team recommendations for STR with mother Kylie Loaiza, she is agreeable to making referrals in [place]. [place] referral placed in [place] for STR. CM will continue to follow and address d/c planning needs.      CM placed a call to DHARMESH, message left for Yessy Barba   to return call at [facility] to discuss d/c planning and getting caregiver assistance in the home to assist mother with care for patient.               Would you like to participate in our [facility] service program?  : No - Declined    Treatment Team Recommendation: Short Term Rehab  Discharge Destination Plan:: Short Term Rehab

## 2024-06-04 NOTE — ASSESSMENT & PLAN NOTE
Multifactorial secondary to heart failure, pleural effusions recurrent aspiration pneumonia.  Continue with oxygen via nasal cannula.  Currently on room air.

## 2024-06-04 NOTE — ASSESSMENT & PLAN NOTE
Recurrent aspiration pneumonia.  He had a hospitalization in April 2024 at [facility].  Continue IV Zosyn.  Complete total 7 days of treatment.  Speech therapy evaluated patient and recommended Pureed with nectar thick liquid with crushed meds.

## 2024-06-04 NOTE — ASSESSMENT & PLAN NOTE
Patient's creatinine slightly elevated than baseline.  Closely.  Hold diuretics.  Hold further dose of diuretic r and repeat BMP in AM.    Lab Results   Component Value Date    EGFR 30 06/04/2024    EGFR 37 06/03/2024    EGFR 36 06/02/2024    CREATININE 2.32 (H) 06/04/2024    CREATININE 1.96 (H) 06/03/2024    CREATININE 1.98 (H) 06/02/2024

## 2024-06-05 LAB
ABO GROUP BLD: NORMAL
ANION GAP SERPL CALCULATED.3IONS-SCNC: 9 MMOL/L (ref 4–13)
BASOPHILS # BLD AUTO: 0.01 THOUSANDS/ÂΜL (ref 0–0.1)
BASOPHILS NFR BLD AUTO: 0 % (ref 0–1)
BLD GP AB SCN SERPL QL: NEGATIVE
BUN SERPL-MCNC: 27 MG/DL (ref 5–25)
CALCIUM SERPL-MCNC: 8.9 MG/DL (ref 8.4–10.2)
CHLORIDE SERPL-SCNC: 103 MMOL/L (ref 96–108)
CO2 SERPL-SCNC: 29 MMOL/L (ref 21–32)
CREAT SERPL-MCNC: 2.31 MG/DL (ref 0.6–1.3)
EOSINOPHIL # BLD AUTO: 0 THOUSAND/ÂΜL (ref 0–0.61)
EOSINOPHIL NFR BLD AUTO: 0 % (ref 0–6)
ERYTHROCYTE [DISTWIDTH] IN BLOOD BY AUTOMATED COUNT: 15.6 % (ref 11.6–15.1)
GFR SERPL CREATININE-BSD FRML MDRD: 30 ML/MIN/1.73SQ M
GLUCOSE SERPL-MCNC: 85 MG/DL (ref 65–140)
GLUCOSE SERPL-MCNC: 91 MG/DL (ref 65–140)
HCT VFR BLD AUTO: 31 % (ref 36.5–49.3)
HGB BLD-MCNC: 9.8 G/DL (ref 12–17)
IMM GRANULOCYTES # BLD AUTO: 0.02 THOUSAND/UL (ref 0–0.2)
IMM GRANULOCYTES NFR BLD AUTO: 0 % (ref 0–2)
LYMPHOCYTES # BLD AUTO: 0.57 THOUSANDS/ÂΜL (ref 0.6–4.47)
LYMPHOCYTES NFR BLD AUTO: 10 % (ref 14–44)
MCH RBC QN AUTO: 32.6 PG (ref 26.8–34.3)
MCHC RBC AUTO-ENTMCNC: 31.6 G/DL (ref 31.4–37.4)
MCV RBC AUTO: 103 FL (ref 82–98)
MONOCYTES # BLD AUTO: 0.6 THOUSAND/ÂΜL (ref 0.17–1.22)
MONOCYTES NFR BLD AUTO: 11 % (ref 4–12)
NEUTROPHILS # BLD AUTO: 4.42 THOUSANDS/ÂΜL (ref 1.85–7.62)
NEUTS SEG NFR BLD AUTO: 79 % (ref 43–75)
NRBC BLD AUTO-RTO: 0 /100 WBCS
PLATELET # BLD AUTO: 126 THOUSANDS/UL (ref 149–390)
PMV BLD AUTO: 11.5 FL (ref 8.9–12.7)
POTASSIUM SERPL-SCNC: 3.7 MMOL/L (ref 3.5–5.3)
RBC # BLD AUTO: 3.01 MILLION/UL (ref 3.88–5.62)
RH BLD: POSITIVE
SODIUM SERPL-SCNC: 141 MMOL/L (ref 135–147)
SPECIMEN EXPIRATION DATE: NORMAL
WBC # BLD AUTO: 5.62 THOUSAND/UL (ref 4.31–10.16)

## 2024-06-05 PROCEDURE — 86900 BLOOD TYPING SEROLOGIC ABO: CPT | Performed by: FAMILY MEDICINE

## 2024-06-05 PROCEDURE — 82948 REAGENT STRIP/BLOOD GLUCOSE: CPT

## 2024-06-05 PROCEDURE — 99232 SBSQ HOSP IP/OBS MODERATE 35: CPT | Performed by: INTERNAL MEDICINE

## 2024-06-05 PROCEDURE — 80048 BASIC METABOLIC PNL TOTAL CA: CPT | Performed by: FAMILY MEDICINE

## 2024-06-05 PROCEDURE — 85025 COMPLETE CBC W/AUTO DIFF WBC: CPT | Performed by: FAMILY MEDICINE

## 2024-06-05 PROCEDURE — 86901 BLOOD TYPING SEROLOGIC RH(D): CPT | Performed by: FAMILY MEDICINE

## 2024-06-05 PROCEDURE — NC001 PR NO CHARGE

## 2024-06-05 PROCEDURE — 92526 ORAL FUNCTION THERAPY: CPT

## 2024-06-05 PROCEDURE — 86850 RBC ANTIBODY SCREEN: CPT | Performed by: FAMILY MEDICINE

## 2024-06-05 RX ORDER — HEPARIN SODIUM 5000 [USP'U]/ML
5000 INJECTION, SOLUTION INTRAVENOUS; SUBCUTANEOUS EVERY 8 HOURS SCHEDULED
Status: DISCONTINUED | OUTPATIENT
Start: 2024-06-05 | End: 2024-06-06

## 2024-06-05 RX ORDER — SODIUM CHLORIDE/ALOE VERA
1 GEL (GRAM) NASAL
Status: DISCONTINUED | OUTPATIENT
Start: 2024-06-05 | End: 2024-06-14 | Stop reason: HOSPADM

## 2024-06-05 RX ORDER — ECHINACEA PURPUREA EXTRACT 125 MG
2 TABLET ORAL
Status: DISCONTINUED | OUTPATIENT
Start: 2024-06-05 | End: 2024-06-14 | Stop reason: HOSPADM

## 2024-06-05 RX ORDER — OXYMETAZOLINE HYDROCHLORIDE 0.05 G/100ML
2 SPRAY NASAL EVERY 12 HOURS SCHEDULED
Status: COMPLETED | OUTPATIENT
Start: 2024-06-05 | End: 2024-06-05

## 2024-06-05 RX ORDER — OXYMETAZOLINE HYDROCHLORIDE 0.05 G/100ML
SPRAY NASAL
Status: DISPENSED
Start: 2024-06-05 | End: 2024-06-05

## 2024-06-05 RX ADMIN — CLONAZEPAM 0.5 MG: 0.5 TABLET ORAL at 22:28

## 2024-06-05 RX ADMIN — SALINE NASAL SPRAY 2 SPRAY: 1.5 SOLUTION NASAL at 17:10

## 2024-06-05 RX ADMIN — Medication 2 G: at 22:33

## 2024-06-05 RX ADMIN — PRIMIDONE 150 MG: 50 TABLET ORAL at 22:26

## 2024-06-05 RX ADMIN — PIPERACILLIN AND TAZOBACTAM 4.5 G: 36; 4.5 INJECTION, POWDER, FOR SOLUTION INTRAVENOUS at 16:54

## 2024-06-05 RX ADMIN — LAMOTRIGINE 250 MG: 100 TABLET ORAL at 16:53

## 2024-06-05 RX ADMIN — OXYMETAZOLINE HYDROCHLORIDE 2 SPRAY: 0.05 SPRAY NASAL at 22:38

## 2024-06-05 RX ADMIN — PIPERACILLIN AND TAZOBACTAM 4.5 G: 36; 4.5 INJECTION, POWDER, FOR SOLUTION INTRAVENOUS at 23:58

## 2024-06-05 RX ADMIN — Medication 2 G: at 09:32

## 2024-06-05 RX ADMIN — ZONISAMIDE 100 MG: 100 CAPSULE ORAL at 22:27

## 2024-06-05 RX ADMIN — PIPERACILLIN AND TAZOBACTAM 4.5 G: 36; 4.5 INJECTION, POWDER, FOR SOLUTION INTRAVENOUS at 00:04

## 2024-06-05 RX ADMIN — LAMOTRIGINE 300 MG: 100 TABLET ORAL at 22:24

## 2024-06-05 RX ADMIN — LEVOCARNITINE 250 MG: 1 SOLUTION ORAL at 22:22

## 2024-06-05 RX ADMIN — Medication 2 G: at 11:40

## 2024-06-05 RX ADMIN — HEPARIN SODIUM 5000 UNITS: 5000 INJECTION INTRAVENOUS; SUBCUTANEOUS at 16:51

## 2024-06-05 RX ADMIN — Medication 1 APPLICATION: at 22:41

## 2024-06-05 RX ADMIN — PRIMIDONE 100 MG: 50 TABLET ORAL at 16:56

## 2024-06-05 RX ADMIN — PIPERACILLIN AND TAZOBACTAM 4.5 G: 36; 4.5 INJECTION, POWDER, FOR SOLUTION INTRAVENOUS at 09:32

## 2024-06-05 RX ADMIN — OXYMETAZOLINE HYDROCHLORIDE 2 SPRAY: 0.05 SPRAY NASAL at 04:59

## 2024-06-05 RX ADMIN — SALINE NASAL SPRAY 2 SPRAY: 1.5 SOLUTION NASAL at 22:40

## 2024-06-05 RX ADMIN — LEVOCARNITINE 250 MG: 1 SOLUTION ORAL at 16:53

## 2024-06-05 RX ADMIN — Medication 1 APPLICATION: at 17:08

## 2024-06-05 RX ADMIN — HEPARIN SODIUM 5000 UNITS: 5000 INJECTION INTRAVENOUS; SUBCUTANEOUS at 22:42

## 2024-06-05 NOTE — CASE MANAGEMENT
Case Management Discharge Planning Note    Patient name Ash Loaiza  Location /-01 MRN [id number]  : 1965 Date 2024       Current Admission Date: 2024  Current Admission Diagnosis:Pneumonia of both lungs due to infectious organism   Patient Active Problem List    Diagnosis Date Noted Date Diagnosed    Encephalopathy 2024     Poor prognosis 2024     Azul catheter in place 2024     Urinary retention 2024     Elevated troponin 2024     Stage 3b chronic kidney disease (HCC) 2024     Acute blood loss anemia 2023     Epistaxis 2023     Chronic respiratory failure with hypercapnia ([facility]) 2023     Acute kidney injury superimposed on chronic kidney disease 3 2023     Chronic diastolic HF (heart failure) ([facility]) 2021     Liver cirrhosis ([facility]) 2021     Abnormal finding on CT scan 2021     EDWIGE (acute kidney injury) ([facility]) 2021     Bilateral pleural effusion 2021     History of COVID-19 2021     S/P pericardial window creation 2021     Acute on chronic heart failure with preserved ejection fraction ([facility]) 2021     Lower extremity pain, left 2021     MRSA nasal colonization 2020     Developmental delay 2020     Dysphagia 2020     Pneumonia of both lungs due to infectious organism 01/10/2020     Nonintractable generalized idiopathic epilepsy without status epilepticus (HCC) 01/10/2020     Falls 01/10/2020     T wave inversion in EKG 01/10/2020     Essential hypertension 01/10/2020     Acute respiratory failure with hypoxia ([facility]) 01/10/2020     Polyp of colon 03/15/2019       LOS (days): 3  Geometric Mean LOS (GMLOS) (days): 4.1  Days to GMLOS:1.3     OBJECTIVE:  Risk of Unplanned Readmission Score: 26.9         Current admission status: Inpatient   Preferred Pharmacy:   [id number] - [place], [place] - [address]  [address]  [place] [address]  Phone: [phone] Fax: [phone]    Primary Care Provider: Jose G Holloway DO    Primary Insurance: MEDICARE  Secondary Insurance: [place] MEDICAL ASSISTANCE    DISCHARGE DETAILS:           CM placed call to Mother Kylie Loaiza to review patient choice list for STR, current accepting facilities [facility], [facility], [facility], and [facility], message left for mother to return call to [facility] CM.                                            Treatment Team Recommendation: Short Term Rehab  Discharge Destination Plan:: Short Term Rehab

## 2024-06-05 NOTE — PLAN OF CARE
Problem: Potential for Falls  Goal: Patient will remain free of falls  Description: INTERVENTIONS:  - Educate patient/family on patient safety including physical limitations  - Instruct patient to call for assistance with activity   - Consult OT/PT to assist with strengthening/mobility   - Keep Call bell within reach  - Keep bed low and locked with side rails adjusted as appropriate  - Keep care items and personal belongings within reach  - Initiate and maintain comfort rounds  - Make Fall Risk Sign visible to staff  - Offer Toileting every 2 Hours, in advance of need  - Initiate/Maintain bed alarm  - Obtain necessary fall risk management equipment  - Apply yellow socks and bracelet for high fall risk patients  - Consider moving patient to room near nurses station  Outcome: Progressing     Problem: PAIN - ADULT  Goal: Verbalizes/displays adequate comfort level or baseline comfort level  Description: Interventions:  - Encourage patient to monitor pain and request assistance  - Assess pain using appropriate pain scale  - Administer analgesics based on type and severity of pain and evaluate response  - Implement non-pharmacological measures as appropriate and evaluate response  - Consider cultural and social influences on pain and pain management  - Notify physician/advanced practitioner if interventions unsuccessful or patient reports new pain  Outcome: Progressing     Problem: INFECTION - ADULT  Goal: Absence or prevention of progression during hospitalization  Description: INTERVENTIONS:  - Assess and monitor for signs and symptoms of infection  - Monitor lab/diagnostic results  - Monitor all insertion sites, i.e. indwelling lines, tubes, and drains  - Monitor endotracheal if appropriate and nasal secretions for changes in amount and color  - [place] appropriate cooling/warming therapies per order  - Administer medications as ordered  - Instruct and encourage patient and family to use good hand hygiene  technique  - Identify and instruct in appropriate isolation precautions for identified infection/condition  Outcome: Progressing  Goal: Absence of fever/infection during neutropenic period  Description: INTERVENTIONS:  - Monitor WBC    Outcome: Progressing     Problem: SAFETY ADULT  Goal: Patient will remain free of falls  Description: INTERVENTIONS:  - Educate patient/family on patient safety including physical limitations  - Instruct patient to call for assistance with activity   - Consult OT/PT to assist with strengthening/mobility   - Keep Call bell within reach  - Keep bed low and locked with side rails adjusted as appropriate  - Keep care items and personal belongings within reach  - Initiate and maintain comfort rounds  - Make Fall Risk Sign visible to staff  - Initiate/Maintain bed alarm  - Obtain necessary fall risk management equipment  - Apply yellow socks and bracelet for high fall risk patients  - Consider moving patient to room near nurses station  Outcome: Progressing  Goal: Maintain or return to baseline ADL function  Description: INTERVENTIONS:  -  Assess patient's ability to carry out ADLs; assess patient's baseline for ADL function and identify physical deficits which impact ability to perform ADLs (bathing, care of mouth/teeth, toileting, grooming, dressing, etc.)  - Assess/evaluate cause of self-care deficits   - Assess range of motion  - Assess patient's mobility; develop plan if impaired  - Assess patient's need for assistive devices and provide as appropriate  - Encourage maximum independence but intervene and supervise when necessary  - Involve family in performance of ADLs  - Assess for home care needs following discharge   - Consider OT consult to assist with ADL evaluation and planning for discharge  - Provide patient education as appropriate  Outcome: Progressing  Goal: Maintains/Returns to pre admission functional level  Description: INTERVENTIONS:  - Perform AM-PAC 6 Click Basic  Mobility/ Daily Activity assessment daily.  - Set and communicate daily mobility goal to care team and patient/family/caregiver.   - Collaborate with rehabilitation services on mobility goals if consulted  - Reposition patient every 2 hours.  - Out of bed for toileting  - Record patient progress and toleration of activity level   Outcome: Progressing     Problem: DISCHARGE PLANNING  Goal: Discharge to home or other facility with appropriate resources  Description: INTERVENTIONS:  - Identify barriers to discharge w/patient and caregiver  - Arrange for needed discharge resources and transportation as appropriate  - Identify discharge learning needs (meds, wound care, etc.)  - Arrange for interpretive services to assist at discharge as needed  - Refer to Case Management Department for coordinating discharge planning if the patient needs post-hospital services based on physician/advanced practitioner order or complex needs related to functional status, cognitive ability, or social support system  Outcome: Progressing     Problem: Knowledge Deficit  Goal: Patient/family/caregiver demonstrates understanding of disease process, treatment plan, medications, and discharge instructions  Description: Complete learning assessment and assess knowledge base.  Interventions:  - Provide teaching at level of understanding  - Provide teaching via preferred learning methods  Outcome: Progressing     Problem: Prexisting or High Potential for Compromised Skin Integrity  Goal: Skin integrity is maintained or improved  Description: INTERVENTIONS:  - Identify patients at risk for skin breakdown  - Assess and monitor skin integrity  - Assess and monitor nutrition and hydration status  - Monitor labs   - Assess for incontinence   - Turn and reposition patient  - Assist with mobility/ambulation  - Relieve pressure over bony prominences  - Avoid friction and shearing  - Provide appropriate hygiene as needed including keeping skin clean and  dry  - Evaluate need for skin moisturizer/barrier cream  - Collaborate with interdisciplinary team   - Patient/family teaching  - Consider wound care consult   Outcome: Progressing

## 2024-06-05 NOTE — ASSESSMENT & PLAN NOTE
Patient with bilateral pleural effusions, received 80 mg of Lasix IV in the emergency room.  At home he takes torsemide and Aldactone.  Lasix 40 mg IV twice daily.  Has had good urine output with -2.4 L in the last 24 hours.  Had episode of hypotension overnight with elevation in creatinine noted yesterday.  Will hold further doses of diuretics and continue to monitor renal functions and electrolytes closely in AM.      Wt Readings from Last 3 Encounters:   06/02/24 93.9 kg (207 lb)   01/30/24 79.3 kg (174 lb 13.2 oz)   12/08/23 106 kg (234 lb 9.1 oz)

## 2024-06-05 NOTE — PROGRESS NOTES
[facility]  Progress Note  Name: Ash Loaiza I  MRN: [id number]  Unit/Bed#: -01 I Date of Admission: 6/2/2024   Date of Service: 6/5/2024 I Hospital Day: 3    Assessment & Plan   * Pneumonia of both lungs due to infectious organism  Assessment & Plan  Recurrent aspiration pneumonia.  He had a hospitalization in April 2024 at [facility].  Continue IV Zosyn.  Complete total 7 days of treatment.  Speech therapy evaluated patient and recommended Pureed with nectar thick liquid with crushed meds.     Poor prognosis  Assessment & Plan  Had a detailed discussion with mom (Kylie) and we discussed that the patient is not doing well.  He has not been doing well since the beginning of this year and has declined since admission to the hospital in April 2024 at [facility] for aspiration pneumonia.  Patient continues to choke on thickened liquids at home.  He is now bedbound.  He continues to decline.  During his hospital course I discussed with that he has recurrent aspiration pneumonia, encephalopathic, bilateral pleural effusions and he seems to be getting worse.  She she states that he continues to decline and not do well.  She states that she has a hard time taking care of him.  Patient has been at 5 nursing homes and she states that they do not give good care to him.    We discussed CODE STATUS: Discussed that the likelihood of him recovering from a cardiac arrest is very poor.  Kylie was a nurse, and states that she did many codes.  She would like to make him a DNR noting that he would not do well from CPR, chest compressions or intubation.    We discussed that if the patient continues to have chronic aspiration pneumonia, and does not pass a swallow screen which he want a feeding tube.  She currently does not know.  We discussed about wishes and about quality measures.  I did briefly touched upon hospice and just making the patient comfortable if she did not want to take  aggressive measures.  She does want to give him a chance to turn around but she knows that he may not do well.    Her son Patricio later joined her.  I answered more of his questions including about pleural effusion, aspiration pneumonia.  There is a bit of a poor understanding.  He did ask me that for the calcification of the pericardium if they would remove that.  With the patient's current medical condition I stated that most likely that would not happen and that is a question for CT surgery.  Continue with current level of care.  Continue to monitor symptoms closely.    Encephalopathy  Assessment & Plan  Patient with developmental delay, but this is most likely secondary to his acute illness including pleural effusions, recurrent aspiration pneumonia.  Mentation appears to be at baseline currently.    Stage 3b chronic kidney disease (HCC)  Assessment & Plan  Patient's creatinine slightly elevated than baseline.  Baseline appears to be up to 1.7.  Elevated creatinine to 2.3 noted yesterday..  Hold diuretics.  . repeat BMP in AM.    Lab Results   Component Value Date    EGFR 30 06/05/2024    EGFR 30 06/04/2024    EGFR 37 06/03/2024    CREATININE 2.31 (H) 06/05/2024    CREATININE 2.32 (H) 06/04/2024    CREATININE 1.96 (H) 06/03/2024       Urinary retention  Assessment & Plan  Azul catheter has been placed for strict I's and O's    Epistaxis  Assessment & Plan  Events of last night noted.  Seen by ENT.  Recommended humidified oxygen and nasal saline spray and gel.  No further episode ofepistaxis since then.  Resume p.o. diet per speech therapy recommendations.  Maintain strict aspiration precautions.    Liver cirrhosis (HCC)  Assessment & Plan  Current ammonia level is normal.    Resume home Lactulose  Will resume home aldactone  Elevated creatinine noted today.  Follow-up renal functions in AM.    Acute on chronic heart failure with preserved ejection fraction (HCC)  Assessment & Plan  Patient with bilateral pleural  effusions, received 80 mg of Lasix IV in the emergency room.  At home he takes torsemide and Aldactone.  Lasix 40 mg IV twice daily.  Has had good urine output with -2.4 L in the last 24 hours.  Had episode of hypotension overnight with elevation in creatinine noted yesterday.  Will hold further doses of diuretics and continue to monitor renal functions and electrolytes closely in AM.      Wt Readings from Last 3 Encounters:   06/02/24 93.9 kg (207 lb)   01/30/24 79.3 kg (174 lb 13.2 oz)   12/08/23 106 kg (234 lb 9.1 oz)         Bilateral pleural effusion  Assessment & Plan  Moderately large right and moderate size left pleural effusion.  On previous admission in April 2024, he had a right-sided thoracentesis which was transudative.  S/p bilateral thoracentesis yesterday.    Dysphagia  Assessment & Plan  At baseline on pureed diet.   Speech evaluated patient and recs pureed with nectar thick, crushed meds  Continue with ongoing speech follow-up and maintain strict aspiration precautions.    Developmental delay  Assessment & Plan  Since birth, patient is adopted.  It is unknown why he has the mental delay if it is due to anoxic brain injury or cerebral palsy.    Acute respiratory failure with hypoxia (HCC)  Assessment & Plan  Multifactorial secondary to heart failure, pleural effusions recurrent aspiration pneumonia.  Continue with oxygen.  Because of recurrent episode of epistaxis, ENT was consulted and recommended using humidified oxygen via facemask  Currently on room air.    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  Patient able to resume PO intake, will resume home meds.                VTE Pharmacologic Prophylaxis: VTE Score: 6 High Risk (Score >/= 5) - Pharmacological DVT Prophylaxis Ordered: heparin. Sequential Compression Devices Ordered.    Mobility:   Basic Mobility Inpatient Raw Score: 8  -[facility] Goal: 3: Sit  at edge of bed  JH-HLM Achieved: 1: Laying in bed  JH-HLM Goal NOT achieved. Continue with multidisciplinary rounding and encourage appropriate mobility to improve upon JH-HLM goals.    Patient Centered Rounds: I performed bedside rounds with nursing staff today.   Discussions with Specialists or Other Care Team Provider: Discussed with nursing and speech therapy    Education and Discussions with Family / Patient: Updated  (mother) via phone.    Total Time Spent on Date of Encounter in care of patient: 20 mins. This time was spent on one or more of the following: performing physical exam; counseling and coordination of care; obtaining or reviewing history; documenting in the medical record; reviewing/ordering tests, medications or procedures; communicating with other healthcare professionals and discussing with patient's family/caregivers.    Current Length of Stay: 3 day(s)  Current Patient Status: Inpatient   Certification Statement: The patient will continue to require additional inpatient hospital stay due to close monitoring of renal functions and respiratory status  Discharge Plan: Anticipate discharge in 48-72 hrs to rehab facility.    Code Status: Level 3 - DNAR and DNI    Subjective:   Patient seen and examined at bedside.  Events of last night noted.  Currently afebrile and off supplemental oxygen.  No further episode of  epistaxis.  Denies any difficulty breathing or cough.    Objective:     Vitals:   Temp (24hrs), Av.5 °F (36.9 °C), Min:98.2 °F (36.8 °C), Max:99 °F (37.2 °C)    Temp:  [98.2 °F (36.8 °C)-99 °F (37.2 °C)] 98.4 °F (36.9 °C)  HR:  [] 84  Resp:  [18] 18  BP: ()/(63-82) 98/63  SpO2:  [93 %-96 %] 96 %  Body mass index is 33.41 kg/m².     Input and Output Summary (last 24 hours):     Intake/Output Summary (Last 24 hours) at 2024 1322  Last data filed at 2024 0429  Gross per 24 hour   Intake --   Output 700 ml   Net -700 ml       Physical Exam:   Physical  Exam  Constitutional:       Appearance: He is obese. He is ill-appearing.   HENT:      Head: Normocephalic.      Nose:      Comments: Dried blood noted in the right nostril.     Mouth/Throat:      Mouth: Mucous membranes are moist.   Eyes:      Extraocular Movements: Extraocular movements intact.      Pupils: Pupils are equal, round, and reactive to light.   Cardiovascular:      Rate and Rhythm: Normal rate and regular rhythm.   Pulmonary:      Effort: No respiratory distress.      Comments: Diminished breath sounds bilaterally.  No wheezing or rales appreciated.  Abdominal:      General: Abdomen is flat. Bowel sounds are normal.      Palpations: Abdomen is soft.   Musculoskeletal:      Cervical back: Neck supple.      Right lower leg: Edema present.      Left lower leg: Edema present.   Skin:     General: Skin is warm.   Neurological:      Mental Status: He is alert.      Comments: Oriented to self.  Able to follow commands.          Additional Data:     Labs:  Results from last 7 days   Lab Units 06/05/24  0447   WBC Thousand/uL 5.62   HEMOGLOBIN g/dL 9.8*   HEMATOCRIT % 31.0*   PLATELETS Thousands/uL 126*   SEGS PCT % 79*   LYMPHO PCT % 10*   MONO PCT % 11   EOS PCT % 0     Results from last 7 days   Lab Units 06/05/24  0447 06/04/24  0435 06/03/24  0506   SODIUM mmol/L 141   < > 138   POTASSIUM mmol/L 3.7   < > 4.2   CHLORIDE mmol/L 103   < > 100   CO2 mmol/L 29   < > 30   BUN mg/dL 27*   < > 25   CREATININE mg/dL 2.31*   < > 1.96*   ANION GAP mmol/L 9   < > 8   CALCIUM mg/dL 8.9   < > 8.8   ALBUMIN g/dL  --   --  3.5   TOTAL BILIRUBIN mg/dL  --   --  1.23*   ALK PHOS U/L  --   --  161*   ALT U/L  --   --  8   AST U/L  --   --  18   GLUCOSE RANDOM mg/dL 91   < > 73    < > = values in this interval not displayed.     Results from last 7 days   Lab Units 06/02/24  1435   INR  1.23*     Results from last 7 days   Lab Units 06/05/24  0439   POC GLUCOSE mg/dl 85         Results from last 7 days   Lab Units  06/02/24  1435   LACTIC ACID mmol/L 1.4   PROCALCITONIN ng/ml 0.30*       Lines/Drains:  Invasive Devices       Peripheral Intravenous Line  Duration             Peripheral IV 06/02/24 Right Antecubital 2 days              Drain  Duration             Urethral Catheter Straight-tip 16 Fr. 2 days                  Urinary Catheter:  Goal for removal: Voiding trial when ambulation improves               Imaging: No pertinent imaging reviewed.    Recent Cultures (last 7 days):   Results from last 7 days   Lab Units 06/02/24  1452 06/02/24  1435   BLOOD CULTURE  No Growth at 48 hrs. No Growth at 48 hrs.       Last 24 Hours Medication List:   Current Facility-Administered Medications   Medication Dose Route Frequency Provider Last Rate    acetaminophen  650 mg Oral Q6H PRN Iris Rivas MD      clonazePAM  0.5 mg Oral HS Iris Rivas MD      Diclofenac Sodium  2 g Topical 4x Daily Iris Rivas MD      heparin (porcine)  5,000 Units Subcutaneous Q8H [facility] Karla Jaimes MD      lactulose  20 g Oral Daily Iris Rivas MD      lamoTRIgine  200 mg Oral Daily Iirs Rivas MD      And    lamoTRIgine  250 mg Oral After Lunch Iris Rivas MD      And    lamoTRIgine  300 mg Oral HS Iris Rivas MD      levOCARNitine  1,000 mg/day Oral 4x Daily (with meals and at bedtime) Iris Rivas MD      metoprolol tartrate  25 mg Oral BID Iris Rivas MD      oxymetazoline           oxymetazoline  2 spray Each Nare Q12H [facility] Fede Mcintyre MD      piperacillin-tazobactam  4.5 g Intravenous Q8H Fede Mcintyre MD 4.5 g (06/05/24 0932)    primidone  100 mg Oral QAM Iris Rivas MD      And    primidone  100 mg Oral After Lunch Iris Rivas MD      And    primidone  150 mg Oral HS Iris Rivas MD      pyridoxine  100 mg Oral Daily Iris Rivas MD      sodium chloride  1 Application Nasal 4x Daily (PC & HS) Jonatan Biggs MD      sodium chloride  2 spray Each Nare 4x Daily (PC & HS) Jonatan Biggs MD      zonisamide  100 mg Oral HS Iris  MD Rob          Today, Patient Was Seen By: Karla Jaimes MD    **Please Note: This note may have been constructed using a voice recognition system.**

## 2024-06-05 NOTE — RAPID RESPONSE
Rapid Response Note  Ash Loaiza 58 y.o. male MRN: [id number]  Unit/Bed#: -01 Encounter: [id number]    Rapid Response Notification(s):   Response called date/time:  6/5/2024 4:35 AM  Response team arrival date/time:  6/5/2024 4:37 AM  Response end date/time:  6/5/2024 4:41 AM  [facility]  Rapid response location:  [facility] unit  Primary reason for rapid response call:  Other (comment) (Nose bleed)    Rapid Response Intervention(s):   Airway:  None  Breathing:  None  Circulation:  None  Fluids administered:  None  Medications administered:  None       Assessment:   Nose Bleed    Plan:   Keep head in forward position  Refrain patient from picking nose  Hold heparin  Maintain NPO  Consider ENT  Obtain hbg and type and screen  If persistent consider rhino rocket      Rapid Response Outcome:   Transfer:  Remain on floor  Code Status: Level 3 (DNAR and DNI)      Family notified: Primary team to notify Family Member       Background/Situation:   Ash Loaiza is a 58 y.o. male who was called for an RRT due to a nose bleed. VSS /80s, sat 94% on RA. Patient minimally verbal at baseline. Blood noted to be coming out of nose and mouth while patient's head tipped back. Mouth exam shows no source of bleeding. Patient's head placed in forward position. SLIM at bedside, given stable VS, RRT ended. See plan above.     Review of Systems   Unable to perform ROS: Psychiatric disorder       Objective:   Vitals:    06/04/24 1500 06/04/24 1900 06/04/24 2228 06/05/24 0437   BP: 109/65 129/66 111/66 128/82   BP Location: Right arm Left arm Left arm    Pulse: 83 87 86 (!) 111   Resp: 18 18 18    Temp: 98.5 °F (36.9 °C) 98.2 °F (36.8 °C) 99 °F (37.2 °C)    TempSrc: Axillary Temporal Temporal    SpO2:  93% 96%    Weight:       Height:         Physical Exam  Constitutional:       General: He is not in acute distress.     Appearance: He is ill-appearing.   HENT:      Head: Normocephalic.      Nose:      Comments:  Bloody discharge from nose and mouth     Mouth/Throat:      Mouth: Mucous membranes are moist.   Eyes:      Pupils: Pupils are equal, round, and reactive to light.      Comments: Strabismus    Cardiovascular:      Rate and Rhythm: Tachycardia present.      Pulses: Normal pulses.      Heart sounds: Normal heart sounds.   Pulmonary:      Effort: Pulmonary effort is normal. No respiratory distress.      Breath sounds: Normal breath sounds.   Abdominal:      General: Abdomen is flat.      Palpations: Abdomen is soft.   Musculoskeletal:      Cervical back: Normal range of motion.   Skin:     General: Skin is warm and dry.      Capillary Refill: Capillary refill takes less than 2 seconds.   Neurological:      Mental Status: He is alert.      Comments: YUSEF minimally verbal at baseline

## 2024-06-05 NOTE — QUICK NOTE
Events overnight    Patient had nosebleed around 4:30 AM.  Rapid response was called.  He started having clots coming out of his mouth.  With suction.  Eventually with external pressure nosebleed is stopped.  Most likely is a posterior nosebleed.    Assessment: Recurrent nosebleed most likely posterior in origin    Plan  #1 Afrin spray   #2 ENT consult

## 2024-06-05 NOTE — NURSING NOTE
Approximately 0430 Patient asked for a drink, gave a sip of nectar thick water, pt began coughing and chocking, nose started bleeding. Shortly after, patient was choking and coughing up blood. Contacted SLIM and suctioned blood clots from pts mouth. Patient became tachy and O2 dropping below 90%. Called RRT for additional support. Labs drawn, afrin nasal spray prescribed, RN continued intermittent suctioning and remained with patient until episodes were over.   ENT consulted, Speech re consulted, patient made NPO, subq heparin d/c.

## 2024-06-05 NOTE — ASSESSMENT & PLAN NOTE
Events of last night noted.  Seen by ENT.  Recommended humidified oxygen and nasal saline spray and gel.  No further episode ofepistaxis since then.  Resume p.o. diet per speech therapy recommendations.  Maintain strict aspiration precautions.

## 2024-06-05 NOTE — ASSESSMENT & PLAN NOTE
Patient's creatinine slightly elevated than baseline.  Baseline appears to be up to 1.7.  Elevated creatinine to 2.3 noted yesterday..  Hold diuretics.  . repeat BMP in AM.    Lab Results   Component Value Date    EGFR 30 06/05/2024    EGFR 30 06/04/2024    EGFR 37 06/03/2024    CREATININE 2.31 (H) 06/05/2024    CREATININE 2.32 (H) 06/04/2024    CREATININE 1.96 (H) 06/03/2024

## 2024-06-05 NOTE — SPEECH THERAPY NOTE
Speech Language/Pathology    Speech/Language Pathology Progress Note    Patient Name: Ash Loaiza  Today's Date: 6/5/2024         Assessment:  Pt seen for f/u dysphagia therapy. Currently NPO d/t issue overnight with coughing and expelling clots from epistaxis. ENT assessed today, recommended to d/c nasal cannula and use mask as needed. Currently pt on room air, SPO2 94%.   PO trials completed of puree and nectar thick via spoon and straw. Pt w/ adequate oral management and slightly delayed. No overt s/s aspiration    Plan/Recommendations:  Recommend puree and nectar thick  Meds crushed in puree  Assistance w/ meals    Lauren Mota MS CCC-SLP  6/5/2024

## 2024-06-05 NOTE — ASSESSMENT & PLAN NOTE
At baseline on pureed diet.   Speech evaluated patient and recs pureed with nectar thick, crushed meds  Continue with ongoing speech follow-up and maintain strict aspiration precautions.

## 2024-06-05 NOTE — CONSULTS
Consultation - ENT   Ash Loaiza 58 y.o. male MRN: [id number]  Unit/Bed#: -01 Encounter: [id number]      Assessment & Plan     Assessment:  Epistaxis secondary to use of nasal prongs, anticoagulation and deviated septum.  Aspiration pneumonia due to inability to protect upper airway.  Plan:  DC nasal prongs and use supplemental oxygen by mask if necessary.  Humidify O2 and use salt water spray and gel.  There is no active bleeding, only crusting caused by his anticoagulation, digital trauma and nasal prongs.  With his deviated septum and use of nasal prongs.  No surgical intervention or cautery is necessary at the present time as his hemoglobin remained stable.  For his aspiration and inability to protect his airway, I would recommend a feeding tube.  Obviously he should continue to work with speech, but the episode this morning is indicative is poor airway protection capability.    History of Present Illness   Physician Requesting Consult: Karla Jaimes MD  Reason for Consult / Principal Problem: Epistaxis  HPI: Ash Loaiza is a 58 y.o. year old male who presents with history of epistaxis worsened here with the use of nasal prongs, anticoagulation.  He has had a couple small nosebleeds, usually self limited.    Inpatient consult to ENT  Consult performed by: Jonatan Biggs MD  Consult ordered by: Fede Mcintyre MD          Review of Systems    Historical Information   Past Medical History:   Diagnosis Date    Hypertension     Mentally challenged     Pericardial effusion     Pneumonia     Seizure (HCC)      Past Surgical History:   Procedure Laterality Date    FRACTURE SURGERY      clavicle, left humerus, radial, and ulna.  Right radius    HIP ARTHROSCOPY Right     IR THORACENTESIS  11/20/2023    IR THORACENTESIS  6/3/2024    MO COLONOSCOPY FLX DX W/COLLJ SPEC WHEN PFRMD N/A 4/1/2019    Procedure: COLONOSCOPY;  Surgeon: Avi Guzman MD;  Location: [facility];  Service: Colorectal      Social History   Social History     Substance and Sexual Activity   Alcohol Use Never     Social History     Substance and Sexual Activity   Drug Use Never     E-Cigarette/Vaping    E-Cigarette Use Never User      E-Cigarette/Vaping Substances    Nicotine No     THC No     CBD No     Flavoring No     Other No     Unknown No      Social History     Tobacco Use   Smoking Status Never   Smokeless Tobacco Never     Family History: non-contributory    Meds/Allergies   all current active meds have been reviewed    Allergies   Allergen Reactions    Budesonide Seizures     Other reaction(s): Seizure    Dilantin [Phenytoin]      Pericardial effusion    Flurazepam Other (See Comments)     dalmane    Other reaction(s): MADW HIM RAMMEY   dalmane    Ipratropium-Albuterol Seizures     Other reaction(s): Seizures    Phenobarbital Hyperactivity    Vyvanse [Lisdexamfetamine Dimesylate] Other (See Comments)     Unknown        Objective       Intake/Output Summary (Last 24 hours) at 6/5/2024 1053  Last data filed at 6/5/2024 0429  Gross per 24 hour   Intake 120 ml   Output 700 ml   Net -580 ml       Invasive Devices       Peripheral Intravenous Line  Duration             Peripheral IV 06/02/24 Right Antecubital 2 days              Drain  Duration             Urethral Catheter Straight-tip 16 Fr. 2 days                    Physical Exam    Ears: Mild cerumen but TMs are neutral without effusion.      Nasal: Deviated septum with crusting left greater than right and spur to the left with some turbinate contact.  No active bleeding is seen.    Oral cavity/oropharynx: No bleeding posteriorly and normal anatomy and function otherwise then limited palate elevation    Indirect laryngoscopy: True vocal folds are mobile, but exam is limited and supraglottic sensation seems diminished    Neck: No adenopathy is appreciated.      Code Status: Level 3 - DNAR and DNI  Advance Directive and Living Will:      Power of :    POLST:       Counseling/Coordination of Care: Total floor / unit time spent today 30 minutes. Greater than 50% of total time was spent with the patient and / or family counseling and / or coordination of care. A description of the counseling / coordination of care: Chart review, examination treatment recommendations

## 2024-06-05 NOTE — PLAN OF CARE
Problem: Potential for Falls  Goal: Patient will remain free of falls  Description: INTERVENTIONS:  - Educate patient/family on patient safety including physical limitations  - Instruct patient to call for assistance with activity   - Consult OT/PT to assist with strengthening/mobility   - Keep Call bell within reach  - Keep bed low and locked with side rails adjusted as appropriate  - Keep care items and personal belongings within reach  - Initiate and maintain comfort rounds  - Make Fall Risk Sign visible to staff    - Apply yellow socks and bracelet for high fall risk patients  - Consider moving patient to room near nurses station  Outcome: Progressing     Problem: INFECTION - ADULT  Goal: Absence or prevention of progression during hospitalization  Description: INTERVENTIONS:  - Assess and monitor for signs and symptoms of infection  - Monitor lab/diagnostic results  - Monitor all insertion sites, i.e. indwelling lines, tubes, and drains  - Monitor endotracheal if appropriate and nasal secretions for changes in amount and color  - [place] appropriate cooling/warming therapies per order  - Administer medications as ordered  - Instruct and encourage patient and family to use good hand hygiene technique  - Identify and instruct in appropriate isolation precautions for identified infection/condition  Outcome: Progressing

## 2024-06-05 NOTE — ASSESSMENT & PLAN NOTE
Had a detailed discussion with mom (Kylie) and we discussed that the patient is not doing well.  He has not been doing well since the beginning of this year and has declined since admission to the hospital in April 2024 at [facility] for aspiration pneumonia.  Patient continues to choke on thickened liquids at home.  He is now bedbound.  He continues to decline.  During his hospital course I discussed with that he has recurrent aspiration pneumonia, encephalopathic, bilateral pleural effusions and he seems to be getting worse.  She she states that he continues to decline and not do well.  She states that she has a hard time taking care of him.  Patient has been at 5 nursing homes and she states that they do not give good care to him.    We discussed CODE STATUS: Discussed that the likelihood of him recovering from a cardiac arrest is very poor.  Kylie was a nurse, and states that she did many codes.  She would like to make him a DNR noting that he would not do well from CPR, chest compressions or intubation.    We discussed that if the patient continues to have chronic aspiration pneumonia, and does not pass a swallow screen which he want a feeding tube.  She currently does not know.  We discussed about wishes and about quality measures.  I did briefly touched upon hospice and just making the patient comfortable if she did not want to take aggressive measures.  She does want to give him a chance to turn around but she knows that he may not do well.    Her son Patricio later joined her.  I answered more of his questions including about pleural effusion, aspiration pneumonia.  There is a bit of a poor understanding.  He did ask me that for the calcification of the pericardium if they would remove that.  With the patient's current medical condition I stated that most likely that would not happen and that is a question for CT surgery.  Continue with current level of care.  Continue to monitor symptoms closely.

## 2024-06-05 NOTE — PLAN OF CARE
Problem: Potential for Falls  Goal: Patient will remain free of falls  Description: INTERVENTIONS:  - Educate patient/family on patient safety including physical limitations  - Instruct patient to call for assistance with activity   - Consult OT/PT to assist with strengthening/mobility   - Keep Call bell within reach  - Keep bed low and locked with side rails adjusted as appropriate  - Keep care items and personal belongings within reach  - Initiate and maintain comfort rounds  - Make Fall Risk Sign visible to staff  - Offer Toileting every 2 Hours, in advance of need  - Initiate/Maintain bed alarm  - Obtain necessary fall risk management equipment  - Apply yellow socks and bracelet for high fall risk patients  - Consider moving patient to room near nurses station  Outcome: Progressing     Problem: PAIN - ADULT  Goal: Verbalizes/displays adequate comfort level or baseline comfort level  Description: Interventions:  - Encourage patient to monitor pain and request assistance  - Assess pain using appropriate pain scale  - Administer analgesics based on type and severity of pain and evaluate response  - Implement non-pharmacological measures as appropriate and evaluate response  - Consider cultural and social influences on pain and pain management  - Notify physician/advanced practitioner if interventions unsuccessful or patient reports new pain  Outcome: Progressing     Problem: INFECTION - ADULT  Goal: Absence or prevention of progression during hospitalization  Description: INTERVENTIONS:  - Assess and monitor for signs and symptoms of infection  - Monitor lab/diagnostic results  - Monitor all insertion sites, i.e. indwelling lines, tubes, and drains  - Monitor endotracheal if appropriate and nasal secretions for changes in amount and color  - Deposit appropriate cooling/warming therapies per order  - Administer medications as ordered  - Instruct and encourage patient and family to use good hand hygiene  technique  - Identify and instruct in appropriate isolation precautions for identified infection/condition  Outcome: Progressing  Goal: Absence of fever/infection during neutropenic period  Description: INTERVENTIONS:  - Monitor WBC    Outcome: Progressing     Problem: SAFETY ADULT  Goal: Patient will remain free of falls  Description: INTERVENTIONS:  - Educate patient/family on patient safety including physical limitations  - Instruct patient to call for assistance with activity   - Consult OT/PT to assist with strengthening/mobility   - Keep Call bell within reach  - Keep bed low and locked with side rails adjusted as appropriate  - Keep care items and personal belongings within reach  - Initiate and maintain comfort rounds  - Make Fall Risk Sign visible to staff  - Offer Toileting every 2 Hours, in advance of need  - Initiate/Maintain bed alarm  - Obtain necessary fall risk management equipment  - Apply yellow socks and bracelet for high fall risk patients  - Consider moving patient to room near nurses station  Outcome: Progressing  Goal: Maintain or return to baseline ADL function  Description: INTERVENTIONS:  -  Assess patient's ability to carry out ADLs; assess patient's baseline for ADL function and identify physical deficits which impact ability to perform ADLs (bathing, care of mouth/teeth, toileting, grooming, dressing, etc.)  - Assess/evaluate cause of self-care deficits   - Assess range of motion  - Assess patient's mobility; develop plan if impaired  - Assess patient's need for assistive devices and provide as appropriate  - Encourage maximum independence but intervene and supervise when necessary  - Involve family in performance of ADLs  - Assess for home care needs following discharge   - Consider OT consult to assist with ADL evaluation and planning for discharge  - Provide patient education as appropriate  Outcome: Progressing  Goal: Maintains/Returns to pre admission functional level  Description:  INTERVENTIONS:  - Perform AM-PAC 6 Click Basic Mobility/ Daily Activity assessment daily.  - Set and communicate daily mobility goal to care team and patient/family/caregiver.   - Collaborate with rehabilitation services on mobility goals if consulted  - Perform Range of Motion 3 times a day.  - Reposition patient every 2 hours.  - Dangle patient 3 times a day  - Stand patient 3 times a day  - Ambulate patient 3 times a day  - Out of bed to chair 3 times a day   - Out of bed for meals 3 times a day  - Out of bed for toileting  - Record patient progress and toleration of activity level   Outcome: Progressing     Problem: DISCHARGE PLANNING  Goal: Discharge to home or other facility with appropriate resources  Description: INTERVENTIONS:  - Identify barriers to discharge w/patient and caregiver  - Arrange for needed discharge resources and transportation as appropriate  - Identify discharge learning needs (meds, wound care, etc.)  - Arrange for interpretive services to assist at discharge as needed  - Refer to Case Management Department for coordinating discharge planning if the patient needs post-hospital services based on physician/advanced practitioner order or complex needs related to functional status, cognitive ability, or social support system  Outcome: Progressing     Problem: Knowledge Deficit  Goal: Patient/family/caregiver demonstrates understanding of disease process, treatment plan, medications, and discharge instructions  Description: Complete learning assessment and assess knowledge base.  Interventions:  - Provide teaching at level of understanding  - Provide teaching via preferred learning methods  Outcome: Progressing     Problem: Prexisting or High Potential for Compromised Skin Integrity  Goal: Skin integrity is maintained or improved  Description: INTERVENTIONS:  - Identify patients at risk for skin breakdown  - Assess and monitor skin integrity  - Assess and monitor nutrition and hydration  status  - Monitor labs   - Assess for incontinence   - Turn and reposition patient  - Assist with mobility/ambulation  - Relieve pressure over bony prominences  - Avoid friction and shearing  - Provide appropriate hygiene as needed including keeping skin clean and dry  - Evaluate need for skin moisturizer/barrier cream  - Collaborate with interdisciplinary team   - Patient/family teaching  - Consider wound care consult   Outcome: Progressing

## 2024-06-05 NOTE — ASSESSMENT & PLAN NOTE
Recurrent aspiration pneumonia.  He had a hospitalization in April 2024 at [facility].  Continue IV Zosyn.  Complete total 7 days of treatment.  Speech therapy evaluated patient and recommended Pureed with nectar thick liquid with crushed meds.

## 2024-06-05 NOTE — ASSESSMENT & PLAN NOTE
Multifactorial secondary to heart failure, pleural effusions recurrent aspiration pneumonia.  Continue with oxygen.  Because of recurrent episode of epistaxis, ENT was consulted and recommended using humidified oxygen via facemask  Currently on room air.

## 2024-06-06 LAB
ANION GAP SERPL CALCULATED.3IONS-SCNC: 5 MMOL/L (ref 4–13)
BASOPHILS # BLD AUTO: 0.02 THOUSANDS/ÂΜL (ref 0–0.1)
BASOPHILS NFR BLD AUTO: 0 % (ref 0–1)
BUN SERPL-MCNC: 25 MG/DL (ref 5–25)
CALCIUM SERPL-MCNC: 8.8 MG/DL (ref 8.4–10.2)
CHLORIDE SERPL-SCNC: 105 MMOL/L (ref 96–108)
CO2 SERPL-SCNC: 33 MMOL/L (ref 21–32)
CREAT SERPL-MCNC: 2.14 MG/DL (ref 0.6–1.3)
EOSINOPHIL # BLD AUTO: 0 THOUSAND/ÂΜL (ref 0–0.61)
EOSINOPHIL NFR BLD AUTO: 0 % (ref 0–6)
ERYTHROCYTE [DISTWIDTH] IN BLOOD BY AUTOMATED COUNT: 15.6 % (ref 11.6–15.1)
GFR SERPL CREATININE-BSD FRML MDRD: 33 ML/MIN/1.73SQ M
GLUCOSE SERPL-MCNC: 112 MG/DL (ref 65–140)
HCT VFR BLD AUTO: 29.6 % (ref 36.5–49.3)
HGB BLD-MCNC: 9.3 G/DL (ref 12–17)
IMM GRANULOCYTES # BLD AUTO: 0.02 THOUSAND/UL (ref 0–0.2)
IMM GRANULOCYTES NFR BLD AUTO: 0 % (ref 0–2)
LYMPHOCYTES # BLD AUTO: 0.52 THOUSANDS/ÂΜL (ref 0.6–4.47)
LYMPHOCYTES NFR BLD AUTO: 10 % (ref 14–44)
MCH RBC QN AUTO: 31.8 PG (ref 26.8–34.3)
MCHC RBC AUTO-ENTMCNC: 31.4 G/DL (ref 31.4–37.4)
MCV RBC AUTO: 101 FL (ref 82–98)
MONOCYTES # BLD AUTO: 0.47 THOUSAND/ÂΜL (ref 0.17–1.22)
MONOCYTES NFR BLD AUTO: 9 % (ref 4–12)
NEUTROPHILS # BLD AUTO: 4.07 THOUSANDS/ÂΜL (ref 1.85–7.62)
NEUTS SEG NFR BLD AUTO: 81 % (ref 43–75)
NRBC BLD AUTO-RTO: 0 /100 WBCS
PLATELET # BLD AUTO: 120 THOUSANDS/UL (ref 149–390)
PMV BLD AUTO: 10.6 FL (ref 8.9–12.7)
POTASSIUM SERPL-SCNC: 3.6 MMOL/L (ref 3.5–5.3)
RBC # BLD AUTO: 2.92 MILLION/UL (ref 3.88–5.62)
SODIUM SERPL-SCNC: 143 MMOL/L (ref 135–147)
WBC # BLD AUTO: 5.1 THOUSAND/UL (ref 4.31–10.16)

## 2024-06-06 PROCEDURE — 80048 BASIC METABOLIC PNL TOTAL CA: CPT | Performed by: INTERNAL MEDICINE

## 2024-06-06 PROCEDURE — 99232 SBSQ HOSP IP/OBS MODERATE 35: CPT | Performed by: INTERNAL MEDICINE

## 2024-06-06 PROCEDURE — 85025 COMPLETE CBC W/AUTO DIFF WBC: CPT | Performed by: INTERNAL MEDICINE

## 2024-06-06 RX ORDER — SPIRONOLACTONE 100 MG/1
100 TABLET, FILM COATED ORAL DAILY
Status: DISCONTINUED | OUTPATIENT
Start: 2024-06-06 | End: 2024-06-10

## 2024-06-06 RX ADMIN — SALINE NASAL SPRAY 2 SPRAY: 1.5 SOLUTION NASAL at 12:04

## 2024-06-06 RX ADMIN — PYRIDOXINE HCL TAB 50 MG 100 MG: 50 TAB at 08:26

## 2024-06-06 RX ADMIN — LACTULOSE 20 G: 20 SOLUTION ORAL at 08:28

## 2024-06-06 RX ADMIN — Medication 2 G: at 11:08

## 2024-06-06 RX ADMIN — ACETAMINOPHEN 650 MG: 325 TABLET, FILM COATED ORAL at 19:55

## 2024-06-06 RX ADMIN — HEPARIN SODIUM 5000 UNITS: 5000 INJECTION INTRAVENOUS; SUBCUTANEOUS at 14:38

## 2024-06-06 RX ADMIN — Medication 2 G: at 22:20

## 2024-06-06 RX ADMIN — LAMOTRIGINE 250 MG: 100 TABLET ORAL at 14:40

## 2024-06-06 RX ADMIN — PRIMIDONE 100 MG: 50 TABLET ORAL at 08:25

## 2024-06-06 RX ADMIN — LAMOTRIGINE 200 MG: 100 TABLET ORAL at 08:26

## 2024-06-06 RX ADMIN — SALINE NASAL SPRAY 2 SPRAY: 1.5 SOLUTION NASAL at 08:29

## 2024-06-06 RX ADMIN — Medication 1 APPLICATION: at 12:04

## 2024-06-06 RX ADMIN — SALINE NASAL SPRAY 2 SPRAY: 1.5 SOLUTION NASAL at 17:23

## 2024-06-06 RX ADMIN — Medication 1 APPLICATION: at 08:28

## 2024-06-06 RX ADMIN — LEVOCARNITINE 250 MG: 1 SOLUTION ORAL at 12:14

## 2024-06-06 RX ADMIN — METOPROLOL TARTRATE 25 MG: 25 TABLET, FILM COATED ORAL at 08:25

## 2024-06-06 RX ADMIN — PIPERACILLIN AND TAZOBACTAM 4.5 G: 36; 4.5 INJECTION, POWDER, FOR SOLUTION INTRAVENOUS at 08:28

## 2024-06-06 RX ADMIN — Medication 2 G: at 17:22

## 2024-06-06 RX ADMIN — HEPARIN SODIUM 5000 UNITS: 5000 INJECTION INTRAVENOUS; SUBCUTANEOUS at 05:12

## 2024-06-06 RX ADMIN — LEVOCARNITINE 250 MG: 1 SOLUTION ORAL at 17:22

## 2024-06-06 RX ADMIN — PIPERACILLIN AND TAZOBACTAM 4.5 G: 36; 4.5 INJECTION, POWDER, FOR SOLUTION INTRAVENOUS at 17:21

## 2024-06-06 RX ADMIN — LEVOCARNITINE 250 MG: 1 SOLUTION ORAL at 08:28

## 2024-06-06 RX ADMIN — METOPROLOL TARTRATE 25 MG: 25 TABLET, FILM COATED ORAL at 17:21

## 2024-06-06 RX ADMIN — Medication 2 G: at 08:30

## 2024-06-06 RX ADMIN — PRIMIDONE 100 MG: 50 TABLET ORAL at 14:40

## 2024-06-06 RX ADMIN — Medication 1 APPLICATION: at 17:23

## 2024-06-06 NOTE — PROGRESS NOTES
Patient:    MRN:  [id number]    Ramandeep Request ID:  [id number]    Level of care reserved:  Skilled Nursing Facility    Partner Reserved:  [place] [address] [phone]    Clinical needs requested:  oxygen, speech therapy, occupational therapy, physical therapy    Geography searched:  10 miles around [phone]    Start of Service:    Request sent:  9:07am EDT on 6/4/2024 by Jeanette Priest    Partner reserved:  4:22pm EDT on 6/6/2024 by Jeanette Priest    Choice list shared:  12:31pm EDT on 6/5/2024 by Jeanette Priest

## 2024-06-06 NOTE — ASSESSMENT & PLAN NOTE
Recurrent aspiration pneumonia.  He had a hospitalization in April 2024 at [facility].  Continue IV Zosyn.  Day 4 of 7 complete total 7 days of treatment.  Speech therapy evaluated patient and recommended Pureed with nectar thick liquid with crushed meds.

## 2024-06-06 NOTE — ASSESSMENT & PLAN NOTE
Had a detailed discussion with mom (Kylie) and we discussed that the patient is not doing well.  He has not been doing well since the beginning of this year and has declined since admission to the hospital in April 2024 at [facility] for aspiration pneumonia.  Patient continues to choke on thickened liquids at home.  He is now bedbound.  He continues to decline.  During his hospital course I discussed with that he has recurrent aspiration pneumonia, encephalopathic, bilateral pleural effusions and he seems to be getting worse.  She she states that he continues to decline and not do well.  She states that she has a hard time taking care of him.  Patient has been at 5 nursing homes and she states that they do not give good care to him.    We discussed CODE STATUS: Discussed that the likelihood of him recovering from a cardiac arrest is very poor.  Kylie was a nurse, and states that she did many codes.  She would like to make him a DNR noting that he would not do well from CPR, chest compressions or intubation.    We discussed that if the patient continues to have chronic aspiration pneumonia, and does not pass a swallow screen which he want a feeding tube.  She currently does not know.  We discussed about wishes and about quality measures.  I did briefly touched upon hospice and just making the patient comfortable if she did not want to take aggressive measures.  She does want to give him a chance to turn around but she knows that he may not do well.    Her son Patricio later joined her.  I answered more of his questions including about pleural effusion, aspiration pneumonia.  There is a bit of a poor understanding.  He did ask me that for the calcification of the pericardium if they would remove that.  With the patient's current medical condition I stated that most likely that would not happen and that is a question for CT surgery.  Continue with current level of care.  Continue to monitor symptoms closely.

## 2024-06-06 NOTE — PROGRESS NOTES
[facility]  Progress Note  Name: Ash Loaiza I  MRN: [id number]  Unit/Bed#: -01 I Date of Admission: 6/2/2024   Date of Service: 6/6/2024 I Hospital Day: 4    Assessment & Plan   * Pneumonia of both lungs due to infectious organism  Assessment & Plan  Recurrent aspiration pneumonia.  He had a hospitalization in April 2024 at [facility].  Continue IV Zosyn.  Day 4 of 7 complete total 7 days of treatment.  Speech therapy evaluated patient and recommended Pureed with nectar thick liquid with crushed meds.     Poor prognosis  Assessment & Plan  Had a detailed discussion with mom (Kylie) and we discussed that the patient is not doing well.  He has not been doing well since the beginning of this year and has declined since admission to the hospital in April 2024 at [facility] for aspiration pneumonia.  Patient continues to choke on thickened liquids at home.  He is now bedbound.  He continues to decline.  During his hospital course I discussed with that he has recurrent aspiration pneumonia, encephalopathic, bilateral pleural effusions and he seems to be getting worse.  She she states that he continues to decline and not do well.  She states that she has a hard time taking care of him.  Patient has been at 5 nursing homes and she states that they do not give good care to him.    We discussed CODE STATUS: Discussed that the likelihood of him recovering from a cardiac arrest is very poor.  Kylie was a nurse, and states that she did many codes.  She would like to make him a DNR noting that he would not do well from CPR, chest compressions or intubation.    We discussed that if the patient continues to have chronic aspiration pneumonia, and does not pass a swallow screen which he want a feeding tube.  She currently does not know.  We discussed about wishes and about quality measures.  I did briefly touched upon hospice and just making the patient comfortable if she did not want  to take aggressive measures.  She does want to give him a chance to turn around but she knows that he may not do well.    Her son Patricio later joined her.  I answered more of his questions including about pleural effusion, aspiration pneumonia.  There is a bit of a poor understanding.  He did ask me that for the calcification of the pericardium if they would remove that.  With the patient's current medical condition I stated that most likely that would not happen and that is a question for CT surgery.  Continue with current level of care.  Continue to monitor symptoms closely.    Encephalopathy  Assessment & Plan  Patient with developmental delay, but this is most likely secondary to his acute illness including pleural effusions, recurrent aspiration pneumonia.  Mentation appears to be at baseline currently.    Stage 3b chronic kidney disease (HCC)  Assessment & Plan  Patient's creatinine slightly elevated than baseline.  Baseline appears to be up to 1.7.  Elevated creatinine to 2.3 noted yesterday.  Improved to 2.1 today resume Aldactone..  Hold diuretics.  . repeat BMP in AM.    Lab Results   Component Value Date    EGFR 33 06/06/2024    EGFR 30 06/05/2024    EGFR 30 06/04/2024    CREATININE 2.14 (H) 06/06/2024    CREATININE 2.31 (H) 06/05/2024    CREATININE 2.32 (H) 06/04/2024       Urinary retention  Assessment & Plan  Azul catheter has been placed for strict I's and O's.  Likely void trial prior to discharge.    Epistaxis  Assessment & Plan  Events of last night noted.  Seen by ENT.  Recommended humidified oxygen and nasal saline spray and gel.  No further episode ofepistaxis since then.  Resume p.o. diet per speech therapy recommendations.  Maintain strict aspiration precautions.    Liver cirrhosis (HCC)  Assessment & Plan  Current ammonia level is normal.    Resume home Lactulose.  Last bowel movement on 6 /5.  Will resume home aldactone  Elevated creatinine noted today.  Follow-up renal functions in  AM.    Acute on chronic heart failure with preserved ejection fraction (HCC)  Assessment & Plan  Patient with bilateral pleural effusions, received 80 mg of Lasix IV in the emergency room.  At home he takes torsemide and Aldactone.  Initially started on Lasix 40 mg IV twice daily.  Has had good urine output with -2.4 L in the last 24 hours.  Had subsequent hypotension and elevation in creatinine.  Diuretics on hold since 6 5.  Continue to hold furosemide for now.  Will resume Aldactone if blood pressure tolerates.  Continue to monitor cardiorespiratory status and renal functions closely..  Of note patient is s/p bilateral thoracentesis with 425 mL and 975 mL   Wt Readings from Last 3 Encounters:   06/02/24 93.9 kg (207 lb)   01/30/24 79.3 kg (174 lb 13.2 oz)   12/08/23 106 kg (234 lb 9.1 oz)         Bilateral pleural effusion  Assessment & Plan  Moderately large right and moderate size left pleural effusion.  On previous admission in April 2024, he had a right-sided thoracentesis which was transudative.  S/p bilateral thoracentesis on 6 4.  Respiratory status has remained stable subsequently.    Dysphagia  Assessment & Plan  At baseline on pureed diet.   Speech evaluated patient and recs pureed with nectar thick, crushed meds  Continue with ongoing speech follow-up and maintain strict aspiration precautions.    Developmental delay  Assessment & Plan  Since birth, patient is adopted.  It is unknown why he has the mental delay if it is due to anoxic brain injury or cerebral palsy.    Acute respiratory failure with hypoxia (HCC)  Assessment & Plan  Multifactorial secondary to heart failure, pleural effusions recurrent aspiration pneumonia.  Continue with oxygen.  Because of recurrent episode of epistaxis, ENT was consulted and recommended using humidified oxygen via facemask  Currently on room air.    Nonintractable generalized idiopathic epilepsy without status epilepticus (HCC)  Assessment & Plan  At home he takes  Lamictal and Zonegran.  Patient able to resume PO intake, will resume home meds.                VTE Pharmacologic Prophylaxis: VTE Score: 6 High Risk (Score >/= 5) - Pharmacological DVT Prophylaxis Ordered: heparin. Sequential Compression Devices Ordered.    Mobility:   Basic Mobility Inpatient Raw Score: 8  -HLM Goal: 3: Sit at edge of bed  JH-HLM Achieved: 2: Bed activities/Dependent transfer  JH-HLM Goal NOT achieved. Continue with multidisciplinary rounding and encourage appropriate mobility to improve upon -HLM goals.    Patient Centered Rounds: I performed bedside rounds with nursing staff today.   Discussions with Specialists or Other Care Team Provider: Discussed with case management and nursing    Education and Discussions with Family / Patient: Updated  (mother) via phone.    Total Time Spent on Date of Encounter in care of patient: 20 mins. This time was spent on one or more of the following: performing physical exam; counseling and coordination of care; obtaining or reviewing history; documenting in the medical record; reviewing/ordering tests, medications or procedures; communicating with other healthcare professionals and discussing with patient's family/caregivers.    Current Length of Stay: 4 day(s)  Current Patient Status: Inpatient   Certification Statement: The patient will continue to require additional inpatient hospital stay due to ongoing IV antibiotics  Discharge Plan: Anticipate discharge in 48 hrs to rehab facility.    Code Status: Level 3 - DNAR and DNI    Subjective:   Patient seen and examined at bedside.  Tmax 99.1.  No further episode of epistaxis.  Tolerating current consistency of diet well without any worsening shortness of breath or cough.  Does not offer any complaints.    Objective:     Vitals:   Temp (24hrs), Av.8 °F (37.1 °C), Min:98.4 °F (36.9 °C), Max:99.1 °F (37.3 °C)    Temp:  [98.4 °F (36.9 °C)-99.1 °F (37.3 °C)] 98.4 °F (36.9 °C)  HR:  [85-92]  85  Resp:  [18] 18  BP: ()/(60-72) 104/64  SpO2:  [92 %-99 %] 92 %  Body mass index is 33.41 kg/m².     Input and Output Summary (last 24 hours):     Intake/Output Summary (Last 24 hours) at 6/6/2024 1227  Last data filed at 6/6/2024 0742  Gross per 24 hour   Intake 220 ml   Output 800 ml   Net -580 ml       Physical Exam:   Physical Exam  Constitutional:       Appearance: He is ill-appearing.   HENT:      Nose: Nose normal.      Mouth/Throat:      Mouth: Mucous membranes are moist.   Eyes:      Extraocular Movements: Extraocular movements intact.      Pupils: Pupils are equal, round, and reactive to light.   Cardiovascular:      Rate and Rhythm: Normal rate and regular rhythm.   Pulmonary:      Comments: Poor respiratory effort with diminished breath sounds at bases.  Abdominal:      General: Abdomen is flat. Bowel sounds are normal.      Palpations: Abdomen is soft.   Musculoskeletal:      Cervical back: Neck supple.      Comments: Trace lower extremity edema   Skin:     General: Skin is dry.   Neurological:      Mental Status: He is alert. Mental status is at baseline.          Additional Data:     Labs:  Results from last 7 days   Lab Units 06/06/24  0535   WBC Thousand/uL 5.10   HEMOGLOBIN g/dL 9.3*   HEMATOCRIT % 29.6*   PLATELETS Thousands/uL 120*   SEGS PCT % 81*   LYMPHO PCT % 10*   MONO PCT % 9   EOS PCT % 0     Results from last 7 days   Lab Units 06/06/24  0632 06/04/24  0435 06/03/24  0506   SODIUM mmol/L 143   < > 138   POTASSIUM mmol/L 3.6   < > 4.2   CHLORIDE mmol/L 105   < > 100   CO2 mmol/L 33*   < > 30   BUN mg/dL 25   < > 25   CREATININE mg/dL 2.14*   < > 1.96*   ANION GAP mmol/L 5   < > 8   CALCIUM mg/dL 8.8   < > 8.8   ALBUMIN g/dL  --   --  3.5   TOTAL BILIRUBIN mg/dL  --   --  1.23*   ALK PHOS U/L  --   --  161*   ALT U/L  --   --  8   AST U/L  --   --  18   GLUCOSE RANDOM mg/dL 112   < > 73    < > = values in this interval not displayed.     Results from last 7 days   Lab Units  06/02/24  1435   INR  1.23*     Results from last 7 days   Lab Units 06/05/24  0439   POC GLUCOSE mg/dl 85         Results from last 7 days   Lab Units 06/02/24  1435   LACTIC ACID mmol/L 1.4   PROCALCITONIN ng/ml 0.30*       Lines/Drains:  Invasive Devices       Peripheral Intravenous Line  Duration             Peripheral IV 06/06/24 Distal;Left;Ventral (anterior) Forearm <1 day              Drain  Duration             Urethral Catheter Straight-tip 16 Fr. 3 days                  Urinary Catheter:  Goal for removal: Voiding trial when ambulation improves               Imaging: No pertinent imaging reviewed.    Recent Cultures (last 7 days):   Results from last 7 days   Lab Units 06/02/24  1452 06/02/24  1435   BLOOD CULTURE  No Growth at 72 hrs. No Growth at 72 hrs.       Last 24 Hours Medication List:   Current Facility-Administered Medications   Medication Dose Route Frequency Provider Last Rate    acetaminophen  650 mg Oral Q6H PRN Iris Rivas MD      clonazePAM  0.5 mg Oral HS Iris Rivas MD      Diclofenac Sodium  2 g Topical 4x Daily Iris Rivas MD      heparin (porcine)  5,000 Units Subcutaneous Q8H Pending sale to [facility] Karla Jaimes MD      lactulose  20 g Oral Daily Iris Rivas MD      lamoTRIgine  200 mg Oral Daily Iris Rivas MD      And    lamoTRIgine  250 mg Oral After Lunch Iris Rivas MD      And    lamoTRIgine  300 mg Oral HS Iris Rivas MD      levOCARNitine  1,000 mg/day Oral 4x Daily (with meals and at bedtime) Iris Rivas MD      metoprolol tartrate  25 mg Oral BID Iris Rivas MD      piperacillin-tazobactam  4.5 g Intravenous Q8H Fede Han Mcintyre MD 4.5 g (06/06/24 0828)    primidone  100 mg Oral QAM Iris Rivas MD      And    primidone  100 mg Oral After Lunch Iris Rivas MD      And    primidone  150 mg Oral HS Iris Rivas MD      pyridoxine  100 mg Oral Daily Iris Rivas MD      sodium chloride  1 Application Nasal 4x Daily (PC & HS) Jonatan Biggs MD      sodium chloride  2 spray Each Nare  4x Daily (PC & HS) Jonatan Biggs MD      spironolactone  100 mg Oral Daily Karla Jaimes MD      zonisamide  100 mg Oral HS Iris Rivas MD          Today, Patient Was Seen By: Karla Jaimes MD    **Please Note: This note may have been constructed using a voice recognition system.**

## 2024-06-06 NOTE — ASSESSMENT & PLAN NOTE
Patient with bilateral pleural effusions, received 80 mg of Lasix IV in the emergency room.  At home he takes torsemide and Aldactone.  Initially started on Lasix 40 mg IV twice daily.  Has had good urine output with -2.4 L in the last 24 hours.  Had subsequent hypotension and elevation in creatinine.  Diuretics on hold since 6 5.  Continue to hold furosemide for now.  Will resume Aldactone if blood pressure tolerates.  Continue to monitor cardiorespiratory status and renal functions closely..  Of note patient is s/p bilateral thoracentesis with 425 mL and 975 mL   Wt Readings from Last 3 Encounters:   06/02/24 93.9 kg (207 lb)   01/30/24 79.3 kg (174 lb 13.2 oz)   12/08/23 106 kg (234 lb 9.1 oz)

## 2024-06-06 NOTE — ASSESSMENT & PLAN NOTE
Patient's creatinine slightly elevated than baseline.  Baseline appears to be up to 1.7.  Elevated creatinine to 2.3 noted yesterday.  Improved to 2.1 today resume Aldactone..  Hold diuretics.  . repeat BMP in AM.    Lab Results   Component Value Date    EGFR 33 06/06/2024    EGFR 30 06/05/2024    EGFR 30 06/04/2024    CREATININE 2.14 (H) 06/06/2024    CREATININE 2.31 (H) 06/05/2024    CREATININE 2.32 (H) 06/04/2024

## 2024-06-06 NOTE — ASSESSMENT & PLAN NOTE
Moderately large right and moderate size left pleural effusion.  On previous admission in April 2024, he had a right-sided thoracentesis which was transudative.  S/p bilateral thoracentesis on 6 4.  Respiratory status has remained stable subsequently.

## 2024-06-06 NOTE — CASE MANAGEMENT
Case Management Discharge Planning Note    Patient name Ash Loaiza  Location /-01 MRN [id number]  : 1965 Date 2024       Current Admission Date: 2024  Current Admission Diagnosis:Pneumonia of both lungs due to infectious organism   Patient Active Problem List    Diagnosis Date Noted Date Diagnosed    Encephalopathy 2024     Poor prognosis 2024     Azul catheter in place 2024     Urinary retention 2024     Elevated troponin 2024     Stage 3b chronic kidney disease (HCC) 2024     Acute blood loss anemia 2023     Epistaxis 2023     Chronic respiratory failure with hypercapnia ([facility]) 2023     Acute kidney injury superimposed on chronic kidney disease 3 2023     Chronic diastolic HF (heart failure) ([facility]) 2021     Liver cirrhosis ([facility]) 2021     Abnormal finding on CT scan 2021     EDWIGE (acute kidney injury) ([facility]) 2021     Bilateral pleural effusion 2021     History of COVID-19 2021     S/P pericardial window creation 2021     Acute on chronic heart failure with preserved ejection fraction ([facility]) 2021     Lower extremity pain, left 2021     MRSA nasal colonization 2020     Developmental delay 2020     Dysphagia 2020     Pneumonia of both lungs due to infectious organism 01/10/2020     Nonintractable generalized idiopathic epilepsy without status epilepticus (HCC) 01/10/2020     Falls 01/10/2020     T wave inversion in EKG 01/10/2020     Essential hypertension 01/10/2020     Acute respiratory failure with hypoxia ([facility]) 01/10/2020     Polyp of colon 03/15/2019       LOS (days): 4  Geometric Mean LOS (GMLOS) (days): 4.1  Days to GMLOS:0.5     OBJECTIVE:  Risk of Unplanned Readmission Score: 21.81         Current admission status: Inpatient   Preferred Pharmacy:   [id number] - [place], [place] - [address]  [address]  [place] [id number]  Phone: [phone] Fax: [phone]    Primary Care Provider: Jose G Holloway DO    Primary Insurance: MEDICARE  Secondary Insurance: [place] MEDICAL [employer]ANCE    DISCHARGE DETAILS:    Discharge planning discussed with:: Mother  Freedom of Choice: Yes  Comments - Freedom of Choice: discussed with mother - reviewed patient choice list  CM contacted family/caregiver?: Yes  Were Treatment Team discharge recommendations reviewed with patient/caregiver?: Yes  Did patient/caregiver verbalize understanding of patient care needs?: Yes       Contacts  Patient Contacts: Mother - Kylie Loaiza  Relationship to Patient:: Family  Contact Method: Phone  Phone Number: [phone]  Reason/Outcome: Discharge Planning    Requested Home Health Care         Is the patient interested in HHC at discharge?: No    DME Referral Provided  Referral made for DME?: No         Would you like to participate in our [facility] service program?  : No - Declined    Treatment Team Recommendation: Short Term Rehab  Discharge Destination Plan:: Short Term Rehab         A post acute care recommendation was made by your care team for STR.  Discussed Freedom of Choice with caregiver.List of facilities given to caregiver via in person. caregiver aware the list is custom filtered for them by preference  and that [facility] post acute providers are designated.       ETHAN discussed patient choice list with Mother Kylie Loaiza, she does not want him to go to [facility], [place], or [place]. She wants to think about [facility] and we will discuss later in the day. I offered to expand the STR search for more choices and motherKylie wants to keep him close.                ETHAN discussed d/c planning with mother Kylie Loaiza, she chose [facility] for STR. [facility] reserved in [facility].

## 2024-06-06 NOTE — ASSESSMENT & PLAN NOTE
Current ammonia level is normal.    Resume home Lactulose.  Last bowel movement on 6 /5.  Will resume home aldactone  Elevated creatinine noted today.  Follow-up renal functions in AM.

## 2024-06-06 NOTE — PLAN OF CARE
Problem: Potential for Falls  Goal: Patient will remain free of falls  Description: INTERVENTIONS:  - Educate patient/family on patient safety including physical limitations  - Instruct patient to call for assistance with activity   - Consult OT/PT to assist with strengthening/mobility   - Keep Call bell within reach  - Keep bed low and locked with side rails adjusted as appropriate  - Keep care items and personal belongings within reach  - Initiate and maintain comfort rounds  - Make Fall Risk Sign visible to staff  - Offer Toileting every 2 Hours, in advance of need  - Initiate/Maintain bed alarm  - Obtain necessary fall risk management equipment  - Apply yellow socks and bracelet for high fall risk patients  - Consider moving patient to room near nurses station  Outcome: Progressing     Problem: PAIN - ADULT  Goal: Verbalizes/displays adequate comfort level or baseline comfort level  Description: Interventions:  - Encourage patient to monitor pain and request assistance  - Assess pain using appropriate pain scale  - Administer analgesics based on type and severity of pain and evaluate response  - Implement non-pharmacological measures as appropriate and evaluate response  - Consider cultural and social influences on pain and pain management  - Notify physician/advanced practitioner if interventions unsuccessful or patient reports new pain  Outcome: Progressing     Problem: INFECTION - ADULT  Goal: Absence or prevention of progression during hospitalization  Description: INTERVENTIONS:  - Assess and monitor for signs and symptoms of infection  - Monitor lab/diagnostic results  - Monitor all insertion sites, i.e. indwelling lines, tubes, and drains  - Monitor endotracheal if appropriate and nasal secretions for changes in amount and color  - [place] appropriate cooling/warming therapies per order  - Administer medications as ordered  - Instruct and encourage patient and family to use good hand hygiene  technique  - Identify and instruct in appropriate isolation precautions for identified infection/condition  Outcome: Progressing  Goal: Absence of fever/infection during neutropenic period  Description: INTERVENTIONS:  - Monitor WBC    Outcome: Progressing     Problem: SAFETY ADULT  Goal: Patient will remain free of falls  Description: INTERVENTIONS:  - Educate patient/family on patient safety including physical limitations  - Instruct patient to call for assistance with activity   - Consult OT/PT to assist with strengthening/mobility   - Keep Call bell within reach  - Keep bed low and locked with side rails adjusted as appropriate  - Keep care items and personal belongings within reach  - Initiate and maintain comfort rounds  - Make Fall Risk Sign visible to staff  - Offer Toileting every 2 Hours, in advance of need  - Initiate/Maintain bed alarm  - Obtain necessary fall risk management equipment  - Apply yellow socks and bracelet for high fall risk patients  - Consider moving patient to room near nurses station  Outcome: Progressing  Goal: Maintain or return to baseline ADL function  Description: INTERVENTIONS:  -  Assess patient's ability to carry out ADLs; assess patient's baseline for ADL function and identify physical deficits which impact ability to perform ADLs (bathing, care of mouth/teeth, toileting, grooming, dressing, etc.)  - Assess/evaluate cause of self-care deficits   - Assess range of motion  - Assess patient's mobility; develop plan if impaired  - Assess patient's need for assistive devices and provide as appropriate  - Encourage maximum independence but intervene and supervise when necessary  - Involve family in performance of ADLs  - Assess for home care needs following discharge   - Consider OT consult to assist with ADL evaluation and planning for discharge  - Provide patient education as appropriate  Outcome: Progressing  Goal: Maintains/Returns to pre admission functional level  Description:  INTERVENTIONS:  - Perform AM-PAC 6 Click Basic Mobility/ Daily Activity assessment daily.  - Set and communicate daily mobility goal to care team and patient/family/caregiver.   - Collaborate with rehabilitation services on mobility goals if consulted  - Reposition patient every 2 hours.  - Stand patient 3 times a day  - Ambulate patient 3 times a day  - Out of bed to chair 3 times a day   - Out of bed for meals 3 times a day  - Out of bed for toileting  - Record patient progress and toleration of activity level   Outcome: Progressing     Problem: DISCHARGE PLANNING  Goal: Discharge to home or other facility with appropriate resources  Description: INTERVENTIONS:  - Identify barriers to discharge w/patient and caregiver  - Arrange for needed discharge resources and transportation as appropriate  - Identify discharge learning needs (meds, wound care, etc.)  - Arrange for interpretive services to assist at discharge as needed  - Refer to Case Management Department for coordinating discharge planning if the patient needs post-hospital services based on physician/advanced practitioner order or complex needs related to functional status, cognitive ability, or social support system  Outcome: Progressing     Problem: Knowledge Deficit  Goal: Patient/family/caregiver demonstrates understanding of disease process, treatment plan, medications, and discharge instructions  Description: Complete learning assessment and assess knowledge base.  Interventions:  - Provide teaching at level of understanding  - Provide teaching via preferred learning methods  Outcome: Progressing     Problem: Prexisting or High Potential for Compromised Skin Integrity  Goal: Skin integrity is maintained or improved  Description: INTERVENTIONS:  - Identify patients at risk for skin breakdown  - Assess and monitor skin integrity  - Assess and monitor nutrition and hydration status  - Monitor labs   - Assess for incontinence   - Turn and reposition  patient  - Assist with mobility/ambulation  - Relieve pressure over bony prominences  - Avoid friction and shearing  - Provide appropriate hygiene as needed including keeping skin clean and dry  - Evaluate need for skin moisturizer/barrier cream  - Collaborate with interdisciplinary team   - Patient/family teaching  - Consider wound care consult   Outcome: Progressing

## 2024-06-06 NOTE — PROGRESS NOTES
Pastoral Care Progress Note    2024  Patient: Ash Loaiza : 1965  Admission Date & Time: 2024 1410  MRN: [id number] [facility]: [id number]    Fr Escudero provided prayer at request of pt.

## 2024-06-07 ENCOUNTER — APPOINTMENT (OUTPATIENT)
Dept: RADIOLOGY | Facility: HOSPITAL | Age: 59
DRG: 177 | End: 2024-06-07
Payer: MEDICARE

## 2024-06-07 LAB
ANION GAP SERPL CALCULATED.3IONS-SCNC: 8 MMOL/L (ref 4–13)
BACTERIA BLD CULT: NORMAL
BACTERIA BLD CULT: NORMAL
BUN SERPL-MCNC: 29 MG/DL (ref 5–25)
CALCIUM SERPL-MCNC: 8.9 MG/DL (ref 8.4–10.2)
CHLORIDE SERPL-SCNC: 107 MMOL/L (ref 96–108)
CO2 SERPL-SCNC: 29 MMOL/L (ref 21–32)
CREAT SERPL-MCNC: 2.11 MG/DL (ref 0.6–1.3)
GFR SERPL CREATININE-BSD FRML MDRD: 33 ML/MIN/1.73SQ M
GLUCOSE SERPL-MCNC: 88 MG/DL (ref 65–140)
HCT VFR BLD AUTO: 32.6 % (ref 36.5–49.3)
HGB BLD-MCNC: 10.1 G/DL (ref 12–17)
POTASSIUM SERPL-SCNC: 3.8 MMOL/L (ref 3.5–5.3)
PROCALCITONIN SERPL-MCNC: 0.7 NG/ML
SODIUM SERPL-SCNC: 144 MMOL/L (ref 135–147)

## 2024-06-07 PROCEDURE — 71046 X-RAY EXAM CHEST 2 VIEWS: CPT

## 2024-06-07 PROCEDURE — 84145 PROCALCITONIN (PCT): CPT | Performed by: INTERNAL MEDICINE

## 2024-06-07 PROCEDURE — 99232 SBSQ HOSP IP/OBS MODERATE 35: CPT | Performed by: INTERNAL MEDICINE

## 2024-06-07 PROCEDURE — 80048 BASIC METABOLIC PNL TOTAL CA: CPT | Performed by: INTERNAL MEDICINE

## 2024-06-07 PROCEDURE — 85018 HEMOGLOBIN: CPT | Performed by: INTERNAL MEDICINE

## 2024-06-07 PROCEDURE — 85014 HEMATOCRIT: CPT | Performed by: INTERNAL MEDICINE

## 2024-06-07 RX ORDER — FUROSEMIDE 10 MG/ML
20 INJECTION INTRAMUSCULAR; INTRAVENOUS ONCE
Status: COMPLETED | OUTPATIENT
Start: 2024-06-07 | End: 2024-06-07

## 2024-06-07 RX ADMIN — SALINE NASAL SPRAY 2 SPRAY: 1.5 SOLUTION NASAL at 09:32

## 2024-06-07 RX ADMIN — Medication 1 APPLICATION: at 09:32

## 2024-06-07 RX ADMIN — ZONISAMIDE 100 MG: 100 CAPSULE ORAL at 21:21

## 2024-06-07 RX ADMIN — Medication 2 G: at 21:22

## 2024-06-07 RX ADMIN — LEVOCARNITINE 250 MG: 1 SOLUTION ORAL at 17:19

## 2024-06-07 RX ADMIN — PIPERACILLIN AND TAZOBACTAM 4.5 G: 36; 4.5 INJECTION, POWDER, FOR SOLUTION INTRAVENOUS at 09:35

## 2024-06-07 RX ADMIN — PIPERACILLIN AND TAZOBACTAM 4.5 G: 36; 4.5 INJECTION, POWDER, FOR SOLUTION INTRAVENOUS at 17:18

## 2024-06-07 RX ADMIN — Medication 2 G: at 09:32

## 2024-06-07 RX ADMIN — Medication 1 APPLICATION: at 12:08

## 2024-06-07 RX ADMIN — LEVOCARNITINE 250 MG: 1 SOLUTION ORAL at 12:08

## 2024-06-07 RX ADMIN — Medication 2 G: at 11:16

## 2024-06-07 RX ADMIN — ACETAMINOPHEN 650 MG: 325 TABLET, FILM COATED ORAL at 18:23

## 2024-06-07 RX ADMIN — LAMOTRIGINE 250 MG: 100 TABLET ORAL at 14:09

## 2024-06-07 RX ADMIN — LEVOCARNITINE 250 MG: 1 SOLUTION ORAL at 09:31

## 2024-06-07 RX ADMIN — PRIMIDONE 100 MG: 50 TABLET ORAL at 09:27

## 2024-06-07 RX ADMIN — PIPERACILLIN AND TAZOBACTAM 4.5 G: 36; 4.5 INJECTION, POWDER, FOR SOLUTION INTRAVENOUS at 00:50

## 2024-06-07 RX ADMIN — Medication 2 G: at 17:23

## 2024-06-07 RX ADMIN — METOPROLOL TARTRATE 25 MG: 25 TABLET, FILM COATED ORAL at 17:18

## 2024-06-07 RX ADMIN — PYRIDOXINE HCL TAB 50 MG 100 MG: 50 TAB at 09:27

## 2024-06-07 RX ADMIN — PRIMIDONE 100 MG: 50 TABLET ORAL at 14:09

## 2024-06-07 RX ADMIN — LEVOCARNITINE 250 MG: 1 SOLUTION ORAL at 21:23

## 2024-06-07 RX ADMIN — CLONAZEPAM 0.5 MG: 0.5 TABLET ORAL at 21:22

## 2024-06-07 RX ADMIN — SPIRONOLACTONE 100 MG: 100 TABLET, FILM COATED ORAL at 09:27

## 2024-06-07 RX ADMIN — METOPROLOL TARTRATE 25 MG: 25 TABLET, FILM COATED ORAL at 09:27

## 2024-06-07 RX ADMIN — FUROSEMIDE 20 MG: 10 INJECTION, SOLUTION INTRAMUSCULAR; INTRAVENOUS at 14:21

## 2024-06-07 RX ADMIN — LAMOTRIGINE 200 MG: 100 TABLET ORAL at 09:27

## 2024-06-07 RX ADMIN — SALINE NASAL SPRAY 2 SPRAY: 1.5 SOLUTION NASAL at 12:08

## 2024-06-07 RX ADMIN — PRIMIDONE 150 MG: 50 TABLET ORAL at 21:21

## 2024-06-07 RX ADMIN — LAMOTRIGINE 300 MG: 100 TABLET ORAL at 21:22

## 2024-06-07 NOTE — ASSESSMENT & PLAN NOTE
Had a detailed discussion with mom (Kylie) and we discussed that the patient is not doing well.  He has not been doing well since the beginning of this year and has declined since admission to the hospital in April 2024 at [facility] for aspiration pneumonia.  Patient continues to choke on thickened liquids at home.  He is now bedbound.  He continues to decline.  During his hospital course I discussed with that he has recurrent aspiration pneumonia, encephalopathic, bilateral pleural effusions and he seems to be getting worse.  She she states that he continues to decline and not do well.  She states that she has a hard time taking care of him.  Patient has been at 5 nursing homes and she states that they do not give good care to him.    We discussed CODE STATUS: Discussed that the likelihood of him recovering from a cardiac arrest is very poor.  Kylie was a nurse, and states that she did many codes.  She would like to make him a DNR noting that he would not do well from CPR, chest compressions or intubation.    We discussed that if the patient continues to have chronic aspiration pneumonia, and does not pass a swallow screen which he want a feeding tube.  She currently does not know.  We discussed about wishes and about quality measures.  I did briefly touched upon hospice and just making the patient comfortable if she did not want to take aggressive measures.  She does want to give him a chance to turn around but she knows that he may not do well.    Her son Patricio later joined her.  I answered more of his questions including about pleural effusion, aspiration pneumonia.  There is a bit of a poor understanding.  He did ask me that for the calcification of the pericardium if they would remove that.  With the patient's current medical condition I stated that most likely that would not happen and that is a question for CT surgery.  Continue with current level of care.  Continue to monitor symptoms closely.

## 2024-06-07 NOTE — ASSESSMENT & PLAN NOTE
Patient's creatinine slightly elevated than baseline.  Baseline appears to be up to 1.7.  Elevated creatinine to 2.3 noted yesterday.  Improved to 2.1 today resume Aldactone..  Hold diuretics.  . repeat BMP in AM.  If creatinine continues to improve, likely can resume home dose of torsemide in AM.    Lab Results   Component Value Date    EGFR 33 06/07/2024    EGFR 33 06/06/2024    EGFR 30 06/05/2024    CREATININE 2.11 (H) 06/07/2024    CREATININE 2.14 (H) 06/06/2024    CREATININE 2.31 (H) 06/05/2024

## 2024-06-07 NOTE — ASSESSMENT & PLAN NOTE
Events of last night noted.  Seen by ENT.  Recommended humidified oxygen and nasal saline spray and gel.  Had another episode last night which self resolved.  Hemoglobin remained stable.  Resume p.o. diet per speech therapy recommendations.  Maintain strict aspiration precautions.

## 2024-06-07 NOTE — ASSESSMENT & PLAN NOTE
Moderately large right and moderate size left pleural effusion.  On previous admission in April 2024, he had a right-sided thoracentesis which was transudative.  S/p bilateral thoracentesis on 6 4.  Respiratory status has remained stable subsequently.  However repeat x-ray still shows large right-sided effusion.  Patient is hemodynamically stable with no increased oxygen requirement.  Can get repeat thoracentesis on 6/10with IR.

## 2024-06-07 NOTE — PROGRESS NOTES
[facility]  Progress Note  Name: Ash Loaiza I  MRN: [id number]  Unit/Bed#: -01 I Date of Admission: 6/2/2024   Date of Service: 6/8/2024 I Hospital Day: 6    Assessment & Plan   * Pneumonia of both lungs due to infectious organism  Assessment & Plan  Recurrent aspiration pneumonia.  He had a hospitalization in April 2024 at [facility].  Continue IV Zosyn.  Day 5 of 7 .complete total 7 days of treatment.  Speech therapy evaluated patient and recommended Pureed with nectar thick liquid with crushed meds.     Poor prognosis  Assessment & Plan  Had a detailed discussion with mom (Kylie) and we discussed that the patient is not doing well.  He has not been doing well since the beginning of this year and has declined since admission to the hospital in April 2024 at [facility] for aspiration pneumonia.  Patient continues to choke on thickened liquids at home.  He is now bedbound.  He continues to decline.  During his hospital course I discussed with that he has recurrent aspiration pneumonia, encephalopathic, bilateral pleural effusions and he seems to be getting worse.  She she states that he continues to decline and not do well.  She states that she has a hard time taking care of him.  Patient has been at 5 nursing homes and she states that they do not give good care to him.    We discussed CODE STATUS: Discussed that the likelihood of him recovering from a cardiac arrest is very poor.  Kylie was a nurse, and states that she did many codes.  She would like to make him a DNR noting that he would not do well from CPR, chest compressions or intubation.    We discussed that if the patient continues to have chronic aspiration pneumonia, and does not pass a swallow screen which he want a feeding tube.  She currently does not know.  We discussed about wishes and about quality measures.  I did briefly touched upon hospice and just making the patient comfortable if she did not want  to take aggressive measures.  She does want to give him a chance to turn around but she knows that he may not do well.    Her son Patricio later joined her.  I answered more of his questions including about pleural effusion, aspiration pneumonia.  There is a bit of a poor understanding.  He did ask me that for the calcification of the pericardium if they would remove that.  With the patient's current medical condition I stated that most likely that would not happen and that is a question for CT surgery.  Continue with current level of care.  Continue to monitor symptoms closely.    Encephalopathy  Assessment & Plan  Patient with developmental delay, but this is most likely secondary to his acute illness including pleural effusions, recurrent aspiration pneumonia.  Mentation appears to be at baseline currently.    Stage 3b chronic kidney disease (HCC)  Assessment & Plan  Patient's creatinine slightly elevated than baseline.  Baseline appears to be up to 1.7.  Elevated creatinine to 2.3 noted yesterday.  Improved to 2.1 today resume Aldactone..  Hold diuretics.  . repeat BMP in AM.  If creatinine continues to improve, likely can resume home dose of torsemide in AM.    Lab Results   Component Value Date    EGFR 33 06/07/2024    EGFR 33 06/06/2024    EGFR 30 06/05/2024    CREATININE 2.11 (H) 06/07/2024    CREATININE 2.14 (H) 06/06/2024    CREATININE 2.31 (H) 06/05/2024       Urinary retention  Assessment & Plan  Azul catheter has been placed for strict I's and O's.  Likely void trial prior to discharge.    Epistaxis  Assessment & Plan  Events of last night noted.  Seen by ENT.  Recommended humidified oxygen and nasal saline spray and gel.  Had another episode last night which self resolved.  Hemoglobin remained stable.  Resume p.o. diet per speech therapy recommendations.  Maintain strict aspiration precautions.    Liver cirrhosis (HCC)  Assessment & Plan  Current ammonia level is normal.    Resume home Lactulose.  Last  bowel movement on 6 /5.  Will resume home aldactone  Elevated creatinine noted today.  Follow-up renal functions in AM.    Acute on chronic heart failure with preserved ejection fraction (HCC)  Assessment & Plan  Patient with bilateral pleural effusions, received 80 mg of Lasix IV in the emergency room.  At home he takes torsemide and Aldactone.  Initially started on Lasix 40 mg IV twice daily.  Has had good urine output with -2.4 L in the last 24 hours.  Had subsequent hypotension and elevation in creatinine.  Diuretics on hold since 6 5.  Continue to hold furosemide for now.  Will resume Aldactone if blood pressure tolerates.  Continue to monitor cardiorespiratory status and renal functions closely..  Of note patient is s/p bilateral thoracentesis with 425 mL and 975 mL.  Currently continues to be euvolemic.  Continue to monitor off diuretics.  Give 1 dose of Lasix on 6 /7.  Repeat chest x-ray shows persistent large right-sided effusion.  Will need thoracentesis again.  IR will be consulted for it on 6/10.  Wt Readings from Last 3 Encounters:   06/02/24 93.9 kg (207 lb)   01/30/24 79.3 kg (174 lb 13.2 oz)   12/08/23 106 kg (234 lb 9.1 oz)         Bilateral pleural effusion  Assessment & Plan  Moderately large right and moderate size left pleural effusion.  On previous admission in April 2024, he had a right-sided thoracentesis which was transudative.  S/p bilateral thoracentesis on 6 4.  Respiratory status has remained stable subsequently.  However repeat x-ray still shows large right-sided effusion.  Patient is hemodynamically stable with no increased oxygen requirement.  Can get repeat thoracentesis on 6/10with IR.    Dysphagia  Assessment & Plan  At baseline on pureed diet.   Speech evaluated patient and recs pureed with nectar thick, crushed meds  Continue with ongoing speech follow-up and maintain strict aspiration precautions.  Of epistaxis yesterday after eating.  Maintain strict aspiration precautions.   Continue with close supervision with food.    Developmental delay  Assessment & Plan  Since birth, patient is adopted.  It is unknown why he has the mental delay if it is due to anoxic brain injury or cerebral palsy.    Acute respiratory failure with hypoxia (HCC)  Assessment & Plan  Multifactorial secondary to heart failure, pleural effusions recurrent aspiration pneumonia.  Continue with oxygen.  Because of recurrent episode of epistaxis, ENT was consulted and recommended using humidified oxygen via facemask  Currently on room air.    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  Patient able to resume PO intake, will resume home meds.                VTE Pharmacologic Prophylaxis: VTE Score: 6 High Risk (Score >/= 5) - Pharmacological DVT Prophylaxis Ordered: heparin. Sequential Compression Devices Ordered.    Mobility:   Basic Mobility Inpatient Raw Score: 10  JH-HLM Goal: 4: Move to chair/commode  JH-HLM Achieved: 2: Bed activities/Dependent transfer  JH-HLM Goal NOT achieved. Continue with multidisciplinary rounding and encourage appropriate mobility to improve upon JH-HLM goals.    Patient Centered Rounds: I performed bedside rounds with nursing staff today.   Discussions with Specialists or Other Care Team Provider: Discussed with nursing    Education and Discussions with Family / Patient: Updated  (mother) via phone.    Total Time Spent on Date of Encounter in care of patient: 20 mins. This time was spent on one or more of the following: performing physical exam; counseling and coordination of care; obtaining or reviewing history; documenting in the medical record; reviewing/ordering tests, medications or procedures; communicating with other healthcare professionals and discussing with patient's family/caregivers.    Current Length of Stay: 6 day(s)  Current Patient Status: Inpatient   Certification  Statement: The patient will continue to require additional inpatient hospital stay due to going IV antibiotics.  Need for repeat thoracentesis.  Discharge Plan: Anticipate discharge in 48-72 hrs to rehab facility.    Code Status: Level 3 - DNAR and DNI    Subjective:   Patient seen and examined at bedside.  No further episode of epistaxis.  Respiratory status remained stable.  Denies any discomfort.  No acute events overnight.    Objective:     Vitals:   Temp (24hrs), Av.6 °F (37 °C), Min:98.2 °F (36.8 °C), Max:98.8 °F (37.1 °C)    Temp:  [98.2 °F (36.8 °C)-98.8 °F (37.1 °C)] 98.2 °F (36.8 °C)  HR:  [74-84] 78  Resp:  [16-20] 16  BP: ()/(48-70) 112/65  SpO2:  [93 %] 93 %  Body mass index is 33.41 kg/m².     Input and Output Summary (last 24 hours):     Intake/Output Summary (Last 24 hours) at 2024 1159  Last data filed at 2024 0851  Gross per 24 hour   Intake 2220 ml   Output 900 ml   Net 1320 ml       Physical Exam:   Physical Exam  Constitutional:       Appearance: He is ill-appearing.   HENT:      Head: Normocephalic.      Nose: Nose normal.      Mouth/Throat:      Mouth: Mucous membranes are moist.   Eyes:      Extraocular Movements: Extraocular movements intact.      Pupils: Pupils are equal, round, and reactive to light.   Cardiovascular:      Rate and Rhythm: Normal rate and regular rhythm.   Pulmonary:      Effort: Pulmonary effort is normal.      Breath sounds: No rales.      Comments: Diminished breath sounds right greater than left.  Musculoskeletal:      Cervical back: Neck supple.      Right lower leg: Edema present.      Left lower leg: Edema present.   Skin:     General: Skin is warm.   Neurological:      General: No focal deficit present.      Mental Status: He is alert. Mental status is at baseline.          Additional Data:     Labs:  Results from last 7 days   Lab Units 24  0556   WBC Thousand/uL 6.05   HEMOGLOBIN g/dL 9.4*   HEMATOCRIT % 30.0*   PLATELETS Thousands/uL 127*    SEGS PCT % 81*   LYMPHO PCT % 9*   MONO PCT % 10   EOS PCT % 0     Results from last 7 days   Lab Units 06/07/24  0443 06/04/24  0435 06/03/24  0506   SODIUM mmol/L 144   < > 138   POTASSIUM mmol/L 3.8   < > 4.2   CHLORIDE mmol/L 107   < > 100   CO2 mmol/L 29   < > 30   BUN mg/dL 29*   < > 25   CREATININE mg/dL 2.11*   < > 1.96*   ANION GAP mmol/L 8   < > 8   CALCIUM mg/dL 8.9   < > 8.8   ALBUMIN g/dL  --   --  3.5   TOTAL BILIRUBIN mg/dL  --   --  1.23*   ALK PHOS U/L  --   --  161*   ALT U/L  --   --  8   AST U/L  --   --  18   GLUCOSE RANDOM mg/dL 88   < > 73    < > = values in this interval not displayed.     Results from last 7 days   Lab Units 06/02/24  1435   INR  1.23*     Results from last 7 days   Lab Units 06/05/24  0439   POC GLUCOSE mg/dl 85         Results from last 7 days   Lab Units 06/07/24  0443 06/02/24  1435   LACTIC ACID mmol/L  --  1.4   PROCALCITONIN ng/ml 0.70* 0.30*       Lines/Drains:  Invasive Devices       Peripheral Intravenous Line  Duration             Peripheral IV 06/06/24 Distal;Left;Ventral (anterior) Forearm 2 days    Peripheral IV 06/08/24 Proximal;Right;Ventral (anterior) Forearm <1 day              Drain  Duration             Urethral Catheter Straight-tip 16 Fr. 5 days                  Urinary Catheter:  Goal for removal: Voiding trial when ambulation improves               Imaging: Reviewed radiology reports from this admission including: chest xray    Recent Cultures (last 7 days):   Results from last 7 days   Lab Units 06/02/24  1452 06/02/24  1435   BLOOD CULTURE  No Growth After 5 Days. No Growth After 5 Days.       Last 24 Hours Medication List:   Current Facility-Administered Medications   Medication Dose Route Frequency Provider Last Rate    acetaminophen  650 mg Oral Q6H PRN Iris Rivas MD      clonazePAM  0.5 mg Oral HS Iris Rivas MD      Diclofenac Sodium  2 g Topical 4x Daily Iris Rivas MD      lactulose  20 g Oral Daily Iris Rivas MD      lamoTRIgine  200  mg Oral Daily Iris Rivas MD      And    lamoTRIgine  250 mg Oral After Lunch Irsi Rivas MD      And    lamoTRIgine  300 mg Oral HS Iris Rivas MD      levOCARNitine  1,000 mg/day Oral 4x Daily (with meals and at bedtime) Iris Rivas MD      metoprolol tartrate  25 mg Oral BID Iris Rivas MD      piperacillin-tazobactam  4.5 g Intravenous Q8H Fede Han Mcintyre MD 4.5 g (06/08/24 0846)    primidone  100 mg Oral QAM Iris Rivas MD      And    primidone  100 mg Oral After Lunch Iris Rivas MD      And    primidone  150 mg Oral HS Iris Rivas MD      pyridoxine  100 mg Oral Daily Iris Rivas MD      sodium chloride  1 Application Nasal 4x Daily (PC & HS) Jonatan Biggs MD      sodium chloride  2 spray Each Nare 4x Daily (PC & HS) Jonatan Biggs MD      spironolactone  100 mg Oral Daily Karla Jaimes MD      zonisamide  100 mg Oral HS Iris Rivas MD          Today, Patient Was Seen By: Karla Jaimes MD    **Please Note: This note may have been constructed using a voice recognition system.**

## 2024-06-07 NOTE — ASSESSMENT & PLAN NOTE
Recurrent aspiration pneumonia.  He had a hospitalization in April 2024 at [facility].  Continue IV Zosyn.  Day 5 of 7 .complete total 7 days of treatment.  Speech therapy evaluated patient and recommended Pureed with nectar thick liquid with crushed meds.

## 2024-06-07 NOTE — ASSESSMENT & PLAN NOTE
At baseline on pureed diet.   Speech evaluated patient and recs pureed with nectar thick, crushed meds  Continue with ongoing speech follow-up and maintain strict aspiration precautions.  Of epistaxis yesterday after eating.  Maintain strict aspiration precautions.  Continue with close supervision with food.

## 2024-06-07 NOTE — ASSESSMENT & PLAN NOTE
Patient with bilateral pleural effusions, received 80 mg of Lasix IV in the emergency room.  At home he takes torsemide and Aldactone.  Initially started on Lasix 40 mg IV twice daily.  Has had good urine output with -2.4 L in the last 24 hours.  Had subsequent hypotension and elevation in creatinine.  Diuretics on hold since 6 5.  Continue to hold furosemide for now.  Will resume Aldactone if blood pressure tolerates.  Continue to monitor cardiorespiratory status and renal functions closely..  Of note patient is s/p bilateral thoracentesis with 425 mL and 975 mL.  Currently continues to be euvolemic.  Continue to monitor off diuretics.  Give 1 dose of Lasix on 6 /7.  Repeat chest x-ray shows persistent large right-sided effusion.  Will need thoracentesis again.  IR will be consulted for it on 6/10.  Wt Readings from Last 3 Encounters:   06/02/24 93.9 kg (207 lb)   01/30/24 79.3 kg (174 lb 13.2 oz)   12/08/23 106 kg (234 lb 9.1 oz)

## 2024-06-07 NOTE — PLAN OF CARE
Problem: Potential for Falls  Goal: Patient will remain free of falls  Description: INTERVENTIONS:  - Educate patient/family on patient safety including physical limitations  - Instruct patient to call for assistance with activity   - Consult OT/PT to assist with strengthening/mobility   - Keep Call bell within reach  - Keep bed low and locked with side rails adjusted as appropriate  - Keep care items and personal belongings within reach  - Initiate and maintain comfort rounds  - Make Fall Risk Sign visible to staff  - Offer Toileting every  Hours, in advance of need  - Initiate/Maintain alarm  - Obtain necessary fall risk management equipment:   - Apply yellow socks and bracelet for high fall risk patients  - Consider moving patient to room near nurses station  Outcome: Progressing     Problem: PAIN - ADULT  Goal: Verbalizes/displays adequate comfort level or baseline comfort level  Description: Interventions:  - Encourage patient to monitor pain and request assistance  - Assess pain using appropriate pain scale  - Administer analgesics based on type and severity of pain and evaluate response  - Implement non-pharmacological measures as appropriate and evaluate response  - Consider cultural and social influences on pain and pain management  - Notify physician/advanced practitioner if interventions unsuccessful or patient reports new pain  Outcome: Progressing     Problem: INFECTION - ADULT  Goal: Absence or prevention of progression during hospitalization  Description: INTERVENTIONS:  - Assess and monitor for signs and symptoms of infection  - Monitor lab/diagnostic results  - Monitor all insertion sites, i.e. indwelling lines, tubes, and drains  - Monitor endotracheal if appropriate and nasal secretions for changes in amount and color  - [place] appropriate cooling/warming therapies per order  - Administer medications as ordered  - Instruct and encourage patient and family to use good hand hygiene technique  -  Identify and instruct in appropriate isolation precautions for identified infection/condition  Outcome: Progressing  Goal: Absence of fever/infection during neutropenic period  Description: INTERVENTIONS:  - Monitor WBC    Outcome: Progressing     Problem: SAFETY ADULT  Goal: Patient will remain free of falls  Description: INTERVENTIONS:  - Educate patient/family on patient safety including physical limitations  - Instruct patient to call for assistance with activity   - Consult OT/PT to assist with strengthening/mobility   - Keep Call bell within reach  - Keep bed low and locked with side rails adjusted as appropriate  - Keep care items and personal belongings within reach  - Initiate and maintain comfort rounds  - Make Fall Risk Sign visible to staff  - Offer Toileting every  Hours, in advance of need  - Initiate/Maintain alarm  - Obtain necessary fall risk management equipment:   - Apply yellow socks and bracelet for high fall risk patients  - Consider moving patient to room near nurses station  Outcome: Progressing  Goal: Maintain or return to baseline ADL function  Description: INTERVENTIONS:  -  Assess patient's ability to carry out ADLs; assess patient's baseline for ADL function and identify physical deficits which impact ability to perform ADLs (bathing, care of mouth/teeth, toileting, grooming, dressing, etc.)  - Assess/evaluate cause of self-care deficits   - Assess range of motion  - Assess patient's mobility; develop plan if impaired  - Assess patient's need for assistive devices and provide as appropriate  - Encourage maximum independence but intervene and supervise when necessary  - Involve family in performance of ADLs  - Assess for home care needs following discharge   - Consider OT consult to assist with ADL evaluation and planning for discharge  - Provide patient education as appropriate  Outcome: Progressing  Goal: Maintains/Returns to pre admission functional level  Description:  INTERVENTIONS:  - Perform AM-PAC 6 Click Basic Mobility/ Daily Activity assessment daily.  - Set and communicate daily mobility goal to care team and patient/family/caregiver.   - Collaborate with rehabilitation services on mobility goals if consulted  - Perform Range of Motion  times a day.  - Reposition patient every  hours.  - Dangle patient  times a day  - Stand patient  times a day  - Ambulate patient  times a day  - Out of bed to chair  times a day   - Out of bed for meals  times a day  - Out of bed for toileting  - Record patient progress and toleration of activity level   Outcome: Progressing     Problem: DISCHARGE PLANNING  Goal: Discharge to home or other facility with appropriate resources  Description: INTERVENTIONS:  - Identify barriers to discharge w/patient and caregiver  - Arrange for needed discharge resources and transportation as appropriate  - Identify discharge learning needs (meds, wound care, etc.)  - Arrange for interpretive services to assist at discharge as needed  - Refer to Case Management Department for coordinating discharge planning if the patient needs post-hospital services based on physician/advanced practitioner order or complex needs related to functional status, cognitive ability, or social support system  Outcome: Progressing     Problem: Knowledge Deficit  Goal: Patient/family/caregiver demonstrates understanding of disease process, treatment plan, medications, and discharge instructions  Description: Complete learning assessment and assess knowledge base.  Interventions:  - Provide teaching at level of understanding  - Provide teaching via preferred learning methods  Outcome: Progressing     Problem: Prexisting or High Potential for Compromised Skin Integrity  Goal: Skin integrity is maintained or improved  Description: INTERVENTIONS:  - Identify patients at risk for skin breakdown  - Assess and monitor skin integrity  - Assess and monitor nutrition and hydration status  -  Monitor labs   - Assess for incontinence   - Turn and reposition patient  - Assist with mobility/ambulation  - Relieve pressure over bony prominences  - Avoid friction and shearing  - Provide appropriate hygiene as needed including keeping skin clean and dry  - Evaluate need for skin moisturizer/barrier cream  - Collaborate with interdisciplinary team   - Patient/family teaching  - Consider wound care consult   Outcome: Progressing     Problem: NEUROSENSORY - ADULT  Goal: Achieves stable or improved neurological status  Description: INTERVENTIONS  - Monitor and report changes in neurological status  - Monitor vital signs such as temperature, blood pressure, glucose, and any other labs ordered   - Initiate measures to prevent increased intracranial pressure  - Monitor for seizure activity and implement precautions if appropriate      Outcome: Progressing  Goal: Remains free of injury related to seizures activity  Description: INTERVENTIONS  - Maintain airway, patient safety  and administer oxygen as ordered  - Monitor patient for seizure activity, document and report duration and description of seizure to physician/advanced practitioner  - If seizure occurs,  ensure patient safety during seizure  - Reorient patient post seizure  - Seizure pads on all 4 side rails  - Instruct patient/family to notify RN of any seizure activity including if an aura is experienced  - Instruct patient/family to call for assistance with activity based on nursing assessment  - Administer anti-seizure medications if ordered    Outcome: Progressing  Goal: Achieves maximal functionality and self care  Description: INTERVENTIONS  - Monitor swallowing and airway patency with patient fatigue and changes in neurological status  - Encourage and assist patient to increase activity and self care.   - Encourage visually impaired, hearing impaired and aphasic patients to use assistive/communication devices  Outcome: Progressing     Problem:  RESPIRATORY - ADULT  Goal: Achieves optimal ventilation and oxygenation  Description: INTERVENTIONS:  - Assess for changes in respiratory status  - Assess for changes in mentation and behavior  - Position to facilitate oxygenation and minimize respiratory effort  - Oxygen administered by appropriate delivery if ordered  - Initiate smoking cessation education as indicated  - Encourage broncho-pulmonary hygiene including cough, deep breathe, Incentive Spirometry  - Assess the need for suctioning and aspirate as needed  - Assess and instruct to report SOB or any respiratory difficulty  - Respiratory Therapy support as indicated  Outcome: Progressing     Problem: GENITOURINARY - ADULT  Goal: Maintains or returns to baseline urinary function  Description: INTERVENTIONS:  - Assess urinary function  - Encourage oral fluids to ensure adequate hydration if ordered  - Administer IV fluids as ordered to ensure adequate hydration  - Administer ordered medications as needed  - Offer frequent toileting  - Follow urinary retention protocol if ordered  Outcome: Progressing  Goal: Absence of urinary retention  Description: INTERVENTIONS:  - Assess patient’s ability to void and empty bladder  - Monitor I/O  - Bladder scan as needed  - Discuss with physician/AP medications to alleviate retention as needed  - Discuss catheterization for long term situations as appropriate  Outcome: Progressing  Goal: Urinary catheter remains patent  Description: INTERVENTIONS:  - Assess patency of urinary catheter  - If patient has a chronic sr, consider changing catheter if non-functioning  - Follow guidelines for intermittent irrigation of non-functioning urinary catheter  Outcome: Progressing     Problem: HEMATOLOGIC - ADULT  Goal: Maintains hematologic stability  Description: INTERVENTIONS  - Assess for signs and symptoms of bleeding or hemorrhage  - Monitor labs  - Administer supportive blood products/factors as ordered and  appropriate  Outcome: Progressing

## 2024-06-07 NOTE — ASSESSMENT & PLAN NOTE
Patient with bilateral pleural effusions, received 80 mg of Lasix IV in the emergency room.  At home he takes torsemide and Aldactone.  Initially started on Lasix 40 mg IV twice daily.  Has had good urine output with -2.4 L in the last 24 hours.  Had subsequent hypotension and elevation in creatinine.  Diuretics on hold since 6 5.  Continue to hold furosemide for now.  Will resume Aldactone if blood pressure tolerates.  Continue to monitor cardiorespiratory status and renal functions closely..  Of note patient is s/p bilateral thoracentesis with 425 mL and 975 mL.  Currently continues to be euvolemic.  Continue to monitor off diuretics.  Wt Readings from Last 3 Encounters:   06/02/24 93.9 kg (207 lb)   01/30/24 79.3 kg (174 lb 13.2 oz)   12/08/23 106 kg (234 lb 9.1 oz)

## 2024-06-07 NOTE — ASSESSMENT & PLAN NOTE
Had a detailed discussion with mom (Kylie) and we discussed that the patient is not doing well.  He has not been doing well since the beginning of this year and has declined since admission to the hospital in April 2024 at [facility] for aspiration pneumonia.  Patient continues to choke on thickened liquids at home.  He is now bedbound.  He continues to decline.  During his hospital course I discussed with that he has recurrent aspiration pneumonia, encephalopathic, bilateral pleural effusions and he seems to be getting worse.  She she states that he continues to decline and not do well.  She states that she has a hard time taking care of him.  Patient has been at 5 nursing homes and she states that they do not give good care to him.    We discussed CODE STATUS: Discussed that the likelihood of him recovering from a cardiac arrest is very poor.  Kylie was a nurse, and states that she did many codes.  She would like to make him a DNR noting that he would not do well from CPR, chest compressions or intubation.    We discussed that if the patient continues to have chronic aspiration pneumonia, and does not pass a swallow screen which he want a feeding tube.  She currently does not know.  We discussed about wishes and about quality measures.  I did briefly touched upon hospice and just making the patient comfortable if she did not want to take aggressive measures.  She does want to give him a chance to turn around but she knows that he may not do well.    Her son Patricio later joined her.  I answered more of his questions including about pleural effusion, aspiration pneumonia.  There is a bit of a poor understanding.  He did ask me that for the calcification of the pericardium if they would remove that.  With the patient's current medical condition I stated that most likely that would not happen and that is a question for CT surgery.  Continue with current level of care.  Continue to monitor symptoms closely.

## 2024-06-07 NOTE — QUICK NOTE
Called to bedside for aggressive nasal bleeding from bilateral nares and mouth.  Suctioned large amount of clots from posterior throat.  Afrin spray to bilateral nares then pressure held with initial cessation of bleeding.  Bleeding resumed only from the left nares.  Clots evacuated from left nares and packed with Vaseline gauze.  Bleeding appears to have ceased.  Continue to monitor.  Will keep n.p.o. for the next 1 to 2 hours and monitor for recurrent bleeding.  Subcutaneous heparin discontinued.

## 2024-06-07 NOTE — PLAN OF CARE
Problem: Potential for Falls  Goal: Patient will remain free of falls  Description: INTERVENTIONS:  - Educate patient/family on patient safety including physical limitations  - Instruct patient to call for assistance with activity   - Consult OT/PT to assist with strengthening/mobility   - Keep Call bell within reach  - Keep bed low and locked with side rails adjusted as appropriate  - Keep care items and personal belongings within reach  - Initiate and maintain comfort rounds  - Make Fall Risk Sign visible to staff  - Offer Toileting every 2 Hours, in advance of need  - Initiate/Maintain bed alarm  - Obtain necessary fall risk management equipment  - Apply yellow socks and bracelet for high fall risk patients  - Consider moving patient to room near nurses station  Outcome: Progressing     Problem: PAIN - ADULT  Goal: Verbalizes/displays adequate comfort level or baseline comfort level  Description: Interventions:  - Encourage patient to monitor pain and request assistance  - Assess pain using appropriate pain scale  - Administer analgesics based on type and severity of pain and evaluate response  - Implement non-pharmacological measures as appropriate and evaluate response  - Consider cultural and social influences on pain and pain management  - Notify physician/advanced practitioner if interventions unsuccessful or patient reports new pain  Outcome: Progressing     Problem: INFECTION - ADULT  Goal: Absence or prevention of progression during hospitalization  Description: INTERVENTIONS:  - Assess and monitor for signs and symptoms of infection  - Monitor lab/diagnostic results  - Monitor all insertion sites, i.e. indwelling lines, tubes, and drains  - Monitor endotracheal if appropriate and nasal secretions for changes in amount and color  - [place] appropriate cooling/warming therapies per order  - Administer medications as ordered  - Instruct and encourage patient and family to use good hand hygiene  technique  - Identify and instruct in appropriate isolation precautions for identified infection/condition  Outcome: Progressing  Goal: Absence of fever/infection during neutropenic period  Description: INTERVENTIONS:  - Monitor WBC    Outcome: Progressing     Problem: SAFETY ADULT  Goal: Patient will remain free of falls  Description: INTERVENTIONS:  - Educate patient/family on patient safety including physical limitations  - Instruct patient to call for assistance with activity   - Consult OT/PT to assist with strengthening/mobility   - Keep Call bell within reach  - Keep bed low and locked with side rails adjusted as appropriate  - Keep care items and personal belongings within reach  - Initiate and maintain comfort rounds  - Make Fall Risk Sign visible to staff  - Offer Toileting every 2 Hours, in advance of need  - Initiate/Maintain bed alarm  - Obtain necessary fall risk management equipment  - Apply yellow socks and bracelet for high fall risk patients  - Consider moving patient to room near nurses station  Outcome: Progressing  Goal: Maintain or return to baseline ADL function  Description: INTERVENTIONS:  -  Assess patient's ability to carry out ADLs; assess patient's baseline for ADL function and identify physical deficits which impact ability to perform ADLs (bathing, care of mouth/teeth, toileting, grooming, dressing, etc.)  - Assess/evaluate cause of self-care deficits   - Assess range of motion  - Assess patient's mobility; develop plan if impaired  - Assess patient's need for assistive devices and provide as appropriate  - Encourage maximum independence but intervene and supervise when necessary  - Involve family in performance of ADLs  - Assess for home care needs following discharge   - Consider OT consult to assist with ADL evaluation and planning for discharge  - Provide patient education as appropriate  Outcome: Progressing  Goal: Maintains/Returns to pre admission functional level  Description:  INTERVENTIONS:  - Perform AM-PAC 6 Click Basic Mobility/ Daily Activity assessment daily.  - Set and communicate daily mobility goal to care team and patient/family/caregiver.   - Collaborate with rehabilitation services on mobility goals if consulted  - Reposition patient every 2 hours.  - Stand patient 3 times a day  - Ambulate patient 3 times a day  - Out of bed to chair 3 times a day   - Out of bed for meals 3 times a day  - Out of bed for toileting  - Record patient progress and toleration of activity level   Outcome: Progressing     Problem: DISCHARGE PLANNING  Goal: Discharge to home or other facility with appropriate resources  Description: INTERVENTIONS:  - Identify barriers to discharge w/patient and caregiver  - Arrange for needed discharge resources and transportation as appropriate  - Identify discharge learning needs (meds, wound care, etc.)  - Arrange for interpretive services to assist at discharge as needed  - Refer to Case Management Department for coordinating discharge planning if the patient needs post-hospital services based on physician/advanced practitioner order or complex needs related to functional status, cognitive ability, or social support system  Outcome: Progressing     Problem: Knowledge Deficit  Goal: Patient/family/caregiver demonstrates understanding of disease process, treatment plan, medications, and discharge instructions  Description: Complete learning assessment and assess knowledge base.  Interventions:  - Provide teaching at level of understanding  - Provide teaching via preferred learning methods  Outcome: Progressing     Problem: Prexisting or High Potential for Compromised Skin Integrity  Goal: Skin integrity is maintained or improved  Description: INTERVENTIONS:  - Identify patients at risk for skin breakdown  - Assess and monitor skin integrity  - Assess and monitor nutrition and hydration status  - Monitor labs   - Assess for incontinence   - Turn and reposition  patient  - Assist with mobility/ambulation  - Relieve pressure over bony prominences  - Avoid friction and shearing  - Provide appropriate hygiene as needed including keeping skin clean and dry  - Evaluate need for skin moisturizer/barrier cream  - Collaborate with interdisciplinary team   - Patient/family teaching  - Consider wound care consult   Outcome: Progressing     Problem: NEUROSENSORY - ADULT  Goal: Achieves stable or improved neurological status  Description: INTERVENTIONS  - Monitor and report changes in neurological status  - Monitor vital signs such as temperature, blood pressure, glucose, and any other labs ordered   - Initiate measures to prevent increased intracranial pressure  - Monitor for seizure activity and implement precautions if appropriate      Outcome: Progressing  Goal: Remains free of injury related to seizures activity  Description: INTERVENTIONS  - Maintain airway, patient safety  and administer oxygen as ordered  - Monitor patient for seizure activity, document and report duration and description of seizure to physician/advanced practitioner  - If seizure occurs,  ensure patient safety during seizure  - Reorient patient post seizure  - Seizure pads on all 4 side rails  - Instruct patient/family to notify RN of any seizure activity including if an aura is experienced  - Instruct patient/family to call for assistance with activity based on nursing assessment  - Administer anti-seizure medications if ordered    Outcome: Progressing  Goal: Achieves maximal functionality and self care  Description: INTERVENTIONS  - Monitor swallowing and airway patency with patient fatigue and changes in neurological status  - Encourage and assist patient to increase activity and self care.   - Encourage visually impaired, hearing impaired and aphasic patients to use assistive/communication devices  Outcome: Progressing     Problem: RESPIRATORY - ADULT  Goal: Achieves optimal ventilation and  oxygenation  Description: INTERVENTIONS:  - Assess for changes in respiratory status  - Assess for changes in mentation and behavior  - Position to facilitate oxygenation and minimize respiratory effort  - Oxygen administered by appropriate delivery if ordered  - Initiate smoking cessation education as indicated  - Encourage broncho-pulmonary hygiene including cough, deep breathe, Incentive Spirometry  - Assess the need for suctioning and aspirate as needed  - Assess and instruct to report SOB or any respiratory difficulty  - Respiratory Therapy support as indicated  Outcome: Progressing     Problem: GENITOURINARY - ADULT  Goal: Maintains or returns to baseline urinary function  Description: INTERVENTIONS:  - Assess urinary function  - Encourage oral fluids to ensure adequate hydration if ordered  - Administer IV fluids as ordered to ensure adequate hydration  - Administer ordered medications as needed  - Offer frequent toileting  - Follow urinary retention protocol if ordered  Outcome: Progressing  Goal: Absence of urinary retention  Description: INTERVENTIONS:  - Assess patient’s ability to void and empty bladder  - Monitor I/O  - Bladder scan as needed  - Discuss with physician/AP medications to alleviate retention as needed  - Discuss catheterization for long term situations as appropriate  Outcome: Progressing  Goal: Urinary catheter remains patent  Description: INTERVENTIONS:  - Assess patency of urinary catheter  - If patient has a chronic sr, consider changing catheter if non-functioning  - Follow guidelines for intermittent irrigation of non-functioning urinary catheter  Outcome: Progressing     Problem: HEMATOLOGIC - ADULT  Goal: Maintains hematologic stability  Description: INTERVENTIONS  - Assess for signs and symptoms of bleeding or hemorrhage  - Monitor labs  - Administer supportive blood products/factors as ordered and appropriate  Outcome: Progressing

## 2024-06-07 NOTE — PROGRESS NOTES
[facility]  Progress Note  Name: Ash Loaiza I  MRN: [id number]  Unit/Bed#: -01 I Date of Admission: 6/2/2024   Date of Service: 6/7/2024 I Hospital Day: 5    Assessment & Plan   * Pneumonia of both lungs due to infectious organism  Assessment & Plan  Recurrent aspiration pneumonia.  He had a hospitalization in April 2024 at [facility].  Continue IV Zosyn.  Day 5 of 7 .complete total 7 days of treatment.  Speech therapy evaluated patient and recommended Pureed with nectar thick liquid with crushed meds.     Poor prognosis  Assessment & Plan  Had a detailed discussion with mom (Kylie) and we discussed that the patient is not doing well.  He has not been doing well since the beginning of this year and has declined since admission to the hospital in April 2024 at [facility] for aspiration pneumonia.  Patient continues to choke on thickened liquids at home.  He is now bedbound.  He continues to decline.  During his hospital course I discussed with that he has recurrent aspiration pneumonia, encephalopathic, bilateral pleural effusions and he seems to be getting worse.  She she states that he continues to decline and not do well.  She states that she has a hard time taking care of him.  Patient has been at 5 nursing homes and she states that they do not give good care to him.    We discussed CODE STATUS: Discussed that the likelihood of him recovering from a cardiac arrest is very poor.  Kylie was a nurse, and states that she did many codes.  She would like to make him a DNR noting that he would not do well from CPR, chest compressions or intubation.    We discussed that if the patient continues to have chronic aspiration pneumonia, and does not pass a swallow screen which he want a feeding tube.  She currently does not know.  We discussed about wishes and about quality measures.  I did briefly touched upon hospice and just making the patient comfortable if she did not want  to take aggressive measures.  She does want to give him a chance to turn around but she knows that he may not do well.    Her son Patricio later joined her.  I answered more of his questions including about pleural effusion, aspiration pneumonia.  There is a bit of a poor understanding.  He did ask me that for the calcification of the pericardium if they would remove that.  With the patient's current medical condition I stated that most likely that would not happen and that is a question for CT surgery.  Continue with current level of care.  Continue to monitor symptoms closely.    Encephalopathy  Assessment & Plan  Patient with developmental delay, but this is most likely secondary to his acute illness including pleural effusions, recurrent aspiration pneumonia.  Mentation appears to be at baseline currently.    Stage 3b chronic kidney disease (HCC)  Assessment & Plan  Patient's creatinine slightly elevated than baseline.  Baseline appears to be up to 1.7.  Elevated creatinine to 2.3 noted yesterday.  Improved to 2.1 today resume Aldactone..  Hold diuretics.  . repeat BMP in AM.  If creatinine continues to improve, likely can resume home dose of torsemide in AM.    Lab Results   Component Value Date    EGFR 33 06/07/2024    EGFR 33 06/06/2024    EGFR 30 06/05/2024    CREATININE 2.11 (H) 06/07/2024    CREATININE 2.14 (H) 06/06/2024    CREATININE 2.31 (H) 06/05/2024       Urinary retention  Assessment & Plan  Azul catheter has been placed for strict I's and O's.  Likely void trial prior to discharge.    Epistaxis  Assessment & Plan  Events of last night noted.  Seen by ENT.  Recommended humidified oxygen and nasal saline spray and gel.  Had another episode last night which self resolved.  Hemoglobin remained stable.  Resume p.o. diet per speech therapy recommendations.  Maintain strict aspiration precautions.    Liver cirrhosis (HCC)  Assessment & Plan  Current ammonia level is normal.    Resume home Lactulose.  Last  bowel movement on 6 /5.  Will resume home aldactone  Elevated creatinine noted today.  Follow-up renal functions in AM.    Acute on chronic heart failure with preserved ejection fraction (HCC)  Assessment & Plan  Patient with bilateral pleural effusions, received 80 mg of Lasix IV in the emergency room.  At home he takes torsemide and Aldactone.  Initially started on Lasix 40 mg IV twice daily.  Has had good urine output with -2.4 L in the last 24 hours.  Had subsequent hypotension and elevation in creatinine.  Diuretics on hold since 6 5.  Continue to hold furosemide for now.  Will resume Aldactone if blood pressure tolerates.  Continue to monitor cardiorespiratory status and renal functions closely..  Of note patient is s/p bilateral thoracentesis with 425 mL and 975 mL.  Currently continues to be euvolemic.  Continue to monitor off diuretics.  Wt Readings from Last 3 Encounters:   06/02/24 93.9 kg (207 lb)   01/30/24 79.3 kg (174 lb 13.2 oz)   12/08/23 106 kg (234 lb 9.1 oz)         Bilateral pleural effusion  Assessment & Plan  Moderately large right and moderate size left pleural effusion.  On previous admission in April 2024, he had a right-sided thoracentesis which was transudative.  S/p bilateral thoracentesis on 6 4.  Respiratory status has remained stable subsequently.    Dysphagia  Assessment & Plan  At baseline on pureed diet.   Speech evaluated patient and recs pureed with nectar thick, crushed meds  Continue with ongoing speech follow-up and maintain strict aspiration precautions.  Of epistaxis yesterday after eating.  Maintain strict aspiration precautions.  Continue with close supervision with food.    Developmental delay  Assessment & Plan  Since birth, patient is adopted.  It is unknown why he has the mental delay if it is due to anoxic brain injury or cerebral palsy.    Acute respiratory failure with hypoxia (HCC)  Assessment & Plan  Multifactorial secondary to heart failure, pleural effusions  recurrent aspiration pneumonia.  Continue with oxygen.  Because of recurrent episode of epistaxis, ENT was consulted and recommended using humidified oxygen via facemask  Currently on room air.    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  Patient able to resume PO intake, will resume home meds.                VTE Pharmacologic Prophylaxis: VTE Score: 6 High Risk (Score >/= 5) - Pharmacological DVT Prophylaxis Contraindicated. Sequential Compression Devices Ordered.    Mobility:   Basic Mobility Inpatient Raw Score: 10  -HLM Goal: 4: Move to chair/commode  JH-HLM Achieved: 2: Bed activities/Dependent transfer  JH-HLM Goal achieved. Continue to encourage appropriate mobility.    Patient Centered Rounds: I performed bedside rounds with nursing staff today.   Discussions with Specialists or Other Care Team Provider: Discussed with case management    Education and Discussions with Family / Patient: Updated  (mother) via phone.    Total Time Spent on Date of Encounter in care of patient: 20 mins. This time was spent on one or more of the following: performing physical exam; counseling and coordination of care; obtaining or reviewing history; documenting in the medical record; reviewing/ordering tests, medications or procedures; communicating with other healthcare professionals and discussing with patient's family/caregivers.    Current Length of Stay: 5 day(s)  Current Patient Status: Inpatient   Certification Statement: The patient will continue to require additional inpatient hospital stay due to ongoing antibiotics  Discharge Plan: Anticipate discharge in 48-72 hrs to rehab facility.    Code Status: Level 3 - DNAR and DNI    Subjective:   Events of last night noted.  No further episode of epistaxis.  Resume p.o. diet and seems to be tolerating it well at current consistency per speech recommendations.   Remains afebrile.  Not on supplemental oxygen.    Objective:     Vitals:   Temp (24hrs), Av.8 °F (37.1 °C), Min:98.6 °F (37 °C), Max:99 °F (37.2 °C)    Temp:  [98.6 °F (37 °C)-99 °F (37.2 °C)] 98.6 °F (37 °C)  HR:  [] 85  Resp:  [19-21] 19  BP: (108-136)/(66-90) 116/74  SpO2:  [92 %] 92 %  Body mass index is 33.41 kg/m².     Input and Output Summary (last 24 hours):     Intake/Output Summary (Last 24 hours) at 2024 1335  Last data filed at 2024 1257  Gross per 24 hour   Intake 240 ml   Output 500 ml   Net -260 ml       Physical Exam:   Physical Exam  Constitutional:       Appearance: He is obese. He is ill-appearing.   HENT:      Head: Normocephalic.      Nose: Nose normal.      Comments: No epistaxis    Eyes:      Pupils: Pupils are equal, round, and reactive to light.   Cardiovascular:      Rate and Rhythm: Normal rate and regular rhythm.   Pulmonary:      Effort: Pulmonary effort is normal. No respiratory distress.      Breath sounds: Normal breath sounds. No wheezing.   Abdominal:      General: Abdomen is flat. Bowel sounds are normal.      Palpations: Abdomen is soft.   Musculoskeletal:         General: Swelling present.      Right lower leg: No edema.      Left lower leg: No edema.   Neurological:      Mental Status: He is alert. Mental status is at baseline.          Additional Data:     Labs:  Results from last 7 days   Lab Units 24  0942 24  0535   WBC Thousand/uL  --  5.10   HEMOGLOBIN g/dL 10.1* 9.3*   HEMATOCRIT % 32.6* 29.6*   PLATELETS Thousands/uL  --  120*   SEGS PCT %  --  81*   LYMPHO PCT %  --  10*   MONO PCT %  --  9   EOS PCT %  --  0     Results from last 7 days   Lab Units 24  0443 24  0435 24  0506   SODIUM mmol/L 144   < > 138   POTASSIUM mmol/L 3.8   < > 4.2   CHLORIDE mmol/L 107   < > 100   CO2 mmol/L 29   < > 30   BUN mg/dL 29*   < > 25   CREATININE mg/dL 2.11*   < > 1.96*   ANION GAP mmol/L 8   < > 8   CALCIUM mg/dL 8.9   < > 8.8   ALBUMIN  g/dL  --   --  3.5   TOTAL BILIRUBIN mg/dL  --   --  1.23*   ALK PHOS U/L  --   --  161*   ALT U/L  --   --  8   AST U/L  --   --  18   GLUCOSE RANDOM mg/dL 88   < > 73    < > = values in this interval not displayed.     Results from last 7 days   Lab Units 06/02/24  1435   INR  1.23*     Results from last 7 days   Lab Units 06/05/24  0439   POC GLUCOSE mg/dl 85         Results from last 7 days   Lab Units 06/07/24  0443 06/02/24  1435   LACTIC ACID mmol/L  --  1.4   PROCALCITONIN ng/ml 0.70* 0.30*       Lines/Drains:  Invasive Devices       Peripheral Intravenous Line  Duration             Peripheral IV 06/06/24 Distal;Left;Ventral (anterior) Forearm 1 day              Drain  Duration             Urethral Catheter Straight-tip 16 Fr. 4 days                  Urinary Catheter:  Goal for removal: Voiding trial when ambulation improves               Imaging: No pertinent imaging reviewed.    Recent Cultures (last 7 days):   Results from last 7 days   Lab Units 06/02/24  1452 06/02/24  1435   BLOOD CULTURE  No Growth After 4 Days. No Growth After 4 Days.       Last 24 Hours Medication List:   Current Facility-Administered Medications   Medication Dose Route Frequency Provider Last Rate    acetaminophen  650 mg Oral Q6H PRN Iris Rivas MD      clonazePAM  0.5 mg Oral HS Iris Rivas MD      Diclofenac Sodium  2 g Topical 4x Daily Iris Rivas MD      lactulose  20 g Oral Daily Iris Rivas MD      lamoTRIgine  200 mg Oral Daily Iris Rivas MD      And    lamoTRIgine  250 mg Oral After Lunch Iris Rivas MD      And    lamoTRIgine  300 mg Oral HS Iris Rivas MD      levOCARNitine  1,000 mg/day Oral 4x Daily (with meals and at bedtime) Iris Rivas MD      metoprolol tartrate  25 mg Oral BID Iris Rivas MD      piperacillin-tazobactam  4.5 g Intravenous Q8H Fede Han Mcintyre MD 4.5 g (06/07/24 0935)    primidone  100 mg Oral QAM Iris Rivas MD      And    primidone  100 mg Oral After Lunch Iris Rivas MD      And     primidone  150 mg Oral HS Iris Rivas MD      pyridoxine  100 mg Oral Daily Iris Rivas MD      sodium chloride  1 Application Nasal 4x Daily (PC & HS) Jonatan Biggs MD      sodium chloride  2 spray Each Nare 4x Daily (PC & HS) Jonatan Biggs MD      spironolactone  100 mg Oral Daily Karla Jaimes MD      zonisamide  100 mg Oral HS Iris Rivas MD          Today, Patient Was Seen By: Karla Jaimes MD    **Please Note: This note may have been constructed using a voice recognition system.**

## 2024-06-07 NOTE — ASSESSMENT & PLAN NOTE
Recurrent aspiration pneumonia.  He had a hospitalization in April 2024 at [facility].  Continue IV Zosyn.  Day 5 of 7 .complete total 7 days of treatment.  Speech therapy evaluated patient and recommended Pureed with nectar thick liquid with crushed meds.

## 2024-06-08 LAB
BASOPHILS # BLD AUTO: 0.01 THOUSANDS/ÂΜL (ref 0–0.1)
BASOPHILS NFR BLD AUTO: 0 % (ref 0–1)
EOSINOPHIL # BLD AUTO: 0.01 THOUSAND/ÂΜL (ref 0–0.61)
EOSINOPHIL NFR BLD AUTO: 0 % (ref 0–6)
ERYTHROCYTE [DISTWIDTH] IN BLOOD BY AUTOMATED COUNT: 16.1 % (ref 11.6–15.1)
HCT VFR BLD AUTO: 30 % (ref 36.5–49.3)
HGB BLD-MCNC: 9.4 G/DL (ref 12–17)
IMM GRANULOCYTES # BLD AUTO: 0.01 THOUSAND/UL (ref 0–0.2)
IMM GRANULOCYTES NFR BLD AUTO: 0 % (ref 0–2)
LYMPHOCYTES # BLD AUTO: 0.56 THOUSANDS/ÂΜL (ref 0.6–4.47)
LYMPHOCYTES NFR BLD AUTO: 9 % (ref 14–44)
MCH RBC QN AUTO: 32.4 PG (ref 26.8–34.3)
MCHC RBC AUTO-ENTMCNC: 31.3 G/DL (ref 31.4–37.4)
MCV RBC AUTO: 103 FL (ref 82–98)
MONOCYTES # BLD AUTO: 0.58 THOUSAND/ÂΜL (ref 0.17–1.22)
MONOCYTES NFR BLD AUTO: 10 % (ref 4–12)
NEUTROPHILS # BLD AUTO: 4.88 THOUSANDS/ÂΜL (ref 1.85–7.62)
NEUTS SEG NFR BLD AUTO: 81 % (ref 43–75)
NRBC BLD AUTO-RTO: 0 /100 WBCS
PLATELET # BLD AUTO: 127 THOUSANDS/UL (ref 149–390)
PMV BLD AUTO: 11.2 FL (ref 8.9–12.7)
RBC # BLD AUTO: 2.9 MILLION/UL (ref 3.88–5.62)
WBC # BLD AUTO: 6.05 THOUSAND/UL (ref 4.31–10.16)

## 2024-06-08 PROCEDURE — 85025 COMPLETE CBC W/AUTO DIFF WBC: CPT | Performed by: INTERNAL MEDICINE

## 2024-06-08 PROCEDURE — 99232 SBSQ HOSP IP/OBS MODERATE 35: CPT | Performed by: INTERNAL MEDICINE

## 2024-06-08 RX ORDER — TORSEMIDE 20 MG/1
40 TABLET ORAL DAILY
Status: DISCONTINUED | OUTPATIENT
Start: 2024-06-08 | End: 2024-06-08

## 2024-06-08 RX ADMIN — LAMOTRIGINE 200 MG: 100 TABLET ORAL at 08:39

## 2024-06-08 RX ADMIN — PRIMIDONE 100 MG: 50 TABLET ORAL at 08:39

## 2024-06-08 RX ADMIN — PIPERACILLIN AND TAZOBACTAM 4.5 G: 36; 4.5 INJECTION, POWDER, FOR SOLUTION INTRAVENOUS at 01:11

## 2024-06-08 RX ADMIN — SODIUM CHLORIDE 1000 ML: 0.9 INJECTION, SOLUTION INTRAVENOUS at 01:28

## 2024-06-08 RX ADMIN — LAMOTRIGINE 250 MG: 100 TABLET ORAL at 13:54

## 2024-06-08 RX ADMIN — Medication 2 G: at 22:00

## 2024-06-08 RX ADMIN — ZONISAMIDE 100 MG: 100 CAPSULE ORAL at 22:00

## 2024-06-08 RX ADMIN — Medication 1 APPLICATION: at 11:50

## 2024-06-08 RX ADMIN — Medication 2 G: at 17:46

## 2024-06-08 RX ADMIN — METOPROLOL TARTRATE 25 MG: 25 TABLET, FILM COATED ORAL at 17:45

## 2024-06-08 RX ADMIN — PRIMIDONE 100 MG: 50 TABLET ORAL at 13:55

## 2024-06-08 RX ADMIN — SALINE NASAL SPRAY 2 SPRAY: 1.5 SOLUTION NASAL at 08:40

## 2024-06-08 RX ADMIN — PIPERACILLIN AND TAZOBACTAM 4.5 G: 36; 4.5 INJECTION, POWDER, FOR SOLUTION INTRAVENOUS at 17:45

## 2024-06-08 RX ADMIN — SODIUM CHLORIDE 500 ML: 0.9 INJECTION, SOLUTION INTRAVENOUS at 00:00

## 2024-06-08 RX ADMIN — SALINE NASAL SPRAY 2 SPRAY: 1.5 SOLUTION NASAL at 11:50

## 2024-06-08 RX ADMIN — PIPERACILLIN AND TAZOBACTAM 4.5 G: 36; 4.5 INJECTION, POWDER, FOR SOLUTION INTRAVENOUS at 08:46

## 2024-06-08 RX ADMIN — PYRIDOXINE HCL TAB 50 MG 100 MG: 50 TAB at 08:39

## 2024-06-08 RX ADMIN — SPIRONOLACTONE 100 MG: 100 TABLET, FILM COATED ORAL at 08:40

## 2024-06-08 RX ADMIN — LAMOTRIGINE 300 MG: 100 TABLET ORAL at 22:00

## 2024-06-08 RX ADMIN — Medication 1 APPLICATION: at 08:40

## 2024-06-08 RX ADMIN — Medication 2 G: at 11:50

## 2024-06-08 RX ADMIN — METOPROLOL TARTRATE 25 MG: 25 TABLET, FILM COATED ORAL at 08:39

## 2024-06-08 RX ADMIN — LEVOCARNITINE 250 MG: 1 SOLUTION ORAL at 11:51

## 2024-06-08 RX ADMIN — PRIMIDONE 150 MG: 50 TABLET ORAL at 21:59

## 2024-06-08 RX ADMIN — Medication 2 G: at 08:40

## 2024-06-08 RX ADMIN — SALINE NASAL SPRAY 2 SPRAY: 1.5 SOLUTION NASAL at 17:46

## 2024-06-08 RX ADMIN — SODIUM CHLORIDE 1000 ML: 0.9 INJECTION, SOLUTION INTRAVENOUS at 20:32

## 2024-06-08 RX ADMIN — Medication 1 APPLICATION: at 17:46

## 2024-06-08 RX ADMIN — CLONAZEPAM 0.5 MG: 0.5 TABLET ORAL at 21:59

## 2024-06-08 RX ADMIN — LEVOCARNITINE 250 MG: 1 SOLUTION ORAL at 08:39

## 2024-06-08 RX ADMIN — LEVOCARNITINE 250 MG: 1 SOLUTION ORAL at 17:47

## 2024-06-08 RX ADMIN — LEVOCARNITINE 250 MG: 1 SOLUTION ORAL at 22:00

## 2024-06-08 NOTE — PLAN OF CARE
Problem: PAIN - ADULT  Goal: Verbalizes/displays adequate comfort level or baseline comfort level  Description: Interventions:  - Encourage patient to monitor pain and request assistance  - Assess pain using appropriate pain scale  - Administer analgesics based on type and severity of pain and evaluate response  - Implement non-pharmacological measures as appropriate and evaluate response  - Consider cultural and social influences on pain and pain management  - Notify physician/advanced practitioner if interventions unsuccessful or patient reports new pain  Outcome: Progressing     Problem: INFECTION - ADULT  Goal: Absence or prevention of progression during hospitalization  Description: INTERVENTIONS:  - Assess and monitor for signs and symptoms of infection  - Monitor lab/diagnostic results  - Monitor all insertion sites, i.e. indwelling lines, tubes, and drains  - Monitor endotracheal if appropriate and nasal secretions for changes in amount and color  - [employer] appropriate cooling/warming therapies per order  - Administer medications as ordered  - Instruct and encourage patient and family to use good hand hygiene technique  - Identify and instruct in appropriate isolation precautions for identified infection/condition  Outcome: Progressing     Problem: Knowledge Deficit  Goal: Patient/family/caregiver demonstrates understanding of disease process, treatment plan, medications, and discharge instructions  Description: Complete learning assessment and assess knowledge base.  Interventions:  - Provide teaching at level of understanding  - Provide teaching via preferred learning methods  Outcome: Progressing     Problem: DISCHARGE PLANNING  Goal: Discharge to home or other facility with appropriate resources  Description: INTERVENTIONS:  - Identify barriers to discharge w/patient and caregiver  - Arrange for needed discharge resources and transportation as appropriate  - Identify discharge learning needs (meds,  wound care, etc.)  - Arrange for interpretive services to assist at discharge as needed  - Refer to Case Management Department for coordinating discharge planning if the patient needs post-hospital services based on physician/advanced practitioner order or complex needs related to functional status, cognitive ability, or social support system  Outcome: Progressing     Problem: GENITOURINARY - ADULT  Goal: Maintains or returns to baseline urinary function  Description: INTERVENTIONS:  - Assess urinary function  - Encourage oral fluids to ensure adequate hydration if ordered  - Administer IV fluids as ordered to ensure adequate hydration  - Administer ordered medications as needed  - Offer frequent toileting  - Follow urinary retention protocol if ordered  Outcome: Progressing     Problem: RESPIRATORY - ADULT  Goal: Achieves optimal ventilation and oxygenation  Description: INTERVENTIONS:  - Assess for changes in respiratory status  - Assess for changes in mentation and behavior  - Position to facilitate oxygenation and minimize respiratory effort  - Oxygen administered by appropriate delivery if ordered  - Initiate smoking cessation education as indicated  - Encourage broncho-pulmonary hygiene including cough, deep breathe, Incentive Spirometry  - Assess the need for suctioning and aspirate as needed  - Assess and instruct to report SOB or any respiratory difficulty  - Respiratory Therapy support as indicated  Outcome: Progressing     Problem: NEUROSENSORY - ADULT  Goal: Achieves stable or improved neurological status  Description: INTERVENTIONS  - Monitor and report changes in neurological status  - Monitor vital signs such as temperature, blood pressure, glucose, and any other labs ordered   - Initiate measures to prevent increased intracranial pressure  - Monitor for seizure activity and implement precautions if appropriate      Outcome: Progressing     Problem: HEMATOLOGIC - ADULT  Goal: Maintains hematologic  stability  Description: INTERVENTIONS  - Assess for signs and symptoms of bleeding or hemorrhage  - Monitor labs  - Administer supportive blood products/factors as ordered and appropriate  Outcome: Progressing

## 2024-06-08 NOTE — NURSING NOTE
Per Dr. Jaimes, sr catheter placed on 6/2 for urinary retention and can removed. Also made aware urine is brown and foul smelling.

## 2024-06-08 NOTE — PLAN OF CARE
Problem: Potential for Falls  Goal: Patient will remain free of falls  Description: INTERVENTIONS:  - Educate patient/family on patient safety including physical limitations  - Instruct patient to call for assistance with activity   - Consult OT/PT to assist with strengthening/mobility   - Keep Call bell within reach  - Keep bed low and locked with side rails adjusted as appropriate  - Keep care items and personal belongings within reach  - Initiate and maintain comfort rounds  - Make Fall Risk Sign visible to staff  - Offer Toileting every 2 Hours, in advance of need  - Initiate/Maintain bed alarm  - Obtain necessary fall risk management equipment  - Apply yellow socks and bracelet for high fall risk patients  - Consider moving patient to room near nurses station  Outcome: Progressing     Problem: PAIN - ADULT  Goal: Verbalizes/displays adequate comfort level or baseline comfort level  Description: Interventions:  - Encourage patient to monitor pain and request assistance  - Assess pain using appropriate pain scale  - Administer analgesics based on type and severity of pain and evaluate response  - Implement non-pharmacological measures as appropriate and evaluate response  - Consider cultural and social influences on pain and pain management  - Notify physician/advanced practitioner if interventions unsuccessful or patient reports new pain  Outcome: Progressing     Problem: INFECTION - ADULT  Goal: Absence or prevention of progression during hospitalization  Description: INTERVENTIONS:  - Assess and monitor for signs and symptoms of infection  - Monitor lab/diagnostic results  - Monitor all insertion sites, i.e. indwelling lines, tubes, and drains  - Monitor endotracheal if appropriate and nasal secretions for changes in amount and color  - [place] appropriate cooling/warming therapies per order  - Administer medications as ordered  - Instruct and encourage patient and family to use good hand hygiene  technique  - Identify and instruct in appropriate isolation precautions for identified infection/condition  Outcome: Progressing  Goal: Absence of fever/infection during neutropenic period  Description: INTERVENTIONS:  - Monitor WBC    Outcome: Progressing     Problem: SAFETY ADULT  Goal: Patient will remain free of falls  Description: INTERVENTIONS:  - Educate patient/family on patient safety including physical limitations  - Instruct patient to call for assistance with activity   - Consult OT/PT to assist with strengthening/mobility   - Keep Call bell within reach  - Keep bed low and locked with side rails adjusted as appropriate  - Keep care items and personal belongings within reach  - Initiate and maintain comfort rounds  - Make Fall Risk Sign visible to staff  - Offer Toileting every 2 Hours, in advance of need  - Initiate/Maintain bed alarm  - Obtain necessary fall risk management equipment  - Apply yellow socks and bracelet for high fall risk patients  - Consider moving patient to room near nurses station  Outcome: Progressing  Goal: Maintain or return to baseline ADL function  Description: INTERVENTIONS:  -  Assess patient's ability to carry out ADLs; assess patient's baseline for ADL function and identify physical deficits which impact ability to perform ADLs (bathing, care of mouth/teeth, toileting, grooming, dressing, etc.)  - Assess/evaluate cause of self-care deficits   - Assess range of motion  - Assess patient's mobility; develop plan if impaired  - Assess patient's need for assistive devices and provide as appropriate  - Encourage maximum independence but intervene and supervise when necessary  - Involve family in performance of ADLs  - Assess for home care needs following discharge   - Consider OT consult to assist with ADL evaluation and planning for discharge  - Provide patient education as appropriate  Outcome: Progressing  Goal: Maintains/Returns to pre admission functional level  Description:  INTERVENTIONS:  - Perform AM-PAC 6 Click Basic Mobility/ Daily Activity assessment daily.  - Set and communicate daily mobility goal to care team and patient/family/caregiver.   - Collaborate with rehabilitation services on mobility goals if consulted  - Reposition patient every 2 hours.  - Out of bed to chair 3 times a day   - Out of bed for meals 3 times a day  - Out of bed for toileting  - Record patient progress and toleration of activity level   Outcome: Progressing     Problem: DISCHARGE PLANNING  Goal: Discharge to home or other facility with appropriate resources  Description: INTERVENTIONS:  - Identify barriers to discharge w/patient and caregiver  - Arrange for needed discharge resources and transportation as appropriate  - Identify discharge learning needs (meds, wound care, etc.)  - Arrange for interpretive services to assist at discharge as needed  - Refer to Case Management Department for coordinating discharge planning if the patient needs post-hospital services based on physician/advanced practitioner order or complex needs related to functional status, cognitive ability, or social support system  Outcome: Progressing     Problem: Knowledge Deficit  Goal: Patient/family/caregiver demonstrates understanding of disease process, treatment plan, medications, and discharge instructions  Description: Complete learning assessment and assess knowledge base.  Interventions:  - Provide teaching at level of understanding  - Provide teaching via preferred learning methods  Outcome: Progressing     Problem: Prexisting or High Potential for Compromised Skin Integrity  Goal: Skin integrity is maintained or improved  Description: INTERVENTIONS:  - Identify patients at risk for skin breakdown  - Assess and monitor skin integrity  - Assess and monitor nutrition and hydration status  - Monitor labs   - Assess for incontinence   - Turn and reposition patient  - Assist with mobility/ambulation  - Relieve pressure over  bony prominences  - Avoid friction and shearing  - Provide appropriate hygiene as needed including keeping skin clean and dry  - Evaluate need for skin moisturizer/barrier cream  - Collaborate with interdisciplinary team   - Patient/family teaching  - Consider wound care consult   Outcome: Progressing     Problem: NEUROSENSORY - ADULT  Goal: Achieves stable or improved neurological status  Description: INTERVENTIONS  - Monitor and report changes in neurological status  - Monitor vital signs such as temperature, blood pressure, glucose, and any other labs ordered   - Initiate measures to prevent increased intracranial pressure  - Monitor for seizure activity and implement precautions if appropriate      Outcome: Progressing  Goal: Remains free of injury related to seizures activity  Description: INTERVENTIONS  - Maintain airway, patient safety  and administer oxygen as ordered  - Monitor patient for seizure activity, document and report duration and description of seizure to physician/advanced practitioner  - If seizure occurs,  ensure patient safety during seizure  - Reorient patient post seizure  - Seizure pads on all 4 side rails  - Instruct patient/family to notify RN of any seizure activity including if an aura is experienced  - Instruct patient/family to call for assistance with activity based on nursing assessment  - Administer anti-seizure medications if ordered    Outcome: Progressing  Goal: Achieves maximal functionality and self care  Description: INTERVENTIONS  - Monitor swallowing and airway patency with patient fatigue and changes in neurological status  - Encourage and assist patient to increase activity and self care.   - Encourage visually impaired, hearing impaired and aphasic patients to use assistive/communication devices  Outcome: Progressing     Problem: RESPIRATORY - ADULT  Goal: Achieves optimal ventilation and oxygenation  Description: INTERVENTIONS:  - Assess for changes in respiratory  status  - Assess for changes in mentation and behavior  - Position to facilitate oxygenation and minimize respiratory effort  - Oxygen administered by appropriate delivery if ordered  - Initiate smoking cessation education as indicated  - Encourage broncho-pulmonary hygiene including cough, deep breathe, Incentive Spirometry  - Assess the need for suctioning and aspirate as needed  - Assess and instruct to report SOB or any respiratory difficulty  - Respiratory Therapy support as indicated  Outcome: Progressing     Problem: GENITOURINARY - ADULT  Goal: Maintains or returns to baseline urinary function  Description: INTERVENTIONS:  - Assess urinary function  - Encourage oral fluids to ensure adequate hydration if ordered  - Administer IV fluids as ordered to ensure adequate hydration  - Administer ordered medications as needed  - Offer frequent toileting  - Follow urinary retention protocol if ordered  Outcome: Progressing  Goal: Absence of urinary retention  Description: INTERVENTIONS:  - Assess patient’s ability to void and empty bladder  - Monitor I/O  - Bladder scan as needed  - Discuss with physician/AP medications to alleviate retention as needed  - Discuss catheterization for long term situations as appropriate  Outcome: Progressing  Goal: Urinary catheter remains patent  Description: INTERVENTIONS:  - Assess patency of urinary catheter  - If patient has a chronic sr, consider changing catheter if non-functioning  - Follow guidelines for intermittent irrigation of non-functioning urinary catheter  Outcome: Progressing     Problem: HEMATOLOGIC - ADULT  Goal: Maintains hematologic stability  Description: INTERVENTIONS  - Assess for signs and symptoms of bleeding or hemorrhage  - Monitor labs  - Administer supportive blood products/factors as ordered and appropriate  Outcome: Progressing

## 2024-06-09 LAB
ANION GAP SERPL CALCULATED.3IONS-SCNC: 6 MMOL/L (ref 4–13)
BASOPHILS # BLD AUTO: 0.01 THOUSANDS/ÂΜL (ref 0–0.1)
BASOPHILS NFR BLD AUTO: 0 % (ref 0–1)
BUN SERPL-MCNC: 35 MG/DL (ref 5–25)
CALCIUM SERPL-MCNC: 8.4 MG/DL (ref 8.4–10.2)
CHLORIDE SERPL-SCNC: 107 MMOL/L (ref 96–108)
CO2 SERPL-SCNC: 28 MMOL/L (ref 21–32)
CREAT SERPL-MCNC: 2.41 MG/DL (ref 0.6–1.3)
EOSINOPHIL # BLD AUTO: 0.01 THOUSAND/ÂΜL (ref 0–0.61)
EOSINOPHIL NFR BLD AUTO: 0 % (ref 0–6)
ERYTHROCYTE [DISTWIDTH] IN BLOOD BY AUTOMATED COUNT: 16.1 % (ref 11.6–15.1)
GFR SERPL CREATININE-BSD FRML MDRD: 28 ML/MIN/1.73SQ M
GLUCOSE SERPL-MCNC: 92 MG/DL (ref 65–140)
HCT VFR BLD AUTO: 30.9 % (ref 36.5–49.3)
HGB BLD-MCNC: 9.7 G/DL (ref 12–17)
IMM GRANULOCYTES # BLD AUTO: 0.01 THOUSAND/UL (ref 0–0.2)
IMM GRANULOCYTES NFR BLD AUTO: 0 % (ref 0–2)
LDH SERPL-CCNC: 149 U/L (ref 140–271)
LYMPHOCYTES # BLD AUTO: 0.54 THOUSANDS/ÂΜL (ref 0.6–4.47)
LYMPHOCYTES NFR BLD AUTO: 9 % (ref 14–44)
MCH RBC QN AUTO: 32.2 PG (ref 26.8–34.3)
MCHC RBC AUTO-ENTMCNC: 31.4 G/DL (ref 31.4–37.4)
MCV RBC AUTO: 103 FL (ref 82–98)
MONOCYTES # BLD AUTO: 0.58 THOUSAND/ÂΜL (ref 0.17–1.22)
MONOCYTES NFR BLD AUTO: 9 % (ref 4–12)
NEUTROPHILS # BLD AUTO: 5.18 THOUSANDS/ÂΜL (ref 1.85–7.62)
NEUTS SEG NFR BLD AUTO: 82 % (ref 43–75)
NRBC BLD AUTO-RTO: 0 /100 WBCS
PLATELET # BLD AUTO: 131 THOUSANDS/UL (ref 149–390)
PMV BLD AUTO: 11.1 FL (ref 8.9–12.7)
POTASSIUM SERPL-SCNC: 3.9 MMOL/L (ref 3.5–5.3)
RBC # BLD AUTO: 3.01 MILLION/UL (ref 3.88–5.62)
SODIUM SERPL-SCNC: 141 MMOL/L (ref 135–147)
WBC # BLD AUTO: 6.33 THOUSAND/UL (ref 4.31–10.16)

## 2024-06-09 PROCEDURE — 80048 BASIC METABOLIC PNL TOTAL CA: CPT | Performed by: INTERNAL MEDICINE

## 2024-06-09 PROCEDURE — 99232 SBSQ HOSP IP/OBS MODERATE 35: CPT | Performed by: FAMILY MEDICINE

## 2024-06-09 PROCEDURE — 85025 COMPLETE CBC W/AUTO DIFF WBC: CPT | Performed by: INTERNAL MEDICINE

## 2024-06-09 PROCEDURE — 83615 LACTATE (LD) (LDH) ENZYME: CPT | Performed by: FAMILY MEDICINE

## 2024-06-09 RX ORDER — FUROSEMIDE 10 MG/ML
20 INJECTION INTRAMUSCULAR; INTRAVENOUS ONCE
Status: COMPLETED | OUTPATIENT
Start: 2024-06-09 | End: 2024-06-09

## 2024-06-09 RX ADMIN — PRIMIDONE 100 MG: 50 TABLET ORAL at 08:43

## 2024-06-09 RX ADMIN — Medication 2 G: at 21:32

## 2024-06-09 RX ADMIN — Medication 1 APPLICATION: at 08:46

## 2024-06-09 RX ADMIN — SALINE NASAL SPRAY 2 SPRAY: 1.5 SOLUTION NASAL at 21:32

## 2024-06-09 RX ADMIN — PRIMIDONE 100 MG: 50 TABLET ORAL at 14:23

## 2024-06-09 RX ADMIN — SALINE NASAL SPRAY 2 SPRAY: 1.5 SOLUTION NASAL at 17:06

## 2024-06-09 RX ADMIN — LEVOCARNITINE 250 MG: 1 SOLUTION ORAL at 21:32

## 2024-06-09 RX ADMIN — PIPERACILLIN AND TAZOBACTAM 4.5 G: 36; 4.5 INJECTION, POWDER, FOR SOLUTION INTRAVENOUS at 00:07

## 2024-06-09 RX ADMIN — METOPROLOL TARTRATE 25 MG: 25 TABLET, FILM COATED ORAL at 08:43

## 2024-06-09 RX ADMIN — PIPERACILLIN AND TAZOBACTAM 4.5 G: 36; 4.5 INJECTION, POWDER, FOR SOLUTION INTRAVENOUS at 17:29

## 2024-06-09 RX ADMIN — LEVOCARNITINE 250 MG: 1 SOLUTION ORAL at 17:29

## 2024-06-09 RX ADMIN — LAMOTRIGINE 200 MG: 100 TABLET ORAL at 08:43

## 2024-06-09 RX ADMIN — Medication 1 APPLICATION: at 21:32

## 2024-06-09 RX ADMIN — LAMOTRIGINE 300 MG: 100 TABLET ORAL at 21:31

## 2024-06-09 RX ADMIN — FUROSEMIDE 20 MG: 10 INJECTION, SOLUTION INTRAMUSCULAR; INTRAVENOUS at 08:41

## 2024-06-09 RX ADMIN — SALINE NASAL SPRAY 2 SPRAY: 1.5 SOLUTION NASAL at 08:46

## 2024-06-09 RX ADMIN — PYRIDOXINE HCL TAB 50 MG 100 MG: 50 TAB at 08:43

## 2024-06-09 RX ADMIN — LEVOCARNITINE 250 MG: 1 SOLUTION ORAL at 08:48

## 2024-06-09 RX ADMIN — Medication 2 G: at 11:49

## 2024-06-09 RX ADMIN — Medication 2 G: at 08:48

## 2024-06-09 RX ADMIN — METOPROLOL TARTRATE 25 MG: 25 TABLET, FILM COATED ORAL at 17:29

## 2024-06-09 RX ADMIN — Medication 1 APPLICATION: at 11:49

## 2024-06-09 RX ADMIN — CLONAZEPAM 0.5 MG: 0.5 TABLET ORAL at 21:31

## 2024-06-09 RX ADMIN — SPIRONOLACTONE 100 MG: 100 TABLET, FILM COATED ORAL at 08:44

## 2024-06-09 RX ADMIN — LEVOCARNITINE 250 MG: 1 SOLUTION ORAL at 12:14

## 2024-06-09 RX ADMIN — LAMOTRIGINE 250 MG: 100 TABLET ORAL at 14:23

## 2024-06-09 RX ADMIN — Medication 2 G: at 17:32

## 2024-06-09 RX ADMIN — ZONISAMIDE 100 MG: 100 CAPSULE ORAL at 21:31

## 2024-06-09 RX ADMIN — Medication 1 APPLICATION: at 17:06

## 2024-06-09 RX ADMIN — SALINE NASAL SPRAY 2 SPRAY: 1.5 SOLUTION NASAL at 11:49

## 2024-06-09 RX ADMIN — PIPERACILLIN AND TAZOBACTAM 4.5 G: 36; 4.5 INJECTION, POWDER, FOR SOLUTION INTRAVENOUS at 23:46

## 2024-06-09 RX ADMIN — PRIMIDONE 150 MG: 50 TABLET ORAL at 21:31

## 2024-06-09 RX ADMIN — PIPERACILLIN AND TAZOBACTAM 4.5 G: 36; 4.5 INJECTION, POWDER, FOR SOLUTION INTRAVENOUS at 08:55

## 2024-06-09 NOTE — ASSESSMENT & PLAN NOTE
Patient with bilateral pleural effusions, received 80 mg of Lasix IV in the emergency room.  At home he takes torsemide and Aldactone.  Initially started on Lasix 40 mg IV twice daily.  Has had good urine output with -2.4 L in the last 24 hours.  Had subsequent hypotension and elevation in creatinine.  Diuretics on hold since 6/5.  Continue to hold furosemide for now.  Will resume Aldactone if blood pressure tolerates.  Continue to monitor cardiorespiratory status and renal functions closely..  Of note patient is s/p bilateral thoracentesis with 425 mL and 975 mL.  Currently continues to be euvolemic.  Continue to monitor off diuretics.  Give 1 dose of Lasix on 6 /7 and repeat again on 6/9.  Repeat chest x-ray shows persistent large right-sided effusion.  Will need thoracentesis again.  IR will be consulted for it on 6/10.  Wt Readings from Last 3 Encounters:   06/02/24 93.9 kg (207 lb)   01/30/24 79.3 kg (174 lb 13.2 oz)   12/08/23 106 kg (234 lb 9.1 oz)

## 2024-06-09 NOTE — ASSESSMENT & PLAN NOTE
Moderately large right and moderate size left pleural effusion.  On previous admission in April 2024, he had a right-sided thoracentesis which was transudative.  S/p bilateral thoracentesis on 6/4.  Respiratory status has remained stable subsequently.  However repeat x-ray still shows large right-sided effusion.  Patient is hemodynamically stable with no increased oxygen requirement.  Can get repeat thoracentesis on 6/10with IR.  Repeat lasix again

## 2024-06-09 NOTE — ASSESSMENT & PLAN NOTE
Had a detailed discussion with mom (Kylie) and we discussed that the patient is not doing well.  He has not been doing well since the beginning of this year and has declined since admission to the hospital in April 2024 at [facility] for aspiration pneumonia.  Patient continues to choke on thickened liquids at home.  He is now bedbound.  He continues to decline.  During his hospital course I discussed with that he has recurrent aspiration pneumonia, encephalopathic, bilateral pleural effusions and he seems to be getting worse.  She she states that he continues to decline and not do well.  She states that she has a hard time taking care of him.  Patient has been at 5 nursing homes and she states that they do not give good care to him.    We discussed CODE STATUS: Discussed that the likelihood of him recovering from a cardiac arrest is very poor.  Kylie was a nurse, and states that she did many codes.  She would like to make him a DNR noting that he would not do well from CPR, chest compressions or intubation.    We discussed that if the patient continues to have chronic aspiration pneumonia, and does not pass a swallow screen which he want a feeding tube.  She currently does not know.  We discussed about wishes and about quality measures.  I did briefly touched upon hospice and just making the patient comfortable if she did not want to take aggressive measures.  She does want to give him a chance to turn around but she knows that he may not do well.    Her son Patricio later joined her.  I answered more of his questions including about pleural effusion, aspiration pneumonia.  There is a bit of a poor understanding.  He did ask me that for the calcification of the pericardium if they would remove that.  With the patient's current medical condition I stated that most likely that would not happen and that is a question for CT surgery.  Continue with current level of care.  Continue to monitor symptoms closely.

## 2024-06-09 NOTE — PROGRESS NOTES
[facility]  Progress Note  Name: Ash Loaiza I  MRN: [id number]  Unit/Bed#: -01 I Date of Admission: 6/2/2024   Date of Service: 6/9/2024 I Hospital Day: 7    Assessment & Plan   Acute respiratory failure with hypoxia (HCC)  Assessment & Plan  Multifactorial secondary to heart failure, pleural effusions recurrent aspiration pneumonia.  Continue with oxygen.  Because of recurrent episode of epistaxis, ENT was consulted and recommended using humidified oxygen via facemask  Currently on room air.    Poor prognosis  Assessment & Plan  Had a detailed discussion with mom (Kylie) and we discussed that the patient is not doing well.  He has not been doing well since the beginning of this year and has declined since admission to the hospital in April 2024 at [facility] for aspiration pneumonia.  Patient continues to choke on thickened liquids at home.  He is now bedbound.  He continues to decline.  During his hospital course I discussed with that he has recurrent aspiration pneumonia, encephalopathic, bilateral pleural effusions and he seems to be getting worse.  She she states that he continues to decline and not do well.  She states that she has a hard time taking care of him.  Patient has been at 5 nursing homes and she states that they do not give good care to him.    We discussed CODE STATUS: Discussed that the likelihood of him recovering from a cardiac arrest is very poor.  Kylie was a nurse, and states that she did many codes.  She would like to make him a DNR noting that he would not do well from CPR, chest compressions or intubation.    We discussed that if the patient continues to have chronic aspiration pneumonia, and does not pass a swallow screen which he want a feeding tube.  She currently does not know.  We discussed about wishes and about quality measures.  I did briefly touched upon hospice and just making the patient comfortable if she did not want to take aggressive measures.   She does want to give him a chance to turn around but she knows that he may not do well.    Her son Patricio later joined her.  I answered more of his questions including about pleural effusion, aspiration pneumonia.  There is a bit of a poor understanding.  He did ask me that for the calcification of the pericardium if they would remove that.  With the patient's current medical condition I stated that most likely that would not happen and that is a question for CT surgery.  Continue with current level of care.  Continue to monitor symptoms closely.    Encephalopathy  Assessment & Plan  Patient with developmental delay, but this is most likely secondary to his acute illness including pleural effusions, recurrent aspiration pneumonia.  Mentation appears to be at baseline currently.    Stage 3b chronic kidney disease (HCC)  Assessment & Plan  Patient's creatinine slightly elevated than baseline.  Baseline appears to be up to 1.7.  Elevated creatinine to 2.4 resume Aldactone.  prn iv lasix diuretics.  . repeat BMP in AM. Home torsemide on hold    Lab Results   Component Value Date    EGFR 28 06/09/2024    EGFR 33 06/07/2024    EGFR 33 06/06/2024    CREATININE 2.41 (H) 06/09/2024    CREATININE 2.11 (H) 06/07/2024    CREATININE 2.14 (H) 06/06/2024       Acute on chronic heart failure with preserved ejection fraction (HCC)  Assessment & Plan  Patient with bilateral pleural effusions, received 80 mg of Lasix IV in the emergency room.  At home he takes torsemide and Aldactone.  Initially started on Lasix 40 mg IV twice daily.  Has had good urine output with -2.4 L in the last 24 hours.  Had subsequent hypotension and elevation in creatinine.  Diuretics on hold since 6/5.  Continue to hold furosemide for now.  Will resume Aldactone if blood pressure tolerates.  Continue to monitor cardiorespiratory status and renal functions closely..  Of note patient is s/p bilateral thoracentesis with 425 mL and 975 mL.  Currently continues  to be euvolemic.  Continue to monitor off diuretics.  Give 1 dose of Lasix on 6 /7 and repeat again on 6/9.  Repeat chest x-ray shows persistent large right-sided effusion.  Will need thoracentesis again.  IR will be consulted for it on 6/10.  Wt Readings from Last 3 Encounters:   06/02/24 93.9 kg (207 lb)   01/30/24 79.3 kg (174 lb 13.2 oz)   12/08/23 106 kg (234 lb 9.1 oz)         Bilateral pleural effusion  Assessment & Plan  Moderately large right and moderate size left pleural effusion.  On previous admission in April 2024, he had a right-sided thoracentesis which was transudative.  S/p bilateral thoracentesis on 6/4.  Respiratory status has remained stable subsequently.  However repeat x-ray still shows large right-sided effusion.  Patient is hemodynamically stable with no increased oxygen requirement.  Can get repeat thoracentesis on 6/10with IR.  Repeat lasix again    Dysphagia  Assessment & Plan  At baseline on pureed diet.   Speech evaluated patient and recs pureed with nectar thick, crushed meds  Continue with ongoing speech follow-up and maintain strict aspiration precautions.  Of epistaxis yesterday after eating.  Maintain strict aspiration precautions.  Continue with close supervision with food.    Developmental delay  Assessment & Plan  Since birth, patient is adopted.  It is unknown why he has the mental delay if it is due to anoxic brain injury or cerebral palsy.    * Pneumonia of both lungs due to infectious organism  Assessment & Plan  Recurrent aspiration pneumonia.  He had a hospitalization in April 2024 at [facility].  Continue IV Zosyn.  Day 6 of 7 .complete total 7 days of treatment.  Speech therapy evaluated patient and recommended Pureed with nectar thick liquid with crushed meds.                VTE Pharmacologic Prophylaxis: VTE Score: 6 Moderate Risk (Score 3-4) - Pharmacological DVT Prophylaxis Contraindicated. Sequential Compression Devices Ordered.    Mobility:    Basic Mobility Inpatient Raw Score: 10  JH-HLM Goal: 4: Move to chair/commode  JH-HLM Achieved: 2: Bed activities/Dependent transfer  JH-HLM Goal achieved. Continue to encourage appropriate mobility.    Patient Centered Rounds: I performed bedside rounds with nursing staff today.   Discussions with Specialists or Other Care Team Provider:     Education and Discussions with Family / Patient:     Total Time Spent on Date of Encounter in care of patient: 35 mins. This time was spent on one or more of the following: performing physical exam; counseling and coordination of care; obtaining or reviewing history; documenting in the medical record; reviewing/ordering tests, medications or procedures; communicating with other healthcare professionals and discussing with patient's family/caregivers.    Current Length of Stay: 7 day(s)  Current Patient Status: Inpatient   Certification Statement: The patient will continue to require additional inpatient hospital stay due to recurrent pleural effusion  Discharge Plan: Anticipate discharge in 24-48 hrs to rehab facility.    Code Status: Level 3 - DNAR and DNI    Subjective:   Denies any complaints.  No fevers or chills reported today.    Objective:     Vitals:   Temp (24hrs), Av.7 °F (37.1 °C), Min:98.5 °F (36.9 °C), Max:99 °F (37.2 °C)    Temp:  [98.5 °F (36.9 °C)-99 °F (37.2 °C)] 98.5 °F (36.9 °C)  HR:  [75-78] 78  Resp:  [18] 18  BP: (105-130)/(63-80) 130/80  SpO2:  [92 %-93 %] 92 %  Body mass index is 33.41 kg/m².     Input and Output Summary (last 24 hours):     Intake/Output Summary (Last 24 hours) at 2024 0834  Last data filed at 2024 2207  Gross per 24 hour   Intake 2320 ml   Output 200 ml   Net 2120 ml       Physical Exam:   Physical Exam  Vitals and nursing note reviewed.   Constitutional:       Appearance: He is ill-appearing.   HENT:      Head: Normocephalic and atraumatic.      Right Ear: External ear normal.      Left Ear: External ear normal.       Nose: Nose normal.      Mouth/Throat:      Pharynx: Oropharynx is clear.   Eyes:      Pupils: Pupils are equal, round, and reactive to light.   Cardiovascular:      Rate and Rhythm: Normal rate and regular rhythm.      Heart sounds: Normal heart sounds.   Pulmonary:      Effort: Pulmonary effort is normal.      Breath sounds: Rhonchi present.   Abdominal:      General: Bowel sounds are normal.      Palpations: Abdomen is soft.      Tenderness: There is no abdominal tenderness.   Musculoskeletal:         General: Normal range of motion.      Cervical back: Normal range of motion and neck supple.      Right lower leg: No edema.      Left lower leg: No edema.   Skin:     General: Skin is warm and dry.      Capillary Refill: Capillary refill takes less than 2 seconds.   Neurological:      Mental Status: He is alert. Mental status is at baseline.   Psychiatric:         Mood and Affect: Mood normal.            Additional Data:     Labs:  Results from last 7 days   Lab Units 06/09/24  0458   WBC Thousand/uL 6.33   HEMOGLOBIN g/dL 9.7*   HEMATOCRIT % 30.9*   PLATELETS Thousands/uL 131*   SEGS PCT % 82*   LYMPHO PCT % 9*   MONO PCT % 9   EOS PCT % 0     Results from last 7 days   Lab Units 06/09/24  0458 06/04/24  0435 06/03/24  0506   SODIUM mmol/L 141   < > 138   POTASSIUM mmol/L 3.9   < > 4.2   CHLORIDE mmol/L 107   < > 100   CO2 mmol/L 28   < > 30   BUN mg/dL 35*   < > 25   CREATININE mg/dL 2.41*   < > 1.96*   ANION GAP mmol/L 6   < > 8   CALCIUM mg/dL 8.4   < > 8.8   ALBUMIN g/dL  --   --  3.5   TOTAL BILIRUBIN mg/dL  --   --  1.23*   ALK PHOS U/L  --   --  161*   ALT U/L  --   --  8   AST U/L  --   --  18   GLUCOSE RANDOM mg/dL 92   < > 73    < > = values in this interval not displayed.     Results from last 7 days   Lab Units 06/02/24  1435   INR  1.23*     Results from last 7 days   Lab Units 06/05/24  0439   POC GLUCOSE mg/dl 85         Results from last 7 days   Lab Units 06/07/24  0443 06/02/24  1435   LACTIC ACID  mmol/L  --  1.4   PROCALCITONIN ng/ml 0.70* 0.30*       Lines/Drains:  Invasive Devices       Peripheral Intravenous Line  Duration             Peripheral IV 06/08/24 Proximal;Right;Ventral (anterior) Forearm 1 day    Peripheral IV 06/08/24 Dorsal (posterior);Left;Proximal Forearm <1 day                          Imaging: Reviewed radiology reports from this admission including: chest xray    Recent Cultures (last 7 days):   Results from last 7 days   Lab Units 06/02/24  1452 06/02/24  1435   BLOOD CULTURE  No Growth After 5 Days. No Growth After 5 Days.       Last 24 Hours Medication List:   Current Facility-Administered Medications   Medication Dose Route Frequency Provider Last Rate    acetaminophen  650 mg Oral Q6H PRN Iris Rivas MD      clonazePAM  0.5 mg Oral HS Iris Rivas MD      Diclofenac Sodium  2 g Topical 4x Daily Iris Rivas MD      furosemide  20 mg Intravenous Once Yomaira Jenkins MD      lactulose  20 g Oral Daily Iris Rivas MD      lamoTRIgine  200 mg Oral Daily Iris Rivas MD      And    lamoTRIgine  250 mg Oral After Lunch Iris Rivas MD      And    lamoTRIgine  300 mg Oral HS Iris Rivas MD      levOCARNitine  1,000 mg/day Oral 4x Daily (with meals and at bedtime) Irsi Rivas MD      metoprolol tartrate  25 mg Oral BID Iris Rivas MD      piperacillin-tazobactam  4.5 g Intravenous Q8H Fede Han Mcintyre MD 4.5 g (06/09/24 0007)    primidone  100 mg Oral QAM Iris Rivas MD      And    primidone  100 mg Oral After Lunch Iris Rivas MD      And    primidone  150 mg Oral HS Iris Rivas MD      pyridoxine  100 mg Oral Daily Iris Rivas MD      sodium chloride  1 Application Nasal 4x Daily (PC & HS) Jonatan Biggs MD      sodium chloride  2 spray Each Nare 4x Daily (PC & HS) Jonatan Biggs MD      spironolactone  100 mg Oral Daily Karla Jaimes MD      zonisamide  100 mg Oral HS Iris Rivas MD          Today, Patient Was Seen By: Yomaira Jenkins MD    **Please Note: This note may have been  constructed using a voice recognition system.**

## 2024-06-09 NOTE — ASSESSMENT & PLAN NOTE
Recurrent aspiration pneumonia.  He had a hospitalization in April 2024 at [facility].  Continue IV Zosyn.  Day 6 of 7 .complete total 7 days of treatment.  Speech therapy evaluated patient and recommended Pureed with nectar thick liquid with crushed meds.

## 2024-06-09 NOTE — PLAN OF CARE
Problem: Potential for Falls  Goal: Patient will remain free of falls  Description: INTERVENTIONS:  - Educate patient/family on patient safety including physical limitations  - Instruct patient to call for assistance with activity   - Consult OT/PT to assist with strengthening/mobility   - Keep Call bell within reach  - Keep bed low and locked with side rails adjusted as appropriate  - Keep care items and personal belongings within reach  - Initiate and maintain comfort rounds  - Make Fall Risk Sign visible to staff  - Offer Toileting every 2 Hours, in advance of need  - Initiate/Maintain bed alarm  - Obtain necessary fall risk management equipment  - Apply yellow socks and bracelet for high fall risk patients  - Consider moving patient to room near nurses station  Outcome: Progressing     Problem: PAIN - ADULT  Goal: Verbalizes/displays adequate comfort level or baseline comfort level  Description: Interventions:  - Encourage patient to monitor pain and request assistance  - Assess pain using appropriate pain scale  - Administer analgesics based on type and severity of pain and evaluate response  - Implement non-pharmacological measures as appropriate and evaluate response  - Consider cultural and social influences on pain and pain management  - Notify physician/advanced practitioner if interventions unsuccessful or patient reports new pain  Outcome: Progressing     Problem: INFECTION - ADULT  Goal: Absence or prevention of progression during hospitalization  Description: INTERVENTIONS:  - Assess and monitor for signs and symptoms of infection  - Monitor lab/diagnostic results  - Monitor all insertion sites, i.e. indwelling lines, tubes, and drains  - Monitor endotracheal if appropriate and nasal secretions for changes in amount and color  - Dilliner appropriate cooling/warming therapies per order  - Administer medications as ordered  - Instruct and encourage patient and family to use good hand hygiene  technique  - Identify and instruct in appropriate isolation precautions for identified infection/condition  Outcome: Progressing  Goal: Absence of fever/infection during neutropenic period  Description: INTERVENTIONS:  - Monitor WBC    Outcome: Progressing     Problem: SAFETY ADULT  Goal: Patient will remain free of falls  Description: INTERVENTIONS:  - Educate patient/family on patient safety including physical limitations  - Instruct patient to call for assistance with activity   - Consult OT/PT to assist with strengthening/mobility   - Keep Call bell within reach  - Keep bed low and locked with side rails adjusted as appropriate  - Keep care items and personal belongings within reach  - Initiate and maintain comfort rounds  - Make Fall Risk Sign visible to staff  - Offer Toileting every 2 Hours, in advance of need  - Initiate/Maintain bed alarm  - Obtain necessary fall risk management equipment  - Apply yellow socks and bracelet for high fall risk patients  - Consider moving patient to room near nurses station  Outcome: Progressing  Goal: Maintain or return to baseline ADL function  Description: INTERVENTIONS:  - Educate patient/family on patient safety including physical limitations  - Instruct patient to call for assistance with activity   - Consult OT/PT to assist with strengthening/mobility   - Keep Call bell within reach  - Keep bed low and locked with side rails adjusted as appropriate  - Keep care items and personal belongings within reach  - Initiate and maintain comfort rounds  - Make Fall Risk Sign visible to staff  - Offer Toileting every 2 Hours, in advance of need  - Initiate/Maintain bed alarm  - Obtain necessary fall risk management equipment  - Apply yellow socks and bracelet for high fall risk patients  - Consider moving patient to room near nurses station  Outcome: Progressing  Goal: Maintains/Returns to pre admission functional level  Description: INTERVENTIONS:  -  Assess patient's ability to  carry out ADLs; assess patient's baseline for ADL function and identify physical deficits which impact ability to perform ADLs (bathing, care of mouth/teeth, toileting, grooming, dressing, etc.)  - Assess/evaluate cause of self-care deficits   - Assess range of motion  - Assess patient's mobility; develop plan if impaired  - Assess patient's need for assistive devices and provide as appropriate  - Encourage maximum independence but intervene and supervise when necessary  - Involve family in performance of ADLs  - Assess for home care needs following discharge   - Consider OT consult to assist with ADL evaluation and planning for discharge  - Provide patient education as appropriate  Outcome: Progressing     Problem: DISCHARGE PLANNING  Goal: Discharge to home or other facility with appropriate resources  Description: INTERVENTIONS:  - Identify barriers to discharge w/patient and caregiver  - Arrange for needed discharge resources and transportation as appropriate  - Identify discharge learning needs (meds, wound care, etc.)  - Arrange for interpretive services to assist at discharge as needed  - Refer to Case Management Department for coordinating discharge planning if the patient needs post-hospital services based on physician/advanced practitioner order or complex needs related to functional status, cognitive ability, or social support system  Outcome: Progressing     Problem: Knowledge Deficit  Goal: Patient/family/caregiver demonstrates understanding of disease process, treatment plan, medications, and discharge instructions  Description: Complete learning assessment and assess knowledge base.  Interventions:  - Provide teaching at level of understanding  - Provide teaching via preferred learning methods  Outcome: Progressing     Problem: Prexisting or High Potential for Compromised Skin Integrity  Goal: Skin integrity is maintained or improved  Description: INTERVENTIONS:  - Identify patients at risk for skin  breakdown  - Assess and monitor skin integrity  - Assess and monitor nutrition and hydration status  - Monitor labs   - Assess for incontinence   - Turn and reposition patient  - Assist with mobility/ambulation  - Relieve pressure over bony prominences  - Avoid friction and shearing  - Provide appropriate hygiene as needed including keeping skin clean and dry  - Evaluate need for skin moisturizer/barrier cream  - Collaborate with interdisciplinary team   - Patient/family teaching  - Consider wound care consult   Outcome: Progressing     Problem: NEUROSENSORY - ADULT  Goal: Achieves stable or improved neurological status  Description: INTERVENTIONS  - Monitor and report changes in neurological status  - Monitor vital signs such as temperature, blood pressure, glucose, and any other labs ordered   - Initiate measures to prevent increased intracranial pressure  - Monitor for seizure activity and implement precautions if appropriate      Outcome: Progressing  Goal: Remains free of injury related to seizures activity  Description: INTERVENTIONS  - Maintain airway, patient safety  and administer oxygen as ordered  - Monitor patient for seizure activity, document and report duration and description of seizure to physician/advanced practitioner  - If seizure occurs,  ensure patient safety during seizure  - Reorient patient post seizure  - Seizure pads on all 4 side rails  - Instruct patient/family to notify RN of any seizure activity including if an aura is experienced  - Instruct patient/family to call for assistance with activity based on nursing assessment  - Administer anti-seizure medications if ordered    Outcome: Progressing  Goal: Achieves maximal functionality and self care  Description: INTERVENTIONS  - Monitor swallowing and airway patency with patient fatigue and changes in neurological status  - Encourage and assist patient to increase activity and self care.   - Encourage visually impaired, hearing impaired  and aphasic patients to use assistive/communication devices  Outcome: Progressing     Problem: RESPIRATORY - ADULT  Goal: Achieves optimal ventilation and oxygenation  Description: INTERVENTIONS:  - Assess for changes in respiratory status  - Assess for changes in mentation and behavior  - Position to facilitate oxygenation and minimize respiratory effort  - Oxygen administered by appropriate delivery if ordered  - Initiate smoking cessation education as indicated  - Encourage broncho-pulmonary hygiene including cough, deep breathe, Incentive Spirometry  - Assess the need for suctioning and aspirate as needed  - Assess and instruct to report SOB or any respiratory difficulty  - Respiratory Therapy support as indicated  Outcome: Progressing     Problem: GENITOURINARY - ADULT  Goal: Maintains or returns to baseline urinary function  Description: INTERVENTIONS:  - Assess urinary function  - Encourage oral fluids to ensure adequate hydration if ordered  - Administer IV fluids as ordered to ensure adequate hydration  - Administer ordered medications as needed  - Offer frequent toileting  - Follow urinary retention protocol if ordered  Outcome: Progressing  Goal: Absence of urinary retention  Description: INTERVENTIONS:  - Assess patient’s ability to void and empty bladder  - Monitor I/O  - Bladder scan as needed  - Discuss with physician/AP medications to alleviate retention as needed  - Discuss catheterization for long term situations as appropriate  Outcome: Progressing  Goal: Urinary catheter remains patent  Description: INTERVENTIONS:  - Assess patency of urinary catheter  - If patient has a chronic sr, consider changing catheter if non-functioning  - Follow guidelines for intermittent irrigation of non-functioning urinary catheter  Outcome: Progressing     Problem: HEMATOLOGIC - ADULT  Goal: Maintains hematologic stability  Description: INTERVENTIONS  - Assess for signs and symptoms of bleeding or hemorrhage  -  Monitor labs  - Administer supportive blood products/factors as ordered and appropriate  Outcome: Progressing

## 2024-06-09 NOTE — PLAN OF CARE
Problem: PAIN - ADULT  Goal: Verbalizes/displays adequate comfort level or baseline comfort level  Description: Interventions:  - Encourage patient to monitor pain and request assistance  - Assess pain using appropriate pain scale  - Administer analgesics based on type and severity of pain and evaluate response  - Implement non-pharmacological measures as appropriate and evaluate response  - Consider cultural and social influences on pain and pain management  - Notify physician/advanced practitioner if interventions unsuccessful or patient reports new pain  Outcome: Progressing     Problem: INFECTION - ADULT  Goal: Absence or prevention of progression during hospitalization  Description: INTERVENTIONS:  - Assess and monitor for signs and symptoms of infection  - Monitor lab/diagnostic results  - Monitor all insertion sites, i.e. indwelling lines, tubes, and drains  - Monitor endotracheal if appropriate and nasal secretions for changes in amount and color  - [place] appropriate cooling/warming therapies per order  - Administer medications as ordered  - Instruct and encourage patient and family to use good hand hygiene technique  - Identify and instruct in appropriate isolation precautions for identified infection/condition  Outcome: Progressing     Problem: NEUROSENSORY - ADULT  Goal: Achieves stable or improved neurological status  Description: INTERVENTIONS  - Monitor and report changes in neurological status  - Monitor vital signs such as temperature, blood pressure, glucose, and any other labs ordered   - Initiate measures to prevent increased intracranial pressure  - Monitor for seizure activity and implement precautions if appropriate      Outcome: Progressing     Problem: Knowledge Deficit  Goal: Patient/family/caregiver demonstrates understanding of disease process, treatment plan, medications, and discharge instructions  Description: Complete learning assessment and assess knowledge  base.  Interventions:  - Provide teaching at level of understanding  - Provide teaching via preferred learning methods  Outcome: Progressing     Problem: GENITOURINARY - ADULT  Goal: Maintains or returns to baseline urinary function  Description: INTERVENTIONS:  - Assess urinary function  - Encourage oral fluids to ensure adequate hydration if ordered  - Administer IV fluids as ordered to ensure adequate hydration  - Administer ordered medications as needed  - Offer frequent toileting  - Follow urinary retention protocol if ordered  Outcome: Progressing     Problem: RESPIRATORY - ADULT  Goal: Achieves optimal ventilation and oxygenation  Description: INTERVENTIONS:  - Assess for changes in respiratory status  - Assess for changes in mentation and behavior  - Position to facilitate oxygenation and minimize respiratory effort  - Oxygen administered by appropriate delivery if ordered  - Initiate smoking cessation education as indicated  - Encourage broncho-pulmonary hygiene including cough, deep breathe, Incentive Spirometry  - Assess the need for suctioning and aspirate as needed  - Assess and instruct to report SOB or any respiratory difficulty  - Respiratory Therapy support as indicated  Outcome: Progressing     Problem: HEMATOLOGIC - ADULT  Goal: Maintains hematologic stability  Description: INTERVENTIONS  - Assess for signs and symptoms of bleeding or hemorrhage  - Monitor labs  - Administer supportive blood products/factors as ordered and appropriate  Outcome: Progressing

## 2024-06-09 NOTE — ASSESSMENT & PLAN NOTE
Patient's creatinine slightly elevated than baseline.  Baseline appears to be up to 1.7.  Elevated creatinine to 2.4 resume Aldactone.  prn iv lasix diuretics.  . repeat BMP in AM. Home torsemide on hold    Lab Results   Component Value Date    EGFR 28 06/09/2024    EGFR 33 06/07/2024    EGFR 33 06/06/2024    CREATININE 2.41 (H) 06/09/2024    CREATININE 2.11 (H) 06/07/2024    CREATININE 2.14 (H) 06/06/2024

## 2024-06-10 ENCOUNTER — APPOINTMENT (OUTPATIENT)
Dept: INTERVENTIONAL RADIOLOGY/VASCULAR | Facility: HOSPITAL | Age: 59
DRG: 177 | End: 2024-06-10
Attending: FAMILY MEDICINE
Payer: MEDICARE

## 2024-06-10 LAB
ANION GAP SERPL CALCULATED.3IONS-SCNC: 4 MMOL/L (ref 4–13)
BUN SERPL-MCNC: 38 MG/DL (ref 5–25)
CALCIUM SERPL-MCNC: 8.2 MG/DL (ref 8.4–10.2)
CHLORIDE SERPL-SCNC: 108 MMOL/L (ref 96–108)
CO2 SERPL-SCNC: 29 MMOL/L (ref 21–32)
CREAT SERPL-MCNC: 2.35 MG/DL (ref 0.6–1.3)
GFR SERPL CREATININE-BSD FRML MDRD: 29 ML/MIN/1.73SQ M
GLUCOSE SERPL-MCNC: 94 MG/DL (ref 65–140)
POTASSIUM SERPL-SCNC: 4.2 MMOL/L (ref 3.5–5.3)
SODIUM SERPL-SCNC: 141 MMOL/L (ref 135–147)

## 2024-06-10 PROCEDURE — 32555 ASPIRATE PLEURA W/ IMAGING: CPT

## 2024-06-10 PROCEDURE — 0W993ZZ DRAINAGE OF RIGHT PLEURAL CAVITY, PERCUTANEOUS APPROACH: ICD-10-PCS | Performed by: RADIOLOGY

## 2024-06-10 PROCEDURE — 94762 N-INVAS EAR/PLS OXIMTRY CONT: CPT

## 2024-06-10 PROCEDURE — 80048 BASIC METABOLIC PNL TOTAL CA: CPT | Performed by: FAMILY MEDICINE

## 2024-06-10 PROCEDURE — 99232 SBSQ HOSP IP/OBS MODERATE 35: CPT | Performed by: FAMILY MEDICINE

## 2024-06-10 RX ORDER — FUROSEMIDE 20 MG/1
20 TABLET ORAL DAILY
Status: DISCONTINUED | OUTPATIENT
Start: 2024-06-10 | End: 2024-06-11

## 2024-06-10 RX ORDER — LIDOCAINE WITH 8.4% SOD BICARB 0.9%(10ML)
SYRINGE (ML) INJECTION AS NEEDED
Status: COMPLETED | OUTPATIENT
Start: 2024-06-10 | End: 2024-06-10

## 2024-06-10 RX ADMIN — PYRIDOXINE HCL TAB 50 MG 100 MG: 50 TAB at 08:32

## 2024-06-10 RX ADMIN — LEVOCARNITINE 250 MG: 1 SOLUTION ORAL at 18:19

## 2024-06-10 RX ADMIN — LAMOTRIGINE 300 MG: 100 TABLET ORAL at 22:30

## 2024-06-10 RX ADMIN — Medication 2 G: at 08:33

## 2024-06-10 RX ADMIN — CLONAZEPAM 0.5 MG: 0.5 TABLET ORAL at 22:29

## 2024-06-10 RX ADMIN — SALINE NASAL SPRAY 2 SPRAY: 1.5 SOLUTION NASAL at 22:30

## 2024-06-10 RX ADMIN — LEVOCARNITINE 250 MG: 1 SOLUTION ORAL at 22:30

## 2024-06-10 RX ADMIN — PRIMIDONE 100 MG: 50 TABLET ORAL at 14:29

## 2024-06-10 RX ADMIN — Medication 5 ML: at 13:13

## 2024-06-10 RX ADMIN — LAMOTRIGINE 200 MG: 100 TABLET ORAL at 08:31

## 2024-06-10 RX ADMIN — Medication 1 APPLICATION: at 12:23

## 2024-06-10 RX ADMIN — METOPROLOL TARTRATE 25 MG: 25 TABLET, FILM COATED ORAL at 08:32

## 2024-06-10 RX ADMIN — SALINE NASAL SPRAY 2 SPRAY: 1.5 SOLUTION NASAL at 08:33

## 2024-06-10 RX ADMIN — LAMOTRIGINE 250 MG: 100 TABLET ORAL at 14:29

## 2024-06-10 RX ADMIN — METOPROLOL TARTRATE 25 MG: 25 TABLET, FILM COATED ORAL at 18:18

## 2024-06-10 RX ADMIN — Medication 1 APPLICATION: at 08:34

## 2024-06-10 RX ADMIN — PIPERACILLIN AND TAZOBACTAM 4.5 G: 36; 4.5 INJECTION, POWDER, FOR SOLUTION INTRAVENOUS at 08:35

## 2024-06-10 RX ADMIN — LEVOCARNITINE 250 MG: 1 SOLUTION ORAL at 08:33

## 2024-06-10 RX ADMIN — ZONISAMIDE 100 MG: 100 CAPSULE ORAL at 22:29

## 2024-06-10 RX ADMIN — Medication 2 G: at 22:30

## 2024-06-10 RX ADMIN — PRIMIDONE 100 MG: 50 TABLET ORAL at 08:32

## 2024-06-10 RX ADMIN — SALINE NASAL SPRAY 2 SPRAY: 1.5 SOLUTION NASAL at 12:23

## 2024-06-10 RX ADMIN — Medication 1 APPLICATION: at 22:30

## 2024-06-10 RX ADMIN — LEVOCARNITINE 250 MG: 1 SOLUTION ORAL at 12:23

## 2024-06-10 RX ADMIN — Medication 2 G: at 18:19

## 2024-06-10 RX ADMIN — SPIRONOLACTONE 100 MG: 100 TABLET, FILM COATED ORAL at 08:32

## 2024-06-10 RX ADMIN — SALINE NASAL SPRAY 2 SPRAY: 1.5 SOLUTION NASAL at 18:19

## 2024-06-10 RX ADMIN — PRIMIDONE 150 MG: 50 TABLET ORAL at 22:29

## 2024-06-10 RX ADMIN — Medication 1 APPLICATION: at 18:19

## 2024-06-10 NOTE — ASSESSMENT & PLAN NOTE
Moderately large right and moderate size left pleural effusion.  On previous admission in April 2024, he had a right-sided thoracentesis which was transudative.  S/p bilateral thoracentesis on 6/4.  Respiratory status has remained stable subsequently.  However repeat x-ray still shows large right-sided effusion.  Patient is hemodynamically stable with no increased oxygen requirement.  repeat thoracentesis on 6/10with  cc removed  Repeat lasix again

## 2024-06-10 NOTE — PLAN OF CARE
Problem: Potential for Falls  Goal: Patient will remain free of falls  Description: INTERVENTIONS:  - Educate patient/family on patient safety including physical limitations  - Instruct patient to call for assistance with activity   - Consult OT/PT to assist with strengthening/mobility   - Keep Call bell within reach  - Keep bed low and locked with side rails adjusted as appropriate  - Keep care items and personal belongings within reach  - Initiate and maintain comfort rounds  - Make Fall Risk Sign visible to staff  - Offer Toileting every 2 Hours, in advance of need  - Initiate/Maintain bed alarm  - Obtain necessary fall risk management equipment:   - Apply yellow socks and bracelet for high fall risk patients  - Consider moving patient to room near nurses station  Outcome: Progressing     Problem: PAIN - ADULT  Goal: Verbalizes/displays adequate comfort level or baseline comfort level  Description: Interventions:  - Encourage patient to monitor pain and request assistance  - Assess pain using appropriate pain scale  - Administer analgesics based on type and severity of pain and evaluate response  - Implement non-pharmacological measures as appropriate and evaluate response  - Consider cultural and social influences on pain and pain management  - Notify physician/advanced practitioner if interventions unsuccessful or patient reports new pain  Outcome: Progressing     Problem: INFECTION - ADULT  Goal: Absence or prevention of progression during hospitalization  Description: INTERVENTIONS:  - Assess and monitor for signs and symptoms of infection  - Monitor lab/diagnostic results  - Monitor all insertion sites, i.e. indwelling lines, tubes, and drains  - Monitor endotracheal if appropriate and nasal secretions for changes in amount and color  - [place] appropriate cooling/warming therapies per order  - Administer medications as ordered  - Instruct and encourage patient and family to use good hand hygiene  technique  - Identify and instruct in appropriate isolation precautions for identified infection/condition  Outcome: Progressing  Goal: Absence of fever/infection during neutropenic period  Description: INTERVENTIONS:  - Monitor WBC    Outcome: Progressing     Problem: SAFETY ADULT  Goal: Patient will remain free of falls  Description: INTERVENTIONS:  - Educate patient/family on patient safety including physical limitations  - Instruct patient to call for assistance with activity   - Consult OT/PT to assist with strengthening/mobility   - Keep Call bell within reach  - Keep bed low and locked with side rails adjusted as appropriate  - Keep care items and personal belongings within reach  - Initiate and maintain comfort rounds  - Make Fall Risk Sign visible to staff  - Offer Toileting every 2 Hours, in advance of need  - Initiate/Maintain bed alarm  - Obtain necessary fall risk management equipment:   - Apply yellow socks and bracelet for high fall risk patients  - Consider moving patient to room near nurses station  Outcome: Progressing  Goal: Maintain or return to baseline ADL function  Description: INTERVENTIONS:  - Educate patient/family on patient safety including physical limitations  - Instruct patient to call for assistance with activity   - Consult OT/PT to assist with strengthening/mobility   - Keep Call bell within reach  - Keep bed low and locked with side rails adjusted as appropriate  - Keep care items and personal belongings within reach  - Initiate and maintain comfort rounds  - Make Fall Risk Sign visible to staff  - Offer Toileting every 2 Hours, in advance of need  - Initiate/Maintain bed alarm  - Obtain necessary fall risk management equipment:   - Apply yellow socks and bracelet for high fall risk patients  - Consider moving patient to room near nurses station  Outcome: Progressing  Goal: Maintains/Returns to pre admission functional level  Description: INTERVENTIONS:  -  Assess patient's ability  to carry out ADLs; assess patient's baseline for ADL function and identify physical deficits which impact ability to perform ADLs (bathing, care of mouth/teeth, toileting, grooming, dressing, etc.)  - Assess/evaluate cause of self-care deficits   - Assess range of motion  - Assess patient's mobility; develop plan if impaired  - Assess patient's need for assistive devices and provide as appropriate  - Encourage maximum independence but intervene and supervise when necessary  - Involve family in performance of ADLs  - Assess for home care needs following discharge   - Consider OT consult to assist with ADL evaluation and planning for discharge  - Provide patient education as appropriate  Outcome: Progressing     Problem: DISCHARGE PLANNING  Goal: Discharge to home or other facility with appropriate resources  Description: INTERVENTIONS:  - Identify barriers to discharge w/patient and caregiver  - Arrange for needed discharge resources and transportation as appropriate  - Identify discharge learning needs (meds, wound care, etc.)  - Arrange for interpretive services to assist at discharge as needed  - Refer to Case Management Department for coordinating discharge planning if the patient needs post-hospital services based on physician/advanced practitioner order or complex needs related to functional status, cognitive ability, or social support system  Outcome: Progressing     Problem: Knowledge Deficit  Goal: Patient/family/caregiver demonstrates understanding of disease process, treatment plan, medications, and discharge instructions  Description: Complete learning assessment and assess knowledge base.  Interventions:  - Provide teaching at level of understanding  - Provide teaching via preferred learning methods  Outcome: Progressing

## 2024-06-10 NOTE — ASSESSMENT & PLAN NOTE
Multifactorial secondary to heart failure, pleural effusions recurrent aspiration pneumonia.  Continue with oxygen.  Because of recurrent episode of epistaxis, ENT was consulted and recommended using humidified oxygen via facemask  epistaxis has resolved

## 2024-06-10 NOTE — ASSESSMENT & PLAN NOTE
At baseline on pureed diet.   Speech evaluated patient and recs pureed with nectar thick, crushed meds  Continue with ongoing speech follow-up and maintain strict aspiration precautions.  Maintain strict aspiration precautions.  Continue with close supervision with food.

## 2024-06-10 NOTE — ASSESSMENT & PLAN NOTE
Patient with bilateral pleural effusions, received 80 mg of Lasix IV in the emergency room.  At home he takes torsemide and Aldactone.  Initially started on Lasix 40 mg IV twice daily.  Has had good urine output with -2.4 L in the last 24 hours.  Had subsequent hypotension and elevation in creatinine.  Diuretics on hold since 6/5.  Continue to hold furosemide for now.  Will resume Aldactone if blood pressure tolerates.  Continue to monitor cardiorespiratory status and renal functions closely..  Of note patient is s/p bilateral thoracentesis with 425 mL and 975 mL.  Currently continues to be euvolemic.  Continue to monitor off diuretics.  Give 1 dose of Lasix on 6 /7 and repeat again on 6/9.  Repeat chest x-ray shows persistent large right-sided effusion.  Will need thoracentesis again.  IR formed right-sided thoracentesis today and removed 950 cc of isidro-colored fluid resume oral Lasix  Wt Readings from Last 3 Encounters:   06/02/24 93.9 kg (207 lb)   01/30/24 79.3 kg (174 lb 13.2 oz)   12/08/23 106 kg (234 lb 9.1 oz)

## 2024-06-10 NOTE — RESPIRATORY THERAPY NOTE
RT Protocol Note  Ash Loaiza 58 y.o. male MRN: [id number]  Unit/Bed#: -01 Encounter: [id number]    Assessment    Principal Problem:    Pneumonia of both lungs due to infectious organism  Active Problems:    Nonintractable generalized idiopathic epilepsy without status epilepticus (HCC)    Acute respiratory failure with hypoxia (HCC)    Developmental delay    Dysphagia    Bilateral pleural effusion    Acute on chronic heart failure with preserved ejection fraction (HCC)    Liver cirrhosis (HCC)    Epistaxis    Urinary retention    Stage 3b chronic kidney disease (HCC)    Encephalopathy    Poor prognosis      Home Pulmonary Medications:         Past Medical History:   Diagnosis Date    Hypertension     Mentally challenged     Pericardial effusion     Pneumonia     Seizure (HCC)      Social History     Socioeconomic History    Marital status: Single     Spouse name: None    Number of children: None    Years of education: None    Highest education level: None   Occupational History    None   Tobacco Use    Smoking status: Never    Smokeless tobacco: Never   Vaping Use    Vaping status: Never Used   Substance and Sexual Activity    Alcohol use: Never    Drug use: Never    Sexual activity: None   Other Topics Concern    None   Social History Narrative    None     Social Determinants of Health     Financial Resource Strain: Low Risk  (4/6/2024)    [facility]    Overall Financial Resource Strain (CARDIA)     Difficulty of Paying Living Expenses: Not very hard   Food Insecurity: No Food Insecurity (6/3/2024)    Hunger Vital Sign     Worried About Running Out of Food in the Last Year: Never true     Ran Out of Food in the Last Year: Never true   Transportation Needs: No Transportation Needs (6/3/2024)    PRAPARE - Transportation     Lack of Transportation (Medical): No     Lack of Transportation (Non-Medical): No   Physical Activity: Not on file  "  Stress: Not on file   Social Connections: Not on file   Intimate Partner Violence: Not At Risk (4/6/2024)    [facility]    Humiliation, Afraid, Rape, and Kick questionnaire     Fear of Current or Ex-Partner: No     Emotionally Abused: No     Physically Abused: No     Sexually Abused: No   Housing Stability: High Risk (6/3/2024)    Housing Stability Vital Sign     Unable to Pay for Housing in the Last Year: No     Number of Times Moved in the Last Year: 2     Homeless in the Last Year: No       Subjective         Objective    Physical Exam:   O2 Device: (P) RA    Vitals:  Blood pressure 107/75, pulse 69, temperature 98.4 °F (36.9 °C), temperature source Temporal, resp. rate 20, height 5' 6\" (1.676 m), weight 93.9 kg (207 lb), SpO2 (P) 97%.          Imaging and other studies: I have personally reviewed pertinent reports.      O2 Device: (P) RA     Plan    Respiratory Plan: No distress/Pulmonary history        Resp Comments: (P) Pt is stable on ra no resp intervention   DC protocol   "

## 2024-06-10 NOTE — PROGRESS NOTES
[facility]  Progress Note  Name: Ash Loaiza I  MRN: [id number]  Unit/Bed#: -01 I Date of Admission: 6/2/2024   Date of Service: 6/10/2024 I Hospital Day: 8    Assessment & Plan   Acute respiratory failure with hypoxia (HCC)  Assessment & Plan  Multifactorial secondary to heart failure, pleural effusions recurrent aspiration pneumonia.  Continue with oxygen.  Because of recurrent episode of epistaxis, ENT was consulted and recommended using humidified oxygen via facemask  epistaxis has resolved    Encephalopathy  Assessment & Plan  Patient with developmental delay, but this is most likely secondary to his acute illness including pleural effusions, recurrent aspiration pneumonia.  Mentation appears to be at baseline currently.    Acute on chronic heart failure with preserved ejection fraction (HCC)  Assessment & Plan  Patient with bilateral pleural effusions, received 80 mg of Lasix IV in the emergency room.  At home he takes torsemide and Aldactone.  Initially started on Lasix 40 mg IV twice daily.  Has had good urine output with -2.4 L in the last 24 hours.  Had subsequent hypotension and elevation in creatinine.  Diuretics on hold since 6/5.  Continue to hold furosemide for now.  Will resume Aldactone if blood pressure tolerates.  Continue to monitor cardiorespiratory status and renal functions closely..  Of note patient is s/p bilateral thoracentesis with 425 mL and 975 mL.  Currently continues to be euvolemic.  Continue to monitor off diuretics.  Give 1 dose of Lasix on 6 /7 and repeat again on 6/9.  Repeat chest x-ray shows persistent large right-sided effusion.  Will need thoracentesis again.  IR formed right-sided thoracentesis today and removed 950 cc of isidro-colored fluid resume oral Lasix  Wt Readings from Last 3 Encounters:   06/02/24 93.9 kg (207 lb)   01/30/24 79.3 kg (174 lb 13.2 oz)   12/08/23 106 kg (234 lb 9.1 oz)         Bilateral pleural effusion  Assessment  & Plan  Moderately large right and moderate size left pleural effusion.  On previous admission in April 2024, he had a right-sided thoracentesis which was transudative.  S/p bilateral thoracentesis on 6/4.  Respiratory status has remained stable subsequently.  However repeat x-ray still shows large right-sided effusion.  Patient is hemodynamically stable with no increased oxygen requirement.  repeat thoracentesis on 6/10with  cc removed  Repeat lasix again    Dysphagia  Assessment & Plan  At baseline on pureed diet.   Speech evaluated patient and recs pureed with nectar thick, crushed meds  Continue with ongoing speech follow-up and maintain strict aspiration precautions.  Maintain strict aspiration precautions.  Continue with close supervision with food.    Developmental delay  Assessment & Plan  Since birth, patient is adopted.  It is unknown why he has the mental delay if it is due to anoxic brain injury or cerebral palsy.    * Pneumonia of both lungs due to infectious organism  Assessment & Plan  Recurrent aspiration pneumonia.  He had a hospitalization in April 2024 at [facility].   IV Zosyn. complete total 7 days of treatment.  Speech therapy evaluated patient and recommended Pureed with nectar thick liquid with crushed meds.                VTE Pharmacologic Prophylaxis: VTE Score: 6 Moderate Risk (Score 3-4) - Pharmacological DVT Prophylaxis Contraindicated. Sequential Compression Devices Ordered.    Mobility:   Basic Mobility Inpatient Raw Score: 9  JH-HLM Goal: 3: Sit at edge of bed  JH-HLM Achieved: 2: Bed activities/Dependent transfer  JH-HLM Goal achieved. Continue to encourage appropriate mobility.    Patient Centered Rounds: I performed bedside rounds with nursing staff today.   Discussions with Specialists or Other Care Team Provider:     Education and Discussions with Family / Patient: will update    Total Time Spent on Date of Encounter in care of patient: 35 mins. This time  was spent on one or more of the following: performing physical exam; counseling and coordination of care; obtaining or reviewing history; documenting in the medical record; reviewing/ordering tests, medications or procedures; communicating with other healthcare professionals and discussing with patient's family/caregivers.    Current Length of Stay: 8 day(s)  Current Patient Status: Inpatient   Certification Statement: The patient will continue to require additional inpatient hospital stay due to pneumonia  Discharge Plan: Anticipate discharge in 24-48 hrs to discharge location to be determined pending rehab evaluations.    Code Status: Level 3 - DNAR and DNI    Subjective:   Patient denies any complaints    Objective:     Vitals:   Temp (24hrs), Av.4 °F (36.9 °C), Min:98.1 °F (36.7 °C), Max:98.8 °F (37.1 °C)    Temp:  [98.1 °F (36.7 °C)-98.8 °F (37.1 °C)] 98.1 °F (36.7 °C)  HR:  [75-86] 79  Resp:  [20-21] 21  BP: ()/(65-80) 112/72  SpO2:  [91 %-95 %] 95 %  Body mass index is 33.41 kg/m².     Input and Output Summary (last 24 hours):     Intake/Output Summary (Last 24 hours) at 6/10/2024 1329  Last data filed at 6/10/2024 1323  Gross per 24 hour   Intake 840 ml   Output 1520 ml   Net -680 ml       Physical Exam:   Physical Exam  Vitals and nursing note reviewed.   Constitutional:       Appearance: Normal appearance.   HENT:      Head: Normocephalic and atraumatic.      Right Ear: External ear normal.      Left Ear: External ear normal.      Nose: Nose normal.      Mouth/Throat:      Pharynx: Oropharynx is clear.   Cardiovascular:      Rate and Rhythm: Normal rate and regular rhythm.      Heart sounds: Normal heart sounds.   Pulmonary:      Effort: Pulmonary effort is normal.      Breath sounds: Rales present.   Abdominal:      General: Bowel sounds are normal.      Palpations: Abdomen is soft.      Tenderness: There is no abdominal tenderness.   Musculoskeletal:         General: Normal range of motion.       Cervical back: Normal range of motion and neck supple.   Skin:     General: Skin is warm and dry.      Capillary Refill: Capillary refill takes less than 2 seconds.   Neurological:      Mental Status: He is alert. Mental status is at baseline.   Psychiatric:         Mood and Affect: Mood normal.            Additional Data:     Labs:  Results from last 7 days   Lab Units 06/09/24  0458   WBC Thousand/uL 6.33   HEMOGLOBIN g/dL 9.7*   HEMATOCRIT % 30.9*   PLATELETS Thousands/uL 131*   SEGS PCT % 82*   LYMPHO PCT % 9*   MONO PCT % 9   EOS PCT % 0     Results from last 7 days   Lab Units 06/10/24  0603   SODIUM mmol/L 141   POTASSIUM mmol/L 4.2   CHLORIDE mmol/L 108   CO2 mmol/L 29   BUN mg/dL 38*   CREATININE mg/dL 2.35*   ANION GAP mmol/L 4   CALCIUM mg/dL 8.2*   GLUCOSE RANDOM mg/dL 94         Results from last 7 days   Lab Units 06/05/24  0439   POC GLUCOSE mg/dl 85         Results from last 7 days   Lab Units 06/07/24  0443   PROCALCITONIN ng/ml 0.70*       Lines/Drains:  Invasive Devices       Peripheral Intravenous Line  Duration             Peripheral IV 06/08/24 Proximal;Right;Ventral (anterior) Forearm 2 days                          Imaging: Reviewed radiology reports from this admission including: chest xray    Recent Cultures (last 7 days):         Last 24 Hours Medication List:   Current Facility-Administered Medications   Medication Dose Route Frequency Provider Last Rate    acetaminophen  650 mg Oral Q6H PRN Iris Rivas MD      clonazePAM  0.5 mg Oral HS Iris Rivas MD      Diclofenac Sodium  2 g Topical 4x Daily Iris Rivas MD      lactulose  20 g Oral Daily Iris Rivas MD      lamoTRIgine  200 mg Oral Daily Iris Rivas MD      And    lamoTRIgine  250 mg Oral After Lunch Iris Rivas MD      And    lamoTRIgine  300 mg Oral HS Iris Rivas MD      levOCARNitine  1,000 mg/day Oral 4x Daily (with meals and at bedtime) Iris Rivas MD      metoprolol tartrate  25 mg Oral BID Iris Rivas MD       piperacillin-tazobactam  4.5 g Intravenous Q8H Fede Han Mcintyre MD 4.5 g (06/10/24 8602)    primidone  100 mg Oral QAM Iris Rivas MD      And    primidone  100 mg Oral After Lunch Iris Rivas MD      And    primidone  150 mg Oral HS Iris Rivas MD      pyridoxine  100 mg Oral Daily Iris Rivas MD      sodium chloride  1 Application Nasal 4x Daily (PC & HS) Jonatna Biggs MD      sodium chloride  2 spray Each Nare 4x Daily (PC & HS) Jonatan Biggs MD      zonisamide  100 mg Oral HS Iris Rivas MD          Today, Patient Was Seen By: Yomaira Jenkins MD    **Please Note: This note may have been constructed using a voice recognition system.**

## 2024-06-10 NOTE — PLAN OF CARE
Problem: Potential for Falls  Goal: Patient will remain free of falls  Description: INTERVENTIONS:  - Educate patient/family on patient safety including physical limitations  - Instruct patient to call for assistance with activity   - Consult OT/PT to assist with strengthening/mobility   - Keep Call bell within reach  - Keep bed low and locked with side rails adjusted as appropriate  - Keep care items and personal belongings within reach  - Initiate and maintain comfort rounds  - Make Fall Risk Sign visible to staff  - Offer Toileting every  Hours, in advance of need  - Initiate/Maintain alarm  - Obtain necessary fall risk management equipment:   - Apply yellow socks and bracelet for high fall risk patients  - Consider moving patient to room near nurses station  Outcome: Progressing     Problem: PAIN - ADULT  Goal: Verbalizes/displays adequate comfort level or baseline comfort level  Description: Interventions:  - Encourage patient to monitor pain and request assistance  - Assess pain using appropriate pain scale  - Administer analgesics based on type and severity of pain and evaluate response  - Implement non-pharmacological measures as appropriate and evaluate response  - Consider cultural and social influences on pain and pain management  - Notify physician/advanced practitioner if interventions unsuccessful or patient reports new pain  Outcome: Progressing     Problem: INFECTION - ADULT  Goal: Absence or prevention of progression during hospitalization  Description: INTERVENTIONS:  - Assess and monitor for signs and symptoms of infection  - Monitor lab/diagnostic results  - Monitor all insertion sites, i.e. indwelling lines, tubes, and drains  - Monitor endotracheal if appropriate and nasal secretions for changes in amount and color  - [place] appropriate cooling/warming therapies per order  - Administer medications as ordered  - Instruct and encourage patient and family to use good hand hygiene technique  -  Identify and instruct in appropriate isolation precautions for identified infection/condition  Outcome: Progressing  Goal: Absence of fever/infection during neutropenic period  Description: INTERVENTIONS:  - Monitor WBC    Outcome: Progressing     Problem: SAFETY ADULT  Goal: Patient will remain free of falls  Description: INTERVENTIONS:  - Educate patient/family on patient safety including physical limitations  - Instruct patient to call for assistance with activity   - Consult OT/PT to assist with strengthening/mobility   - Keep Call bell within reach  - Keep bed low and locked with side rails adjusted as appropriate  - Keep care items and personal belongings within reach  - Initiate and maintain comfort rounds  - Make Fall Risk Sign visible to staff  - Offer Toileting every Hours, in advance of need  - Initiate/Maintain alarm  - Obtain necessary fall risk management equipment:   - Apply yellow socks and bracelet for high fall risk patients  - Consider moving patient to room near nurses station  Outcome: Progressing  Goal: Maintain or return to baseline ADL function  Description: INTERVENTIONS:  -  Assess patient's ability to carry out ADLs; assess patient's baseline for ADL function and identify physical deficits which impact ability to perform ADLs (bathing, care of mouth/teeth, toileting, grooming, dressing, etc.)  - Assess/evaluate cause of self-care deficits   - Assess range of motion  - Assess patient's mobility; develop plan if impaired  - Assess patient's need for assistive devices and provide as appropriate  - Encourage maximum independence but intervene and supervise when necessary  - Involve family in performance of ADLs  - Assess for home care needs following discharge   - Consider OT consult to assist with ADL evaluation and planning for discharge  - Provide patient education as appropriate  Outcome: Progressing  Goal: Maintains/Returns to pre admission functional level  Description: INTERVENTIONS:  -  Perform AM-PAC 6 Click Basic Mobility/ Daily Activity assessment daily.  - Set and communicate daily mobility goal to care team and patient/family/caregiver.   - Collaborate with rehabilitation services on mobility goals if consulted  - Perform Range of Motion  times a day.  - Reposition patient every  hours.  - Dangle patient  times a day  - Stand patient  times a day  - Ambulate patient  times a day  - Out of bed to chair  times a day   - Out of bed for meals times a day  - Out of bed for toileting  - Record patient progress and toleration of activity level   Outcome: Progressing     Problem: DISCHARGE PLANNING  Goal: Discharge to home or other facility with appropriate resources  Description: INTERVENTIONS:  - Identify barriers to discharge w/patient and caregiver  - Arrange for needed discharge resources and transportation as appropriate  - Identify discharge learning needs (meds, wound care, etc.)  - Arrange for interpretive services to assist at discharge as needed  - Refer to Case Management Department for coordinating discharge planning if the patient needs post-hospital services based on physician/advanced practitioner order or complex needs related to functional status, cognitive ability, or social support system  Outcome: Progressing     Problem: Knowledge Deficit  Goal: Patient/family/caregiver demonstrates understanding of disease process, treatment plan, medications, and discharge instructions  Description: Complete learning assessment and assess knowledge base.  Interventions:  - Provide teaching at level of understanding  - Provide teaching via preferred learning methods  Outcome: Progressing     Problem: Prexisting or High Potential for Compromised Skin Integrity  Goal: Skin integrity is maintained or improved  Description: INTERVENTIONS:  - Identify patients at risk for skin breakdown  - Assess and monitor skin integrity  - Assess and monitor nutrition and hydration status  - Monitor labs   -  Assess for incontinence   - Turn and reposition patient  - Assist with mobility/ambulation  - Relieve pressure over bony prominences  - Avoid friction and shearing  - Provide appropriate hygiene as needed including keeping skin clean and dry  - Evaluate need for skin moisturizer/barrier cream  - Collaborate with interdisciplinary team   - Patient/family teaching  - Consider wound care consult   Outcome: Progressing     Problem: NEUROSENSORY - ADULT  Goal: Achieves stable or improved neurological status  Description: INTERVENTIONS  - Monitor and report changes in neurological status  - Monitor vital signs such as temperature, blood pressure, glucose, and any other labs ordered   - Initiate measures to prevent increased intracranial pressure  - Monitor for seizure activity and implement precautions if appropriate      Outcome: Progressing  Goal: Remains free of injury related to seizures activity  Description: INTERVENTIONS  - Maintain airway, patient safety  and administer oxygen as ordered  - Monitor patient for seizure activity, document and report duration and description of seizure to physician/advanced practitioner  - If seizure occurs,  ensure patient safety during seizure  - Reorient patient post seizure  - Seizure pads on all 4 side rails  - Instruct patient/family to notify RN of any seizure activity including if an aura is experienced  - Instruct patient/family to call for assistance with activity based on nursing assessment  - Administer anti-seizure medications if ordered    Outcome: Progressing  Goal: Achieves maximal functionality and self care  Description: INTERVENTIONS  - Monitor swallowing and airway patency with patient fatigue and changes in neurological status  - Encourage and assist patient to increase activity and self care.   - Encourage visually impaired, hearing impaired and aphasic patients to use assistive/communication devices  Outcome: Progressing     Problem: RESPIRATORY - ADULT  Goal:  Achieves optimal ventilation and oxygenation  Description: INTERVENTIONS:  - Assess for changes in respiratory status  - Assess for changes in mentation and behavior  - Position to facilitate oxygenation and minimize respiratory effort  - Oxygen administered by appropriate delivery if ordered  - Initiate smoking cessation education as indicated  - Encourage broncho-pulmonary hygiene including cough, deep breathe, Incentive Spirometry  - Assess the need for suctioning and aspirate as needed  - Assess and instruct to report SOB or any respiratory difficulty  - Respiratory Therapy support as indicated  Outcome: Progressing     Problem: GENITOURINARY - ADULT  Goal: Maintains or returns to baseline urinary function  Description: INTERVENTIONS:  - Assess urinary function  - Encourage oral fluids to ensure adequate hydration if ordered  - Administer IV fluids as ordered to ensure adequate hydration  - Administer ordered medications as needed  - Offer frequent toileting  - Follow urinary retention protocol if ordered  Outcome: Progressing  Goal: Absence of urinary retention  Description: INTERVENTIONS:  - Assess patient’s ability to void and empty bladder  - Monitor I/O  - Bladder scan as needed  - Discuss with physician/AP medications to alleviate retention as needed  - Discuss catheterization for long term situations as appropriate  Outcome: Progressing  Goal: Urinary catheter remains patent  Description: INTERVENTIONS:  - Assess patency of urinary catheter  - If patient has a chronic sr, consider changing catheter if non-functioning  - Follow guidelines for intermittent irrigation of non-functioning urinary catheter  Outcome: Progressing     Problem: HEMATOLOGIC - ADULT  Goal: Maintains hematologic stability  Description: INTERVENTIONS  - Assess for signs and symptoms of bleeding or hemorrhage  - Monitor labs  - Administer supportive blood products/factors as ordered and appropriate  Outcome: Progressing

## 2024-06-10 NOTE — WOUND OSTOMY CARE
Progress Note - Wound   Ash Loaiza 58 y.o. male MRN: [id number]  Unit/Bed#: -01 Encounter: [id number]      History of present Illness;  58 year old male presented to Ed from home. Experienced nose bleed at home, pt mother concerned about aspiration.PMH HFpEF CKD cirrhosis, developmental delay epilepsy.     Assessment:   1)Buttocks and sacrum hyperpigmentation, intact. Mepilex foam as preventative measure  2)Bilateral heels dry and intact, bilateral heel foams and bilateral EHOB foam boots on  3)Left anterior distal tibai, dry yellow tan flaky tissue. Open to air  4)Right anterior tibia, dry intact linear scab.   Pt mother present, did not wish to have dressing on tibias.    No induration, fluctuance, odor, warmth/temperature differences, redness, or purulence noted to the above noted wounds and skin areas assessed. New dressings applied per orders listed below. Patient tolerated well- no s/s of non-verbal pain or discomfort observed during the encounter. Bedside nurse aware of plan of care. See flow sheets for more detailed assessment findings. Wound care will sign off      Skin Care orders:  1- Cleanse sacral buttocks with soap and water apply silicone foam abraham with a P and date check skin integrity every shift change every 3 days or if soiled   2-EHOB  cushion when out of bed in chair.  3-Moisturize skin daily with skin nourishing cream  4-Elevate heels to offload pressure.  5-Turn/reposition q2h for pressure re-distribution on skin  6. Silicone foam to bilateral heels abraham with a P and date peel and check skin integrity every shift change every 3 days   7. Right lower leg - small partial thickness wound cleanse with soap and water then apply small silicone foam change every 3 days   8. Bilateral prevalon boots     Wound Care will sign off  Call or Tigertext with any questions    Galilea STILESN RN CWON

## 2024-06-10 NOTE — PROGRESS NOTES
Pastoral Care Progress Note    6/10/2024  Patient: Ash Loaiza : 1965  Admission Date & Time: 2024 1410  MRN: [id number] [facility]: [id number]     attempted support visit to family, no family currently present.              06/10/24 1100   Clinical Encounter Type   Visited With Patient not available

## 2024-06-10 NOTE — ASSESSMENT & PLAN NOTE
Recurrent aspiration pneumonia.  He had a hospitalization in April 2024 at [facility].   IV Zosyn. complete total 7 days of treatment.  Speech therapy evaluated patient and recommended Pureed with nectar thick liquid with crushed meds.

## 2024-06-10 NOTE — CONSULTS
Consultation - Cardiology   Ash Loaiza 58 y.o. 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 year old male with history of developmental delay, dysphagia with recurrent aspiration pneumonia, cirrhosis, CKD3, epilepsy presented to the hospital a week ago for concerns with concern with recurrent aspiration. He was found to be in acute resp failure secondary to CHFpEF and aspiration pneumonia. He was found to have bl pleural effusions that required thoracenteiss that was successful. He was given IV lasix but developed hypotension and renal function worsened.  His spironolactone was resumed. Since then his pleural effusions have recurred. Medicine team is again considering another thoracentesis.    Of note, patient's last admission showed evidence of constrictive pericardium. His chest CT shows evidence of calcified pericardium without effusion. It does not appear that he regularly sees cardiology as an OP.    Inpatient consult to Cardiology  Consult performed by: Jailyn Singh PA-C  Consult ordered by: Yomaira Jenkins MD        Review of Systems   Unable to perform ROS: Patient nonverbal       Historical Information   Past  "Medical History:   Diagnosis Date    Hypertension     Mentally challenged     Pericardial effusion     Pneumonia     Seizure (HCC)      Past Surgical History:   Procedure Laterality Date    FRACTURE SURGERY      clavicle, left humerus, radial, and ulna.  Right radius    HIP ARTHROSCOPY Right     IR THORACENTESIS  11/20/2023    IR THORACENTESIS  6/3/2024    OH COLONOSCOPY FLX DX W/COLLJ SPEC WHEN PFRMD N/A 4/1/2019    Procedure: COLONOSCOPY;  Surgeon: Avi Guzman MD;  Location: [facility];  Service: Colorectal     Social History     Substance and Sexual Activity   Alcohol Use Never     Social History     Substance and Sexual Activity   Drug Use Never     E-Cigarette/Vaping    E-Cigarette Use Never User      E-Cigarette/Vaping Substances    Nicotine No     THC No     CBD No     Flavoring No     Other No     Unknown No      Social History     Tobacco Use   Smoking Status Never   Smokeless Tobacco Never     Family History:  unknown    Meds/Allergies   all current active meds have been reviewed  Allergies   Allergen Reactions    Budesonide Seizures     Other reaction(s): Seizure    Dilantin [Phenytoin]      Pericardial effusion    Flurazepam Other (See Comments)     dalmane    Other reaction(s): MADW HIM RAMMEY   dalmane    Ipratropium-Albuterol Seizures     Other reaction(s): Seizures    Phenobarbital Hyperactivity    Vyvanse [Lisdexamfetamine Dimesylate] Other (See Comments)     Unknown        Objective   Vitals: Blood pressure 116/73, pulse 81, temperature 98.1 °F (36.7 °C), temperature source Temporal, resp. rate 20, height 5' 6\" (1.676 m), weight 93.9 kg (207 lb), SpO2 91%.  Orthostatic Blood Pressures      Flowsheet Row Most Recent Value   Blood Pressure 116/73 filed at 06/10/2024 0831   Patient Position - Orthostatic VS Lying filed at 06/10/2024 0700              Intake/Output Summary (Last 24 hours) at 6/10/2024 0851  Last data filed at 6/9/2024 2324  Gross per 24 hour   Intake 840 ml   Output 1070 ml "   Net -230 ml       Invasive Devices       Peripheral Intravenous Line  Duration             Peripheral IV 06/08/24 Proximal;Right;Ventral (anterior) Forearm 2 days                    Physical Exam  Vitals and nursing note reviewed.   Constitutional:       General: He is not in acute distress.  HENT:      Head: Normocephalic.   Cardiovascular:      Rate and Rhythm: Normal rate and regular rhythm.      Heart sounds: No murmur heard.  Pulmonary:      Effort: No respiratory distress.      Breath sounds: Rales present.   Musculoskeletal:         General: No swelling.   Skin:     General: Skin is warm.   Neurological:      Mental Status: Mental status is at baseline.         Lab Results: I have personally reviewed pertinent lab results.    CBC with diff:   Results from last 7 days   Lab Units 06/09/24  0458   WBC Thousand/uL 6.33   RBC Million/uL 3.01*   HEMOGLOBIN g/dL 9.7*   HEMATOCRIT % 30.9*   MCV fL 103*   MCH pg 32.2   MCHC g/dL 31.4   RDW % 16.1*   MPV fL 11.1   PLATELETS Thousands/uL 131*     CMP:   Results from last 7 days   Lab Units 06/10/24  0603   SODIUM mmol/L 141   CHLORIDE mmol/L 108   CO2 mmol/L 29   BUN mg/dL 38*   CREATININE mg/dL 2.35*   CALCIUM mg/dL 8.2*   EGFR ml/min/1.73sq m 29     HS Troponin:   0   Lab Value Date/Time    HSTNI 11 06/02/2024 1617    HSTNI0 82 (H) 01/27/2024 1631    HSTNI2 114 (H) 01/27/2024 1823    HSTNI4 92 (H) 01/27/2024 2224    HSTNI4 23 11/17/2023 2251     BNP:   Results from last 7 days   Lab Units 06/10/24  0603   POTASSIUM mmol/L 4.2   CHLORIDE mmol/L 108   CO2 mmol/L 29   BUN mg/dL 38*   CREATININE mg/dL 2.35*   CALCIUM mg/dL 8.2*   EGFR ml/min/1.73sq m 29     Coags:     TSH:     Magnesium:     Lipid Profile:     Imaging: I have personally reviewed pertinent reports.    EKG: Normal sinus rhythm  Incomplete right bundle branch block  ST & T wave abnormality, consider inferior ischemia  ST & T wave abnormality, consider anterolateral ischemia  Abnormal ECG  When compared  with ECG of 27-JAN-2024 19:20,  T wave inversion now evident in Anterior leads  QT has shortened  Confirmed by Serafin Nielsen      Echo 4/7/2024:    Left Ventricle: Left ventricle is normal in size. Systolic function is   normal with an ejection fraction of 60-65%. Wall motion is within normal   limits. Indeterminate diastolic function due to incomplete   data/conflicting data.     Left Atrium: Left atrium cavity is severely dilated.     Right Ventricle: Right ventricle cavity is normal. Systolic function is   normal.    IVC/SVC: The inferior vena cava demonstrates a diameter of >21 mm and   collapses <50%; therefore, the right atrial pressure is estimated at   greater than 15 mmHg.       Septal e' > lateral e' , diastolic dysfunction and SEPTAL bounce favor the   diagnosis of constrictive pericarditis.

## 2024-06-10 NOTE — BRIEF OP NOTE (RAD/CATH)
INTERVENTIONAL RADIOLOGY PROCEDURE NOTE    Date: 6/10/2024    Procedure: Right thoracentesis  Procedure Summary       Date:  Room / Location:     Anesthesia Start:  Anesthesia Stop:     Procedure:  Diagnosis:     Scheduled Providers:  Responsible Provider:     Anesthesia Type: Not recorded ASA Status: Not recorded            Preoperative diagnosis:   1. Acute respiratory failure with hypoxia (HCC)    2. Acute decompensated heart failure (HCC)    3. Acute on chronic diastolic heart failure (HCC)    4. Bilateral lower extremity edema    5. Anasarca    6. Chronic kidney disease, unspecified CKD stage    7. Bleeding nose         Postoperative diagnosis: Same.    Surgeon: Ayden Cuevas MD     Assistant: None. No qualified resident was available.    Blood loss: None    Specimens: None     Findings: Right thoracentesis and 950 ml isidro colored fluid aspirated    Complications: None immediate.    Anesthesia: local

## 2024-06-11 ENCOUNTER — APPOINTMENT (INPATIENT)
Dept: RADIOLOGY | Facility: HOSPITAL | Age: 59
DRG: 177 | End: 2024-06-11
Payer: MEDICARE

## 2024-06-11 LAB
ANION GAP SERPL CALCULATED.3IONS-SCNC: 5 MMOL/L (ref 4–13)
BUN SERPL-MCNC: 40 MG/DL (ref 5–25)
CALCIUM SERPL-MCNC: 8.6 MG/DL (ref 8.4–10.2)
CHLORIDE SERPL-SCNC: 107 MMOL/L (ref 96–108)
CO2 SERPL-SCNC: 29 MMOL/L (ref 21–32)
CREAT SERPL-MCNC: 2.36 MG/DL (ref 0.6–1.3)
GFR SERPL CREATININE-BSD FRML MDRD: 29 ML/MIN/1.73SQ M
GLUCOSE SERPL-MCNC: 89 MG/DL (ref 65–140)
POTASSIUM SERPL-SCNC: 4.2 MMOL/L (ref 3.5–5.3)
SODIUM SERPL-SCNC: 141 MMOL/L (ref 135–147)

## 2024-06-11 PROCEDURE — 80048 BASIC METABOLIC PNL TOTAL CA: CPT | Performed by: FAMILY MEDICINE

## 2024-06-11 PROCEDURE — 97530 THERAPEUTIC ACTIVITIES: CPT

## 2024-06-11 PROCEDURE — 99232 SBSQ HOSP IP/OBS MODERATE 35: CPT | Performed by: FAMILY MEDICINE

## 2024-06-11 PROCEDURE — 71045 X-RAY EXAM CHEST 1 VIEW: CPT

## 2024-06-11 PROCEDURE — 92526 ORAL FUNCTION THERAPY: CPT

## 2024-06-11 RX ORDER — FUROSEMIDE 10 MG/ML
40 INJECTION INTRAMUSCULAR; INTRAVENOUS
Status: DISCONTINUED | OUTPATIENT
Start: 2024-06-11 | End: 2024-06-12

## 2024-06-11 RX ORDER — FUROSEMIDE 10 MG/ML
40 INJECTION INTRAMUSCULAR; INTRAVENOUS ONCE
Status: COMPLETED | OUTPATIENT
Start: 2024-06-11 | End: 2024-06-11

## 2024-06-11 RX ADMIN — Medication 1 APPLICATION: at 21:37

## 2024-06-11 RX ADMIN — Medication 1 APPLICATION: at 09:21

## 2024-06-11 RX ADMIN — Medication 2 G: at 21:37

## 2024-06-11 RX ADMIN — Medication 2 G: at 17:39

## 2024-06-11 RX ADMIN — SALINE NASAL SPRAY 2 SPRAY: 1.5 SOLUTION NASAL at 21:37

## 2024-06-11 RX ADMIN — LEVOCARNITINE 250 MG: 1 SOLUTION ORAL at 17:41

## 2024-06-11 RX ADMIN — PYRIDOXINE HCL TAB 50 MG 100 MG: 50 TAB at 09:20

## 2024-06-11 RX ADMIN — LEVOCARNITINE 250 MG: 1 SOLUTION ORAL at 09:17

## 2024-06-11 RX ADMIN — LAMOTRIGINE 300 MG: 100 TABLET ORAL at 21:36

## 2024-06-11 RX ADMIN — PRIMIDONE 150 MG: 50 TABLET ORAL at 21:36

## 2024-06-11 RX ADMIN — PRIMIDONE 100 MG: 50 TABLET ORAL at 09:20

## 2024-06-11 RX ADMIN — FUROSEMIDE 40 MG: 10 INJECTION, SOLUTION INTRAMUSCULAR; INTRAVENOUS at 12:32

## 2024-06-11 RX ADMIN — METOPROLOL TARTRATE 25 MG: 25 TABLET, FILM COATED ORAL at 17:46

## 2024-06-11 RX ADMIN — LAMOTRIGINE 250 MG: 100 TABLET ORAL at 13:30

## 2024-06-11 RX ADMIN — SALINE NASAL SPRAY 2 SPRAY: 1.5 SOLUTION NASAL at 17:43

## 2024-06-11 RX ADMIN — ZONISAMIDE 100 MG: 100 CAPSULE ORAL at 21:36

## 2024-06-11 RX ADMIN — LEVOCARNITINE 250 MG: 1 SOLUTION ORAL at 12:33

## 2024-06-11 RX ADMIN — CLONAZEPAM 0.5 MG: 0.5 TABLET ORAL at 21:36

## 2024-06-11 RX ADMIN — LAMOTRIGINE 200 MG: 100 TABLET ORAL at 09:19

## 2024-06-11 RX ADMIN — Medication 1 APPLICATION: at 17:43

## 2024-06-11 RX ADMIN — Medication 2 G: at 09:21

## 2024-06-11 RX ADMIN — Medication 2 G: at 12:33

## 2024-06-11 RX ADMIN — SALINE NASAL SPRAY 2 SPRAY: 1.5 SOLUTION NASAL at 09:21

## 2024-06-11 RX ADMIN — SALINE NASAL SPRAY 2 SPRAY: 1.5 SOLUTION NASAL at 12:33

## 2024-06-11 RX ADMIN — Medication 1 APPLICATION: at 12:33

## 2024-06-11 RX ADMIN — PRIMIDONE 100 MG: 50 TABLET ORAL at 13:30

## 2024-06-11 RX ADMIN — FUROSEMIDE 40 MG: 10 INJECTION, SOLUTION INTRAMUSCULAR; INTRAVENOUS at 17:46

## 2024-06-11 RX ADMIN — LEVOCARNITINE 250 MG: 1 SOLUTION ORAL at 21:37

## 2024-06-11 NOTE — PROGRESS NOTES
[facility]  Progress Note  Name: Ash Loaiza I  MRN: [id number]  Unit/Bed#: -01 I Date of Admission: 6/2/2024   Date of Service: 6/11/2024 I Hospital Day: 9    Assessment & Plan   Acute respiratory failure with hypoxia (HCC)  Assessment & Plan  Multifactorial secondary to heart failure, pleural effusions recurrent aspiration pneumonia.  Continue with oxygen.  Because of recurrent episode of epistaxis, ENT was consulted and recommended using humidified oxygen via facemask  epistaxis has resolved    Acute on chronic heart failure with preserved ejection fraction (HCC)  Assessment & Plan  Patient with bilateral pleural effusions, received 80 mg of Lasix IV in the emergency room.  At home he takes torsemide and Aldactone.  Initially started on Lasix 40 mg IV twice daily.  Has had good urine output with -2.4 L in the last 24 hours.  Had subsequent hypotension and elevation in creatinine.  Diuretics on hold since 6/5.  Continue to hold furosemide for now.  Will resume Aldactone if blood pressure tolerates.  Continue to monitor cardiorespiratory status and renal functions closely..  Of note patient is s/p bilateral thoracentesis with 425 mL and 975 mL.  Currently continues to be euvolemic.  Continue to monitor off diuretics.  Give 1 dose of Lasix on 6 /7 and repeat again on 6/9.  Repeat chest x-ray shows persistent large right-sided effusion.  Will need thoracentesis again.  IR formed right-sided thoracentesis again 6/10 and removed 950 cc of isidro-colored fluid resume oral Lasix.  However repeat chest x-ray today again shows large right pleural effusion.  Will place on Lasix 40 mg IV twice daily overall prognosis is guarded at this time with rapidly recurrent pleural effusion and rising creatinine  Wt Readings from Last 3 Encounters:   06/02/24 93.9 kg (207 lb)   01/30/24 79.3 kg (174 lb 13.2 oz)   12/08/23 106 kg (234 lb 9.1 oz)         Bilateral pleural effusion  Assessment &  Plan  Moderately large right and moderate size left pleural effusion.  On previous admission in April 2024, he had a right-sided thoracentesis which was transudative.  S/p bilateral thoracentesis on 6/4.  Respiratory status has remained stable subsequently.  However repeat x-ray still shows large right-sided effusion.  Patient is hemodynamically stable with no increased oxygen requirement.  repeat thoracentesis on 6/10with  cc removed  Repeat iv lasix again pleural effusion has reaccumulated again    Developmental delay  Assessment & Plan  Since birth, patient is adopted.  It is unknown why he has the mental delay if it is due to anoxic brain injury or cerebral palsy.               VTE Pharmacologic Prophylaxis: VTE Score: 6 Moderate Risk (Score 3-4) - Pharmacological DVT Prophylaxis Contraindicated. Sequential Compression Devices Ordered.    Mobility:   Basic Mobility Inpatient Raw Score: 7  JH-HLM Goal: 2: Bed activities/Dependent transfer  JH-HLM Achieved: 4: Move to chair/commode  JH-HLM Goal achieved. Continue to encourage appropriate mobility.    Patient Centered Rounds: I performed bedside rounds with nursing staff today.   Discussions with Specialists or Other Care Team Provider: kareem cardiology    Education and Discussions with Family / Patient: Updated  (mother) via phone.    Total Time Spent on Date of Encounter in care of patient: 35 mins. This time was spent on one or more of the following: performing physical exam; counseling and coordination of care; obtaining or reviewing history; documenting in the medical record; reviewing/ordering tests, medications or procedures; communicating with other healthcare professionals and discussing with patient's family/caregivers.    Current Length of Stay: 9 day(s)  Current Patient Status: Inpatient   Certification Statement: The patient will continue to require additional inpatient hospital stay due to acute resp failure  Discharge Plan:  Anticipate discharge in 24-48 hrs to discharge location to be determined pending rehab evaluations.    Code Status: Level 3 - DNAR and DNI    Subjective:   Patient denies any complaints .answers few questions briefly    Objective:     Vitals:   Temp (24hrs), Av.3 °F (36.8 °C), Min:98.2 °F (36.8 °C), Max:98.4 °F (36.9 °C)    Temp:  [98.2 °F (36.8 °C)-98.4 °F (36.9 °C)] 98.2 °F (36.8 °C)  HR:  [69-89] 89  Resp:  [18-20] 18  BP: (105-118)/(70-77) 116/77  SpO2:  [92 %-97 %] 92 %  Body mass index is 33.41 kg/m².     Input and Output Summary (last 24 hours):     Intake/Output Summary (Last 24 hours) at 2024 1413  Last data filed at 2024 1301  Gross per 24 hour   Intake 840 ml   Output 100 ml   Net 740 ml       Physical Exam:   Physical Exam  Vitals and nursing note reviewed.   Constitutional:       Appearance: Normal appearance.   HENT:      Head: Normocephalic and atraumatic.      Right Ear: External ear normal.      Left Ear: External ear normal.      Nose: Nose normal.      Mouth/Throat:      Pharynx: Oropharynx is clear.   Eyes:      Pupils: Pupils are equal, round, and reactive to light.   Cardiovascular:      Rate and Rhythm: Normal rate and regular rhythm.      Heart sounds: Normal heart sounds.   Pulmonary:      Effort: Pulmonary effort is normal.      Breath sounds: Rales present.   Abdominal:      General: Bowel sounds are normal.      Palpations: Abdomen is soft.      Tenderness: There is no abdominal tenderness.   Musculoskeletal:         General: Normal range of motion.      Cervical back: Normal range of motion and neck supple.   Skin:     General: Skin is warm and dry.      Capillary Refill: Capillary refill takes less than 2 seconds.   Neurological:      Mental Status: He is alert. Mental status is at baseline.   Psychiatric:         Mood and Affect: Mood normal.            Additional Data:     Labs:  Results from last 7 days   Lab Units 24  0458   WBC Thousand/uL 6.33   HEMOGLOBIN g/dL  9.7*   HEMATOCRIT % 30.9*   PLATELETS Thousands/uL 131*   SEGS PCT % 82*   LYMPHO PCT % 9*   MONO PCT % 9   EOS PCT % 0     Results from last 7 days   Lab Units 06/11/24  0627   SODIUM mmol/L 141   POTASSIUM mmol/L 4.2   CHLORIDE mmol/L 107   CO2 mmol/L 29   BUN mg/dL 40*   CREATININE mg/dL 2.36*   ANION GAP mmol/L 5   CALCIUM mg/dL 8.6   GLUCOSE RANDOM mg/dL 89         Results from last 7 days   Lab Units 06/05/24  0439   POC GLUCOSE mg/dl 85         Results from last 7 days   Lab Units 06/07/24  0443   PROCALCITONIN ng/ml 0.70*       Lines/Drains:  Invasive Devices       Peripheral Intravenous Line  Duration             Peripheral IV 06/08/24 Proximal;Right;Ventral (anterior) Forearm 3 days                          Imaging: Reviewed radiology reports from this admission including: chest xray    Recent Cultures (last 7 days):         Last 24 Hours Medication List:   Current Facility-Administered Medications   Medication Dose Route Frequency Provider Last Rate    acetaminophen  650 mg Oral Q6H PRN Iris Rivas MD      clonazePAM  0.5 mg Oral HS Iris Rivas MD      Diclofenac Sodium  2 g Topical 4x Daily Iris Rivas MD      furosemide  40 mg Intravenous BID (diuretic) Yomaira Jenkins MD      lactulose  20 g Oral Daily Iris Rivas MD      lamoTRIgine  200 mg Oral Daily Iris Rivas MD      And    lamoTRIgine  250 mg Oral After Lunch Iris Rivas MD      And    lamoTRIgine  300 mg Oral HS Iris Rivas MD      levOCARNitine  1,000 mg/day Oral 4x Daily (with meals and at bedtime) Iris Rivas MD      metoprolol tartrate  25 mg Oral BID Iris Rivas MD      primidone  100 mg Oral QAM Iris Rivas MD      And    primidone  100 mg Oral After Lunch Iris Rivas MD      And    primidone  150 mg Oral HS Iris Rivas MD      pyridoxine  100 mg Oral Daily Iris Rivas MD      sodium chloride  1 Application Nasal 4x Daily (PC & HS) Jonatan Biggs MD      sodium chloride  2 spray Each Nare 4x Daily (PC & HS) Jonatan Biggs MD       zonisamide  100 mg Oral HS Iris Rivas MD          Today, Patient Was Seen By: Yomaira Jenkins MD    **Please Note: This note may have been constructed using a voice recognition system.**

## 2024-06-11 NOTE — CASE MANAGEMENT
Case Management Discharge Planning Note    Patient name Ash Loaiza  Location /-01 MRN [id number]  : 1965 Date 2024       Current Admission Date: 2024  Current Admission Diagnosis:Pneumonia of both lungs due to infectious organism   Patient Active Problem List    Diagnosis Date Noted Date Diagnosed    Encephalopathy 2024     Poor prognosis 2024     Azul catheter in place 2024     Urinary retention 2024     Elevated troponin 2024     Stage 3b chronic kidney disease (HCC) 2024     Acute blood loss anemia 2023     Epistaxis 2023     Chronic respiratory failure with hypercapnia ([facility]) 2023     Acute kidney injury superimposed on chronic kidney disease 3 2023     Chronic diastolic HF (heart failure) ([facility]) 2021     Liver cirrhosis ([facility]) 2021     Abnormal finding on CT scan 2021     EDWIGE (acute kidney injury) ([facility]) 2021     Bilateral pleural effusion 2021     History of COVID-19 2021     S/P pericardial window creation 2021     Acute on chronic heart failure with preserved ejection fraction ([facility]) 2021     Lower extremity pain, left 2021     MRSA nasal colonization 2020     Developmental delay 2020     Dysphagia 2020     Pneumonia of both lungs due to infectious organism 01/10/2020     Nonintractable generalized idiopathic epilepsy without status epilepticus (HCC) 01/10/2020     Falls 01/10/2020     T wave inversion in EKG 01/10/2020     Essential hypertension 01/10/2020     Acute respiratory failure with hypoxia ([facility]) 01/10/2020     Polyp of colon 03/15/2019       LOS (days): 9  Geometric Mean LOS (GMLOS) (days): 4.1  Days to GMLOS:-4.7     OBJECTIVE:  Risk of Unplanned Readmission Score: 22.3         Current admission status: Inpatient   Preferred Pharmacy:   [id number] - [place], [place] - [address]  [address]  [place] [address]  Phone: [phone] Fax: [phone]    Primary Care Provider: Jose G Holloway DO    Primary Insurance: MEDICARE  Secondary Insurance: [place] [employer]    DISCHARGE DETAILS:        CM updated [facility] on current medical status. CM spoke with mother Kylie Loaiza, she is requesting to present during therapy sessions at [facility] and requesting a hospital bed to bed ordered prior to him going home from the rehab center. I messaged [facility] via [employer] to make them aware of mothers request. [facility] responded they will make therapy and social service aware of request.

## 2024-06-11 NOTE — ASSESSMENT & PLAN NOTE
Moderately large right and moderate size left pleural effusion.  On previous admission in April 2024, he had a right-sided thoracentesis which was transudative.  S/p bilateral thoracentesis on 6/4.  Respiratory status has remained stable subsequently.  However repeat x-ray still shows large right-sided effusion.  Patient is hemodynamically stable with no increased oxygen requirement.  repeat thoracentesis on 6/10with  cc removed  Repeat iv lasix again pleural effusion has reaccumulated again

## 2024-06-11 NOTE — PROGRESS NOTES
"Progress Note - Cardiology   Ash Loaiza 58 y.o. 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 BP better with dc spironolactone. Sp oral lasix yesterday    Vitals: /73   Pulse 80   Temp 98.2 °F (36.8 °C)   Resp 18   Ht 5' 6\" (1.676 m)   Wt 93.9 kg (207 lb)   SpO2 92%   BMI 33.41 kg/m²   Vitals:    06/02/24 1412 06/02/24 2326   Weight: 94 kg (207 lb 3.7 oz) 93.9 kg (207 lb)     Orthostatic Blood Pressures      Flowsheet Row Most Recent Value   Blood Pressure 108/73 filed at 06/11/2024 0919   Patient Position - Orthostatic VS Lying filed at 06/10/2024 1500              Intake/Output Summary (Last 24 hours) at 6/11/2024 1040  Last data filed at 6/11/2024 0700  Gross per 24 hour   Intake 940 ml   Output 1050 ml   Net -110 ml       Invasive Devices       Peripheral Intravenous Line  Duration             Peripheral IV 06/08/24 Proximal;Right;Ventral (anterior) Forearm 3 days                             Physical Exam  Vitals and nursing note reviewed.   Constitutional:       General: He is not in acute distress.  HENT:      Head: Normocephalic.   Cardiovascular:      Rate and Rhythm: Normal rate and regular rhythm.      Heart sounds: No murmur heard.  Pulmonary:      Effort: No respiratory distress.      " Breath sounds: Rales present.   Musculoskeletal:         General: No swelling.   Skin:     General: Skin is warm.   Neurological:      Mental Status: Mental status is at baseline.           Lab Results: I have personally reviewed pertinent lab results.    CBC with diff:   Results from last 7 days   Lab Units 06/09/24  0458   WBC Thousand/uL 6.33   RBC Million/uL 3.01*   HEMOGLOBIN g/dL 9.7*   HEMATOCRIT % 30.9*   MCV fL 103*   MCH pg 32.2   MCHC g/dL 31.4   RDW % 16.1*   MPV fL 11.1   PLATELETS Thousands/uL 131*     CMP:   Results from last 7 days   Lab Units 06/11/24  0627   SODIUM mmol/L 141   CHLORIDE mmol/L 107   CO2 mmol/L 29   BUN mg/dL 40*   CREATININE mg/dL 2.36*   CALCIUM mg/dL 8.6   EGFR ml/min/1.73sq m 29     HS Troponin:   0   Lab Value Date/Time    HSTNI 11 06/02/2024 1617    HSTNI0 82 (H) 01/27/2024 1631    HSTNI2 114 (H) 01/27/2024 1823    HSTNI4 92 (H) 01/27/2024 2224    HSTNI4 23 11/17/2023 2251     BNP:   Results from last 7 days   Lab Units 06/11/24  0627   POTASSIUM mmol/L 4.2   CHLORIDE mmol/L 107   CO2 mmol/L 29   BUN mg/dL 40*   CREATININE mg/dL 2.36*   CALCIUM mg/dL 8.6   EGFR ml/min/1.73sq m 29     Coags:     TSH:     Magnesium:     Lipid Profile:     Imaging: I have personally reviewed pertinent reports.      EKG: Normal sinus rhythm  Incomplete right bundle branch block  ST & T wave abnormality, consider inferior ischemia  ST & T wave abnormality, consider anterolateral ischemia  Abnormal ECG  When compared with ECG of 27-JAN-2024 19:20,  T wave inversion now evident in Anterior leads  QT has shortened  Confirmed by Serafin Nielsen        Echo 4/7/2024:    Left Ventricle: Left ventricle is normal in size. Systolic function is   normal with an ejection fraction of 60-65%. Wall motion is within normal   limits. Indeterminate diastolic function due to incomplete   data/conflicting data.     Left Atrium: Left atrium cavity is severely dilated.     Right Ventricle: Right ventricle cavity is  normal. Systolic function is   normal.    IVC/SVC: The inferior vena cava demonstrates a diameter of >21 mm and   collapses <50%; therefore, the right atrial pressure is estimated at   greater than 15 mmHg.       Septal e' > lateral e' , diastolic dysfunction and SEPTAL bounce favor the   diagnosis of constrictive pericarditis.

## 2024-06-11 NOTE — PLAN OF CARE

## 2024-06-11 NOTE — PLAN OF CARE
Problem: PHYSICAL THERAPY ADULT  Goal: Performs mobility at highest level of function for planned discharge setting.  See evaluation for individualized goals.  Description: Treatment/Interventions: ADL retraining, Functional transfer training, LE strengthening/ROM, Elevations, Therapeutic exercise, Endurance training, Patient/family training, Cognitive reorientation, Equipment eval/education, Bed mobility, Gait training, Compensatory technique education, Spoke to nursing, Spoke to case management, OT  Equipment Recommended:  (should patient return to home instead of post acute rehab, recommend hospital bed, lizz lift and caregivers.)       See flowsheet documentation for full assessment, interventions and recommendations.  Outcome: Not Progressing  Note: Prognosis: Fair  Problem List: Decreased strength, Decreased endurance, Impaired balance, Decreased mobility, Decreased range of motion, Decreased cognition, Impaired judgement, Decreased safety awareness, Obesity, Decreased skin integrity  Assessment: Pt tolerated session poorly. He has increased pain of buttock with noted open area. PCA present and notifying RN. He continues to require significant assistance with all mobility. He requires maxAx2 to achieve sitting in recliner chair. He is limited by increased pain, decreased strength, balance and endurance. He did demonstrate improved sitting balance with verbal cues. He will continue to benefit from PT services to maximize LOF.  Barriers to Discharge: Decreased caregiver support, Inaccessible home environment  Barriers to Discharge Comments: pt requires significant assistance to complete mobility, increased fall risk. has support from mother, but would beenfit from icnreased support from daily aides. recommend post acute rehab  Rehab Resource Intensity Level, PT: II (Moderate Resource Intensity)    See flowsheet documentation for full assessment.

## 2024-06-11 NOTE — PHYSICAL THERAPY NOTE
"   PHYSICAL THERAPY TREATMENT NOTE  NAME:  Ash Loaiza  DATE: 06/11/24    Length Of Stay: 9  Performed at least 2 patient identifiers during session: Name and Birthday    TREATMENT FLOW SHEET:    06/11/24 1049   PT Last Visit   PT Visit Date 06/11/24   Note Type   Note Type Treatment   Pain Assessment   Pain Assessment Tool FLACC   Pain Score No Pain  (at rest)   Pain Location/Orientation Location: Buttocks   Pain Rating: FLACC (Rest) - Face 0   Pain Rating: FLACC (Rest) - Legs 0   Pain Rating: FLACC (Rest) - Activity 0   Pain Rating: FLACC (Rest) - Cry 0   Pain Rating: FLACC (Rest) - Consolability 0   Score: FLACC (Rest) 0   Pain Rating: FLACC (Activity) - Face 1   Pain Rating: FLACC (Activity) - Legs 1   Pain Rating: FLACC (Activity) - Activity 1   Pain Rating: FLACC (Activity) - Cry 1   Pain Rating: FLACC (Activity) - Consolability 0   Score: FLACC (Activity) 4   Restrictions/Precautions   Other Precautions Cognitive;Chair Alarm;Bed Alarm;Fall Risk;Pain   General   Chart Reviewed Yes   Response to Previous Treatment Patient with no complaints from previous session.   Cognition   Orientation Level Oriented to person;Oriented to place;Oriented to time;Oriented to situation   Following Commands Follows one step commands with increased time or repetition   Comments repeated cues, inc time to respond   Subjective   Subjective \"It wasn't on TV\" (referring to Boris)   Bed Mobility   Rolling R 2  Maximal assistance   Additional items Assist x 1;Increased time required;Verbal cues   Rolling L 2  Maximal assistance   Additional items Assist x 1;Increased time required;Verbal cues   Supine to Sit   (max and modAx2)   Additional items Assist x 2;Increased time required;Verbal cues;LE management  (trunk management)   Additional Comments HOB elevated > 30 degrees, requires maxAx1 and modAx1 to ahcieve sitting on EOB. upon sitting EOB required modAx1 to SBA to mainatin sititng wiht verbal and manual cues for ant wt " shifting. sat EOB. rolling right and left wiht maxAx1 wiht manual cues for trunk management. sat EOB 5' with modAx1 to SBA.   Transfers   Sit pivot 2  Maximal assistance   Additional items Assist x 2;Increased time required;Verbal cues   Additional Comments no AD. drop arm recliner for sit pivot transfer to right with multiple sit pivots with maxAx2 with manua cues for wt shifting and B knees and feet blocked. inc time and effort to complete.   Balance   Static Sitting   (poor to fair -)   Dynamic Sitting Poor   Endurance Deficit   Endurance Deficit Yes   Endurance Deficit Description fatigue   Activity Tolerance   Activity Tolerance Patient limited by fatigue;Patient limited by pain   Medical Staff Made Aware PCA, Payge   Assessment   Prognosis Fair   Problem List Decreased strength;Decreased endurance;Impaired balance;Decreased mobility;Decreased range of motion;Decreased cognition;Impaired judgement;Decreased safety awareness;Obesity;Decreased skin integrity   Barriers to Discharge Decreased caregiver support;Inaccessible home environment   Barriers to Discharge Comments pt requires significant assistance to complete mobility, increased fall risk. has support from mother, but would beenfit from icnreased support from daily aides. recommend post acute rehab   Goals   Patient Goals none stated   PT Treatment Day 3   Plan   Treatment/Interventions ADL retraining;LE strengthening/ROM;Functional transfer training;Therapeutic exercise;Endurance training;Patient/family training;Equipment eval/education;Bed mobility;Compensatory technique education;Spoke to nursing;Spoke to case management;OT   Progress Slow progress, decreased activity tolerance   PT Frequency 3-5x/wk   Discharge Recommendation   Rehab Resource Intensity Level, PT II (Moderate Resource Intensity)   Equipment Recommended   (should patient return to home instead of post acute rehab, recommend hospital bed, lizz lift and caregivers.)   AM-PAC Basic  Mobility Inpatient   Turning in Flat Bed Without Bedrails 2   Lying on Back to Sitting on Edge of Flat Bed Without Bedrails 1   Moving Bed to Chair 1   Standing Up From Chair Using Arms 1   Walk in Room 1   Climb 3-5 Stairs With Railing 1   Basic Mobility Inpatient Raw Score 7   Turning Head Towards Sound 3   Follow Simple Instructions 2   Low Function Basic Mobility Raw Score  12   Low Function Basic Mobility Standardized Score  18.33   [facility] Highest Level Of Mobility   -[facility] Goal 2: Bed activities/Dependent transfer   -[facility] Achieved 4: Move to chair/commode   Education   Education Provided Mobility training;Assistive device   Patient Reinforcement needed   End of Consult   Patient Position at End of Consult Bedside chair;Bed/Chair alarm activated;All needs within reach       The patient's AM-PAC Basic Mobility Inpatient Short Form Raw Score is 7. A Raw score of less than or equal to 16 suggests the patient may benefit from discharge to post-acute rehabilitation services. Please also refer to the recommendation of the Physical Therapist for safe discharge planning.     Pt tolerated session poorly. He has increased pain of buttock with noted open area. PCA present and notifying RN. He continues to require significant assistance with all mobility. He requires maxAx2 to achieve sitting in recliner chair. He is limited by increased pain, decreased strength, balance and endurance. He did demonstrate improved sitting balance with verbal cues. He will continue to benefit from PT services to maximize LOF.     Julienne Rooney, PT,DPT

## 2024-06-11 NOTE — SPEECH THERAPY NOTE
Speech Language/Pathology    Speech/Language Pathology Progress Note    Patient Name: Ash Loaiza  Today's Date: 6/11/2024    Assessment:  Pt seen for dysphagia therapy w/ breakfast meal. Currently on puree and nectar thick liquids, full feeding assistance. Pt demonstrates prolonged oral management w/ delayed AP movement. Suspected delayed swallow, slight throat clear x1 otherwise no overt s/s aspiration. Consumed 75% of meal.    Plan/Recommendations:  Continue puree/nectar thick  Meds crushed in puree; full feeding assistance  SLP will continue to follow on periphery, pt stable on current diet.  Family training may be necessary if pt returning home to ensure proper thickening    Lauren Mota MS CCC-SLP  6/11/2024

## 2024-06-11 NOTE — ASSESSMENT & PLAN NOTE
Patient with bilateral pleural effusions, received 80 mg of Lasix IV in the emergency room.  At home he takes torsemide and Aldactone.  Initially started on Lasix 40 mg IV twice daily.  Has had good urine output with -2.4 L in the last 24 hours.  Had subsequent hypotension and elevation in creatinine.  Diuretics on hold since 6/5.  Continue to hold furosemide for now.  Will resume Aldactone if blood pressure tolerates.  Continue to monitor cardiorespiratory status and renal functions closely..  Of note patient is s/p bilateral thoracentesis with 425 mL and 975 mL.  Currently continues to be euvolemic.  Continue to monitor off diuretics.  Give 1 dose of Lasix on 6 /7 and repeat again on 6/9.  Repeat chest x-ray shows persistent large right-sided effusion.  Will need thoracentesis again.  IR formed right-sided thoracentesis again 6/10 and removed 950 cc of isidro-colored fluid resume oral Lasix.  However repeat chest x-ray today again shows large right pleural effusion.  Will place on Lasix 40 mg IV twice daily overall prognosis is guarded at this time with rapidly recurrent pleural effusion and rising creatinine  Wt Readings from Last 3 Encounters:   06/02/24 93.9 kg (207 lb)   01/30/24 79.3 kg (174 lb 13.2 oz)   12/08/23 106 kg (234 lb 9.1 oz)

## 2024-06-11 NOTE — PLAN OF CARE
Problem: Potential for Falls  Goal: Patient will remain free of falls  Description: INTERVENTIONS:  - Educate patient/family on patient safety including physical limitations  - Instruct patient to call for assistance with activity   - Consult OT/PT to assist with strengthening/mobility   - Keep Call bell within reach  - Keep bed low and locked with side rails adjusted as appropriate  - Keep care items and personal belongings within reach  - Initiate and maintain comfort rounds  - Make Fall Risk Sign visible to staff  - Offer Toileting every  Hours, in advance of need  - Initiate/Maintain alarm  - Obtain necessary fall risk management equipment:   - Apply yellow socks and bracelet for high fall risk patients  - Consider moving patient to room near nurses station  Outcome: Progressing     Problem: PAIN - ADULT  Goal: Verbalizes/displays adequate comfort level or baseline comfort level  Description: Interventions:  - Encourage patient to monitor pain and request assistance  - Assess pain using appropriate pain scale  - Administer analgesics based on type and severity of pain and evaluate response  - Implement non-pharmacological measures as appropriate and evaluate response  - Consider cultural and social influences on pain and pain management  - Notify physician/advanced practitioner if interventions unsuccessful or patient reports new pain  Outcome: Progressing     Problem: INFECTION - ADULT  Goal: Absence or prevention of progression during hospitalization  Description: INTERVENTIONS:  - Assess and monitor for signs and symptoms of infection  - Monitor lab/diagnostic results  - Monitor all insertion sites, i.e. indwelling lines, tubes, and drains  - Monitor endotracheal if appropriate and nasal secretions for changes in amount and color  - [place] appropriate cooling/warming therapies per order  - Administer medications as ordered  - Instruct and encourage patient and family to use good hand hygiene technique  -  Identify and instruct in appropriate isolation precautions for identified infection/condition  Outcome: Progressing  Goal: Absence of fever/infection during neutropenic period  Description: INTERVENTIONS:  - Monitor WBC    Outcome: Progressing     Problem: SAFETY ADULT  Goal: Patient will remain free of falls  Description: INTERVENTIONS:  - Educate patient/family on patient safety including physical limitations  - Instruct patient to call for assistance with activity   - Consult OT/PT to assist with strengthening/mobility   - Keep Call bell within reach  - Keep bed low and locked with side rails adjusted as appropriate  - Keep care items and personal belongings within reach  - Initiate and maintain comfort rounds  - Make Fall Risk Sign visible to staff  - Offer Toileting every  Hours, in advance of need  - Initiate/Maintain alarm  - Obtain necessary fall risk management equipment:   - Apply yellow socks and bracelet for high fall risk patients  - Consider moving patient to room near nurses station  Outcome: Progressing  Goal: Maintain or return to baseline ADL function  Description: INTERVENTIONS:  -  Assess patient's ability to carry out ADLs; assess patient's baseline for ADL function and identify physical deficits which impact ability to perform ADLs (bathing, care of mouth/teeth, toileting, grooming, dressing, etc.)  - Assess/evaluate cause of self-care deficits   - Assess range of motion  - Assess patient's mobility; develop plan if impaired  - Assess patient's need for assistive devices and provide as appropriate  - Encourage maximum independence but intervene and supervise when necessary  - Involve family in performance of ADLs  - Assess for home care needs following discharge   - Consider OT consult to assist with ADL evaluation and planning for discharge  - Provide patient education as appropriate  Outcome: Progressing  Goal: Maintains/Returns to pre admission functional level  Description:  INTERVENTIONS:  - Perform AM-PAC 6 Click Basic Mobility/ Daily Activity assessment daily.  - Set and communicate daily mobility goal to care team and patient/family/caregiver.   - Collaborate with rehabilitation services on mobility goals if consulted  - Perform Range of Motion  times a day.  - Reposition patient every  hours.  - Dangle patient  times a day  - Stand patient  times a day  - Ambulate patient  times a day  - Out of bed to chair  times a day   - Out of bed for meals times a day  - Out of bed for toileting  - Record patient progress and toleration of activity level   Outcome: Progressing     Problem: DISCHARGE PLANNING  Goal: Discharge to home or other facility with appropriate resources  Description: INTERVENTIONS:  - Identify barriers to discharge w/patient and caregiver  - Arrange for needed discharge resources and transportation as appropriate  - Identify discharge learning needs (meds, wound care, etc.)  - Arrange for interpretive services to assist at discharge as needed  - Refer to Case Management Department for coordinating discharge planning if the patient needs post-hospital services based on physician/advanced practitioner order or complex needs related to functional status, cognitive ability, or social support system  Outcome: Progressing     Problem: Knowledge Deficit  Goal: Patient/family/caregiver demonstrates understanding of disease process, treatment plan, medications, and discharge instructions  Description: Complete learning assessment and assess knowledge base.  Interventions:  - Provide teaching at level of understanding  - Provide teaching via preferred learning methods  Outcome: Progressing     Problem: Prexisting or High Potential for Compromised Skin Integrity  Goal: Skin integrity is maintained or improved  Description: INTERVENTIONS:  - Identify patients at risk for skin breakdown  - Assess and monitor skin integrity  - Assess and monitor nutrition and hydration status  -  Monitor labs   - Assess for incontinence   - Turn and reposition patient  - Assist with mobility/ambulation  - Relieve pressure over bony prominences  - Avoid friction and shearing  - Provide appropriate hygiene as needed including keeping skin clean and dry  - Evaluate need for skin moisturizer/barrier cream  - Collaborate with interdisciplinary team   - Patient/family teaching  - Consider wound care consult   Outcome: Progressing     Problem: NEUROSENSORY - ADULT  Goal: Achieves stable or improved neurological status  Description: INTERVENTIONS  - Monitor and report changes in neurological status  - Monitor vital signs such as temperature, blood pressure, glucose, and any other labs ordered   - Initiate measures to prevent increased intracranial pressure  - Monitor for seizure activity and implement precautions if appropriate      Outcome: Progressing  Goal: Remains free of injury related to seizures activity  Description: INTERVENTIONS  - Maintain airway, patient safety  and administer oxygen as ordered  - Monitor patient for seizure activity, document and report duration and description of seizure to physician/advanced practitioner  - If seizure occurs,  ensure patient safety during seizure  - Reorient patient post seizure  - Seizure pads on all 4 side rails  - Instruct patient/family to notify RN of any seizure activity including if an aura is experienced  - Instruct patient/family to call for assistance with activity based on nursing assessment  - Administer anti-seizure medications if ordered    Outcome: Progressing  Goal: Achieves maximal functionality and self care  Description: INTERVENTIONS  - Monitor swallowing and airway patency with patient fatigue and changes in neurological status  - Encourage and assist patient to increase activity and self care.   - Encourage visually impaired, hearing impaired and aphasic patients to use assistive/communication devices  Outcome: Progressing     Problem:  RESPIRATORY - ADULT  Goal: Achieves optimal ventilation and oxygenation  Description: INTERVENTIONS:  - Assess for changes in respiratory status  - Assess for changes in mentation and behavior  - Position to facilitate oxygenation and minimize respiratory effort  - Oxygen administered by appropriate delivery if ordered  - Initiate smoking cessation education as indicated  - Encourage broncho-pulmonary hygiene including cough, deep breathe, Incentive Spirometry  - Assess the need for suctioning and aspirate as needed  - Assess and instruct to report SOB or any respiratory difficulty  - Respiratory Therapy support as indicated  Outcome: Progressing     Problem: GENITOURINARY - ADULT  Goal: Maintains or returns to baseline urinary function  Description: INTERVENTIONS:  - Assess urinary function  - Encourage oral fluids to ensure adequate hydration if ordered  - Administer IV fluids as ordered to ensure adequate hydration  - Administer ordered medications as needed  - Offer frequent toileting  - Follow urinary retention protocol if ordered  Outcome: Progressing     Problem: HEMATOLOGIC - ADULT  Goal: Maintains hematologic stability  Description: INTERVENTIONS  - Assess for signs and symptoms of bleeding or hemorrhage  - Monitor labs  - Administer supportive blood products/factors as ordered and appropriate  Outcome: Progressing     Problem: GENITOURINARY - ADULT  Goal: Absence of urinary retention  Description: INTERVENTIONS:  - Assess patient’s ability to void and empty bladder  - Monitor I/O  - Bladder scan as needed  - Discuss with physician/AP medications to alleviate retention as needed  - Discuss catheterization for long term situations as appropriate  Outcome: Completed  Goal: Urinary catheter remains patent  Description: INTERVENTIONS:  - Assess patency of urinary catheter  - If patient has a chronic sr, consider changing catheter if non-functioning  - Follow guidelines for intermittent irrigation of  non-functioning urinary catheter  Outcome: Completed

## 2024-06-12 LAB
ANION GAP SERPL CALCULATED.3IONS-SCNC: 6 MMOL/L (ref 4–13)
BUN SERPL-MCNC: 44 MG/DL (ref 5–25)
CALCIUM SERPL-MCNC: 8.6 MG/DL (ref 8.4–10.2)
CHLORIDE SERPL-SCNC: 106 MMOL/L (ref 96–108)
CO2 SERPL-SCNC: 30 MMOL/L (ref 21–32)
CREAT SERPL-MCNC: 2.35 MG/DL (ref 0.6–1.3)
CRP SERPL QL: 83.6 MG/L
GFR SERPL CREATININE-BSD FRML MDRD: 29 ML/MIN/1.73SQ M
GLUCOSE SERPL-MCNC: 100 MG/DL (ref 65–140)
POTASSIUM SERPL-SCNC: 4.1 MMOL/L (ref 3.5–5.3)
SODIUM SERPL-SCNC: 142 MMOL/L (ref 135–147)

## 2024-06-12 PROCEDURE — 80048 BASIC METABOLIC PNL TOTAL CA: CPT | Performed by: FAMILY MEDICINE

## 2024-06-12 PROCEDURE — 99232 SBSQ HOSP IP/OBS MODERATE 35: CPT | Performed by: FAMILY MEDICINE

## 2024-06-12 PROCEDURE — 86140 C-REACTIVE PROTEIN: CPT | Performed by: INTERNAL MEDICINE

## 2024-06-12 RX ORDER — FUROSEMIDE 10 MG/ML
80 INJECTION INTRAMUSCULAR; INTRAVENOUS
Status: DISCONTINUED | OUTPATIENT
Start: 2024-06-12 | End: 2024-06-13

## 2024-06-12 RX ORDER — FUROSEMIDE 10 MG/ML
40 INJECTION INTRAMUSCULAR; INTRAVENOUS ONCE
Status: COMPLETED | OUTPATIENT
Start: 2024-06-12 | End: 2024-06-12

## 2024-06-12 RX ADMIN — LEVOCARNITINE 250 MG: 1 SOLUTION ORAL at 21:17

## 2024-06-12 RX ADMIN — Medication 2 G: at 12:50

## 2024-06-12 RX ADMIN — Medication 2 G: at 21:16

## 2024-06-12 RX ADMIN — CLONAZEPAM 0.5 MG: 0.5 TABLET ORAL at 21:14

## 2024-06-12 RX ADMIN — METOPROLOL TARTRATE 25 MG: 25 TABLET, FILM COATED ORAL at 08:27

## 2024-06-12 RX ADMIN — LAMOTRIGINE 250 MG: 100 TABLET ORAL at 18:12

## 2024-06-12 RX ADMIN — SALINE NASAL SPRAY 2 SPRAY: 1.5 SOLUTION NASAL at 21:15

## 2024-06-12 RX ADMIN — SALINE NASAL SPRAY 2 SPRAY: 1.5 SOLUTION NASAL at 18:13

## 2024-06-12 RX ADMIN — PRIMIDONE 100 MG: 50 TABLET ORAL at 18:12

## 2024-06-12 RX ADMIN — Medication 2 G: at 18:10

## 2024-06-12 RX ADMIN — LAMOTRIGINE 300 MG: 100 TABLET ORAL at 21:16

## 2024-06-12 RX ADMIN — PRIMIDONE 100 MG: 50 TABLET ORAL at 08:27

## 2024-06-12 RX ADMIN — FUROSEMIDE 40 MG: 10 INJECTION, SOLUTION INTRAMUSCULAR; INTRAVENOUS at 08:25

## 2024-06-12 RX ADMIN — Medication 1 APPLICATION: at 12:50

## 2024-06-12 RX ADMIN — Medication 1 APPLICATION: at 18:13

## 2024-06-12 RX ADMIN — LAMOTRIGINE 200 MG: 100 TABLET ORAL at 08:23

## 2024-06-12 RX ADMIN — ZONISAMIDE 100 MG: 100 CAPSULE ORAL at 21:14

## 2024-06-12 RX ADMIN — LEVOCARNITINE 250 MG: 1 SOLUTION ORAL at 08:26

## 2024-06-12 RX ADMIN — Medication 1 APPLICATION: at 21:15

## 2024-06-12 RX ADMIN — PYRIDOXINE HCL TAB 50 MG 100 MG: 50 TAB at 08:27

## 2024-06-12 RX ADMIN — FUROSEMIDE 40 MG: 10 INJECTION, SOLUTION INTRAMUSCULAR; INTRAVENOUS at 12:50

## 2024-06-12 RX ADMIN — PRIMIDONE 150 MG: 50 TABLET ORAL at 21:14

## 2024-06-12 RX ADMIN — LEVOCARNITINE 250 MG: 1 SOLUTION ORAL at 12:54

## 2024-06-12 RX ADMIN — LEVOCARNITINE 250 MG: 1 SOLUTION ORAL at 18:12

## 2024-06-12 RX ADMIN — Medication 2 G: at 08:22

## 2024-06-12 RX ADMIN — Medication 1 APPLICATION: at 08:27

## 2024-06-12 RX ADMIN — SALINE NASAL SPRAY 2 SPRAY: 1.5 SOLUTION NASAL at 12:50

## 2024-06-12 RX ADMIN — SALINE NASAL SPRAY 2 SPRAY: 1.5 SOLUTION NASAL at 08:28

## 2024-06-12 NOTE — PROGRESS NOTES
[facility]  Progress Note  Name: Ash Loaiza I  MRN: [id number]  Unit/Bed#: -01 I Date of Admission: 6/2/2024   Date of Service: 6/12/2024 I Hospital Day: 10    Assessment & Plan   Acute respiratory failure with hypoxia (HCC)  Assessment & Plan  Multifactorial secondary to heart failure, pleural effusions recurrent aspiration pneumonia.  Continue with oxygen.  Because of recurrent episode of epistaxis, ENT was consulted and recommended using humidified oxygen via facemask  epistaxis has resolved    Acute on chronic heart failure with preserved ejection fraction (HCC)  Assessment & Plan  Patient with bilateral pleural effusions, received 80 mg of Lasix IV in the emergency room.  At home he takes torsemide and Aldactone.  Initially started on Lasix 40 mg IV twice daily.  Has had good urine output with -2.4 L in the last 24 hours.  Had subsequent hypotension and elevation in creatinine.  Diuretics on hold since 6/5.  Continue to hold furosemide for now.  Will resume Aldactone if blood pressure tolerates.  Continue to monitor cardiorespiratory status and renal functions closely..  Of note patient is s/p bilateral thoracentesis with 425 mL and 975 mL.  Currently continues to be euvolemic.  Continue to monitor off diuretics.  Give 1 dose of Lasix on 6 /7 and repeat again on 6/9.  Repeat chest x-ray shows persistent large right-sided effusion.  Will need thoracentesis again.  IR formed right-sided thoracentesis again 6/10 and removed 950 cc of isidro-colored fluid resume oral Lasix.  However repeat chest x-ray  again shows large right pleural effusion.  Will place on Lasix 40 mg IV twice daily overall prognosis is guarded at this time with rapidly recurrent pleural effusion and rising creatinine and low urine outputs.goals of care discussion today  Wt Readings from Last 3 Encounters:   06/02/24 93.9 kg (207 lb)   01/30/24 79.3 kg (174 lb 13.2 oz)   12/08/23 106 kg (234 lb 9.1 oz)          Bilateral pleural effusion  Assessment & Plan  Moderately large right and moderate size left pleural effusion.  On previous admission in April 2024, he had a right-sided thoracentesis which was transudative.  S/p bilateral thoracentesis on 6/4.  Respiratory status has remained stable subsequently.  However repeat x-ray still shows large right-sided effusion.  Patient is hemodynamically stable with no increased oxygen requirement.  repeat thoracentesis on 6/10with  cc removed  Repeat iv lasix again pleural effusion has reaccumulated again.urine output is still low    Developmental delay  Assessment & Plan  Since birth, patient is adopted.  It is unknown why he has the mental delay if it is due to anoxic brain injury or cerebral palsy.    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  Patient able to resume PO intake, will resume home meds.                VTE Pharmacologic Prophylaxis: VTE Score: 6 Moderate Risk (Score 3-4) - Pharmacological DVT Prophylaxis Contraindicated. Sequential Compression Devices Ordered.    Mobility:   Basic Mobility Inpatient Raw Score: 7  -HLM Goal: 2: Bed activities/Dependent transfer  JH-HLM Achieved: 4: Move to chair/commode  JH-HLM Goal achieved. Continue to encourage appropriate mobility.    Patient Centered Rounds: I performed bedside rounds with nursing staff today.   Discussions with Specialists or Other Care Team Provider: kareem cardiology    Education and Discussions with Family / Patient: Updated  (mother) at bedside.    Total Time Spent on Date of Encounter in care of patient: 35 mins. This time was spent on one or more of the following: performing physical exam; counseling and coordination of care; obtaining or reviewing history; documenting in the medical record; reviewing/ordering tests, medications or procedures; communicating with other healthcare  professionals and discussing with patient's family/caregivers.    Current Length of Stay: 10 day(s)  Current Patient Status: Inpatient   Certification Statement: The patient will continue to require additional inpatient hospital stay due to chf  Discharge Plan: Anticipate discharge in 24-48 hrs to rehab facility.    Code Status: Level 3 - DNAR and DNI    Subjective:   Patient denies any complaints today looks currently comfortable on room air    Objective:     Vitals:   Temp (24hrs), Av.4 °F (36.9 °C), Min:98.2 °F (36.8 °C), Max:98.8 °F (37.1 °C)    Temp:  [98.2 °F (36.8 °C)-98.8 °F (37.1 °C)] 98.2 °F (36.8 °C)  HR:  [78-96] 87  Resp:  [17] 17  BP: (102-128)/(64-77) 128/75  SpO2:  [93 %-95 %] 94 %  Body mass index is 33.41 kg/m².     Input and Output Summary (last 24 hours):     Intake/Output Summary (Last 24 hours) at 2024 1107  Last data filed at 2024 0857  Gross per 24 hour   Intake 480 ml   Output 200 ml   Net 280 ml       Physical Exam:   Physical Exam  Vitals and nursing note reviewed.   Constitutional:       Appearance: Normal appearance.   HENT:      Head: Normocephalic and atraumatic.      Right Ear: External ear normal.      Left Ear: External ear normal.      Nose: Nose normal.      Mouth/Throat:      Pharynx: Oropharynx is clear.   Eyes:      Pupils: Pupils are equal, round, and reactive to light.   Cardiovascular:      Rate and Rhythm: Normal rate and regular rhythm.      Heart sounds: Normal heart sounds.   Pulmonary:      Effort: Pulmonary effort is normal.      Breath sounds: Rales present.   Abdominal:      General: Bowel sounds are normal.      Palpations: Abdomen is soft.      Tenderness: There is no abdominal tenderness.   Musculoskeletal:         General: Normal range of motion.      Cervical back: Normal range of motion and neck supple.   Skin:     General: Skin is warm and dry.      Capillary Refill: Capillary refill takes less than 2 seconds.   Neurological:      Mental Status:  He is alert. Mental status is at baseline.   Psychiatric:         Mood and Affect: Mood normal.            Additional Data:     Labs:  Results from last 7 days   Lab Units 06/09/24  0458   WBC Thousand/uL 6.33   HEMOGLOBIN g/dL 9.7*   HEMATOCRIT % 30.9*   PLATELETS Thousands/uL 131*   SEGS PCT % 82*   LYMPHO PCT % 9*   MONO PCT % 9   EOS PCT % 0     Results from last 7 days   Lab Units 06/12/24  0442   SODIUM mmol/L 142   POTASSIUM mmol/L 4.1   CHLORIDE mmol/L 106   CO2 mmol/L 30   BUN mg/dL 44*   CREATININE mg/dL 2.35*   ANION GAP mmol/L 6   CALCIUM mg/dL 8.6   GLUCOSE RANDOM mg/dL 100                 Results from last 7 days   Lab Units 06/07/24  0443   PROCALCITONIN ng/ml 0.70*       Lines/Drains:  Invasive Devices       Peripheral Intravenous Line  Duration             Peripheral IV 06/12/24 Left Antecubital <1 day                          Imaging: Reviewed radiology reports from this admission including: chest xray    Recent Cultures (last 7 days):         Last 24 Hours Medication List:   Current Facility-Administered Medications   Medication Dose Route Frequency Provider Last Rate    acetaminophen  650 mg Oral Q6H PRN Iris Rivas MD      clonazePAM  0.5 mg Oral HS Iris Rivas MD      Diclofenac Sodium  2 g Topical 4x Daily Iris Rivas MD      furosemide  40 mg Intravenous BID (diuretic) Yomaira Jenkins MD      lactulose  20 g Oral Daily Iris Rivas MD      lamoTRIgine  200 mg Oral Daily Iris Rivas MD      And    lamoTRIgine  250 mg Oral After Lunch Iris Rivas MD      And    lamoTRIgine  300 mg Oral HS Iris Rivas MD      levOCARNitine  1,000 mg/day Oral 4x Daily (with meals and at bedtime) Iris Rivas MD      metoprolol tartrate  25 mg Oral BID Iris Rivas MD      primidone  100 mg Oral QAM Iris Rivas MD      And    primidone  100 mg Oral After Lunch Iris Rivas MD      And    primidone  150 mg Oral HS Iris Rivas MD      pyridoxine  100 mg Oral Daily Iris Rivas MD      sodium chloride  1  Application Nasal 4x Daily (PC & HS) Jonatan Biggs MD      sodium chloride  2 spray Each Nare 4x Daily (PC & HS) Jonatan Biggs MD      zonisamide  100 mg Oral HS Iris Rivas MD          Today, Patient Was Seen By: Yomaira Jenkins MD    **Please Note: This note may have been constructed using a voice recognition system.**

## 2024-06-12 NOTE — ASSESSMENT & PLAN NOTE
Patient with bilateral pleural effusions, received 80 mg of Lasix IV in the emergency room.  At home he takes torsemide and Aldactone.  Initially started on Lasix 40 mg IV twice daily.  Has had good urine output with -2.4 L in the last 24 hours.  Had subsequent hypotension and elevation in creatinine.  Diuretics on hold since 6/5.  Continue to hold furosemide for now.  Will resume Aldactone if blood pressure tolerates.  Continue to monitor cardiorespiratory status and renal functions closely..  Of note patient is s/p bilateral thoracentesis with 425 mL and 975 mL.  Currently continues to be euvolemic.  Continue to monitor off diuretics.  Give 1 dose of Lasix on 6 /7 and repeat again on 6/9.  Repeat chest x-ray shows persistent large right-sided effusion.  Will need thoracentesis again.  IR formed right-sided thoracentesis again 6/10 and removed 950 cc of isidro-colored fluid resume oral Lasix.  However repeat chest x-ray  again shows large right pleural effusion.  Will place on Lasix 40 mg IV twice daily overall prognosis is guarded at this time with rapidly recurrent pleural effusion and rising creatinine and low urine outputs.goals of care discussion today  Wt Readings from Last 3 Encounters:   06/02/24 93.9 kg (207 lb)   01/30/24 79.3 kg (174 lb 13.2 oz)   12/08/23 106 kg (234 lb 9.1 oz)

## 2024-06-12 NOTE — PROGRESS NOTES
"Progress Note - Cardiology   Ash Loaiza 58 y.o. 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     Vitals: /75   Pulse 87   Temp 98.2 °F (36.8 °C)   Resp 17   Ht 5' 6\" (1.676 m)   Wt 93.9 kg (207 lb)   SpO2 94%   BMI 33.41 kg/m²   Vitals:    06/02/24 1412 06/02/24 2326   Weight: 94 kg (207 lb 3.7 oz) 93.9 kg (207 lb)     Orthostatic Blood Pressures      Flowsheet Row Most Recent Value   Blood Pressure 128/75 filed at 06/12/2024 0825   Patient Position - Orthostatic VS Sitting filed at 06/11/2024 1235              Intake/Output Summary (Last 24 hours) at 6/12/2024 1016  Last data filed at 6/12/2024 0857  Gross per 24 hour   Intake 480 ml   Output 200 ml   Net 280 ml       Invasive Devices       Peripheral Intravenous Line  Duration             Peripheral IV 06/12/24 Left Antecubital <1 day                           Physical Exam  Vitals and nursing note reviewed.   Constitutional:       General: He is not in acute distress.  HENT:      Head: Normocephalic.   Cardiovascular:      Rate and Rhythm: Normal rate and regular rhythm.      Heart sounds: No murmur heard.  Pulmonary:      Effort: No respiratory distress.      Breath sounds: Rales present.   Musculoskeletal:         General: No swelling.   Skin:     " General: Skin is warm.   Neurological:      Mental Status: Mental status is at baseline.        Lab Results: I have personally reviewed pertinent lab results.    CBC with diff:   Results from last 7 days   Lab Units 06/09/24  0458   WBC Thousand/uL 6.33   RBC Million/uL 3.01*   HEMOGLOBIN g/dL 9.7*   HEMATOCRIT % 30.9*   MCV fL 103*   MCH pg 32.2   MCHC g/dL 31.4   RDW % 16.1*   MPV fL 11.1   PLATELETS Thousands/uL 131*     CMP:   Results from last 7 days   Lab Units 06/12/24  0442   SODIUM mmol/L 142   CHLORIDE mmol/L 106   CO2 mmol/L 30   BUN mg/dL 44*   CREATININE mg/dL 2.35*   CALCIUM mg/dL 8.6   EGFR ml/min/1.73sq m 29     HS Troponin:   0   Lab Value Date/Time    HSTNI 11 06/02/2024 1617    HSTNI0 82 (H) 01/27/2024 1631    HSTNI2 114 (H) 01/27/2024 1823    HSTNI4 92 (H) 01/27/2024 2224    HSTNI4 23 11/17/2023 2251     BNP:   Results from last 7 days   Lab Units 06/12/24  0442   POTASSIUM mmol/L 4.1   CHLORIDE mmol/L 106   CO2 mmol/L 30   BUN mg/dL 44*   CREATININE mg/dL 2.35*   CALCIUM mg/dL 8.6   EGFR ml/min/1.73sq m 29     Coags:     TSH:     Magnesium:     Lipid Profile:     Imaging: I have personally reviewed pertinent reports.      EKG: Normal sinus rhythm  Incomplete right bundle branch block  ST & T wave abnormality, consider inferior ischemia  ST & T wave abnormality, consider anterolateral ischemia  Abnormal ECG  When compared with ECG of 27-JAN-2024 19:20,  T wave inversion now evident in Anterior leads  QT has shortened  Confirmed by Serafin Nielsen     Echo 4/7/2024:    Left Ventricle: Left ventricle is normal in size. Systolic function is   normal with an ejection fraction of 60-65%. Wall motion is within normal   limits. Indeterminate diastolic function due to incomplete   data/conflicting data.     Left Atrium: Left atrium cavity is severely dilated.     Right Ventricle: Right ventricle cavity is normal. Systolic function is   normal.    IVC/SVC: The inferior vena cava demonstrates a diameter of  >21 mm and   collapses <50%; therefore, the right atrial pressure is estimated at   greater than 15 mmHg.   Septal e' > lateral e' , diastolic dysfunction and SEPTAL bounce favor the   diagnosis of constrictive pericarditis.

## 2024-06-12 NOTE — ASSESSMENT & PLAN NOTE
Moderately large right and moderate size left pleural effusion.  On previous admission in April 2024, he had a right-sided thoracentesis which was transudative.  S/p bilateral thoracentesis on 6/4.  Respiratory status has remained stable subsequently.  However repeat x-ray still shows large right-sided effusion.  Patient is hemodynamically stable with no increased oxygen requirement.  repeat thoracentesis on 6/10with  cc removed  Repeat iv lasix again pleural effusion has reaccumulated again.urine output is still low

## 2024-06-13 LAB
ANION GAP SERPL CALCULATED.3IONS-SCNC: 6 MMOL/L (ref 4–13)
BUN SERPL-MCNC: 47 MG/DL (ref 5–25)
CALCIUM SERPL-MCNC: 8.6 MG/DL (ref 8.4–10.2)
CHLORIDE SERPL-SCNC: 106 MMOL/L (ref 96–108)
CO2 SERPL-SCNC: 30 MMOL/L (ref 21–32)
CREAT SERPL-MCNC: 2.47 MG/DL (ref 0.6–1.3)
GFR SERPL CREATININE-BSD FRML MDRD: 28 ML/MIN/1.73SQ M
GLUCOSE SERPL-MCNC: 101 MG/DL (ref 65–140)
POTASSIUM SERPL-SCNC: 4.2 MMOL/L (ref 3.5–5.3)
SODIUM SERPL-SCNC: 142 MMOL/L (ref 135–147)

## 2024-06-13 PROCEDURE — 92526 ORAL FUNCTION THERAPY: CPT

## 2024-06-13 PROCEDURE — 99232 SBSQ HOSP IP/OBS MODERATE 35: CPT | Performed by: FAMILY MEDICINE

## 2024-06-13 PROCEDURE — 80048 BASIC METABOLIC PNL TOTAL CA: CPT | Performed by: FAMILY MEDICINE

## 2024-06-13 RX ORDER — TORSEMIDE 20 MG/1
40 TABLET ORAL DAILY
Status: DISCONTINUED | OUTPATIENT
Start: 2024-06-13 | End: 2024-06-14 | Stop reason: HOSPADM

## 2024-06-13 RX ORDER — LACTULOSE 10 G/15ML
10 SOLUTION ORAL DAILY
Status: DISCONTINUED | OUTPATIENT
Start: 2024-06-14 | End: 2024-06-14 | Stop reason: HOSPADM

## 2024-06-13 RX ORDER — TAMSULOSIN HYDROCHLORIDE 0.4 MG/1
0.4 CAPSULE ORAL
Status: DISCONTINUED | OUTPATIENT
Start: 2024-06-13 | End: 2024-06-14 | Stop reason: HOSPADM

## 2024-06-13 RX ADMIN — LEVOCARNITINE 250 MG: 1 SOLUTION ORAL at 17:13

## 2024-06-13 RX ADMIN — Medication 2 G: at 08:43

## 2024-06-13 RX ADMIN — Medication 2 G: at 17:12

## 2024-06-13 RX ADMIN — SALINE NASAL SPRAY 2 SPRAY: 1.5 SOLUTION NASAL at 17:13

## 2024-06-13 RX ADMIN — Medication 1 APPLICATION: at 08:39

## 2024-06-13 RX ADMIN — LEVOCARNITINE 250 MG: 1 SOLUTION ORAL at 21:15

## 2024-06-13 RX ADMIN — Medication 1 APPLICATION: at 21:15

## 2024-06-13 RX ADMIN — Medication 1 APPLICATION: at 17:13

## 2024-06-13 RX ADMIN — LEVOCARNITINE 250 MG: 1 SOLUTION ORAL at 08:40

## 2024-06-13 RX ADMIN — TORSEMIDE 40 MG: 20 TABLET ORAL at 17:11

## 2024-06-13 RX ADMIN — ZONISAMIDE 100 MG: 100 CAPSULE ORAL at 21:11

## 2024-06-13 RX ADMIN — SALINE NASAL SPRAY 2 SPRAY: 1.5 SOLUTION NASAL at 12:09

## 2024-06-13 RX ADMIN — LAMOTRIGINE 200 MG: 100 TABLET ORAL at 08:38

## 2024-06-13 RX ADMIN — Medication 2 G: at 21:15

## 2024-06-13 RX ADMIN — SALINE NASAL SPRAY 2 SPRAY: 1.5 SOLUTION NASAL at 21:15

## 2024-06-13 RX ADMIN — SALINE NASAL SPRAY 2 SPRAY: 1.5 SOLUTION NASAL at 08:34

## 2024-06-13 RX ADMIN — PRIMIDONE 100 MG: 50 TABLET ORAL at 08:38

## 2024-06-13 RX ADMIN — Medication 1 APPLICATION: at 12:09

## 2024-06-13 RX ADMIN — LAMOTRIGINE 250 MG: 100 TABLET ORAL at 15:15

## 2024-06-13 RX ADMIN — METOPROLOL TARTRATE 25 MG: 25 TABLET, FILM COATED ORAL at 17:12

## 2024-06-13 RX ADMIN — LEVOCARNITINE 250 MG: 1 SOLUTION ORAL at 12:08

## 2024-06-13 RX ADMIN — PRIMIDONE 150 MG: 50 TABLET ORAL at 21:12

## 2024-06-13 RX ADMIN — PRIMIDONE 100 MG: 50 TABLET ORAL at 15:15

## 2024-06-13 RX ADMIN — PYRIDOXINE HCL TAB 50 MG 100 MG: 50 TAB at 08:38

## 2024-06-13 RX ADMIN — LAMOTRIGINE 300 MG: 100 TABLET ORAL at 21:12

## 2024-06-13 RX ADMIN — FUROSEMIDE 80 MG: 10 INJECTION, SOLUTION INTRAMUSCULAR; INTRAVENOUS at 08:52

## 2024-06-13 RX ADMIN — Medication 2 G: at 12:08

## 2024-06-13 RX ADMIN — CLONAZEPAM 0.5 MG: 0.5 TABLET ORAL at 21:12

## 2024-06-13 NOTE — ASSESSMENT & PLAN NOTE
Recurrent aspiration pneumonia.  He had a hospitalization in April 2024 at [facility].   IV Zosyn. complete total 7 days of treatment.  Speech therapy evaluated patient and recommended Pureed with nectar thick liquid with crushed meds.

## 2024-06-13 NOTE — ASSESSMENT & PLAN NOTE
Moderately large right and moderate size left pleural effusion.  On previous admission in April 2024, he had a right-sided thoracentesis which was transudative.  S/p bilateral thoracentesis on 6/4.  Respiratory status has remained stable subsequently.  However repeat x-ray still shows large right-sided effusion.  Patient is hemodynamically stable with no increased oxygen requirement.  repeat thoracentesis on 6/10with  cc removed  transition from IV Lasix to oral diuretic regimen.

## 2024-06-13 NOTE — SPEECH THERAPY NOTE
Speech Language/Pathology    Speech/Language Pathology Progress Note    Patient Name: Ash Loaiza  Today's Date: 6/13/2024     Assessment:  Pt seen upright in bed w/ snack of puree and nectar thick via straw. Required full assistance for feeding. Tolerated puree w/ slow but adequate oral clearance and suspected delayed swallow. No overt s/s aspiration for puree or nectar thick.    Plan/Recommendations:  Continue current diet  SLP will continue to follow as able    Lauren Mota MS CCC-SLP  6/13/2024

## 2024-06-13 NOTE — ASSESSMENT & PLAN NOTE
Patient with bilateral pleural effusions, received 80 mg of Lasix IV in the emergency room.  At home he takes torsemide and Aldactone.  Initially started on Lasix 40 mg IV twice daily.  Has had good urine output with -2.4 L in the last 24 hours.  Had subsequent hypotension and elevation in creatinine.  Diuretics on hold since 6/5.  Continue to hold furosemide for now.  Will resume Aldactone if blood pressure tolerates.  Continue to monitor cardiorespiratory status and renal functions closely..  Of note patient is s/p bilateral thoracentesis with 425 mL and 975 mL.  Currently continues to be euvolemic.  Continue to monitor off diuretics.  Give 1 dose of Lasix on 6 /7 and repeat again on 6/9.  Repeat chest x-ray shows persistent large right-sided effusion.  Will need thoracentesis again.  IR formed right-sided thoracentesis again 6/10 and removed 950 cc of isidro-colored fluid resume oral Lasix.  However repeat chest x-ray  again shows large right pleural effusion.  Will place on Lasix 40 mg IV twice daily overall prognosis is guarded at this time with rapidly recurrent pleural effusion and rising creatinine and low urine outputs.goals of care discussion and transition to palliative care  Transition to oral diuretic regimen.  Patient noted to have elevated inflammatory marker will need further follow-up in a few weeks outpatient with cardiology to determine if he is a candidate to be started on rilonocept.  Currently due to his renal function not candidate for colchicine  Wt Readings from Last 3 Encounters:   06/13/24 95.7 kg (211 lb)   01/30/24 79.3 kg (174 lb 13.2 oz)   12/08/23 106 kg (234 lb 9.1 oz)

## 2024-06-13 NOTE — PLAN OF CARE
Problem: Potential for Falls  Goal: Patient will remain free of falls  Description: INTERVENTIONS:  - Educate patient/family on patient safety including physical limitations  - Instruct patient to call for assistance with activity   - Consult OT/PT to assist with strengthening/mobility   - Keep Call bell within reach  - Keep bed low and locked with side rails adjusted as appropriate  - Keep care items and personal belongings within reach  - Initiate and maintain comfort rounds  - Make Fall Risk Sign visible to staff  - Offer Toileting every 2 Hours, in advance of need  - Initiate/Maintain bed alarm    - Apply yellow socks and bracelet for high fall risk patients  - Consider moving patient to room near nurses station  6/13/2024 1643 by Brit Horowitz RN  Outcome: Progressing  6/13/2024 1643 by rBit Horowitz RN  Outcome: Progressing     Problem: PAIN - ADULT  Goal: Verbalizes/displays adequate comfort level or baseline comfort level  Description: Interventions:  - Encourage patient to monitor pain and request assistance  - Assess pain using appropriate pain scale  - Administer analgesics based on type and severity of pain and evaluate response  - Implement non-pharmacological measures as appropriate and evaluate response  - Consider cultural and social influences on pain and pain management  - Notify physician/advanced practitioner if interventions unsuccessful or patient reports new pain  6/13/2024 1643 by Brit Horowitz RN  Outcome: Progressing  6/13/2024 1643 by Brit Horowitz RN  Outcome: Progressing     Problem: INFECTION - ADULT  Goal: Absence or prevention of progression during hospitalization  Description: INTERVENTIONS:  - Assess and monitor for signs and symptoms of infection  - Monitor lab/diagnostic results  - Monitor all insertion sites, i.e. indwelling lines, tubes, and drains  - Monitor endotracheal if appropriate and nasal secretions for changes in amount and color  - Hernandez appropriate cooling/warming  therapies per order  - Administer medications as ordered  - Instruct and encourage patient and family to use good hand hygiene technique  - Identify and instruct in appropriate isolation precautions for identified infection/condition  6/13/2024 1643 by Brit Horowitz RN  Outcome: Progressing  6/13/2024 1643 by Brit Horowitz RN  Outcome: Progressing  Goal: Absence of fever/infection during neutropenic period  Description: INTERVENTIONS:  - Monitor WBC    6/13/2024 1643 by Brit Horowitz RN  Outcome: Progressing  6/13/2024 1643 by Brit Horowitz RN  Outcome: Progressing     Problem: SAFETY ADULT  Goal: Patient will remain free of falls  Description: INTERVENTIONS:  - Educate patient/family on patient safety including physical limitations  - Instruct patient to call for assistance with activity   - Consult OT/PT to assist with strengthening/mobility   - Keep Call bell within reach  - Keep bed low and locked with side rails adjusted as appropriate  - Keep care items and personal belongings within reach  - Initiate and maintain comfort rounds  - Make Fall Risk Sign visible to staff  - Offer Toileting every 2 Hours, in advance of need  - Initiate/Maintain bed alarm    - Apply yellow socks and bracelet for high fall risk patients  - Consider moving patient to room near nurses station  6/13/2024 1643 by Brit Horowitz RN  Outcome: Progressing  6/13/2024 1643 by rBit Horowitz RN  Outcome: Progressing  Goal: Maintain or return to baseline ADL function  Description: INTERVENTIONS:  -  Assess patient's ability to carry out ADLs; assess patient's baseline for ADL function and identify physical deficits which impact ability to perform ADLs (bathing, care of mouth/teeth, toileting, grooming, dressing, etc.)  - Assess/evaluate cause of self-care deficits   - Assess range of motion  - Assess patient's mobility; develop plan if impaired  - Assess patient's need for assistive devices and provide as appropriate  - Encourage maximum independence  but intervene and supervise when necessary  - Involve family in performance of ADLs  - Assess for home care needs following discharge   - Consider OT consult to assist with ADL evaluation and planning for discharge  - Provide patient education as appropriate  6/13/2024 1643 by Brit Horowitz RN  Outcome: Progressing  6/13/2024 1643 by Brit Horowitz RN  Outcome: Progressing  Goal: Maintains/Returns to pre admission functional level  Description: INTERVENTIONS:  - Perform AM-PAC 6 Click Basic Mobility/ Daily Activity assessment daily.  - Set and communicate daily mobility goal to care team and patient/family/caregiver.   - Collaborate with rehabilitation services on mobility goals if consulted    - Reposition patient every 2 hours.  - Out of bed for toileting  - Record patient progress and toleration of activity level   6/13/2024 1643 by Brit Horowitz RN  Outcome: Progressing  6/13/2024 1643 by Brit Horowitz RN  Outcome: Progressing     Problem: DISCHARGE PLANNING  Goal: Discharge to home or other facility with appropriate resources  Description: INTERVENTIONS:  - Identify barriers to discharge w/patient and caregiver  - Arrange for needed discharge resources and transportation as appropriate  - Identify discharge learning needs (meds, wound care, etc.)  - Arrange for interpretive services to assist at discharge as needed  - Refer to Case Management Department for coordinating discharge planning if the patient needs post-hospital services based on physician/advanced practitioner order or complex needs related to functional status, cognitive ability, or social support system  6/13/2024 1643 by Brti Horowitz RN  Outcome: Progressing  6/13/2024 1643 by Brit Horowitz RN  Outcome: Progressing     Problem: Knowledge Deficit  Goal: Patient/family/caregiver demonstrates understanding of disease process, treatment plan, medications, and discharge instructions  Description: Complete learning assessment and assess knowledge  base.  Interventions:  - Provide teaching at level of understanding  - Provide teaching via preferred learning methods  6/13/2024 1643 by Brit Horowitz RN  Outcome: Progressing  6/13/2024 1643 by Brit Horowitz RN  Outcome: Progressing     Problem: Prexisting or High Potential for Compromised Skin Integrity  Goal: Skin integrity is maintained or improved  Description: INTERVENTIONS:  - Identify patients at risk for skin breakdown  - Assess and monitor skin integrity  - Assess and monitor nutrition and hydration status  - Monitor labs   - Assess for incontinence   - Turn and reposition patient  - Assist with mobility/ambulation  - Relieve pressure over bony prominences  - Avoid friction and shearing  - Provide appropriate hygiene as needed including keeping skin clean and dry  - Evaluate need for skin moisturizer/barrier cream  - Collaborate with interdisciplinary team   - Patient/family teaching  - Consider wound care consult   6/13/2024 1643 by Brit Horowitz RN  Outcome: Progressing  6/13/2024 1643 by Brit Horowitz RN  Outcome: Progressing     Problem: NEUROSENSORY - ADULT  Goal: Achieves stable or improved neurological status  Description: INTERVENTIONS  - Monitor and report changes in neurological status  - Monitor vital signs such as temperature, blood pressure, glucose, and any other labs ordered   - Initiate measures to prevent increased intracranial pressure  - Monitor for seizure activity and implement precautions if appropriate      6/13/2024 1643 by Brit Horowitz RN  Outcome: Progressing  6/13/2024 1643 by Brit Horowitz RN  Outcome: Progressing  Goal: Remains free of injury related to seizures activity  Description: INTERVENTIONS  - Maintain airway, patient safety  and administer oxygen as ordered  - Monitor patient for seizure activity, document and report duration and description of seizure to physician/advanced practitioner  - If seizure occurs,  ensure patient safety during seizure  - Reorient patient post  seizure  - Seizure pads on all 4 side rails  - Instruct patient/family to notify RN of any seizure activity including if an aura is experienced  - Instruct patient/family to call for assistance with activity based on nursing assessment  - Administer anti-seizure medications if ordered    6/13/2024 1643 by Brit Horowitz RN  Outcome: Progressing  6/13/2024 1643 by Brit Horowitz RN  Outcome: Progressing  Goal: Achieves maximal functionality and self care  Description: INTERVENTIONS  - Monitor swallowing and airway patency with patient fatigue and changes in neurological status  - Encourage and assist patient to increase activity and self care.   - Encourage visually impaired, hearing impaired and aphasic patients to use assistive/communication devices  6/13/2024 1643 by Brit Horowitz RN  Outcome: Progressing  6/13/2024 1643 by Brit Horowitz RN  Outcome: Progressing     Problem: RESPIRATORY - ADULT  Goal: Achieves optimal ventilation and oxygenation  Description: INTERVENTIONS:  - Assess for changes in respiratory status  - Assess for changes in mentation and behavior  - Position to facilitate oxygenation and minimize respiratory effort  - Oxygen administered by appropriate delivery if ordered  - Initiate smoking cessation education as indicated  - Encourage broncho-pulmonary hygiene including cough, deep breathe, Incentive Spirometry  - Assess the need for suctioning and aspirate as needed  - Assess and instruct to report SOB or any respiratory difficulty  - Respiratory Therapy support as indicated  6/13/2024 1643 by Brit Horowitz RN  Outcome: Progressing  6/13/2024 1643 by Brit Horowitz RN  Outcome: Progressing     Problem: GENITOURINARY - ADULT  Goal: Maintains or returns to baseline urinary function  Description: INTERVENTIONS:  - Assess urinary function  - Encourage oral fluids to ensure adequate hydration if ordered  - Administer IV fluids as ordered to ensure adequate hydration  - Administer ordered medications as  needed  - Offer frequent toileting  - Follow urinary retention protocol if ordered  6/13/2024 1643 by Brit Horowitz RN  Outcome: Progressing  6/13/2024 1643 by Brit Horowitz RN  Outcome: Progressing     Problem: HEMATOLOGIC - ADULT  Goal: Maintains hematologic stability  Description: INTERVENTIONS  - Assess for signs and symptoms of bleeding or hemorrhage  - Monitor labs  - Administer supportive blood products/factors as ordered and appropriate  6/13/2024 1643 by Brit Horowitz RN  Outcome: Progressing  6/13/2024 1643 by Brit Horowitz RN  Outcome: Progressing

## 2024-06-13 NOTE — CASE MANAGEMENT
Case Management Discharge Planning Note    Patient name Ash Loaiza  Location /-01 MRN [id number]  : 1965 Date 2024       Current Admission Date: 2024  Current Admission Diagnosis:Pneumonia of both lungs due to infectious organism   Patient Active Problem List    Diagnosis Date Noted Date Diagnosed    Encephalopathy 2024     Poor prognosis 2024     Azul catheter in place 2024     Urinary retention 2024     Elevated troponin 2024     Stage 3b chronic kidney disease (HCC) 2024     Acute blood loss anemia 2023     Epistaxis 2023     Chronic respiratory failure with hypercapnia ([facility]) 2023     Acute kidney injury superimposed on chronic kidney disease 3 2023     Chronic diastolic HF (heart failure) ([facility]) 2021     Liver cirrhosis ([facility]) 2021     Abnormal finding on CT scan 2021     EDWIGE (acute kidney injury) ([facility]) 2021     Bilateral pleural effusion 2021     History of COVID-19 2021     S/P pericardial window creation 2021     Acute on chronic heart failure with preserved ejection fraction ([facility]) 2021     Lower extremity pain, left 2021     MRSA nasal colonization 2020     Developmental delay 2020     Dysphagia 2020     Pneumonia of both lungs due to infectious organism 01/10/2020     Nonintractable generalized idiopathic epilepsy without status epilepticus (HCC) 01/10/2020     Falls 01/10/2020     T wave inversion in EKG 01/10/2020     Essential hypertension 01/10/2020     Acute respiratory failure with hypoxia ([facility]) 01/10/2020     Polyp of colon 03/15/2019       LOS (days): 11  Geometric Mean LOS (GMLOS) (days): 5  Days to GMLOS:-5.9     OBJECTIVE:  Risk of Unplanned Readmission Score: 21.05         Current admission status: Inpatient   Preferred Pharmacy:   [id number] - [place], [place] - [address]  [address]  [place] [address]  Phone: [phone] Fax: [phone]    Primary Care Provider: Jose G Holloway DO    Primary Insurance: MEDICARE  Secondary Insurance: [place] [employer]    DISCHARGE DETAILS:        ETHAN made aware Dr. Jenkins spoke with patients mother Kylie Loaiza, she is agreeable with discharging patient to [facility] for STR. [facility] updated on current medical status, able to accept patient when medically cleared. CM received call from Blossom Barba from [facility], she wanted updated on medical condition and will follow patient after discharge from the hospital.

## 2024-06-13 NOTE — PROGRESS NOTES
Pastoral Care Progress Note    2024  Patient: Ash Loaiza : 1965  Admission Date & Time: 2024 1410  MRN: [id number] [facility]: [id number]    Fr Escudero provided prayer at pt request.

## 2024-06-13 NOTE — ASSESSMENT & PLAN NOTE
Since birth, patient is adopted.  It is unknown why he has the mental delay if it is due to anoxic brain injury or cerebral palsy.    Mother Feels very overwhelmed at this point in time and recommend that he go to subacute rehab first before transitioning home after 1 to 2 weeks  Also place consult to palliative care

## 2024-06-13 NOTE — ASSESSMENT & PLAN NOTE
Seen by ENT.  Recommended humidified oxygen and nasal saline spray and gel.  No further episodes of epistaxis noted

## 2024-06-13 NOTE — PROGRESS NOTES
[facility]  Progress Note  Name: Ash Loaiza I  MRN: [id number]  Unit/Bed#: -01 I Date of Admission: 6/2/2024   Date of Service: 6/13/2024 I Hospital Day: 11    Assessment & Plan   Acute respiratory failure with hypoxia (HCC)  Assessment & Plan  Multifactorial secondary to heart failure, pleural effusions recurrent aspiration pneumonia.  Continue with oxygen.  Because of recurrent episode of epistaxis, ENT was consulted and recommended using humidified oxygen via facemask  epistaxis has resolved    Acute on chronic heart failure with preserved ejection fraction (HCC)  Assessment & Plan  Patient with bilateral pleural effusions, received 80 mg of Lasix IV in the emergency room.  At home he takes torsemide and Aldactone.  Initially started on Lasix 40 mg IV twice daily.  Has had good urine output with -2.4 L in the last 24 hours.  Had subsequent hypotension and elevation in creatinine.  Diuretics on hold since 6/5.  Continue to hold furosemide for now.  Will resume Aldactone if blood pressure tolerates.  Continue to monitor cardiorespiratory status and renal functions closely..  Of note patient is s/p bilateral thoracentesis with 425 mL and 975 mL.  Currently continues to be euvolemic.  Continue to monitor off diuretics.  Give 1 dose of Lasix on 6 /7 and repeat again on 6/9.  Repeat chest x-ray shows persistent large right-sided effusion.  Will need thoracentesis again.  IR formed right-sided thoracentesis again 6/10 and removed 950 cc of isidro-colored fluid resume oral Lasix.  However repeat chest x-ray  again shows large right pleural effusion.  Will place on Lasix 40 mg IV twice daily overall prognosis is guarded at this time with rapidly recurrent pleural effusion and rising creatinine and low urine outputs.goals of care discussion and transition to palliative care  Transition to oral diuretic regimen.  Patient noted to have elevated inflammatory marker will need further  follow-up in a few weeks outpatient with cardiology to determine if he is a candidate to be started on rilonocept.  Currently due to his renal function not candidate for colchicine  Wt Readings from Last 3 Encounters:   06/13/24 95.7 kg (211 lb)   01/30/24 79.3 kg (174 lb 13.2 oz)   12/08/23 106 kg (234 lb 9.1 oz)         Encephalopathy  Assessment & Plan  Patient with developmental delay, but this is most likely secondary to his acute illness including pleural effusions, recurrent aspiration pneumonia.  Mentation appears to be at baseline currently.    Epistaxis  Assessment & Plan  Seen by ENT.  Recommended humidified oxygen and nasal saline spray and gel.  No further episodes of epistaxis noted    Bilateral pleural effusion  Assessment & Plan  Moderately large right and moderate size left pleural effusion.  On previous admission in April 2024, he had a right-sided thoracentesis which was transudative.  S/p bilateral thoracentesis on 6/4.  Respiratory status has remained stable subsequently.  However repeat x-ray still shows large right-sided effusion.  Patient is hemodynamically stable with no increased oxygen requirement.  repeat thoracentesis on 6/10with  cc removed  transition from IV Lasix to oral diuretic regimen.    Dysphagia  Assessment & Plan  At baseline on pureed diet.   Speech evaluated patient and recs pureed with nectar thick, crushed meds  Continue with ongoing speech follow-up and maintain strict aspiration precautions.  Maintain strict aspiration precautions.  Continue with close supervision with food.    Developmental delay  Assessment & Plan  Since birth, patient is adopted.  It is unknown why he has the mental delay if it is due to anoxic brain injury or cerebral palsy.    Mother Feels very overwhelmed at this point in time and recommend that he go to subacute rehab first before transitioning home after 1 to 2 weeks  Also place consult to palliative care    Nonintractable generalized  idiopathic epilepsy without status epilepticus (HCC)  Assessment & Plan  At home he takes Lamictal and Zonegran.  cont    * Pneumonia of both lungs due to infectious organism  Assessment & Plan  Recurrent aspiration pneumonia.  He had a hospitalization in April 2024 at [facility].   IV Zosyn. complete total 7 days of treatment.  Speech therapy evaluated patient and recommended Pureed with nectar thick liquid with crushed meds.                VTE Pharmacologic Prophylaxis: VTE Score: 6 Moderate Risk (Score 3-4) - Pharmacological DVT Prophylaxis Contraindicated. Sequential Compression Devices Ordered.    Mobility:   Basic Mobility Inpatient Raw Score: 7  JH-HLM Goal: 2: Bed activities/Dependent transfer  JH-HLM Achieved: 2: Bed activities/Dependent transfer  JH-HLM Goal achieved. Continue to encourage appropriate mobility.    Patient Centered Rounds: I performed bedside rounds with nursing staff today.   Discussions with Specialists or Other Care Team Provider: kareem cardiology    Education and Discussions with Family / Patient: Updated  (mother) via phone.    Total Time Spent on Date of Encounter in care of patient: 35 mins. This time was spent on one or more of the following: performing physical exam; counseling and coordination of care; obtaining or reviewing history; documenting in the medical record; reviewing/ordering tests, medications or procedures; communicating with other healthcare professionals and discussing with patient's family/caregivers.    Current Length of Stay: 11 day(s)  Current Patient Status: Inpatient   Certification Statement: The patient will continue to require additional inpatient hospital stay due to chf  Discharge Plan: Anticipate discharge tomorrow to rehab facility.    Code Status: Level 3 - DNAR and DNI    Subjective:   Denies any complaints feels well currently breathing is comfortable did have last few loose bowel movements overnight and hence we will  hold lactulose this morning    Objective:     Vitals:   Temp (24hrs), Av.1 °F (36.7 °C), Min:98.1 °F (36.7 °C), Max:98.1 °F (36.7 °C)    Temp:  [98.1 °F (36.7 °C)] 98.1 °F (36.7 °C)  HR:  [74-92] 92  Resp:  [16-18] 16  BP: ()/(59-80) 109/70  SpO2:  [91 %-95 %] 91 %  Body mass index is 34.06 kg/m².     Input and Output Summary (last 24 hours):     Intake/Output Summary (Last 24 hours) at 2024 1221  Last data filed at 2024 1128  Gross per 24 hour   Intake 600 ml   Output 555 ml   Net 45 ml       Physical Exam:   Physical Exam  Vitals and nursing note reviewed.   HENT:      Head: Normocephalic and atraumatic.      Right Ear: External ear normal.      Left Ear: External ear normal.      Nose: Nose normal.      Mouth/Throat:      Pharynx: Oropharynx is clear.   Eyes:      Pupils: Pupils are equal, round, and reactive to light.   Cardiovascular:      Rate and Rhythm: Normal rate and regular rhythm.      Heart sounds: Normal heart sounds.   Pulmonary:      Effort: Pulmonary effort is normal.      Breath sounds: Rales present.   Abdominal:      General: Bowel sounds are normal.      Palpations: Abdomen is soft.      Tenderness: There is no abdominal tenderness.   Musculoskeletal:      Cervical back: Normal range of motion and neck supple.      Right lower leg: No edema.      Left lower leg: No edema.   Skin:     General: Skin is warm and dry.      Capillary Refill: Capillary refill takes less than 2 seconds.   Neurological:      Mental Status: He is alert. Mental status is at baseline.   Psychiatric:         Mood and Affect: Mood normal.            Additional Data:     Labs:  Results from last 7 days   Lab Units 24  0458   WBC Thousand/uL 6.33   HEMOGLOBIN g/dL 9.7*   HEMATOCRIT % 30.9*   PLATELETS Thousands/uL 131*   SEGS PCT % 82*   LYMPHO PCT % 9*   MONO PCT % 9   EOS PCT % 0     Results from last 7 days   Lab Units 24  0523   SODIUM mmol/L 142   POTASSIUM mmol/L 4.2   CHLORIDE mmol/L 106    CO2 mmol/L 30   BUN mg/dL 47*   CREATININE mg/dL 2.47*   ANION GAP mmol/L 6   CALCIUM mg/dL 8.6   GLUCOSE RANDOM mg/dL 101                 Results from last 7 days   Lab Units 06/07/24  0443   PROCALCITONIN ng/ml 0.70*       Lines/Drains:  Invasive Devices       Peripheral Intravenous Line  Duration             Peripheral IV 06/12/24 Left Antecubital 1 day                          Imaging: Reviewed radiology reports from this admission including: chest xray    Recent Cultures (last 7 days):         Last 24 Hours Medication List:   Current Facility-Administered Medications   Medication Dose Route Frequency Provider Last Rate    acetaminophen  650 mg Oral Q6H PRN Iris Rivas MD      clonazePAM  0.5 mg Oral HS Iris Rivas MD      Diclofenac Sodium  2 g Topical 4x Daily Iris Rivas MD      furosemide  80 mg Intravenous BID (diuretic) Yomaira Jenkins MD      lactulose  20 g Oral Daily Iris Rivas MD      lamoTRIgine  200 mg Oral Daily Iris Rivas MD      And    lamoTRIgine  250 mg Oral After Lunch Iris Rivas MD      And    lamoTRIgine  300 mg Oral HS Iris Rivas MD      levOCARNitine  1,000 mg/day Oral 4x Daily (with meals and at bedtime) Iris Rivas MD      metoprolol tartrate  25 mg Oral BID Iris Rivas MD      primidone  100 mg Oral QAM Iris Rivas MD      And    primidone  100 mg Oral After Lunch Iris Rivas MD      And    primidone  150 mg Oral HS Iris Rivas MD      pyridoxine  100 mg Oral Daily Iris Rivas MD      sodium chloride  1 Application Nasal 4x Daily (PC & HS) Jonatan Biggs MD      sodium chloride  2 spray Each Nare 4x Daily (PC & HS) Jonatan Biggs MD      zonisamide  100 mg Oral HS Iris Rivas MD          Today, Patient Was Seen By: Yomaira Jenkins MD    **Please Note: This note may have been constructed using a voice recognition system.**

## 2024-06-13 NOTE — PROGRESS NOTES
Pastoral Care Progress Note    2024  Patient: Ash Loaiza : 1965  Admission Date & Time: 2024 1410  MRN: [id number] [facility]: [id number]      Fr Escudero provided prayer.

## 2024-06-14 VITALS
WEIGHT: 212.08 LBS | HEIGHT: 66 IN | SYSTOLIC BLOOD PRESSURE: 110 MMHG | BODY MASS INDEX: 34.08 KG/M2 | DIASTOLIC BLOOD PRESSURE: 66 MMHG | TEMPERATURE: 98.2 F | HEART RATE: 81 BPM | OXYGEN SATURATION: 93 % | RESPIRATION RATE: 16 BRPM

## 2024-06-14 LAB
AMMONIA PLAS-SCNC: 30 UMOL/L (ref 18–72)
ANION GAP SERPL CALCULATED.3IONS-SCNC: 7 MMOL/L (ref 4–13)
BUN SERPL-MCNC: 49 MG/DL (ref 5–25)
CALCIUM SERPL-MCNC: 8.5 MG/DL (ref 8.4–10.2)
CHLORIDE SERPL-SCNC: 104 MMOL/L (ref 96–108)
CO2 SERPL-SCNC: 29 MMOL/L (ref 21–32)
CREAT SERPL-MCNC: 2.49 MG/DL (ref 0.6–1.3)
GFR SERPL CREATININE-BSD FRML MDRD: 27 ML/MIN/1.73SQ M
GLUCOSE SERPL-MCNC: 80 MG/DL (ref 65–140)
POTASSIUM SERPL-SCNC: 4.3 MMOL/L (ref 3.5–5.3)
SODIUM SERPL-SCNC: 140 MMOL/L (ref 135–147)

## 2024-06-14 PROCEDURE — 82140 ASSAY OF AMMONIA: CPT | Performed by: FAMILY MEDICINE

## 2024-06-14 PROCEDURE — 99239 HOSP IP/OBS DSCHRG MGMT >30: CPT | Performed by: FAMILY MEDICINE

## 2024-06-14 PROCEDURE — 80048 BASIC METABOLIC PNL TOTAL CA: CPT | Performed by: FAMILY MEDICINE

## 2024-06-14 RX ORDER — SODIUM CHLORIDE/ALOE VERA
1 GEL (GRAM) NASAL
Start: 2024-06-14

## 2024-06-14 RX ORDER — ECHINACEA PURPUREA EXTRACT 125 MG
2 TABLET ORAL
Start: 2024-06-14

## 2024-06-14 RX ORDER — LACTULOSE 10 G/15ML
10 SOLUTION ORAL DAILY
Qty: 240 ML | Refills: 0 | Status: SHIPPED | OUTPATIENT
Start: 2024-06-15

## 2024-06-14 RX ORDER — TORSEMIDE 20 MG/1
40 TABLET ORAL 2 TIMES DAILY
Start: 2024-06-14 | End: 2024-07-14

## 2024-06-14 RX ORDER — CLONAZEPAM 0.5 MG/1
0.5 TABLET ORAL
Qty: 10 TABLET | Refills: 0 | Status: SHIPPED | OUTPATIENT
Start: 2024-06-14 | End: 2024-06-24

## 2024-06-14 RX ORDER — LAMOTRIGINE 100 MG/1
TABLET ORAL
Start: 2024-06-14

## 2024-06-14 RX ADMIN — LEVOCARNITINE 250 MG: 1 SOLUTION ORAL at 07:53

## 2024-06-14 RX ADMIN — LAMOTRIGINE 200 MG: 100 TABLET ORAL at 09:20

## 2024-06-14 RX ADMIN — LEVOCARNITINE 250 MG: 1 SOLUTION ORAL at 12:13

## 2024-06-14 RX ADMIN — METOPROLOL TARTRATE 25 MG: 25 TABLET, FILM COATED ORAL at 09:21

## 2024-06-14 RX ADMIN — Medication 1 APPLICATION: at 12:13

## 2024-06-14 RX ADMIN — Medication 2 G: at 07:53

## 2024-06-14 RX ADMIN — PRIMIDONE 100 MG: 50 TABLET ORAL at 09:20

## 2024-06-14 RX ADMIN — SALINE NASAL SPRAY 2 SPRAY: 1.5 SOLUTION NASAL at 12:12

## 2024-06-14 RX ADMIN — Medication 1 APPLICATION: at 07:53

## 2024-06-14 RX ADMIN — SALINE NASAL SPRAY 2 SPRAY: 1.5 SOLUTION NASAL at 07:52

## 2024-06-14 RX ADMIN — PYRIDOXINE HCL TAB 50 MG 100 MG: 50 TAB at 09:20

## 2024-06-14 RX ADMIN — Medication 2 G: at 12:12

## 2024-06-14 RX ADMIN — TORSEMIDE 40 MG: 20 TABLET ORAL at 09:20

## 2024-06-14 NOTE — ASSESSMENT & PLAN NOTE
Patient with bilateral pleural effusions, received 80 mg of Lasix IV in the emergency room.  At home he takes torsemide and Aldactone.  Initially started on Lasix 40 mg IV twice daily.  Of note patient is s/p bilateral thoracentesis with 425 mL and 975 mL.  Repeat chest x-ray shows persistent large right-sided effusion.  Will need thoracentesis again.  IR formed right-sided thoracentesis again 6/10 and removed 950 cc of isidro-colored fluid resume oral Lasix.  However repeat chest x-ray  again shows large right pleural effusion.  Will place on Lasix 40 mg IV twice daily   Transition to oral diuretic regimen.    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 Prognosis is poor given restrictive pericardium   Patient noted to have elevated inflammatory marker will need further follow-up in a few weeks outpatient with cardiology to determine if he is a candidate to be started on rilonocept.  Currently due to his renal function not candidate for colchicine  discussed with mother daily for the last 3 days about goals of care and trying to help her understand that his overall prognosis is guarded at this time recommend palliative care evaluation while in SNF.  She wants to eventually bring him home again after a short course of rehab  Wt Readings from Last 3 Encounters:   06/14/24 96.2 kg (212 lb 1.3 oz)   01/30/24 79.3 kg (174 lb 13.2 oz)   12/08/23 106 kg (234 lb 9.1 oz)

## 2024-06-14 NOTE — ASSESSMENT & PLAN NOTE
Patient's creatinine slightly elevated than baseline.  Baseline appears to be up to 1.7.  Elevated creatinine to 2.4 .  This will be his new baseline in order to help him stay euvolemic  Azul catheter has been removed and he is voiding well.  Lab Results   Component Value Date    EGFR 27 06/14/2024    EGFR 28 06/13/2024    EGFR 29 06/12/2024    CREATININE 2.49 (H) 06/14/2024    CREATININE 2.47 (H) 06/13/2024    CREATININE 2.35 (H) 06/12/2024

## 2024-06-14 NOTE — ASSESSMENT & PLAN NOTE
Current ammonia level is normal.    Resume home Lactulose.  Will resume home aldactone and torsemide

## 2024-06-14 NOTE — CASE MANAGEMENT
Case Management Discharge Planning Note    Patient name Ash Loaiza  Location /-01 MRN [id number]  : 1965 Date 2024       Current Admission Date: 2024  Current Admission Diagnosis:Pneumonia of both lungs due to infectious organism   Patient Active Problem List    Diagnosis Date Noted Date Diagnosed    Encephalopathy 2024     Poor prognosis 2024     Azul catheter in place 2024     Urinary retention 2024     Elevated troponin 2024     Stage 3b chronic kidney disease (HCC) 2024     Acute blood loss anemia 2023     Epistaxis 2023     Chronic respiratory failure with hypercapnia ([facility]) 2023     Acute kidney injury superimposed on chronic kidney disease 3 2023     Chronic diastolic HF (heart failure) ([facility]) 2021     Liver cirrhosis ([facility]) 2021     Abnormal finding on CT scan 2021     EDWIGE (acute kidney injury) ([facility]) 2021     Bilateral pleural effusion 2021     History of COVID-19 2021     S/P pericardial window creation 2021     Acute on chronic heart failure with preserved ejection fraction ([facility]) 2021     Lower extremity pain, left 2021     MRSA nasal colonization 2020     Developmental delay 2020     Dysphagia 2020     Pneumonia of both lungs due to infectious organism 01/10/2020     Nonintractable generalized idiopathic epilepsy without status epilepticus (HCC) 01/10/2020     Falls 01/10/2020     T wave inversion in EKG 01/10/2020     Essential hypertension 01/10/2020     Acute respiratory failure with hypoxia ([facility]) 01/10/2020     Polyp of colon 03/15/2019       LOS (days): 12  Geometric Mean LOS (GMLOS) (days): 5  Days to GMLOS:-6.6     OBJECTIVE:  Risk of Unplanned Readmission Score: 21.29         Current admission status: Inpatient   Preferred Pharmacy:   [id number] - [place], [place] - [address]  [address]  [place] [address]  Phone: [phone] Fax: [phone]    Primary Care Provider: Jose G Holloway DO    Primary Insurance: MEDICARE  Secondary Insurance: [place] [employer]    DISCHARGE DETAILS:        CM placed call to mother Kylie made her aware of confirmed transport with [facility] at 13:30 via stretcher to [facility]. Mother Kylie is requesting to talk to Dr. Jenkins before discharge, MD made aware. CM discussed DC IMM with mother and offered her right to appeal discharge, if she doesn't feel he is ready for discharge. I offered mother Julianne the appeal number with [facility], she wants to talk with doctor first. CM will continue to follow and address d/c planning.            CM spoke to mother Kylie Loaiza via phone, reviewed DC IMM with mother and informed that patient can stay an additional 4 hours for reconsidering appealing the discharge as the medicare rights were review on the day of discharge. Mother  verbalized understanding and feels ready to go SNF and does not intend to stay 4 hours to reconsider.          CM confirmed transport with [facility] 6/14/24 at 13:30, mother Kylie Loaiza aware. [facility] aware of transport.                                                                IMM Given (Date):: 06/14/24  IMM Given to:: Family  Family notified:: Mother Kylie Loaiza

## 2024-06-14 NOTE — ASSESSMENT & PLAN NOTE
Had a detailed discussion with mom (Kylie) and we discussed that the patient is not doing well.  He has not been doing well since the beginning of this year and has declined since admission to the hospital in April 2024 at [facility] for aspiration pneumonia.  Patient continues to choke on thickened liquids at home.  He is now bedbound.  He continues to decline.  During his hospital course I discussed with that he has recurrent aspiration pneumonia, encephalopathic, bilateral pleural effusions and he seems to be getting worse.  She she states that he continues to decline and not do well.  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  Will have palliative care see him on discharge and his CODE STATUS is level 3 DNR/DNI

## 2024-06-14 NOTE — ASSESSMENT & PLAN NOTE
Recurrent aspiration pneumonia.  He had a hospitalization in April 2024 at [facility].   IV Zosyn. completed total 7 days of treatment.  Speech therapy evaluated patient and recommended Pureed with nectar thick liquid with crushed meds.    · Fluids.  · Rest.  · Over the counter analgesics.  · Follow-up with PCP (primary care provider) in 5-7 days if not better or seek medical attention earlier if worsens.   · Plain Mucinex.

## 2024-06-14 NOTE — ASSESSMENT & PLAN NOTE
Moderately large right and moderate size left pleural effusion.  On previous admission in April 2024, he had a right-sided thoracentesis which was transudative.  S/p bilateral thoracentesis on 6/4.  Respiratory status has remained stable subsequently.  However repeat x-ray still shows large right-sided effusion.  Patient is hemodynamically stable with no increased oxygen requirement.  repeat thoracentesis on 6/10 with  cc removed  transition from IV Lasix to oral diuretic regimen.  Repeat labs and chest x-ray in 10 days at SNF

## 2024-06-14 NOTE — DISCHARGE SUMMARY
[facility]  Discharge- Ash Loaiza 1965, 58 y.o. male MRN: [id number]  Unit/Bed#: -01 Encounter: [id number]  Primary Care Provider: Jose G Holloway DO   Date and time admitted to hospital: 6/2/2024  2:10 PM    Acute respiratory failure with hypoxia (HCC)  Assessment & Plan  Multifactorial secondary to heart failure, pleural effusions recurrent aspiration pneumonia.  Continue with oxygen.  Because of recurrent episode of epistaxis, ENT was consulted and recommended using humidified oxygen via facemask  epistaxis has resolved    * Pneumonia of both lungs due to infectious organism  Assessment & Plan  Recurrent aspiration pneumonia.  He had a hospitalization in April 2024 at [facility].   IV Zosyn. completed total 7 days of treatment.  Speech therapy evaluated patient and recommended Pureed with nectar thick liquid with crushed meds.     Acute on chronic heart failure with preserved ejection fraction (HCC)  Assessment & Plan  Patient with bilateral pleural effusions, received 80 mg of Lasix IV in the emergency room.  At home he takes torsemide and Aldactone.  Initially started on Lasix 40 mg IV twice daily.  Of note patient is s/p bilateral thoracentesis with 425 mL and 975 mL.  Repeat chest x-ray shows persistent large right-sided effusion.  Will need thoracentesis again.  IR formed right-sided thoracentesis again 6/10 and removed 950 cc of isidro-colored fluid resume oral Lasix.  However repeat chest x-ray  again shows large right pleural effusion.  Will place on Lasix 40 mg IV twice daily   Transition to oral diuretic regimen.    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 Prognosis is poor given restrictive pericardium   Patient noted to have elevated inflammatory marker will need further follow-up in a few weeks outpatient with cardiology to determine if he is a candidate to be started on rilonocept.  Currently due to his  renal function not candidate for colchicine  discussed with mother daily for the last 3 days about goals of care and trying to help her understand that his overall prognosis is guarded at this time recommend palliative care evaluation while in SNF.  She wants to eventually bring him home again after a short course of rehab  Wt Readings from Last 3 Encounters:   06/14/24 96.2 kg (212 lb 1.3 oz)   01/30/24 79.3 kg (174 lb 13.2 oz)   12/08/23 106 kg (234 lb 9.1 oz)         Poor prognosis  Assessment & Plan  Had a detailed discussion with mom (Kylie) and we discussed that the patient is not doing well.  He has not been doing well since the beginning of this year and has declined since admission to the hospital in April 2024 at [facility] for aspiration pneumonia.  Patient continues to choke on thickened liquids at home.  He is now bedbound.  He continues to decline.  During his hospital course I discussed with that he has recurrent aspiration pneumonia, encephalopathic, bilateral pleural effusions and he seems to be getting worse.  She she states that he continues to decline and not do well.  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  Will have palliative care see him on discharge and his CODE STATUS is level 3 DNR/DNI    Encephalopathy  Assessment & Plan  Patient with developmental delay, but this is most likely secondary to his acute illness including pleural effusions, recurrent aspiration pneumonia.  Mentation appears to be at baseline currently.    Stage 3b chronic kidney disease (HCC)  Assessment & Plan  Patient's creatinine slightly elevated than baseline.  Baseline appears to be up to 1.7.  Elevated creatinine to 2.4 .  This will be his new baseline in order to help him stay euvolemic  Azul catheter has been removed and he is voiding well.  Lab Results   Component Value Date    EGFR 27 06/14/2024    EGFR 28 06/13/2024     EGFR 29 06/12/2024    CREATININE 2.49 (H) 06/14/2024    CREATININE 2.47 (H) 06/13/2024    CREATININE 2.35 (H) 06/12/2024       Epistaxis  Assessment & Plan  Seen by ENT.  Recommended humidified oxygen and nasal saline spray and gel.  No further episodes of epistaxis noted    Liver cirrhosis (HCC)  Assessment & Plan  Current ammonia level is normal.    Resume home Lactulose.  Will resume home aldactone and torsemide    Bilateral pleural effusion  Assessment & Plan  Moderately large right and moderate size left pleural effusion.  On previous admission in April 2024, he had a right-sided thoracentesis which was transudative.  S/p bilateral thoracentesis on 6/4.  Respiratory status has remained stable subsequently.  However repeat x-ray still shows large right-sided effusion.  Patient is hemodynamically stable with no increased oxygen requirement.  repeat thoracentesis on 6/10 with  cc removed  transition from IV Lasix to oral diuretic regimen.  Repeat labs and chest x-ray in 10 days at SNF    Dysphagia  Assessment & Plan  At baseline on pureed diet.   Speech evaluated patient and recs pureed with nectar thick, crushed meds  Continue with ongoing speech follow-up and maintain strict aspiration precautions.  Maintain strict aspiration precautions.  Continue with close supervision with food.    Developmental delay  Assessment & Plan  Since birth, patient is adopted.  It is unknown why he has the mental delay if it is due to anoxic brain injury or cerebral palsy.    Mother Feels very overwhelmed at this point in time and recommend that he go to subacute rehab first before transitioning home after 1 to 2 weeks  Also place consult to palliative care on discharge to rehab    Nonintractable generalized idiopathic epilepsy without status epilepticus (HCC)  Assessment & Plan  At home he takes Lamictal and Zonegran.  Stable and no seizure activity noted      Discharging Physician / Practitioner: Yomaira Jenkins MD  PCP:  Jose G Holloway DO  Admission Date:   Admission Orders (From admission, onward)       Ordered        06/02/24 1801  INPATIENT ADMISSION  Once                          Discharge Date: 06/14/24    Medical Problems       Resolved Problems  Date Reviewed: 6/14/2024   None         Consultations During Hospital Stay:  Cardiology,ir,pt/ot,ent    Procedures Performed:   Thoracocentesis as mentioned above    Significant Findings / Test Results:   XR chest pa & lateral    Result Date: 6/8/2024  Impression: Airspace disease at the lung bases may represent infiltrates and/or atelectasis. Large right pleural effusion. Workstation performed: [id number]     IR IN-Patient Thoracentesis    Result Date: 6/3/2024  Impression: Impression: 1. Successful ultrasound-guided thoracentesis yielding 425 ml left and 975 ml right pleural fluid. Workstation performed: [id number]     CTA ED chest PE Study    Result Date: 6/2/2024  Impression: 1. Mild motion limited examination demonstrating no findings suspicious for pulmonary embolism 2. Moderately large right and moderate-sized left pleural effusion, each mildly increased in size from earlier with adjacent compressive atelectasis mild body wall edema 4. Diffuse pericardial calcification. Workstation performed: [id number]     CT head without contrast    Result Date: 6/2/2024  Impression: No acute intracranial abnormality. Age advanced parenchymal volume loss. Workstation performed: [id number]      Incidental Findings:   none    Test Results Pending at Discharge (will require follow up):   none     Outpatient Tests Requested:  Cxray in 10 days and cbc,cmp in 10 days   Outpt followup with dr ayon cardiology    Complications:  none    Reason for Admission: acute on chronic diastolic heart failure    Hospital Course:     Ash Loaiza is a 58 y.o. male patient who originally presented to the hospital on 6/2/2024 due to acute on chronic diastolic heart failure.  Patient has known calcified  "pericardium with recurrent pericarditis and recurrent pleural effusions.  Patient had thoracentesis done several times and placed on IV diuretics and now transition back to oral diuretics.  To have aspiration pneumonia for which she completed a 7-day course of IV Zosyn.  He has known developmental delay epilepsy.  Overall over the last few months his condition has been declining and he has had 3 hospital stays in the last 6 months.  Discussed with mother about goals of care and about his declining condition however she is having a hard time excepting this as she is very attached to him.  Will ask palliative care to see him outpatient and also cardiology is trying to get rilonocept approved for him which is a new FDA approved medication for constrictive pericarditis and they will follow-up with him in the office it as follow-up.  It might take up to a month for this new medication to get approved for him    overall his prognosis is guarded and he is at very high risk of recurrent pleural effusions    Please see above list of diagnoses and related plan for additional information.     Condition at Discharge: fair     Discharge Day Visit / Exam:     Subjective: he does not appear to be in any acute distress he he answers a few questions with one-word answers eating and drinking well.  Having soft bowel movements with lactulose.  Vitals: Blood Pressure: 110/66 (06/14/24 0809)  Pulse: 81 (06/14/24 0809)  Temperature: 98.2 °F (36.8 °C) (06/14/24 0726)  Temp Source: Temporal (06/10/24 1500)  Respirations: 16 (06/14/24 0726)  Height: 5' 6\" (167.6 cm) (06/02/24 2326)  Weight - Scale: 96.2 kg (212 lb 1.3 oz) (with x2 prev boots) (06/14/24 0600)  SpO2: 93 % (06/14/24 0809)  Exam:   Physical Exam  Vitals and nursing note reviewed.   Constitutional:       Appearance: Normal appearance.   HENT:      Head: Normocephalic and atraumatic.      Right Ear: External ear normal.      Left Ear: External ear normal.      Nose: Nose normal. "      Mouth/Throat:      Pharynx: Oropharynx is clear.   Cardiovascular:      Rate and Rhythm: Normal rate and regular rhythm.      Heart sounds: Normal heart sounds.   Pulmonary:      Effort: Pulmonary effort is normal.      Comments: mod air entry bilaterally with mild decreased breath sounds bilateral bases  Abdominal:      General: Bowel sounds are normal.      Palpations: Abdomen is soft.      Tenderness: There is no abdominal tenderness.   Musculoskeletal:         General: Normal range of motion.      Cervical back: Normal range of motion and neck supple.   Skin:     General: Skin is warm and dry.      Capillary Refill: Capillary refill takes less than 2 seconds.   Neurological:      Mental Status: He is alert. Mental status is at baseline.   Psychiatric:         Mood and Affect: Mood normal.         Discussion with Family: kareem mother    Discharge instructions/Information to patient and family:   See after visit summary for information provided to patient and family.      Provisions for Follow-Up Care:  See after visit summary for information related to follow-up care and any pertinent home health orders.      Disposition:     Other Skilled Nursing Facility at [facility]    For Discharges to [facility] SNF:   Not Applicable to this Patient - Not Applicable to this Patient    Planned Readmission: none     Discharge Statement:  I spent 35 minutes discharging the patient. This time was spent on the day of discharge. I had direct contact with the patient on the day of discharge. Greater than 50% of the total time was spent examining patient, answering all patient questions, arranging and discussing plan of care with patient as well as directly providing post-discharge instructions.  Additional time then spent on discharge activities.    Discharge Medications:  See after visit summary for reconciled discharge medications provided to patient and family.      ** Please Note: This note has been  constructed using a voice recognition system **

## 2024-12-06 NOTE — ED NOTES
"the last time he had routine blood work the doctor said his platelet count was low"  Pt states no trauma  "its on the scrotum, it's on the left inner aspect of the scrotum" as per mom        Kylah Blum RN  12/22/19 5775 range of motion is not limited and there is no muscle tenderness.

## (undated) DEVICE — WORKING LENGTH 235CM, WORKING CHANNEL 2.8MM: Brand: RESOLUTION 360 CLIP